# Patient Record
Sex: MALE | Race: WHITE | NOT HISPANIC OR LATINO | Employment: OTHER | URBAN - METROPOLITAN AREA
[De-identification: names, ages, dates, MRNs, and addresses within clinical notes are randomized per-mention and may not be internally consistent; named-entity substitution may affect disease eponyms.]

---

## 2017-10-17 ENCOUNTER — HOSPITAL ENCOUNTER (OUTPATIENT)
Dept: OTHER | Facility: HOSPITAL | Age: 72
Setting detail: SPECIMEN
Discharge: HOME | End: 2017-10-17
Attending: INTERNAL MEDICINE | Admitting: INTERNAL MEDICINE

## 2018-01-09 ENCOUNTER — HOSPITAL ENCOUNTER (OUTPATIENT)
Dept: PERIOP | Facility: HOSPITAL | Age: 73
Setting detail: HOSPITAL OUTPATIENT SURGERY
End: 2018-01-09
Attending: ORTHOPAEDIC SURGERY | Admitting: ORTHOPAEDIC SURGERY

## 2018-04-18 ENCOUNTER — HOSPITAL ENCOUNTER (OUTPATIENT)
Dept: LAB | Facility: HOSPITAL | Age: 73
Discharge: HOME | End: 2018-04-18
Attending: INTERNAL MEDICINE | Admitting: INTERNAL MEDICINE

## 2018-04-18 LAB
ALBUMIN SERPL-MCNC: 3.3 G/DL (ref 3.5–4.8)
ALBUMIN/GLOB SERPL: 1 {RATIO} (ref 1–1.7)
ALP SERPL-CCNC: 146 IU/L (ref 32–91)
ALT SERPL-CCNC: 38 IU/L (ref 17–63)
ANION GAP SERPL CALC-SCNC: 10.8 MMOL/L (ref 10–20)
AST SERPL-CCNC: 29 IU/L (ref 15–41)
BILIRUB SERPL-MCNC: 0.4 MG/DL (ref 0.3–1.2)
BILIRUB UR QL STRIP: NEGATIVE MG/DL
BUN SERPL-MCNC: 26 MG/DL (ref 8–20)
BUN/CREAT SERPL: 21.7 (ref 6.2–20.3)
CALCIUM SERPL-MCNC: 8.9 MG/DL (ref 8.9–10.3)
CASTS URNS QL MICRO: NORMAL /[LPF]
CHLORIDE SERPL-SCNC: 102 MMOL/L (ref 101–111)
COLOR UR: YELLOW
CONV BACTERIA IN URINE MICRO: NEGATIVE
CONV CLARITY OF URINE: CLEAR
CONV CO2: 27 MMOL/L (ref 22–32)
CONV HYALINE CASTS IN URINE MICRO: 0 /[LPF] (ref 0–5)
CONV PROTEIN IN URINE BY AUTOMATED TEST STRIP: NEGATIVE MG/DL
CONV SMALL ROUND CELLS: NORMAL /[HPF]
CONV TOTAL PROTEIN: 6.6 G/DL (ref 6.1–7.9)
CONV UROBILINOGEN IN URINE BY AUTOMATED TEST STRIP: 0.2 MG/DL
CREAT 24H UR-MCNC: 175.8 MG/DL
CREAT UR-MCNC: 1.2 MG/DL (ref 0.7–1.2)
CULTURE INDICATED?: NORMAL
ERYTHROCYTE [DISTWIDTH] IN BLOOD BY AUTOMATED COUNT: 15.9 % (ref 11.5–14.5)
GLOBULIN UR ELPH-MCNC: 3.3 G/DL (ref 2.5–3.8)
GLUCOSE SERPL-MCNC: 97 MG/DL (ref 65–99)
GLUCOSE UR QL: NEGATIVE MG/DL
HCT VFR BLD AUTO: 38.3 % (ref 40–54)
HGB BLD-MCNC: 12.4 G/DL (ref 14–18)
HGB UR QL STRIP: NEGATIVE
KETONES UR QL STRIP: NEGATIVE MG/DL
LEUKOCYTE ESTERASE UR QL STRIP: NEGATIVE
MAGNESIUM SERPL-MCNC: 1.9 MG/DL (ref 1.8–2.5)
MCH RBC QN AUTO: 25.5 PG (ref 26–32)
MCHC RBC AUTO-ENTMCNC: 32.4 G/DL (ref 32–36)
MCV RBC AUTO: 78.8 FL (ref 80–94)
NITRITE UR QL STRIP: NEGATIVE
OSMOLALITY UR: 786 MOSM/KG (ref 300–800)
PH UR STRIP.AUTO: 5 [PH] (ref 4.5–8)
PHOSPHATE SERPL-MCNC: 4.4 MG/DL (ref 2.4–4.7)
PLATELET # BLD AUTO: 342 10*3/UL (ref 150–450)
PMV BLD AUTO: 6.9 FL (ref 7.4–10.4)
POTASSIUM SERPL-SCNC: 4.8 MMOL/L (ref 3.6–5.1)
PROT UR-MCNC: 14 MG/DL
PROT/CREAT UR: 0.1 MG/MG (ref 0–22)
RBC # BLD AUTO: 4.86 10*6/UL (ref 4.6–6)
RBC #/AREA URNS HPF: 1 /[HPF] (ref 0–3)
SODIUM SERPL-SCNC: 135 MMOL/L (ref 136–144)
SODIUM UR-SCNC: 91 MMOL/L
SP GR UR: 1.03 (ref 1–1.03)
SPERM URNS QL MICRO: NORMAL /[HPF]
SQUAMOUS SPT QL MICRO: 0 /[HPF] (ref 0–5)
UNIDENT CRYS URNS QL MICRO: NORMAL /[HPF]
WBC # BLD AUTO: 7.6 10*3/UL (ref 4.5–11.5)
WBC #/AREA URNS HPF: 0 /[HPF] (ref 0–5)
YEAST SPEC QL WET PREP: NORMAL /[HPF]

## 2018-04-19 LAB — PROT PATTERN SERPL IFE-IMP: NORMAL

## 2018-04-24 LAB
KAPPA LC SERPL-MCNC: 2 MG/DL (ref 0.33–1.94)
KAPPA LC/LAMBDA SER: 0.53 {RATIO} (ref 0.26–1.65)
LAMBDA LC FREE SERPL-MCNC: 3.75 MG/DL (ref 0.57–2.63)

## 2018-06-15 ENCOUNTER — HOSPITAL ENCOUNTER (OUTPATIENT)
Dept: FAMILY MEDICINE CLINIC | Facility: CLINIC | Age: 73
Setting detail: SPECIMEN
Discharge: HOME | End: 2018-06-15
Attending: NURSE PRACTITIONER | Admitting: NURSE PRACTITIONER

## 2018-06-15 LAB
ALBUMIN SERPL-MCNC: 3.9 G/DL (ref 3.5–4.8)
ALBUMIN/GLOB SERPL: 1.1 {RATIO} (ref 1–1.7)
ALP SERPL-CCNC: 167 IU/L (ref 32–91)
ALT SERPL-CCNC: 53 IU/L (ref 17–63)
ANION GAP SERPL CALC-SCNC: 13.5 MMOL/L (ref 10–20)
AST SERPL-CCNC: 33 IU/L (ref 15–41)
BASOPHILS # BLD AUTO: 0 10*3/UL (ref 0–0.2)
BASOPHILS NFR BLD AUTO: 1 % (ref 0–2)
BILIRUB SERPL-MCNC: 0.7 MG/DL (ref 0.3–1.2)
BUN SERPL-MCNC: 30 MG/DL (ref 8–20)
BUN/CREAT SERPL: 21.4 (ref 6.2–20.3)
CALCIUM SERPL-MCNC: 9.6 MG/DL (ref 8.9–10.3)
CHLORIDE SERPL-SCNC: 99 MMOL/L (ref 101–111)
CHOLEST SERPL-MCNC: 260 MG/DL
CHOLEST/HDLC SERPL: 5.1 {RATIO}
CONV CO2: 27 MMOL/L (ref 22–32)
CONV LDL CHOLESTEROL DIRECT: 172 MG/DL (ref 0–100)
CONV MICROALBUM.,U,RANDOM: 2 MG/L
CONV TOTAL PROTEIN: 7.6 G/DL (ref 6.1–7.9)
CREAT 24H UR-MCNC: 183.6 MG/DL
CREAT UR-MCNC: 1.4 MG/DL (ref 0.7–1.2)
DIFFERENTIAL METHOD BLD: (no result)
EOSINOPHIL # BLD AUTO: 0.2 10*3/UL (ref 0–0.3)
EOSINOPHIL # BLD AUTO: 2 % (ref 0–3)
ERYTHROCYTE [DISTWIDTH] IN BLOOD BY AUTOMATED COUNT: 16.1 % (ref 11.5–14.5)
GLOBULIN UR ELPH-MCNC: 3.7 G/DL (ref 2.5–3.8)
GLUCOSE SERPL-MCNC: 92 MG/DL (ref 65–99)
HCT VFR BLD AUTO: 43.5 % (ref 40–54)
HDLC SERPL-MCNC: 51 MG/DL
HGB BLD-MCNC: 14.2 G/DL (ref 14–18)
LDLC/HDLC SERPL: 3.3 {RATIO}
LIPID INTERPRETATION: ABNORMAL
LYMPHOCYTES # BLD AUTO: 2.7 10*3/UL (ref 0.8–4.8)
LYMPHOCYTES NFR BLD AUTO: 28 % (ref 18–42)
MCH RBC QN AUTO: 26.1 PG (ref 26–32)
MCHC RBC AUTO-ENTMCNC: 32.6 G/DL (ref 32–36)
MCV RBC AUTO: 80 FL (ref 80–94)
MICROALBUMIN/CREAT UR: 1.1 UG/MG
MONOCYTES # BLD AUTO: 0.6 10*3/UL (ref 0.1–1.3)
MONOCYTES NFR BLD AUTO: 6 % (ref 2–11)
NEUTROPHILS # BLD AUTO: 6 10*3/UL (ref 2.3–8.6)
NEUTROPHILS NFR BLD AUTO: 63 % (ref 50–75)
NRBC BLD AUTO-RTO: 0 /100{WBCS}
NRBC/RBC NFR BLD MANUAL: 0 10*3/UL
PLATELET # BLD AUTO: 339 10*3/UL (ref 150–450)
PMV BLD AUTO: 7.1 FL (ref 7.4–10.4)
POTASSIUM SERPL-SCNC: 4.5 MMOL/L (ref 3.6–5.1)
RBC # BLD AUTO: 5.44 10*6/UL (ref 4.6–6)
SODIUM SERPL-SCNC: 135 MMOL/L (ref 136–144)
TRIGL SERPL-MCNC: 150 MG/DL
VLDLC SERPL CALC-MCNC: 37 MG/DL
WBC # BLD AUTO: 9.5 10*3/UL (ref 4.5–11.5)

## 2018-07-24 ENCOUNTER — CONVERSION ENCOUNTER (OUTPATIENT)
Dept: PAIN MEDICINE | Facility: CLINIC | Age: 73
End: 2018-07-24

## 2018-08-02 ENCOUNTER — HOSPITAL ENCOUNTER (OUTPATIENT)
Dept: PAIN MEDICINE | Facility: HOSPITAL | Age: 73
Discharge: HOME | End: 2018-08-02
Attending: PHYSICAL MEDICINE & REHABILITATION | Admitting: PHYSICAL MEDICINE & REHABILITATION

## 2018-08-02 LAB
AMPHETAMINES UR QL SCN: NEGATIVE
BARBITURATES UR QL SCN: NEGATIVE
BENZODIAZ UR QL SCN: NEGATIVE
BZE UR QL SCN: NEGATIVE
CREAT 24H UR-MCNC: ABNORMAL MG/DL
METHADONE UR QL SCN: NEGATIVE
OPIATE CONFIRMATION URINE: ABNORMAL
OPIATES TESTED UR SCN: ABNORMAL
PCP UR QL: NEGATIVE
THC SERPLBLD CFM-MCNC: NEGATIVE NG/ML

## 2018-08-13 ENCOUNTER — PREP FOR SURGERY (OUTPATIENT)
Dept: OTHER | Facility: HOSPITAL | Age: 73
End: 2018-08-13

## 2018-08-13 DIAGNOSIS — S72.452K: Primary | ICD-10-CM

## 2018-08-13 RX ORDER — OXYCODONE HCL 10 MG/1
20 TABLET, FILM COATED, EXTENDED RELEASE ORAL ONCE
Status: CANCELLED | OUTPATIENT
Start: 2018-08-31 | End: 2018-08-31

## 2018-08-13 RX ORDER — ACETAMINOPHEN 500 MG
1000 TABLET ORAL ONCE
Status: CANCELLED | OUTPATIENT
Start: 2018-08-31 | End: 2018-08-31

## 2018-08-13 RX ORDER — CEFAZOLIN SODIUM 2 G/100ML
2 INJECTION, SOLUTION INTRAVENOUS ONCE
Status: CANCELLED | OUTPATIENT
Start: 2018-08-31 | End: 2018-08-31

## 2018-08-16 ENCOUNTER — HOSPITAL ENCOUNTER (OUTPATIENT)
Dept: PAIN MEDICINE | Facility: HOSPITAL | Age: 73
Discharge: HOME | End: 2018-08-16
Attending: PHYSICAL MEDICINE & REHABILITATION | Admitting: PHYSICAL MEDICINE & REHABILITATION

## 2018-08-23 ENCOUNTER — HOSPITAL ENCOUNTER (OUTPATIENT)
Dept: GENERAL RADIOLOGY | Facility: HOSPITAL | Age: 73
Discharge: HOME | End: 2018-08-23

## 2018-08-23 ENCOUNTER — HOSPITAL ENCOUNTER (OUTPATIENT)
Dept: CARDIAC REHAB | Facility: HOSPITAL | Age: 73
Discharge: HOME | End: 2018-08-23
Attending: INTERNAL MEDICINE | Admitting: INTERNAL MEDICINE

## 2018-08-23 ENCOUNTER — APPOINTMENT (OUTPATIENT)
Dept: PREADMISSION TESTING | Facility: HOSPITAL | Age: 73
End: 2018-08-23

## 2018-08-23 ENCOUNTER — HOSPITAL ENCOUNTER (OUTPATIENT)
Dept: GENERAL RADIOLOGY | Facility: HOSPITAL | Age: 73
Discharge: HOME | End: 2018-08-23
Admitting: ORTHOPAEDIC SURGERY

## 2018-08-23 VITALS
TEMPERATURE: 99.4 F | BODY MASS INDEX: 31.07 KG/M2 | DIASTOLIC BLOOD PRESSURE: 77 MMHG | HEIGHT: 68 IN | OXYGEN SATURATION: 97 % | RESPIRATION RATE: 20 BRPM | SYSTOLIC BLOOD PRESSURE: 124 MMHG | WEIGHT: 205 LBS | HEART RATE: 86 BPM

## 2018-08-23 DIAGNOSIS — S72.452K: ICD-10-CM

## 2018-08-23 LAB
ALBUMIN SERPL-MCNC: 4 G/DL (ref 3.5–5.2)
ALBUMIN/GLOB SERPL: 1.3 G/DL
ALP SERPL-CCNC: 143 U/L (ref 39–117)
ALT SERPL W P-5'-P-CCNC: 40 U/L (ref 1–41)
ANION GAP SERPL CALCULATED.3IONS-SCNC: 6.9 MMOL/L
APTT PPP: 27.4 SECONDS (ref 22.7–35.4)
AST SERPL-CCNC: 24 U/L (ref 1–40)
BACTERIA UR QL AUTO: ABNORMAL /HPF
BASOPHILS # BLD AUTO: 0.02 10*3/MM3 (ref 0–0.2)
BASOPHILS NFR BLD AUTO: 0.2 % (ref 0–1.5)
BILIRUB SERPL-MCNC: 0.3 MG/DL (ref 0.1–1.2)
BILIRUB UR QL STRIP: NEGATIVE
BUN BLD-MCNC: 22 MG/DL (ref 8–23)
BUN/CREAT SERPL: 20.2 (ref 7–25)
CALCIUM SPEC-SCNC: 8.8 MG/DL (ref 8.6–10.5)
CHLORIDE SERPL-SCNC: 105 MMOL/L (ref 98–107)
CLARITY UR: CLEAR
CO2 SERPL-SCNC: 26.1 MMOL/L (ref 22–29)
COLOR UR: YELLOW
CREAT BLD-MCNC: 1.09 MG/DL (ref 0.76–1.27)
DEPRECATED RDW RBC AUTO: 45.3 FL (ref 37–54)
EOSINOPHIL # BLD AUTO: 0.19 10*3/MM3 (ref 0–0.7)
EOSINOPHIL NFR BLD AUTO: 2.3 % (ref 0.3–6.2)
ERYTHROCYTE [DISTWIDTH] IN BLOOD BY AUTOMATED COUNT: 14.4 % (ref 11.5–14.5)
GFR SERPL CREATININE-BSD FRML MDRD: 67 ML/MIN/1.73
GLOBULIN UR ELPH-MCNC: 3 GM/DL
GLUCOSE BLD-MCNC: 223 MG/DL (ref 65–99)
GLUCOSE UR STRIP-MCNC: ABNORMAL MG/DL
HBA1C MFR BLD: 7.9 % (ref 4.8–5.6)
HCT VFR BLD AUTO: 43.1 % (ref 40.4–52.2)
HGB BLD-MCNC: 13.4 G/DL (ref 13.7–17.6)
HGB UR QL STRIP.AUTO: ABNORMAL
HYALINE CASTS UR QL AUTO: ABNORMAL /LPF
IMM GRANULOCYTES # BLD: 0.02 10*3/MM3 (ref 0–0.03)
IMM GRANULOCYTES NFR BLD: 0.2 % (ref 0–0.5)
INR PPP: 1.04 (ref 0.9–1.1)
KETONES UR QL STRIP: ABNORMAL
LEUKOCYTE ESTERASE UR QL STRIP.AUTO: ABNORMAL
LYMPHOCYTES # BLD AUTO: 2.03 10*3/MM3 (ref 0.9–4.8)
LYMPHOCYTES NFR BLD AUTO: 24.6 % (ref 19.6–45.3)
MCH RBC QN AUTO: 26.9 PG (ref 27–32.7)
MCHC RBC AUTO-ENTMCNC: 31.1 G/DL (ref 32.6–36.4)
MCV RBC AUTO: 86.5 FL (ref 79.8–96.2)
MONOCYTES # BLD AUTO: 0.57 10*3/MM3 (ref 0.2–1.2)
MONOCYTES NFR BLD AUTO: 6.9 % (ref 5–12)
NEUTROPHILS # BLD AUTO: 5.41 10*3/MM3 (ref 1.9–8.1)
NEUTROPHILS NFR BLD AUTO: 65.8 % (ref 42.7–76)
NITRITE UR QL STRIP: NEGATIVE
PH UR STRIP.AUTO: <=5 [PH] (ref 5–8)
PLATELET # BLD AUTO: 295 10*3/MM3 (ref 140–500)
PMV BLD AUTO: 9.5 FL (ref 6–12)
POTASSIUM BLD-SCNC: 4.5 MMOL/L (ref 3.5–5.2)
PROT SERPL-MCNC: 7 G/DL (ref 6–8.5)
PROT UR QL STRIP: ABNORMAL
PROTHROMBIN TIME: 13.4 SECONDS (ref 11.7–14.2)
RBC # BLD AUTO: 4.98 10*6/MM3 (ref 4.6–6)
RBC # UR: ABNORMAL /HPF
REF LAB TEST METHOD: ABNORMAL
SODIUM BLD-SCNC: 138 MMOL/L (ref 136–145)
SP GR UR STRIP: 1.02 (ref 1–1.03)
SQUAMOUS #/AREA URNS HPF: ABNORMAL /HPF
UROBILINOGEN UR QL STRIP: ABNORMAL
WBC NRBC COR # BLD: 8.24 10*3/MM3 (ref 4.5–10.7)
WBC UR QL AUTO: ABNORMAL /HPF

## 2018-08-23 PROCEDURE — 85610 PROTHROMBIN TIME: CPT | Performed by: ORTHOPAEDIC SURGERY

## 2018-08-23 PROCEDURE — 83036 HEMOGLOBIN GLYCOSYLATED A1C: CPT | Performed by: ORTHOPAEDIC SURGERY

## 2018-08-23 PROCEDURE — 81001 URINALYSIS AUTO W/SCOPE: CPT | Performed by: ORTHOPAEDIC SURGERY

## 2018-08-23 PROCEDURE — 71046 X-RAY EXAM CHEST 2 VIEWS: CPT

## 2018-08-23 PROCEDURE — 85025 COMPLETE CBC W/AUTO DIFF WBC: CPT | Performed by: ORTHOPAEDIC SURGERY

## 2018-08-23 PROCEDURE — 80053 COMPREHEN METABOLIC PANEL: CPT | Performed by: ORTHOPAEDIC SURGERY

## 2018-08-23 PROCEDURE — 36415 COLL VENOUS BLD VENIPUNCTURE: CPT

## 2018-08-23 PROCEDURE — 85730 THROMBOPLASTIN TIME PARTIAL: CPT | Performed by: ORTHOPAEDIC SURGERY

## 2018-08-23 PROCEDURE — 73560 X-RAY EXAM OF KNEE 1 OR 2: CPT

## 2018-08-23 RX ORDER — TAMSULOSIN HYDROCHLORIDE 0.4 MG/1
1 CAPSULE ORAL NIGHTLY
COMMUNITY
End: 2019-06-25 | Stop reason: SDUPTHER

## 2018-08-23 RX ORDER — SODIUM CHLORIDE 1000 MG
1 TABLET, SOLUBLE MISCELLANEOUS 2 TIMES DAILY
COMMUNITY
End: 2019-04-08

## 2018-08-23 RX ORDER — GLIPIZIDE 5 MG/1
5 TABLET ORAL EVERY MORNING
COMMUNITY
End: 2019-09-10 | Stop reason: SDUPTHER

## 2018-08-23 RX ORDER — MEMANTINE HYDROCHLORIDE 5 MG/1
5 TABLET ORAL 2 TIMES DAILY
COMMUNITY
End: 2020-08-26

## 2018-08-23 RX ORDER — LEVOTHYROXINE SODIUM 0.05 MG/1
50 TABLET ORAL EVERY MORNING
COMMUNITY
End: 2019-12-10 | Stop reason: ALTCHOICE

## 2018-08-23 RX ORDER — TRAZODONE HYDROCHLORIDE 50 MG/1
50 TABLET ORAL AS NEEDED
COMMUNITY
End: 2019-06-25 | Stop reason: SDUPTHER

## 2018-08-23 RX ORDER — MAGNESIUM OXIDE 400 MG/1
400 TABLET ORAL 2 TIMES DAILY
COMMUNITY
End: 2019-10-23 | Stop reason: SDUPTHER

## 2018-08-23 RX ORDER — METOPROLOL SUCCINATE 25 MG/1
25 TABLET, EXTENDED RELEASE ORAL EVERY MORNING
COMMUNITY
End: 2019-12-31

## 2018-08-23 RX ORDER — ASPIRIN 325 MG
325 TABLET, DELAYED RELEASE (ENTERIC COATED) ORAL 3 TIMES WEEKLY
COMMUNITY
End: 2018-09-06 | Stop reason: HOSPADM

## 2018-08-23 RX ORDER — ARIPIPRAZOLE 20 MG/1
20 TABLET ORAL EVERY MORNING
COMMUNITY
End: 2019-06-25 | Stop reason: ALTCHOICE

## 2018-08-23 ASSESSMENT — KOOS JR
KOOS JR SCORE: 47.487
KOOS JR SCORE: 16

## 2018-08-23 NOTE — DISCHARGE INSTRUCTIONS
Take the following medications the morning of surgery with a small sip of water:  metoprolol        General Instructions:  • Do not eat solid food after midnight the night before surgery.  • You may drink clear liquids day of surgery but must stop at least one hour before your hospital arrival time.  • It is beneficial for you to have a clear drink that contains carbohydrates the day of surgery.  We suggest a 12 to 20 ounce bottle of Gatorade or Powerade for non-diabetic patients or a 12 to 20 ounce bottle of G2 or Powerade Zero for diabetic patients. (Pediatric patients, are not advised to drink a 12 to 20 ounce carbohydrate drink)    Clear liquids are liquids you can see through.  Nothing red in color.     Plain water                               Sports drinks  Sodas                                   Gelatin (Jell-O)  Fruit juices without pulp such as white grape juice and apple juice  Popsicles that contain no fruit or yogurt  Tea or coffee (no cream or milk added)  Gatorade / Powerade  G2 / Powerade Zero    • Infants may have breast milk up to four hours before surgery.  • Infants drinking formula may drink formula up to six hours before surgery.   • Patients who avoid smoking, chewing tobacco and alcohol for 4 weeks prior to surgery have a reduced risk of post-operative complications.  Quit smoking as many days before surgery as you can.  • Do not smoke, use chewing tobacco or drink alcohol the day of surgery.   • If applicable bring your C-PAP/ BI-PAP machine.  • Bring any papers given to you in the doctor’s office.  • Wear clean comfortable clothes and socks.  • Do not wear contact lenses or make-up.  Bring a case for your glasses.   • Bring crutches or walker if applicable.  • Remove all piercings.  Leave jewelry and any other valuables at home.  • Hair extensions with metal clips must be removed prior to surgery.  • The Pre-Admission Testing nurse will instruct you to bring medications if unable to obtain  an accurate list in Pre-Admission Testing.        If you were given a blood bank ID arm band remember to bring it with you the day of surgery.    Preventing a Surgical Site Infection:  • For 2 to 3 days before surgery, avoid shaving with a razor because the razor can irritate skin and make it easier to develop an infection.    • Any areas of open skin can increase the risk of a post-operative wound infection by allowing bacteria to enter and travel throughout the body.  Notify your surgeon if you have any skin wounds / rashes even if it is not near the expected surgical site.  The area will need assessed to determine if surgery should be delayed until it is healed.  • The night prior to surgery sleep in a clean bed with clean clothing.  Do not allow pets to sleep with you.  • Shower on the morning of surgery using a fresh bar of anti-bacterial soap (such as Dial) and clean washcloth.  Dry with a clean towel and dress in clean clothing.  • Ask your surgeon if you will be receiving antibiotics prior to surgery.  • Make sure you, your family, and all healthcare providers clean their hands with soap and water or an alcohol based hand  before caring for you or your wound.    Day of surgery:  Upon arrival, a Pre-op nurse and Anesthesiologist will review your health history, obtain vital signs, and answer questions you may have.  The only belongings needed at this time will be your home medications and if applicable your C-PAP/BI-PAP machine.  If you are staying overnight your family can leave the rest of your belongings in the car and bring them to your room later.  A Pre-op nurse will start an IV and you may receive medication in preparation for surgery, including something to help you relax.  Your family will be able to see you in the Pre-op area.  While you are in surgery your family should notify the waiting room  if they leave the waiting room area and provide a contact phone number.    Please be  aware that surgery does come with discomfort.  We want to make every effort to control your discomfort so please discuss any uncontrolled symptoms with your nurse.   Your doctor will most likely have prescribed pain medications.      If you are going home after surgery you will receive individualized written care instructions before being discharged.  A responsible adult must drive you to and from the hospital on the day of your surgery and stay with you for 24 hours.    If you are staying overnight following surgery, you will be transported to your hospital room following the recovery period.  [facility] has all private rooms.    You have received a list of surgical assistants for your reference.  If you have any questions please call Pre-Admission Testing at [phone].  Deductibles and co-payments are collected on the day of service. Please be prepared to pay the required co-pay, deductible or deposit on the day of service as defined by your plan.    2% CHLORAHEXIDINE GLUCONATE* CLOTH  Preparing or “prepping” skin before surgery can reduce the risk of infection at the surgical site. To make the process easier, [facility] has chosen disposable cloths moistened with a rinse-free, 2% Chlorhexidine Gluconate (CHG) antiseptic solution. The steps below outline the prepping process and should be carefully followed.        Use the prep cloth on the area that is circled in the diagram             Directions Night before Surgery  1) Shower using a fresh bar of anti-bacterial soap (such as Dial) and clean washcloth.  Use a clean towel to completely dry your skin.  2) Do not use any lotions, oils or creams on your skin.  3) Open the package and remove 1 cloth, wipe your skin for 30 seconds in a circular motion.  Allow to dry for 3 minutes.  4) Repeat #3 with second cloth.  5) Do not touch your eyes, ears, or mouth with the prep cloth.  6) Allow the wet prep solution to air dry.  7) Discard the  prep cloth and wash your hands with soap and water.   8) Dress in clean bed clothes and sleep on fresh clean bed sheets.   9) You may experience some temporary itching after the prep.    Directions Day of Surgery  1) Repeat steps 1,2,3,4,5,6,7, and 9.   2) Dress in clean clothes before coming to the hospital.    BACTROBAN NASAL OINTMENT  There are many germs normally in your nose. Bactroban is an ointment that will help reduce these germs. Please follow these instructions for Bactroban use:      ____The day before surgery in the morning  Date________    ____The day before surgery in the evening              Date________    ____The day of surgery in the morning    Date________    **Squirt ½ package of Bactroban Ointment onto a cotton applicator and apply to inside of 1st nostril.  Squirt the remaining Bactroban and apply to the inside of the other nostril.    PERIDEX- ORAL:  Use only if your surgeon has ordered  Use the night before and morning of surgery - Swish, gargle, and spit - do not swallow.

## 2018-08-31 ENCOUNTER — APPOINTMENT (OUTPATIENT)
Dept: GENERAL RADIOLOGY | Facility: HOSPITAL | Age: 73
End: 2018-08-31

## 2018-08-31 ENCOUNTER — ANESTHESIA (OUTPATIENT)
Dept: PERIOP | Facility: HOSPITAL | Age: 73
End: 2018-08-31

## 2018-08-31 ENCOUNTER — ANESTHESIA EVENT (OUTPATIENT)
Dept: PERIOP | Facility: HOSPITAL | Age: 73
End: 2018-08-31

## 2018-08-31 ENCOUNTER — HOSPITAL ENCOUNTER (INPATIENT)
Facility: HOSPITAL | Age: 73
LOS: 6 days | Discharge: NURSING, REHAB OR LONG-TERM CARE | End: 2018-09-06
Attending: ORTHOPAEDIC SURGERY | Admitting: ORTHOPAEDIC SURGERY

## 2018-08-31 DIAGNOSIS — Z74.09 IMPAIRED FUNCTIONAL MOBILITY, BALANCE, GAIT, AND ENDURANCE: Primary | ICD-10-CM

## 2018-08-31 DIAGNOSIS — S72.452K: ICD-10-CM

## 2018-08-31 PROBLEM — N40.0 BPH (BENIGN PROSTATIC HYPERPLASIA): Status: ACTIVE | Noted: 2018-08-31

## 2018-08-31 PROBLEM — I10 ESSENTIAL HYPERTENSION: Status: ACTIVE | Noted: 2018-08-31

## 2018-08-31 PROBLEM — E11.9 DM (DIABETES MELLITUS), TYPE 2 (HCC): Status: ACTIVE | Noted: 2018-08-31

## 2018-08-31 PROBLEM — E03.9 HYPOTHYROIDISM: Status: ACTIVE | Noted: 2018-08-31

## 2018-08-31 LAB
GLUCOSE BLDC GLUCOMTR-MCNC: 117 MG/DL (ref 70–130)
GLUCOSE BLDC GLUCOMTR-MCNC: 124 MG/DL (ref 70–130)
GLUCOSE BLDC GLUCOMTR-MCNC: 126 MG/DL (ref 70–130)
GLUCOSE BLDC GLUCOMTR-MCNC: 188 MG/DL (ref 70–130)

## 2018-08-31 PROCEDURE — 25010000003 CEFAZOLIN IN DEXTROSE 2-4 GM/100ML-% SOLUTION: Performed by: ORTHOPAEDIC SURGERY

## 2018-08-31 PROCEDURE — C1713 ANCHOR/SCREW BN/BN,TIS/BN: HCPCS | Performed by: ORTHOPAEDIC SURGERY

## 2018-08-31 PROCEDURE — 25010000002 ALBUMIN HUMAN 5% PER 50 ML: Performed by: NURSE ANESTHETIST, CERTIFIED REGISTERED

## 2018-08-31 PROCEDURE — C1776 JOINT DEVICE (IMPLANTABLE): HCPCS | Performed by: ORTHOPAEDIC SURGERY

## 2018-08-31 PROCEDURE — 63710000001 INSULIN DETEMIR PER 5 UNITS: Performed by: INTERNAL MEDICINE

## 2018-08-31 PROCEDURE — 73560 X-RAY EXAM OF KNEE 1 OR 2: CPT

## 2018-08-31 PROCEDURE — 82962 GLUCOSE BLOOD TEST: CPT

## 2018-08-31 PROCEDURE — 25010000002 FENTANYL CITRATE (PF) 100 MCG/2ML SOLUTION: Performed by: ANESTHESIOLOGY

## 2018-08-31 PROCEDURE — 63710000001 INSULIN ASPART PER 5 UNITS: Performed by: INTERNAL MEDICINE

## 2018-08-31 PROCEDURE — 25010000002 ONDANSETRON PER 1 MG: Performed by: NURSE ANESTHETIST, CERTIFIED REGISTERED

## 2018-08-31 PROCEDURE — 25010000002 HYDROMORPHONE PER 4 MG: Performed by: ORTHOPAEDIC SURGERY

## 2018-08-31 PROCEDURE — 25010000002 PROPOFOL 10 MG/ML EMULSION: Performed by: NURSE ANESTHETIST, CERTIFIED REGISTERED

## 2018-08-31 PROCEDURE — 0QPC04Z REMOVAL OF INTERNAL FIXATION DEVICE FROM LEFT LOWER FEMUR, OPEN APPROACH: ICD-10-PCS | Performed by: ORTHOPAEDIC SURGERY

## 2018-08-31 PROCEDURE — 25010000002 FENTANYL CITRATE (PF) 100 MCG/2ML SOLUTION: Performed by: NURSE ANESTHETIST, CERTIFIED REGISTERED

## 2018-08-31 PROCEDURE — 97162 PT EVAL MOD COMPLEX 30 MIN: CPT

## 2018-08-31 PROCEDURE — 25010000002 MIDAZOLAM PER 1 MG: Performed by: ANESTHESIOLOGY

## 2018-08-31 PROCEDURE — P9041 ALBUMIN (HUMAN),5%, 50ML: HCPCS | Performed by: NURSE ANESTHETIST, CERTIFIED REGISTERED

## 2018-08-31 PROCEDURE — 25010000002 PHENYLEPHRINE PER 1 ML: Performed by: NURSE ANESTHETIST, CERTIFIED REGISTERED

## 2018-08-31 PROCEDURE — 25010000002 VANCOMYCIN 10 G RECONSTITUTED SOLUTION: Performed by: ORTHOPAEDIC SURGERY

## 2018-08-31 PROCEDURE — 97110 THERAPEUTIC EXERCISES: CPT

## 2018-08-31 PROCEDURE — 0SRD0J9 REPLACEMENT OF LEFT KNEE JOINT WITH SYNTHETIC SUBSTITUTE, CEMENTED, OPEN APPROACH: ICD-10-PCS | Performed by: ORTHOPAEDIC SURGERY

## 2018-08-31 DEVICE — IMPLANTABLE DEVICE: Type: IMPLANTABLE DEVICE | Site: KNEE | Status: FUNCTIONAL

## 2018-08-31 DEVICE — AXLE KN DURATION MRH: Type: IMPLANTABLE DEVICE | Site: KNEE | Status: FUNCTIONAL

## 2018-08-31 DEVICE — SLV TIB DURATION MRH ROTAT: Type: IMPLANTABLE DEVICE | Site: KNEE | Status: FUNCTIONAL

## 2018-08-31 DEVICE — BUSHING FEM DURATION MRH STD: Type: IMPLANTABLE DEVICE | Site: KNEE | Status: FUNCTIONAL

## 2018-08-31 DEVICE — CMT BONE SIMPLEX/P FULL DOSE 10/PK: Type: IMPLANTABLE DEVICE | Site: KNEE | Status: FUNCTIONAL

## 2018-08-31 DEVICE — COMP TIB ROTAT MRH: Type: IMPLANTABLE DEVICE | Site: KNEE | Status: FUNCTIONAL

## 2018-08-31 RX ORDER — EPHEDRINE SULFATE 50 MG/ML
5 INJECTION, SOLUTION INTRAVENOUS ONCE AS NEEDED
Status: DISCONTINUED | OUTPATIENT
Start: 2018-08-31 | End: 2018-08-31 | Stop reason: HOSPADM

## 2018-08-31 RX ORDER — FENTANYL CITRATE 50 UG/ML
50 INJECTION, SOLUTION INTRAMUSCULAR; INTRAVENOUS
Status: DISCONTINUED | OUTPATIENT
Start: 2018-08-31 | End: 2018-08-31 | Stop reason: HOSPADM

## 2018-08-31 RX ORDER — MAGNESIUM OXIDE 400 MG/1
400 TABLET ORAL 2 TIMES DAILY
Status: DISCONTINUED | OUTPATIENT
Start: 2018-08-31 | End: 2018-09-06 | Stop reason: HOSPADM

## 2018-08-31 RX ORDER — HYDROCODONE BITARTRATE AND ACETAMINOPHEN 7.5; 325 MG/1; MG/1
2 TABLET ORAL EVERY 4 HOURS PRN
Status: DISCONTINUED | OUTPATIENT
Start: 2018-08-31 | End: 2018-09-06 | Stop reason: HOSPADM

## 2018-08-31 RX ORDER — ONDANSETRON 4 MG/1
4 TABLET, ORALLY DISINTEGRATING ORAL EVERY 6 HOURS PRN
Status: DISCONTINUED | OUTPATIENT
Start: 2018-08-31 | End: 2018-09-06 | Stop reason: HOSPADM

## 2018-08-31 RX ORDER — SODIUM CHLORIDE 9 MG/ML
100 INJECTION, SOLUTION INTRAVENOUS CONTINUOUS
Status: ACTIVE | OUTPATIENT
Start: 2018-08-31 | End: 2018-09-02

## 2018-08-31 RX ORDER — NICOTINE POLACRILEX 4 MG
15 LOZENGE BUCCAL
Status: DISCONTINUED | OUTPATIENT
Start: 2018-08-31 | End: 2018-09-06 | Stop reason: HOSPADM

## 2018-08-31 RX ORDER — FLUMAZENIL 0.1 MG/ML
0.2 INJECTION INTRAVENOUS AS NEEDED
Status: DISCONTINUED | OUTPATIENT
Start: 2018-08-31 | End: 2018-08-31 | Stop reason: HOSPADM

## 2018-08-31 RX ORDER — ALBUMIN, HUMAN INJ 5% 5 %
SOLUTION INTRAVENOUS CONTINUOUS PRN
Status: DISCONTINUED | OUTPATIENT
Start: 2018-08-31 | End: 2018-08-31 | Stop reason: SURG

## 2018-08-31 RX ORDER — PROMETHAZINE HYDROCHLORIDE 25 MG/ML
12.5 INJECTION, SOLUTION INTRAMUSCULAR; INTRAVENOUS ONCE AS NEEDED
Status: DISCONTINUED | OUTPATIENT
Start: 2018-08-31 | End: 2018-08-31 | Stop reason: HOSPADM

## 2018-08-31 RX ORDER — TRAZODONE HYDROCHLORIDE 50 MG/1
50 TABLET ORAL NIGHTLY PRN
Status: DISCONTINUED | OUTPATIENT
Start: 2018-08-31 | End: 2018-09-06 | Stop reason: HOSPADM

## 2018-08-31 RX ORDER — FENTANYL CITRATE 50 UG/ML
INJECTION, SOLUTION INTRAMUSCULAR; INTRAVENOUS AS NEEDED
Status: DISCONTINUED | OUTPATIENT
Start: 2018-08-31 | End: 2018-08-31 | Stop reason: SURG

## 2018-08-31 RX ORDER — MIDAZOLAM HYDROCHLORIDE 1 MG/ML
1 INJECTION INTRAMUSCULAR; INTRAVENOUS
Status: DISCONTINUED | OUTPATIENT
Start: 2018-08-31 | End: 2018-08-31 | Stop reason: HOSPADM

## 2018-08-31 RX ORDER — MEMANTINE HYDROCHLORIDE 5 MG/1
5 TABLET ORAL DAILY
Status: DISCONTINUED | OUTPATIENT
Start: 2018-08-31 | End: 2018-09-06 | Stop reason: HOSPADM

## 2018-08-31 RX ORDER — SODIUM CHLORIDE 0.9 % (FLUSH) 0.9 %
1-10 SYRINGE (ML) INJECTION AS NEEDED
Status: DISCONTINUED | OUTPATIENT
Start: 2018-08-31 | End: 2018-08-31 | Stop reason: HOSPADM

## 2018-08-31 RX ORDER — ROCURONIUM BROMIDE 10 MG/ML
INJECTION, SOLUTION INTRAVENOUS AS NEEDED
Status: DISCONTINUED | OUTPATIENT
Start: 2018-08-31 | End: 2018-08-31 | Stop reason: SURG

## 2018-08-31 RX ORDER — CEFAZOLIN SODIUM 2 G/100ML
2 INJECTION, SOLUTION INTRAVENOUS ONCE
Status: COMPLETED | OUTPATIENT
Start: 2018-08-31 | End: 2018-08-31

## 2018-08-31 RX ORDER — ONDANSETRON 2 MG/ML
INJECTION INTRAMUSCULAR; INTRAVENOUS AS NEEDED
Status: DISCONTINUED | OUTPATIENT
Start: 2018-08-31 | End: 2018-08-31 | Stop reason: SURG

## 2018-08-31 RX ORDER — ARIPIPRAZOLE 10 MG/1
20 TABLET ORAL EVERY MORNING
Status: DISCONTINUED | OUTPATIENT
Start: 2018-09-01 | End: 2018-09-06 | Stop reason: HOSPADM

## 2018-08-31 RX ORDER — UREA 10 %
1 LOTION (ML) TOPICAL NIGHTLY PRN
Status: DISCONTINUED | OUTPATIENT
Start: 2018-08-31 | End: 2018-09-06 | Stop reason: HOSPADM

## 2018-08-31 RX ORDER — DIPHENHYDRAMINE HYDROCHLORIDE 50 MG/ML
25 INJECTION INTRAMUSCULAR; INTRAVENOUS EVERY 6 HOURS PRN
Status: DISCONTINUED | OUTPATIENT
Start: 2018-08-31 | End: 2018-09-06 | Stop reason: HOSPADM

## 2018-08-31 RX ORDER — BISACODYL 5 MG/1
10 TABLET, DELAYED RELEASE ORAL DAILY PRN
Status: DISCONTINUED | OUTPATIENT
Start: 2018-08-31 | End: 2018-09-06 | Stop reason: HOSPADM

## 2018-08-31 RX ORDER — FAMOTIDINE 10 MG/ML
20 INJECTION, SOLUTION INTRAVENOUS ONCE
Status: COMPLETED | OUTPATIENT
Start: 2018-08-31 | End: 2018-08-31

## 2018-08-31 RX ORDER — SODIUM CHLORIDE 1000 MG
1 TABLET, SOLUBLE MISCELLANEOUS 2 TIMES DAILY
Status: DISCONTINUED | OUTPATIENT
Start: 2018-08-31 | End: 2018-09-06 | Stop reason: HOSPADM

## 2018-08-31 RX ORDER — ASPIRIN 325 MG
325 TABLET, DELAYED RELEASE (ENTERIC COATED) ORAL EVERY 12 HOURS SCHEDULED
Status: DISCONTINUED | OUTPATIENT
Start: 2018-09-01 | End: 2018-09-06 | Stop reason: HOSPADM

## 2018-08-31 RX ORDER — DOCUSATE SODIUM 100 MG/1
100 CAPSULE, LIQUID FILLED ORAL 2 TIMES DAILY
Status: DISCONTINUED | OUTPATIENT
Start: 2018-08-31 | End: 2018-09-06 | Stop reason: HOSPADM

## 2018-08-31 RX ORDER — TRANEXAMIC ACID 100 MG/ML
INJECTION, SOLUTION INTRAVENOUS AS NEEDED
Status: DISCONTINUED | OUTPATIENT
Start: 2018-08-31 | End: 2018-08-31 | Stop reason: SURG

## 2018-08-31 RX ORDER — PROPOFOL 10 MG/ML
VIAL (ML) INTRAVENOUS AS NEEDED
Status: DISCONTINUED | OUTPATIENT
Start: 2018-08-31 | End: 2018-08-31 | Stop reason: SURG

## 2018-08-31 RX ORDER — SODIUM CHLORIDE, SODIUM LACTATE, POTASSIUM CHLORIDE, CALCIUM CHLORIDE 600; 310; 30; 20 MG/100ML; MG/100ML; MG/100ML; MG/100ML
9 INJECTION, SOLUTION INTRAVENOUS CONTINUOUS
Status: DISCONTINUED | OUTPATIENT
Start: 2018-08-31 | End: 2018-08-31 | Stop reason: HOSPADM

## 2018-08-31 RX ORDER — ALBUTEROL SULFATE 2.5 MG/3ML
2.5 SOLUTION RESPIRATORY (INHALATION) ONCE AS NEEDED
Status: DISCONTINUED | OUTPATIENT
Start: 2018-08-31 | End: 2018-08-31 | Stop reason: HOSPADM

## 2018-08-31 RX ORDER — DIPHENHYDRAMINE HCL 25 MG
25 CAPSULE ORAL EVERY 6 HOURS PRN
Status: DISCONTINUED | OUTPATIENT
Start: 2018-08-31 | End: 2018-09-06 | Stop reason: HOSPADM

## 2018-08-31 RX ORDER — CEFAZOLIN SODIUM 2 G/100ML
2 INJECTION, SOLUTION INTRAVENOUS EVERY 8 HOURS
Status: COMPLETED | OUTPATIENT
Start: 2018-08-31 | End: 2018-09-01

## 2018-08-31 RX ORDER — LABETALOL HYDROCHLORIDE 5 MG/ML
5 INJECTION, SOLUTION INTRAVENOUS
Status: DISCONTINUED | OUTPATIENT
Start: 2018-08-31 | End: 2018-08-31 | Stop reason: HOSPADM

## 2018-08-31 RX ORDER — LIDOCAINE HYDROCHLORIDE 20 MG/ML
INJECTION, SOLUTION INFILTRATION; PERINEURAL AS NEEDED
Status: DISCONTINUED | OUTPATIENT
Start: 2018-08-31 | End: 2018-08-31 | Stop reason: SURG

## 2018-08-31 RX ORDER — HYDROCODONE BITARTRATE AND ACETAMINOPHEN 7.5; 325 MG/1; MG/1
1 TABLET ORAL EVERY 4 HOURS PRN
Status: DISCONTINUED | OUTPATIENT
Start: 2018-08-31 | End: 2018-09-06 | Stop reason: HOSPADM

## 2018-08-31 RX ORDER — GLIPIZIDE 5 MG/1
5 TABLET ORAL EVERY MORNING
Status: DISCONTINUED | OUTPATIENT
Start: 2018-09-01 | End: 2018-08-31

## 2018-08-31 RX ORDER — SODIUM CHLORIDE, SODIUM LACTATE, POTASSIUM CHLORIDE, CALCIUM CHLORIDE 600; 310; 30; 20 MG/100ML; MG/100ML; MG/100ML; MG/100ML
100 INJECTION, SOLUTION INTRAVENOUS CONTINUOUS
Status: DISCONTINUED | OUTPATIENT
Start: 2018-08-31 | End: 2018-09-06 | Stop reason: HOSPADM

## 2018-08-31 RX ORDER — BISACODYL 10 MG
10 SUPPOSITORY, RECTAL RECTAL DAILY PRN
Status: DISCONTINUED | OUTPATIENT
Start: 2018-08-31 | End: 2018-09-06 | Stop reason: HOSPADM

## 2018-08-31 RX ORDER — ACETAMINOPHEN 500 MG
1000 TABLET ORAL ONCE
Status: COMPLETED | OUTPATIENT
Start: 2018-08-31 | End: 2018-08-31

## 2018-08-31 RX ORDER — ACETAMINOPHEN 325 MG/1
325 TABLET ORAL EVERY 4 HOURS PRN
Status: DISCONTINUED | OUTPATIENT
Start: 2018-08-31 | End: 2018-09-06 | Stop reason: HOSPADM

## 2018-08-31 RX ORDER — ONDANSETRON 4 MG/1
4 TABLET, FILM COATED ORAL EVERY 6 HOURS PRN
Status: DISCONTINUED | OUTPATIENT
Start: 2018-08-31 | End: 2018-09-06 | Stop reason: HOSPADM

## 2018-08-31 RX ORDER — HYDROCODONE BITARTRATE AND ACETAMINOPHEN 7.5; 325 MG/1; MG/1
1 TABLET ORAL ONCE AS NEEDED
Status: DISCONTINUED | OUTPATIENT
Start: 2018-08-31 | End: 2018-08-31 | Stop reason: HOSPADM

## 2018-08-31 RX ORDER — LIDOCAINE HYDROCHLORIDE 10 MG/ML
0.5 INJECTION, SOLUTION EPIDURAL; INFILTRATION; INTRACAUDAL; PERINEURAL ONCE AS NEEDED
Status: DISCONTINUED | OUTPATIENT
Start: 2018-08-31 | End: 2018-08-31 | Stop reason: HOSPADM

## 2018-08-31 RX ORDER — LEVOTHYROXINE SODIUM 0.05 MG/1
50 TABLET ORAL EVERY MORNING
Status: DISCONTINUED | OUTPATIENT
Start: 2018-09-01 | End: 2018-09-06 | Stop reason: HOSPADM

## 2018-08-31 RX ORDER — DEXTROSE MONOHYDRATE 25 G/50ML
25 INJECTION, SOLUTION INTRAVENOUS
Status: DISCONTINUED | OUTPATIENT
Start: 2018-08-31 | End: 2018-09-06 | Stop reason: HOSPADM

## 2018-08-31 RX ORDER — TAMSULOSIN HYDROCHLORIDE 0.4 MG/1
0.4 CAPSULE ORAL NIGHTLY
Status: DISCONTINUED | OUTPATIENT
Start: 2018-08-31 | End: 2018-09-06 | Stop reason: HOSPADM

## 2018-08-31 RX ORDER — DIPHENHYDRAMINE HYDROCHLORIDE 50 MG/ML
12.5 INJECTION INTRAMUSCULAR; INTRAVENOUS
Status: DISCONTINUED | OUTPATIENT
Start: 2018-08-31 | End: 2018-08-31 | Stop reason: HOSPADM

## 2018-08-31 RX ORDER — MIDAZOLAM HYDROCHLORIDE 1 MG/ML
2 INJECTION INTRAMUSCULAR; INTRAVENOUS
Status: DISCONTINUED | OUTPATIENT
Start: 2018-08-31 | End: 2018-08-31 | Stop reason: HOSPADM

## 2018-08-31 RX ORDER — NALOXONE HCL 0.4 MG/ML
0.2 VIAL (ML) INJECTION AS NEEDED
Status: DISCONTINUED | OUTPATIENT
Start: 2018-08-31 | End: 2018-08-31 | Stop reason: HOSPADM

## 2018-08-31 RX ORDER — METOPROLOL SUCCINATE 25 MG/1
25 TABLET, EXTENDED RELEASE ORAL ONCE
Status: COMPLETED | OUTPATIENT
Start: 2018-08-31 | End: 2018-08-31

## 2018-08-31 RX ORDER — OXYCODONE HCL 10 MG/1
20 TABLET, FILM COATED, EXTENDED RELEASE ORAL ONCE
Status: COMPLETED | OUTPATIENT
Start: 2018-08-31 | End: 2018-08-31

## 2018-08-31 RX ORDER — ONDANSETRON 2 MG/ML
4 INJECTION INTRAMUSCULAR; INTRAVENOUS ONCE AS NEEDED
Status: DISCONTINUED | OUTPATIENT
Start: 2018-08-31 | End: 2018-08-31 | Stop reason: HOSPADM

## 2018-08-31 RX ORDER — PROMETHAZINE HYDROCHLORIDE 25 MG/1
25 TABLET ORAL ONCE AS NEEDED
Status: DISCONTINUED | OUTPATIENT
Start: 2018-08-31 | End: 2018-08-31 | Stop reason: HOSPADM

## 2018-08-31 RX ORDER — NALOXONE HCL 0.4 MG/ML
0.1 VIAL (ML) INJECTION
Status: DISCONTINUED | OUTPATIENT
Start: 2018-08-31 | End: 2018-09-01

## 2018-08-31 RX ORDER — METOPROLOL SUCCINATE 25 MG/1
25 TABLET, EXTENDED RELEASE ORAL EVERY MORNING
Status: DISCONTINUED | OUTPATIENT
Start: 2018-09-01 | End: 2018-09-01

## 2018-08-31 RX ORDER — ONDANSETRON 2 MG/ML
4 INJECTION INTRAMUSCULAR; INTRAVENOUS EVERY 6 HOURS PRN
Status: DISCONTINUED | OUTPATIENT
Start: 2018-08-31 | End: 2018-09-06 | Stop reason: HOSPADM

## 2018-08-31 RX ORDER — PROMETHAZINE HYDROCHLORIDE 25 MG/1
25 SUPPOSITORY RECTAL ONCE AS NEEDED
Status: DISCONTINUED | OUTPATIENT
Start: 2018-08-31 | End: 2018-08-31 | Stop reason: HOSPADM

## 2018-08-31 RX ADMIN — FENTANYL CITRATE 50 MCG: 50 INJECTION INTRAMUSCULAR; INTRAVENOUS at 09:26

## 2018-08-31 RX ADMIN — PROPOFOL 150 MG: 10 INJECTION, EMULSION INTRAVENOUS at 09:34

## 2018-08-31 RX ADMIN — PHENYLEPHRINE HYDROCHLORIDE 100 MCG: 10 INJECTION INTRAVENOUS at 12:28

## 2018-08-31 RX ADMIN — Medication 1 G: at 21:08

## 2018-08-31 RX ADMIN — PHENYLEPHRINE HYDROCHLORIDE 100 MCG: 10 INJECTION INTRAVENOUS at 09:38

## 2018-08-31 RX ADMIN — VANCOMYCIN HYDROCHLORIDE 1250 MG: 10 INJECTION, POWDER, LYOPHILIZED, FOR SOLUTION INTRAVENOUS at 08:30

## 2018-08-31 RX ADMIN — FENTANYL CITRATE 50 MCG: 50 INJECTION INTRAMUSCULAR; INTRAVENOUS at 14:22

## 2018-08-31 RX ADMIN — LIDOCAINE HYDROCHLORIDE 80 MG: 20 INJECTION, SOLUTION INFILTRATION; PERINEURAL at 09:34

## 2018-08-31 RX ADMIN — HYDROMORPHONE HYDROCHLORIDE 0.5 MG: 1 INJECTION, SOLUTION INTRAMUSCULAR; INTRAVENOUS; SUBCUTANEOUS at 16:09

## 2018-08-31 RX ADMIN — FENTANYL CITRATE 50 MCG: 50 INJECTION, SOLUTION INTRAMUSCULAR; INTRAVENOUS at 08:26

## 2018-08-31 RX ADMIN — PHENYLEPHRINE HYDROCHLORIDE 100 MCG: 10 INJECTION INTRAVENOUS at 10:43

## 2018-08-31 RX ADMIN — INSULIN ASPART 2 UNITS: 100 INJECTION, SOLUTION INTRAVENOUS; SUBCUTANEOUS at 21:07

## 2018-08-31 RX ADMIN — SUGAMMADEX 400 MG: 100 INJECTION, SOLUTION INTRAVENOUS at 13:13

## 2018-08-31 RX ADMIN — ROCURONIUM BROMIDE 10 MG: 10 INJECTION INTRAVENOUS at 11:20

## 2018-08-31 RX ADMIN — METOPROLOL SUCCINATE 25 MG: 25 TABLET, FILM COATED, EXTENDED RELEASE ORAL at 22:49

## 2018-08-31 RX ADMIN — ROCURONIUM BROMIDE 50 MG: 10 INJECTION INTRAVENOUS at 09:34

## 2018-08-31 RX ADMIN — SODIUM CHLORIDE, POTASSIUM CHLORIDE, SODIUM LACTATE AND CALCIUM CHLORIDE: 600; 310; 30; 20 INJECTION, SOLUTION INTRAVENOUS at 07:01

## 2018-08-31 RX ADMIN — FENTANYL CITRATE 50 MCG: 50 INJECTION INTRAMUSCULAR; INTRAVENOUS at 14:27

## 2018-08-31 RX ADMIN — PHENYLEPHRINE HYDROCHLORIDE 1 MCG/KG/MIN: 10 INJECTION, SOLUTION INTRAMUSCULAR; INTRAVENOUS; SUBCUTANEOUS at 11:26

## 2018-08-31 RX ADMIN — FENTANYL CITRATE 50 MCG: 50 INJECTION INTRAMUSCULAR; INTRAVENOUS at 09:34

## 2018-08-31 RX ADMIN — PHENYLEPHRINE HYDROCHLORIDE 100 MCG: 10 INJECTION INTRAVENOUS at 11:18

## 2018-08-31 RX ADMIN — PHENYLEPHRINE HYDROCHLORIDE 100 MCG: 10 INJECTION INTRAVENOUS at 11:13

## 2018-08-31 RX ADMIN — FENTANYL CITRATE 50 MCG: 50 INJECTION INTRAMUSCULAR; INTRAVENOUS at 14:02

## 2018-08-31 RX ADMIN — TRANEXAMIC ACID 1000 MG: 100 INJECTION, SOLUTION INTRAVENOUS at 09:40

## 2018-08-31 RX ADMIN — ACETAMINOPHEN 1000 MG: 500 TABLET, FILM COATED ORAL at 07:02

## 2018-08-31 RX ADMIN — PHENYLEPHRINE HYDROCHLORIDE 100 MCG: 10 INJECTION INTRAVENOUS at 10:58

## 2018-08-31 RX ADMIN — ROCURONIUM BROMIDE 20 MG: 10 INJECTION INTRAVENOUS at 12:23

## 2018-08-31 RX ADMIN — CEFAZOLIN SODIUM 2 G: 2 INJECTION, SOLUTION INTRAVENOUS at 09:26

## 2018-08-31 RX ADMIN — SODIUM CHLORIDE, POTASSIUM CHLORIDE, SODIUM LACTATE AND CALCIUM CHLORIDE 100 ML/HR: 600; 310; 30; 20 INJECTION, SOLUTION INTRAVENOUS at 18:12

## 2018-08-31 RX ADMIN — MIDAZOLAM HYDROCHLORIDE 1 MG: 2 INJECTION, SOLUTION INTRAMUSCULAR; INTRAVENOUS at 08:30

## 2018-08-31 RX ADMIN — FENTANYL CITRATE 50 MCG: 50 INJECTION INTRAMUSCULAR; INTRAVENOUS at 14:07

## 2018-08-31 RX ADMIN — ONDANSETRON 4 MG: 2 INJECTION INTRAMUSCULAR; INTRAVENOUS at 12:55

## 2018-08-31 RX ADMIN — ROCURONIUM BROMIDE 10 MG: 10 INJECTION INTRAVENOUS at 10:20

## 2018-08-31 RX ADMIN — Medication 400 MG: at 21:08

## 2018-08-31 RX ADMIN — INSULIN DETEMIR 24 UNITS: 100 INJECTION, SOLUTION SUBCUTANEOUS at 21:07

## 2018-08-31 RX ADMIN — FAMOTIDINE 20 MG: 10 INJECTION INTRAVENOUS at 08:29

## 2018-08-31 RX ADMIN — ALBUMIN (HUMAN): 0.05 SOLUTION INTRAVENOUS at 11:26

## 2018-08-31 RX ADMIN — SODIUM CHLORIDE, POTASSIUM CHLORIDE, SODIUM LACTATE AND CALCIUM CHLORIDE: 600; 310; 30; 20 INJECTION, SOLUTION INTRAVENOUS at 10:46

## 2018-08-31 RX ADMIN — HYDROCODONE BITARTRATE AND ACETAMINOPHEN 2 TABLET: 7.5; 325 TABLET ORAL at 18:12

## 2018-08-31 RX ADMIN — TAMSULOSIN HYDROCHLORIDE 0.4 MG: 0.4 CAPSULE ORAL at 21:08

## 2018-08-31 RX ADMIN — OXYCODONE HYDROCHLORIDE 20 MG: 10 TABLET, FILM COATED, EXTENDED RELEASE ORAL at 07:02

## 2018-08-31 RX ADMIN — HYDROCODONE BITARTRATE AND ACETAMINOPHEN 2 TABLET: 7.5; 325 TABLET ORAL at 22:25

## 2018-08-31 RX ADMIN — CEFAZOLIN SODIUM 2 G: 2 INJECTION, SOLUTION INTRAVENOUS at 18:12

## 2018-08-31 RX ADMIN — DOCUSATE SODIUM 100 MG: 100 CAPSULE, LIQUID FILLED ORAL at 21:07

## 2018-08-31 RX ADMIN — MIDAZOLAM HYDROCHLORIDE 1 MG: 2 INJECTION, SOLUTION INTRAMUSCULAR; INTRAVENOUS at 08:24

## 2018-09-01 ENCOUNTER — APPOINTMENT (OUTPATIENT)
Dept: GENERAL RADIOLOGY | Facility: HOSPITAL | Age: 73
End: 2018-09-01

## 2018-09-01 PROBLEM — R00.0 TACHYCARDIA: Status: ACTIVE | Noted: 2018-09-01

## 2018-09-01 PROBLEM — D62 POSTOPERATIVE ANEMIA DUE TO ACUTE BLOOD LOSS: Status: ACTIVE | Noted: 2018-09-01

## 2018-09-01 LAB
ANION GAP SERPL CALCULATED.3IONS-SCNC: 10.9 MMOL/L
BASOPHILS # BLD AUTO: 0.02 10*3/MM3 (ref 0–0.2)
BASOPHILS NFR BLD AUTO: 0.3 % (ref 0–1.5)
BUN BLD-MCNC: 20 MG/DL (ref 8–23)
BUN/CREAT SERPL: 16.1 (ref 7–25)
CALCIUM SPEC-SCNC: 8.1 MG/DL (ref 8.6–10.5)
CHLORIDE SERPL-SCNC: 103 MMOL/L (ref 98–107)
CO2 SERPL-SCNC: 24.1 MMOL/L (ref 22–29)
CREAT BLD-MCNC: 1.24 MG/DL (ref 0.76–1.27)
DEPRECATED RDW RBC AUTO: 46 FL (ref 37–54)
EOSINOPHIL # BLD AUTO: 0.11 10*3/MM3 (ref 0–0.7)
EOSINOPHIL NFR BLD AUTO: 1.5 % (ref 0.3–6.2)
ERYTHROCYTE [DISTWIDTH] IN BLOOD BY AUTOMATED COUNT: 14.4 % (ref 11.5–14.5)
GFR SERPL CREATININE-BSD FRML MDRD: 58 ML/MIN/1.73
GLUCOSE BLD-MCNC: 133 MG/DL (ref 65–99)
GLUCOSE BLDC GLUCOMTR-MCNC: 158 MG/DL (ref 70–130)
GLUCOSE BLDC GLUCOMTR-MCNC: 184 MG/DL (ref 70–130)
GLUCOSE BLDC GLUCOMTR-MCNC: 212 MG/DL (ref 70–130)
GLUCOSE BLDC GLUCOMTR-MCNC: 241 MG/DL (ref 70–130)
HCT VFR BLD AUTO: 26.4 % (ref 40.4–52.2)
HCT VFR BLD AUTO: 26.4 % (ref 40.4–52.2)
HCT VFR BLD AUTO: 27.3 % (ref 40.4–52.2)
HGB BLD-MCNC: 8.2 G/DL (ref 13.7–17.6)
HGB BLD-MCNC: 8.3 G/DL (ref 13.7–17.6)
HGB BLD-MCNC: 8.7 G/DL (ref 13.7–17.6)
IMM GRANULOCYTES # BLD: 0.02 10*3/MM3 (ref 0–0.03)
IMM GRANULOCYTES NFR BLD: 0.3 % (ref 0–0.5)
LYMPHOCYTES # BLD AUTO: 0.98 10*3/MM3 (ref 0.9–4.8)
LYMPHOCYTES NFR BLD AUTO: 13.3 % (ref 19.6–45.3)
MCH RBC QN AUTO: 27 PG (ref 27–32.7)
MCHC RBC AUTO-ENTMCNC: 31.1 G/DL (ref 32.6–36.4)
MCV RBC AUTO: 86.8 FL (ref 79.8–96.2)
MONOCYTES # BLD AUTO: 0.8 10*3/MM3 (ref 0.2–1.2)
MONOCYTES NFR BLD AUTO: 10.9 % (ref 5–12)
NEUTROPHILS # BLD AUTO: 5.46 10*3/MM3 (ref 1.9–8.1)
NEUTROPHILS NFR BLD AUTO: 74 % (ref 42.7–76)
NT-PROBNP SERPL-MCNC: 390.2 PG/ML (ref 5–900)
PLATELET # BLD AUTO: 213 10*3/MM3 (ref 140–500)
PMV BLD AUTO: 8.9 FL (ref 6–12)
POTASSIUM BLD-SCNC: 4.2 MMOL/L (ref 3.5–5.2)
RBC # BLD AUTO: 3.04 10*6/MM3 (ref 4.6–6)
SODIUM BLD-SCNC: 138 MMOL/L (ref 136–145)
WBC NRBC COR # BLD: 7.37 10*3/MM3 (ref 4.5–10.7)

## 2018-09-01 PROCEDURE — 85025 COMPLETE CBC W/AUTO DIFF WBC: CPT | Performed by: ORTHOPAEDIC SURGERY

## 2018-09-01 PROCEDURE — 85018 HEMOGLOBIN: CPT | Performed by: INTERNAL MEDICINE

## 2018-09-01 PROCEDURE — 97535 SELF CARE MNGMENT TRAINING: CPT

## 2018-09-01 PROCEDURE — 82962 GLUCOSE BLOOD TEST: CPT

## 2018-09-01 PROCEDURE — 83880 ASSAY OF NATRIURETIC PEPTIDE: CPT | Performed by: INTERNAL MEDICINE

## 2018-09-01 PROCEDURE — 80048 BASIC METABOLIC PNL TOTAL CA: CPT | Performed by: ORTHOPAEDIC SURGERY

## 2018-09-01 PROCEDURE — 63710000001 INSULIN ASPART PER 5 UNITS: Performed by: INTERNAL MEDICINE

## 2018-09-01 PROCEDURE — 97110 THERAPEUTIC EXERCISES: CPT

## 2018-09-01 PROCEDURE — 85014 HEMATOCRIT: CPT | Performed by: INTERNAL MEDICINE

## 2018-09-01 PROCEDURE — 71045 X-RAY EXAM CHEST 1 VIEW: CPT

## 2018-09-01 PROCEDURE — 97150 GROUP THERAPEUTIC PROCEDURES: CPT

## 2018-09-01 PROCEDURE — 25010000003 CEFAZOLIN IN DEXTROSE 2-4 GM/100ML-% SOLUTION: Performed by: ORTHOPAEDIC SURGERY

## 2018-09-01 PROCEDURE — 97165 OT EVAL LOW COMPLEX 30 MIN: CPT

## 2018-09-01 PROCEDURE — 85014 HEMATOCRIT: CPT | Performed by: HOSPITALIST

## 2018-09-01 PROCEDURE — 85018 HEMOGLOBIN: CPT | Performed by: HOSPITALIST

## 2018-09-01 PROCEDURE — 63710000001 INSULIN DETEMIR PER 5 UNITS: Performed by: INTERNAL MEDICINE

## 2018-09-01 RX ORDER — FERROUS SULFATE 325(65) MG
325 TABLET ORAL
Status: DISCONTINUED | OUTPATIENT
Start: 2018-09-01 | End: 2018-09-06 | Stop reason: HOSPADM

## 2018-09-01 RX ORDER — METOPROLOL SUCCINATE 25 MG/1
12.5 TABLET, EXTENDED RELEASE ORAL EVERY 12 HOURS SCHEDULED
Status: DISCONTINUED | OUTPATIENT
Start: 2018-09-01 | End: 2018-09-04

## 2018-09-01 RX ADMIN — ARIPIPRAZOLE 20 MG: 10 TABLET ORAL at 06:25

## 2018-09-01 RX ADMIN — HYDROCODONE BITARTRATE AND ACETAMINOPHEN 2 TABLET: 7.5; 325 TABLET ORAL at 18:28

## 2018-09-01 RX ADMIN — MEMANTINE HYDROCHLORIDE 5 MG: 5 TABLET, FILM COATED ORAL at 07:55

## 2018-09-01 RX ADMIN — ASPIRIN 325 MG: 325 TABLET, DELAYED RELEASE ORAL at 20:25

## 2018-09-01 RX ADMIN — METOPROLOL SUCCINATE 12.5 MG: 25 TABLET, FILM COATED, EXTENDED RELEASE ORAL at 20:25

## 2018-09-01 RX ADMIN — HYDROCODONE BITARTRATE AND ACETAMINOPHEN 2 TABLET: 7.5; 325 TABLET ORAL at 06:25

## 2018-09-01 RX ADMIN — Medication 1 G: at 20:25

## 2018-09-01 RX ADMIN — INSULIN ASPART 2 UNITS: 100 INJECTION, SOLUTION INTRAVENOUS; SUBCUTANEOUS at 18:27

## 2018-09-01 RX ADMIN — LEVOTHYROXINE SODIUM 50 MCG: 50 TABLET ORAL at 06:25

## 2018-09-01 RX ADMIN — TAMSULOSIN HYDROCHLORIDE 0.4 MG: 0.4 CAPSULE ORAL at 20:25

## 2018-09-01 RX ADMIN — INSULIN ASPART 3 UNITS: 100 INJECTION, SOLUTION INTRAVENOUS; SUBCUTANEOUS at 13:01

## 2018-09-01 RX ADMIN — DOCUSATE SODIUM 100 MG: 100 CAPSULE, LIQUID FILLED ORAL at 07:56

## 2018-09-01 RX ADMIN — INSULIN DETEMIR 24 UNITS: 100 INJECTION, SOLUTION SUBCUTANEOUS at 21:41

## 2018-09-01 RX ADMIN — INSULIN ASPART 3 UNITS: 100 INJECTION, SOLUTION INTRAVENOUS; SUBCUTANEOUS at 21:41

## 2018-09-01 RX ADMIN — SODIUM CHLORIDE 100 ML/HR: 9 INJECTION, SOLUTION INTRAVENOUS at 13:01

## 2018-09-01 RX ADMIN — INSULIN ASPART 2 UNITS: 100 INJECTION, SOLUTION INTRAVENOUS; SUBCUTANEOUS at 07:59

## 2018-09-01 RX ADMIN — HYDROCODONE BITARTRATE AND ACETAMINOPHEN 2 TABLET: 7.5; 325 TABLET ORAL at 02:28

## 2018-09-01 RX ADMIN — DOCUSATE SODIUM 100 MG: 100 CAPSULE, LIQUID FILLED ORAL at 21:41

## 2018-09-01 RX ADMIN — Medication 400 MG: at 20:25

## 2018-09-01 RX ADMIN — CEFAZOLIN SODIUM 2 G: 2 INJECTION, SOLUTION INTRAVENOUS at 01:25

## 2018-09-01 RX ADMIN — Medication 1 G: at 07:56

## 2018-09-01 RX ADMIN — SODIUM CHLORIDE 500 ML: 9 INJECTION, SOLUTION INTRAVENOUS at 12:27

## 2018-09-01 RX ADMIN — ASPIRIN 325 MG: 325 TABLET, DELAYED RELEASE ORAL at 07:56

## 2018-09-01 RX ADMIN — Medication 400 MG: at 07:56

## 2018-09-01 RX ADMIN — FERROUS SULFATE TAB 325 MG (65 MG ELEMENTAL FE) 325 MG: 325 (65 FE) TAB at 18:27
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
## 2018-09-01 NOTE — THERAPY TREATMENT NOTE
Acute Care - Physical Therapy Treatment Note  [facility]     Patient Name: Chris Ferguson  : 1945  MRN: [id number]  Today's Date: 2018  Onset of Illness/Injury or Date of Surgery: 18  Date of Referral to PT: 18  Referring Physician: Dr. Giang    Admit Date: 2018    Visit Dx:    ICD-10-CM ICD-9-CM   1. Impaired functional mobility, balance, gait, and endurance Z74.09 V49.89   2. 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, left   • Essential hypertension   • DM (diabetes mellitus), type 2 (CMS/HCC)   • BPH (benign prostatic hyperplasia)   • Hypothyroidism   • Postoperative anemia due to acute blood loss   • Tachycardia       Therapy Treatment          Rehabilitation Treatment Summary     Row Name 18 0830             Treatment Time/Intention    Discipline physical therapy assistant  -SM      Document Type therapy note (daily note)  -SM      Subjective Information complains of;weakness;pain  -SM      Mode of Treatment group therapy;physical therapy  -SM      Patient Effort adequate  -SM      Comment pt very lethargic  -SM      Existing Precautions/Restrictions fall;brace worn when out of bed   KI until SLR ind  -SM      Recorded by [SM] Leila Hunter, [facility] 18 1106      Row Name 18 0830             Vital Signs    O2 Delivery Pre Treatment supplemental O2  -SM      O2 Delivery Intra Treatment supplemental O2  -SM      O2 Delivery Post Treatment supplemental O2  -SM      Recorded by [] Leila Hunter [facility] 18 1106      Row Name 18 0830             Cognitive Assessment/Intervention- PT/OT    Orientation Status (Cognition) oriented x 3  -SM      Follows Commands (Cognition) follows two step commands;repetition of directions required;verbal cues/prompting required  -SM      Safety Deficit  (Cognitive) mild deficit  -SM      Personal Safety Interventions fall prevention program maintained;gait belt;nonskid shoes/slippers when out of bed  -SM      Recorded by [] Leila Hunter, [facility] 09/01/18 1106      Row Name 09/01/18 0830             Safety Issues, Functional Mobility    Impairments Affecting Function (Mobility) endurance/activity tolerance;pain;strength  -SM      Recorded by [] Leila Hunter, [facility] 09/01/18 1106      Row Name 09/01/18 0830             Bed Mobility Assessment/Treatment    Comment (Bed Mobility) up in chair  -SM      Recorded by [SM] Leila Hunter, [facility] 09/01/18 1106      Row Name 09/01/18 0830             Transfer Assessment/Treatment    Comment (Transfers) pt too lethargic, unsafe at this time  -SM      Recorded by [] Leila Hunter, [facility] 09/01/18 1106      Row Name 09/01/18 0830             General ROM    LT Lower Ext Lt Knee Extension/Flexion  -SM      Recorded by [] Leila Hunter, [facility] 09/01/18 110      Row Name 09/01/18 0830             Left Lower Ext    Lt Knee Extension/Flexion AROM 25-65   w/ ace wrap  -SM      Recorded by [] Leila Hunter, [facility] 09/01/18 1106      Row Name 09/01/18 0830             Therapeutic Exercise    Comment (Therapeutic Exercise) L TKR protocol x 20 reps, no SLR or SAQ  -SM      Recorded by [] Leila Hunter, [facility] 09/01/18 1106      Row Name 09/01/18 0830             Positioning and Restraints    Pre-Treatment Position sitting in chair/recliner  -SM      Post Treatment Position chair  -SM      In Chair reclined;call light within reach;encouraged to call for assist  -SM      Recorded by [] Leila Hunter, [facility] 09/01/18 1106      Row Name 09/01/18 0830             Pain Assessment    Additional Documentation Pain Scale: Numbers Pre/Post-Treatment (Group)  -SM      Recorded by [] Liela Hunter, [facility] 09/01/18 1106      Row Name 09/01/18 0830             Pain Scale: Numbers Pre/Post-Treatment    Pain  Scale: Numbers, Pretreatment 8/10  -SM      Pain Scale: Numbers, Post-Treatment 9/10  -SM      Pain Location - Side Left  -SM      Pain Location knee  -SM      Pain Intervention(s) Repositioned;Ambulation/increased activity;Rest  -      Recorded by [SM] Peter Hunterah Lorraine, DONOVAN 09/01/18 1106      Row Name                Wound 08/31/18 1241 Left knee incision    Wound - Properties Group Date first assessed: 08/31/18 [EG] Time first assessed: 1241 [EG] Side: Left [EG] Location: knee [EG] Type: incision [EG] Recorded by:  [EG] Mery Jc RN 08/31/18 1241      User Key  (r) = Recorded By, (t) = Taken By, (c) = Cosigned By    Initials Name Effective Dates Discipline     HunterPeterah Lorraine, DONOVAN 03/07/18 -  PT    Mery Koehler RN 07/10/17 -  Nurse          Wound 08/31/18 1241 Left knee incision (Active)   Dressing Appearance dry;intact;no drainage 9/1/2018  7:56 AM   Closure GENE 9/1/2018  7:56 AM   Base dressing in place, unable to visualize 8/31/2018  7:52 PM   Drainage Amount none 9/1/2018  7:56 AM   Dressing Care, Wound multi-layer wrap;elastic bandage 9/1/2018  7:56 AM             Physical Therapy Education     Title: PT OT SLP Therapies (Active)     Topic: Physical Therapy (Active)     Point: Mobility training (Active)    Learning Progress Summary     Learner Status Readiness Method Response Comment Documented by    Patient Active Acceptance EELIZABETH NR   09/01/18 1106     Done Acceptance E VU  MA 08/31/18 1618          Point: Home exercise program (Active)    Learning Progress Summary     Learner Status Readiness Method Response Comment Documented by    Patient Active Acceptance STEFANYD NR   09/01/18 1106     Done Acceptance E VU  MA 08/31/18 1618          Point: Body mechanics (Active)    Learning Progress Summary     Learner Status Readiness Method Response Comment Documented by    Patient Active Acceptance ELIZABETH MCCALL NR   09/01/18 1106     Done Acceptance E VU  MA 08/31/18 1618          Point:  Precautions (Active)    Learning Progress Summary     Learner Status Readiness Method Response Comment Documented by    Patient Active Acceptance E,D NR   09/01/18 1106     Done Acceptance E VU  MA 08/31/18 1618                      User Key     Initials Effective Dates Name Provider Type Discipline     03/07/18 -  Leila Hunter, PTA Physical Therapy Assistant PT    MA 04/03/18 -  Bridget Dennis, PT Physical Therapist PT                    PT Recommendation and Plan  Anticipated Discharge Disposition (PT): home with home health, skilled nursing facility  Outcome Summary/Treatment Plan (PT)  Anticipated Discharge Disposition (PT): home with home health, skilled nursing facility  Plan of Care Reviewed With: patient  Progress: no change  Outcome Summary: Pt now has KI, although refused to attempt SLR in gym session d/t weakness and pain. Still requires KI. Pt very lethargic throughout gym session and states he had no sleep last night. Did not attempt ambulation d/t pt being lethargic and in increased pain. PT will continue to address functional mobility deficits as tolerated.          Outcome Measures     Row Name 09/01/18 1100 08/31/18 1600          How much help from another person do you currently need...    Turning from your back to your side while in flat bed without using bedrails? 3  -SM 3  -MA     Moving from lying on back to sitting on the side of a flat bed without bedrails? 3  -SM 3  -MA     Moving to and from a bed to a chair (including a wheelchair)? 2  -SM 2  -MA     Standing up from a chair using your arms (e.g., wheelchair, bedside chair)? 2  -SM 2  -MA     Climbing 3-5 steps with a railing? 2  -SM 2  -MA     To walk in hospital room? 2  -SM 2  -MA     AM-PAC 6 Clicks Score 14  - 14  -MA        Functional Assessment    Outcome Measure Options AM-PAC 6 Clicks Basic Mobility (PT)  - AM-PAC 6 Clicks Basic Mobility (PT)  -MA       User Key  (r) = Recorded By, (t) = Taken By, (c) =  Cosigned By    Initials Name Provider Type     Leila Hunter PTA Physical Therapy Assistant    Bridegt Kirkland, PT Physical Therapist           Time Calculation:         PT Charges     Row Name 09/01/18 1110             Time Calculation    Start Time 0830  -SM      Stop Time 0930  -SM      Time Calculation (min) 60 min  -      PT Received On 09/01/18  -      PT - Next Appointment 09/01/18  -        User Key  (r) = Recorded By, (t) = Taken By, (c) = Cosigned By    Initials Name Provider Type     Leila Hunter PTA Physical Therapy Assistant        Therapy Suggested Charges     Code   Minutes Charges    None           Therapy Charges for Today     Code Description Service Date Service Provider Modifiers Qty    64864383188 HC PT THER PROC EA 15 MIN 9/1/2018 Leila Hunter PTA GP 1    95233671230 HC PT THER PROC GROUP 9/1/2018 Leila Hunter PTA GP 1          PT G-Codes  Outcome Measure Options: AM-PAC 6 Clicks Basic Mobility (PT)    Leila Hunter PTA  9/1/2018

## 2018-09-01 NOTE — PLAN OF CARE
Problem: Patient Care Overview  Goal: Plan of Care Review  Outcome: Ongoing (interventions implemented as appropriate)   09/01/18 1106   Coping/Psychosocial   Plan of Care Reviewed With patient   OTHER   Outcome Summary Pt now has KI, although refused to attempt SLR in gym session d/t weakness and pain. Still requires KI. Pt very lethargic throughout gym session and states he had no sleep last night. Did not attempt ambulation d/t pt being lethargic and in increased pain. PT will continue to address functional mobility deficits as tolerated.   Plan of Care Review   Progress no change

## 2018-09-01 NOTE — PLAN OF CARE
Problem: Patient Care Overview  Goal: Plan of Care Review  Outcome: Ongoing (interventions implemented as appropriate)   09/01/18 0208   Coping/Psychosocial   Plan of Care Reviewed With patient   OTHER   Outcome Summary patient resting comfortably through night, pain controlled at this time, patient heart rate elevated during shift, did not take metoprolol prior to surgery, 1x dose given last night and patient will resume his normal morning dose in am, educated on b/p and glucose monitoring   Plan of Care Review   Progress improving     Goal: Individualization and Mutuality  Outcome: Ongoing (interventions implemented as appropriate)    Goal: Discharge Needs Assessment  Outcome: Ongoing (interventions implemented as appropriate)    Goal: Interprofessional Rounds/Family Conf  Outcome: Ongoing (interventions implemented as appropriate)      Problem: Fall Risk (Adult)  Goal: Absence of Fall  Outcome: Ongoing (interventions implemented as appropriate)      Problem: Knee Arthroplasty (Total, Partial) (Adult)  Goal: Signs and Symptoms of Listed Potential Problems Will be Absent, Minimized or Managed (Knee Arthroplasty)  Outcome: Ongoing (interventions implemented as appropriate)    Goal: Anesthesia/Sedation Recovery  Outcome: Ongoing (interventions implemented as appropriate)

## 2018-09-01 NOTE — PLAN OF CARE
Problem: Patient Care Overview  Goal: Plan of Care Review  Outcome: Ongoing (interventions implemented as appropriate)   09/01/18 0074   Coping/Psychosocial   Plan of Care Reviewed With patient   OTHER   Outcome Summary Pt required mod A for standing and ambulating 3ft from chair to bed this date. VCs given for sequencing w/ [facility]. Family present towards 2nd half of session to see pt transfer, discussed rehab option. Pt and family requests rehab facility close to home ([place]).   Plan of Care Review   Progress improving

## 2018-09-01 NOTE — PLAN OF CARE
Problem: Patient Care Overview  Goal: Plan of Care Review  Outcome: Ongoing (interventions implemented as appropriate)   09/01/18 1850   Coping/Psychosocial   Plan of Care Reviewed With patient   OTHER   Outcome Summary Patient transferring using walker and x1 assist. Pain is controlled with po meds. Patient has been hypotensive throughout day with bp responding positively after bolus and IVF for 5 hours. Patient is voiding without difficulty. H/H rechecked at 1600 and hbg improved to 8.7 from 8.2. Tachycardia improved throughout day as well. Patient still requiring 3 L O2, unable to wean, IS encouraged but patient very drowsy throughout most of day. Patient will need snf at d/c, CCP aware and working to find placement. Will continue to monitor.    Plan of Care Review   Progress improving       Problem: Fall Risk (Adult)  Goal: Absence of Fall  Outcome: Ongoing (interventions implemented as appropriate)   09/01/18 1850   Fall Risk (Adult)   Absence of Fall achieves outcome       Problem: Knee Arthroplasty (Total, Partial) (Adult)  Goal: Signs and Symptoms of Listed Potential Problems Will be Absent, Minimized or Managed (Knee Arthroplasty)  Outcome: Ongoing (interventions implemented as appropriate)   09/01/18 1949   Goal/Outcome Evaluation   Problems Assessed (Knee Arthroplasty) all   Problems Present (Knee Arthroplasty) pain;situational response;range of motion decreased;functional deficit

## 2018-09-01 NOTE — PROGRESS NOTES
Orthopedic Progress Note      Patient: Chris Ferguson  YOB: 1945     Date of Admission: 8/31/2018  6:41 AM Medical Record Number: [id number]     Attending Physician: Valente Giang,*    Status Post:  LEft distal femur replacement  TOTAL KNEE ARTHROPLASTY  complex distal femur replacement   removal of plate  Post Operative Day Number: 1    Subjective : No new orthopaedic complaints     Pain Relief: some relief with present medication.     Systemic Complaints: No Complaints  Vitals:    08/31/18 2331 09/01/18 0227 09/01/18 0500 09/01/18 0704   BP: 110/65 112/65  105/66   BP Location: Right arm Right arm  Right arm   Patient Position: Lying Lying  Lying   Pulse: 114 118 108 116   Resp: 16 16 18   Temp: 98.1 °F (36.7 °C) (!) 100.8 °F (38.2 °C) 98.8 °F (37.1 °C) 99.3 °F (37.4 °C)   TempSrc: Oral Oral Oral Oral   SpO2: 94% 96% 98% 95%   Weight:       Height:           Physical Exam: 72 y.o. male    General Appearance:       Alert, cooperative, in no acute distress                  Extremities:    Dressing Clean, Dry and Intact, drain working 250 ml overnight          Incision healthy without signs or symptoms of infections         No clinical sign of DVT        Able to do good movements of digits    Pulses:   Pulses palpable and equal bilaterally           Diagnostic Tests:       Results from last 7 days  Lab Units 09/01/18  0709   WBC 10*3/mm3 7.37   HEMOGLOBIN g/dL 8.2*   HEMATOCRIT % 26.4*   PLATELETS 10*3/mm3 213       Results from last 7 days  Lab Units 09/01/18  0709   SODIUM mmol/L 138   POTASSIUM mmol/L 4.2   CHLORIDE mmol/L 103   CO2 mmol/L 24.1   BUN mg/dL 20   CREATININE mg/dL 1.24   GLUCOSE mg/dL 133*   CALCIUM mg/dL 8.1*         No results found for: URICACID  No results found for: CRYSTAL  Microbiology Results (last 10 days)     ** No results found for the last 240 hours. **        No radiology results for the last 7 days      Current Medications:  Scheduled Meds:  ARIPiprazole 20  mg Oral QAM   aspirin 325 mg Oral Q12H   docusate sodium 100 mg Oral BID   insulin aspart 0-7 Units Subcutaneous 4x Daily With Meals & Nightly   insulin detemir 24 Units Subcutaneous Nightly   levothyroxine 50 mcg Oral QAM   magnesium oxide 400 mg Oral BID   memantine 5 mg Oral Daily   metoprolol succinate XL 12.5 mg Oral Q12H   sodium chloride 1 g Oral BID   tamsulosin 0.4 mg Oral Nightly     Continuous Infusions:  lactated ringers 100 mL/hr Last Rate: 100 mL/hr (08/31/18 1812)   sodium chloride 100 mL/hr Last Rate: Stopped (09/01/18 0622)     PRN Meds:.•  acetaminophen  •  bisacodyl  •  bisacodyl  •  dextrose  •  dextrose  •  diphenhydrAMINE **OR** diphenhydrAMINE  •  glucagon (human recombinant)  •  HYDROcodone-acetaminophen  •  HYDROcodone-acetaminophen  •  melatonin  •  ondansetron **OR** ondansetron ODT **OR** ondansetron  •  traZODone    Assessment: Status post  TOTAL KNEE ARTHROPLASTY  complex distal femur replacement   removal of plate     Patient Active Problem List   Diagnosis   • 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    Valente Giang MD    Date: 9/1/2018    Time: 10:29 AM

## 2018-09-01 NOTE — PROGRESS NOTES
Orthopedic Total Knee Progress Note      Status post:REMOVAL OF LEFT DISTAL FEMUR PLATE AND SCREWS WITH COMPLEX TOTAL KNEE REPLACEMENT DISTAL FEMUR HINGED Post-Operative Day: 1    Doing well postoperatively.    Pain Relief: good relief    Weight Bearing: WBAT     LOS: 1 day     Systemic or Specific Complaints: No Complaints    Objective     Vital signs in last 24 hours:    Vitals:    08/31/18 2331 09/01/18 0227 09/01/18 0500 09/01/18 0704   BP: 110/65 112/65  105/66   BP Location: Right arm Right arm  Right arm   Patient Position: Lying Lying  Lying   Pulse: 114 118 108 116   Resp: 16 16 18   Temp: 98.1 °F (36.7 °C) (!) 100.8 °F (38.2 °C) 98.8 °F (37.1 °C) 99.3 °F (37.4 °C)   TempSrc: Oral Oral Oral Oral   SpO2: 94% 96% 98% 95%   Weight:       Height:           General: alert, appears stated age and cooperative   Neurovascular: Toes good active movements up and down, Gross sensation intact over toes.   Good capillary refill, toes pink and warm   Wound: Wound clean and dry no evidence of infection.   Activity: Doing well appropriate for the post op day   DVT Exam: No signs of DVT on physical examination           Data Review:    Results from last 7 days  Lab Units 09/01/18  0709   WBC 10*3/mm3 7.37   HEMOGLOBIN g/dL 8.2*   HEMATOCRIT % 26.4*   PLATELETS 10*3/mm3 213       Results from last 7 days  Lab Units 09/01/18  0709   SODIUM mmol/L 138   POTASSIUM mmol/L 4.2   CHLORIDE mmol/L 103   CO2 mmol/L 24.1   BUN mg/dL 20   CREATININE mg/dL 1.24   GLUCOSE mg/dL 133*   CALCIUM mg/dL 8.1*     Current Medications:  Scheduled Meds:  ARIPiprazole 20 mg Oral QAM   aspirin 325 mg Oral Q12H   docusate sodium 100 mg Oral BID   insulin aspart 0-7 Units Subcutaneous 4x Daily With Meals & Nightly   insulin detemir 24 Units Subcutaneous Nightly   levothyroxine 50 mcg Oral QAM   magnesium oxide 400 mg Oral BID   memantine 5 mg Oral Daily   metoprolol succinate XL 25 mg Oral QAM   sodium chloride 1 g Oral BID   tamsulosin 0.4 mg  Oral Nightly     Continuous Infusions:  lactated ringers 100 mL/hr Last Rate: 100 mL/hr (08/31/18 1812)   sodium chloride 100 mL/hr Last Rate: Stopped (09/01/18 0622)     PRN Meds:.•  acetaminophen  •  bisacodyl  •  bisacodyl  •  dextrose  •  dextrose  •  diphenhydrAMINE **OR** diphenhydrAMINE  •  glucagon (human recombinant)  •  HYDROcodone-acetaminophen  •  HYDROcodone-acetaminophen  •  HYDROmorphone **AND** naloxone  •  melatonin  •  ondansetron **OR** ondansetron ODT **OR** ondansetron  •  traZODone  Assessment/Plan     Continues current post-op course  Monitor H&H  Start ferrous sulfate 325mg PO QD x 30 days  Likely d/c to home tomorrow    LLOYD Almeida    Date: 9/1/2018  Time: 9:21 AM     I have examined this patient with LLOYD Almeida.   I have reviewed the labs, done the physical examination.  I agree with above evaluation and plan and  Note.

## 2018-09-01 NOTE — PROGRESS NOTES
Name: Chris Ferguson ADMIT: 2018   : 1945  PCP: Gisela Sotelo APRN    MRN: [id number] LOS: 1 days   AGE/SEX: 72 y.o. male  ROOM: [facility]     Subjective   Didn't sleep well at night. Sleepy today. He denies any chest pain, SOA, nausea, vomiting or diarrhea. RN called due to tachycardia.    Objective   Vital Signs  Temp:  [97 °F (36.1 °C)-100.8 °F (38.2 °C)] 99.3 °F (37.4 °C)  Heart Rate:  [] 116  Resp:  [16-18] 18  BP: ()/(57-77) 105/66  SpO2:  [92 %-100 %] 95 %  on  Flow (L/min):  [2-4] 3;   Device (Oxygen Therapy): nasal cannula  Body mass index is 28.55 kg/m².    Physical Exam   Constitutional: He is oriented to person, place, and time. No distress.   Cardiovascular: Regular rhythm.  Tachycardia present.    No murmur heard.  Pulmonary/Chest: Effort normal and breath sounds normal.   Abdominal: Soft. Bowel sounds are normal. He exhibits no distension. There is no tenderness.   Musculoskeletal: He exhibits no edema.   Left leg wrapped. Surgical drain in place.   Neurological: He is oriented to person, place, and time.   A little groggy   Skin: Skin is warm and dry. He is not diaphoretic.       Results Review:       I reviewed the patient's new clinical results.    Results from last 7 days  Lab Units 18  0709   WBC 10*3/mm3 7.37   HEMOGLOBIN g/dL 8.2*   PLATELETS 10*3/mm3 213     Results from last 7 days  Lab Units 18  0709   SODIUM mmol/L 138   POTASSIUM mmol/L 4.2   CHLORIDE mmol/L 103   CO2 mmol/L 24.1   BUN mg/dL 20   CREATININE mg/dL 1.24   GLUCOSE mg/dL 133*   Estimated Creatinine Clearance: 64.5 mL/min (by C-G formula based on SCr of 1.24 mg/dL).  Results from last 7 days  Lab Units 18  0709   CALCIUM mg/dL 8.1*       Lab Results   Component Value Date    HGBA1C 7.90 (H) 2018     Glucose   Date/Time Value Ref Range Status   2018 0551 158 (H) 70 - 130 mg/dL Final   2018 2058 188 (H) 70 - 130 mg/dL Final   2018 1624 126 70 - 130 mg/dL  Final   08/31/2018 1410 124 70 - 130 mg/dL Final   08/31/2018 0649 117 70 - 130 mg/dL Final         ARIPiprazole 20 mg Oral QAM   aspirin 325 mg Oral Q12H   docusate sodium 100 mg Oral BID   insulin aspart 0-7 Units Subcutaneous 4x Daily With Meals & Nightly   insulin detemir 24 Units Subcutaneous Nightly   levothyroxine 50 mcg Oral QAM   magnesium oxide 400 mg Oral BID   memantine 5 mg Oral Daily   metoprolol succinate XL 25 mg Oral QAM   sodium chloride 1 g Oral BID   tamsulosin 0.4 mg Oral Nightly       lactated ringers 100 mL/hr Last Rate: 100 mL/hr (08/31/18 1812)   sodium chloride 100 mL/hr Last Rate: Stopped (09/01/18 0622)         Assessment/Plan      Active Hospital Problems    Diagnosis Date Noted   • **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      Postoperative anemia due to acute blood loss  · Preop hemoglobin 13.4 down to 8.2 this morning.  · Defer to orthopedics whether transfusion is indicated.  · Daily CBC.      Essential hypertension  · BP somewhat low.  · Holding parameters for Toprol.      Tachycardia  · Likely due to postoperative state, anemia and pain.  · Continue Toprol but change to 12.5 mg twice daily with holding parameters.      DM (diabetes mellitus), type 2 (CMS/HCC)  · A1c in acceptable range.  · Blood sugar stable.  · Continue  current insulin regimen.      BPH (benign prostatic hyperplasia)  · Continue Flomax and watch for postoperative urinary retention.      Hypothyroidism  · Continue outpatient dose of Synthroid      Nghia Stinson MD  [facility]  09/01/18  8:24 AM

## 2018-09-01 NOTE — PROGRESS NOTES
Discharge Planning Assessment  [facility]     Patient Name: Chris Ferguson  MRN: [id number]  Today's Date: 9/1/2018    Admit Date: 8/31/2018          Discharge Needs Assessment     Row Name 09/01/18 1647       Living Environment    Lives With friend(s)    Current Living Arrangements home/apartment/condo    Family Caregiver if Needed friend(s)    Quality of Family Relationships helpful;involved;supportive       Resource/Environmental Concerns    Resource/Environmental Concerns none       Transition Planning    Patient/Family Anticipates Transition to inpatient rehabilitation facility    Patient/Family Anticipated Services at Transition skilled nursing    Transportation Anticipated family or friend will provide       Discharge Needs Assessment    Readmission Within the Last 30 Days no previous admission in last 30 days    Discharge Facility/Level of Care Needs nursing facility, skilled    Offered/Gave Vendor List yes            Discharge Plan     Row Name 09/01/18 9050       Plan    Plan referral pending w/ [facility][1] Rehab (will require [employer] cert)     Patient/Family in Agreement with Plan yes    Plan Comments S/w pt verified facesheet and d/c plan, pt would like a referral to [facility]. Electronic referral sent, will not be able to initiate cert until Tuesday.  CCP will follow up.         Destination     Service Request Status Selected Specialties Address Phone Number Fax Number    [facility] Pending - Request Sent N/A [address] [place] [address] [phone]    [facility] Pending - Request Sent N/A [address] [place] [address] [phone]      Durable Medical Equipment     No service coordination in this encounter.      Dialysis/Infusion     No service coordination in this encounter.      Home Medical Care     No service coordination in this encounter.      Social Care     No service  coordination in this encounter.                Demographic Summary    No documentation.           Functional Status     Row Name 09/01/18 1649       Functional Status    Usual Activity Tolerance moderate    Current Activity Tolerance fair       Functional Status, IADL    Medications independent    Meal Preparation independent    Housekeeping independent    Laundry independent    Shopping independent            Psychosocial    No documentation.           Abuse/Neglect    No documentation.           Legal    No documentation.           Substance Abuse    No documentation.           Patient Forms     Row Name 09/01/18 1649       Patient Forms    Provider Choice List Delivered    Delivered to Patient    Method of delivery Telephone          Theresa Merritt RN

## 2018-09-02 LAB
ABO GROUP BLD: NORMAL
ANION GAP SERPL CALCULATED.3IONS-SCNC: 8.5 MMOL/L
BASOPHILS # BLD AUTO: 0.02 10*3/MM3 (ref 0–0.2)
BASOPHILS NFR BLD AUTO: 0.3 % (ref 0–1.5)
BLD GP AB SCN SERPL QL: NEGATIVE
BUN BLD-MCNC: 23 MG/DL (ref 8–23)
BUN/CREAT SERPL: 16.5 (ref 7–25)
CALCIUM SPEC-SCNC: 8.2 MG/DL (ref 8.6–10.5)
CHLORIDE SERPL-SCNC: 104 MMOL/L (ref 98–107)
CO2 SERPL-SCNC: 26.5 MMOL/L (ref 22–29)
CREAT BLD-MCNC: 1.39 MG/DL (ref 0.76–1.27)
DEPRECATED RDW RBC AUTO: 46.9 FL (ref 37–54)
EOSINOPHIL # BLD AUTO: 0.26 10*3/MM3 (ref 0–0.7)
EOSINOPHIL NFR BLD AUTO: 3.8 % (ref 0.3–6.2)
ERYTHROCYTE [DISTWIDTH] IN BLOOD BY AUTOMATED COUNT: 14.6 % (ref 11.5–14.5)
GFR SERPL CREATININE-BSD FRML MDRD: 51 ML/MIN/1.73
GLUCOSE BLD-MCNC: 95 MG/DL (ref 65–99)
GLUCOSE BLDC GLUCOMTR-MCNC: 177 MG/DL (ref 70–130)
GLUCOSE BLDC GLUCOMTR-MCNC: 207 MG/DL (ref 70–130)
GLUCOSE BLDC GLUCOMTR-MCNC: 222 MG/DL (ref 70–130)
GLUCOSE BLDC GLUCOMTR-MCNC: 71 MG/DL (ref 70–130)
HCT VFR BLD AUTO: 23.4 % (ref 40.4–52.2)
HGB BLD-MCNC: 7.3 G/DL (ref 13.7–17.6)
IMM GRANULOCYTES # BLD: 0.01 10*3/MM3 (ref 0–0.03)
IMM GRANULOCYTES NFR BLD: 0.1 % (ref 0–0.5)
LYMPHOCYTES # BLD AUTO: 0.95 10*3/MM3 (ref 0.9–4.8)
LYMPHOCYTES NFR BLD AUTO: 13.9 % (ref 19.6–45.3)
MCH RBC QN AUTO: 27 PG (ref 27–32.7)
MCHC RBC AUTO-ENTMCNC: 31.2 G/DL (ref 32.6–36.4)
MCV RBC AUTO: 86.7 FL (ref 79.8–96.2)
MONOCYTES # BLD AUTO: 0.53 10*3/MM3 (ref 0.2–1.2)
MONOCYTES NFR BLD AUTO: 7.8 % (ref 5–12)
NEUTROPHILS # BLD AUTO: 5.07 10*3/MM3 (ref 1.9–8.1)
NEUTROPHILS NFR BLD AUTO: 74.2 % (ref 42.7–76)
PLATELET # BLD AUTO: 186 10*3/MM3 (ref 140–500)
PMV BLD AUTO: 9.2 FL (ref 6–12)
POTASSIUM BLD-SCNC: 4.3 MMOL/L (ref 3.5–5.2)
RBC # BLD AUTO: 2.7 10*6/MM3 (ref 4.6–6)
RH BLD: POSITIVE
SODIUM BLD-SCNC: 139 MMOL/L (ref 136–145)
T&S EXPIRATION DATE: NORMAL
WBC NRBC COR # BLD: 6.83 10*3/MM3 (ref 4.5–10.7)

## 2018-09-02 PROCEDURE — 63710000001 INSULIN DETEMIR PER 5 UNITS: Performed by: INTERNAL MEDICINE

## 2018-09-02 PROCEDURE — 63710000001 INSULIN ASPART PER 5 UNITS: Performed by: INTERNAL MEDICINE

## 2018-09-02 PROCEDURE — 80048 BASIC METABOLIC PNL TOTAL CA: CPT | Performed by: ORTHOPAEDIC SURGERY

## 2018-09-02 PROCEDURE — 86900 BLOOD TYPING SEROLOGIC ABO: CPT

## 2018-09-02 PROCEDURE — P9016 RBC LEUKOCYTES REDUCED: HCPCS

## 2018-09-02 PROCEDURE — 86900 BLOOD TYPING SEROLOGIC ABO: CPT | Performed by: PHYSICIAN ASSISTANT

## 2018-09-02 PROCEDURE — 86901 BLOOD TYPING SEROLOGIC RH(D): CPT

## 2018-09-02 PROCEDURE — 85025 COMPLETE CBC W/AUTO DIFF WBC: CPT | Performed by: ORTHOPAEDIC SURGERY

## 2018-09-02 PROCEDURE — 82962 GLUCOSE BLOOD TEST: CPT

## 2018-09-02 PROCEDURE — 86850 RBC ANTIBODY SCREEN: CPT | Performed by: PHYSICIAN ASSISTANT

## 2018-09-02 PROCEDURE — 97110 THERAPEUTIC EXERCISES: CPT

## 2018-09-02 PROCEDURE — 86901 BLOOD TYPING SEROLOGIC RH(D): CPT | Performed by: PHYSICIAN ASSISTANT

## 2018-09-02 PROCEDURE — 36430 TRANSFUSION BLD/BLD COMPNT: CPT

## 2018-09-02 PROCEDURE — 86923 COMPATIBILITY TEST ELECTRIC: CPT

## 2018-09-02 RX ADMIN — MEMANTINE HYDROCHLORIDE 5 MG: 5 TABLET, FILM COATED ORAL at 07:34

## 2018-09-02 RX ADMIN — Medication 400 MG: at 20:37

## 2018-09-02 RX ADMIN — HYDROCODONE BITARTRATE AND ACETAMINOPHEN 2 TABLET: 7.5; 325 TABLET ORAL at 10:22

## 2018-09-02 RX ADMIN — HYDROCODONE BITARTRATE AND ACETAMINOPHEN 1 TABLET: 7.5; 325 TABLET ORAL at 06:19

## 2018-09-02 RX ADMIN — ASPIRIN 325 MG: 325 TABLET, DELAYED RELEASE ORAL at 20:37

## 2018-09-02 RX ADMIN — METOPROLOL SUCCINATE 12.5 MG: 25 TABLET, FILM COATED, EXTENDED RELEASE ORAL at 07:34

## 2018-09-02 RX ADMIN — METOPROLOL SUCCINATE 12.5 MG: 25 TABLET, FILM COATED, EXTENDED RELEASE ORAL at 20:37

## 2018-09-02 RX ADMIN — Medication 400 MG: at 07:34

## 2018-09-02 RX ADMIN — ARIPIPRAZOLE 20 MG: 10 TABLET ORAL at 06:19

## 2018-09-02 RX ADMIN — Medication 1 G: at 20:37

## 2018-09-02 RX ADMIN — TAMSULOSIN HYDROCHLORIDE 0.4 MG: 0.4 CAPSULE ORAL at 20:37

## 2018-09-02 RX ADMIN — DOCUSATE SODIUM 100 MG: 100 CAPSULE, LIQUID FILLED ORAL at 20:37

## 2018-09-02 RX ADMIN — LEVOTHYROXINE SODIUM 50 MCG: 50 TABLET ORAL at 06:19

## 2018-09-02 RX ADMIN — Medication 1 G: at 07:34

## 2018-09-02 RX ADMIN — DOCUSATE SODIUM 100 MG: 100 CAPSULE, LIQUID FILLED ORAL at 07:34

## 2018-09-02 RX ADMIN — INSULIN DETEMIR 24 UNITS: 100 INJECTION, SOLUTION SUBCUTANEOUS at 21:06

## 2018-09-02 RX ADMIN — ASPIRIN 325 MG: 325 TABLET, DELAYED RELEASE ORAL at 07:33

## 2018-09-02 RX ADMIN — FERROUS SULFATE TAB 325 MG (65 MG ELEMENTAL FE) 325 MG: 325 (65 FE) TAB at 07:34

## 2018-09-02 RX ADMIN — INSULIN ASPART 3 UNITS: 100 INJECTION, SOLUTION INTRAVENOUS; SUBCUTANEOUS at 17:49

## 2018-09-02 RX ADMIN — HYDROCODONE BITARTRATE AND ACETAMINOPHEN 2 TABLET: 7.5; 325 TABLET ORAL at 17:48

## 2018-09-02 RX ADMIN — INSULIN ASPART 2 UNITS: 100 INJECTION, SOLUTION INTRAVENOUS; SUBCUTANEOUS at 11:37

## 2018-09-02 RX ADMIN — INSULIN ASPART 3 UNITS: 100 INJECTION, SOLUTION INTRAVENOUS; SUBCUTANEOUS at 21:05

## 2018-09-02 NOTE — PLAN OF CARE
Problem: Patient Care Overview  Goal: Plan of Care Review  Outcome: Ongoing (interventions implemented as appropriate)   09/02/18 0221   Coping/Psychosocial   Plan of Care Reviewed With patient   OTHER   Outcome Summary patient resting comfortbly at this time, ambulating with assistance, no complaints of pain, unable to wean O2, CXR done r/t new expiratory wheezing, educated on b/p and glucose monitoring   Plan of Care Review   Progress improving     Goal: Individualization and Mutuality  Outcome: Ongoing (interventions implemented as appropriate)    Goal: Discharge Needs Assessment  Outcome: Ongoing (interventions implemented as appropriate)    Goal: Interprofessional Rounds/Family Conf  Outcome: Ongoing (interventions implemented as appropriate)      Problem: Fall Risk (Adult)  Goal: Absence of Fall  Outcome: Ongoing (interventions implemented as appropriate)      Problem: Knee Arthroplasty (Total, Partial) (Adult)  Goal: Signs and Symptoms of Listed Potential Problems Will be Absent, Minimized or Managed (Knee Arthroplasty)  Outcome: Ongoing (interventions implemented as appropriate)    Goal: Anesthesia/Sedation Recovery  Outcome: Outcome(s) achieved Date Met: 09/02/18

## 2018-09-02 NOTE — PLAN OF CARE
Problem: Patient Care Overview  Goal: Plan of Care Review  Outcome: Ongoing (interventions implemented as appropriate)   09/02/18 1026 09/02/18 9927   Coping/Psychosocial   Plan of Care Reviewed With patient --    OTHER   Outcome Summary --  pt POD 3 Left Femur. pain is controlled with PO pain medication. VSS. BP improved. 2 U PRBC's given, tolerated well. pt able to work with PT. educated on the importance of glycemic control and medications as ordered for HO DM. will cont to monitor.    Plan of Care Review   Progress --  improving     Goal: Individualization and Mutuality  Outcome: Ongoing (interventions implemented as appropriate)    Goal: Discharge Needs Assessment  Outcome: Ongoing (interventions implemented as appropriate)    Goal: Interprofessional Rounds/Family Conf  Outcome: Ongoing (interventions implemented as appropriate)      Problem: Fall Risk (Adult)  Goal: Identify Related Risk Factors and Signs and Symptoms  Outcome: Ongoing (interventions implemented as appropriate)    Goal: Absence of Fall  Outcome: Ongoing (interventions implemented as appropriate)      Problem: Knee Arthroplasty (Total, Partial) (Adult)  Goal: Signs and Symptoms of Listed Potential Problems Will be Absent, Minimized or Managed (Knee Arthroplasty)  Outcome: Ongoing (interventions implemented as appropriate)    Goal: Anesthesia/Sedation Recovery  Outcome: Ongoing (interventions implemented as appropriate)

## 2018-09-02 NOTE — PROGRESS NOTES
Name: Chris Ferguson ADMIT: 2018   : 1945  PCP: Gisela Sotelo APRN    MRN: [id number] LOS: 2 days   AGE/SEX: 72 y.o. male  ROOM: [facility]     Subjective   Feels weak today. No other new c/o. He denies any chest pain, SOA, nausea, vomiting or diarrhea.     Objective   Vital Signs  Temp:  [97 °F (36.1 °C)-100 °F (37.8 °C)] 97.6 °F (36.4 °C)  Heart Rate:  [] 110  Resp:  [16-18] 18  BP: ()/(38-72) 117/72  SpO2:  [95 %-98 %] 96 %  on  Flow (L/min):  [3] 3;   Device (Oxygen Therapy): nasal cannula  Body mass index is 28.55 kg/m².    Physical Exam   Constitutional: He is oriented to person, place, and time. No distress.   Cardiovascular: Normal rate and regular rhythm.    No murmur heard.  Pulmonary/Chest: Effort normal and breath sounds normal.   Abdominal: Soft. Bowel sounds are normal. He exhibits no distension. There is no tenderness.   Musculoskeletal: He exhibits no edema.   Left leg wrapped. Surgical drain in place.   Neurological: He is oriented to person, place, and time.   A little groggy   Skin: Skin is warm and dry. He is not diaphoretic.       Results Review:       I reviewed the patient's new clinical results.    Results from last 7 days  Lab Units 18  0430 18  2103 18  1639 18  0709   WBC 10*3/mm3 6.83  --   --  7.37   HEMOGLOBIN g/dL 7.3* 8.3* 8.7* 8.2*   PLATELETS 10*3/mm3 186  --   --  213       Results from last 7 days  Lab Units 18  0430 18  0709   SODIUM mmol/L 139 138   POTASSIUM mmol/L 4.3 4.2   CHLORIDE mmol/L 104 103   CO2 mmol/L 26.5 24.1   BUN mg/dL 23 20   CREATININE mg/dL 1.39* 1.24   GLUCOSE mg/dL 95 133*   Estimated Creatinine Clearance: 57.6 mL/min (A) (by C-G formula based on SCr of 1.39 mg/dL (H)).    Results from last 7 days  Lab Units 18  0430 18  0709   CALCIUM mg/dL 8.2* 8.1*       Lab Results   Component Value Date    HGBA1C 7.90 (H) 2018     Glucose   Date/Time Value Ref Range Status   2018  0607 71 70 - 130 mg/dL Final   09/01/2018 2135 212 (H) 70 - 130 mg/dL Final   09/01/2018 1626 184 (H) 70 - 130 mg/dL Final   09/01/2018 1127 241 (H) 70 - 130 mg/dL Final   09/01/2018 0551 158 (H) 70 - 130 mg/dL Final   08/31/2018 2058 188 (H) 70 - 130 mg/dL Final   08/31/2018 1624 126 70 - 130 mg/dL Final   08/31/2018 1410 124 70 - 130 mg/dL Final         ARIPiprazole 20 mg Oral QAM   aspirin 325 mg Oral Q12H   docusate sodium 100 mg Oral BID   ferrous sulfate 325 mg Oral Daily With Breakfast   insulin aspart 0-7 Units Subcutaneous 4x Daily With Meals & Nightly   insulin detemir 24 Units Subcutaneous Nightly   levothyroxine 50 mcg Oral QAM   magnesium oxide 400 mg Oral BID   memantine 5 mg Oral Daily   metoprolol succinate XL 12.5 mg Oral Q12H   sodium chloride 1 g Oral BID   tamsulosin 0.4 mg Oral Nightly       lactated ringers 100 mL/hr Last Rate: 100 mL/hr (08/31/18 1812)   sodium chloride 100 mL/hr Last Rate: 100 mL/hr (09/01/18 1301)       Active Hospital Problems    Diagnosis Date Noted   • **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      Postoperative anemia due to acute blood loss  · Preop hemoglobin 13.4 down to 8.2 yesterday and 7.3 this morning.  · Defer to orthopedics whether transfusion is indicated.  · Daily CBC.      Essential  hypertension  · BP somewhat low yesterday but improved today.  · Holding parameters for Toprol.      Tachycardia  · Likely due to postoperative state, anemia and pain.  · HR and BP both better today.   · Continue Toprol but changed to 12.5 mg twice daily with holding parameters.      DM (diabetes mellitus), type 2 (CMS/[facility])  · A1c in acceptable range.  · Blood sugar stable.  · Continue current insulin regimen.      BPH (benign prostatic hyperplasia)  · Continue Flomax and watch for postoperative urinary retention.      Hypothyroidism  · Continue outpatient dose of Synthroid      Nghia Stinson MD  [facility]  09/02/18  9:39 AM

## 2018-09-02 NOTE — THERAPY TREATMENT NOTE
Acute Care - Physical Therapy Treatment Note  [facility]     Patient Name: Chris Ferguson  : 1945  MRN: [id number]  Today's Date: 2018  Onset of Illness/Injury or Date of Surgery: 18  Date of Referral to PT: 18  Referring Physician: Dr. Giang    Admit Date: 2018    Visit Dx:    ICD-10-CM ICD-9-CM   1. Impaired functional mobility, balance, gait, and endurance Z74.09 V49.89   2. 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, left   • Essential hypertension   • DM (diabetes mellitus), type 2 (CMS/HCC)   • BPH (benign prostatic hyperplasia)   • Hypothyroidism   • Postoperative anemia due to acute blood loss   • Tachycardia       Therapy Treatment          Rehabilitation Treatment Summary     Row Name 18 0956             Treatment Time/Intention    Discipline physical therapy assistant  -SM      Document Type therapy note (daily note)  -SM      Subjective Information complains of;weakness;fatigue;pain  -SM      Mode of Treatment physical therapy  -SM      Patient Effort fair  -SM      Existing Precautions/Restrictions fall;brace worn when out of bed   KI until able to SLR ind  -SM      Recorded by [SM] Leila Hunter, [facility] 18 1025      Row Name 18 0956             Vital Signs    Pre SpO2 (%) 96  -SM      O2 Delivery Pre Treatment supplemental O2  -SM      Intra SpO2 (%) 95  -SM      O2 Delivery Intra Treatment supplemental O2  -SM      Post SpO2 (%) 96  -SM      O2 Delivery Post Treatment supplemental O2  -SM      Pre Patient Position Sitting  -SM      Intra Patient Position Sitting  -SM      Post Patient Position Sitting  -SM      Recorded by [SM] Leila Hunter, [facility] 18 1025      Row Name 18 0956             Cognitive Assessment/Intervention- PT/OT    Orientation Status (Cognition) oriented x 3   -SM      Follows Commands (Cognition) follows two step commands;repetition of directions required  -SM      Safety Deficit (Cognitive) moderate deficit  -SM      Personal Safety Interventions fall prevention program maintained  -SM      Recorded by [] Leila Hunter [facility] 09/02/18 1025      Row Name 09/02/18 0956             Safety Issues, Functional Mobility    Impairments Affecting Function (Mobility) endurance/activity tolerance;pain;strength  -SM      Recorded by [] Leila Hunter [facility] [place] [facility] 09/02/18 1025      Row Name 09/02/18 0956             Bed Mobility Assessment/Treatment    Comment (Bed Mobility) up in chair throughout  -SM      Recorded by [] Leila Hunter [facility] [place] [facility] 09/02/18 1025      Row Name 09/02/18 0956             Transfer Assessment/Treatment    Comment (Transfers) NT - pt in increased pain and weakness   agreed for exercises only  -SM      Recorded by [] Leila Hunter [facility] [place] 09/02/18 1025      Row Name 09/02/18 0956             Therapeutic Exercise    Comment (Therapeutic Exercise) L TKR protocol x 15-20 reps w/ assist  -SM      Recorded by [] Leila Hunter [facility] [place] 09/02/18 1025      [facility] Name 09/02/18 0956             Positioning and Restraints    Pre-Treatment Position sitting in chair/recliner  -SM      Post Treatment Position chair  -SM      In Chair reclined;call light within reach;encouraged to call for assist;exit alarm on;notified nsg  -SM      Recorded by [] Leila Hunter [facility] [place] [facility] 09/02/18 1025      Row Name 09/02/18 0956             Pain Assessment    Additional Documentation Pain Scale: Numbers Pre/Post-Treatment (Group)  -SM      Recorded by [] Leila Hunter [facility] [place] 09/02/18 1025      Row Name 09/02/18 0956             Pain Scale: Numbers Pre/Post-Treatment    Pain Scale: Numbers, Pretreatment 8/10  -SM      Pain Scale: Numbers, Post-Treatment 8/10  -SM      Pain Location - Side Left  -SM      Pain Location knee  -      Pain Intervention(s)  Repositioned;Ambulation/increased activity;Rest  -SM2      Recorded by [SM] Leila Hunter [place], PTA 09/02/18 1025  [SM2] Leila Hunter [place], PTA 09/02/18 1026      Row Name                Wound 08/31/18 1241 Left knee incision    Wound - Properties Group Date first assessed: 08/31/18 [EG] Time first assessed: 1241 [EG] Side: Left [EG] Location: knee [EG] Type: incision [EG] Recorded by:  [EG] Mery Jc RN 08/31/18 1241      User Key  (r) = Recorded By, (t) = Taken By, (c) = Cosigned By    Initials Name Effective Dates Discipline     Hunter Leila Lorraine, DONOVAN 03/07/18 -  PT    EG Mery Jc RN 07/10/17 -  Nurse          Wound 08/31/18 1241 Left knee incision (Active)   Dressing Appearance dry;intact;no drainage 9/2/2018  7:34 AM   Closure GENE 9/2/2018  7:34 AM   Base dressing in place, unable to visualize 9/2/2018  7:34 AM   Drainage Amount none 9/2/2018  7:34 AM             Physical Therapy Education     Title: PT OT SLP Therapies (Done)     Topic: Physical Therapy (Done)     Point: Mobility training (Done)    Learning Progress Summary     Learner Status Readiness Method Response Comment Documented by    Patient Done Acceptance E,TB,D VU,NR   09/02/18 1026     Done Acceptance E,TB,D VU,[facility] 09/01/18 1453     Active Acceptance E,D NR   09/01/18 1106     Done Acceptance E VU  MA 08/31/18 1618          Point: Home exercise program (Done)    Learning Progress Summary     Learner Status Readiness Method Response Comment Documented by    Patient Done Acceptance E,TB,D VU,NR   09/02/18 1026     Done Acceptance E,TB,D VU,NR   09/01/18 1453     Active Acceptance E,D NR   09/01/18 1106     Done Acceptance E VU  MA 08/31/18 1618          Point: Body mechanics (Done)    Learning Progress Summary     Learner Status Readiness Method Response Comment Documented by    Patient Done Acceptance E,TB,D VU,NR   09/02/18 1026     Done Acceptance E,TB,D VU,[facility] 09/01/18 1453     Active Acceptance E,D NR    09/01/18 1106     Done Acceptance E VU  MA 08/31/18 1618          Point: Precautions (Done)    Learning Progress Summary     Learner Status Readiness Method Response Comment Documented by    Patient Done Acceptance E,ARISTIDES,ELIZABETH ROJO,NR   09/02/18 1026     Done Acceptance STEFANYTB,ELIZABETH ROJO,NR   09/01/18 1453     Active Acceptance E,D NR   09/01/18 1106     Done Acceptance E VU  MA 08/31/18 1618                      User Key     Initials Effective Dates Name Provider Type Discipline     03/07/18 -  Leila Hunter, PTA Physical Therapy Assistant PT    MA 04/03/18 -  Bridget Dennis, PT Physical Therapist PT                    PT Recommendation and Plan  Anticipated Discharge Disposition (PT): home with home health, skilled nursing facility  Outcome Summary/Treatment Plan (PT)  Anticipated Discharge Disposition (PT): home with home health, skilled nursing facility  Plan of Care Reviewed With: patient  Progress: no change  Outcome Summary: Pt still having increased pain, also having overall weakness. Pt performed TKR protocol w/ assist. Not obtaining much knee flexion at this time, even w/ PROM. Severe pain w/ all exercises. Pt refused standing this morning, but agreed to ambulate to bed this afternoon. PT will continue to address functional mobility deficits as tolerated.          Outcome Measures     Row Name 09/02/18 1000 09/01/18 1400 09/01/18 1111       How much help from another person do you currently need...    Turning from your back to your side while in flat bed without using bedrails? 3  -SM 3  -SM  --    Moving from lying on back to sitting on the side of a flat bed without bedrails? 3  -SM 3  -SM  --    Moving to and from a bed to a chair (including a wheelchair)? 2  -SM 2  -SM  --    Standing up from a chair using your arms (e.g., wheelchair, bedside chair)? 2  -SM 2  -SM  --    Climbing 3-5 steps with a railing? 2  -SM 2  -SM  --    To walk in hospital room? 2  -SM 2  -SM  --    AM-PAC 6 Clicks Score  14  -SM 14  -SM  --       How much help from another is currently needed...    Putting on and taking off regular lower body clothing?  --  -- 1  -LE    Bathing (including washing, rinsing, and drying)  --  -- 1  -LE    Toileting (which includes using toilet bed pan or urinal)  --  -- 2  -LE    Putting on and taking off regular upper body clothing  --  -- 1  -LE    Taking care of personal grooming (such as brushing teeth)  --  -- 1  -LE    Eating meals  --  -- 3  -LE    Score  --  -- 9  -LE       Functional Assessment    Outcome Measure Options AM-PAC 6 Clicks Basic Mobility (PT)  - AM-PAC 6 Clicks Basic Mobility (PT)  - AM-[facility] 6 Clicks Daily Activity (OT)  -LE    Row Name 09/01/18 1100 08/31/18 1600          How much help from another person do you currently need...    Turning from your back to your side while in flat bed without using bedrails? 3  -SM 3  -MA     Moving from lying on back to sitting on the side of a flat bed without bedrails? 3  -SM 3  -MA     Moving to and from a bed to a chair (including a wheelchair)? 2  -SM 2  -MA     Standing up from a chair using your arms (e.g., wheelchair, bedside chair)? 2  -SM 2  -MA     Climbing 3-5 steps with a railing? 2  -SM 2  -MA     To walk in hospital room? 2  -SM 2  -MA     AM-PAC 6 Clicks Score 14  -SM 14  -MA        Functional Assessment    Outcome Measure Options AM-PAC 6 Clicks Basic Mobility (PT)  -[facility] 6 Clicks Basic Mobility (PT)  -MA       User Key  (r) = Recorded By, (t) = Taken By, (c) = Cosigned By    Initials Name Provider Type    Rita Arceo, OTR Occupational Therapist    Leila Cooney, DONOVAN Physical Therapy Assistant    Bridget Kirkland, PT Physical Therapist           Time Calculation:         PT Charges     Row Name 09/02/18 1031             Time Calculation    Start Time 0956  -      Stop Time 1020  -      Time Calculation (min) 24 min  -      PT Received On 09/02/18  -      PT - Next Appointment 09/02/18  -         User Key  (r) = Recorded By, (t) = Taken By, (c) = Cosigned By    Initials Name Provider Type     Leila Hunter, DONOVAN Physical Therapy Assistant        Therapy Suggested Charges     Code   Minutes Charges    None           Therapy Charges for Today     Code Description Service Date Service Provider Modifiers Qty    39990499018 HC PT THER PROC EA 15 MIN 9/1/2018 Leila Hunter, PTA GP 1    12184805425 HC PT THER PROC GROUP 9/1/2018 Leila Hunter, DONOVAN GP 1    94834601040 HC PT THER PROC EA 15 MIN 9/1/2018 Leila Hunter, PTA GP 2    42055635925 HC PT THER PROC EA 15 MIN 9/2/2018 Leila Hunter, PTA GP 2          PT G-Codes  Outcome Measure Options: AM-PAC 6 Clicks Basic Mobility (PT)    Leila Hunter PTA  9/2/2018

## 2018-09-02 NOTE — THERAPY TREATMENT NOTE
Acute Care - Physical Therapy Treatment Note  [facility]     Patient Name: Chris Ferguson  : 1945  MRN: [id number]  Today's Date: 2018  Onset of Illness/Injury or Date of Surgery: 18  Date of Referral to PT: 18  Referring Physician: Dr. Giang    Admit Date: 2018    Visit Dx:    ICD-10-CM ICD-9-CM   1. Impaired functional mobility, balance, gait, and endurance Z74.09 V49.89   2. 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, left   • Essential hypertension   • DM (diabetes mellitus), type 2 (CMS/HCC)   • BPH (benign prostatic hyperplasia)   • Hypothyroidism   • Postoperative anemia due to acute blood loss   • Tachycardia       Therapy Treatment          Rehabilitation Treatment Summary     Row Name 18 1410 18 0956          Treatment Time/Intention    Discipline physical therapy assistant  - physical therapy assistant  -     Document Type therapy note (daily note)  - therapy note (daily note)  -     Subjective Information complains of;weakness;fatigue;pain  -SM complains of;weakness;fatigue;pain  -SM     Mode of Treatment physical therapy  - physical therapy  -     Patient Effort adequate  -SM fair  -SM     Existing Precautions/Restrictions fall;brace worn when out of bed   KI until able to SLR  -SM fall;brace worn when out of bed   KI until able to SLR ind  -SM     Recorded by [SM] Leila Hunter, [facility] 18 1444 [SM] Leila Hunter, [facility] 18 1025     Row Name 18 0956             Vital Signs    Pre SpO2 (%) 96  -SM      O2 Delivery Pre Treatment supplemental O2  -SM      Intra SpO2 (%) 95  -SM      O2 Delivery Intra Treatment supplemental O2  -SM      Post SpO2 (%) 96  -SM      O2 Delivery Post Treatment supplemental O2  -SM      Pre Patient Position Sitting  -SM      Intra Patient  Position Sitting  -SM      Post Patient Position Sitting  -SM      Recorded by [SM] Leila Hunter, [facility] 09/02/18 1025      Row Name 09/02/18 1410 09/02/18 0956          Cognitive Assessment/Intervention- PT/OT    Orientation Status (Cognition) oriented x 3  -SM oriented x 3  -SM     Follows Commands (Cognition) WFL  -SM follows two step commands;repetition of directions required  -     Safety Deficit (Cognitive) moderate deficit  -SM moderate deficit  -SM     Personal Safety Interventions fall prevention program maintained;gait belt;nonskid shoes/slippers when out of bed  - fall prevention program maintained  -SM     Recorded by [SM] Leial Hunter, [facility] 09/02/18 1444 [SM] Leila Hunter, [facility] 09/02/18 1025     Row Name 09/02/18 1410 09/02/18 0956          Safety Issues, Functional Mobility    Impairments Affecting Function (Mobility) endurance/activity tolerance;pain;strength  - endurance/activity tolerance;pain;strength  -SM     Recorded by [SM] Leila Hunter, [facility] 09/02/18 1444 [SM] Leila Hunter, [facility] 09/02/18 1025     Row Name 09/02/18 1410 09/02/18 0956          Bed Mobility Assessment/Treatment    Bed Mobility Assessment/Treatment sit-supine  -  --     Supine-Sit [place] (Bed Mobility) not tested  -  --     Sit-Supine [place] (Bed Mobility) moderate assist (50% patient effort)  -  --     Bed Mobility, Safety Issues decreased use of legs for bridging/pushing  -  --     Assistive Device (Bed Mobility) bed rails  -  --     Comment (Bed Mobility)  -- up in chair throughout  -SM     Recorded by [SM] Leila Hunter, [facility] 09/02/18 1444 [SM] Leila Hunter, [facility] 09/02/18 1025     Row Name 09/02/18 1410 09/02/18 0956          Transfer Assessment/Treatment    Transfer Assessment/Treatment sit-stand transfer;stand-sit transfer  -  --     Comment (Transfers) cues for hand placement  - NT - pt in increased pain and weakness   agreed for exercises only  -      Recorded by [] Leila Hunter, [facility] 09/02/18 1444 [SM] Leila Hunter, [facility] 09/02/18 1025     Row Name 09/02/18 1410             Sit-Stand Transfer    Sit-Stand [place] (Transfers) minimum assist (75% patient effort);moderate assist (50% patient effort);2 person assist  -      Assistive Device (Sit-Stand Transfers) walker, front-wheeled  -      Recorded by [] Leila Hunter, [facility] 09/02/18 1444      Row Name 09/02/18 1410             Stand-Sit Transfer    Stand-Sit [place] (Transfers) minimum assist (75% patient effort);moderate assist (50% patient effort);2 person assist;verbal cues  -      Assistive Device (Stand-Sit Transfers) walker, front-wheeled  -      Recorded by [] Leila Hunter, [facility] 09/02/18 1444      Row Name 09/02/18 1410             Gait/Stairs Assessment/Training    Gait/Stairs Assessment/Training gait/ambulation independence;gait/ambulation assistive device;distance ambulated;gait pattern;gait deviations  -      [place] Level (Gait) minimum assist (75% patient effort);moderate assist (50% patient effort);2 person assist;verbal cues  -      Assistive Device (Gait) walker, front-wheeled  -      Distance in Feet (Gait) 5  -SM      Pattern (Gait) step-to  -SM      Deviations/Abnormal Patterns (Gait) antalgic;scar decreased;stride length decreased  -      Bilateral Gait Deviations forward flexed posture;heel strike decreased;weight shift ability decreased  -      Comment (Gait/Stairs) cues for keeping walker close throughout  -SM      Recorded by [] Leila Hunter, [facility] 09/02/18 1444      Row Name 09/02/18 1410 09/02/18 0956          Therapeutic Exercise    Comment (Therapeutic Exercise) L TKR protocol x 15 reps w/ assist  - L TKR protocol x 15-20 reps w/ assist  -     Recorded by [] Leila Hunter, [facility] 09/02/18 1444 [SM] Leila Hunter, [facility] 09/02/18 1025     Row Name 09/02/18 1410 09/02/18 0956          Positioning and  Restraints    Pre-Treatment Position sitting in chair/recliner  -SM sitting in chair/recliner  -SM     Post Treatment Position bed  -SM chair  -SM     In Bed supine;call light within reach;encouraged to call for assist;exit alarm on;notified nsg  -SM  --     In Chair  -- reclined;call light within reach;encouraged to call for assist;exit alarm on;notified nsg  -SM     Recorded by [] Dale Leila Lorraine, [facility] 09/02/18 1444 [] Hunter Leila Lorraine, [facility] 09/02/18 1025     Row Name 09/02/18 1410 09/02/18 0956          Pain Assessment    Additional Documentation Pain Scale: Numbers Pre/Post-Treatment (Group)  -SM Pain Scale: Numbers Pre/Post-Treatment (Group)  -SM     Recorded by [] Leila Hunter, [facility] 09/02/18 1444 [SM] HunterPeterah Lorraine, [facility] 09/02/18 1025     Row Name 09/02/18 1410 09/02/18 0956          Pain Scale: Numbers Pre/Post-Treatment    Pain Scale: Numbers, Pretreatment 3/10  -SM 8/10  -SM     Pain Scale: Numbers, Post-Treatment 8/10  -SM 8/10  -SM     Pain Location - Side Left  -SM Left  -SM     Pain Location knee  -SM knee  -SM     Pain Intervention(s) Repositioned;Ambulation/increased activity;Rest  -SM Repositioned;Ambulation/increased activity;Rest  -SM2     Recorded by [] Dale Leila Lorraine, [facility] 09/02/18 1444 [SM] Peter Hunterah Lorraine, [facility] 09/02/18 1025  [SM2] Dale Leila Lorraine, [facility] 09/02/18 1026     Row Name                Wound 08/31/18 1241 Left knee incision    Wound - Properties Group Date first assessed: 08/31/18 [EG] Time first assessed: 1241 [EG] Side: Left [EG] Location: knee [EG] Type: incision [EG] Recorded by:  [EG] Mery Jc RN 08/31/18 1241      User Key  (r) = Recorded By, (t) = Taken By, (c) = Cosigned By    Initials Name Effective Dates Discipline     Leila Hunter, [facility] 03/07/18 -  PT    EG Mery Jc RN 07/10/17 -  Nurse          Wound 08/31/18 1241 Left knee incision (Active)   Dressing Appearance dry;intact;no drainage 9/2/2018 12:11 PM   Closure  GENE 9/2/2018 12:11 PM   Base dressing in place, unable to visualize 9/2/2018 12:11 PM   Drainage Amount none 9/2/2018 12:11 PM             Physical Therapy Education     Title: PT OT SLP Therapies (Done)     Topic: Physical Therapy (Done)     Point: Mobility training (Done)    Learning Progress Summary     Learner Status Readiness Method Response Comment Documented by    Patient Done Acceptance E,TB,D VU,NR   09/02/18 1026     Done Acceptance E,TB,D VU,[facility] 09/01/18 1453     Active Acceptance E,D NR   09/01/18 1106     Done Acceptance E VU  MA 08/31/18 1618          Point: Home exercise program (Done)    Learning Progress Summary     Learner Status Readiness Method Response Comment Documented by    Patient Done Acceptance E,TB,D VU,[facility] 09/02/18 1026     Done Acceptance E,TB,D VU,[facility] 09/01/18 1453     Active Acceptance E,D [facility] 09/01/18 1106     Done Acceptance E VU  MA 08/31/18 1618          Point: Body mechanics (Done)    Learning Progress Summary     Learner Status Readiness Method Response Comment Documented by    Patient Done Acceptance E,TB,D VU,[facility] 09/02/18 1026     Done Acceptance E,TB,D VU,[facility] 09/01/18 1453     Active Acceptance E,D NR   09/01/18 1106     Done Acceptance E VU  MA 08/31/18 1618          Point: Precautions (Done)    Learning Progress Summary     Learner Status Readiness Method Response Comment Documented by    Patient Done Acceptance E,TB,D VU,[facility] 09/02/18 1026     Done Acceptance E,TB,D VU,[facility] 09/01/18 1453     Active Acceptance E,D [facility] 09/01/18 1106     Done Acceptance E VU  MA 08/31/18 1618                      User Key     Initials Effective Dates Name Provider Type Discipline     03/07/18 -  Leila Hunter, PTA Physical Therapy Assistant PT    MA 04/03/18 -  Bridget Dennis PT Physical Therapist PT                    PT Recommendation and Plan  Anticipated Discharge Disposition (PT): home with home health, skilled nursing facility  Outcome  Summary/Treatment Plan (PT)  Anticipated Discharge Disposition (PT): home with home health, skilled nursing facility  Plan of Care Reviewed With: patient  Progress: no change  Outcome Summary: Pt still having increased pain, also having overall weakness. Pt performed TKR protocol w/ assist. Not obtaining much knee flexion at this time, even w/ PROM. Severe pain w/ all exercises. Pt refused standing this morning, but agreed to ambulate to bed this afternoon. PT will continue to address functional mobility deficits as tolerated.          Outcome Measures     Row Name 09/02/18 1000 09/01/18 1400 09/01/18 1111       How much help from another person do you currently need...    Turning from your back to your side while in flat bed without using bedrails? 3  -SM 3  -SM  --    Moving from lying on back to sitting on the side of a flat bed without bedrails? 3  -SM 3  -SM  --    Moving to and from a bed to a chair (including a wheelchair)? 2  -SM 2  -SM  --    Standing up from a chair using your arms (e.g., wheelchair, bedside chair)? 2  -SM 2  -SM  --    Climbing 3-5 steps with a railing? 2  -SM 2  -SM  --    To walk in hospital room? 2  -SM 2  -SM  --    AM-PAC 6 Clicks Score 14  -SM 14  -SM  --       How much help from another is currently needed...    Putting on and taking off regular lower body clothing?  --  -- 1  -LE    Bathing (including washing, rinsing, and drying)  --  -- 1  -LE    Toileting (which includes using toilet bed pan or urinal)  --  -- 2  -LE    Putting on and taking off regular upper body clothing  --  -- 1  -LE    Taking care of personal grooming (such as brushing teeth)  --  -- 1  -LE    Eating meals  --  -- 3  -LE    Score  --  -- 9  -LE       Functional Assessment    Outcome Measure Options AM-PAC 6 Clicks Basic Mobility (PT)  - AM-PAC 6 Clicks Basic Mobility (PT)  - AM-PAC 6 Clicks Daily Activity (OT)  -LE    Row Name 09/01/18 1100 08/31/18 1600          How much help from another person do  you currently need...    Turning from your back to your side while in flat bed without using bedrails? 3  -SM 3  -MA     Moving from lying on back to sitting on the side of a flat bed without bedrails? 3  -SM 3  -MA     Moving to and from a bed to a chair (including a wheelchair)? 2  -SM 2  -MA     Standing up from a chair using your arms (e.g., wheelchair, bedside chair)? 2  -SM 2  -MA     Climbing 3-5 steps with a railing? 2  -SM 2  -MA     To walk in hospital room? 2  -SM 2  -MA     AM-PAC 6 Clicks Score 14  -SM 14  -MA        Functional Assessment    Outcome Measure Options AM-PAC 6 Clicks Basic Mobility (PT)  -SM AM-PAC 6 Clicks Basic Mobility (PT)  -MA       User Key  (r) = Recorded By, (t) = Taken By, (c) = Cosigned By    Initials Name Provider Type    Rita Arceo, OTR Occupational Therapist     Leila Hunter, DONOVAN Physical Therapy Assistant    Bridget Kirkland, PT Physical Therapist           Time Calculation:         PT Charges     Row Name 09/02/18 1444 09/02/18 1031          Time Calculation    Start Time 1410  -SM 0956  -SM     Stop Time 1433  -SM 1020  -SM     Time Calculation (min) 23 min  - 24 min  -     PT Received On 09/02/18  - 09/02/18  -     PT - Next Appointment 09/03/18  - 09/02/18  -       User Key  (r) = Recorded By, (t) = Taken By, (c) = Cosigned By    Initials Name Provider Type     Leila Hunter PTA Physical Therapy Assistant        Therapy Suggested Charges     Code   Minutes Charges    None           Therapy Charges for Today     Code Description Service Date Service Provider Modifiers Qty    39446384157 HC PT THER PROC EA 15 MIN 9/1/2018 Leila Hunter, PTA GP 1    24839869958 HC PT THER PROC GROUP 9/1/2018 Leila Hunter, PTA GP 1    19583161294 HC PT THER PROC EA 15 MIN 9/1/2018 Leila Hunter, PTA GP 2    76633742565 HC PT THER PROC EA 15 MIN 9/2/2018 Leila Hunter, PTA GP 2    88543665787 HC PT THER PROC EA 15 MIN 9/2/2018  Leila Hunter, PTA GP 2    10144998993 HC PT THER SUPP EA 15 MIN 9/2/2018 Leila Hunter, DONOVAN GP 1          PT G-Codes  Outcome Measure Options: AM-PAC 6 Clicks Basic Mobility (PT)    Leila Hunter PTA  9/2/2018

## 2018-09-02 NOTE — PLAN OF CARE
Problem: Patient Care Overview  Goal: Plan of Care Review  Outcome: Ongoing (interventions implemented as appropriate)   09/02/18 1026   Coping/Psychosocial   Plan of Care Reviewed With patient   OTHER   Outcome Summary Pt still having increased pain, also having overall weakness. Pt performed TKR protocol w/ assist. Not obtaining much knee flexion at this time, even w/ PROM. Severe pain w/ all exercises. Pt refused standing this morning, but agreed to ambulate to bed this afternoon. PT will continue to address functional mobility deficits as tolerated.   Plan of Care Review   Progress no change

## 2018-09-02 NOTE — PROGRESS NOTES
Orthopedic Progress Note      Patient: Chris Ferguson  YOB: 1945     Date of Admission: 8/31/2018  6:41 AM Medical Record Number: [id number]     Attending Physician: Valente Giang,*    Status Post:  LEft distal femur replacement  TOTAL KNEE ARTHROPLASTY  complex distal femur replacement   removal of plate  Post Operative Day Number: 2    Subjective : No new orthopaedic complaints     Pain Relief: some relief with present medication.     Systemic Complaints: No Complaints  Drain was pulled by patient overnight    Vitals:    09/01/18 1900 09/01/18 2100 09/02/18 0300 09/02/18 0707   BP: 112/61 117/69 113/63 117/72   BP Location: Left arm Left arm Left arm Right arm   Patient Position: Lying Lying Lying Lying   Pulse: 99 102 98 110   Resp: 16 16 16 18   Temp: 97.4 °F (36.3 °C) 97 °F (36.1 °C) 97.1 °F (36.2 °C) 97.6 °F (36.4 °C)   TempSrc: Oral Oral Oral Oral   SpO2: 95% 97% 96% 96%   Weight:       Height:           Physical Exam: 72 y.o. male    General Appearance:       Alert, cooperative, in no acute distress                  Extremities:    Dressing Clean, Dry and Intact, drain has been pulled          Incision healthy without signs or symptoms of infections         No clinical sign of DVT        Able to do good movements of digits    Pulses:   Pulses palpable and equal bilaterally           Diagnostic Tests:       Results from last 7 days  Lab Units 09/02/18  0430 09/01/18  2103 09/01/18  1639 09/01/18  0709   WBC 10*3/mm3 6.83  --   --  7.37   HEMOGLOBIN g/dL 7.3* 8.3* 8.7* 8.2*   HEMATOCRIT % 23.4* 26.4* 27.3* 26.4*   PLATELETS 10*3/mm3 186  --   --  213       Results from last 7 days  Lab Units 09/02/18  0430 09/01/18  0709   SODIUM mmol/L 139 138   POTASSIUM mmol/L 4.3 4.2   CHLORIDE mmol/L 104 103   CO2 mmol/L 26.5 24.1   BUN mg/dL 23 20   CREATININE mg/dL 1.39* 1.24   GLUCOSE mg/dL 95 133*   CALCIUM mg/dL 8.2* 8.1*         Xr Chest 1 View    Result Date: 9/1/2018   Stable appearance  of the chest by portable radiography.  This report was finalized on 9/1/2018 9:04 PM by Oj Cowan M.D.          Current Medications:  Scheduled Meds:    ARIPiprazole 20 mg Oral QAM   aspirin 325 mg Oral Q12H   docusate sodium 100 mg Oral BID   ferrous sulfate 325 mg Oral Daily With Breakfast   insulin aspart 0-7 Units Subcutaneous 4x Daily With Meals & Nightly   insulin detemir 24 Units Subcutaneous Nightly   levothyroxine 50 mcg Oral QAM   magnesium oxide 400 mg Oral BID   memantine 5 mg Oral Daily   metoprolol succinate XL 12.5 mg Oral Q12H   sodium chloride 1 g Oral BID   tamsulosin 0.4 mg Oral Nightly     Continuous Infusions:    lactated ringers 100 mL/hr Last Rate: 100 mL/hr (08/31/18 1812)   sodium chloride 100 mL/hr Last Rate: 100 mL/hr (09/01/18 1301)     PRN Meds:.•  acetaminophen  •  bisacodyl  •  bisacodyl  •  dextrose  •  dextrose  •  diphenhydrAMINE **OR** diphenhydrAMINE  •  glucagon (human recombinant)  •  HYDROcodone-acetaminophen  •  HYDROcodone-acetaminophen  •  melatonin  •  ondansetron **OR** ondansetron ODT **OR** ondansetron  •  traZODone    Assessment: Status post  TOTAL KNEE ARTHROPLASTY  complex distal femur replacement   removal of plate     Patient Active Problem List   Diagnosis   • 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 Will transfuse 2 unit PRBC    Weight Bearing: WBAT  Discharge Plan: Await rehab placement. D/C to rehab once medically stable      LLOYD Almeida    Date: 9/2/2018    Time: 7:21 AM     I I have examined this patient with LLOYD Almeida.   I have reviewed the  labs, done the physical examination.  I agree with above evaluation and plan and  Note.

## 2018-09-02 NOTE — NURSING NOTE
Dr. High notified of patient's drop in hemoglobin. Stated he will wait and let day shift address and decide if they want to transfuse.

## 2018-09-03 LAB
ABO + RH BLD: NORMAL
ABO + RH BLD: NORMAL
BH BB BLOOD EXPIRATION DATE: NORMAL
BH BB BLOOD EXPIRATION DATE: NORMAL
BH BB BLOOD TYPE BARCODE: 6200
BH BB BLOOD TYPE BARCODE: 6200
BH BB DISPENSE STATUS: NORMAL
BH BB DISPENSE STATUS: NORMAL
BH BB PRODUCT CODE: NORMAL
BH BB PRODUCT CODE: NORMAL
BH BB UNIT NUMBER: NORMAL
BH BB UNIT NUMBER: NORMAL
GLUCOSE BLDC GLUCOMTR-MCNC: 134 MG/DL (ref 70–130)
GLUCOSE BLDC GLUCOMTR-MCNC: 135 MG/DL (ref 70–130)
GLUCOSE BLDC GLUCOMTR-MCNC: 205 MG/DL (ref 70–130)
GLUCOSE BLDC GLUCOMTR-MCNC: 251 MG/DL (ref 70–130)
HCT VFR BLD AUTO: 28.6 % (ref 40.4–52.2)
HGB BLD-MCNC: 9.1 G/DL (ref 13.7–17.6)
UNIT  ABO: NORMAL
UNIT  ABO: NORMAL
UNIT  RH: NORMAL
UNIT  RH: NORMAL

## 2018-09-03 PROCEDURE — 97110 THERAPEUTIC EXERCISES: CPT

## 2018-09-03 PROCEDURE — 82962 GLUCOSE BLOOD TEST: CPT

## 2018-09-03 PROCEDURE — 85014 HEMATOCRIT: CPT | Performed by: HOSPITALIST

## 2018-09-03 PROCEDURE — 63710000001 INSULIN ASPART PER 5 UNITS: Performed by: INTERNAL MEDICINE

## 2018-09-03 PROCEDURE — 63710000001 INSULIN DETEMIR PER 5 UNITS: Performed by: INTERNAL MEDICINE

## 2018-09-03 PROCEDURE — 85018 HEMOGLOBIN: CPT | Performed by: HOSPITALIST

## 2018-09-03 PROCEDURE — 63710000001 INSULIN ASPART PER 5 UNITS: Performed by: HOSPITALIST

## 2018-09-03 RX ADMIN — METOPROLOL SUCCINATE 12.5 MG: 25 TABLET, FILM COATED, EXTENDED RELEASE ORAL at 08:28

## 2018-09-03 RX ADMIN — DOCUSATE SODIUM 100 MG: 100 CAPSULE, LIQUID FILLED ORAL at 08:28

## 2018-09-03 RX ADMIN — ARIPIPRAZOLE 20 MG: 10 TABLET ORAL at 06:06

## 2018-09-03 RX ADMIN — INSULIN ASPART 3 UNITS: 100 INJECTION, SOLUTION INTRAVENOUS; SUBCUTANEOUS at 17:36

## 2018-09-03 RX ADMIN — TAMSULOSIN HYDROCHLORIDE 0.4 MG: 0.4 CAPSULE ORAL at 20:59

## 2018-09-03 RX ADMIN — Medication 400 MG: at 08:28

## 2018-09-03 RX ADMIN — ASPIRIN 325 MG: 325 TABLET, DELAYED RELEASE ORAL at 08:28

## 2018-09-03 RX ADMIN — FERROUS SULFATE TAB 325 MG (65 MG ELEMENTAL FE) 325 MG: 325 (65 FE) TAB at 08:28

## 2018-09-03 RX ADMIN — METOPROLOL SUCCINATE 12.5 MG: 25 TABLET, FILM COATED, EXTENDED RELEASE ORAL at 20:59

## 2018-09-03 RX ADMIN — DOCUSATE SODIUM 100 MG: 100 CAPSULE, LIQUID FILLED ORAL at 21:08

## 2018-09-03 RX ADMIN — INSULIN ASPART 3 UNITS: 100 INJECTION, SOLUTION INTRAVENOUS; SUBCUTANEOUS at 12:11

## 2018-09-03 RX ADMIN — Medication 400 MG: at 20:59

## 2018-09-03 RX ADMIN — INSULIN DETEMIR 24 UNITS: 100 INJECTION, SOLUTION SUBCUTANEOUS at 21:08

## 2018-09-03 RX ADMIN — INSULIN ASPART 4 UNITS: 100 INJECTION, SOLUTION INTRAVENOUS; SUBCUTANEOUS at 12:09

## 2018-09-03 RX ADMIN — Medication 1 G: at 20:59

## 2018-09-03 RX ADMIN — LEVOTHYROXINE SODIUM 50 MCG: 50 TABLET ORAL at 06:06

## 2018-09-03 RX ADMIN — Medication 1 G: at 08:28

## 2018-09-03 RX ADMIN — HYDROCODONE BITARTRATE AND ACETAMINOPHEN 2 TABLET: 7.5; 325 TABLET ORAL at 06:06

## 2018-09-03 RX ADMIN — MEMANTINE HYDROCHLORIDE 5 MG: 5 TABLET, FILM COATED ORAL at 08:28

## 2018-09-03 RX ADMIN — HYDROCODONE BITARTRATE AND ACETAMINOPHEN 2 TABLET: 7.5; 325 TABLET ORAL at 17:35

## 2018-09-03 RX ADMIN — ASPIRIN 325 MG: 325 TABLET, DELAYED RELEASE ORAL at 20:59

## 2018-09-03 RX ADMIN — HYDROCODONE BITARTRATE AND ACETAMINOPHEN 2 TABLET: 7.5; 325 TABLET ORAL at 23:16

## 2018-09-03 RX ADMIN — HYDROCODONE BITARTRATE AND ACETAMINOPHEN 2 TABLET: 7.5; 325 TABLET ORAL at 10:35

## 2018-09-03 NOTE — PLAN OF CARE
Problem: Patient Care Overview  Goal: Plan of Care Review  Outcome: Ongoing (interventions implemented as appropriate)   09/03/18 4364   Coping/Psychosocial   Plan of Care Reviewed With patient   OTHER   Outcome Summary Pt tolerated treatment fair this date. States he is feeling better, although still w/ increased pain w/ mobility. Required mod A x2 for standing, then min-mod A x2 to ambulate 5ft w/ Rw. Cues given for sequencing. Pt educated on performing APs and QS throughout the day. PT will continue to address functional mobility deficits as tolerated.   Plan of Care Review   Progress improving

## 2018-09-03 NOTE — PROGRESS NOTES
[facility]     LOS: 3 days     Name: Chris Ferguson  Age: 72 y.o.  Sex: male  :  1945  MRN: [id number]         Primary Care Physician: Gisela Sotelo APRN    Diet Regular; Consistent Carbohydrate    Subjective   no complaints. states pain is controlled.    Objective   Temp:  [97.3 °F (36.3 °C)-99.9 °F (37.7 °C)] 99.5 °F (37.5 °C)  Heart Rate:  [] 96  Resp:  [16-20] 16  BP: (115-149)/(62-86) 126/73   SpO2:  [86 %-98 %] 92 %  on  Flow (L/min):  [1-2] 1;   Device (Oxygen Therapy): nasal cannula  Body mass index is 28.55 kg/m².    Physical Exam   Constitutional: He is oriented to person, place, and time. No distress.   Cardiovascular: Normal rate and regular rhythm.    Pulmonary/Chest: Effort normal and breath sounds normal. No respiratory distress.   Abdominal: Soft. There is no tenderness.   Musculoskeletal:   left knee covered   Neurological: He is alert and oriented to person, place, and time.   Skin: Skin is warm and dry.     Reviewed medications and new clinical results    ARIPiprazole 20 mg Oral QAM   aspirin 325 mg Oral Q12H   docusate sodium 100 mg Oral BID   ferrous sulfate 325 mg Oral Daily With Breakfast   insulin aspart 0-7 Units Subcutaneous 4x Daily With Meals & Nightly   insulin detemir 24 Units Subcutaneous Nightly   levothyroxine 50 mcg Oral QAM   magnesium oxide 400 mg Oral BID   memantine 5 mg Oral Daily   metoprolol succinate XL 12.5 mg Oral Q12H   sodium chloride 1 g Oral BID   tamsulosin 0.4 mg Oral Nightly     lactated ringers 100 mL/hr Last Rate: 100 mL/hr (18 1812)     Results from last 7 days  Lab Units 18  0430 18  2103 18  1639 18  0709   WBC 10*3/mm3 6.83  --   --  7.37   HEMOGLOBIN g/dL 7.3* 8.3* 8.7* 8.2*   PLATELETS 10*3/mm3 186  --   --  213     Results from last 7 days  Lab Units 18  0430 18  0709   SODIUM mmol/L 139 138   POTASSIUM mmol/L 4.3 4.2   CHLORIDE mmol/L 104  103   CO2 mmol/L 26.5 24.1   BUN mg/dL 23 20   CREATININE mg/dL 1.39* 1.24   CALCIUM mg/dL 8.2* 8.1*   GLUCOSE mg/dL 95 133*     Lab Results   Component Value Date    ANIONGAP 8.5 09/02/2018     Glucose   Date/Time Value Ref Range Status   09/03/2018 1042 251 (H) 70 - 130 mg/dL Final   09/03/2018 0616 135 (H) 70 - 130 mg/dL Final   09/02/2018 2100 207 (H) 70 - 130 mg/dL Final   09/02/2018 1737 222 (H) 70 - 130 mg/dL Final   09/02/2018 1048 177 (H) 70 - 130 mg/dL Final   09/02/2018 0607 71 70 - 130 mg/dL Final   09/01/2018 2135 212 (H) 70 - 130 mg/dL Final   09/01/2018 1626 184 (H) 70 - 130 mg/dL Final     Estimated Creatinine Clearance: 57.6 mL/min (A) (by C-G formula based on SCr of 1.39 mg/dL (H)).    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 check follow up Hgb  · HTN  · controlled  · tachycardia  · improved  · suspect due to anemia, post op, pain  · DM 2  · add small dose mealtime insulin  · a1c 7.90  · BPH  · hypothyroidism  · disposition  · per attending  · I discussed the patient's findings and my recommendations with patient.    Reed Aguirre MD   09/03/18  10:54 AM

## 2018-09-03 NOTE — PROGRESS NOTES
Orthopedic Progress Note      Patient: Chris Ferguson  YOB: 1945     Date of Admission: 8/31/2018  6:41 AM Medical Record Number: [id number]     Attending Physician: Valente Giang,*    Status Post:  LEFT  TOTAL KNEE ARTHROPLASTY  complex distal femur replacement   removal of plate  Post Operative Day Number: 3    Subjective : No new orthopaedic complaints     Pain Relief: some relief with present medication.     Systemic Complaints: No Complaints  Vitals:    09/03/18 0700 09/03/18 0820 09/03/18 0828 09/03/18 1056   BP: 126/73   116/70   BP Location: Right arm   Right arm   Patient Position: Lying   Sitting   Pulse: 96   72   Resp: 16   18   Temp: 99.5 °F (37.5 °C)   99.1 °F (37.3 °C)   TempSrc: Oral   Oral   SpO2: 91% (!) 86% 92% 95%   Weight:       Height:           Physical Exam: 72 y.o. male    General Appearance:       Alert, cooperative, in no acute distress                  Extremities:    Dressing Clean, Dry and Intact         Incision healthy without signs or symptoms of infections         No clinical sign of DVT        Able to do good movements of digits    Pulses:   Pulses palpable and equal bilaterally           Diagnostic Tests:       Results from last 7 days  Lab Units 09/02/18  0430 09/01/18  2103 09/01/18  1639 09/01/18  0709   WBC 10*3/mm3 6.83  --   --  7.37   HEMOGLOBIN g/dL 7.3* 8.3* 8.7* 8.2*   HEMATOCRIT % 23.4* 26.4* 27.3* 26.4*   PLATELETS 10*3/mm3 186  --   --  213       Results from last 7 days  Lab Units 09/02/18  0430 09/01/18  0709   SODIUM mmol/L 139 138   POTASSIUM mmol/L 4.3 4.2   CHLORIDE mmol/L 104 103   CO2 mmol/L 26.5 24.1   BUN mg/dL 23 20   CREATININE mg/dL 1.39* 1.24   GLUCOSE mg/dL 95 133*   CALCIUM mg/dL 8.2* 8.1*         No results found for: URICACID  No results found for: CRYSTAL  Microbiology Results (last 10 days)     ** No results found for the last 240 hours. **        Xr Chest 1 View    Result Date: 9/1/2018   Stable appearance of the chest  by portable radiography.  This report was finalized on 9/1/2018 9:04 PM by Oj Cowan M.D.          Current Medications:  Scheduled Meds:  ARIPiprazole 20 mg Oral QAM   aspirin 325 mg Oral Q12H   docusate sodium 100 mg Oral BID   ferrous sulfate 325 mg Oral Daily With Breakfast   insulin aspart 0-7 Units Subcutaneous 4x Daily With Meals & Nightly   insulin aspart 3 Units Subcutaneous TID With Meals   insulin detemir 24 Units Subcutaneous Nightly   levothyroxine 50 mcg Oral QAM   magnesium oxide 400 mg Oral BID   memantine 5 mg Oral Daily   metoprolol succinate XL 12.5 mg Oral Q12H   sodium chloride 1 g Oral BID   tamsulosin 0.4 mg Oral Nightly     Continuous Infusions:  lactated ringers 100 mL/hr Last Rate: 100 mL/hr (08/31/18 1812)     PRN Meds:.•  acetaminophen  •  bisacodyl  •  bisacodyl  •  dextrose  •  dextrose  •  diphenhydrAMINE **OR** diphenhydrAMINE  •  glucagon (human recombinant)  •  HYDROcodone-acetaminophen  •  HYDROcodone-acetaminophen  •  melatonin  •  ondansetron **OR** ondansetron ODT **OR** ondansetron  •  traZODone    Assessment: Status post  TOTAL KNEE ARTHROPLASTY  complex distal femur replacement   removal of plate     Patient Active Problem List   Diagnosis   • 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    Valente Giang MD    Date: 9/3/2018    Time: 11:40 AM

## 2018-09-03 NOTE — PLAN OF CARE
Problem: Patient Care Overview  Goal: Plan of Care Review  Outcome: Ongoing (interventions implemented as appropriate)   09/03/18 0943 09/03/18 1215 09/03/18 3068   Coping/Psychosocial   Plan of Care Reviewed With --  patient --    OTHER   Outcome Summary --  --  PT POD 3, able to work more with therapy today with Assist x2. VSS. BP improving. O2 1l NC in place. unable to wean. pt states he is feeling better. pain is controlled with PO pain medication. voiding per urinal. KI in place when up. pt to be D/C'd to SNU when medically stable and precert obtained. possible in AM. pt educated on the importance of Glucose monitoring and the use of SSI as ordered. verbalized understanding. will cont to monitor.    Plan of Care Review   Progress improving --  --      Goal: Individualization and Mutuality  Outcome: Ongoing (interventions implemented as appropriate)    Goal: Discharge Needs Assessment  Outcome: Ongoing (interventions implemented as appropriate)    Goal: Interprofessional Rounds/Family Conf  Outcome: Ongoing (interventions implemented as appropriate)      Problem: Fall Risk (Adult)  Goal: Identify Related Risk Factors and Signs and Symptoms  Outcome: Ongoing (interventions implemented as appropriate)    Goal: Absence of Fall  Outcome: Ongoing (interventions implemented as appropriate)      Problem: Knee Arthroplasty (Total, Partial) (Adult)  Goal: Signs and Symptoms of Listed Potential Problems Will be Absent, Minimized or Managed (Knee Arthroplasty)  Outcome: Ongoing (interventions implemented as appropriate)    Goal: Anesthesia/Sedation Recovery  Outcome: Ongoing (interventions implemented as appropriate)

## 2018-09-03 NOTE — THERAPY TREATMENT NOTE
Acute Care - Physical Therapy Treatment Note  [facility]     Patient Name: Chris Ferguson  : 1945  MRN: [id number]  Today's Date: 9/3/2018  Onset of Illness/Injury or Date of Surgery: 18  Date of Referral to PT: 18  Referring Physician: Dr. Giang    Admit Date: 2018    Visit Dx:    ICD-10-CM ICD-9-CM   1. Impaired functional mobility, balance, gait, and endurance Z74.09 V49.89   2. 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, left   • Essential hypertension   • DM (diabetes mellitus), type 2 (CMS/HCC)   • BPH (benign prostatic hyperplasia)   • Hypothyroidism   • Postoperative anemia due to acute blood loss   • Tachycardia       Therapy Treatment          Rehabilitation Treatment Summary     Row Name 18             Treatment Time/Intention    Discipline physical therapy assistant  -SM      Document Type therapy note (daily note)  -SM      Subjective Information complains of;weakness;fatigue;pain  -SM      Mode of Treatment physical therapy  -SM      Patient Effort adequate  -SM      Existing Precautions/Restrictions fall;brace worn when out of bed   KI until able to SLR ind  -SM      Recorded by [SM] Leila Hunter, [facility] 18      Row Name 18             Vital Signs    Pre SpO2 (%) 97  -SM      O2 Delivery Pre Treatment supplemental O2  -SM      O2 Delivery Intra Treatment supplemental O2  -SM      Post SpO2 (%) 93  -SM      O2 Delivery Post Treatment supplemental O2  -SM      Pre Patient Position Supine  -SM      Intra Patient Position Standing  -SM      Post Patient Position Sitting  -SM      Recorded by [SM] Leila Hunter, DONOVAN 18      Row Name 18             Cognitive Assessment/Intervention- PT/OT    Orientation Status (Cognition) oriented x 3  -SM      Follows  Commands (Cognition) WFL  -      Safety Deficit (Cognitive) mild deficit  -      Personal Safety Interventions fall prevention program maintained;gait belt;nonskid shoes/slippers when out of bed  -      Recorded by [] Leila Hunter, PTA 09/03/18 0942      Row Name 09/03/18 0915             Safety Issues, Functional Mobility    Impairments Affecting Function (Mobility) endurance/activity tolerance;pain;strength  -      Recorded by [] Leila Hunter, PTA 09/03/18 0942      Row Name 09/03/18 0915             Bed Mobility Assessment/Treatment    Bed Mobility Assessment/Treatment supine-sit  -SM      Supine-Sit [place] (Bed Mobility) moderate assist (50% patient effort)   assist w/ LEs  -SM      Sit-Supine [place] (Bed Mobility) not tested  -      Bed Mobility, Safety Issues decreased use of legs for bridging/pushing  -      Assistive Device (Bed Mobility) bed rails;head of bed elevated  -      Recorded by [] Leila Hunter, PTA 09/03/18 0942      Row Name 09/03/18 0915             Transfer Assessment/Treatment    Transfer Assessment/Treatment sit-stand transfer;stand-sit transfer  -      Recorded by [] Leila Hunter, PTA 09/03/18 0942      Row Name 09/03/18 0915             Sit-Stand Transfer    Sit-Stand [place] (Transfers) moderate assist (50% patient effort);2 person assist;verbal cues  -      Assistive Device (Sit-Stand Transfers) walker, front-wheeled  -      Recorded by [] Leila Hunter, PTA 09/03/18 0942      Row Name 09/03/18 0915             Stand-Sit Transfer    Stand-Sit [place] (Transfers) minimum assist (75% patient effort);2 person assist;verbal cues  -      Assistive Device (Stand-Sit Transfers) walker, front-wheeled  -      Recorded by [] Leila Hunter, PTA 09/03/18 0942      Row Name 09/03/18 0915             Gait/Stairs Assessment/Training    Gait/Stairs Assessment/Training gait/ambulation  independence;gait/ambulation assistive device;distance ambulated;gait pattern;gait deviations  -      [place] Level (Gait) minimum assist (75% patient effort);moderate assist (50% patient effort);2 person assist;verbal cues  -SM      Assistive Device (Gait) walker, front-wheeled  -SM      Distance in Feet (Gait) 5  -SM      Pattern (Gait) step-to  -SM      Deviations/Abnormal Patterns (Gait) antalgic;scar decreased;stride length decreased  -SM      Bilateral Gait Deviations forward flexed posture;heel strike decreased;weight shift ability decreased  -      Comment (Gait/Stairs) cues for sequencing; better use of keeping walker close throughout w/out cues  -SM      Recorded by [] Leila Hunter, [facility] 09/03/18 0942      Row Name 09/03/18 0915             Therapeutic Exercise    Comment (Therapeutic Exercise) L TKR protocol x 15 reps w/ assist  -SM      Recorded by [SM] Leila Hunter PTA 09/03/18 0942      Row Name 09/03/18 0915             Positioning and Restraints    Pre-Treatment Position in bed  -SM      Post Treatment Position chair  -SM      In Chair reclined;call light within reach;encouraged to call for assist  -SM      Recorded by [] Leila Hunter, [facility] 09/03/18 0942      Row Name 09/03/18 0915             Pain Assessment    Additional Documentation Pain Scale: Numbers Pre/Post-Treatment (Group)  -SM      Recorded by [] Leila Hunter PTA 09/03/18 0942      Row Name 09/03/18 0915             Pain Scale: Numbers Pre/Post-Treatment    Pain Scale: Numbers, Pretreatment 0/10 - no pain  -      Pain Scale: Numbers, Post-Treatment 8/10  -SM      Pain Location - Side Left  -SM      Pain Location knee  -SM      Pain Intervention(s) Repositioned;Ambulation/increased activity;Rest  -SM      Recorded by [] Leila Hunter PTA 09/03/18 0942      Row Name                Wound 08/31/18 1241 Left knee incision    Wound - Properties Group Date first assessed: 08/31/18 [EG] Time  first assessed: 1241 [EG] Side: Left [EG] Location: knee [EG] Type: incision [EG] Recorded by:  [EG] Mery Jc RN 08/31/18 1241      User Key  (r) = Recorded By, (t) = Taken By, (c) = Cosigned By    Initials Name Effective Dates Discipline     Dale Leila Peters, PTA 03/07/18 -  PT    EG Mery Jc RN 07/10/17 -  Nurse          Wound 08/31/18 1241 Left knee incision (Active)   Dressing Appearance dry;intact;no drainage 9/2/2018  7:40 PM   Closure GENE 9/2/2018  7:40 PM   Base dressing in place, unable to visualize 9/2/2018  7:40 PM   Drainage Amount none 9/2/2018  4:18 PM             Physical Therapy Education     Title: PT OT SLP Therapies (Done)     Topic: Physical Therapy (Done)     Point: Mobility training (Done)    Learning Progress Summary     Learner Status Readiness Method Response Comment Documented by    Patient Done Acceptance E,TB,D VU,[facility] 09/03/18 0943     Done Acceptance E,TB,D VU,[facility] 09/02/18 1026     Done Acceptance E,TB,D VU,[facility] 09/01/18 1453     Active Acceptance E,D [facility] 09/01/18 1106     Done Acceptance E VU  MA 08/31/18 1618          Point: Home exercise program (Done)    Learning Progress Summary     Learner Status Readiness Method Response Comment Documented by    Patient Done Acceptance E,TB,D VU,[facility] 09/03/18 0943     Done Acceptance E,TB,D VU,[facility] 09/02/18 1026     Done Acceptance E,TB,D VU,[facility] 09/01/18 1453     Active Acceptance E,D [facility] 09/01/18 1106     Done Acceptance E VU  MA 08/31/18 1618          Point: Body mechanics (Done)    Learning Progress Summary     Learner Status Readiness Method Response Comment Documented by    Patient Done Acceptance E,TB,D VU,[facility] 09/03/18 0943     Done Acceptance E,TB,D VU,[facility] 09/02/18 1026     Done Acceptance E,TB,D VU,[facility] 09/01/18 1453     Active Acceptance E,D [facility] 09/01/18 1106     Done Acceptance E VU  MA 08/31/18 1618          Point: Precautions (Done)    Learning Progress Summary     Learner Status  Readiness Method Response Comment Documented by    Patient Done Acceptance E,TB,D VU,NR   09/03/18 0943     Done Acceptance E,TB,D VU,NR   09/02/18 1026     Done Acceptance E,TB,D VU,NR   09/01/18 1453     Active Acceptance E,D NR   09/01/18 1106     Done Acceptance E ALIA  MA 08/31/18 1618                      User Key     Initials Effective Dates Name Provider Type Discipline     03/07/18 -  Leila Hunter, PTA Physical Therapy Assistant PT    MA 04/03/18 -  Bridget Dennis, PT Physical Therapist PT                    PT Recommendation and Plan  Anticipated Discharge Disposition (PT): home with home health, skilled nursing facility  Outcome Summary/Treatment Plan (PT)  Anticipated Discharge Disposition (PT): home with home health, skilled nursing facility  Plan of Care Reviewed With: patient  Progress: improving  Outcome Summary: Pt tolerated treatment fair this date. States he is feeling better, although still w/ increased pain w/ mobility. Required mod A x2 for standing, then min-mod A x2 to ambulate 5ft w/ Rw. Cues given for sequencing. Pt educated on performing APs and QS throughout the day. PT will continue to address functional mobility deficits as tolerated.          Outcome Measures     Row Name 09/03/18 0900 09/02/18 1000 09/01/18 1400       How much help from another person do you currently need...    Turning from your back to your side while in flat bed without using bedrails? 3  -SM 3  -SM 3  -SM    Moving from lying on back to sitting on the side of a flat bed without bedrails? 3  -SM 3  -SM 3  -SM    Moving to and from a bed to a chair (including a wheelchair)? 2  -SM 2  -SM 2  -SM    Standing up from a chair using your arms (e.g., wheelchair, bedside chair)? 2  -SM 2  -SM 2  -SM    Climbing 3-5 steps with a railing? 2  -SM 2  -SM 2  -SM    To walk in hospital room? 2  -SM 2  -SM 2  -SM    AM-PAC 6 Clicks Score 14  -SM 14  -SM 14  -SM       Functional Assessment    Outcome Measure  Options AM-[facility] 6 Clicks Basic Mobility (PT)  -SM AM-[facility] 6 Clicks Basic Mobility (PT)  -[facility] 6 Clicks Basic Mobility (PT)  -    Row Name 09/01/18 1111 09/01/18 1100 08/31/18 1600       How much help from another person do you currently need...    Turning from your back to your side while in flat bed without using bedrails?  -- 3  -SM 3  -MA    Moving from lying on back to sitting on the side of a flat bed without bedrails?  -- 3  -SM 3  -MA    Moving to and from a bed to a chair (including a wheelchair)?  -- 2  -SM 2  -MA    Standing up from a chair using your arms (e.g., wheelchair, bedside chair)?  -- 2  -SM 2  -MA    Climbing 3-5 steps with a railing?  -- 2  -SM 2  -MA    To walk in hospital room?  -- 2  -SM 2  -MA    AM-PAC 6 Clicks Score  -- 14  -SM 14  -MA       How much help from another is currently needed...    Putting on and taking off regular lower body clothing? 1  -LE  --  --    Bathing (including washing, rinsing, and drying) 1  -LE  --  --    Toileting (which includes using toilet bed pan or urinal) 2  -LE  --  --    Putting on and taking off regular upper body clothing 1  -LE  --  --    Taking care of personal grooming (such as brushing teeth) 1  -LE  --  --    Eating meals 3  -LE  --  --    Score 9  -LE  --  --       Functional Assessment    Outcome Measure Options AM-[facility] 6 Clicks Daily Activity (OT)  -LE AM-[facility] 6 Clicks Basic Mobility (PT)  -[facility] 6 Clicks Basic Mobility (PT)  -MA      User Key  (r) = Recorded By, (t) = Taken By, (c) = Cosigned By    Initials Name Provider Type    Rita Arceo, OTR Occupational Therapist    Leila Cooney, PTA Physical Therapy Assistant    Bridget Kirkland, PT Physical Therapist           Time Calculation:         PT Charges     Row Name 09/03/18 0947             Time Calculation    Start Time 0915  -      Stop Time 0938  -SM      Time Calculation (min) 23 min  -      PT Received On 09/03/18  -      PT - Next Appointment 09/03/18   -        User Key  (r) = Recorded By, (t) = Taken By, (c) = Cosigned By    Initials Name Provider Type     Leila Hunter PTA Physical Therapy Assistant        Therapy Suggested Charges     Code   Minutes Charges    None           Therapy Charges for Today     Code Description Service Date Service Provider Modifiers Qty    08305942977 HC PT THER PROC EA 15 MIN 9/2/2018 Peter Hunterah Lor[place], DONOVAN GP 2    24823540044 HC PT THER PROC EA 15 MIN 9/2/2018 Leila Hunter, DONOVAN GP 2    59088546478 HC PT THER SUPP EA 15 MIN 9/2/2018 Leila Hunter, DONOVAN GP 1    76697870300 HC PT THER PROC EA 15 MIN 9/3/2018 Leila Hunter, DONOVAN GP 2    27735738286 HC PT THER SUPP EA 15 MIN 9/3/2018 Leila Hunter, DONOVAN GP 1          PT G-Codes  Outcome Measure Options: AM-PAC 6 Clicks Basic Mobility (PT)    Leila Hunter PTA  9/3/2018

## 2018-09-03 NOTE — PLAN OF CARE
Problem: Patient Care Overview  Goal: Plan of Care Review  Outcome: Ongoing (interventions implemented as appropriate)   09/03/18 0122   Coping/Psychosocial   Plan of Care Reviewed With parent   OTHER   Outcome Summary patient ambulating with assistance x2, pain controlled at this time, resting comfortably through night, educated on b/p and glucose monitoring   Plan of Care Review   Progress improving     Goal: Individualization and Mutuality  Outcome: Ongoing (interventions implemented as appropriate)    Goal: Discharge Needs Assessment  Outcome: Ongoing (interventions implemented as appropriate)    Goal: Interprofessional Rounds/Family Conf  Outcome: Ongoing (interventions implemented as appropriate)      Problem: Fall Risk (Adult)  Goal: Identify Related Risk Factors and Signs and Symptoms  Outcome: Outcome(s) achieved Date Met: 09/03/18    Goal: Absence of Fall  Outcome: Ongoing (interventions implemented as appropriate)      Problem: Knee Arthroplasty (Total, Partial) (Adult)  Goal: Signs and Symptoms of Listed Potential Problems Will be Absent, Minimized or Managed (Knee Arthroplasty)  Outcome: Ongoing (interventions implemented as appropriate)    Goal: Anesthesia/Sedation Recovery  Outcome: Outcome(s) achieved Date Met: 09/03/18

## 2018-09-03 NOTE — THERAPY TREATMENT NOTE
Acute Care - Physical Therapy Treatment Note  [facility]     Patient Name: Chris Ferguson  : 1945  MRN: [id number]  Today's Date: 9/3/2018  Onset of Illness/Injury or Date of Surgery: 18  Date of Referral to PT: 18  Referring Physician: Dr. Giang    Admit Date: 2018    Visit Dx:    ICD-10-CM ICD-9-CM   1. Impaired functional mobility, balance, gait, and endurance Z74.09 V49.89   2. 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, left   • Essential hypertension   • DM (diabetes mellitus), type 2 (CMS/HCC)   • BPH (benign prostatic hyperplasia)   • Hypothyroidism   • Postoperative anemia due to acute blood loss   • Tachycardia       Therapy Treatment          Rehabilitation Treatment Summary     Row Name 18 1308 18 0915          Treatment Time/Intention    Discipline physical therapy assistant  - physical therapy assistant  -     Document Type therapy note (daily note)  - therapy note (daily note)  -     Subjective Information complains of;fatigue;pain  -SM complains of;weakness;fatigue;pain  -SM     Mode of Treatment physical therapy  -SM physical therapy  -     Patient Effort adequate  -SM adequate  -SM     Existing Precautions/Restrictions fall;brace worn when out of bed   KI until able to SLR ind  -SM fall;brace worn when out of bed   KI until able to SLR ind  -SM     Recorded by [SM] Leila Hunter, [facility] 18 1324 [SM] Leila Hunter, [facility] 18 0942     Row Name 18 0915             Vital Signs    Pre SpO2 (%) 97  -SM      O2 Delivery Pre Treatment supplemental O2  -SM      O2 Delivery Intra Treatment supplemental O2  -SM      Post SpO2 (%) 93  -SM      O2 Delivery Post Treatment supplemental O2  -SM      Pre Patient Position Supine  -SM      Intra Patient Position Standing  -SM      Post  Patient Position Sitting  -SM      Recorded by [SM] Leila Hunter, PTA 09/03/18 0942      Row Name 09/03/18 1308 09/03/18 0915          Cognitive Assessment/Intervention- PT/OT    Orientation Status (Cognition) oriented x 4  -SM oriented x 3  -SM     Follows Commands (Cognition) WFL  -SM WFL  -SM     Safety Deficit (Cognitive) mild deficit  -SM mild deficit  -SM     Personal Safety Interventions fall prevention program maintained;gait belt;nonskid shoes/slippers when out of bed  -SM fall prevention program maintained;gait belt;nonskid shoes/slippers when out of bed  -SM     Recorded by [SM] Leila Hunter, PTA 09/03/18 1324 [SM] Leila Hunter, PTA 09/03/18 0942     Row Name 09/03/18 1308 09/03/18 0915          Safety Issues, Functional Mobility    Impairments Affecting Function (Mobility) endurance/activity tolerance;pain;strength  - endurance/activity tolerance;pain;strength  -SM     Recorded by [] Leila Hunter, PTA 09/03/18 1324 [SM] Leila Hunter, PTA 09/03/18 0942     Row Name 09/03/18 1308 09/03/18 0915          Bed Mobility Assessment/Treatment    Bed Mobility Assessment/Treatment  -- supine-sit  -     Supine-Sit [place] (Bed Mobility)  -- moderate assist (50% patient effort)   assist w/ LEs  -SM     Sit-Supine [place] (Bed Mobility)  -- not tested  -     Bed Mobility, Safety Issues  -- decreased use of legs for bridging/pushing  -     Assistive Device (Bed Mobility)  -- bed rails;head of bed elevated  -     Comment (Bed Mobility) up in chair  -  --     Recorded by [SM] Leila Hunter, PTA 09/03/18 1324 [SM] Leila Hunter, PTA 09/03/18 0942     Row Name 09/03/18 1308 09/03/18 0915          Transfer Assessment/Treatment    Transfer Assessment/Treatment sit-stand transfer;stand-sit transfer  - sit-stand transfer;stand-sit transfer  -     Recorded by [SM] Leila Hunter, PTA 09/03/18 1324 [] Leila Hunter, PTA 09/03/18 0942     Row  Name 09/03/18 1308 09/03/18 0915          Sit-Stand Transfer    Sit-Stand [place] (Transfers) moderate assist (50% patient effort);2 person assist;verbal cues  -SM moderate assist (50% patient effort);2 person assist;verbal cues  -SM     Assistive Device (Sit-Stand Transfers) walker, front-wheeled  -SM walker, front-wheeled  -SM     Recorded by [SM] Leila Hunter, [facility] 09/03/18 1324 [SM] Leila Hunter, [facility] 09/03/18 0942     Row Name 09/03/18 1308 09/03/18 0915          Stand-Sit Transfer    Stand-Sit [place] (Transfers) minimum assist (75% patient effort);verbal cues  -SM minimum assist (75% patient effort);2 person assist;verbal cues  -SM     Assistive Device (Stand-Sit Transfers) walker, front-wheeled  -SM walker, front-wheeled  -SM     Recorded by [SM] Leila Hunter, [facility] 09/03/18 1324 [SM] Leila Hunter, [facility] 09/03/18 0942     Row Name 09/03/18 1308 09/03/18 0915          Gait/Stairs Assessment/Training    Gait/Stairs Assessment/Training gait/ambulation independence;gait/ambulation assistive device;distance ambulated;gait pattern;gait deviations  - gait/ambulation independence;gait/ambulation assistive device;distance ambulated;gait pattern;gait deviations  -     [place] Level (Gait) minimum assist (75% patient effort);moderate assist (50% patient effort);2 person assist;verbal cues  -SM minimum assist (75% patient effort);moderate assist (50% patient effort);2 person assist;verbal cues  -SM     Assistive Device (Gait) walker, front-wheeled  -SM walker, front-wheeled  -SM     Distance in Feet (Gait) 10  -SM 5  -SM     Pattern (Gait) step-to  -SM step-to  -SM     Deviations/Abnormal Patterns (Gait) antalgic;scar decreased;stride length decreased  -SM antalgic;scar decreased;stride length decreased  -SM     Bilateral Gait Deviations forward flexed posture;heel strike decreased;weight shift ability decreased  -SM forward flexed posture;heel strike decreased;weight shift  ability decreased  -     Comment (Gait/Stairs) good carryover for sequencing; cues for posture  -SM cues for sequencing; better use of keeping walker close throughout w/out cues  -SM     Recorded by [] Leila Hunter, [facility] 09/03/18 1324 [SM] Leila Hunter, [facility] 09/03/18 0942     Row Name 09/03/18 1308 09/03/18 0915          Therapeutic Exercise    Comment (Therapeutic Exercise) L TKR protocol x 20 reps  -SM L TKR protocol x 15 reps w/ assist  -SM     Recorded by [SM] Leila Hunter, [facility] 09/03/18 1324 [SM] Leila Hunter, [facility] 09/03/18 0942     Row Name 09/03/18 1308 09/03/18 0915          Positioning and Restraints    Pre-Treatment Position sitting in chair/recliner  -SM in bed  -SM     Post Treatment Position chair  -SM chair  -SM     In Chair reclined;call light within reach;encouraged to call for assist;exit alarm on  -SM reclined;call light within reach;encouraged to call for assist  -SM     Recorded by [] Leila Hunter, [facility] 09/03/18 1324 [SM] Leila Hunter, [facility] 09/03/18 0942     Row Name 09/03/18 1308 09/03/18 0915          Pain Assessment    Additional Documentation Pain Scale: Numbers Pre/Post-Treatment (Group)  -SM Pain Scale: Numbers Pre/Post-Treatment (Group)  -SM     Recorded by [] Leila Hunter, [facility] 09/03/18 1324 [SM] Leila Hunter, [facility] 09/03/18 0942     Row Name 09/03/18 [facility]8 09/03/18 0915          Pain Scale: Numbers Pre/Post-Treatment    Pain Scale: Numbers, Pretreatment 2/10  -SM 0/10 - no pain  -SM     Pain Scale: Numbers, Post-Treatment 8/10  -SM 8/10  -SM     Pain Location - Side Left  -SM Left  -SM     Pain Location knee  -SM knee  -SM     Pain Intervention(s) Repositioned;Ambulation/increased activity;Rest  -SM Repositioned;Ambulation/increased activity;Rest  -SM     Recorded by [] Leila Hunter, [facility] 09/03/18 1324 [SM] Leila Hunter, PTA 09/03/18 0942     Row Name                Wound 08/31/18 1241 Left knee incision    Wound -  Properties Group Date first assessed: 08/31/18 [EG] Time first assessed: 1241 [EG] Side: Left [EG] Location: knee [EG] Type: incision [EG] Recorded by:  [EG] Mery Jc RN 08/31/18 1241      User Key  (r) = Recorded By, (t) = Taken By, (c) = Cosigned By    Initials Name Effective Dates Discipline     Dale Leila Lorraine, DONOVAN 03/07/18 -  PT    EG Mery Jc RN 07/10/17 -  Nurse          Wound 08/31/18 1241 Left knee incision (Active)   Dressing Appearance dry;intact;no drainage 9/3/2018 12:15 PM   Closure GENE 9/3/2018 12:15 PM   Base dressing in place, unable to visualize 9/3/2018 12:15 PM   Drainage Amount small 9/3/2018 12:15 PM             Physical Therapy Education     Title: PT OT SLP Therapies (Done)     Topic: Physical Therapy (Done)     Point: Mobility training (Done)    Learning Progress Summary     Learner Status Readiness Method Response Comment Documented by    Patient Done Acceptance E,TB,D VU,[facility] 09/03/18 0943     Done Acceptance E,TB,D VU,[facility] 09/02/18 1026     Done Acceptance E,TB,D VU,[facility] 09/01/18 1453     Active Acceptance E,D [facility] 09/01/18 1106     Done Acceptance E VU  MA 08/31/18 1618          Point: Home exercise program (Done)    Learning Progress Summary     Learner Status Readiness Method Response Comment Documented by    Patient Done Acceptance E,TB,D VU,[facility] 09/03/18 0943     Done Acceptance E,TB,D VU,[facility] 09/02/18 1026     Done Acceptance E,TB,D VU,[facility] 09/01/18 1453     Active Acceptance E,D [facility] 09/01/18 1106     Done Acceptance E VU  MA 08/31/18 1618          Point: Body mechanics (Done)    Learning Progress Summary     Learner Status Readiness Method Response Comment Documented by    Patient Done Acceptance E,TB,D VU,[facility] 09/03/18 0943     Done Acceptance E,TB,D VU,[facility] 09/02/18 1026     Done Acceptance E,TB,D VU,[facility] 09/01/18 1453     Active Acceptance E,D [facility] 09/01/18 1106     Done Acceptance E VU  MA 08/31/18 8317          Point:  Precautions (Done)    Learning Progress Summary     Learner Status Readiness Method Response Comment Documented by    Patient Done Acceptance E,TB,D VU,NR   09/03/18 0943     Done Acceptance E,TB,D VU,NR   09/02/18 1026     Done Acceptance E,TB,D VU,NR   09/01/18 1453     Active Acceptance E,D NR   09/01/18 1106     Done Acceptance STEFANY ROJO  MA 08/31/18 1618                      User Key     Initials Effective Dates Name Provider Type Discipline     03/07/18 -  Leila Hunter, PTA Physical Therapy Assistant PT    MA 04/03/18 -  Bridget Dennis PT Physical Therapist PT                    PT Recommendation and Plan  Anticipated Discharge Disposition (PT): home with home health, skilled nursing facility  Outcome Summary/Treatment Plan (PT)  Anticipated Discharge Disposition (PT): home with home health, skilled nursing facility  Plan of Care Reviewed With: patient  Progress: improving  Outcome Summary: Pt tolerated treatment fair this date. States he is feeling better, although still w/ increased pain w/ mobility. Required mod A x2 for standing, then min-mod A x2 to ambulate 5ft w/ Rw. Cues given for sequencing. Pt educated on performing APs and QS throughout the day. PT will continue to address functional mobility deficits as tolerated.          Outcome Measures     Row Name 09/03/18 0900 09/02/18 1000 09/01/18 1400       How much help from another person do you currently need...    Turning from your back to your side while in flat bed without using bedrails? 3  -SM 3  -SM 3  -SM    Moving from lying on back to sitting on the side of a flat bed without bedrails? 3  -SM 3  -SM 3  -SM    Moving to and from a bed to a chair (including a wheelchair)? 2  -SM 2  -SM 2  -SM    Standing up from a chair using your arms (e.g., wheelchair, bedside chair)? 2  -SM 2  -SM 2  -SM    Climbing 3-5 steps with a railing? 2  -SM 2  -SM 2  -SM    To walk in hospital room? 2  -SM 2  -SM 2  -SM    AM-PAC 6 Clicks Score 14   - 14  -SM 14  -SM       Functional Assessment    Outcome Measure Options AM-[facility] 6 Clicks Basic Mobility (PT)  -SM AM-[facility] 6 Clicks Basic Mobility (PT)  -[facility] 6 Clicks Basic Mobility (PT)  -    Row Name 09/01/18 1111 09/01/18 1100 08/31/18 1600       How much help from another person do you currently need...    Turning from your back to your side while in flat bed without using bedrails?  -- 3  -SM 3  -MA    Moving from lying on back to sitting on the side of a flat bed without bedrails?  -- 3  -SM 3  -MA    Moving to and from a bed to a chair (including a wheelchair)?  -- 2  -SM 2  -MA    Standing up from a chair using your arms (e.g., wheelchair, bedside chair)?  -- 2  -SM 2  -MA    Climbing 3-5 steps with a railing?  -- 2  -SM 2  -MA    To walk in hospital room?  -- 2  -SM 2  -MA    AM-PAC 6 Clicks Score  -- 14  -SM 14  -MA       How much help from another is currently needed...    Putting on and taking off regular lower body clothing? 1  -LE  --  --    Bathing (including washing, rinsing, and drying) 1  -LE  --  --    Toileting (which includes using toilet bed pan or urinal) 2  -LE  --  --    Putting on and taking off regular upper body clothing 1  -LE  --  --    Taking care of personal grooming (such as brushing teeth) 1  -LE  --  --    Eating meals 3  -LE  --  --    Score 9  -LE  --  --       Functional Assessment    Outcome Measure Options AM-[facility] 6 Clicks Daily Activity (OT)  -LE AM-[facility] 6 Clicks Basic Mobility (PT)  -[facility] 6 Clicks Basic Mobility (PT)  -MA      User Key  (r) = Recorded By, (t) = Taken By, (c) = Cosigned By    Initials Name Provider Type    Rita Arceo, OTR Occupational Therapist    Leila Cooney, DONOVAN Physical Therapy Assistant    Bridget Kirkland, PT Physical Therapist           Time Calculation:         PT Charges     Row Name 09/03/18 1334 09/03/18 0947          Time Calculation    Start Time 1308  - 0915  -     Stop Time 1334  - 0938  -     Time  Calculation (min) 26 min  - 23 min  -     PT Received On 09/03/18  - 09/03/18  -     PT - Next Appointment 09/04/18  - 09/03/18  -       User Key  (r) = Recorded By, (t) = Taken By, (c) = Cosigned By    Initials Name Provider Type    SM Leila Hunter, PTA Physical Therapy Assistant        Therapy Suggested Charges     Code   Minutes Charges    None           Therapy Charges for Today     Code Description Service Date Service Provider Modifiers Qty    03983703439 HC PT THER PROC EA 15 MIN 9/2/2018 Leila Hunter, PTA GP 2    14018990719 HC PT THER PROC EA 15 MIN 9/2/2018 Leila Hunter, PTA GP 2    93804208609 HC PT THER SUPP EA 15 MIN 9/2/2018 Leila Hunter, PTA GP 1    13105600613 HC PT THER PROC EA 15 MIN 9/3/2018 Leila Hunter, PTA GP 2    18211069355 HC PT THER SUPP EA 15 MIN 9/3/2018 Leila Hunter, PTA GP 1    14005202763 HC PT THER PROC EA 15 MIN 9/3/2018 Leila Hunter, PTA GP 2    45704257837 HC PT THER SUPP EA 15 MIN 9/3/2018 Leila Hunter, PTA GP 1          PT G-Codes  Outcome Measure Options: AM-PAC 6 Clicks Basic Mobility (PT)    Leila Peters DONOVAN Hunter  9/3/2018

## 2018-09-04 LAB
ANION GAP SERPL CALCULATED.3IONS-SCNC: 10.6 MMOL/L
BUN BLD-MCNC: 24 MG/DL (ref 8–23)
BUN/CREAT SERPL: 22.4 (ref 7–25)
CALCIUM SPEC-SCNC: 8.8 MG/DL (ref 8.6–10.5)
CHLORIDE SERPL-SCNC: 102 MMOL/L (ref 98–107)
CO2 SERPL-SCNC: 25.4 MMOL/L (ref 22–29)
CREAT BLD-MCNC: 1.07 MG/DL (ref 0.76–1.27)
GFR SERPL CREATININE-BSD FRML MDRD: 68 ML/MIN/1.73
GLUCOSE BLD-MCNC: 58 MG/DL (ref 65–99)
GLUCOSE BLDC GLUCOMTR-MCNC: 139 MG/DL (ref 70–130)
GLUCOSE BLDC GLUCOMTR-MCNC: 164 MG/DL (ref 70–130)
GLUCOSE BLDC GLUCOMTR-MCNC: 170 MG/DL (ref 70–130)
GLUCOSE BLDC GLUCOMTR-MCNC: 172 MG/DL (ref 70–130)
GLUCOSE BLDC GLUCOMTR-MCNC: 48 MG/DL (ref 70–130)
GLUCOSE BLDC GLUCOMTR-MCNC: 71 MG/DL (ref 70–130)
POTASSIUM BLD-SCNC: 4.3 MMOL/L (ref 3.5–5.2)
SODIUM BLD-SCNC: 138 MMOL/L (ref 136–145)

## 2018-09-04 PROCEDURE — 63710000001 INSULIN ASPART PER 5 UNITS: Performed by: HOSPITALIST

## 2018-09-04 PROCEDURE — 82962 GLUCOSE BLOOD TEST: CPT

## 2018-09-04 PROCEDURE — 63710000001 INSULIN ASPART PER 5 UNITS: Performed by: INTERNAL MEDICINE

## 2018-09-04 PROCEDURE — 97110 THERAPEUTIC EXERCISES: CPT

## 2018-09-04 PROCEDURE — 80048 BASIC METABOLIC PNL TOTAL CA: CPT | Performed by: HOSPITALIST

## 2018-09-04 PROCEDURE — 63710000001 INSULIN DETEMIR PER 5 UNITS: Performed by: HOSPITALIST

## 2018-09-04 RX ORDER — BISACODYL 5 MG/1
10 TABLET, DELAYED RELEASE ORAL DAILY PRN
Qty: 30 TABLET | Refills: 1 | Status: SHIPPED | OUTPATIENT
Start: 2018-09-04 | End: 2020-08-26

## 2018-09-04 RX ORDER — HYDROCODONE BITARTRATE AND ACETAMINOPHEN 7.5; 325 MG/1; MG/1
TABLET ORAL
Qty: 60 TABLET | Refills: 0 | Status: ON HOLD | OUTPATIENT
Start: 2018-09-04 | End: 2019-04-24 | Stop reason: SDUPTHER

## 2018-09-04 RX ORDER — ONDANSETRON 4 MG/1
4 TABLET, FILM COATED ORAL EVERY 8 HOURS PRN
Qty: 30 TABLET | Refills: 0 | Status: SHIPPED | OUTPATIENT
Start: 2018-09-04 | End: 2019-10-23 | Stop reason: ALTCHOICE

## 2018-09-04 RX ORDER — ACETAMINOPHEN 325 MG/1
650 TABLET ORAL EVERY 4 HOURS PRN
Qty: 120 TABLET | Refills: 1 | Status: SHIPPED | OUTPATIENT
Start: 2018-09-04 | End: 2020-08-26

## 2018-09-04 RX ORDER — PSEUDOEPHEDRINE HCL 30 MG
100 TABLET ORAL 2 TIMES DAILY
Qty: 60 CAPSULE | Refills: 1 | Status: SHIPPED | OUTPATIENT
Start: 2018-09-04 | End: 2018-09-16

## 2018-09-04 RX ORDER — METOPROLOL SUCCINATE 25 MG/1
25 TABLET, EXTENDED RELEASE ORAL EVERY MORNING
Status: DISCONTINUED | OUTPATIENT
Start: 2018-09-04 | End: 2018-09-06 | Stop reason: HOSPADM

## 2018-09-04 RX ORDER — FERROUS SULFATE 325(65) MG
325 TABLET ORAL
Qty: 27 TABLET | Refills: 0 | Status: SHIPPED | OUTPATIENT
Start: 2018-09-04 | End: 2018-10-01

## 2018-09-04 RX ADMIN — MEMANTINE HYDROCHLORIDE 5 MG: 5 TABLET, FILM COATED ORAL at 08:56

## 2018-09-04 RX ADMIN — HYDROCODONE BITARTRATE AND ACETAMINOPHEN 1 TABLET: 7.5; 325 TABLET ORAL at 17:29

## 2018-09-04 RX ADMIN — ASPIRIN 325 MG: 325 TABLET, DELAYED RELEASE ORAL at 08:56

## 2018-09-04 RX ADMIN — Medication 1 G: at 08:56

## 2018-09-04 RX ADMIN — LEVOTHYROXINE SODIUM 50 MCG: 50 TABLET ORAL at 06:36

## 2018-09-04 RX ADMIN — HYDROCODONE BITARTRATE AND ACETAMINOPHEN 2 TABLET: 7.5; 325 TABLET ORAL at 12:40

## 2018-09-04 RX ADMIN — INSULIN ASPART 2 UNITS: 100 INJECTION, SOLUTION INTRAVENOUS; SUBCUTANEOUS at 12:41

## 2018-09-04 RX ADMIN — INSULIN ASPART 3 UNITS: 100 INJECTION, SOLUTION INTRAVENOUS; SUBCUTANEOUS at 17:28

## 2018-09-04 RX ADMIN — INSULIN ASPART 3 UNITS: 100 INJECTION, SOLUTION INTRAVENOUS; SUBCUTANEOUS at 12:41

## 2018-09-04 RX ADMIN — DOCUSATE SODIUM 100 MG: 100 CAPSULE, LIQUID FILLED ORAL at 21:17

## 2018-09-04 RX ADMIN — METOPROLOL SUCCINATE 12.5 MG: 25 TABLET, FILM COATED, EXTENDED RELEASE ORAL at 08:56

## 2018-09-04 RX ADMIN — Medication 1 G: at 21:17

## 2018-09-04 RX ADMIN — ARIPIPRAZOLE 20 MG: 10 TABLET ORAL at 08:56

## 2018-09-04 RX ADMIN — ASPIRIN 325 MG: 325 TABLET, DELAYED RELEASE ORAL at 21:17

## 2018-09-04 RX ADMIN — FERROUS SULFATE TAB 325 MG (65 MG ELEMENTAL FE) 325 MG: 325 (65 FE) TAB at 08:56

## 2018-09-04 RX ADMIN — HYDROCODONE BITARTRATE AND ACETAMINOPHEN 2 TABLET: 7.5; 325 TABLET ORAL at 04:48

## 2018-09-04 RX ADMIN — INSULIN ASPART 2 UNITS: 100 INJECTION, SOLUTION INTRAVENOUS; SUBCUTANEOUS at 08:57

## 2018-09-04 RX ADMIN — Medication 400 MG: at 21:17

## 2018-09-04 RX ADMIN — HYDROCODONE BITARTRATE AND ACETAMINOPHEN 2 TABLET: 7.5; 325 TABLET ORAL at 08:56

## 2018-09-04 RX ADMIN — BISACODYL 10 MG: 5 TABLET, COATED ORAL at 08:56

## 2018-09-04 RX ADMIN — HYDROCODONE BITARTRATE AND ACETAMINOPHEN 2 TABLET: 7.5; 325 TABLET ORAL at 21:17

## 2018-09-04 RX ADMIN — TAMSULOSIN HYDROCHLORIDE 0.4 MG: 0.4 CAPSULE ORAL at 21:17

## 2018-09-04 RX ADMIN — INSULIN ASPART 2 UNITS: 100 INJECTION, SOLUTION INTRAVENOUS; SUBCUTANEOUS at 21:17

## 2018-09-04 RX ADMIN — INSULIN DETEMIR 10 UNITS: 100 INJECTION, SOLUTION SUBCUTANEOUS at 21:16

## 2018-09-04 RX ADMIN — Medication 400 MG: at 08:56

## 2018-09-04 RX ADMIN — INSULIN ASPART 3 UNITS: 100 INJECTION, SOLUTION INTRAVENOUS; SUBCUTANEOUS at 08:57

## 2018-09-04 RX ADMIN — BISACODYL 10 MG: 10 SUPPOSITORY RECTAL at 08:56

## 2018-09-04 RX ADMIN — DOCUSATE SODIUM 100 MG: 100 CAPSULE, LIQUID FILLED ORAL at 08:56

## 2018-09-04 NOTE — THERAPY TREATMENT NOTE
Acute Care - Physical Therapy Treatment Note  [facility]     Patient Name: Chris Ferguson  : 1945  MRN: [id number]  Today's Date: 2018  Onset of Illness/Injury or Date of Surgery: 18  Date of Referral to PT: 18  Referring Physician: Dr. Giang    Admit Date: 2018    Visit Dx:    ICD-10-CM ICD-9-CM   1. Impaired functional mobility, balance, gait, and endurance Z74.09 V49.89   2. 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, left   • Essential hypertension   • DM (diabetes mellitus), type 2 (CMS/HCC)   • BPH (benign prostatic hyperplasia)   • Hypothyroidism   • Postoperative anemia due to acute blood loss   • Tachycardia       Therapy Treatment          Rehabilitation Treatment Summary     Row Name 18 1600 18 1135          Treatment Time/Intention    Discipline physical therapy assistant  -CW physical therapist  -EJ     Document Type therapy note (daily note)  -CW therapy note (daily note)  -EJ     Subjective Information complains of;weakness;fatigue;pain  -CW complains of;pain  -EJ     Mode of Treatment physical therapy  -CW physical therapy  -EJ     Therapy Frequency (PT Clinical Impression) 2 times/day  -CW  --     Patient Effort adequate  -CW adequate  -EJ     Existing Precautions/Restrictions brace worn when out of bed;fall  -CW fall;brace worn when out of bed   knee immobilizer on when ambulating, unable to do SLR i  -EJ     Recorded by [CW] Marty Peterson P, PTA 18 1631 [EJ] Shea Santoro, PT 18 1148     Row Name 18 1600             Vital Signs    O2 Delivery Pre Treatment room air  -CW      Recorded by [CW] Marty Peterson, PTA 18 1631      Row Name 18 1600 18 1135          Cognitive Assessment/Intervention- PT/OT    Orientation Status (Cognition) oriented x 4  -CW   --     Follows Commands (Cognition) WFL  -CW  --     Safety Deficit (Cognitive) mild deficit  -CW  --     Personal Safety Interventions fall prevention program maintained;gait belt;muscle strengthening facilitated;nonskid shoes/slippers when out of bed  -CW fall prevention program maintained;gait belt;nonskid shoes/slippers when out of bed;supervised activity  -EJ     Recorded by [CW] Marty Peterson, PTA 09/04/18 1631 [EJ] Shea Santoro, PT 09/04/18 1148     Row Name 09/04/18 1135             Cognitive Assessment Intervention- SLP    Cognitive Function (Cognition) other (see comments)  -EJ      Recorded by [EJ] Shea Santoro, PT 09/04/18 1148      Row Name 09/04/18 1600 09/04/18 1135          Bed Mobility Assessment/Treatment    Supine-Sit [place] (Bed Mobility) not tested  -CW verbal cues;moderate assist (50% patient effort)  -EJ     Sit-Supine [place] (Bed Mobility)  -- not tested  -EJ     Bed Mobility, Safety Issues  -- decreased use of legs for bridging/pushing  -EJ     Assistive Device (Bed Mobility)  -- bed rails  -EJ     Comment (Bed Mobility) in chair  -CW  --     Recorded by [CW] Marty Peterson, PTA 09/04/18 1631 [EJ] Shea Santoro, PT 09/04/18 1148     Row Name 09/04/18 1600             Transfer Assessment/Treatment    Transfer Assessment/Treatment sit-stand transfer;stand-sit transfer  -CW      Recorded by [CW] Marty Peterson, PTA 09/04/18 1631      Row Name 09/04/18 1600 09/04/18 1135          Sit-Stand Transfer    Sit-Stand [place] (Transfers) moderate assist (50% patient effort)  -CW verbal cues;moderate assist (50% patient effort);2 person assist  -EJ     Assistive Device (Sit-Stand Transfers) walker, front-wheeled  -CW walker, front-wheeled  -EJ     Recorded by [CW] Marty Peterson, PTA 09/04/18 1631 [EJ] Shea Santoro, PT 09/04/18 1148     Row Name 09/04/18 1600 09/04/18 1135          Stand-Sit Transfer    Stand-Sit [place] (Transfers)  moderate assist (50% patient effort)  -CW verbal cues;moderate assist (50% patient effort);2 person assist  -EJ     Assistive Device (Stand-Sit Transfers) walker, front-wheeled  -CW walker, front-wheeled  -EJ     Recorded by [CW] Marty Peterson, PTA 09/04/18 1631 [EJ] Shea Santoro, PT 09/04/18 1148     Row Name 09/04/18 1600 09/04/18 1135          Gait/Stairs Assessment/Training    [place] Level (Gait) minimum assist (75% patient effort)  -CW verbal cues;nonverbal cues (demo/gesture);moderate assist (50% patient effort);2 person assist  -EJ     Assistive Device (Gait) walker, front-wheeled  -CW walker, front-wheeled  -EJ     Distance in Feet (Gait) 20  -CW 10  -EJ     Pattern (Gait) step-to  -CW  --     Deviations/Abnormal Patterns (Gait) antalgic;scar decreased;stride length decreased  -CW antalgic;scar decreased;stride length decreased  -EJ     Bilateral Gait Deviations forward flexed posture;heel strike decreased  -CW forward flexed posture;heel strike decreased  -EJ     Comment (Gait/Stairs)  -- cues for sequence and posture  -EJ     Recorded by [CW] Marty Peterson, PTA 09/04/18 1631 [EJ] Shea Santoro, PT 09/04/18 1148     Row Name 09/04/18 1135             Therapeutic Exercise    Comment (Therapeutic Exercise) L TKR  protocol x 10 reps with assist  -EJ      Recorded by [EJ] Shea Santoro, PT 09/04/18 1148      Row Name 09/04/18 1600 09/04/18 1135          Positioning and Restraints    Pre-Treatment Position sitting in chair/recliner  -CW in bed  -EJ     Post Treatment Position chair  -CW chair  -EJ     In Chair notified nsg;reclined;call light within reach;encouraged to call for assist;exit alarm on  -CW notified nsg;reclined;call light within reach;encouraged to call for assist;exit alarm on  -EJ     Recorded by [CW] Marty Peterson, PTA 09/04/18 1631 [EJ] Shea Santoro, PT 09/04/18 1148     Row Name 09/04/18 1600             Pain Assessment    Additional  Documentation Pain Scale: Numbers Pre/Post-Treatment (Group)  -CW      Recorded by [CW] Marty Peterson, PTA 09/04/18 1631      Row Name 09/04/18 1600 09/04/18 1135          Pain Scale: Numbers Pre/Post-Treatment    Pain Scale: Numbers, Pretreatment 6/10  -CW 8/10  -EJ     Pain Scale: Numbers, Post-Treatment 8/10  -CW 8/10  -EJ     Pain Location - Side Left  -CW Left  -EJ     Pain Location - Orientation incisional  -CW  --     Pain Location knee  -CW knee  -EJ     Pain Intervention(s) Repositioned;Ambulation/increased activity  -CW  --     Recorded by [CW] Marty Peterson, PTA 09/04/18 1631 [EJ] Shea Santoro, PT 09/04/18 1148     Row Name                Wound 08/31/18 1241 Left knee incision    Wound - Properties Group Date first assessed: 08/31/18 [EG] Time first assessed: 1241 [EG] Side: Left [EG] Location: knee [EG] Type: incision [EG] Recorded by:  [EG] Mery Jc RN 08/31/18 1241    Row Name 09/04/18 1600             Outcome Summary/Treatment Plan (PT)    Anticipated Discharge Disposition (PT) home with home health;skilled nursing facility  -CW      Recorded by [CW] Marty Peterson, PTA 09/04/18 1631        User Key  (r) = Recorded By, (t) = Taken By, (c) = Cosigned By    Initials Name Effective Dates Discipline    EJ Shea Santoro, PT 04/03/18 -  PT    CW Marty Peterson, PTA 03/07/18 -  PT    EG Mery Jc RN 07/10/17 -  Nurse          Wound 08/31/18 1241 Left knee incision (Active)   Dressing Appearance intact;dried drainage 9/4/2018  8:56 AM   Closure GENE 9/4/2018  8:56 AM   Base dressing in place, unable to visualize 9/4/2018  8:56 AM   Drainage Amount small 9/4/2018  4:48 AM             Physical Therapy Education     Title: PT OT SLP Therapies (Done)     Topic: Physical Therapy (Done)     Point: Mobility training (Done)    Learning Progress Summary     Learner Status Readiness Method Response Comment Documented by    Patient Done Acceptance E,TB,D VU,NR  EJ  09/04/18 1148     Done Acceptance E,TB,D VU,NR   09/03/18 0943     Done Acceptance E,TB,D VU,[facility] 09/02/18 1026     Done Acceptance E,TB,D VU,[facility] 09/01/18 1453     Active Acceptance E,D [facility] 09/01/18 1106     Done Acceptance E VU  MA 08/31/18 1618          Point: Home exercise program (Done)    Learning Progress Summary     Learner Status Readiness Method Response Comment Documented by    Patient Done Acceptance E,TB,D VU,NR   09/04/18 1148     Done Acceptance E,TB,D VU,[facility] 09/03/18 0943     Done Acceptance E,TB,D VU,[facility] 09/02/18 1026     Done Acceptance E,TB,D VU,[facility] 09/01/18 1453     Active Acceptance E,D [facility] 09/01/18 1106     Done Acceptance E VU  MA 08/31/18 1618          Point: Body mechanics (Done)    Learning Progress Summary     Learner Status Readiness Method Response Comment Documented by    Patient Done Acceptance E,TB,D VU,NR   09/04/18 1148     Done Acceptance E,TB,D VU,[facility] 09/03/18 0943     Done Acceptance E,TB,D VU,[facility] 09/02/18 1026     Done Acceptance E,TB,D VU,[facility] 09/01/18 1453     Active Acceptance E,D [facility] 09/01/18 1106     Done Acceptance E VU  MA 08/31/18 1618          Point: Precautions (Done)    Learning Progress Summary     Learner Status Readiness Method Response Comment Documented by    Patient Done Acceptance E,TB,D VU,[facility] 09/04/18 1148     Done Acceptance E,TB,D VU,[facility] 09/03/18 0943     Done Acceptance E,TB,D VU,[facility] 09/02/18 1026     Done Acceptance E,TB,D VU,[facility] 09/01/18 1453     Active Acceptance E,D [facility] 09/01/18 1106     Done Acceptance E VU  MA 08/31/18 1618                      User Key     Initials Effective Dates Name Provider Type Discipline     04/03/18 -  Shea Santoro, PT Physical Therapist PT     03/07/18 -  Leila Hunter, PTA Physical Therapy Assistant PT    MA 04/03/18 -  Sonny, Bridget B, PT Physical Therapist PT                    PT Recommendation and Plan  Anticipated Discharge Disposition (PT): home  with home health, skilled nursing facility  Therapy Frequency (PT Clinical Impression): 2 times/day  Outcome Summary/Treatment Plan (PT)  Anticipated Discharge Disposition (PT): home with home health, skilled nursing facility          Outcome Measures     Row Name 09/04/18 1100 09/03/18 0900 09/02/18 1000       How much help from another person do you currently need...    Turning from your back to your side while in flat bed without using bedrails? 3  -EJ 3  -SM 3  -SM    Moving from lying on back to sitting on the side of a flat bed without bedrails? 2  -EJ 3  -SM 3  -SM    Moving to and from a bed to a chair (including a wheelchair)? 2  -EJ 2  -SM 2  -SM    Standing up from a chair using your arms (e.g., wheelchair, bedside chair)? 2  -EJ 2  -SM 2  -SM    Climbing 3-5 steps with a railing? 1  -EJ 2  -SM 2  -SM    To walk in hospital room? 2  -EJ 2  -SM 2  -SM    AM-PAC 6 Clicks Score 12  -EJ 14  -SM 14  -SM       Functional Assessment    Outcome Measure Options AM-PAC 6 Clicks Basic Mobility (PT)  -EJ AM-PAC 6 Clicks Basic Mobility (PT)  -SM AM-PAC 6 Clicks Basic Mobility (PT)  -SM      User Key  (r) = Recorded By, (t) = Taken By, (c) = Cosigned By    Initials Name Provider Type    Shea Roldan, PT Physical Therapist    Leila Cooney, PTA Physical Therapy Assistant           Time Calculation:         PT Charges     Row Name 09/04/18 1631 09/04/18 1150          Time Calculation    Start Time 1615  -CW 1135  -EJ     Stop Time 1631  -CW 1151  -EJ     Time Calculation (min) 16 min  -CW 16 min  -EJ     PT Received On 09/04/18  -CW 09/04/18  -EJ     PT - Next Appointment 09/05/18  -CW 09/04/18  -EJ       User Key  (r) = Recorded By, (t) = Taken By, (c) = Cosigned By    Initials Name Provider Type    Shea Roldan, PT Physical Therapist    Marty Aldridge, PTA Physical Therapy Assistant        Therapy Suggested Charges     Code   Minutes Charges    None           Therapy Charges for Today      Code Description Service Date Service Provider Modifiers Qty    85271910887 HC PT THER PROC EA 15 MIN 9/4/2018 Marty Peterson, PTA GP 1          PT G-Codes  Outcome Measure Options: AM-PAC 6 Clicks Basic Mobility (PT)  AM-PAC 6 Clicks Score: 12  Score: 9    Marty Peterson PTA  9/4/2018

## 2018-09-04 NOTE — THERAPY TREATMENT NOTE
Acute Care - Physical Therapy Treatment Note  [facility]     Patient Name: Chris Ferguson  : 1945  MRN: [id number]  Today's Date: 2018  Onset of Illness/Injury or Date of Surgery: 18  Date of Referral to PT: 18  Referring Physician: Dr. Giang    Admit Date: 2018    Visit Dx:    ICD-10-CM ICD-9-CM   1. Impaired functional mobility, balance, gait, and endurance Z74.09 V49.89   2. 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, left   • Essential hypertension   • DM (diabetes mellitus), type 2 (CMS/HCC)   • BPH (benign prostatic hyperplasia)   • Hypothyroidism   • Postoperative anemia due to acute blood loss   • Tachycardia       Therapy Treatment          Rehabilitation Treatment Summary     Row Name 18 1135             Treatment Time/Intention    Discipline physical therapist  -EJ      Document Type therapy note (daily note)  -EJ      Subjective Information complains of;pain  -EJ      Mode of Treatment physical therapy  -EJ      Patient Effort adequate  -EJ      Existing Precautions/Restrictions fall;brace worn when out of bed   knee immobilizer on when ambulating, unable to do SLR i  -EJ      Recorded by [EJ] Shea Santoro, PT 18 1148      Row Name 18 1135             Cognitive Assessment/Intervention- PT/OT    Personal Safety Interventions fall prevention program maintained;gait belt;nonskid shoes/slippers when out of bed;supervised activity  -EJ      Recorded by [EJ] Shea Santoro, PT 18 1148      Row Name 18 1135             Cognitive Assessment Intervention- SLP    Cognitive Function (Cognition) other (see comments)  -EJ      Recorded by [EJ] Shea Santoro, PT 18 1148      Row Name 18 1135             Bed Mobility Assessment/Treatment    Supine-Sit [place] (Bed  Mobility) verbal cues;moderate assist (50% patient effort)  -EJ      Sit-Supine [place] (Bed Mobility) not tested  -EJ      Bed Mobility, Safety Issues decreased use of legs for bridging/pushing  -EJ      Assistive Device (Bed Mobility) bed rails  -EJ      Recorded by [EJ] Shea Santoro, PT 09/04/18 1148      Row Name 09/04/18 1135             Sit-Stand Transfer    Sit-Stand [place] (Transfers) verbal cues;moderate assist (50% patient effort);2 person assist  -EJ      Assistive Device (Sit-Stand Transfers) walker, front-wheeled  -EJ      Recorded by [EJ] Shea Santoro, PT 09/04/18 1148      Row Name 09/04/18 1135             Stand-Sit Transfer    Stand-Sit [place] (Transfers) verbal cues;moderate assist (50% patient effort);2 person assist  -EJ      Assistive Device (Stand-Sit Transfers) walker, front-wheeled  -EJ      Recorded by [EJ] Shea Santoro, PT 09/04/18 1148      Row Name 09/04/18 1135             Gait/Stairs Assessment/Training    [place] Level (Gait) verbal cues;nonverbal cues (demo/gesture);moderate assist (50% patient effort);2 person assist  -EJ      Assistive Device (Gait) walker, front-wheeled  -EJ      Distance in Feet (Gait) 10  -EJ      Deviations/Abnormal Patterns (Gait) antalgic;scar decreased;stride length decreased  -EJ      Bilateral Gait Deviations forward flexed posture;heel strike decreased  -EJ      Comment (Gait/Stairs) cues for sequence and posture  -EJ      Recorded by [EJ] Shea Santoro, PT 09/04/18 1148      Row Name 09/04/18 1135             Therapeutic Exercise    Comment (Therapeutic Exercise) L TKR  protocol x 10 reps with assist  -EJ      Recorded by [EJ] Shea Santoro, PT 09/04/18 1148      Row Name 09/04/18 1135             Positioning and Restraints    Pre-Treatment Position in bed  -EJ      Post Treatment Position chair  -EJ      In Chair notified nsg;reclined;call light within reach;encouraged to call for assist;exit alarm on   -EJ      Recorded by [EJ] Shea Santoro, PT 09/04/18 1148      Row Name 09/04/18 1135             Pain Scale: Numbers Pre/Post-Treatment    Pain Scale: Numbers, Pretreatment 8/10  -EJ      Pain Scale: Numbers, Post-Treatment 8/10  -EJ      Pain Location - Side Left  -EJ      Pain Location knee  -EJ      Recorded by [EJ] Shea Santoro, PT 09/04/18 1148      Row Name                Wound 08/31/18 1241 Left knee incision    Wound - Properties Group Date first assessed: 08/31/18 [EG] Time first assessed: 1241 [EG] Side: Left [EG] Location: knee [EG] Type: incision [EG] Recorded by:  [EG] Mery Jc RN 08/31/18 1241      User Key  (r) = Recorded By, (t) = Taken By, (c) = Cosigned By    Initials Name Effective Dates Discipline    EJ Shea Santoro, PT 04/03/18 -  PT    EG Mery Jc RN 07/10/17 -  Nurse          Wound 08/31/18 1241 Left knee incision (Active)   Dressing Appearance intact;dried drainage 9/4/2018  8:56 AM   Closure GENE 9/4/2018  8:56 AM   Base dressing in place, unable to visualize 9/4/2018  8:56 AM   Drainage Amount small 9/4/2018  4:48 AM             Physical Therapy Education     Title: PT OT SLP Therapies (Done)     Topic: Physical Therapy (Done)     Point: Mobility training (Done)    Learning Progress Summary     Learner Status Readiness Method Response Comment Documented by    Patient Done Acceptance E,TB,D VUKARTHIK   09/04/18 1148     Done Acceptance ETBELIZABETH VU,[facility] 09/03/18 0943     Done Acceptance ETBELIZABETH VU,[facility] 09/02/18 1026     Done Acceptance ETBELIZABETH VU,[facility] 09/01/18 1453     Active Acceptance E,D KARTHIK   09/01/18 1106     Done Acceptance E ALIA SOTELO 08/31/18 1618          Point: Home exercise program (Done)    Learning Progress Summary     Learner Status Readiness Method Response Comment Documented by    Patient Done Acceptance E,TB,D VU,KARTHIK   09/04/18 1148     Done Acceptance ETBELIZABETH VU,[facility] 09/03/18 0943     Done Acceptance ETBD KARTHIK ROJO   09/02/18 1029      Done Acceptance E,TB,D VU,NR   09/01/18 1453     Active Acceptance E,D NR   09/01/18 1106     Done Acceptance E VU  MA 08/31/18 1618          Point: Body mechanics (Done)    Learning Progress Summary     Learner Status Readiness Method Response Comment Documented by    Patient Done Acceptance E,TB,D VU,NR   09/04/18 1148     Done Acceptance E,TB,D VU,[facility] 09/03/18 0943     Done Acceptance E,TB,D VU,[facility] 09/02/18 1026     Done Acceptance E,TB,D VU,[facility] 09/01/18 1453     Active Acceptance E,D NR   09/01/18 1106     Done Acceptance E VU  MA 08/31/18 1618          Point: Precautions (Done)    Learning Progress Summary     Learner Status Readiness Method Response Comment Documented by    Patient Done Acceptance E,TB,D VU,NR   09/04/18 1148     Done Acceptance E,TB,D VU,[facility] 09/03/18 0943     Done Acceptance E,TB,D VU,[facility] 09/02/18 1026     Done Acceptance E,TB,D VU,[facility] 09/01/18 1453     Active Acceptance E,D NR   09/01/18 1106     Done Acceptance E VU  MA 08/31/18 1618                      User Key     Initials Effective Dates Name Provider Type Discipline     04/03/18 -  Shea Santoro, PT Physical Therapist PT     03/07/18 -  Leila Hunter, DONOVAN Physical Therapy Assistant PT    MA 04/03/18 -  Bridget Dennis, DARYN Physical Therapist PT                    PT Recommendation and Plan     Plan of Care Reviewed With: patient  Progress: improving  Outcome Summary: Pt agreeable to  PT, still with c/o pain. Slow progress with PT. He  is requiring mod A for all mobility as well as assist with exercises. He is unable to do SLR independently yet. Will continue to benefit from skilled PT.          Outcome Measures     Row Name 09/04/18 1100 09/03/18 0900 09/02/18 1000       How much help from another person do you currently need...    Turning from your back to your side while in flat bed without using bedrails? 3  -EJ 3  -SM 3  -SM    Moving from lying on back to sitting on the side of a  flat bed without bedrails? 2  -EJ 3  -SM 3  -SM    Moving to and from a bed to a chair (including a wheelchair)? 2  -EJ 2  -SM 2  -SM    Standing up from a chair using your arms (e.g., wheelchair, bedside chair)? 2  -EJ 2  -SM 2  -SM    Climbing 3-5 steps with a railing? 1  -EJ 2  -SM 2  -SM    To walk in hospital room? 2  -EJ 2  -SM 2  -SM    AM-PAC 6 Clicks Score 12  -EJ 14  -SM 14  -SM       Functional Assessment    Outcome Measure Options AM-PAC 6 Clicks Basic Mobility (PT)  -EJ AM-PAC 6 Clicks Basic Mobility (PT)  -SM AM-PAC 6 Clicks Basic Mobility (PT)  -SM    Row Name 09/01/18 1400             How much help from another person do you currently need...    Turning from your back to your side while in flat bed without using bedrails? 3  -SM      Moving from lying on back to sitting on the side of a flat bed without bedrails? 3  -SM      Moving to and from a bed to a chair (including a wheelchair)? 2  -SM      Standing up from a chair using your arms (e.g., wheelchair, bedside chair)? 2  -SM      Climbing 3-5 steps with a railing? 2  -SM      To walk in hospital room? 2  -SM      AM-PAC 6 Clicks Score 14  -SM         Functional Assessment    Outcome Measure Options AM-PAC 6 Clicks Basic Mobility (PT)  -SM        User Key  (r) = Recorded By, (t) = Taken By, (c) = Cosigned By    Initials Name Provider Type    Shea Roldan, PT Physical Therapist    Leila Cooney, PTA Physical Therapy Assistant           Time Calculation:         PT Charges     Row Name 09/04/18 1150             Time Calculation    Start Time 1135  -EJ      Stop Time 1151  -EJ      Time Calculation (min) 16 min  -EJ      PT Received On 09/04/18  -EJ      PT - Next Appointment 09/04/18  -EJ        User Key  (r) = Recorded By, (t) = Taken By, (c) = Cosigned By    Initials Name Provider Type    Shea Roldan, PT Physical Therapist        Therapy Suggested Charges     Code   Minutes Charges    None           Therapy Charges for  Today     Code Description Service Date Service Provider Modifiers Qty    25074464996 HC PT THER PROC EA 15 MIN 9/4/2018 Shea Santoro, PT GP 1    01353889259 HC PT THER SUPP EA 15 MIN 9/4/2018 Shea Santoro, PT GP 1          PT G-Codes  Outcome Measure Options: AM-PAC 6 Clicks Basic Mobility (PT)  AM-PAC 6 Clicks Score: 12  Score: 9    Shea Santoro, PT  9/4/2018

## 2018-09-04 NOTE — PROGRESS NOTES
Continued Stay Note  [facility]     Patient Name: Chris Ferguson  MRN: [id number]  Today's Date: 9/4/2018    Admit Date: 8/31/2018          Discharge Plan     Row Name 09/04/18 1554       Plan    Plan [facility], pre-cert pending     Patient/Family in Agreement with Plan yes    Plan Comments Plan remains to d/c to [facility], pending [employer] cert. [place]/ will notify when cert is approved.               Discharge Codes    No documentation.           Theresa Merritt RN

## 2018-09-04 NOTE — DISCHARGE PLACEMENT REQUEST
"Chris Grullon (72 y.o. Male)     Date of Birth Social Security Number Address Home Phone MRN    1945  [id number] [place] [address] [phone] [id number]    Gnosticist Marital Status          Tenriism        Admission Date Admission Type Admitting Provider Attending Provider Department, Room/Bed    8/31/18 Elective Valente Giang MD Yakkanti, Madhusudhan R, MD [facility] [address], P880/1    Discharge Date Discharge Disposition Discharge Destination                       Attending Provider:  Valente Giang MD    Allergies:  Codeine    Isolation:  None   Infection:  None   Code Status:  CPR    Ht:  180.3 cm (70.98\")   Wt:  92.8 kg (204 lb 9.4 oz)    Admission Cmt:  None   Principal Problem:  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                 Active Insurance as of 8/31/2018     Primary Coverage     Payor Plan Insurance Group Employer/Plan Group    HUMANA MEDICARE REPLACEMENT HUMANA MEDICARE REPL [id number]     Payor Plan Address Payor Plan Phone Number Effective From Effective To    [address] [address] [phone] 1/1/2018     [facility] [address]       Subscriber Name Subscriber Birth Date Member ID       CHRIS GRULLON 1945 [id number]                 Emergency Contacts      (Rel.) Home Phone Work Phone Mobile Phone    Norbert Lynch (Friend) [phone] -- --    Constance Moreau (Friend) -- -- [phone]              "

## 2018-09-04 NOTE — DISCHARGE INSTRUCTIONS
[facility] Total Knee Replacement Discharge Instructions     I. ACTIVITIES:  1. Exercises:  ? Complete exercise program as taught by the hospital physical therapist 2 times per day  ? Exercise program will be advanced by your home health physical therapist  ? During the day be up ambulating every 2 hours (while awake) for short distances  ? Complete the ankle pump exercises at least 10 times per hour (while awake)  ? Elevate legs most of the day the first week post operatively and thereafter elevate legs when in bed and for at least 30 minutes during the day.   ? Caution must be taken to avoid pillow placement under the bend of the knee as this can led to flexion contractures of the knee. Pillow placement under the heel is encouraged.  ? Use cold packs 20-30 minutes approximately 5 times per day. This should be done before and after completing your exercises and at any time you are experiencing pain/ stiffness in your operative extremity.      2. Activities of Daily Living:  ? No tub baths, hot tubs, or swimming pools for 4 weeks  ? May shower and let water run over the incision on post-operative day #5 if no drainage. Do not scrub or rub the incision. Simply let the water run over the incision and pat dry.    II. Restrictions  ? Do not cross legs or kneel  ? Your surgeon will discuss with you when you will be able to drive again. Usual guidelines are you are to be off pain medications prior to driving.  ? Weight bearing is as tolerated  ? First week stay inside on even terrain. May go up and down stairs one stair at a time utilizing the hand rail.  ? After one week, you may venture outside.    III. Precautions:  ? Everyone that comes near you should wash their hands  ? No elective dental, genital-urinary, or colon procedures or surgical procedures for 12 weeks after surgery unless absolutely necessary.  ?  If dental work or surgical procedure is deemed absolutely necessary, you will need to contact your surgeon as  you will need to take antibiotics 1 hour prior to any dental work (including teeth cleanings).  ? Please discuss with your surgeon prophylactic antibiotics as the length of time this intervention will be necessary for you varies with each patient’s health history and situation.  ? Avoid sick people. If you must be around someone who is ill, they should wear a mask.  ? Avoid visits to the Emergency Room or Urgent Care. If you feel you need to go to the emergency room, please notify your surgeon.    ? Stockings are to be worn for one week after surgery and are to be placed on in the morning and removed at night. Observe your skin when stocking is removed for any problems. Monitor the stockings to ensure that any swelling is not causing the stockings to become too tight. In this case, remove stockings immediately.    IV. INCISION CARE:  ? Wash your hands prior to dressing changes  ? Change the dressing as needed to keep incision clean and dry. Utilize dry gauze and paper tape. Avoid touching the side of the gauze that goes against the incision with your hands.  ? No creams or ointments to the incision  ? May remove dressing once the incision is free of drainage  ? Do not touch or pick at the incision  ? Check incision every day and notify surgeon immediately if any of the following signs or symptoms are noted:  o Increase in redness  o Increase in swelling around the incision and of the entire extremity  o Increase in pain  o Drainage oozing from the incision  o Pulling apart of the edges of the incision  o Increase in overall body temperature (greater than 100.5 degrees)  ? Your  Staples will be removed between 10-14 days postoperation.  This may be done by either the home health nurse, rehabilitation nurse or during your return visit to Dr. Giang's office.  You will then be instructed on how to care for the incision.  (Please call the office if your staples have not been removed within 14 days after  surgery).    V. Medications:   1. Anticoagulants: You will be discharged on an anticoagulant. This is a prophylactic medication that helps prevent blood clots during your post-operative period.  You will be on Aspirin 325 mg Enteric Coated every 12 hours orally for 21 days. If you were on Aspirin 81 mg prior to surgery hold it and restart once your Aspirin 325 mg is completed.     ? While taking the anticoagulant, you should avoid taking any additional aspirin, ibuprofen (Advil or Motrin), Aleve (Naprosyn) or other non-steroidal anti-inflammatory medications.   ? Notify surgeon immediately if any franklin bleeding is noted in the urine, stool, emesis, or from the nose or the incision. Blood in the stool will often appear as black rather than red. Blood in urine may appear as pink. Blood in emesis may appear as brown/black like coffee grounds.  ? You will need to apply pressure for longer periods of time to any cuts or abrasions to stop bleeding  ? Avoid alcohol while taking anticoagulants    2. Stool Softeners: You will be at greater risk of constipation after surgery due to being less mobile and the pain medications.   ? Take stool softeners as instructed by your surgeon while on pain medications. Over the counter Colace 100 mg 1-2 capsules twice daily.   ? If stools become too loose or too frequent, please decreases the dosage or stop the stool softener.  ? If constipation occurs despite use of stool softeners, you are to continue the stool softeners and add a laxative (Milk of Magnesia 1 ounce daily as needed).  ? Dulcolax oral tabs or suppository, or a fleets enema can also be utilized for constipation and can be obtained over the counter.   ? If above interventions are unsuccessful in inducing bowel movements, please contact your surgeon's office / family physician's office.  ? Drink plenty of fluids, and eat fruits and vegetables during your recovery time    3. Pain Medications utilized after surgery are  narcotics and the law requires that the following information be given to all patients that are prescribed narcotics:  ? CLASSIFICATION: Pain medications are called Opioids and are narcotics  ? LEGALITIES: It is illegal to share narcotics with others and to drive within 24 hours of taking narcotics  ? POTENTIAL SIDE EFFECTS: Potential side effects of opioids include: nausea, vomiting, itching, dizziness, drowsiness, dry mouth, constipation, and difficulty urinating.  ? POTENTIAL ADVERSE EFFECTS:   o Opioid tolerance can develop with use of pain medications and this simply means that it requires more and more of the medication to control pain; however, this is seen more in patients that use opioids for longer periods of time.  o Opioid dependence can develop with use of Opioids and this simply means that to stop the medication can cause withdrawal symptoms; however, this is seen with patients that use Opioids for longer periods of time.  o Opioid addiction can develop with use of Opioids and the incidence of this is very unlikely in patients who take the medications as ordered and stop the medications as instructed.  o Opioid overdose can be dangerous, but is unlikely when the medication is taken as ordered and stopped when ordered. It is important not to mix opioids with alcohol or with and type of sedative such as Benadryl as this can lead to over sedation and respiratory difficulty.  ? DOSAGE:   o Pain medications will need to be taken consistently for the first week to decrease pain and promote adequate pain relief and participation in physical therapy.  o After the initial surgical pain begins to resolve, you may begin to decrease the pain medication. By the end of 6 weeks, you should be off of pain medications.  o Refills will not be given by the office during evening hours, on weekends, or after 6 weeks post-op.  o To seek refills on pain medications during the initial 6 week post-operative period, you must  call the office 48 hours in advance to request the refill. The office will then notify you when to  the prescription. DO NOT wait until you are out of the medication to request a refill.    V. FOLLOW-UP VISITS:  ? You will need to follow up in the office with your surgeon in [place] on 9/20/18. Please call this number [phone] to schedule this appointment.  ? If you have any concerns or suspected complications prior to your follow up visit, please call your surgeons office. Do not wait until your appointment time if you suspect complications. These will need to be addressed in the office promptly.

## 2018-09-04 NOTE — PLAN OF CARE
Problem: Patient Care Overview  Goal: Plan of Care Review  Outcome: Ongoing (interventions implemented as appropriate)   09/04/18 1811   Coping/Psychosocial   Plan of Care Reviewed With patient   OTHER   Outcome Summary Pain controlled with PO analgesic. Ambulating with walker and assist. Dressing clean and dry. Suppository given x1 for no BM with large result. Continue ac/hs accuchecks with sliding scale coverage, levemir adjusted per [facility]. Plan to DC to [facility] once precert obtained, prob tomorrow.   Plan of Care Review   Progress improving     Goal: Individualization and Mutuality  Outcome: Ongoing (interventions implemented as appropriate)    Goal: Discharge Needs Assessment  Outcome: Ongoing (interventions implemented as appropriate)    Goal: Interprofessional Rounds/Family Conf  Outcome: Ongoing (interventions implemented as appropriate)      Problem: Fall Risk (Adult)  Goal: Absence of Fall  Outcome: Ongoing (interventions implemented as appropriate)   09/04/18 1811   Fall Risk (Adult)   Absence of Fall achieves outcome       Problem: Knee Arthroplasty (Total, Partial) (Adult)  Goal: Signs and Symptoms of Listed Potential Problems Will be Absent, Minimized or Managed (Knee Arthroplasty)  Outcome: Ongoing (interventions implemented as appropriate)   09/04/18 1811   Goal/Outcome Evaluation   Problems Assessed (Knee Arthroplasty) all   Problems Present (Knee Arthroplasty) pain

## 2018-09-04 NOTE — PROGRESS NOTES
Orthopedic Progress Note      Patient: Chris Ferguson  YOB: 1945     Date of Admission: 8/31/2018  6:41 AM Medical Record Number: [id number]     Attending Physician: Valente Giang,*    Status Post:  LEFT  TOTAL KNEE ARTHROPLASTY  complex distal femur replacement   removal of plate  Post Operative Day Number: 4    Subjective : No new orthopaedic complaints     Pain Relief: some relief with present medication.     Systemic Complaints: No Complaints  Vitals:    09/03/18 2313 09/04/18 0253 09/04/18 0449 09/04/18 0737   BP: 128/80 137/81  127/85   BP Location: Right arm Right arm  Right arm   Patient Position: Lying Lying  Sitting   Pulse: 115 108  115   Resp: 16 16  16   Temp: 100.1 °F (37.8 °C) 100.3 °F (37.9 °C) 98.6 °F (37 °C) 98.9 °F (37.2 °C)   TempSrc: Oral Oral Oral Oral   SpO2: 95% 95%  93%   Weight:       Height:           Physical Exam: 72 y.o. male    General Appearance:       Alert, cooperative, in no acute distress                  Extremities:    Dressing Clean, Dry and Intact         Incision healthy without signs or symptoms of infections         No clinical sign of DVT        Able to do good movements of digits    Pulses:   Pulses palpable and equal bilaterally           Diagnostic Tests:       Results from last 7 days  Lab Units 09/03/18  1208 09/02/18  0430 09/01/18  2103  09/01/18  0709   WBC 10*3/mm3  --  6.83  --   --  7.37   HEMOGLOBIN g/dL 9.1* 7.3* 8.3*  < > 8.2*   HEMATOCRIT % 28.6* 23.4* 26.4*  < > 26.4*   PLATELETS 10*3/mm3  --  186  --   --  213   < > = values in this interval not displayed.    Results from last 7 days  Lab Units 09/04/18  0301 09/02/18  0430 09/01/18  0709   SODIUM mmol/L 138 139 138   POTASSIUM mmol/L 4.3 4.3 4.2   CHLORIDE mmol/L 102 104 103   CO2 mmol/L 25.4 26.5 24.1   BUN mg/dL 24* 23 20   CREATININE mg/dL 1.07 1.39* 1.24   GLUCOSE mg/dL 58* 95 133*   CALCIUM mg/dL 8.8 8.2* 8.1*         No results found for: URICACID  No results found for:  CRYSTAL  Microbiology Results (last 10 days)     ** No results found for the last 240 hours. **        Xr Chest 1 View    Result Date: 9/1/2018   Stable appearance of the chest by portable radiography.  This report was finalized on 9/1/2018 9:04 PM by Oj Cowan M.D.          Current Medications:  Scheduled Meds:    ARIPiprazole 20 mg Oral QAM   aspirin 325 mg Oral Q12H   docusate sodium 100 mg Oral BID   ferrous sulfate 325 mg Oral Daily With Breakfast   insulin aspart 0-7 Units Subcutaneous 4x Daily With Meals & Nightly   insulin aspart 3 Units Subcutaneous TID With Meals   insulin detemir 10 Units Subcutaneous Nightly   levothyroxine 50 mcg Oral QAM   magnesium oxide 400 mg Oral BID   memantine 5 mg Oral Daily   metoprolol succinate XL 12.5 mg Oral Q12H   sodium chloride 1 g Oral BID   tamsulosin 0.4 mg Oral Nightly     Continuous Infusions:    lactated ringers 100 mL/hr Last Rate: 100 mL/hr (08/31/18 1812)     PRN Meds:.•  acetaminophen  •  bisacodyl  •  bisacodyl  •  dextrose  •  dextrose  •  diphenhydrAMINE **OR** diphenhydrAMINE  •  glucagon (human recombinant)  •  HYDROcodone-acetaminophen  •  HYDROcodone-acetaminophen  •  melatonin  •  ondansetron **OR** ondansetron ODT **OR** ondansetron  •  traZODone    Assessment: Status post  TOTAL KNEE ARTHROPLASTY  complex distal femur replacement   removal of plate     Patient Active Problem List   Diagnosis   • 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    Denzel Moscoso MD    Date: 9/4/2018    Time: 9:13 AM

## 2018-09-04 NOTE — DISCHARGE SUMMARY
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, left [S72.452K]    Procedures Performed  REMOVAL OF LEFT DISTAL FEMUR PLATE AND SCREWS WITH COMPLEX TOTAL KNEE REPLACEMENT DISTAL FEMUR HINGED         TOTAL KNEE ARTHROPLASTY  complex distal femur replacement   removal of plate     Patient Active Problem List   Diagnosis   • 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, left   • Essential hypertension   • DM (diabetes mellitus), type 2 (CMS/HCC)   • BPH (benign prostatic hyperplasia)   • Hypothyroidism   • Postoperative anemia due to acute blood loss   • Tachycardia          Allergies   Allergen Reactions   • Codeine GI Intolerance          Discharge Medications      New Medications      Instructions Start Date   acetaminophen 325 MG tablet  Commonly known as:  TYLENOL   650 mg, Oral, Every 4 Hours PRN      bisacodyl 5 MG EC tablet  Commonly known as:  DULCOLAX   10 mg, Oral, Daily PRN      docusate sodium 100 MG capsule   100 mg, Oral, 2 Times Daily      ferrous sulfate 325 (65 FE) MG tablet   325 mg, Oral, Daily With Breakfast      HYDROcodone-acetaminophen 7.5-325 MG per tablet  Commonly known as:  NORCO   Take 1-2 tablets by mouth every 4-6 hours as needed for pain.      ondansetron 4 MG tablet  Commonly known as:  ZOFRAN   4 mg, Oral, Every 8 Hours PRN         Changes to Medications      Instructions Start Date   aspirin 325 MG EC tablet  What changed:  when to take this   325 mg, Oral, Every 12 Hours Scheduled         Continue These Medications      Instructions Start Date   ARIPiprazole 20 MG tablet  Commonly known  as:  ABILIFY   20 mg, Oral, Every Morning      glipiZIDE 5 MG tablet  Commonly known as:  GLUCOTROL   5 mg, Oral, Every Morning      levothyroxine 50 MCG tablet  Commonly known as:  SYNTHROID, LEVOTHROID   50 mcg, Oral, Every Morning      magnesium oxide 400 MG tablet  Commonly known as:  MAG-OX   400 mg, Oral, 2 Times Daily      memantine 5 MG tablet  Commonly known as:  NAMENDA   5 mg, Oral, 2 Times Daily      metoprolol succinate XL 25 MG 24 hr tablet  Commonly known as:  TOPROL-XL   25 mg, Oral, Every Morning      sodium chloride 1 g tablet   1 g, Oral, 2 Times Daily, 3 tabs  Each dose       tamsulosin 0.4 MG capsule 24 hr capsule  Commonly known as:  FLOMAX   1 capsule, Oral, Nightly      traZODone 50 MG tablet  Commonly known as:  DESYREL   50 mg, Oral, As Needed                Past Medical History:   Diagnosis Date   • Arthritis    • BPH (benign prostatic hyperplasia)    • Depression    • Diabetes mellitus (CMS/HCC)     type 2   • Disease of thyroid gland    • GERD (gastroesophageal reflux disease)    • Hypertension         Past Surgical History:   Procedure Laterality Date   • APPENDECTOMY     • CHOLECYSTECTOMY     • EYE SURGERY      kallie cataracts w iol   • FEMUR FRACTURE SURGERY Left     1/2018   • STOMACH SURGERY      gastric ulcer        Social History     Occupational History   • Not on file.     Social History Main Topics   • Smoking status: Never Smoker   • Smokeless tobacco: Never Used   • Alcohol use No   • Drug use: No   • Sexual activity: Not on file      Social History     Social History Narrative   • No narrative on file        Family History   Problem Relation Age of Onset   • Malig Hyperthermia Neg Hx        Physical Exam: 72 y.o. male  General Appearance:    Alert, cooperative, in no acute distress                      Vitals:    09/03/18 2313 09/04/18 0253 09/04/18 0449 09/04/18 0737   BP: 128/80 137/81  127/85   BP Location: Right arm Right arm  Right arm   Patient Position: Lying Lying   Sitting   Pulse: 115 108  115   Resp: 16 16  16   Temp: 100.1 °F (37.8 °C) 100.3 °F (37.9 °C) 98.6 °F (37 °C) 98.9 °F (37.2 °C)   TempSrc: Oral Oral Oral Oral   SpO2: 95% 95%  93%   Weight:       Height:            Hospital Course:  72 y.o. male admitted to [facility] to services of Valente Giang * with nonunion left femur fracture on 8/31/2018 and underwent a left distal femur replacement. Antibiotic and VTE prophylaxis were per [facility]IP protocols and included  Kefzol  every 8 hours and Aspirin 325 mg twice daily . Post-operatively the patient transferred to the post-operative floor where the patient underwent mobilization therapy that included active as well as passive ROM exercises. Opioids were titrated to achieve appropriate pain management to allow for participation in mobilization exercises. Vital signs are now stable. The incision is intact without signs or symptoms of infection. Operative extremity neurovascular status remains intact.     He had a decreased hemoglobin on postoperative day #2 and received 2 units of PRBC.  He lives alone and has been immobile for several months prior to the procedure and it was felt he will benefit from subacute rehabilitation placement.  Appropriate education re: incision care, activity levels, medications, and follow up visits was completed and all questions were answered. The patient is now deemed stable for discharge.    There was a delay in getting a precert from the insurance  And his discharge was delayed.      DIAGNOSTIC TESTS:     Admission on 08/31/2018   Component Date Value Ref Range Status   • Glucose 08/31/2018 117  70 - 130 mg/dL Final   • Glucose 08/31/2018 124  70 - 130 mg/dL Final   • Glucose 08/31/2018 126  70 - 130 mg/dL Final   • Glucose 08/31/2018 188* 70 - 130 mg/dL Final   • Glucose 09/01/2018 133* 65 - 99 mg/dL Final   • BUN 09/01/2018 20  8 - 23 mg/dL Final   • Creatinine 09/01/2018 1.24  0.76 - 1.27 mg/dL Final   • Sodium 09/01/2018 138   136 - 145 mmol/L Final   • Potassium 09/01/2018 4.2  3.5 - 5.2 mmol/L Final   • Chloride 09/01/2018 103  98 - 107 mmol/L Final   • CO2 09/01/2018 24.1  22.0 - 29.0 mmol/L Final   • Calcium 09/01/2018 8.1* 8.6 - 10.5 mg/dL Final   • eGFR Non African Amer 09/01/2018 58* >60 mL/min/1.73 Final   • BUN/Creatinine Ratio 09/01/2018 16.1  7.0 - 25.0 Final   • Anion Gap 09/01/2018 10.9  mmol/L Final   • WBC 09/01/2018 7.37  4.50 - 10.70 10*3/mm3 Final   • RBC 09/01/2018 3.04* 4.60 - 6.00 10*6/mm3 Final   • Hemoglobin 09/01/2018 8.2* 13.7 - 17.6 g/dL Final   • Hematocrit 09/01/2018 26.4* 40.4 - 52.2 % Final   • MCV 09/01/2018 86.8  79.8 - 96.2 fL Final   • MCH 09/01/2018 27.0  27.0 - 32.7 pg Final   • MCHC 09/01/2018 31.1* 32.6 - 36.4 g/dL Final   • RDW 09/01/2018 14.4  11.5 - 14.5 % Final   • RDW-SD 09/01/2018 46.0  37.0 - 54.0 fl Final   • MPV 09/01/2018 8.9  6.0 - 12.0 fL Final   • Platelets 09/01/2018 213  140 - 500 10*3/mm3 Final   • Neutrophil % 09/01/2018 74.0  42.7 - 76.0 % Final   • Lymphocyte % 09/01/2018 13.3* 19.6 - 45.3 % Final   • Monocyte % 09/01/2018 10.9  5.0 - 12.0 % Final   • Eosinophil % 09/01/2018 1.5  0.3 - 6.2 % Final   • Basophil % 09/01/2018 0.3  0.0 - 1.5 % Final   • Immature Grans % 09/01/2018 0.3  0.0 - 0.5 % Final   • Neutrophils, Absolute 09/01/2018 5.46  1.90 - 8.10 10*3/mm3 Final   • Lymphocytes, Absolute 09/01/2018 0.98  0.90 - 4.80 10*3/mm3 Final   • Monocytes, Absolute 09/01/2018 0.80  0.20 - 1.20 10*3/mm3 Final   • Eosinophils, Absolute 09/01/2018 0.11  0.00 - 0.70 10*3/mm3 Final   • Basophils, Absolute 09/01/2018 0.02  0.00 - 0.20 10*3/mm3 Final   • Immature Grans, Absolute 09/01/2018 0.02  0.00 - 0.03 10*3/mm3 Final   • Glucose 09/01/2018 158* 70 - 130 mg/dL Final   • Glucose 09/01/2018 241* 70 - 130 mg/dL Final   • Hemoglobin 09/01/2018 8.7* 13.7 - 17.6 g/dL Final   • Hematocrit 09/01/2018 27.3* 40.4 - 52.2 % Final   • Glucose 09/01/2018 184* 70 - 130 mg/dL Final   • Hemoglobin  09/01/2018 8.3* 13.7 - 17.6 g/dL Final   • Hematocrit 09/01/2018 26.4* 40.4 - 52.2 % Final   • proBNP 09/01/2018 390.2  5.0 - 900.0 pg/mL Final   • Glucose 09/01/2018 212* 70 - 130 mg/dL Final   • Glucose 09/02/2018 95  65 - 99 mg/dL Final   • BUN 09/02/2018 23  8 - 23 mg/dL Final   • Creatinine 09/02/2018 1.39* 0.76 - 1.27 mg/dL Final   • Sodium 09/02/2018 139  136 - 145 mmol/L Final   • Potassium 09/02/2018 4.3  3.5 - 5.2 mmol/L Final   • Chloride 09/02/2018 104  98 - 107 mmol/L Final   • CO2 09/02/2018 26.5  22.0 - 29.0 mmol/L Final   • Calcium 09/02/2018 8.2* 8.6 - 10.5 mg/dL Final   • eGFR Non  Amer 09/02/2018 51* >60 mL/min/1.73 Final   • BUN/Creatinine Ratio 09/02/2018 16.5  7.0 - 25.0 Final   • Anion Gap 09/02/2018 8.5  mmol/L Final   • WBC 09/02/2018 6.83  4.50 - 10.70 10*3/mm3 Final   • RBC 09/02/2018 2.70* 4.60 - 6.00 10*6/mm3 Final   • Hemoglobin 09/02/2018 7.3* 13.7 - 17.6 g/dL Final   • Hematocrit 09/02/2018 23.4* 40.4 - 52.2 % Final   • MCV 09/02/2018 86.7  79.8 - 96.2 fL Final   • MCH 09/02/2018 27.0  27.0 - 32.7 pg Final   • MCHC 09/02/2018 31.2* 32.6 - 36.4 g/dL Final   • RDW 09/02/2018 14.6* 11.5 - 14.5 % Final   • RDW-SD 09/02/2018 46.9  37.0 - 54.0 fl Final   • MPV 09/02/2018 9.2  6.0 - 12.0 fL Final   • Platelets 09/02/2018 186  140 - 500 10*3/mm3 Final   • Neutrophil % 09/02/2018 74.2  42.7 - 76.0 % Final   • Lymphocyte % 09/02/2018 13.9* 19.6 - 45.3 % Final   • Monocyte % 09/02/2018 7.8  5.0 - 12.0 % Final   • Eosinophil % 09/02/2018 3.8  0.3 - 6.2 % Final   • Basophil % 09/02/2018 0.3  0.0 - 1.5 % Final   • Immature Grans % 09/02/2018 0.1  0.0 - 0.5 % Final   • Neutrophils, Absolute 09/02/2018 5.07  1.90 - 8.10 10*3/mm3 Final   • Lymphocytes, Absolute 09/02/2018 0.95  0.90 - 4.80 10*3/mm3 Final   • Monocytes, Absolute 09/02/2018 0.53  0.20 - 1.20 10*3/mm3 Final   • Eosinophils, Absolute 09/02/2018 0.26  0.00 - 0.70 10*3/mm3 Final   • Basophils, Absolute 09/02/2018 0.02  0.00 - 0.20  10*3/mm3 Final   • Immature Grans, Absolute 09/02/2018 0.01  0.00 - 0.03 10*3/mm3 Final   • Glucose 09/02/2018 71  70 - 130 mg/dL Final   • Product Code 09/03/2018 [id number]   Final   • Unit Number 09/03/2018 [id number]   Final   • UNIT  ABO 09/03/2018 A   Final   • UNIT  RH 09/03/2018 POS   Final   • Dispense Status 09/03/2018 PT   Final   • Blood Type 09/03/2018 APOS   Final   • Blood Expiration Date 09/03/2018 201809262359   Final   • Blood Type Barcode 09/03/2018 6200   Final   • Product Code 09/03/2018 [id number]   Final   • Unit Number 09/03/2018 [id number]   Final   • UNIT  ABO 09/03/2018 A   Final   • UNIT  RH 09/03/2018 POS   Final   • Dispense Status 09/03/2018 PT   Final   • Blood Type 09/03/2018 APOS   Final   • Blood Expiration Date 09/03/2018 201809262359   Final   • Blood Type Barcode 09/03/2018 6200   Final   • ABO Type 09/02/2018 A   Final   • RH type 09/02/2018 Positive   Final   • Antibody Screen 09/02/2018 Negative   Final   • T&S Expiration Date 09/02/2018 9/5/2018 11:59:59 PM   Final   • Glucose 09/02/2018 177* 70 - 130 mg/dL Final   • Glucose 09/02/2018 222* 70 - 130 mg/dL Final   • Glucose 09/02/2018 207* 70 - 130 mg/dL Final   • Glucose 09/03/2018 135* 70 - 130 mg/dL Final   • Glucose 09/03/2018 251* 70 - 130 mg/dL Final   • Hemoglobin 09/03/2018 9.1* 13.7 - 17.6 g/dL Final   • Hematocrit 09/03/2018 28.6* 40.4 - 52.2 % Final   • Glucose 09/03/2018 205* 70 - 130 mg/dL Final   • Glucose 09/03/2018 134* 70 - 130 mg/dL Final   • Glucose 09/04/2018 58* 65 - 99 mg/dL Final   • BUN 09/04/2018 24* 8 - 23 mg/dL Final   • Creatinine 09/04/2018 1.07  0.76 - 1.27 mg/dL Final   • Sodium 09/04/2018 138  136 - 145 mmol/L Final   • Potassium 09/04/2018 4.3  3.5 - 5.2 mmol/L Final   • Chloride 09/04/2018 102  98 - 107 mmol/L Final   • CO2 09/04/2018 25.4  22.0 - 29.0 mmol/L Final   • Calcium 09/04/2018 8.8  8.6 - 10.5 mg/dL Final   • eGFR Non  Amer 09/04/2018 68  >60 mL/min/1.73 Final   •  BUN/Creatinine Ratio 09/04/2018 22.4  7.0 - 25.0 Final   • Anion Gap 09/04/2018 10.6  mmol/L Final   • Glucose 09/04/2018 48* 70 - 130 mg/dL Final   • Glucose 09/04/2018 71  70 - 130 mg/dL Final   • Glucose 09/04/2018 164* 70 - 130 mg/dL Final       No results found for: URICACID  No results found for: CRYSTAL  Microbiology Results (last 10 days)     ** No results found for the last 240 hours. **        Xr Chest 1 View    Result Date: 9/1/2018   Stable appearance of the chest by portable radiography.  This report was finalized on 9/1/2018 9:04 PM by Oj Cowan M.D.        Discharge and Follow up Instructions:     Total Knee Joint Replacement Discharge Instructions:    I. ACTIVITIES:  1. Exercises:  ? Complete exercise program as taught by the hospital physical therapist 2 times per day  ? Exercise program will be advanced by your home health physical therapist  ? During the day be up ambulating every 2 hours (while awake) for short distances  ? Complete the ankle pump exercises at least 10 times per hour (while awake)  ? Elevate legs most of the day the first week post operatively and thereafter elevate legs when in bed and for at least 30 minutes during the day.   ? Caution must be taken to avoid pillow placement under the bend of the knee as this can led to flexion contractures of the knee. Pillow placement under the heel is encouraged.  ? Use cold packs 20-30 minutes approximately 5 times per day. This should be done before and after completing your exercises and at any time you are experiencing pain/ stiffness in your operative extremity.      2. Activities of Daily Living:  ? No tub baths, hot tubs, or swimming pools for 4 weeks  ? May shower and let water run over the incision on post-operative day #5 if no drainage. Do not scrub or rub the incision. Simply let the water run over the incision and pat dry.    II. Restrictions  ? Do not cross legs or kneel  ? Your surgeon will discuss with you when you will  be able to drive again. Usual guidelines are you are to be off pain medications prior to driving.  ? Weight bearing is as tolerated  ? First week stay inside on even terrain. May go up and down stairs one stair at a time utilizing the hand rail.  ? After one week, you may venture outside.    III. Precautions:  ? Everyone that comes near you should wash their hands  ? No elective dental, genital-urinary, or colon procedures or surgical procedures for 12 weeks after surgery unless absolutely necessary.  ?  If dental work or surgical procedure is deemed absolutely necessary, you will need to contact your surgeon as you will need to take antibiotics 1 hour prior to any dental work (including teeth cleanings).  ? Please discuss with your surgeon prophylactic antibiotics as the length of time this intervention will be necessary for you varies with each patient’s health history and situation.  ? Avoid sick people. If you must be around someone who is ill, they should wear a mask.  ? Avoid visits to the Emergency Room or Urgent Care. If you feel you need to go to the emergency room, please notify your surgeon.    ? Stockings are to be worn for one week after surgery and are to be placed on in the morning and removed at night. Observe your skin when stocking is removed for any problems. Monitor the stockings to ensure that any swelling is not causing the stockings to become too tight. In this case, remove stockings immediately.    IV. INCISION CARE:  ? Wash your hands prior to dressing changes  ? Change the dressing as needed to keep incision clean and dry. Utilize dry gauze and paper tape. Avoid touching the side of the gauze that goes against the incision with your hands.  ? No creams or ointments to the incision  ? May remove dressing once the incision is free of drainage  ? Do not touch or pick at the incision  ? Check incision every day and notify surgeon immediately if any of the following signs or symptoms are  noted:  o Increase in redness  o Increase in swelling around the incision and of the entire extremity  o Increase in pain  o Drainage oozing from the incision  o Pulling apart of the edges of the incision  o Increase in overall body temperature (greater than 100.5 degrees)  ? Your  Staples will be removed between 10-14 days postoperation (9/12/18).  This may be done by either the home health nurse, rehabilitation nurse or during your return visit to Dr. Giang's office.  You will then be instructed on how to care for the incision.  (Please call the office if your staples have not been removed within 14 days after surgery).    V. Medications:   1. Anticoagulants: You will be discharged on an anticoagulant. This is a prophylactic medication that helps prevent blood clots during your post-operative period.  You will be on Aspirin 325 mg twice daily for 21 days. If you were on Aspirin 81 mg prior to surgery hold it and restart once your Aspirin 325 mg is completed.     ? While taking the anticoagulant, you should avoid taking any additional aspirin, ibuprofen (Advil or Motrin), Aleve (Naprosyn) or other non-steroidal anti-inflammatory medications.   ? Notify surgeon immediately if any franklin bleeding is noted in the urine, stool, emesis, or from the nose or the incision. Blood in the stool will often appear as black rather than red. Blood in urine may appear as pink. Blood in emesis may appear as brown/black like coffee grounds.  ? You will need to apply pressure for longer periods of time to any cuts or abrasions to stop bleeding  ? Avoid alcohol while taking anticoagulants    2. Stool Softeners: You will be at greater risk of constipation after surgery due to being less mobile and the pain medications.   ? Take stool softeners as instructed by your surgeon while on pain medications. Over the counter Colace 100 mg 1-2 capsules twice daily.   ? If stools become too loose or too frequent, please decreases the dosage or  stop the stool softener.  ? If constipation occurs despite use of stool softeners, you are to continue the stool softeners and add a laxative (Milk of Magnesia 1 ounce daily as needed).  ? Dulcolax oral tabs or suppository, or a fleets enema can also be utilized for constipation and can be obtained over the counter.   ? If above interventions are unsuccessful in inducing bowel movements, please contact your surgeon's office / family physician's office.  ? Drink plenty of fluids, and eat fruits and vegetables during your recovery time    3. Pain Medications utilized after surgery are narcotics and the law requires that the following information be given to all patients that are prescribed narcotics:  ? CLASSIFICATION: Pain medications are called Opioids and are narcotics  ? LEGALITIES: It is illegal to share narcotics with others and to drive within 24 hours of taking narcotics  ? POTENTIAL SIDE EFFECTS: Potential side effects of opioids include: nausea, vomiting, itching, dizziness, drowsiness, dry mouth, constipation, and difficulty urinating.  ? POTENTIAL ADVERSE EFFECTS:   o Opioid tolerance can develop with use of pain medications and this simply means that it requires more and more of the medication to control pain; however, this is seen more in patients that use opioids for longer periods of time.  o Opioid dependence can develop with use of Opioids and this simply means that to stop the medication can cause withdrawal symptoms; however, this is seen with patients that use Opioids for longer periods of time.  o Opioid addiction can develop with use of Opioids and the incidence of this is very unlikely in patients who take the medications as ordered and stop the medications as instructed.  o Opioid overdose can be dangerous, but is unlikely when the medication is taken as ordered and stopped when ordered. It is important not to mix opioids with alcohol or with and type of sedative such as Benadryl as this can  lead to over sedation and respiratory difficulty.  ? DOSAGE:   o Pain medications will need to be taken consistently for the first week to decrease pain and promote adequate pain relief and participation in physical therapy.  o After the initial surgical pain begins to resolve, you may begin to decrease the pain medication. By the end of 6 weeks, you should be off of pain medications.  o Refills will not be given by the office during evening hours, on weekends, or after 6 weeks post-op.  o To seek refills on pain medications during the initial 6 week post-operative period, you must call the office 48 hours in advance to request the refill. The office will then notify you when to  the prescription. DO NOT wait until you are out of the medication to request a refill.    V. FOLLOW-UP VISITS:  ? You will need to follow up in the office with your surgeon Dr Giang in [facility] on 9/20/18. Please call this number [phone] to schedule this appointment.  ? If you have any concerns or suspected complications prior to your follow up visit, please call your surgeons office. Do not wait until your appointment time if you suspect complications. These will need to be addressed in the office promptly.      Denzel Moscoso MD    CC: Gisela Sotelo, APRN; MD Rahat Reardon, Valente HOFFMANN,*     Addendum:    I have personally examined this patient. I agree with the above findings and treatment  plan.   There was a delay in getting a precert from the insurance  And his discharge was delayed.    Valente Giang MD  9/9/2018

## 2018-09-04 NOTE — PLAN OF CARE
Problem: Patient Care Overview  Goal: Plan of Care Review  Outcome: Ongoing (interventions implemented as appropriate)   09/04/18 1146   Coping/Psychosocial   Plan of Care Reviewed With patient   OTHER   Outcome Summary Pt agreeable to PT, still with c/o pain. Slow progress with PT. He is requiring mod A for all mobility as well as assist with exercises. He is unable to do SLR independently yet. Will continue to benefit from skilled PT.   Plan of Care Review   Progress improving

## 2018-09-04 NOTE — PROGRESS NOTES
[facility]     LOS: 4 days     Name: Chris Ferguson  Age: 72 y.o.  Sex: male  :  1945  MRN: [id number]         Primary Care Physician: Gisela Sotelo APRN    Diet Regular; Consistent Carbohydrate    Subjective   no complaints. states pain is controlled. had hypoglycemia and was symptomatic (diaphroesis). corrected and feeling back to normal now.    Objective   Temp:  [97.2 °F (36.2 °C)-100.3 °F (37.9 °C)] 98.9 °F (37.2 °C)  Heart Rate:  [] 115  Resp:  [16-18] 16  BP: (115-137)/(65-85) 127/85   SpO2:  [91 %-95 %] 93 %  on  Flow (L/min):  [1-2] 2;   Device (Oxygen Therapy): room air  Body mass index is 28.55 kg/m².    Physical Exam   Constitutional: He is oriented to person, place, and time. No distress.   Cardiovascular: Normal rate and regular rhythm.    Pulmonary/Chest: Effort normal and breath sounds normal. No respiratory distress.   Abdominal: Soft. There is no tenderness.   Musculoskeletal:   left knee covered   Neurological: He is alert and oriented to person, place, and time.   Skin: Skin is warm and dry.     Reviewed medications and new clinical results    ARIPiprazole 20 mg Oral QAM   aspirin 325 mg Oral Q12H   docusate sodium 100 mg Oral BID   ferrous sulfate 325 mg Oral Daily With Breakfast   insulin aspart 0-7 Units Subcutaneous 4x Daily With Meals & Nightly   insulin aspart 3 Units Subcutaneous TID With Meals   insulin detemir 10 Units Subcutaneous Nightly   levothyroxine 50 mcg Oral QAM   magnesium oxide 400 mg Oral BID   memantine 5 mg Oral Daily   metoprolol succinate XL 12.5 mg Oral Q12H   sodium chloride 1 g Oral BID   tamsulosin 0.4 mg Oral Nightly     lactated ringers 100 mL/hr Last Rate: 100 mL/hr (18)     Results from last 7 days  Lab Units 18  1208 18  0430 18  2103 18  1639 18  0709   WBC 10*3/mm3  --  6.83  --   --  7.37   HEMOGLOBIN g/dL 9.1* 7.3* 8.3* 8.7* 8.2*   PLATELETS  10*3/mm3  --  186  --   --  213     Results from last 7 days  Lab Units 09/04/18  0301 09/02/18  0430 09/01/18  0709   SODIUM mmol/L 138 139 138   POTASSIUM mmol/L 4.3 4.3 4.2   CHLORIDE mmol/L 102 104 103   CO2 mmol/L 25.4 26.5 24.1   BUN mg/dL 24* 23 20   CREATININE mg/dL 1.07 1.39* 1.24   CALCIUM mg/dL 8.8 8.2* 8.1*   GLUCOSE mg/dL 58* 95 133*     Lab Results   Component Value Date    ANIONGAP 10.6 09/04/2018     Glucose   Date/Time Value Ref Range Status   09/04/2018 0555 164 (H) 70 - 130 mg/dL Final   09/04/2018 0433 71 70 - 130 mg/dL Final   09/04/2018 0414 48 (C) 70 - 130 mg/dL Final   09/03/2018 2057 134 (H) 70 - 130 mg/dL Final   09/03/2018 1536 205 (H) 70 - 130 mg/dL Final   09/03/2018 1042 251 (H) 70 - 130 mg/dL Final   09/03/2018 0616 135 (H) 70 - 130 mg/dL Final   09/02/2018 2100 207 (H) 70 - 130 mg/dL Final     Estimated Creatinine Clearance: 74.8 mL/min (by C-G formula based on SCr of 1.07 mg/dL).    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  · DM 2  · with hypoglycemia reduced levemir  · if discharged likely can resume home PO DM medication (glucotrol) given a1c 7.90 not terrible. could add metformin as creatinine is OK but will defer to PCP  · BPH  · hypothyroidism  · disposition  · per attending  · I discussed the patient's findings and my recommendations with patient and nursing staff.    Reed Aguirre MD   09/04/18  9:11 AM

## 2018-09-04 NOTE — PLAN OF CARE
Problem: Patient Care Overview  Goal: Plan of Care Review  Outcome: Ongoing (interventions implemented as appropriate)   09/04/18 0615   Coping/Psychosocial   Plan of Care Reviewed With patient   OTHER   Outcome Summary VSS, pain tolerable, voiding per urinal, blood sugar low on AM labs-treated and rechecked, KI when OOB, educated on glucose monitoring, rehab today   Plan of Care Review   Progress improving     Goal: Individualization and Mutuality  Outcome: Ongoing (interventions implemented as appropriate)    Goal: Discharge Needs Assessment  Outcome: Ongoing (interventions implemented as appropriate)      Problem: Fall Risk (Adult)  Goal: Identify Related Risk Factors and Signs and Symptoms  Outcome: Outcome(s) achieved Date Met: 09/04/18    Goal: Absence of Fall  Outcome: Ongoing (interventions implemented as appropriate)      Problem: Knee Arthroplasty (Total, Partial) (Adult)  Goal: Signs and Symptoms of Listed Potential Problems Will be Absent, Minimized or Managed (Knee Arthroplasty)  Outcome: Ongoing (interventions implemented as appropriate)    Goal: Anesthesia/Sedation Recovery  Outcome: Outcome(s) achieved Date Met: 09/04/18

## 2018-09-05 ENCOUNTER — APPOINTMENT (OUTPATIENT)
Dept: GENERAL RADIOLOGY | Facility: HOSPITAL | Age: 73
End: 2018-09-05
Attending: HOSPITALIST

## 2018-09-05 ENCOUNTER — APPOINTMENT (OUTPATIENT)
Dept: CARDIOLOGY | Facility: HOSPITAL | Age: 73
End: 2018-09-05
Attending: HOSPITALIST

## 2018-09-05 LAB
GLUCOSE BLDC GLUCOMTR-MCNC: 165 MG/DL (ref 70–130)
GLUCOSE BLDC GLUCOMTR-MCNC: 248 MG/DL (ref 70–130)
GLUCOSE BLDC GLUCOMTR-MCNC: 249 MG/DL (ref 70–130)
GLUCOSE BLDC GLUCOMTR-MCNC: 92 MG/DL (ref 70–130)

## 2018-09-05 PROCEDURE — 63710000001 INSULIN ASPART PER 5 UNITS: Performed by: INTERNAL MEDICINE

## 2018-09-05 PROCEDURE — 63710000001 INSULIN DETEMIR PER 5 UNITS: Performed by: HOSPITALIST

## 2018-09-05 PROCEDURE — 71045 X-RAY EXAM CHEST 1 VIEW: CPT

## 2018-09-05 PROCEDURE — 97150 GROUP THERAPEUTIC PROCEDURES: CPT

## 2018-09-05 PROCEDURE — 63710000001 INSULIN ASPART PER 5 UNITS: Performed by: HOSPITALIST

## 2018-09-05 PROCEDURE — 93970 EXTREMITY STUDY: CPT

## 2018-09-05 PROCEDURE — 97110 THERAPEUTIC EXERCISES: CPT

## 2018-09-05 PROCEDURE — 82962 GLUCOSE BLOOD TEST: CPT

## 2018-09-05 RX ADMIN — HYDROCODONE BITARTRATE AND ACETAMINOPHEN 2 TABLET: 7.5; 325 TABLET ORAL at 12:16

## 2018-09-05 RX ADMIN — LEVOTHYROXINE SODIUM 50 MCG: 50 TABLET ORAL at 06:16

## 2018-09-05 RX ADMIN — INSULIN ASPART 2 UNITS: 100 INJECTION, SOLUTION INTRAVENOUS; SUBCUTANEOUS at 17:22

## 2018-09-05 RX ADMIN — ARIPIPRAZOLE 20 MG: 10 TABLET ORAL at 08:22

## 2018-09-05 RX ADMIN — INSULIN ASPART 3 UNITS: 100 INJECTION, SOLUTION INTRAVENOUS; SUBCUTANEOUS at 08:23

## 2018-09-05 RX ADMIN — DOCUSATE SODIUM 100 MG: 100 CAPSULE, LIQUID FILLED ORAL at 08:22

## 2018-09-05 RX ADMIN — INSULIN ASPART 3 UNITS: 100 INJECTION, SOLUTION INTRAVENOUS; SUBCUTANEOUS at 21:08

## 2018-09-05 RX ADMIN — ASPIRIN 325 MG: 325 TABLET, DELAYED RELEASE ORAL at 08:22

## 2018-09-05 RX ADMIN — METOPROLOL SUCCINATE 25 MG: 25 TABLET, FILM COATED, EXTENDED RELEASE ORAL at 08:22

## 2018-09-05 RX ADMIN — FERROUS SULFATE TAB 325 MG (65 MG ELEMENTAL FE) 325 MG: 325 (65 FE) TAB at 08:22

## 2018-09-05 RX ADMIN — HYDROCODONE BITARTRATE AND ACETAMINOPHEN 1 TABLET: 7.5; 325 TABLET ORAL at 21:08

## 2018-09-05 RX ADMIN — INSULIN ASPART 3 UNITS: 100 INJECTION, SOLUTION INTRAVENOUS; SUBCUTANEOUS at 12:17

## 2018-09-05 RX ADMIN — MEMANTINE HYDROCHLORIDE 5 MG: 5 TABLET, FILM COATED ORAL at 08:21

## 2018-09-05 RX ADMIN — INSULIN ASPART 3 UNITS: 100 INJECTION, SOLUTION INTRAVENOUS; SUBCUTANEOUS at 12:18

## 2018-09-05 RX ADMIN — INSULIN DETEMIR 10 UNITS: 100 INJECTION, SOLUTION SUBCUTANEOUS at 21:08

## 2018-09-05 RX ADMIN — ASPIRIN 325 MG: 325 TABLET, DELAYED RELEASE ORAL at 21:08

## 2018-09-05 RX ADMIN — Medication 1 G: at 21:08

## 2018-09-05 RX ADMIN — INSULIN ASPART 3 UNITS: 100 INJECTION, SOLUTION INTRAVENOUS; SUBCUTANEOUS at 17:22

## 2018-09-05 RX ADMIN — Medication 400 MG: at 21:08

## 2018-09-05 RX ADMIN — TAMSULOSIN HYDROCHLORIDE 0.4 MG: 0.4 CAPSULE ORAL at 21:08

## 2018-09-05 RX ADMIN — Medication 400 MG: at 08:21

## 2018-09-05 RX ADMIN — HYDROCODONE BITARTRATE AND ACETAMINOPHEN 2 TABLET: 7.5; 325 TABLET ORAL at 06:16

## 2018-09-05 RX ADMIN — Medication 1 G: at 08:22

## 2018-09-05 NOTE — PLAN OF CARE
Problem: Patient Care Overview  Goal: Plan of Care Review  Outcome: Ongoing (interventions implemented as appropriate)   09/05/18 1954   Coping/Psychosocial   Plan of Care Reviewed With patient   OTHER   Outcome Summary Pt VSS, POD 5, pt noted to have some slight confusion this afternoon, O2sats 87-90 on RA, replaced O2 at 2L NC, CXR ordered, bilat dopplers done, labs for am. Awaiting precert for [facility].   Plan of Care Review   Progress improving       Problem: Fall Risk (Adult)  Goal: Absence of Fall  Outcome: Ongoing (interventions implemented as appropriate)

## 2018-09-05 NOTE — THERAPY TREATMENT NOTE
Acute Care - Physical Therapy Treatment Note  [facility]     Patient Name: Chris Ferguson  : 1945  MRN: [id number]  Today's Date: 2018  Onset of Illness/Injury or Date of Surgery: 18  Date of Referral to PT: 18  Referring Physician: Dr. Giang    Admit Date: 2018    Visit Dx:    ICD-10-CM ICD-9-CM   1. Impaired functional mobility, balance, gait, and endurance Z74.09 V49.89   2. 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, left   • Essential hypertension   • DM (diabetes mellitus), type 2 (CMS/HCC)   • BPH (benign prostatic hyperplasia)   • Hypothyroidism   • Postoperative anemia due to acute blood loss   • Tachycardia       Therapy Treatment          Rehabilitation Treatment Summary     Row Name 18 1000             Treatment Time/Intention    Discipline physical therapy assistant  -CW      Document Type therapy note (daily note)  -CW      Subjective Information complains of;weakness;fatigue;pain  -CW      Mode of Treatment physical therapy  -CW      Therapy Frequency (PT Clinical Impression) 2 times/day  -CW      Patient Effort adequate  -CW      Existing Precautions/Restrictions brace worn when out of bed;fall  -CW      Recorded by [CW] Marty Peterson, PTA 18 1056      Row Name 18 1000             Vital Signs    O2 Delivery Pre Treatment room air  -CW      Recorded by [CW] Marty Peterson, PTA 18 1056      Row Name 18 1000             Cognitive Assessment/Intervention- PT/OT    Orientation Status (Cognition) oriented x 4  -CW      Follows Commands (Cognition) WFL  -CW      Safety Deficit (Cognitive) mild deficit  -CW      Personal Safety Interventions fall prevention program maintained;gait belt;muscle strengthening facilitated;nonskid shoes/slippers when out of bed  -CW      Recorded by  [CW] Marty Peterson, PTA 09/05/18 1056      Row Name 09/05/18 1000             Mobility Assessment/Intervention    Extremity Weight-bearing Status left lower extremity  -CW      Left Lower Extremity (Weight-bearing Status) weight-bearing as tolerated (WBAT)  -CW      Recorded by [CW] Marty Peterson, PTA 09/05/18 1056      Row Name 09/05/18 1000             Bed Mobility Assessment/Treatment    Supine-Sit [place] (Bed Mobility) not tested  -CW      Comment (Bed Mobility) in chair  -CW      Recorded by [CW] Marty Peterson, PTA 09/05/18 1056      Row Name 09/05/18 1000             Transfer Assessment/Treatment    Transfer Assessment/Treatment sit-stand transfer;stand-sit transfer  -CW      Recorded by [CW] Marty Peterson, PTA 09/05/18 1056      Row Name 09/05/18 1000             Sit-Stand Transfer    Sit-Stand [place] (Transfers) minimum assist (75% patient effort)  -CW      Assistive Device (Sit-Stand Transfers) walker, front-wheeled  -CW      Recorded by [CW] Marty Peterson, PTA 09/05/18 1056      Row Name 09/05/18 1000             Stand-Sit Transfer    Stand-Sit [place] (Transfers) minimum assist (75% patient effort)  -CW      Assistive Device (Stand-Sit Transfers) walker, front-wheeled  -CW      Recorded by [CW] Marty Peterson, PTA 09/05/18 1056      Row Name 09/05/18 1000             Gait/Stairs Assessment/Training    [place] Level (Gait) contact guard  -CW      Assistive Device (Gait) walker, front-wheeled  -CW      Distance in Feet (Gait) 40  -CW      Pattern (Gait) step-to  -CW      Deviations/Abnormal Patterns (Gait) antalgic;scar decreased;stride length decreased  -CW      Bilateral Gait Deviations forward flexed posture;heel strike decreased  -CW      Recorded by [CW] Marty Peterson, PTA 09/05/18 1056      Row Name 09/05/18 1000             General ROM    GENERAL ROM COMMENTS 5-70  -CW      Recorded by [CW] Marty Peterson, PTA 09/05/18 1056       Row Name 09/05/18 1000             Therapeutic Exercise    Comment (Therapeutic Exercise) L TKR Protocol 15 Reps  -CW      Recorded by [CW] Marty Peterson, PTA 09/05/18 1056      Row Name 09/05/18 1000             Positioning and Restraints    Pre-Treatment Position sitting in chair/recliner  -CW      Post Treatment Position chair  -CW      In Chair notified nsg;reclined;call light within reach;encouraged to call for assist  -CW      Recorded by [CW] Marty Peterson, PTA 09/05/18 1056      Row Name 09/05/18 1000             Pain Assessment    Additional Documentation Pain Scale: Numbers Pre/Post-Treatment (Group)  -CW      Recorded by [CW] Marty Peterson, PTA 09/05/18 1056      Row Name 09/05/18 1000             Pain Scale: Numbers Pre/Post-Treatment    Pain Scale: Numbers, Pretreatment 8/10  -CW      Pain Scale: Numbers, Post-Treatment 8/10  -CW      Pain Location - Side Left  -CW      Pain Location - Orientation incisional  -CW      Pain Location knee  -CW      Pain Intervention(s) Repositioned;Ambulation/increased activity  -CW      Recorded by [CW] Marty Peterson, PTA 09/05/18 1056      Row Name                Wound 08/31/18 1241 Left knee incision    Wound - Properties Group Date first assessed: 08/31/18 [EG] Time first assessed: 1241 [EG] Side: Left [EG] Location: knee [EG] Type: incision [EG] Recorded by:  [EG] Mery Jc RN 08/31/18 1241    Row Name 09/05/18 1000             Outcome Summary/Treatment Plan (PT)    Anticipated Discharge Disposition (PT) home with home health;skilled nursing facility  -CW      Recorded by [CW] Marty Peterson, PTA 09/05/18 1056        User Key  (r) = Recorded By, (t) = Taken By, (c) = Cosigned By    Initials Name Effective Dates Discipline    CW Marty Peterson, PTA 03/07/18 -  PT    EG Mery Jc RN 07/10/17 -  Nurse          Wound 08/31/18 1241 Left knee incision (Active)   Dressing Appearance dried drainage;intact 9/5/2018  8:45 AM    Closure GENE 9/5/2018  8:45 AM   Base dressing in place, unable to visualize 9/5/2018  8:45 AM   Drainage Amount small 9/5/2018  8:45 AM             Physical Therapy Education     Title: PT OT SLP Therapies (Done)     Topic: Physical Therapy (Done)     Point: Mobility training (Done)    Learning Progress Summary     Learner Status Readiness Method Response Comment Documented by    Patient Done Acceptance E,TB VU,JESSE   09/05/18 1056     Done Acceptance E,TB,D VU,[facility] 09/04/18 1148     Done Acceptance E,TB,D VU,[facility] 09/03/18 0943     Done Acceptance E,TB,D VU,[facility] 09/02/18 1026     Done Acceptance E,TB,D VU,[facility] 09/01/18 1453     Active Acceptance E,D [facility] 09/01/18 1106     Done Acceptance E VU  MA 08/31/18 1618          Point: Home exercise program (Done)    Learning Progress Summary     Learner Status Readiness Method Response Comment Documented by    Patient Done Acceptance E,TB VU,JESSE   09/05/18 1056     Done Acceptance E,TB,D VU,[facility] 09/04/18 1148     Done Acceptance E,TB,D VU,[facility] 09/03/18 0943     Done Acceptance E,TB,D VU,[facility] 09/02/18 1026     Done Acceptance E,TB,D VU,[facility] 09/01/18 1453     Active Acceptance E,D [facility] 09/01/18 1106     Done Acceptance E VU  MA 08/31/18 1618          Point: Body mechanics (Done)    Learning Progress Summary     Learner Status Readiness Method Response Comment Documented by    Patient Done Acceptance E,TB VU,[facility] 09/05/18 1056     Done Acceptance E,TB,D VU,[facility] 09/04/18 1148     Done Acceptance E,TB,D VU,[facility] 09/03/18 0943     Done Acceptance E,TB,D VU,[facility] 09/02/18 1026     Done Acceptance E,TB,D VU,[facility] 09/01/18 1453     Active Acceptance E,D [facility] 09/01/18 1106     Done Acceptance E VU  MA 08/31/18 1618          Point: Precautions (Done)    Learning Progress Summary     Learner Status Readiness Method Response Comment Documented by    Patient Done Acceptance E,TB VU,JESSE   09/05/18 1056     Done Acceptance E,TB,D VU,[facility] 09/04/18 1148      Done Acceptance E,TB,D VU,NR   09/03/18 0943     Done Acceptance E,TB,D VU,NR   09/02/18 1026     Done Acceptance E,TB,D VU,NR   09/01/18 1453     Active Acceptance E,D NR   09/01/18 1106     Done Acceptance E VU  MA 08/31/18 1618                      User Key     Initials Effective Dates Name Provider Type Discipline     04/03/18 -  Shea Santoro, PT Physical Therapist PT     03/07/18 -  Leila Hunter, PTA Physical Therapy Assistant PT    MA 04/03/18 -  Bridget Dennis, PT Physical Therapist PT     03/07/18 -  Marty Peterson, PTA Physical Therapy Assistant PT                    PT Recommendation and Plan  Anticipated Discharge Disposition (PT): home with home health, skilled nursing facility  Therapy Frequency (PT Clinical Impression): 2 times/day  Outcome Summary/Treatment Plan (PT)  Anticipated Discharge Disposition (PT): home with home health, skilled nursing facility  Plan of Care Reviewed With: patient  Progress: improving  Outcome Summary: Pt increaisng with strength in the L LE and improved transadfer safety and I to RWX and activity tolerancew          Outcome Measures     Row Name 09/05/18 1000 09/04/18 1100 09/03/18 0900       How much help from another person do you currently need...    Turning from your back to your side while in flat bed without using bedrails? 3  -CW 3  -EJ 3  -SM    Moving from lying on back to sitting on the side of a flat bed without bedrails? 3  -CW 2  -EJ 3  -SM    Moving to and from a bed to a chair (including a wheelchair)? 3  -CW 2  -EJ 2  -SM    Standing up from a chair using your arms (e.g., wheelchair, bedside chair)? 3  -CW 2  -EJ 2  -SM    Climbing 3-5 steps with a railing? 1  -CW 1  -EJ 2  -SM    To walk in hospital room? 3  -CW 2  -EJ 2  -SM    AM-PAC 6 Clicks Score 16  -CW 12  -EJ 14  -SM       Functional Assessment    Outcome Measure Options AM-PAC 6 Clicks Basic Mobility (PT)  -CW AM-PAC 6 Clicks Basic Mobility (PT)  -EJ AM-PAC 6  Clicks Basic Mobility (PT)  -      User Key  (r) = Recorded By, (t) = Taken By, (c) = Cosigned By    Initials Name Provider Type    Shea Roldan, PT Physical Therapist    Leila Cooney, PTA Physical Therapy Assistant    CW Marty Peterson PTA Physical Therapy Assistant           Time Calculation:         PT Charges     Row Name 09/05/18 1057             Time Calculation    Start Time 0940  -CW      Stop Time 1030  -CW      Time Calculation (min) 50 min  -CW      PT Received On 09/05/18  -CW      PT - Next Appointment 09/05/18  -CW        User Key  (r) = Recorded By, (t) = Taken By, (c) = Cosigned By    Initials Name Provider Type    Marty Aldridge PTA Physical Therapy Assistant        Therapy Suggested Charges     Code   Minutes Charges    None           Therapy Charges for Today     Code Description Service Date Service Provider Modifiers Qty    68421470544 HC PT THER PROC EA 15 MIN 9/4/2018 Marty Peterson, DONOVAN GP 1    61928287440 HC PT THER PROC GROUP 9/5/2018 Marty Peterson PTA GP 1    29767976102 HC PT THER PROC EA 15 MIN 9/5/2018 Marty Peterson PTA GP 1          PT G-Codes  Outcome Measure Options: AM-PAC 6 Clicks Basic Mobility (PT)  AM-PAC 6 Clicks Score: 16  Score: 9    Marty Peterson PTA  9/5/2018

## 2018-09-05 NOTE — PROGRESS NOTES
Orthopedic Progress Note      Patient: Chris Ferguson  YOB: 1945     Date of Admission: 8/31/2018  6:41 AM Medical Record Number: [id number]     Attending Physician: Valente Giang,*    Status Post:  TOTAL KNEE ARTHROPLASTY  complex distal femur replacement   removal of plate  Post Operative Day Number: 5    Subjective : No new orthopaedic complaints     Pain Relief: some relief with present medication.     Systemic Complaints: No Complaints  Vitals:    09/04/18 1925 09/04/18 2300 09/05/18 0300 09/05/18 0742   BP: 114/69 114/67 134/82 115/67   BP Location: Right arm Right arm Right arm Right arm   Patient Position: Lying Lying Lying Sitting   Pulse: 98 105 108 108   Resp: 16 16 16 16   Temp: 99 °F (37.2 °C) 99.3 °F (37.4 °C) 98.9 °F (37.2 °C) 97.9 °F (36.6 °C)   TempSrc:    Oral   SpO2: 96% 94% 94% 93%   Weight:       Height:           Physical Exam: 72 y.o. male    General Appearance:       Alert, cooperative, in no acute distress                  Extremities:    Dressing Clean, Dry and Intact         Incision healthy without signs or symptoms of infections         No clinical sign of DVT        Able to do good movements of digits    Pulses:   Pulses palpable and equal bilaterally           Diagnostic Tests:       Results from last 7 days  Lab Units 09/03/18  1208 09/02/18  0430 09/01/18  2103  09/01/18  0709   WBC 10*3/mm3  --  6.83  --   --  7.37   HEMOGLOBIN g/dL 9.1* 7.3* 8.3*  < > 8.2*   HEMATOCRIT % 28.6* 23.4* 26.4*  < > 26.4*   PLATELETS 10*3/mm3  --  186  --   --  213   < > = values in this interval not displayed.    Results from last 7 days  Lab Units 09/04/18  0301 09/02/18  0430 09/01/18  0709   SODIUM mmol/L 138 139 138   POTASSIUM mmol/L 4.3 4.3 4.2   CHLORIDE mmol/L 102 104 103   CO2 mmol/L 25.4 26.5 24.1   BUN mg/dL 24* 23 20   CREATININE mg/dL 1.07 1.39* 1.24   GLUCOSE mg/dL 58* 95 133*   CALCIUM mg/dL 8.8 8.2* 8.1*         No results found for: URICACID  No results found  for: CRYSTAL  Microbiology Results (last 10 days)     ** No results found for the last 240 hours. **        Xr Chest 1 View    Result Date: 9/1/2018   Stable appearance of the chest by portable radiography.  This report was finalized on 9/1/2018 9:04 PM by Oj Cowan M.D.          Current Medications:  Scheduled Meds:  ARIPiprazole 20 mg Oral QAM   aspirin 325 mg Oral Q12H   docusate sodium 100 mg Oral BID   ferrous sulfate 325 mg Oral Daily With Breakfast   insulin aspart 0-7 Units Subcutaneous 4x Daily With Meals & Nightly   insulin aspart 3 Units Subcutaneous TID With Meals   insulin detemir 10 Units Subcutaneous Nightly   levothyroxine 50 mcg Oral QAM   magnesium oxide 400 mg Oral BID   memantine 5 mg Oral Daily   metoprolol succinate XL 25 mg Oral QAM   sodium chloride 1 g Oral BID   tamsulosin 0.4 mg Oral Nightly     Continuous Infusions:  lactated ringers 100 mL/hr Last Rate: 100 mL/hr (08/31/18 1812)     PRN Meds:.•  acetaminophen  •  bisacodyl  •  bisacodyl  •  dextrose  •  dextrose  •  diphenhydrAMINE **OR** diphenhydrAMINE  •  glucagon (human recombinant)  •  HYDROcodone-acetaminophen  •  HYDROcodone-acetaminophen  •  melatonin  •  ondansetron **OR** ondansetron ODT **OR** ondansetron  •  traZODone    Assessment: Status post  TOTAL KNEE ARTHROPLASTY  complex distal femur replacement   removal of plate     Patient Active Problem List   Diagnosis   • 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 Awaiting precert    Valente Giang MD    Date: 9/5/2018    Time: 7:52 AM

## 2018-09-05 NOTE — PLAN OF CARE
Problem: Patient Care Overview  Goal: Plan of Care Review  Outcome: Ongoing (interventions implemented as appropriate)   09/05/18 1056   Coping/Psychosocial   Plan of Care Reviewed With patient   OTHER   Outcome Summary Pt increasing with strength in the L LE and improved transfer safety and I to RWX and activity tolerance   Plan of Care Review   Progress improving

## 2018-09-05 NOTE — PROGRESS NOTES
BLEV doppler completed.  Preliminary results:  Negative for DVT and STP in bilateral lower extremities.  Results given to RUBY Pena.

## 2018-09-05 NOTE — PLAN OF CARE
Problem: Patient Care Overview  Goal: Plan of Care Review  Outcome: Ongoing (interventions implemented as appropriate)   09/05/18 0151   Coping/Psychosocial   Plan of Care Reviewed With patient   OTHER   Outcome Summary patient ambulating with assistance, pain controlled at this time, resting comfortably through night, educated on b/p and glucose monitoring   Plan of Care Review   Progress improving     Goal: Individualization and Mutuality  Outcome: Ongoing (interventions implemented as appropriate)    Goal: Discharge Needs Assessment  Outcome: Ongoing (interventions implemented as appropriate)    Goal: Interprofessional Rounds/Family Conf  Outcome: Ongoing (interventions implemented as appropriate)      Problem: Fall Risk (Adult)  Goal: Absence of Fall  Outcome: Ongoing (interventions implemented as appropriate)      Problem: Knee Arthroplasty (Total, Partial) (Adult)  Goal: Signs and Symptoms of Listed Potential Problems Will be Absent, Minimized or Managed (Knee Arthroplasty)  Outcome: Ongoing (interventions implemented as appropriate)

## 2018-09-05 NOTE — THERAPY TREATMENT NOTE
Acute Care - Physical Therapy Treatment Note  [facility]     Patient Name: Chris Ferguson  : 1945  MRN: [id number]  Today's Date: 2018  Onset of Illness/Injury or Date of Surgery: 18  Date of Referral to PT: 18  Referring Physician: Dr. Giang    Admit Date: 2018    Visit Dx:    ICD-10-CM ICD-9-CM   1. Impaired functional mobility, balance, gait, and endurance Z74.09 V49.89   2. 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, left   • Essential hypertension   • DM (diabetes mellitus), type 2 (CMS/HCC)   • BPH (benign prostatic hyperplasia)   • Hypothyroidism   • Postoperative anemia due to acute blood loss   • Tachycardia       Therapy Treatment          Rehabilitation Treatment Summary     Row Name 18 1400 18 1000          Treatment Time/Intention    Discipline physical therapy assistant  -CW physical therapy assistant  -CW     Document Type therapy note (daily note)  -CW therapy note (daily note)  -CW     Subjective Information complains of;weakness;fatigue;pain  -CW complains of;weakness;fatigue;pain  -CW     Mode of Treatment physical therapy  -CW physical therapy  -CW     Therapy Frequency (PT Clinical Impression) 2 times/day  -CW 2 times/day  -CW     Patient Effort adequate  -CW adequate  -CW     Existing Precautions/Restrictions brace worn when out of bed;fall  -CW brace worn when out of bed;fall  -CW     Recorded by [CW] Marty Peterson, PTA 18 1418 [CW] Marty Peterson, PTA 18 1056     Row Name 18 1400 18 1000          Vital Signs    O2 Delivery Pre Treatment room air  -CW room air  -CW     Recorded by [CW] Marty Peterson, PTA 18 1418 [CW] Marty Peterson, PTA 18 1056     Row Name 18 1400 18 1000          Cognitive Assessment/Intervention- PT/OT     Orientation Status (Cognition) oriented x 4  -CW oriented x 4  -CW     Follows Commands (Cognition) WFL  -CW WFL  -CW     Safety Deficit (Cognitive) mild deficit  -CW mild deficit  -CW     Personal Safety Interventions fall prevention program maintained;gait belt;muscle strengthening facilitated;nonskid shoes/slippers when out of bed  -CW fall prevention program maintained;gait belt;muscle strengthening facilitated;nonskid shoes/slippers when out of bed  -CW     Recorded by [CW] Marty Peterson, PTA 09/05/18 1418 [CW] Marty Peterson, PTA 09/05/18 1056     Row Name 09/05/18 1400 09/05/18 1000          Mobility Assessment/Intervention    Extremity Weight-bearing Status left lower extremity  -CW left lower extremity  -CW     Left Lower Extremity (Weight-bearing Status) weight-bearing as tolerated (WBAT)  -CW weight-bearing as tolerated (WBAT)  -CW     Recorded by [CW] Marty Peterson, PTA 09/05/18 1418 [CW] Marty Peterson, PTA 09/05/18 1056     Row Name 09/05/18 1400 09/05/18 1000          Bed Mobility Assessment/Treatment    Supine-Sit [place] (Bed Mobility) not tested  -CW not tested  -CW     Comment (Bed Mobility)  -- in chair  -CW     Recorded by [CW] Marty Peterson, PTA 09/05/18 1418 [CW] Marty Peterson, PTA 09/05/18 1056     Row Name 09/05/18 1400 09/05/18 1000          Transfer Assessment/Treatment    Transfer Assessment/Treatment sit-stand transfer;stand-sit transfer  -CW sit-stand transfer;stand-sit transfer  -CW     Recorded by [CW] Marty Peterson, PTA 09/05/18 1418 [CW] Marty Peterson, PTA 09/05/18 1056     Row Name 09/05/18 1400 09/05/18 1000          Sit-Stand Transfer    Sit-Stand [place] (Transfers) minimum assist (75% patient effort)  -CW minimum assist (75% patient effort)  -CW     Assistive Device (Sit-Stand Transfers) walker, front-wheeled  -CW walker, front-wheeled  -CW     Recorded by [CW] Marty Peterson, PTA 09/05/18 1418 [CW] Marty Peterson  P, PTA 09/05/18 1056     Row Name 09/05/18 1400 09/05/18 1000          Stand-Sit Transfer    Stand-Sit [place] (Transfers) minimum assist (75% patient effort)  -CW minimum assist (75% patient effort)  -CW     Assistive Device (Stand-Sit Transfers) walker, front-wheeled  -CW walker, front-wheeled  -CW     Recorded by [CW] Marty Peterson, PTA 09/05/18 1418 [CW] Marty Peterson, PTA 09/05/18 1056     Row Name 09/05/18 1400 09/05/18 1000          Gait/Stairs Assessment/Training    [place] Level (Gait) contact guard  -CW contact guard  -CW     Assistive Device (Gait) walker, front-wheeled  -CW walker, front-wheeled  -CW     Distance in Feet (Gait) 40  -CW 40  -CW     Pattern (Gait) step-to  -CW step-to  -CW     Deviations/Abnormal Patterns (Gait) antalgic;scar decreased;stride length decreased  -CW antalgic;scar decreased;stride length decreased  -CW     Bilateral Gait Deviations forward flexed posture;heel strike decreased  -CW forward flexed posture;heel strike decreased  -CW     Recorded by [CW] Marty Peterson, PTA 09/05/18 1418 [CW] Marty Peterson, PTA 09/05/18 1056     Row Name 09/05/18 1000             General ROM    GENERAL ROM COMMENTS 5-70  -CW      Recorded by [CW] Marty Peterson, PTA 09/05/18 1056      Row Name 09/05/18 1400 09/05/18 1000          Therapeutic Exercise    Comment (Therapeutic Exercise) L TKR Protocol Reps 20  -CW L TKR Protocol 15 Reps  -CW     Recorded by [CW] Marty Peterson, PTA 09/05/18 1418 [CW] Marty Peterson, PTA 09/05/18 1056     Row Name 09/05/18 1400 09/05/18 1000          Positioning and Restraints    Pre-Treatment Position sitting in chair/recliner  -CW sitting in chair/recliner  -CW     Post Treatment Position chair  -CW chair  -CW     In Chair notified nsg;reclined;call light within reach;encouraged to call for assist  -CW notified nsg;reclined;call light within reach;encouraged to call for assist  -CW     Recorded by [CW]  Marty Peterson P, PTA 09/05/18 1418 [CW] Marty Peterson P, PTA 09/05/18 1056     Row Name 09/05/18 1400 09/05/18 1000          Pain Assessment    Additional Documentation Pain Scale: Numbers Pre/Post-Treatment (Group)  -CW Pain Scale: Numbers Pre/Post-Treatment (Group)  -CW     Recorded by [CW] Marty Peterson P, PTA 09/05/18 1418 [CW] Marty Peterson, PTA 09/05/18 1056     Row Name 09/05/18 1400 09/05/18 1000          Pain Scale: Numbers Pre/Post-Treatment    Pain Scale: Numbers, Pretreatment 8/10  -CW 8/10  -CW     Pain Scale: Numbers, Post-Treatment 8/10  -CW 8/10  -CW     Pain Location - Side Left  -CW Left  -CW     Pain Location - Orientation incisional  -CW incisional  -CW     Pain Location knee  -CW knee  -CW     Pain Intervention(s)  -- Repositioned;Ambulation/increased activity  -CW     Recorded by [CW] Marty Peterson, PTA 09/05/18 1418 [CW] Marty Peterson, PTA 09/05/18 1056     Row Name                Wound 08/31/18 1241 Left knee incision    Wound - Properties Group Date first assessed: 08/31/18 [EG] Time first assessed: 1241 [EG] Side: Left [EG] Location: knee [EG] Type: incision [EG] Recorded by:  [EG] Mery Jc RN 08/31/18 1241    Row Name 09/05/18 1400 09/05/18 1000          Outcome Summary/Treatment Plan (PT)    Anticipated Discharge Disposition (PT) home with home health;skilled nursing facility  -CW home with home health;skilled nursing facility  -CW     Recorded by [CW] Marty Peterson, PTA 09/05/18 1418 [CW] Marty Peterson, PTA 09/05/18 1056       User Key  (r) = Recorded By, (t) = Taken By, (c) = Cosigned By    Initials Name Effective Dates Discipline    CW Marty Peterson, PTA 03/07/18 -  PT    EG Mery Jc RN 07/10/17 -  Nurse          Wound 08/31/18 1241 Left knee incision (Active)   Dressing Appearance dried drainage;intact 9/5/2018 12:16 PM   Closure GENE 9/5/2018 12:16 PM   Base dressing in place, unable to visualize 9/5/2018 12:16 PM    Drainage Amount scant 9/5/2018 12:16 PM             Physical Therapy Education     Title: PT OT SLP Therapies (Done)     Topic: Physical Therapy (Done)     Point: Mobility training (Done)    Learning Progress Summary     Learner Status Readiness Method Response Comment Documented by    Patient Done Acceptance E,TB VU,JESSE   09/05/18 1056     Done Acceptance E,TB,D VU,NR   09/04/18 1148     Done Acceptance E,TB,D VU,[facility] 09/03/18 0943     Done Acceptance E,TB,D VU,[facility] 09/02/18 1026     Done Acceptance E,TB,D VU,[facility] 09/01/18 1453     Active Acceptance E,D NR   09/01/18 1106     Done Acceptance E VU  MA 08/31/18 1618          Point: Home exercise program (Done)    Learning Progress Summary     Learner Status Readiness Method Response Comment Documented by    Patient Done Acceptance E,TB VU,[facility] 09/05/18 1056     Done Acceptance E,TB,D VU,[facility] 09/04/18 1148     Done Acceptance E,TB,D VU,[facility] 09/03/18 0943     Done Acceptance E,TB,D VU,[facility] 09/02/18 1026     Done Acceptance E,TB,D VU,[facility] 09/01/18 1453     Active Acceptance E,D [facility] 09/01/18 1106     Done Acceptance E VU  MA 08/31/18 1618          Point: Body mechanics (Done)    Learning Progress Summary     Learner Status Readiness Method Response Comment Documented by    Patient Done Acceptance E,TB VU,DU   09/05/18 1056     Done Acceptance E,TB,D VU,[facility] 09/04/18 1148     Done Acceptance E,TB,D VU,[facility] 09/03/18 0943     Done Acceptance E,TB,D VU,[facility] 09/02/18 1026     Done Acceptance E,TB,D VU,[facility] 09/01/18 1453     Active Acceptance E,D [facility] 09/01/18 1106     Done Acceptance E VU  MA 08/31/18 1618          Point: Precautions (Done)    Learning Progress Summary     Learner Status Readiness Method Response Comment Documented by    Patient Done Acceptance E,TB VU,JESSE   09/05/18 1056     Done Acceptance E,TB,D VU,[facility] 09/04/18 1148     Done Acceptance E,TB,D VU,[facility] 09/03/18 0943     Done Acceptance E,TB,D KARTHIK ROJO   09/02/18  1026     Done Acceptance E,TB,D VU,NR   09/01/18 1453     Active Acceptance E,D NR   09/01/18 1106     Done Acceptance E ALIA  MA 08/31/18 1618                      User Key     Initials Effective Dates Name Provider Type Discipline     04/03/18 -  Shea Santoro, PT Physical Therapist PT     03/07/18 -  Leila Hunter, PTA Physical Therapy Assistant PT    MA 04/03/18 -  Bridget Dennis, PT Physical Therapist PT     03/07/18 -  Marty Peterson, PTA Physical Therapy Assistant PT                    PT Recommendation and Plan  Anticipated Discharge Disposition (PT): home with home health, skilled nursing facility  Therapy Frequency (PT Clinical Impression): 2 times/day  Outcome Summary/Treatment Plan (PT)  Anticipated Discharge Disposition (PT): home with home health, skilled nursing facility  Plan of Care Reviewed With: patient  Progress: improving  Outcome Summary: Pt increaisng with strength in the L LE and improved transadfer safety and I to RWX and activity tolerancew          Outcome Measures     Row Name 09/05/18 1000 09/04/18 1100 09/03/18 0900       How much help from another person do you currently need...    Turning from your back to your side while in flat bed without using bedrails? 3  -CW 3  -EJ 3  -SM    Moving from lying on back to sitting on the side of a flat bed without bedrails? 3  -CW 2  -EJ 3  -SM    Moving to and from a bed to a chair (including a wheelchair)? 3  -CW 2  -EJ 2  -SM    Standing up from a chair using your arms (e.g., wheelchair, bedside chair)? 3  -CW 2  -EJ 2  -SM    Climbing 3-5 steps with a railing? 1  -CW 1  -EJ 2  -SM    To walk in hospital room? 3  -CW 2  -EJ 2  -SM    AM-PAC 6 Clicks Score 16  -CW 12  -EJ 14  -SM       Functional Assessment    Outcome Measure Options AM-PAC 6 Clicks Basic Mobility (PT)  -CW AM-PAC 6 Clicks Basic Mobility (PT)  -EJ AM-PAC 6 Clicks Basic Mobility (PT)  -SM      User Key  (r) = Recorded By, (t) = Taken By, (c) = Cosigned  By    Initials Name Provider Type    EJ Shea Santoro, PT Physical Therapist    Leila Cooney, PTA Physical Therapy Assistant    CW Marty Peterson, DONOVAN Physical Therapy Assistant           Time Calculation:         PT Charges     Row Name 09/05/18 1448 09/05/18 1057          Time Calculation    Start Time 1415  -CW 0940  -CW     Stop Time 1448  -CW 1030  -CW     Time Calculation (min) 33 min  -CW 50 min  -CW     PT Received On 09/05/18  -CW 09/05/18  -CW     PT - Next Appointment 09/06/18  -CW 09/05/18  -CW       User Key  (r) = Recorded By, (t) = Taken By, (c) = Cosigned By    Initials Name Provider Type    Marty Aldridge, PTA Physical Therapy Assistant        Therapy Suggested Charges     Code   Minutes Charges    None           Therapy Charges for Today     Code Description Service Date Service Provider Modifiers Qty    62280867206 HC PT THER PROC EA 15 MIN 9/4/2018 Marty Peterson, PTA GP 1    71487651132 HC PT THER PROC GROUP 9/5/2018 Marty Peterson, PTA GP 1    01986210489 HC PT THER PROC EA 15 MIN 9/5/2018 Marty Peterson, PTA GP 1    26830873500 HC PT THER PROC GROUP 9/5/2018 Marty Peterson, PTA GP 1    86254067401 HC PT THER PROC EA 15 MIN 9/5/2018 Marty Peterson, PTA GP 1          PT G-Codes  Outcome Measure Options: AM-PAC 6 Clicks Basic Mobility (PT)  AM-PAC 6 Clicks Score: 16  Score: 9    Marty Peterson PTA  9/5/2018

## 2018-09-05 NOTE — PROGRESS NOTES
[facility]     LOS: 5 days     Name: Chris Ferguson  Age: 72 y.o.  Sex: male  :  1945  MRN: [id number]         Primary Care Physician: Gisela Sotelo APRN    Diet Regular; Consistent Carbohydrate    Subjective   no complaints. no chest pain, shortness of breath, dysuria. per RN around noon he was feeling a little achey. he had low grade fever at that time.    Objective   Temp:  [97.5 °F (36.4 °C)-100.3 °F (37.9 °C)] 97.5 °F (36.4 °C)  Heart Rate:  [] 88  Resp:  [16-18] 18  BP: (112-137)/(67-82) 112/68   SpO2:  [90 %-96 %] 90 %  on  Flow (L/min):  [2] 2;   Device (Oxygen Therapy): nasal cannula  Body mass index is 28.55 kg/m².    Physical Exam   Constitutional: He is oriented to person, place, and time. No distress.   Cardiovascular: Normal rate and regular rhythm.    Pulmonary/Chest: Effort normal and breath sounds normal. No respiratory distress.   Abdominal: Soft. There is no tenderness.   Musculoskeletal:   left knee covered   Neurological: He is alert and oriented to person, place, and time.   Skin: Skin is warm and dry.     Reviewed medications and new clinical results    ARIPiprazole 20 mg Oral QAM   aspirin 325 mg Oral Q12H   docusate sodium 100 mg Oral BID   ferrous sulfate 325 mg Oral Daily With Breakfast   insulin aspart 0-7 Units Subcutaneous 4x Daily With Meals & Nightly   insulin aspart 3 Units Subcutaneous TID With Meals   insulin detemir 10 Units Subcutaneous Nightly   levothyroxine 50 mcg Oral QAM   magnesium oxide 400 mg Oral BID   memantine 5 mg Oral Daily   metoprolol succinate XL 25 mg Oral QAM   sodium chloride 1 g Oral BID   tamsulosin 0.4 mg Oral Nightly       lactated ringers 100 mL/hr Last Rate: 100 mL/hr (18 1812)     Results from last 7 days  Lab Units 18  1208 18  0430 18  2103 18  1639 18  0709   WBC 10*3/mm3  --  6.83  --   --  7.37   HEMOGLOBIN g/dL 9.1* 7.3* 8.3* 8.7* 8.2*    PLATELETS 10*3/mm3  --  186  --   --  213     Results from last 7 days  Lab Units 09/04/18  0301 09/02/18  0430 09/01/18  0709   SODIUM mmol/L 138 139 138   POTASSIUM mmol/L 4.3 4.3 4.2   CHLORIDE mmol/L 102 104 103   CO2 mmol/L 25.4 26.5 24.1   BUN mg/dL 24* 23 20   CREATININE mg/dL 1.07 1.39* 1.24   CALCIUM mg/dL 8.8 8.2* 8.1*   GLUCOSE mg/dL 58* 95 133*     Lab Results   Component Value Date    ANIONGAP 10.6 09/04/2018     Glucose   Date/Time Value Ref Range Status   09/05/2018 1145 249 (H) 70 - 130 mg/dL Final   09/05/2018 0634 92 70 - 130 mg/dL Final   09/04/2018 2106 172 (H) 70 - 130 mg/dL Final   09/04/2018 1700 139 (H) 70 - 130 mg/dL Final   09/04/2018 1055 170 (H) 70 - 130 mg/dL Final   09/04/2018 0555 164 (H) 70 - 130 mg/dL Final   09/04/2018 0433 71 70 - 130 mg/dL Final   09/04/2018 0414 48 (C) 70 - 130 mg/dL Final     Estimated Creatinine Clearance: 74.8 mL/min (by C-G formula based on SCr of 1.07 mg/dL).    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 dimer not helpful post op  · increased toprol yesterday to home dose  · continue to monitor  · DM 2  · no more hypoglycemia.  · if discharged likely can resume home PO DM medication (glucotrol) given a1c 7.90 not terrible. could add metformin as creatinine is OK but will defer to PCP  · BPH  · hypothyroidism  · disposition  · per attending, awaiting precert  · I discussed the patient's findings and my recommendations with patient and nursing staff.    Reed Aguirre MD   09/05/18  4:47 PM

## 2018-09-06 VITALS
WEIGHT: 204.59 LBS | RESPIRATION RATE: 18 BRPM | HEART RATE: 85 BPM | OXYGEN SATURATION: 92 % | HEIGHT: 71 IN | TEMPERATURE: 98.6 F | BODY MASS INDEX: 28.64 KG/M2 | DIASTOLIC BLOOD PRESSURE: 61 MMHG | SYSTOLIC BLOOD PRESSURE: 109 MMHG

## 2018-09-06 LAB
BH CV LOWER VASCULAR LEFT COMMON FEMORAL AUGMENT: NORMAL
BH CV LOWER VASCULAR LEFT COMMON FEMORAL COMPETENT: NORMAL
BH CV LOWER VASCULAR LEFT COMMON FEMORAL COMPRESS: NORMAL
BH CV LOWER VASCULAR LEFT COMMON FEMORAL PHASIC: NORMAL
BH CV LOWER VASCULAR LEFT COMMON FEMORAL SPONT: NORMAL
BH CV LOWER VASCULAR LEFT DISTAL FEMORAL COMPRESS: NORMAL
BH CV LOWER VASCULAR LEFT GASTRONEMIUS COMPRESS: NORMAL
BH CV LOWER VASCULAR LEFT GREATER SAPH AK COMPRESS: NORMAL
BH CV LOWER VASCULAR LEFT GREATER SAPH BK COMPRESS: NORMAL
BH CV LOWER VASCULAR LEFT MID FEMORAL AUGMENT: NORMAL
BH CV LOWER VASCULAR LEFT MID FEMORAL COMPETENT: NORMAL
BH CV LOWER VASCULAR LEFT MID FEMORAL COMPRESS: NORMAL
BH CV LOWER VASCULAR LEFT MID FEMORAL PHASIC: NORMAL
BH CV LOWER VASCULAR LEFT MID FEMORAL SPONT: NORMAL
BH CV LOWER VASCULAR LEFT PERONEAL COMPRESS: NORMAL
BH CV LOWER VASCULAR LEFT POPLITEAL AUGMENT: NORMAL
BH CV LOWER VASCULAR LEFT POPLITEAL COMPETENT: NORMAL
BH CV LOWER VASCULAR LEFT POPLITEAL COMPRESS: NORMAL
BH CV LOWER VASCULAR LEFT POPLITEAL PHASIC: NORMAL
BH CV LOWER VASCULAR LEFT POPLITEAL SPONT: NORMAL
BH CV LOWER VASCULAR LEFT POSTERIOR TIBIAL COMPRESS: NORMAL
BH CV LOWER VASCULAR LEFT PROXIMAL FEMORAL COMPRESS: NORMAL
BH CV LOWER VASCULAR LEFT SAPHENOFEMORAL JUNCTION AUGMENT: NORMAL
BH CV LOWER VASCULAR LEFT SAPHENOFEMORAL JUNCTION COMPRESS: NORMAL
BH CV LOWER VASCULAR LEFT SAPHENOFEMORAL JUNCTION PHASIC: NORMAL
BH CV LOWER VASCULAR LEFT SAPHENOFEMORAL JUNCTION SPONT: NORMAL
BH CV LOWER VASCULAR RIGHT COMMON FEMORAL AUGMENT: NORMAL
BH CV LOWER VASCULAR RIGHT COMMON FEMORAL COMPETENT: NORMAL
BH CV LOWER VASCULAR RIGHT COMMON FEMORAL COMPRESS: NORMAL
BH CV LOWER VASCULAR RIGHT COMMON FEMORAL PHASIC: NORMAL
BH CV LOWER VASCULAR RIGHT COMMON FEMORAL SPONT: NORMAL
BH CV LOWER VASCULAR RIGHT DISTAL FEMORAL COMPRESS: NORMAL
BH CV LOWER VASCULAR RIGHT GASTRONEMIUS COMPRESS: NORMAL
BH CV LOWER VASCULAR RIGHT GREATER SAPH AK COMPRESS: NORMAL
BH CV LOWER VASCULAR RIGHT GREATER SAPH BK COMPRESS: NORMAL
BH CV LOWER VASCULAR RIGHT MID FEMORAL AUGMENT: NORMAL
BH CV LOWER VASCULAR RIGHT MID FEMORAL COMPETENT: NORMAL
BH CV LOWER VASCULAR RIGHT MID FEMORAL COMPRESS: NORMAL
BH CV LOWER VASCULAR RIGHT MID FEMORAL PHASIC: NORMAL
BH CV LOWER VASCULAR RIGHT MID FEMORAL SPONT: NORMAL
BH CV LOWER VASCULAR RIGHT PERONEAL COMPRESS: NORMAL
BH CV LOWER VASCULAR RIGHT POPLITEAL AUGMENT: NORMAL
BH CV LOWER VASCULAR RIGHT POPLITEAL COMPETENT: NORMAL
BH CV LOWER VASCULAR RIGHT POPLITEAL COMPRESS: NORMAL
BH CV LOWER VASCULAR RIGHT POPLITEAL PHASIC: NORMAL
BH CV LOWER VASCULAR RIGHT POPLITEAL SPONT: NORMAL
BH CV LOWER VASCULAR RIGHT POSTERIOR TIBIAL COMPRESS: NORMAL
BH CV LOWER VASCULAR RIGHT PROXIMAL FEMORAL COMPRESS: NORMAL
BH CV LOWER VASCULAR RIGHT SAPHENOFEMORAL JUNCTION AUGMENT: NORMAL
BH CV LOWER VASCULAR RIGHT SAPHENOFEMORAL JUNCTION COMPRESS: NORMAL
BH CV LOWER VASCULAR RIGHT SAPHENOFEMORAL JUNCTION PHASIC: NORMAL
BH CV LOWER VASCULAR RIGHT SAPHENOFEMORAL JUNCTION SPONT: NORMAL
DEPRECATED RDW RBC AUTO: 47.4 FL (ref 37–54)
ERYTHROCYTE [DISTWIDTH] IN BLOOD BY AUTOMATED COUNT: 14.3 % (ref 11.5–14.5)
GLUCOSE BLDC GLUCOMTR-MCNC: 237 MG/DL (ref 70–130)
GLUCOSE BLDC GLUCOMTR-MCNC: 265 MG/DL (ref 70–130)
GLUCOSE BLDC GLUCOMTR-MCNC: 95 MG/DL (ref 70–130)
HCT VFR BLD AUTO: 28.3 % (ref 40.4–52.2)
HGB BLD-MCNC: 8.7 G/DL (ref 13.7–17.6)
MCH RBC QN AUTO: 27.7 PG (ref 27–32.7)
MCHC RBC AUTO-ENTMCNC: 30.7 G/DL (ref 32.6–36.4)
MCV RBC AUTO: 90.1 FL (ref 79.8–96.2)
PLATELET # BLD AUTO: 268 10*3/MM3 (ref 140–500)
PMV BLD AUTO: 8.9 FL (ref 6–12)
RBC # BLD AUTO: 3.14 10*6/MM3 (ref 4.6–6)
WBC NRBC COR # BLD: 6.86 10*3/MM3 (ref 4.5–10.7)

## 2018-09-06 PROCEDURE — 97110 THERAPEUTIC EXERCISES: CPT

## 2018-09-06 PROCEDURE — 97150 GROUP THERAPEUTIC PROCEDURES: CPT

## 2018-09-06 PROCEDURE — 63710000001 INSULIN ASPART PER 5 UNITS: Performed by: INTERNAL MEDICINE

## 2018-09-06 PROCEDURE — 85027 COMPLETE CBC AUTOMATED: CPT | Performed by: HOSPITALIST

## 2018-09-06 PROCEDURE — 82962 GLUCOSE BLOOD TEST: CPT

## 2018-09-06 PROCEDURE — 63710000001 INSULIN ASPART PER 5 UNITS: Performed by: HOSPITALIST

## 2018-09-06 RX ADMIN — INSULIN ASPART 4 UNITS: 100 INJECTION, SOLUTION INTRAVENOUS; SUBCUTANEOUS at 12:17

## 2018-09-06 RX ADMIN — HYDROCODONE BITARTRATE AND ACETAMINOPHEN 1 TABLET: 7.5; 325 TABLET ORAL at 16:21

## 2018-09-06 RX ADMIN — INSULIN ASPART 3 UNITS: 100 INJECTION, SOLUTION INTRAVENOUS; SUBCUTANEOUS at 08:53

## 2018-09-06 RX ADMIN — LEVOTHYROXINE SODIUM 50 MCG: 50 TABLET ORAL at 06:04

## 2018-09-06 RX ADMIN — ARIPIPRAZOLE 20 MG: 10 TABLET ORAL at 08:53

## 2018-09-06 RX ADMIN — Medication 400 MG: at 08:53

## 2018-09-06 RX ADMIN — DOCUSATE SODIUM 100 MG: 100 CAPSULE, LIQUID FILLED ORAL at 08:52

## 2018-09-06 RX ADMIN — METOPROLOL SUCCINATE 25 MG: 25 TABLET, FILM COATED, EXTENDED RELEASE ORAL at 08:53

## 2018-09-06 RX ADMIN — HYDROCODONE BITARTRATE AND ACETAMINOPHEN 1 TABLET: 7.5; 325 TABLET ORAL at 06:04

## 2018-09-06 RX ADMIN — MEMANTINE HYDROCHLORIDE 5 MG: 5 TABLET, FILM COATED ORAL at 08:53

## 2018-09-06 RX ADMIN — ASPIRIN 325 MG: 325 TABLET, DELAYED RELEASE ORAL at 08:53

## 2018-09-06 RX ADMIN — INSULIN ASPART 3 UNITS: 100 INJECTION, SOLUTION INTRAVENOUS; SUBCUTANEOUS at 12:17

## 2018-09-06 RX ADMIN — HYDROCODONE BITARTRATE AND ACETAMINOPHEN 2 TABLET: 7.5; 325 TABLET ORAL at 12:17

## 2018-09-06 RX ADMIN — Medication 1 G: at 08:53

## 2018-09-06 RX ADMIN — FERROUS SULFATE TAB 325 MG (65 MG ELEMENTAL FE) 325 MG: 325 (65 FE) TAB at 08:53

## 2018-09-06 NOTE — PROGRESS NOTES
Continued Stay Note  [facility]     Patient Name: Chris Ferguson  MRN: [id number]  Today's Date: 9/6/2018    Admit Date: 8/31/2018          Discharge Plan     Row Name 09/06/18 0910       Plan    Plan [facility], cert APPROVED.     Patient/Family in Agreement with Plan yes    Plan Comments S/w [facility] cert approved for [facility], bed ready.               Discharge Codes    No documentation.           Theresa Merritt RN

## 2018-09-06 NOTE — PLAN OF CARE
Problem: Patient Care Overview  Goal: Plan of Care Review  Outcome: Ongoing (interventions implemented as appropriate)   09/06/18 1549   Coping/Psychosocial   Plan of Care Reviewed With patient   OTHER   Outcome Summary Ambulating with walker, assist x1-2. Pain controlled with oral analgesic. AC/HS accucheck with sliding scale coverage. Hx htn, continue to monitor. DC to [facility].   Plan of Care Review   Progress improving     Goal: Individualization and Mutuality  Outcome: Ongoing (interventions implemented as appropriate)    Goal: Discharge Needs Assessment  Outcome: Ongoing (interventions implemented as appropriate)    Goal: Interprofessional Rounds/Family Conf  Outcome: Ongoing (interventions implemented as appropriate)      Problem: Fall Risk (Adult)  Goal: Absence of Fall  Outcome: Ongoing (interventions implemented as appropriate)   09/06/18 2369   Fall Risk (Adult)   Absence of Fall achieves outcome       Problem: Knee Arthroplasty (Total, Partial) (Adult)  Goal: Signs and Symptoms of Listed Potential Problems Will be Absent, Minimized or Managed (Knee Arthroplasty)  Outcome: Ongoing (interventions implemented as appropriate)   09/06/18 1549   Goal/Outcome Evaluation   Problems Assessed (Knee Arthroplasty) all   Problems Present (Knee Arthroplasty) pain;range of motion decreased

## 2018-09-06 NOTE — PROGRESS NOTES
[facility]     LOS: 6 days     Name: Chris Ferguson  Age: 72 y.o.  Sex: male  :  1945  MRN: [id number]         Primary Care Physician: Gisela Sotelo APRN    Diet Regular; Consistent Carbohydrate    Subjective   no complaints. states pain is fine. no chest pain, shortness of breath, cough, dysuria.    Objective   Temp:  [97.3 °F (36.3 °C)-100.3 °F (37.9 °C)] 97.9 °F (36.6 °C)  Heart Rate:  [] 102  Resp:  [16-18] 16  BP: (112-140)/(68-77) 140/77   SpO2:  [90 %-95 %] 95 %  on  Flow (L/min):  [2] 2;   Device (Oxygen Therapy): room air  Body mass index is 28.55 kg/m².    Physical Exam   Constitutional: He is oriented to person, place, and time. No distress.   Cardiovascular: Normal rate and regular rhythm.    Pulmonary/Chest: Effort normal and breath sounds normal. No respiratory distress.   Abdominal: Soft. Bowel sounds are normal. There is no tenderness. There is no rebound and no guarding.   Musculoskeletal:   left knee covered   Neurological: He is alert and oriented to person, place, and time.   Skin: Skin is warm and dry.   Psychiatric: He has a normal mood and affect. His behavior is normal.     Reviewed medications and new clinical results    ARIPiprazole 20 mg Oral QAM   aspirin 325 mg Oral Q12H   docusate sodium 100 mg Oral BID   ferrous sulfate 325 mg Oral Daily With Breakfast   insulin aspart 0-7 Units Subcutaneous 4x Daily With Meals & Nightly   insulin aspart 3 Units Subcutaneous TID With Meals   insulin detemir 10 Units Subcutaneous Nightly   levothyroxine 50 mcg Oral QAM   magnesium oxide 400 mg Oral BID   memantine 5 mg Oral Daily   metoprolol succinate XL 25 mg Oral QAM   sodium chloride 1 g Oral BID   tamsulosin 0.4 mg Oral Nightly       lactated ringers 100 mL/hr Last Rate: 100 mL/hr (18 1812)       Results from last 7 days  Lab Units 18  0331 18  1208 18  0430 18  2103 18  7788  09/01/18  0709   WBC 10*3/mm3 6.86  --  6.83  --   --  7.37   HEMOGLOBIN g/dL 8.7* 9.1* 7.3* 8.3* 8.7* 8.2*   PLATELETS 10*3/mm3 268  --  186  --   --  213       Results from last 7 days  Lab Units 09/04/18  0301 09/02/18  0430 09/01/18  0709   SODIUM mmol/L 138 139 138   POTASSIUM mmol/L 4.3 4.3 4.2   CHLORIDE mmol/L 102 104 103   CO2 mmol/L 25.4 26.5 24.1   BUN mg/dL 24* 23 20   CREATININE mg/dL 1.07 1.39* 1.24   CALCIUM mg/dL 8.8 8.2* 8.1*   GLUCOSE mg/dL 58* 95 133*     Lab Results   Component Value Date    ANIONGAP 10.6 09/04/2018     Glucose   Date/Time Value Ref Range Status   09/06/2018 0641 95 70 - 130 mg/dL Final   09/05/2018 2044 248 (H) 70 - 130 mg/dL Final   09/05/2018 1650 165 (H) 70 - 130 mg/dL Final   09/05/2018 1145 249 (H) 70 - 130 mg/dL Final   09/05/2018 0634 92 70 - 130 mg/dL Final   09/04/2018 2106 172 (H) 70 - 130 mg/dL Final   09/04/2018 1700 139 (H) 70 - 130 mg/dL Final   09/04/2018 1055 170 (H) 70 - 130 mg/dL Final     Estimated Creatinine Clearance: 74.8 mL/min (by C-G formula based on SCr of 1.07 mg/dL).    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  · if discharged likely can resume home PO DM medication (glucotrol) given a1c 7.90 not terrible. could add metformin as creatinine is OK but will defer to PCP  · BPH  · hypothyroidism  · disposition  · per attending, awaiting precert  · I discussed the patient's findings and my recommendations with patient.    Reed Aguirre MD   09/06/18  9:18 AM    "

## 2018-09-06 NOTE — PROGRESS NOTES
Orthopedic Progress Note      Patient: Chris Ferguson  YOB: 1945     Date of Admission: 8/31/2018  6:41 AM Medical Record Number: [id number]     Attending Physician: Valente Giang,*    Status Post:  TOTAL KNEE ARTHROPLASTY  complex distal femur replacement   removal of plate  Post Operative Day Number: 6    Subjective : No new orthopaedic complaints     Pain Relief: some relief with present medication.     Systemic Complaints: No Complaints  Vitals:    09/05/18 2300 09/06/18 0300 09/06/18 0614 09/06/18 1100   BP: 119/69 112/70 140/77 122/67   BP Location: Right arm Right arm Right arm Left arm   Patient Position: Lying Lying Lying Sitting   Pulse: 88 90 102 102   Resp: 16 16 16 16   Temp: 97.3 °F (36.3 °C) 97.7 °F (36.5 °C) 97.9 °F (36.6 °C) 100 °F (37.8 °C)  Comment: RN Notified   TempSrc: Oral Oral Oral Oral   SpO2: 95%  95% 91%   Weight:       Height:           Physical Exam: 72 y.o. male    General Appearance:       Alert, cooperative, in no acute distress                  Extremities:    Dressing Clean, Dry and Intact         Incision healthy without signs or symptoms of infections         No clinical sign of DVT        Able to do good movements of digits    Pulses:   Pulses palpable and equal bilaterally           Diagnostic Tests:       Results from last 7 days  Lab Units 09/06/18  0331 09/03/18  1208 09/02/18  0430  09/01/18  0709   WBC 10*3/mm3 6.86  --  6.83  --  7.37   HEMOGLOBIN g/dL 8.7* 9.1* 7.3*  < > 8.2*   HEMATOCRIT % 28.3* 28.6* 23.4*  < > 26.4*   PLATELETS 10*3/mm3 268  --  186  --  213   < > = values in this interval not displayed.    Results from last 7 days  Lab Units 09/04/18  0301 09/02/18  0430 09/01/18  0709   SODIUM mmol/L 138 139 138   POTASSIUM mmol/L 4.3 4.3 4.2   CHLORIDE mmol/L 102 104 103   CO2 mmol/L 25.4 26.5 24.1   BUN mg/dL 24* 23 20   CREATININE mg/dL 1.07 1.39* 1.24   GLUCOSE mg/dL 58* 95 133*   CALCIUM mg/dL 8.8 8.2* 8.1*         No results found  for: URICACID  No results found for: CRYSTAL  Microbiology Results (last 10 days)     ** No results found for the last 240 hours. **        Xr Chest 1 View    Result Date: 9/1/2018   Stable appearance of the chest by portable radiography.  This report was finalized on 9/1/2018 9:04 PM by Oj Cowan M.D.          Current Medications:  Scheduled Meds:    ARIPiprazole 20 mg Oral QAM   aspirin 325 mg Oral Q12H   docusate sodium 100 mg Oral BID   ferrous sulfate 325 mg Oral Daily With Breakfast   insulin aspart 0-7 Units Subcutaneous 4x Daily With Meals & Nightly   insulin aspart 3 Units Subcutaneous TID With Meals   insulin detemir 10 Units Subcutaneous Nightly   levothyroxine 50 mcg Oral QAM   magnesium oxide 400 mg Oral BID   memantine 5 mg Oral Daily   metoprolol succinate XL 25 mg Oral QAM   sodium chloride 1 g Oral BID   tamsulosin 0.4 mg Oral Nightly     Continuous Infusions:    lactated ringers 100 mL/hr Last Rate: 100 mL/hr (08/31/18 1812)     PRN Meds:.•  acetaminophen  •  bisacodyl  •  bisacodyl  •  dextrose  •  dextrose  •  diphenhydrAMINE **OR** diphenhydrAMINE  •  glucagon (human recombinant)  •  HYDROcodone-acetaminophen  •  HYDROcodone-acetaminophen  •  melatonin  •  ondansetron **OR** ondansetron ODT **OR** ondansetron  •  traZODone    Assessment: Status post  TOTAL KNEE ARTHROPLASTY  complex distal femur replacement   removal of plate     Patient Active Problem List   Diagnosis   • 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 I  agree with the above findings and treatment  plan.     Valente Giang MD  9/6/2018

## 2018-09-06 NOTE — SIGNIFICANT NOTE
09/06/18 1556   Rehab Treatment   Discipline occupational therapist   Reason Treatment Not Performed other (see comments)  (Pt w/ D/C orders to rehab today.)

## 2018-09-06 NOTE — THERAPY TREATMENT NOTE
Acute Care - Physical Therapy Treatment Note  [facility]     Patient Name: Chris Ferguson  : 1945  MRN: [id number]  Today's Date: 2018  Onset of Illness/Injury or Date of Surgery: 18  Date of Referral to PT: 18  Referring Physician: Dr. Giang    Admit Date: 2018    Visit Dx:    ICD-10-CM ICD-9-CM   1. Impaired functional mobility, balance, gait, and endurance Z74.09 V49.89   2. 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, left   • Essential hypertension   • DM (diabetes mellitus), type 2 (CMS/HCC)   • BPH (benign prostatic hyperplasia)   • Hypothyroidism   • Postoperative anemia due to acute blood loss   • Tachycardia       Therapy Treatment          Rehabilitation Treatment Summary     Row Name 18 1000             Treatment Time/Intention    Discipline physical therapy assistant  -CW      Document Type therapy note (daily note)  -CW      Subjective Information complains of;weakness;fatigue;pain  -CW      Mode of Treatment physical therapy  -CW      Therapy Frequency (PT Clinical Impression) 2 times/day  -CW      Patient Effort adequate  -CW      Existing Precautions/Restrictions brace worn when out of bed;fall  -CW      Recorded by [CW] Marty Petersno, PTA 18 1029      Row Name 18 1000             Vital Signs    O2 Delivery Pre Treatment room air  -CW      Recorded by [CW] Marty Peterson PTA 18 1029      Row Name 18 1000             Cognitive Assessment/Intervention- PT/OT    Orientation Status (Cognition) oriented x 4  -CW      Follows Commands (Cognition) WFL  -CW      Safety Deficit (Cognitive) mild deficit  -CW      Personal Safety Interventions fall prevention program maintained;gait belt;muscle strengthening facilitated;nonskid shoes/slippers when out of bed  -CW      Recorded by  [CW] Marty Peterson, PTA 09/06/18 1029      Row Name 09/06/18 1000             Mobility Assessment/Intervention    Extremity Weight-bearing Status left lower extremity  -CW      Left Lower Extremity (Weight-bearing Status) weight-bearing as tolerated (WBAT)  -CW      Recorded by [CW] Marty Peterson, PTA 09/06/18 1029      Row Name 09/06/18 1000             Bed Mobility Assessment/Treatment    Supine-Sit [place] (Bed Mobility) not tested  -CW      Recorded by [CW] Marty Peterson, PTA 09/06/18 1029      Row Name 09/06/18 1000             Transfer Assessment/Treatment    Transfer Assessment/Treatment sit-stand transfer;stand-sit transfer  -CW      Recorded by [CW] Marty Peterson, PTA 09/06/18 1029      Row Name 09/06/18 1000             Sit-Stand Transfer    Sit-Stand [place] (Transfers) minimum assist (75% patient effort)  -CW      Assistive Device (Sit-Stand Transfers) walker, front-wheeled  -CW      Recorded by [CW] Marty Peterson, PTA 09/06/18 1029      Row Name 09/06/18 1000             Stand-Sit Transfer    Stand-Sit [place] (Transfers) minimum assist (75% patient effort)  -CW      Assistive Device (Stand-Sit Transfers) walker, front-wheeled  -CW      Recorded by [CW] Marty Peterson, PTA 09/06/18 1029      Row Name 09/06/18 1000             Gait/Stairs Assessment/Training    [place] Level (Gait) contact guard  -CW      Assistive Device (Gait) walker, front-wheeled  -CW      Distance in Feet (Gait) 40  -CW      Pattern (Gait) step-to  -CW      Deviations/Abnormal Patterns (Gait) antalgic;scar decreased;stride length decreased  -CW      Bilateral Gait Deviations forward flexed posture;heel strike decreased  -CW      Recorded by [CW] Marty Peterson, PTA 09/06/18 1029      Row Name 09/06/18 1000             General ROM    GENERAL ROM COMMENTS 8-80  -CW      Recorded by [CW] Marty Peterson, PTA 09/06/18 1029      Row Name 09/06/18 1000              Therapeutic Exercise    Comment (Therapeutic Exercise) L TKR Protocol 25 reps  -CW      Recorded by [CW] Marty Peterson, PTA 09/06/18 1029      Row Name 09/06/18 1000             Positioning and Restraints    Pre-Treatment Position sitting in chair/recliner  -CW      Post Treatment Position chair  -CW      In Chair notified nsg;reclined;call light within reach;encouraged to call for assist  -CW      Recorded by [CW] Marty Peterson, PTA 09/06/18 1029      Row Name 09/06/18 1000             Pain Scale: Numbers Pre/Post-Treatment    Pain Scale: Numbers, Pretreatment 8/10  -CW      Pain Scale: Numbers, Post-Treatment 8/10  -CW      Pain Location - Side Left  -CW      Pain Location - Orientation incisional  -CW      Pain Location knee  -CW      Recorded by [CW] Marty Peterson, PTA 09/06/18 1029      Row Name                Wound 08/31/18 1241 Left knee incision    Wound - Properties Group Date first assessed: 08/31/18 [EG] Time first assessed: 1241 [EG] Side: Left [EG] Location: knee [EG] Type: incision [EG] Recorded by:  [EG] Mery Jc RN 08/31/18 1241    Row Name 09/06/18 1000             Outcome Summary/Treatment Plan (PT)    Anticipated Discharge Disposition (PT) home with home health;skilled nursing facility  -CW      Recorded by [CW] Marty Peterson, PTA 09/06/18 1029        User Key  (r) = Recorded By, (t) = Taken By, (c) = Cosigned By    Initials Name Effective Dates Discipline    CW Marty Peterson, PTA 03/07/18 -  PT    EG Mery Jc RN 07/10/17 -  Nurse          Wound 08/31/18 1241 Left knee incision (Active)   Dressing Appearance dried drainage;intact 9/6/2018  8:53 AM   Closure GENE 9/6/2018  8:53 AM   Base dressing in place, unable to visualize 9/6/2018  8:53 AM   Drainage Amount scant 9/5/2018  4:00 PM   Dressing Care, Wound dressing changed 9/5/2018  4:00 PM             Physical Therapy Education     Title: PT OT SLP Therapies (Done)     Topic: Physical Therapy  (Done)     Point: Mobility training (Done)    Learning Progress Summary     Learner Status Readiness Method Response Comment Documented by    Patient Done Acceptance E,TB VU,DU   09/06/18 1029     Done Acceptance E,TB VU,[facility] 09/05/18 1056     Done Acceptance E,TB,D VU,NR   09/04/18 1148     Done Acceptance E,TB,D VU,NR   09/03/18 0943     Done Acceptance E,TB,D VU,NR   09/02/18 1026     Done Acceptance E,TB,D VU,NR   09/01/18 1453     Active Acceptance E,D NR   09/01/18 1106     Done Acceptance E VU  MA 08/31/18 1618          Point: Home exercise program (Done)    Learning Progress Summary     Learner Status Readiness Method Response Comment Documented by    Patient Done Acceptance E,TB VU,DU   09/06/18 1029     Done Acceptance E,TB VU,[facility] 09/05/18 1056     Done Acceptance E,TB,D VU,[facility] 09/04/18 1148     Done Acceptance E,TB,D VU,[facility] 09/03/18 0943     Done Acceptance E,TB,D VU,NR   09/02/18 1026     Done Acceptance E,TB,D VU,[facility] 09/01/18 1453     Active Acceptance E,D NR   09/01/18 1106     Done Acceptance E VU  MA 08/31/18 1618          Point: Body mechanics (Done)    Learning Progress Summary     Learner Status Readiness Method Response Comment Documented by    Patient Done Acceptance E,TB VU,[facility] 09/06/18 1029     Done Acceptance E,TB VU,[facility] 09/05/18 1056     Done Acceptance E,TB,D VU,NR   09/04/18 1148     Done Acceptance E,TB,D VU,NR   09/03/18 0943     Done Acceptance E,TB,D VU,[facility] 09/02/18 1026     Done Acceptance E,TB,D VU,[facility] 09/01/18 1453     Active Acceptance E,D [facility] 09/01/18 1106     Done Acceptance E VU  MA 08/31/18 1618          Point: Precautions (Done)    Learning Progress Summary     Learner Status Readiness Method Response Comment Documented by    Patient Done Acceptance E,TB VU,[facility] 09/06/18 1029     Done Acceptance E,TB VU,[facility] 09/05/18 1056     Done Acceptance E,TB,KARTHIK MORALES   09/04/18 1148     Done Acceptance STEFANY,ELIZABETH IRVING NR   09/03/18 0949      Done Acceptance E,TB,D VU,NR   09/02/18 1026     Done Acceptance E,TB,D VU,NR   09/01/18 1453     Active Acceptance E,D [facility] 09/01/18 1106     Done Acceptance E VU  MA 08/31/18 1618                      User Key     Initials Effective Dates Name Provider Type Discipline     04/03/18 -  Shea Santoro, PT Physical Therapist PT     03/07/18 -  Leila Hunter, PTA Physical Therapy Assistant PT    MA 04/03/18 -  Bridget Dennis, PT Physical Therapist PT     03/07/18 -  Marty Peterson, PTA Physical Therapy Assistant PT                    PT Recommendation and Plan  Anticipated Discharge Disposition (PT): home with home health, skilled nursing facility  Therapy Frequency (PT Clinical Impression): 2 times/day  Outcome Summary/Treatment Plan (PT)  Anticipated Discharge Disposition (PT): home with home health, skilled nursing facility  Plan of Care Reviewed With: patient  Progress: improving  Outcome Summary: P tincreasing with strength and ROM in the L LE but limited amb due to pain and weakness          Outcome Measures     Row Name 09/06/18 1000 09/05/18 1000 09/04/18 1100       How much help from another person do you currently need...    Turning from your back to your side while in flat bed without using bedrails? 3  -CW 3  -CW 3  -EJ    Moving from lying on back to sitting on the side of a flat bed without bedrails? 3  -CW 3  -CW 2  -EJ    Moving to and from a bed to a chair (including a wheelchair)? 3  -CW 3  -CW 2  -EJ    Standing up from a chair using your arms (e.g., wheelchair, bedside chair)? 3  -CW 3  -CW 2  -EJ    Climbing 3-5 steps with a railing? 1  -CW 1  -CW 1  -EJ    To walk in hospital room? 3  -CW 3  -CW 2  -EJ    AM-PAC 6 Clicks Score 16  -CW 16  -CW 12  -EJ       Functional Assessment    Outcome Measure Options AM-PAC 6 Clicks Basic Mobility (PT)  -CW AM-PAC 6 Clicks Basic Mobility (PT)  -CW AM-PAC 6 Clicks Basic Mobility (PT)  -EJ      User Key  (r) = Recorded By, (t)  = Taken By, (c) = Cosigned By    Initials Name Provider Type    Shea Roldan, PT Physical Therapist    CW Marty Peterson, DONOVAN Physical Therapy Assistant           Time Calculation:         PT Charges     Row Name 09/06/18 1030             Time Calculation    Start Time 0930  -CW      Stop Time 1020  -CW      Time Calculation (min) 50 min  -CW      PT Received On 09/06/18  -CW        User Key  (r) = Recorded By, (t) = Taken By, (c) = Cosigned By    Initials Name Provider Type    Marty Aldridge, DONOVAN Physical Therapy Assistant        Therapy Suggested Charges     Code   Minutes Charges    None           Therapy Charges for Today     Code Description Service Date Service Provider Modifiers Qty    70229167834 HC PT THER PROC GROUP 9/5/2018 Marty Peterson, PTA GP 1    03400302039 HC PT THER PROC EA 15 MIN 9/5/2018 Marty Peterson, PTA GP 1    91930200363 HC PT THER PROC GROUP 9/5/2018 Marty Peterson, PTA GP 1    96248293146 HC PT THER PROC EA 15 MIN 9/5/2018 Marty Peterson, PTA GP 1    12131177264 HC PT THER PROC GROUP 9/6/2018 Marty Peterson, PTA GP 1    13611185200 HC PT THER PROC EA 15 MIN 9/6/2018 Marty Peterson, DONOVAN GP 1          PT G-Codes  Outcome Measure Options: AM-PAC 6 Clicks Basic Mobility (PT)  AM-PAC 6 Clicks Score: 16  Score: 9    Marty Peterson PTA  9/6/2018

## 2018-09-06 NOTE — PLAN OF CARE
Problem: Patient Care Overview  Goal: Plan of Care Review  Outcome: Ongoing (interventions implemented as appropriate)   09/06/18 0130   Coping/Psychosocial   Plan of Care Reviewed With patient   OTHER   Outcome Summary patient ambulating with assistance x2 to chair, placed back on O2 during dayshift for decreased SATs, sleeping through night, pain controlled at this time, kallie dopplers during dayshift, educated on b/p and glucose monitoring   Plan of Care Review   Progress no change     Goal: Individualization and Mutuality  Outcome: Ongoing (interventions implemented as appropriate)    Goal: Discharge Needs Assessment  Outcome: Ongoing (interventions implemented as appropriate)    Goal: Interprofessional Rounds/Family Conf  Outcome: Ongoing (interventions implemented as appropriate)      Problem: Fall Risk (Adult)  Goal: Absence of Fall  Outcome: Ongoing (interventions implemented as appropriate)      Problem: Knee Arthroplasty (Total, Partial) (Adult)  Goal: Signs and Symptoms of Listed Potential Problems Will be Absent, Minimized or Managed (Knee Arthroplasty)  Outcome: Ongoing (interventions implemented as appropriate)

## 2018-09-06 NOTE — PLAN OF CARE
Problem: Patient Care Overview  Goal: Plan of Care Review  Outcome: Ongoing (interventions implemented as appropriate)   09/06/18 1030   Coping/Psychosocial   Plan of Care Reviewed With patient   OTHER   Outcome Summary Pt increasing with strength and ROM in the L LE but limited amb due to pain and weakness   Plan of Care Review   Progress improving

## 2018-09-17 ENCOUNTER — HOSPITAL ENCOUNTER (OUTPATIENT)
Dept: CARDIOLOGY | Facility: HOSPITAL | Age: 73
Discharge: HOME | End: 2018-09-17
Attending: PHYSICAL MEDICINE & REHABILITATION | Admitting: PHYSICAL MEDICINE & REHABILITATION

## 2018-10-02 ENCOUNTER — HOSPITAL ENCOUNTER (OUTPATIENT)
Dept: PHYSICAL THERAPY | Facility: HOSPITAL | Age: 73
Setting detail: RECURRING SERIES
Discharge: HOME | End: 2018-11-29
Attending: ORTHOPAEDIC SURGERY | Admitting: ORTHOPAEDIC SURGERY

## 2018-10-25 ENCOUNTER — HOSPITAL ENCOUNTER (OUTPATIENT)
Dept: FAMILY MEDICINE CLINIC | Facility: CLINIC | Age: 73
Setting detail: SPECIMEN
Discharge: HOME | End: 2018-10-25
Attending: NURSE PRACTITIONER | Admitting: NURSE PRACTITIONER

## 2018-10-25 LAB
ANION GAP SERPL CALC-SCNC: 13.4 MMOL/L (ref 10–20)
BASOPHILS # BLD AUTO: 0.1 10*3/UL (ref 0–0.2)
BASOPHILS NFR BLD AUTO: 1 % (ref 0–2)
BUN SERPL-MCNC: 22 MG/DL (ref 8–20)
BUN/CREAT SERPL: 18.3 (ref 6.2–20.3)
CALCIUM SERPL-MCNC: 9 MG/DL (ref 8.9–10.3)
CHLORIDE SERPL-SCNC: 103 MMOL/L (ref 101–111)
CONV CO2: 25 MMOL/L (ref 22–32)
CREAT UR-MCNC: 1.2 MG/DL (ref 0.7–1.2)
DIFFERENTIAL METHOD BLD: (no result)
EOSINOPHIL # BLD AUTO: 0.1 10*3/UL (ref 0–0.3)
EOSINOPHIL # BLD AUTO: 2 % (ref 0–3)
ERYTHROCYTE [DISTWIDTH] IN BLOOD BY AUTOMATED COUNT: 15.4 % (ref 11.5–14.5)
GLUCOSE SERPL-MCNC: 84 MG/DL (ref 65–99)
HCT VFR BLD AUTO: 38.7 % (ref 40–54)
HGB BLD-MCNC: 12.6 G/DL (ref 14–18)
LYMPHOCYTES # BLD AUTO: 1.6 10*3/UL (ref 0.8–4.8)
LYMPHOCYTES NFR BLD AUTO: 17 % (ref 18–42)
MCH RBC QN AUTO: 27.8 PG (ref 26–32)
MCHC RBC AUTO-ENTMCNC: 32.6 G/DL (ref 32–36)
MCV RBC AUTO: 85.3 FL (ref 80–94)
MONOCYTES # BLD AUTO: 0.6 10*3/UL (ref 0.1–1.3)
MONOCYTES NFR BLD AUTO: 7 % (ref 2–11)
NEUTROPHILS # BLD AUTO: 6.8 10*3/UL (ref 2.3–8.6)
NEUTROPHILS NFR BLD AUTO: 73 % (ref 50–75)
NRBC BLD AUTO-RTO: 0 /100{WBCS}
NRBC/RBC NFR BLD MANUAL: 0 10*3/UL
PLATELET # BLD AUTO: 335 10*3/UL (ref 150–450)
PMV BLD AUTO: 7.3 FL (ref 7.4–10.4)
POTASSIUM SERPL-SCNC: 4.4 MMOL/L (ref 3.6–5.1)
RBC # BLD AUTO: 4.54 10*6/UL (ref 4.6–6)
SODIUM SERPL-SCNC: 137 MMOL/L (ref 136–144)
WBC # BLD AUTO: 9.2 10*3/UL (ref 4.5–11.5)

## 2018-11-12 ENCOUNTER — HOSPITAL ENCOUNTER (OUTPATIENT)
Dept: FAMILY MEDICINE CLINIC | Facility: CLINIC | Age: 73
Setting detail: SPECIMEN
Discharge: HOME | End: 2018-11-12
Attending: NURSE PRACTITIONER | Admitting: NURSE PRACTITIONER

## 2018-11-12 LAB
BASOPHILS # BLD AUTO: 0 10*3/UL (ref 0–0.2)
BASOPHILS NFR BLD AUTO: 0 % (ref 0–2)
DIFFERENTIAL METHOD BLD: (no result)
EOSINOPHIL # BLD AUTO: 0.2 10*3/UL (ref 0–0.3)
EOSINOPHIL # BLD AUTO: 3 % (ref 0–3)
ERYTHROCYTE [DISTWIDTH] IN BLOOD BY AUTOMATED COUNT: 15.3 % (ref 11.5–14.5)
HCT VFR BLD AUTO: 38.1 % (ref 40–54)
HGB BLD-MCNC: 12.7 G/DL (ref 14–18)
IRON SATN MFR SERPL: 14 % (ref 20–50)
IRON SERPL-MCNC: 47 UG/DL (ref 45–182)
LYMPHOCYTES # BLD AUTO: 2.3 10*3/UL (ref 0.8–4.8)
LYMPHOCYTES NFR BLD AUTO: 28 % (ref 18–42)
MCH RBC QN AUTO: 28.3 PG (ref 26–32)
MCHC RBC AUTO-ENTMCNC: 33.4 G/DL (ref 32–36)
MCV RBC AUTO: 84.8 FL (ref 80–94)
MONOCYTES # BLD AUTO: 0.4 10*3/UL (ref 0.1–1.3)
MONOCYTES NFR BLD AUTO: 5 % (ref 2–11)
NEUTROPHILS # BLD AUTO: 5.3 10*3/UL (ref 2.3–8.6)
NEUTROPHILS NFR BLD AUTO: 64 % (ref 50–75)
NRBC BLD AUTO-RTO: 0 /100{WBCS}
NRBC/RBC NFR BLD MANUAL: 0 10*3/UL
PLATELET # BLD AUTO: 320 10*3/UL (ref 150–450)
PMV BLD AUTO: 7.7 FL (ref 7.4–10.4)
RBC # BLD AUTO: 4.49 10*6/UL (ref 4.6–6)
TIBC SERPL-MCNC: 342 UG/DL (ref 228–428)
WBC # BLD AUTO: 8.4 10*3/UL (ref 4.5–11.5)

## 2019-01-09 ENCOUNTER — HOSPITAL ENCOUNTER (OUTPATIENT)
Dept: LAB | Facility: HOSPITAL | Age: 74
Discharge: HOME | End: 2019-01-09
Attending: NURSE PRACTITIONER | Admitting: NURSE PRACTITIONER

## 2019-01-09 LAB
ALBUMIN SERPL-MCNC: 3.3 G/DL (ref 3.5–4.8)
ALBUMIN/GLOB SERPL: 0.9 {RATIO} (ref 1–1.7)
ALP SERPL-CCNC: 150 IU/L (ref 32–91)
ALT SERPL-CCNC: 33 IU/L (ref 17–63)
ANION GAP SERPL CALC-SCNC: 15.4 MMOL/L (ref 10–20)
AST SERPL-CCNC: 27 IU/L (ref 15–41)
BILIRUB SERPL-MCNC: <0.1 MG/DL (ref 0.3–1.2)
BUN SERPL-MCNC: 22 MG/DL (ref 8–20)
BUN/CREAT SERPL: 16.9 (ref 6.2–20.3)
CALCIUM SERPL-MCNC: 9 MG/DL (ref 8.9–10.3)
CHLORIDE SERPL-SCNC: 102 MMOL/L (ref 101–111)
CHOLEST SERPL-MCNC: 272 MG/DL
CHOLEST/HDLC SERPL: 4.4 {RATIO}
CONV CO2: 24 MMOL/L (ref 22–32)
CONV LDL CHOLESTEROL DIRECT: 199 MG/DL (ref 0–100)
CONV TOTAL PROTEIN: 6.8 G/DL (ref 6.1–7.9)
CREAT UR-MCNC: 1.3 MG/DL (ref 0.7–1.2)
GLOBULIN UR ELPH-MCNC: 3.5 G/DL (ref 2.5–3.8)
GLUCOSE SERPL-MCNC: 219 MG/DL (ref 65–99)
HDLC SERPL-MCNC: 62 MG/DL
LDLC/HDLC SERPL: 3.2 {RATIO}
LIPID INTERPRETATION: ABNORMAL
MAGNESIUM SERPL-MCNC: 1.8 MG/DL (ref 1.8–2.5)
POTASSIUM SERPL-SCNC: 4.4 MMOL/L (ref 3.6–5.1)
SODIUM SERPL-SCNC: 137 MMOL/L (ref 136–144)
TRIGL SERPL-MCNC: 139 MG/DL
VLDLC SERPL CALC-MCNC: 11.2 MG/DL

## 2019-01-21 ENCOUNTER — HOSPITAL ENCOUNTER (OUTPATIENT)
Dept: PAIN MEDICINE | Facility: HOSPITAL | Age: 74
Discharge: HOME | End: 2019-01-21
Attending: PHYSICAL MEDICINE & REHABILITATION | Admitting: PHYSICAL MEDICINE & REHABILITATION

## 2019-02-11 ENCOUNTER — HOSPITAL ENCOUNTER (OUTPATIENT)
Dept: PHYSICAL THERAPY | Facility: HOSPITAL | Age: 74
Setting detail: RECURRING SERIES
Discharge: HOME | End: 2019-03-27
Attending: ORTHOPAEDIC SURGERY | Admitting: ORTHOPAEDIC SURGERY

## 2019-02-18 ENCOUNTER — HOSPITAL ENCOUNTER (OUTPATIENT)
Dept: PAIN MEDICINE | Facility: HOSPITAL | Age: 74
Discharge: HOME | End: 2019-02-18
Attending: PHYSICAL MEDICINE & REHABILITATION | Admitting: PHYSICAL MEDICINE & REHABILITATION

## 2019-03-21 ENCOUNTER — TRANSCRIBE ORDERS (OUTPATIENT)
Dept: ADMINISTRATIVE | Facility: HOSPITAL | Age: 74
End: 2019-03-21

## 2019-03-21 ENCOUNTER — PREP FOR SURGERY (OUTPATIENT)
Dept: OTHER | Facility: HOSPITAL | Age: 74
End: 2019-03-21

## 2019-03-21 DIAGNOSIS — M25.551 HIP PAIN, CHRONIC, RIGHT: Primary | ICD-10-CM

## 2019-03-21 DIAGNOSIS — G89.29 HIP PAIN, CHRONIC, RIGHT: Primary | ICD-10-CM

## 2019-03-21 DIAGNOSIS — M16.11 PRIMARY OSTEOARTHRITIS OF RIGHT HIP: Primary | ICD-10-CM

## 2019-03-21 RX ORDER — OXYCODONE HCL 10 MG/1
20 TABLET, FILM COATED, EXTENDED RELEASE ORAL ONCE
Status: CANCELLED | OUTPATIENT
Start: 2019-04-23 | End: 2019-03-21

## 2019-03-21 RX ORDER — VANCOMYCIN HYDROCHLORIDE 1 G/200ML
1000 INJECTION, SOLUTION INTRAVENOUS ONCE
Status: CANCELLED | OUTPATIENT
Start: 2019-04-23 | End: 2019-03-21

## 2019-03-21 RX ORDER — CEFAZOLIN SODIUM 2 G/100ML
2 INJECTION, SOLUTION INTRAVENOUS ONCE
Status: CANCELLED | OUTPATIENT
Start: 2019-04-23 | End: 2019-03-21

## 2019-03-21 RX ORDER — ACETAMINOPHEN 500 MG
1000 TABLET ORAL ONCE
Status: CANCELLED | OUTPATIENT
Start: 2019-04-23 | End: 2019-03-21

## 2019-03-31 NOTE — H&P
NIDHI is a 73 year old male Presenting to the office today with complaints of RIGHT hip pain and inability to ambulate.  He has had a recent MRI for a prostate issue and has been diagnosed with RIGHT hip abnormality and has been referred to  me for further evaluation. No history of any malignancy in the prostate.  He  had a left removal of femur hardware with distal femur replacement and total knee arthroplasty  on 08/31/2018.  He is now 7 months status post operation, performed by THIEN CLEMENT MD.  He is doing well with his LEFT knee.  He continues to notice  some weakness in bilateral legs.  He has trouble sitting in a chair and getting up from a chair.  He is  ambulating with the help of a rolling walker.  He does get tired easily if having to walk long distances.  He is here with his brother in the office today.  He has a history of diabetes mellitus type 2, which is controlled with oral medications and insulin injections.   No history of DVT or MRSA infections.  No history of any cardiac problems.  High BMI  Review of Systems:  Positive for: Depression and Persistent Cough.    Patient denies: Abdominal Pain, Bleeding, Chest Pain, Convulsions/Seizure, Decreased Motion, Difficulty Swallowing, Easy Bruisability, Emotional Disturbances, Eyes or Vision Problems, Fecal Incontinence, Fever/Chills, Headaches, Increased Thirst, Increased Hunger, Insomnia, Joint Pain, Nausea/Vomiting, Night Sweats, Poor Balance, Rash, Shortness of Breath, Shortness of Breath While Lying down, Skin Problems, Urinary Retention and Weakness.  Allergies:  * codene (critical)  * no known allergies  Medications:  trazodone hcl 50 mg oral tablet (trazodone hcl)   glipizide 5 mg oral tablet (glipizide)   tamsulosin hcl 0.4 mg oral capsule (tamsulosin hcl)   metoprolol succinate er 25 mg oral tablet extended release 24 hour (metoprolol succinate)   sodium chloride 1 gm oral tablet (sodium chloride)   pain reliever tablet (acetaminophen  tabs)   aripiprazole tablet (aripiprazole tabs)   magnesium oxide 400 mg oral tablet (magnesium oxide)   levothyroxine sodium tablet (levothyroxine sodium tabs)   memantine hcl 5 mg oral tablet (memantine hcl)   Patient History of:  PROSTATE  BLOOD CLOTS/EMBOLISM - NEGATIVE  DIABETES - TYPE 2  CANCER - SHOULDER  Surgical History:  BIOPSY PROSTATE-   LT.KNEE-   Known Family History of:  Negative  Social History:  Social history taken on 03/21/2019 states NIDHI GRULLON is a  73 year old male.  He has never used tobacco products.  He has not fallen in the last 12 months.      Past medical, social, family histories and ROS reviewed today with the patient and changes documented in the chart (03/21/2019).  Referring Dr. VISHAL ESTRADA MD  PCP MICHAEL Ferrell    Physical Exam  Height:  67 in.    Weight:  225 lbs.     BMI:  35.37  Pulse:  102  Blood pressure:  142 / 76 mm Hg    Gait: antalgic               Ambulation: with a walker      Mental/HEENT/Cardio/Skin  The patient's general appearance was well nourished, well hydrated, no acute distress.  Orientation was alert and oritented x 3.  The patient's mood was normal.  A head exam revealed normocephalic/atraumatic.  An eye exam revealed pupils equal.  Pulmonary exam shows normal air exchange, no labored breathing, or shortness of breath.  A skin exam showed normal temperature and color in the area of examination.      Right Hip/Pelvis  RIGHT leg is short by about 10 mm.  Normal:   Neurovascular status is intact.  Sensation in hip is normal.  DP Pulse is 3+.  PT PULSE is 3+.  Capillary Refill is normal.  Reflexes are normal.    ROM  Internal Rotation: <30  External Rotation: <40  Abduction: <45  Adduction: <15  Flexion: <100  Extension: <5    Tenderness  Location: groin  Radiation of Pain: anterior thigh  Pain w/ Palpation: none  Yaz Test: negative  Impingement: negative  Straight Leg Raise: negative    Strength  Quad: normal  Abduction:  normal  Adduction: normal  Flexion: normal  Extension: normal  Positive Stinchfield sign.   Attempted movements of the RIGHT hip are painful and restricted, especially rotations.  Unable to do a Trendelenburg sign.  Due to inability to stand on single leg.    Left Hip/Pelvis          Imaging/Diagnostic Studies  X-rays of the Right Hip [AP;AP pelvis;Frog Lateral] were ordered and reviewed today.      X-rays of the right hip/pelvisX-rays show Protrusio.  osteoarthritis is severe.  Images show bone on bone arthrosis.  He has diffuse degeneration of the hip with signs of some resorption of the femoral head.  I cannot rule out  avascular necrosis/ infection.    MRI report from [facility] on March 4, 2019, has been reviewed by me.  This was an MRI of the prostate with and without contrast.  Abnormal appearance of the RIGHT hip was noticed and a suspicion for septic joint has been expressed.  Impression  Right hip primary osteoarthritis ([id number])    Plan  Options and alternatives were discussed in detail with the patient.  Will rule out infection.   Dedicated MRI Right hip to be scheduled.   Right hip aspiration of hip under fluoroscopy and send fluid for cell count, culture sensitivity.  CRP, ESR at PAT  The patient has reached the point of disability and failed nonoperative management.   The patient is indicated for a RIGHT  total hip replacement .     The likely Risks and benefits of the procedure including but not limited to infection, DVT, pulmonary embolism,  recurrent dislocation, Leg length discrepancy, periprosthetic fractures, possibility of injury to nerves or vessels, tendons, possibility of morbidity and mortality likely  medical risks for stroke and heart attack,  have been discussed in detail.     Despite the risks involved the patient would like to proceed.  The patient  is being scheduled for a RIGHT   Total HIP  arthroplasty by posterior approach   at [facility] on April 23 of  2019. I will plan for frozen section if needed,  if there is any suspicion for infection during surgery, The patient will receive antibiotic spacer.  I will request for Medical and cardiac clearance from Dr. Gisela Sotelo, MARIZOL.  Postoperative DVT prophylaxis - Patient has no high risk factors  Plan for Aspirin.   Preoperative antibiotic prophylaxis - Plan for SCIP protocol with Cephazolin weight based. Will give Vancomycin in addition due to increased BMI.     We discussed the benefits of surgical intervention, as well as alternative treatments.  Potential surgical risks and complications include but are not limited to DVT, infection, neurovascular injury, fracture, implant wear, failure, possible need for revision surgery, loss of motion, dislocation, limb length changes.  Sufficient opportunity was given to discuss the condition and treatment plan with the doctor, and all questions were answered for the patient.  Nonsurgical measures such as injections, medications, or physical therapy may not offer significant relief to this patient.  The discussion lasted 30 minutes.

## 2019-04-08 ENCOUNTER — APPOINTMENT (OUTPATIENT)
Dept: GENERAL RADIOLOGY | Facility: HOSPITAL | Age: 74
End: 2019-04-08

## 2019-04-08 ENCOUNTER — APPOINTMENT (OUTPATIENT)
Dept: PREADMISSION TESTING | Facility: HOSPITAL | Age: 74
End: 2019-04-08

## 2019-04-08 ENCOUNTER — HOSPITAL ENCOUNTER (OUTPATIENT)
Dept: GENERAL RADIOLOGY | Facility: HOSPITAL | Age: 74
Discharge: HOME | End: 2019-04-08

## 2019-04-08 ENCOUNTER — HOSPITAL ENCOUNTER (OUTPATIENT)
Dept: GENERAL RADIOLOGY | Facility: HOSPITAL | Age: 74
Discharge: HOME | End: 2019-04-08
Admitting: ORTHOPAEDIC SURGERY

## 2019-04-08 ENCOUNTER — APPOINTMENT (OUTPATIENT)
Dept: MRI IMAGING | Facility: HOSPITAL | Age: 74
End: 2019-04-08

## 2019-04-08 VITALS
OXYGEN SATURATION: 98 % | WEIGHT: 224.19 LBS | DIASTOLIC BLOOD PRESSURE: 66 MMHG | RESPIRATION RATE: 16 BRPM | SYSTOLIC BLOOD PRESSURE: 128 MMHG | TEMPERATURE: 98.9 F | BODY MASS INDEX: 31.39 KG/M2 | HEART RATE: 95 BPM | HEIGHT: 71 IN

## 2019-04-08 DIAGNOSIS — M16.11 PRIMARY OSTEOARTHRITIS OF RIGHT HIP: ICD-10-CM

## 2019-04-08 LAB
ALBUMIN SERPL-MCNC: 3.9 G/DL (ref 3.5–5.2)
ALBUMIN/GLOB SERPL: 1.1 G/DL
ALP SERPL-CCNC: 139 U/L (ref 39–117)
ALT SERPL W P-5'-P-CCNC: 31 U/L (ref 1–41)
ANION GAP SERPL CALCULATED.3IONS-SCNC: 13.4 MMOL/L
APTT PPP: 27.6 SECONDS (ref 22.7–35.4)
AST SERPL-CCNC: 20 U/L (ref 1–40)
BASOPHILS # BLD AUTO: 0.03 10*3/MM3 (ref 0–0.2)
BASOPHILS NFR BLD AUTO: 0.4 % (ref 0–1.5)
BILIRUB SERPL-MCNC: 0.3 MG/DL (ref 0.2–1.2)
BILIRUB UR QL STRIP: NEGATIVE
BUN BLD-MCNC: 32 MG/DL (ref 8–23)
BUN/CREAT SERPL: 24.6 (ref 7–25)
CALCIUM SPEC-SCNC: 9.8 MG/DL (ref 8.6–10.5)
CHLORIDE SERPL-SCNC: 96 MMOL/L (ref 98–107)
CLARITY UR: CLEAR
CO2 SERPL-SCNC: 23.6 MMOL/L (ref 22–29)
COLOR UR: YELLOW
CREAT BLD-MCNC: 1.3 MG/DL (ref 0.76–1.27)
DEPRECATED RDW RBC AUTO: 48.7 FL (ref 37–54)
EOSINOPHIL # BLD AUTO: 0.22 10*3/MM3 (ref 0–0.4)
EOSINOPHIL NFR BLD AUTO: 2.6 % (ref 0.3–6.2)
ERYTHROCYTE [DISTWIDTH] IN BLOOD BY AUTOMATED COUNT: 15 % (ref 12.3–15.4)
GFR SERPL CREATININE-BSD FRML MDRD: 54 ML/MIN/1.73
GLOBULIN UR ELPH-MCNC: 3.5 GM/DL
GLUCOSE BLD-MCNC: 316 MG/DL (ref 65–99)
GLUCOSE UR STRIP-MCNC: NEGATIVE MG/DL
HBA1C MFR BLD: 7.77 % (ref 4.8–5.6)
HCT VFR BLD AUTO: 43.5 % (ref 37.5–51)
HGB BLD-MCNC: 13.8 G/DL (ref 13–17.7)
HGB UR QL STRIP.AUTO: NEGATIVE
IMM GRANULOCYTES # BLD AUTO: 0.04 10*3/MM3 (ref 0–0.05)
IMM GRANULOCYTES NFR BLD AUTO: 0.5 % (ref 0–0.5)
INR PPP: 0.98 (ref 0.9–1.1)
KETONES UR QL STRIP: NEGATIVE
LEUKOCYTE ESTERASE UR QL STRIP.AUTO: NEGATIVE
LYMPHOCYTES # BLD AUTO: 1.92 10*3/MM3 (ref 0.7–3.1)
LYMPHOCYTES NFR BLD AUTO: 23.1 % (ref 19.6–45.3)
MCH RBC QN AUTO: 28.1 PG (ref 26.6–33)
MCHC RBC AUTO-ENTMCNC: 31.7 G/DL (ref 31.5–35.7)
MCV RBC AUTO: 88.6 FL (ref 79–97)
MONOCYTES # BLD AUTO: 0.61 10*3/MM3 (ref 0.1–0.9)
MONOCYTES NFR BLD AUTO: 7.3 % (ref 5–12)
NEUTROPHILS # BLD AUTO: 5.5 10*3/MM3 (ref 1.4–7)
NEUTROPHILS NFR BLD AUTO: 66.1 % (ref 42.7–76)
NITRITE UR QL STRIP: NEGATIVE
NRBC BLD AUTO-RTO: 0 /100 WBC (ref 0–0)
PH UR STRIP.AUTO: <=5 [PH] (ref 5–8)
PLATELET # BLD AUTO: 282 10*3/MM3 (ref 140–450)
PMV BLD AUTO: 9.7 FL (ref 6–12)
POTASSIUM BLD-SCNC: 4.6 MMOL/L (ref 3.5–5.2)
PROT SERPL-MCNC: 7.4 G/DL (ref 6–8.5)
PROT UR QL STRIP: ABNORMAL
PROTHROMBIN TIME: 12.7 SECONDS (ref 11.7–14.2)
RBC # BLD AUTO: 4.91 10*6/MM3 (ref 4.14–5.8)
SODIUM BLD-SCNC: 133 MMOL/L (ref 136–145)
SP GR UR STRIP: 1.02 (ref 1–1.03)
UROBILINOGEN UR QL STRIP: ABNORMAL
WBC NRBC COR # BLD: 8.32 10*3/MM3 (ref 3.4–10.8)

## 2019-04-08 PROCEDURE — 36415 COLL VENOUS BLD VENIPUNCTURE: CPT

## 2019-04-08 PROCEDURE — 73502 X-RAY EXAM HIP UNI 2-3 VIEWS: CPT

## 2019-04-08 PROCEDURE — 80053 COMPREHEN METABOLIC PANEL: CPT | Performed by: ORTHOPAEDIC SURGERY

## 2019-04-08 PROCEDURE — 93005 ELECTROCARDIOGRAM TRACING: CPT

## 2019-04-08 PROCEDURE — 85730 THROMBOPLASTIN TIME PARTIAL: CPT | Performed by: ORTHOPAEDIC SURGERY

## 2019-04-08 PROCEDURE — 83036 HEMOGLOBIN GLYCOSYLATED A1C: CPT | Performed by: ORTHOPAEDIC SURGERY

## 2019-04-08 PROCEDURE — 85610 PROTHROMBIN TIME: CPT | Performed by: ORTHOPAEDIC SURGERY

## 2019-04-08 PROCEDURE — 85025 COMPLETE CBC W/AUTO DIFF WBC: CPT | Performed by: ORTHOPAEDIC SURGERY

## 2019-04-08 PROCEDURE — 81003 URINALYSIS AUTO W/O SCOPE: CPT | Performed by: ORTHOPAEDIC SURGERY

## 2019-04-08 PROCEDURE — 71046 X-RAY EXAM CHEST 2 VIEWS: CPT

## 2019-04-08 PROCEDURE — 93010 ELECTROCARDIOGRAM REPORT: CPT | Performed by: INTERNAL MEDICINE

## 2019-04-08 RX ORDER — PNV NO.95/FERROUS FUM/FOLIC AC 28MG-0.8MG
TABLET ORAL
COMMUNITY
End: 2019-10-23 | Stop reason: ALTCHOICE

## 2019-04-08 ASSESSMENT — HOOS JR
HOOS JR SCORE: 12
HOOS JR SCORE: 52.965

## 2019-04-08 NOTE — DISCHARGE INSTRUCTIONS
Take the following medications the morning of surgery with a small sip of water:    METOPROLOL  MENANTINE (NAMENDA)  HYDROCODONE IF NEEDED    General Instructions:  • Do not eat solid food after midnight the night before surgery.  • You may drink clear liquids day of surgery but must stop at least one hour before your hospital arrival time @ 0430 AM STOP DRINKING!!!  • It is beneficial for you to have a clear drink that contains carbohydrates the day of surgery.  We suggest  a 12 to 20 ounce bottle of G2 or Powerade Zero for diabetic patients.     Clear liquids are liquids you can see through.  Nothing red in color.     Plain water                               Sports drinks  Sodas                                   Gelatin (Jell-O)  Fruit juices without pulp such as white grape juice and apple juice  Popsicles that contain no fruit or yogurt  Tea or coffee (no cream or milk added)  G2 / Powerade Zero  .   • Patients who avoid smoking, chewing tobacco and alcohol for 4 weeks prior to surgery have a reduced risk of post-operative complications.  Quit smoking as many days before surgery as you can.  • Do not smoke, use chewing tobacco or drink alcohol the day of surgery.   • If applicable bring your C-PAP/ BI-PAP machine.  • Bring any papers given to you in the doctor’s office.  • Wear clean comfortable clothes and socks.  • Do not wear contact lenses or make-up.  Bring a case for your glasses.   • Bring crutches or walker if applicable.  • Remove all piercings.  Leave jewelry and any other valuables at home.  • Hair extensions with metal clips must be removed prior to surgery.  • The Pre-Admission Testing nurse will instruct you to bring medications if unable to obtain an accurate list in Pre-Admission Testing.        Preventing a Surgical Site Infection:  • For 2 to 3 days before surgery, avoid shaving with a razor because the razor can irritate skin and make it easier to develop an infection.    • Any areas of open  skin can increase the risk of a post-operative wound infection by allowing bacteria to enter and travel throughout the body.  Notify your surgeon if you have any skin wounds / rashes even if it is not near the expected surgical site.  The area will need assessed to determine if surgery should be delayed until it is healed.  • The night prior to surgery sleep in a clean bed with clean clothing.  Do not allow pets to sleep with you.  • Shower on the morning of surgery using a fresh bar of anti-bacterial soap (such as Dial) and clean washcloth.  Dry with a clean towel and dress in clean clothing.  • Ask your surgeon if you will be receiving antibiotics prior to surgery.  • Make sure you, your family, and all healthcare providers clean their hands with soap and water or an alcohol based hand  before caring for you or your wound.    Day of surgery:04- ARRIVE @ 0530 AM REPORT TO THE MAIN OR   Upon arrival, a Pre-op nurse and Anesthesiologist will review your health history, obtain vital signs, and answer questions you may have.  The only belongings needed at this time will be your home medications and if applicable your C-PAP/BI-PAP machine.  If you are staying overnight your family can leave the rest of your belongings in the car and bring them to your room later.  A Pre-op nurse will start an IV and you may receive medication in preparation for surgery, including something to help you relax.  Your family will be able to see you in the Pre-op area.  While you are in surgery your family should notify the waiting room  if they leave the waiting room area and provide a contact phone number.    Please be aware that surgery does come with discomfort.  We want to make every effort to control your discomfort so please discuss any uncontrolled symptoms with your nurse.   Your doctor will most likely have prescribed pain medications.      If you are going home after surgery you will receive  individualized written care instructions before being discharged.  A responsible adult must drive you to and from the hospital on the day of your surgery and stay with you for 24 hours.    If you are staying overnight following surgery, you will be transported to your hospital room following the recovery period.  [facility] has all private rooms.    You have received a list of surgical assistants for your reference.  If you have any questions please call Pre-Admission Testing at [phone].  Deductibles and co-payments are collected on the day of service. Please be prepared to pay the required co-pay, deductible or deposit on the day of service as defined by your plan.    2% CHLORAHEXIDINE GLUCONATE* CLOTH  Preparing or “prepping” skin before surgery can reduce the risk of infection at the surgical site. To make the process easier, [facility] has chosen disposable cloths moistened with a rinse-free, 2% Chlorhexidine Gluconate (CHG) antiseptic solution. The steps below outline the prepping process and should be carefully followed.        Use the prep cloth on the area that is circled in the diagram             Directions Night before Surgery 04- PM PRIOR TO SURGERY (RIGHT HIP AREA)  1) Shower using a fresh bar of anti-bacterial soap (such as Dial) and clean washcloth.  Use a clean towel to completely dry your skin.  2) Do not use any lotions, oils or creams on your skin.  3) Open the package and remove 1 cloth, wipe your skin for 30 seconds in a circular motion.  Allow to dry for 3 minutes.  4) Repeat #3 with second cloth.  5) Do not touch your eyes, ears, or mouth with the prep cloth.  6) Allow the wet prep solution to air dry.  7) Discard the prep cloth and wash your hands with soap and water.   8) Dress in clean bed clothes and sleep on fresh clean bed sheets.   9) You may experience some temporary itching after the prep.    Directions Day of Surgery 04- AM PRIOR TO  SURGERY (RIGHT HIP AREA)  1) Repeat steps 1,2,3,4,5,6,7, and 9.   2) Dress in clean clothes before coming to the hospital.    BACTROBAN NASAL OINTMENT  There are many germs normally in your nose. Bactroban is an ointment that will help reduce these germs. Please follow these instructions for Bactroban use:      ____The day before surgery in the morning  [id number]_______    ____The day before surgery in the evening              [id number]_______    ____The day of surgery in the morning    [id number]_______    **Squirt ½ package of Bactroban Ointment onto a cotton applicator and apply to inside of 1st nostril.  Squirt the remaining Bactroban and apply to the inside of the other nostril.

## 2019-04-23 ENCOUNTER — APPOINTMENT (OUTPATIENT)
Dept: GENERAL RADIOLOGY | Facility: HOSPITAL | Age: 74
End: 2019-04-23

## 2019-04-23 ENCOUNTER — HOSPITAL ENCOUNTER (INPATIENT)
Facility: HOSPITAL | Age: 74
LOS: 3 days | Discharge: NURSING, REHAB OR LONG-TERM CARE | End: 2019-04-26
Attending: ORTHOPAEDIC SURGERY | Admitting: ORTHOPAEDIC SURGERY

## 2019-04-23 ENCOUNTER — ANESTHESIA (OUTPATIENT)
Dept: PERIOP | Facility: HOSPITAL | Age: 74
End: 2019-04-23

## 2019-04-23 ENCOUNTER — ANESTHESIA EVENT (OUTPATIENT)
Dept: PERIOP | Facility: HOSPITAL | Age: 74
End: 2019-04-23

## 2019-04-23 DIAGNOSIS — R26.2 DIFFICULTY WALKING: Primary | ICD-10-CM

## 2019-04-23 DIAGNOSIS — M16.11 PRIMARY OSTEOARTHRITIS OF RIGHT HIP: ICD-10-CM

## 2019-04-23 LAB
ANION GAP SERPL CALCULATED.3IONS-SCNC: 14.1 MMOL/L
BUN BLD-MCNC: 29 MG/DL (ref 8–23)
BUN/CREAT SERPL: 18.5 (ref 7–25)
CALCIUM SPEC-SCNC: 8.6 MG/DL (ref 8.6–10.5)
CHLORIDE SERPL-SCNC: 100 MMOL/L (ref 98–107)
CO2 SERPL-SCNC: 22.9 MMOL/L (ref 22–29)
CREAT BLD-MCNC: 1.57 MG/DL (ref 0.76–1.27)
GFR SERPL CREATININE-BSD FRML MDRD: 44 ML/MIN/1.73
GLUCOSE BLD-MCNC: 231 MG/DL (ref 65–99)
GLUCOSE BLDC GLUCOMTR-MCNC: 219 MG/DL (ref 70–130)
GLUCOSE BLDC GLUCOMTR-MCNC: 250 MG/DL (ref 70–130)
GLUCOSE BLDC GLUCOMTR-MCNC: 269 MG/DL (ref 70–130)
GLUCOSE BLDC GLUCOMTR-MCNC: 274 MG/DL (ref 70–130)
GLUCOSE BLDC GLUCOMTR-MCNC: 284 MG/DL (ref 70–130)
GLUCOSE BLDC GLUCOMTR-MCNC: 292 MG/DL (ref 70–130)
POTASSIUM BLD-SCNC: 5 MMOL/L (ref 3.5–5.2)
SODIUM BLD-SCNC: 137 MMOL/L (ref 136–145)

## 2019-04-23 PROCEDURE — 87075 CULTR BACTERIA EXCEPT BLOOD: CPT | Performed by: ORTHOPAEDIC SURGERY

## 2019-04-23 PROCEDURE — 97110 THERAPEUTIC EXERCISES: CPT

## 2019-04-23 PROCEDURE — 87176 TISSUE HOMOGENIZATION CULTR: CPT | Performed by: ORTHOPAEDIC SURGERY

## 2019-04-23 PROCEDURE — 97161 PT EVAL LOW COMPLEX 20 MIN: CPT

## 2019-04-23 PROCEDURE — 25010000002 FENTANYL CITRATE (PF) 100 MCG/2ML SOLUTION: Performed by: NURSE ANESTHETIST, CERTIFIED REGISTERED

## 2019-04-23 PROCEDURE — 82962 GLUCOSE BLOOD TEST: CPT

## 2019-04-23 PROCEDURE — 25010000002 VANCOMYCIN PER 500 MG: Performed by: ORTHOPAEDIC SURGERY

## 2019-04-23 PROCEDURE — 25010000002 PHENYLEPHRINE PER 1 ML: Performed by: NURSE ANESTHETIST, CERTIFIED REGISTERED

## 2019-04-23 PROCEDURE — 25010000002 FENTANYL CITRATE (PF) 100 MCG/2ML SOLUTION: Performed by: ANESTHESIOLOGY

## 2019-04-23 PROCEDURE — 25010000002 PROPOFOL 10 MG/ML EMULSION: Performed by: NURSE ANESTHETIST, CERTIFIED REGISTERED

## 2019-04-23 PROCEDURE — 73501 X-RAY EXAM HIP UNI 1 VIEW: CPT

## 2019-04-23 PROCEDURE — 63710000001 INSULIN LISPRO (HUMAN) PER 5 UNITS: Performed by: HOSPITALIST

## 2019-04-23 PROCEDURE — 25010000002 KETOROLAC TROMETHAMINE PER 15 MG: Performed by: ORTHOPAEDIC SURGERY

## 2019-04-23 PROCEDURE — 25010000003 CEFAZOLIN IN DEXTROSE 2-4 GM/100ML-% SOLUTION: Performed by: ORTHOPAEDIC SURGERY

## 2019-04-23 PROCEDURE — 25010000002 MIDAZOLAM PER 1 MG: Performed by: ANESTHESIOLOGY

## 2019-04-23 PROCEDURE — 25010000002 ONDANSETRON PER 1 MG: Performed by: NURSE ANESTHETIST, CERTIFIED REGISTERED

## 2019-04-23 PROCEDURE — 87070 CULTURE OTHR SPECIMN AEROBIC: CPT | Performed by: ORTHOPAEDIC SURGERY

## 2019-04-23 PROCEDURE — 80048 BASIC METABOLIC PNL TOTAL CA: CPT | Performed by: ORTHOPAEDIC SURGERY

## 2019-04-23 PROCEDURE — 63710000001 INSULIN REGULAR HUMAN PER 5 UNITS: Performed by: ANESTHESIOLOGY

## 2019-04-23 PROCEDURE — 0SR90JZ REPLACEMENT OF RIGHT HIP JOINT WITH SYNTHETIC SUBSTITUTE, OPEN APPROACH: ICD-10-PCS | Performed by: ORTHOPAEDIC SURGERY

## 2019-04-23 PROCEDURE — 25010000002 ROPIVACAINE PER 1 MG: Performed by: ORTHOPAEDIC SURGERY

## 2019-04-23 PROCEDURE — C1776 JOINT DEVICE (IMPLANTABLE): HCPCS | Performed by: ORTHOPAEDIC SURGERY

## 2019-04-23 PROCEDURE — 87205 SMEAR GRAM STAIN: CPT | Performed by: ORTHOPAEDIC SURGERY

## 2019-04-23 DEVICE — IMPLANTABLE DEVICE: Type: IMPLANTABLE DEVICE | Site: HIP | Status: FUNCTIONAL

## 2019-04-23 DEVICE — SCRW HEX LP TRIDENT2 6.5X30MM: Type: IMPLANTABLE DEVICE | Site: HIP | Status: FUNCTIONAL

## 2019-04-23 DEVICE — TOTL HIP HI DEMAND STRYKER: Type: IMPLANTABLE DEVICE | Site: HIP | Status: FUNCTIONAL

## 2019-04-23 DEVICE — SUT FW #2 W/TPR NDL 1/2 CIR 38IN 97CM 26.5MM BLU: Type: IMPLANTABLE DEVICE | Site: HIP | Status: FUNCTIONAL

## 2019-04-23 DEVICE — HD FEM/HIP BIOLOX/DELTA V40 CERAM 28MM: Type: IMPLANTABLE DEVICE | Site: HIP | Status: FUNCTIONAL

## 2019-04-23 DEVICE — STEM FEM ACCOLADE2 V40 127D 37X114X50 SZ7: Type: IMPLANTABLE DEVICE | Site: HIP | Status: FUNCTIONAL

## 2019-04-23 DEVICE — SCRW HEX LP TRIDENT2 6.5X40MM: Type: IMPLANTABLE DEVICE | Site: HIP | Status: FUNCTIONAL

## 2019-04-23 RX ORDER — ONDANSETRON 2 MG/ML
4 INJECTION INTRAMUSCULAR; INTRAVENOUS EVERY 6 HOURS PRN
Status: DISCONTINUED | OUTPATIENT
Start: 2019-04-23 | End: 2019-04-26 | Stop reason: HOSPADM

## 2019-04-23 RX ORDER — ASPIRIN 325 MG
325 TABLET, DELAYED RELEASE (ENTERIC COATED) ORAL EVERY 12 HOURS SCHEDULED
Status: DISCONTINUED | OUTPATIENT
Start: 2019-04-24 | End: 2019-04-26 | Stop reason: HOSPADM

## 2019-04-23 RX ORDER — DIPHENHYDRAMINE HCL 25 MG
25 CAPSULE ORAL
Status: DISCONTINUED | OUTPATIENT
Start: 2019-04-23 | End: 2019-04-23 | Stop reason: HOSPADM

## 2019-04-23 RX ORDER — SODIUM CHLORIDE 9 MG/ML
75 INJECTION, SOLUTION INTRAVENOUS CONTINUOUS
Status: DISCONTINUED | OUTPATIENT
Start: 2019-04-23 | End: 2019-04-24

## 2019-04-23 RX ORDER — HYDROCODONE BITARTRATE AND ACETAMINOPHEN 7.5; 325 MG/1; MG/1
1 TABLET ORAL ONCE AS NEEDED
Status: DISCONTINUED | OUTPATIENT
Start: 2019-04-23 | End: 2019-04-23 | Stop reason: HOSPADM

## 2019-04-23 RX ORDER — OXYCODONE AND ACETAMINOPHEN 7.5; 325 MG/1; MG/1
1 TABLET ORAL ONCE AS NEEDED
Status: DISCONTINUED | OUTPATIENT
Start: 2019-04-23 | End: 2019-04-23 | Stop reason: HOSPADM

## 2019-04-23 RX ORDER — FERROUS SULFATE 325(65) MG
325 TABLET ORAL
Status: DISCONTINUED | OUTPATIENT
Start: 2019-04-23 | End: 2019-04-26 | Stop reason: HOSPADM

## 2019-04-23 RX ORDER — ONDANSETRON 2 MG/ML
INJECTION INTRAMUSCULAR; INTRAVENOUS AS NEEDED
Status: DISCONTINUED | OUTPATIENT
Start: 2019-04-23 | End: 2019-04-23 | Stop reason: SURG

## 2019-04-23 RX ORDER — MEMANTINE HYDROCHLORIDE 5 MG/1
5 TABLET ORAL DAILY
Status: DISCONTINUED | OUTPATIENT
Start: 2019-04-23 | End: 2019-04-26 | Stop reason: HOSPADM

## 2019-04-23 RX ORDER — HYDROMORPHONE HCL 110MG/55ML
2 PATIENT CONTROLLED ANALGESIA SYRINGE INTRAVENOUS EVERY 4 HOURS PRN
Status: DISCONTINUED | OUTPATIENT
Start: 2019-04-23 | End: 2019-04-24

## 2019-04-23 RX ORDER — CEFAZOLIN SODIUM 2 G/100ML
2 INJECTION, SOLUTION INTRAVENOUS ONCE
Status: COMPLETED | OUTPATIENT
Start: 2019-04-23 | End: 2019-04-23

## 2019-04-23 RX ORDER — ACETAMINOPHEN 325 MG/1
325 TABLET ORAL EVERY 4 HOURS PRN
Status: DISCONTINUED | OUTPATIENT
Start: 2019-04-23 | End: 2019-04-26 | Stop reason: HOSPADM

## 2019-04-23 RX ORDER — FENTANYL CITRATE 50 UG/ML
50 INJECTION, SOLUTION INTRAMUSCULAR; INTRAVENOUS
Status: DISCONTINUED | OUTPATIENT
Start: 2019-04-23 | End: 2019-04-23 | Stop reason: HOSPADM

## 2019-04-23 RX ORDER — METOPROLOL SUCCINATE 25 MG/1
25 TABLET, EXTENDED RELEASE ORAL EVERY MORNING
Status: DISCONTINUED | OUTPATIENT
Start: 2019-04-23 | End: 2019-04-26 | Stop reason: HOSPADM

## 2019-04-23 RX ORDER — MAGNESIUM OXIDE 400 MG/1
400 TABLET ORAL 2 TIMES DAILY
Status: DISCONTINUED | OUTPATIENT
Start: 2019-04-23 | End: 2019-04-26 | Stop reason: HOSPADM

## 2019-04-23 RX ORDER — HYDROCODONE BITARTRATE AND ACETAMINOPHEN 7.5; 325 MG/1; MG/1
1 TABLET ORAL EVERY 4 HOURS PRN
Status: DISCONTINUED | OUTPATIENT
Start: 2019-04-23 | End: 2019-04-26 | Stop reason: HOSPADM

## 2019-04-23 RX ORDER — SODIUM CHLORIDE, SODIUM LACTATE, POTASSIUM CHLORIDE, CALCIUM CHLORIDE 600; 310; 30; 20 MG/100ML; MG/100ML; MG/100ML; MG/100ML
9 INJECTION, SOLUTION INTRAVENOUS CONTINUOUS
Status: DISCONTINUED | OUTPATIENT
Start: 2019-04-23 | End: 2019-04-23 | Stop reason: HOSPADM

## 2019-04-23 RX ORDER — TRANEXAMIC ACID 100 MG/ML
INJECTION, SOLUTION INTRAVENOUS AS NEEDED
Status: DISCONTINUED | OUTPATIENT
Start: 2019-04-23 | End: 2019-04-23 | Stop reason: SURG

## 2019-04-23 RX ORDER — BISACODYL 5 MG/1
10 TABLET, DELAYED RELEASE ORAL DAILY PRN
Status: DISCONTINUED | OUTPATIENT
Start: 2019-04-23 | End: 2019-04-26 | Stop reason: HOSPADM

## 2019-04-23 RX ORDER — FLUMAZENIL 0.1 MG/ML
0.2 INJECTION INTRAVENOUS AS NEEDED
Status: DISCONTINUED | OUTPATIENT
Start: 2019-04-23 | End: 2019-04-23 | Stop reason: HOSPADM

## 2019-04-23 RX ORDER — PROMETHAZINE HYDROCHLORIDE 25 MG/1
25 SUPPOSITORY RECTAL ONCE AS NEEDED
Status: DISCONTINUED | OUTPATIENT
Start: 2019-04-23 | End: 2019-04-23 | Stop reason: HOSPADM

## 2019-04-23 RX ORDER — TAMSULOSIN HYDROCHLORIDE 0.4 MG/1
0.4 CAPSULE ORAL NIGHTLY
Status: DISCONTINUED | OUTPATIENT
Start: 2019-04-23 | End: 2019-04-26 | Stop reason: HOSPADM

## 2019-04-23 RX ORDER — PROMETHAZINE HYDROCHLORIDE 25 MG/1
25 TABLET ORAL ONCE AS NEEDED
Status: DISCONTINUED | OUTPATIENT
Start: 2019-04-23 | End: 2019-04-23 | Stop reason: HOSPADM

## 2019-04-23 RX ORDER — SODIUM CHLORIDE 0.9 % (FLUSH) 0.9 %
1-10 SYRINGE (ML) INJECTION AS NEEDED
Status: DISCONTINUED | OUTPATIENT
Start: 2019-04-23 | End: 2019-04-26 | Stop reason: HOSPADM

## 2019-04-23 RX ORDER — VANCOMYCIN HYDROCHLORIDE 1 G/200ML
1000 INJECTION, SOLUTION INTRAVENOUS ONCE
Status: COMPLETED | OUTPATIENT
Start: 2019-04-23 | End: 2019-04-23

## 2019-04-23 RX ORDER — OXYCODONE HCL 10 MG/1
20 TABLET, FILM COATED, EXTENDED RELEASE ORAL ONCE
Status: COMPLETED | OUTPATIENT
Start: 2019-04-23 | End: 2019-04-23

## 2019-04-23 RX ORDER — BISACODYL 10 MG
10 SUPPOSITORY, RECTAL RECTAL DAILY PRN
Status: DISCONTINUED | OUTPATIENT
Start: 2019-04-23 | End: 2019-04-24

## 2019-04-23 RX ORDER — PANTOPRAZOLE SODIUM 40 MG/1
40 TABLET, DELAYED RELEASE ORAL
Status: DISCONTINUED | OUTPATIENT
Start: 2019-04-24 | End: 2019-04-26 | Stop reason: HOSPADM

## 2019-04-23 RX ORDER — LIDOCAINE HYDROCHLORIDE 10 MG/ML
0.5 INJECTION, SOLUTION EPIDURAL; INFILTRATION; INTRACAUDAL; PERINEURAL ONCE AS NEEDED
Status: DISCONTINUED | OUTPATIENT
Start: 2019-04-23 | End: 2019-04-23 | Stop reason: HOSPADM

## 2019-04-23 RX ORDER — SODIUM CHLORIDE 0.9 % (FLUSH) 0.9 %
3 SYRINGE (ML) INJECTION EVERY 12 HOURS SCHEDULED
Status: DISCONTINUED | OUTPATIENT
Start: 2019-04-23 | End: 2019-04-26 | Stop reason: HOSPADM

## 2019-04-23 RX ORDER — UREA 10 %
1 LOTION (ML) TOPICAL NIGHTLY PRN
Status: DISCONTINUED | OUTPATIENT
Start: 2019-04-23 | End: 2019-04-26 | Stop reason: HOSPADM

## 2019-04-23 RX ORDER — MIDAZOLAM HYDROCHLORIDE 1 MG/ML
1 INJECTION INTRAMUSCULAR; INTRAVENOUS
Status: DISCONTINUED | OUTPATIENT
Start: 2019-04-23 | End: 2019-04-23 | Stop reason: HOSPADM

## 2019-04-23 RX ORDER — SODIUM CHLORIDE 0.9 % (FLUSH) 0.9 %
1-10 SYRINGE (ML) INJECTION AS NEEDED
Status: DISCONTINUED | OUTPATIENT
Start: 2019-04-23 | End: 2019-04-23 | Stop reason: HOSPADM

## 2019-04-23 RX ORDER — GLIPIZIDE 5 MG/1
5 TABLET ORAL EVERY MORNING
Status: DISCONTINUED | OUTPATIENT
Start: 2019-04-23 | End: 2019-04-24

## 2019-04-23 RX ORDER — ONDANSETRON 4 MG/1
4 TABLET, FILM COATED ORAL EVERY 6 HOURS PRN
Status: DISCONTINUED | OUTPATIENT
Start: 2019-04-23 | End: 2019-04-24

## 2019-04-23 RX ORDER — ONDANSETRON 2 MG/ML
4 INJECTION INTRAMUSCULAR; INTRAVENOUS ONCE AS NEEDED
Status: DISCONTINUED | OUTPATIENT
Start: 2019-04-23 | End: 2019-04-23 | Stop reason: HOSPADM

## 2019-04-23 RX ORDER — TRAZODONE HYDROCHLORIDE 50 MG/1
50 TABLET ORAL NIGHTLY PRN
Status: DISCONTINUED | OUTPATIENT
Start: 2019-04-23 | End: 2019-04-26 | Stop reason: HOSPADM

## 2019-04-23 RX ORDER — HYDROMORPHONE HYDROCHLORIDE 1 MG/ML
0.5 INJECTION, SOLUTION INTRAMUSCULAR; INTRAVENOUS; SUBCUTANEOUS
Status: DISCONTINUED | OUTPATIENT
Start: 2019-04-23 | End: 2019-04-23 | Stop reason: HOSPADM

## 2019-04-23 RX ORDER — PROPOFOL 10 MG/ML
VIAL (ML) INTRAVENOUS AS NEEDED
Status: DISCONTINUED | OUTPATIENT
Start: 2019-04-23 | End: 2019-04-23 | Stop reason: SURG

## 2019-04-23 RX ORDER — BISACODYL 5 MG/1
10 TABLET, DELAYED RELEASE ORAL DAILY PRN
Status: DISCONTINUED | OUTPATIENT
Start: 2019-04-23 | End: 2019-04-23 | Stop reason: SDUPTHER

## 2019-04-23 RX ORDER — HYDROCODONE BITARTRATE AND ACETAMINOPHEN 7.5; 325 MG/1; MG/1
2 TABLET ORAL EVERY 4 HOURS PRN
Status: DISCONTINUED | OUTPATIENT
Start: 2019-04-23 | End: 2019-04-26 | Stop reason: HOSPADM

## 2019-04-23 RX ORDER — ACETAMINOPHEN 325 MG/1
650 TABLET ORAL ONCE AS NEEDED
Status: DISCONTINUED | OUTPATIENT
Start: 2019-04-23 | End: 2019-04-23 | Stop reason: HOSPADM

## 2019-04-23 RX ORDER — ROCURONIUM BROMIDE 10 MG/ML
INJECTION, SOLUTION INTRAVENOUS AS NEEDED
Status: DISCONTINUED | OUTPATIENT
Start: 2019-04-23 | End: 2019-04-23 | Stop reason: SURG

## 2019-04-23 RX ORDER — ARIPIPRAZOLE 10 MG/1
20 TABLET ORAL EVERY MORNING
Status: DISCONTINUED | OUTPATIENT
Start: 2019-04-23 | End: 2019-04-26 | Stop reason: HOSPADM

## 2019-04-23 RX ORDER — MAGNESIUM HYDROXIDE 1200 MG/15ML
LIQUID ORAL AS NEEDED
Status: DISCONTINUED | OUTPATIENT
Start: 2019-04-23 | End: 2019-04-23 | Stop reason: HOSPADM

## 2019-04-23 RX ORDER — NALOXONE HCL 0.4 MG/ML
0.1 VIAL (ML) INJECTION
Status: DISCONTINUED | OUTPATIENT
Start: 2019-04-23 | End: 2019-04-24

## 2019-04-23 RX ORDER — LIDOCAINE HYDROCHLORIDE 20 MG/ML
INJECTION, SOLUTION INFILTRATION; PERINEURAL AS NEEDED
Status: DISCONTINUED | OUTPATIENT
Start: 2019-04-23 | End: 2019-04-23 | Stop reason: SURG

## 2019-04-23 RX ORDER — ACETAMINOPHEN 500 MG
1000 TABLET ORAL ONCE
Status: COMPLETED | OUTPATIENT
Start: 2019-04-23 | End: 2019-04-23

## 2019-04-23 RX ORDER — CEFAZOLIN SODIUM 2 G/100ML
2 INJECTION, SOLUTION INTRAVENOUS EVERY 8 HOURS
Status: COMPLETED | OUTPATIENT
Start: 2019-04-23 | End: 2019-04-23

## 2019-04-23 RX ORDER — PROMETHAZINE HYDROCHLORIDE 25 MG/ML
12.5 INJECTION, SOLUTION INTRAMUSCULAR; INTRAVENOUS ONCE AS NEEDED
Status: DISCONTINUED | OUTPATIENT
Start: 2019-04-23 | End: 2019-04-23 | Stop reason: HOSPADM

## 2019-04-23 RX ORDER — NALOXONE HCL 0.4 MG/ML
0.2 VIAL (ML) INJECTION AS NEEDED
Status: DISCONTINUED | OUTPATIENT
Start: 2019-04-23 | End: 2019-04-23 | Stop reason: HOSPADM

## 2019-04-23 RX ORDER — MIDAZOLAM HYDROCHLORIDE 1 MG/ML
2 INJECTION INTRAMUSCULAR; INTRAVENOUS
Status: DISCONTINUED | OUTPATIENT
Start: 2019-04-23 | End: 2019-04-23 | Stop reason: HOSPADM

## 2019-04-23 RX ORDER — LEVOTHYROXINE SODIUM 0.05 MG/1
50 TABLET ORAL EVERY MORNING
Status: DISCONTINUED | OUTPATIENT
Start: 2019-04-23 | End: 2019-04-26 | Stop reason: HOSPADM

## 2019-04-23 RX ORDER — DOCUSATE SODIUM 100 MG/1
100 CAPSULE, LIQUID FILLED ORAL 2 TIMES DAILY PRN
Status: DISCONTINUED | OUTPATIENT
Start: 2019-04-23 | End: 2019-04-26 | Stop reason: HOSPADM

## 2019-04-23 RX ORDER — EPHEDRINE SULFATE 50 MG/ML
5 INJECTION, SOLUTION INTRAVENOUS ONCE AS NEEDED
Status: DISCONTINUED | OUTPATIENT
Start: 2019-04-23 | End: 2019-04-23 | Stop reason: HOSPADM

## 2019-04-23 RX ORDER — MEPERIDINE HYDROCHLORIDE 25 MG/ML
12.5 INJECTION INTRAMUSCULAR; INTRAVENOUS; SUBCUTANEOUS
Status: DISCONTINUED | OUTPATIENT
Start: 2019-04-23 | End: 2019-04-23 | Stop reason: HOSPADM

## 2019-04-23 RX ORDER — FAMOTIDINE 10 MG/ML
20 INJECTION, SOLUTION INTRAVENOUS ONCE
Status: COMPLETED | OUTPATIENT
Start: 2019-04-23 | End: 2019-04-23

## 2019-04-23 RX ADMIN — PHENYLEPHRINE HYDROCHLORIDE 100 MCG: 10 INJECTION INTRAVENOUS at 09:33

## 2019-04-23 RX ADMIN — HYDROCODONE BITARTRATE AND ACETAMINOPHEN 1 TABLET: 7.5; 325 TABLET ORAL at 12:04

## 2019-04-23 RX ADMIN — FENTANYL CITRATE 100 MCG: 50 INJECTION INTRAMUSCULAR; INTRAVENOUS at 07:09

## 2019-04-23 RX ADMIN — SODIUM CHLORIDE, PRESERVATIVE FREE 3 ML: 5 INJECTION INTRAVENOUS at 20:35

## 2019-04-23 RX ADMIN — ARIPIPRAZOLE 20 MG: 10 TABLET ORAL at 14:51

## 2019-04-23 RX ADMIN — PROPOFOL 150 MG: 10 INJECTION, EMULSION INTRAVENOUS at 07:10

## 2019-04-23 RX ADMIN — HYDROCODONE BITARTRATE AND ACETAMINOPHEN 2 TABLET: 7.5; 325 TABLET ORAL at 15:47

## 2019-04-23 RX ADMIN — CEFAZOLIN SODIUM 2 G: 2 INJECTION, SOLUTION INTRAVENOUS at 14:53

## 2019-04-23 RX ADMIN — GLIPIZIDE 5 MG: 5 TABLET ORAL at 17:59

## 2019-04-23 RX ADMIN — PHENYLEPHRINE HYDROCHLORIDE 100 MCG: 10 INJECTION INTRAVENOUS at 08:48

## 2019-04-23 RX ADMIN — PHENYLEPHRINE HYDROCHLORIDE 200 MCG: 10 INJECTION INTRAVENOUS at 08:58

## 2019-04-23 RX ADMIN — TRANEXAMIC ACID 1000 MG: 100 INJECTION, SOLUTION INTRAVENOUS at 07:40

## 2019-04-23 RX ADMIN — PHENYLEPHRINE HYDROCHLORIDE 200 MCG: 10 INJECTION INTRAVENOUS at 09:21

## 2019-04-23 RX ADMIN — Medication 400 MG: at 17:59

## 2019-04-23 RX ADMIN — PHENYLEPHRINE HYDROCHLORIDE 100 MCG: 10 INJECTION INTRAVENOUS at 08:31

## 2019-04-23 RX ADMIN — VANCOMYCIN HYDROCHLORIDE 1000 MG: 1 INJECTION, SOLUTION INTRAVENOUS at 06:24

## 2019-04-23 RX ADMIN — LIDOCAINE HYDROCHLORIDE 80 MG: 20 INJECTION, SOLUTION INFILTRATION; PERINEURAL at 07:10

## 2019-04-23 RX ADMIN — PHENYLEPHRINE HYDROCHLORIDE 200 MCG: 10 INJECTION INTRAVENOUS at 08:38

## 2019-04-23 RX ADMIN — PHENYLEPHRINE HYDROCHLORIDE 100 MCG: 10 INJECTION INTRAVENOUS at 08:34

## 2019-04-23 RX ADMIN — FENTANYL CITRATE 50 MCG: 50 INJECTION, SOLUTION INTRAMUSCULAR; INTRAVENOUS at 10:30

## 2019-04-23 RX ADMIN — ACETAMINOPHEN 1000 MG: 500 TABLET, FILM COATED ORAL at 06:08

## 2019-04-23 RX ADMIN — HYDROCODONE BITARTRATE AND ACETAMINOPHEN 2 TABLET: 7.5; 325 TABLET ORAL at 20:35

## 2019-04-23 RX ADMIN — ROCURONIUM BROMIDE 50 MG: 10 INJECTION INTRAVENOUS at 07:10

## 2019-04-23 RX ADMIN — SODIUM CHLORIDE, POTASSIUM CHLORIDE, SODIUM LACTATE AND CALCIUM CHLORIDE 9 ML/HR: 600; 310; 30; 20 INJECTION, SOLUTION INTRAVENOUS at 06:24

## 2019-04-23 RX ADMIN — OXYCODONE HYDROCHLORIDE 20 MG: 10 TABLET, FILM COATED, EXTENDED RELEASE ORAL at 06:23

## 2019-04-23 RX ADMIN — PHENYLEPHRINE HYDROCHLORIDE 100 MCG: 10 INJECTION INTRAVENOUS at 08:11

## 2019-04-23 RX ADMIN — SODIUM CHLORIDE, POTASSIUM CHLORIDE, SODIUM LACTATE AND CALCIUM CHLORIDE: 600; 310; 30; 20 INJECTION, SOLUTION INTRAVENOUS at 08:41

## 2019-04-23 RX ADMIN — CEFAZOLIN SODIUM 2 G: 2 INJECTION, SOLUTION INTRAVENOUS at 07:22

## 2019-04-23 RX ADMIN — MEMANTINE HYDROCHLORIDE 5 MG: 5 TABLET, FILM COATED ORAL at 14:51

## 2019-04-23 RX ADMIN — CEFAZOLIN SODIUM 2 G: 2 INJECTION, SOLUTION INTRAVENOUS at 22:19

## 2019-04-23 RX ADMIN — TAMSULOSIN HYDROCHLORIDE 0.4 MG: 0.4 CAPSULE ORAL at 20:35

## 2019-04-23 RX ADMIN — FENTANYL CITRATE 25 MCG: 50 INJECTION INTRAMUSCULAR; INTRAVENOUS at 07:43

## 2019-04-23 RX ADMIN — INSULIN LISPRO 4 UNITS: 100 INJECTION, SOLUTION INTRAVENOUS; SUBCUTANEOUS at 16:59

## 2019-04-23 RX ADMIN — INSULIN LISPRO 4 UNITS: 100 INJECTION, SOLUTION INTRAVENOUS; SUBCUTANEOUS at 22:08

## 2019-04-23 RX ADMIN — PHENYLEPHRINE HYDROCHLORIDE 100 MCG: 10 INJECTION INTRAVENOUS at 09:43

## 2019-04-23 RX ADMIN — FAMOTIDINE 20 MG: 10 INJECTION INTRAVENOUS at 06:34

## 2019-04-23 RX ADMIN — INSULIN HUMAN 10 UNITS: 100 INJECTION, SOLUTION PARENTERAL at 10:15

## 2019-04-23 RX ADMIN — LEVOTHYROXINE SODIUM 50 MCG: 50 TABLET ORAL at 14:51

## 2019-04-23 RX ADMIN — FERROUS SULFATE TAB 325 MG (65 MG ELEMENTAL FE) 325 MG: 325 (65 FE) TAB at 14:51

## 2019-04-23 RX ADMIN — SODIUM CHLORIDE 100 ML/HR: 9 INJECTION, SOLUTION INTRAVENOUS at 11:48

## 2019-04-23 RX ADMIN — MIDAZOLAM 0.5 MG: 1 INJECTION INTRAMUSCULAR; INTRAVENOUS at 06:35

## 2019-04-23 RX ADMIN — ONDANSETRON 4 MG: 2 INJECTION INTRAMUSCULAR; INTRAVENOUS at 09:30

## 2019-04-23 NOTE — ANESTHESIA PREPROCEDURE EVALUATION
Anesthesia Evaluation     Patient summary reviewed and Nursing notes reviewed                Airway   Mallampati: II  No difficulty expected  Dental    (+) poor dentition and upper dentures        Pulmonary - negative pulmonary ROS   Cardiovascular     ECG reviewed  Rhythm: regular  Rate: normal    (+) hypertension,       Neuro/Psych  (+) psychiatric history Depression, dementia,     GI/Hepatic/Renal/Endo    (+)  GERD,  diabetes mellitus type 2, hypothyroidism,     Musculoskeletal     Abdominal    Substance History - negative use     OB/GYN negative ob/gyn ROS         Other   (+) arthritis                     Anesthesia Plan    ASA 3     general   (Patient prefers GA vs SAB)  intravenous induction   Anesthetic plan, all risks, benefits, and alternatives have been provided, discussed and informed consent has been obtained with: patient.

## 2019-04-23 NOTE — ANESTHESIA PROCEDURE NOTES
Airway  Urgency: elective    Date/Time: 4/23/2019 7:11 AM  Airway not difficult    General Information and Staff    Patient location during procedure: OR  Anesthesiologist: Vinny Kowalski MD  CRNA: Alexia Heredia CRNA    Indications and Patient Condition  Indications for airway management: airway protection    Preoxygenated: yes  Mask difficulty assessment: 1 - vent by mask    Final Airway Details  Final airway type: endotracheal airway      Successful airway: ETT  Cuffed: yes   Successful intubation technique: direct laryngoscopy  Endotracheal tube insertion site: oral  Blade: Melvin  Blade size: 4  ETT size (mm): 7.5  Cormack-Lehane Classification: grade I - full view of glottis  Placement verified by: chest auscultation and capnometry   Cuff volume (mL): 5  Measured from: lips  ETT to lips (cm): 21  Number of attempts at approach: 1    Additional Comments  Smooth IV induction. Trachea intubated. Cuff up. Dentition intact without injury.

## 2019-04-23 NOTE — ANESTHESIA POSTPROCEDURE EVALUATION
"Patient: Chris Ferguson    Procedure Summary     Date:  04/23/19 Room / Location:  [facility] OR  / [facility] MAIN OR    Anesthesia Start:  0704 Anesthesia Stop:  0959    Procedure:  TOTAL HIP ARTHROPLASTY POSTERIOR (Right Hip) Diagnosis:       Primary osteoarthritis of right hip      (Primary osteoarthritis of right hip [M16.11])    Surgeon:  Valente Giang MD Provider:  Vinny Kowalski MD    Anesthesia Type:  general ASA Status:  3          Anesthesia Type: general  Last vitals  BP   138/76 (04/23/19 1030)   Temp   36.5 °C (97.7 °F) (04/23/19 0955)   Pulse   95 (04/23/19 1030)   Resp   16 (04/23/19 1030)     SpO2   96 % (04/23/19 1030)     Post Anesthesia Care and Evaluation    Patient location during evaluation: PACU  Patient participation: complete - patient participated  Level of consciousness: awake and alert  Pain management: adequate  Airway patency: patent  Anesthetic complications: No anesthetic complications    Cardiovascular status: acceptable  Respiratory status: acceptable  Hydration status: acceptable    Comments: /76 (BP Location: Left arm, Patient Position: Lying)   Pulse 95   Temp 36.5 °C (97.7 °F) (Oral)   Resp 16   Ht 177.8 cm (70\")   Wt 99.3 kg (218 lb 14.7 oz)   SpO2 96%   BMI 31.41 kg/m²       "

## 2019-04-24 PROBLEM — M16.11 PRIMARY OSTEOARTHRITIS OF RIGHT HIP: Status: RESOLVED | Noted: 2019-03-21 | Resolved: 2019-04-24

## 2019-04-24 LAB
ALBUMIN SERPL-MCNC: 3.2 G/DL (ref 3.5–5.2)
ALBUMIN/GLOB SERPL: 1 G/DL
ALP SERPL-CCNC: 118 U/L (ref 39–117)
ALT SERPL W P-5'-P-CCNC: 18 U/L (ref 1–41)
ANION GAP SERPL CALCULATED.3IONS-SCNC: 12.8 MMOL/L
AST SERPL-CCNC: 24 U/L (ref 1–40)
BASOPHILS # BLD AUTO: 0.03 10*3/MM3 (ref 0–0.2)
BASOPHILS NFR BLD AUTO: 0.3 % (ref 0–1.5)
BILIRUB SERPL-MCNC: 0.2 MG/DL (ref 0.2–1.2)
BUN BLD-MCNC: 39 MG/DL (ref 8–23)
BUN/CREAT SERPL: 19 (ref 7–25)
CALCIUM SPEC-SCNC: 7.8 MG/DL (ref 8.6–10.5)
CHLORIDE SERPL-SCNC: 100 MMOL/L (ref 98–107)
CHOLEST SERPL-MCNC: 227 MG/DL (ref 0–200)
CO2 SERPL-SCNC: 23.2 MMOL/L (ref 22–29)
CREAT BLD-MCNC: 2.05 MG/DL (ref 0.76–1.27)
DEPRECATED RDW RBC AUTO: 49.3 FL (ref 37–54)
EOSINOPHIL # BLD AUTO: 0.07 10*3/MM3 (ref 0–0.4)
EOSINOPHIL NFR BLD AUTO: 0.6 % (ref 0.3–6.2)
ERYTHROCYTE [DISTWIDTH] IN BLOOD BY AUTOMATED COUNT: 15 % (ref 12.3–15.4)
GFR SERPL CREATININE-BSD FRML MDRD: 32 ML/MIN/1.73
GLOBULIN UR ELPH-MCNC: 3.1 GM/DL
GLUCOSE BLD-MCNC: 231 MG/DL (ref 65–99)
GLUCOSE BLDC GLUCOMTR-MCNC: 234 MG/DL (ref 70–130)
GLUCOSE BLDC GLUCOMTR-MCNC: 246 MG/DL (ref 70–130)
GLUCOSE BLDC GLUCOMTR-MCNC: 259 MG/DL (ref 70–130)
GLUCOSE BLDC GLUCOMTR-MCNC: 353 MG/DL (ref 70–130)
HBA1C MFR BLD: 7.94 % (ref 4.8–5.6)
HCT VFR BLD AUTO: 36 % (ref 37.5–51)
HDLC SERPL-MCNC: 42 MG/DL (ref 40–60)
HGB BLD-MCNC: 11.2 G/DL (ref 13–17.7)
IMM GRANULOCYTES # BLD AUTO: 0.05 10*3/MM3 (ref 0–0.05)
IMM GRANULOCYTES NFR BLD AUTO: 0.5 % (ref 0–0.5)
LDLC SERPL CALC-MCNC: 146 MG/DL (ref 0–100)
LDLC/HDLC SERPL: 3.48 {RATIO}
LYMPHOCYTES # BLD AUTO: 1.13 10*3/MM3 (ref 0.7–3.1)
LYMPHOCYTES NFR BLD AUTO: 10.4 % (ref 19.6–45.3)
MCH RBC QN AUTO: 28 PG (ref 26.6–33)
MCHC RBC AUTO-ENTMCNC: 31.1 G/DL (ref 31.5–35.7)
MCV RBC AUTO: 90 FL (ref 79–97)
MONOCYTES # BLD AUTO: 0.78 10*3/MM3 (ref 0.1–0.9)
MONOCYTES NFR BLD AUTO: 7.2 % (ref 5–12)
NEUTROPHILS # BLD AUTO: 8.84 10*3/MM3 (ref 1.7–7)
NEUTROPHILS NFR BLD AUTO: 81 % (ref 42.7–76)
NRBC BLD AUTO-RTO: 0 /100 WBC (ref 0–0.2)
NT-PROBNP SERPL-MCNC: 141.2 PG/ML (ref 5–900)
PLATELET # BLD AUTO: 226 10*3/MM3 (ref 140–450)
PMV BLD AUTO: 9.5 FL (ref 6–12)
POTASSIUM BLD-SCNC: 5 MMOL/L (ref 3.5–5.2)
PROT SERPL-MCNC: 6.3 G/DL (ref 6–8.5)
RBC # BLD AUTO: 4 10*6/MM3 (ref 4.14–5.8)
SODIUM BLD-SCNC: 136 MMOL/L (ref 136–145)
TRIGL SERPL-MCNC: 194 MG/DL (ref 0–150)
TSH SERPL DL<=0.05 MIU/L-ACNC: 3.15 MIU/ML (ref 0.27–4.2)
VLDLC SERPL-MCNC: 38.8 MG/DL (ref 5–40)
WBC NRBC COR # BLD: 10.9 10*3/MM3 (ref 3.4–10.8)

## 2019-04-24 PROCEDURE — 80053 COMPREHEN METABOLIC PANEL: CPT | Performed by: HOSPITALIST

## 2019-04-24 PROCEDURE — 97165 OT EVAL LOW COMPLEX 30 MIN: CPT

## 2019-04-24 PROCEDURE — 82962 GLUCOSE BLOOD TEST: CPT

## 2019-04-24 PROCEDURE — 97110 THERAPEUTIC EXERCISES: CPT

## 2019-04-24 PROCEDURE — 63710000001 INSULIN LISPRO (HUMAN) PER 5 UNITS: Performed by: HOSPITALIST

## 2019-04-24 PROCEDURE — 84443 ASSAY THYROID STIM HORMONE: CPT | Performed by: HOSPITALIST

## 2019-04-24 PROCEDURE — 97535 SELF CARE MNGMENT TRAINING: CPT

## 2019-04-24 PROCEDURE — 80061 LIPID PANEL: CPT | Performed by: HOSPITALIST

## 2019-04-24 PROCEDURE — 83036 HEMOGLOBIN GLYCOSYLATED A1C: CPT | Performed by: HOSPITALIST

## 2019-04-24 PROCEDURE — 97150 GROUP THERAPEUTIC PROCEDURES: CPT

## 2019-04-24 PROCEDURE — 83880 ASSAY OF NATRIURETIC PEPTIDE: CPT | Performed by: HOSPITALIST

## 2019-04-24 PROCEDURE — 85025 COMPLETE CBC W/AUTO DIFF WBC: CPT | Performed by: HOSPITALIST

## 2019-04-24 RX ORDER — HYDROCODONE BITARTRATE AND ACETAMINOPHEN 7.5; 325 MG/1; MG/1
TABLET ORAL
Qty: 60 TABLET | Refills: 0 | Status: SHIPPED | OUTPATIENT
Start: 2019-04-24 | End: 2019-06-25 | Stop reason: ALTCHOICE

## 2019-04-24 RX ORDER — GLIPIZIDE 10 MG/1
10 TABLET ORAL EVERY MORNING
Status: DISCONTINUED | OUTPATIENT
Start: 2019-04-25 | End: 2019-04-25

## 2019-04-24 RX ADMIN — HYDROCODONE BITARTRATE AND ACETAMINOPHEN 1 TABLET: 7.5; 325 TABLET ORAL at 12:23

## 2019-04-24 RX ADMIN — HYDROCODONE BITARTRATE AND ACETAMINOPHEN 2 TABLET: 7.5; 325 TABLET ORAL at 19:23

## 2019-04-24 RX ADMIN — METOPROLOL SUCCINATE 25 MG: 25 TABLET, FILM COATED, EXTENDED RELEASE ORAL at 08:03

## 2019-04-24 RX ADMIN — HYDROCODONE BITARTRATE AND ACETAMINOPHEN 1 TABLET: 7.5; 325 TABLET ORAL at 08:04

## 2019-04-24 RX ADMIN — SODIUM CHLORIDE, PRESERVATIVE FREE 3 ML: 5 INJECTION INTRAVENOUS at 08:05

## 2019-04-24 RX ADMIN — MEMANTINE HYDROCHLORIDE 5 MG: 5 TABLET, FILM COATED ORAL at 08:03

## 2019-04-24 RX ADMIN — FERROUS SULFATE TAB 325 MG (65 MG ELEMENTAL FE) 325 MG: 325 (65 FE) TAB at 08:03

## 2019-04-24 RX ADMIN — TAMSULOSIN HYDROCHLORIDE 0.4 MG: 0.4 CAPSULE ORAL at 21:13

## 2019-04-24 RX ADMIN — SODIUM CHLORIDE, PRESERVATIVE FREE 3 ML: 5 INJECTION INTRAVENOUS at 21:14

## 2019-04-24 RX ADMIN — LEVOTHYROXINE SODIUM 50 MCG: 50 TABLET ORAL at 06:23

## 2019-04-24 RX ADMIN — Medication 400 MG: at 08:03

## 2019-04-24 RX ADMIN — ASPIRIN 325 MG: 325 TABLET, COATED ORAL at 21:13

## 2019-04-24 RX ADMIN — GLIPIZIDE 5 MG: 5 TABLET ORAL at 08:03

## 2019-04-24 RX ADMIN — HYDROCODONE BITARTRATE AND ACETAMINOPHEN 2 TABLET: 7.5; 325 TABLET ORAL at 04:05

## 2019-04-24 RX ADMIN — Medication 400 MG: at 21:13

## 2019-04-24 RX ADMIN — PANTOPRAZOLE SODIUM 40 MG: 40 TABLET, DELAYED RELEASE ORAL at 06:10

## 2019-04-24 RX ADMIN — INSULIN LISPRO 6 UNITS: 100 INJECTION, SOLUTION INTRAVENOUS; SUBCUTANEOUS at 12:23

## 2019-04-24 RX ADMIN — INSULIN LISPRO 3 UNITS: 100 INJECTION, SOLUTION INTRAVENOUS; SUBCUTANEOUS at 21:13

## 2019-04-24 RX ADMIN — INSULIN LISPRO 3 UNITS: 100 INJECTION, SOLUTION INTRAVENOUS; SUBCUTANEOUS at 08:04

## 2019-04-24 RX ADMIN — INSULIN LISPRO 4 UNITS: 100 INJECTION, SOLUTION INTRAVENOUS; SUBCUTANEOUS at 18:42

## 2019-04-24 RX ADMIN — ARIPIPRAZOLE 20 MG: 10 TABLET ORAL at 08:03

## 2019-04-24 RX ADMIN — ASPIRIN 325 MG: 325 TABLET, COATED ORAL at 08:03

## 2019-04-25 LAB
ANION GAP SERPL CALCULATED.3IONS-SCNC: 11 MMOL/L
BASOPHILS # BLD AUTO: 0.02 10*3/MM3 (ref 0–0.2)
BASOPHILS NFR BLD AUTO: 0.3 % (ref 0–1.5)
BUN BLD-MCNC: 41 MG/DL (ref 8–23)
BUN/CREAT SERPL: 24 (ref 7–25)
CALCIUM SPEC-SCNC: 8 MG/DL (ref 8.6–10.5)
CHLORIDE SERPL-SCNC: 102 MMOL/L (ref 98–107)
CO2 SERPL-SCNC: 23 MMOL/L (ref 22–29)
CREAT BLD-MCNC: 1.71 MG/DL (ref 0.76–1.27)
DEPRECATED RDW RBC AUTO: 48 FL (ref 37–54)
EOSINOPHIL # BLD AUTO: 0.23 10*3/MM3 (ref 0–0.4)
EOSINOPHIL NFR BLD AUTO: 3 % (ref 0.3–6.2)
ERYTHROCYTE [DISTWIDTH] IN BLOOD BY AUTOMATED COUNT: 14.8 % (ref 12.3–15.4)
GFR SERPL CREATININE-BSD FRML MDRD: 40 ML/MIN/1.73
GLUCOSE BLD-MCNC: 163 MG/DL (ref 65–99)
GLUCOSE BLDC GLUCOMTR-MCNC: 174 MG/DL (ref 70–130)
GLUCOSE BLDC GLUCOMTR-MCNC: 189 MG/DL (ref 70–130)
GLUCOSE BLDC GLUCOMTR-MCNC: 204 MG/DL (ref 70–130)
GLUCOSE BLDC GLUCOMTR-MCNC: 282 MG/DL (ref 70–130)
HCT VFR BLD AUTO: 31.6 % (ref 37.5–51)
HGB BLD-MCNC: 10 G/DL (ref 13–17.7)
IMM GRANULOCYTES # BLD AUTO: 0.04 10*3/MM3 (ref 0–0.05)
IMM GRANULOCYTES NFR BLD AUTO: 0.5 % (ref 0–0.5)
LYMPHOCYTES # BLD AUTO: 1.38 10*3/MM3 (ref 0.7–3.1)
LYMPHOCYTES NFR BLD AUTO: 17.9 % (ref 19.6–45.3)
MCH RBC QN AUTO: 27.9 PG (ref 26.6–33)
MCHC RBC AUTO-ENTMCNC: 31.6 G/DL (ref 31.5–35.7)
MCV RBC AUTO: 88.3 FL (ref 79–97)
MONOCYTES # BLD AUTO: 0.55 10*3/MM3 (ref 0.1–0.9)
MONOCYTES NFR BLD AUTO: 7.1 % (ref 5–12)
NEUTROPHILS # BLD AUTO: 5.51 10*3/MM3 (ref 1.7–7)
NEUTROPHILS NFR BLD AUTO: 71.2 % (ref 42.7–76)
NRBC BLD AUTO-RTO: 0 /100 WBC (ref 0–0.2)
PLATELET # BLD AUTO: 202 10*3/MM3 (ref 140–450)
PMV BLD AUTO: 9.3 FL (ref 6–12)
POTASSIUM BLD-SCNC: 4.8 MMOL/L (ref 3.5–5.2)
RBC # BLD AUTO: 3.58 10*6/MM3 (ref 4.14–5.8)
SODIUM BLD-SCNC: 136 MMOL/L (ref 136–145)
WBC NRBC COR # BLD: 7.73 10*3/MM3 (ref 3.4–10.8)

## 2019-04-25 PROCEDURE — 82962 GLUCOSE BLOOD TEST: CPT

## 2019-04-25 PROCEDURE — 97150 GROUP THERAPEUTIC PROCEDURES: CPT

## 2019-04-25 PROCEDURE — 85025 COMPLETE CBC W/AUTO DIFF WBC: CPT | Performed by: HOSPITALIST

## 2019-04-25 PROCEDURE — 80048 BASIC METABOLIC PNL TOTAL CA: CPT | Performed by: HOSPITALIST

## 2019-04-25 PROCEDURE — 63710000001 INSULIN LISPRO (HUMAN) PER 5 UNITS: Performed by: HOSPITALIST

## 2019-04-25 PROCEDURE — 97110 THERAPEUTIC EXERCISES: CPT

## 2019-04-25 RX ORDER — GLIPIZIDE 10 MG/1
10 TABLET ORAL
Status: DISCONTINUED | OUTPATIENT
Start: 2019-04-25 | End: 2019-04-26 | Stop reason: HOSPADM

## 2019-04-25 RX ADMIN — PANTOPRAZOLE SODIUM 40 MG: 40 TABLET, DELAYED RELEASE ORAL at 05:24

## 2019-04-25 RX ADMIN — TAMSULOSIN HYDROCHLORIDE 0.4 MG: 0.4 CAPSULE ORAL at 21:20

## 2019-04-25 RX ADMIN — Medication 400 MG: at 21:20

## 2019-04-25 RX ADMIN — Medication 400 MG: at 08:41

## 2019-04-25 RX ADMIN — INSULIN LISPRO 4 UNITS: 100 INJECTION, SOLUTION INTRAVENOUS; SUBCUTANEOUS at 13:20

## 2019-04-25 RX ADMIN — ASPIRIN 325 MG: 325 TABLET, COATED ORAL at 08:41

## 2019-04-25 RX ADMIN — SODIUM CHLORIDE, PRESERVATIVE FREE 3 ML: 5 INJECTION INTRAVENOUS at 08:41

## 2019-04-25 RX ADMIN — ARIPIPRAZOLE 20 MG: 10 TABLET ORAL at 06:18

## 2019-04-25 RX ADMIN — INSULIN LISPRO 2 UNITS: 100 INJECTION, SOLUTION INTRAVENOUS; SUBCUTANEOUS at 08:41

## 2019-04-25 RX ADMIN — ASPIRIN 325 MG: 325 TABLET, COATED ORAL at 21:20

## 2019-04-25 RX ADMIN — INSULIN LISPRO 2 UNITS: 100 INJECTION, SOLUTION INTRAVENOUS; SUBCUTANEOUS at 22:36

## 2019-04-25 RX ADMIN — INSULIN LISPRO 3 UNITS: 100 INJECTION, SOLUTION INTRAVENOUS; SUBCUTANEOUS at 17:00

## 2019-04-25 RX ADMIN — GLIPIZIDE 10 MG: 10 TABLET ORAL at 06:18

## 2019-04-25 RX ADMIN — HYDROCODONE BITARTRATE AND ACETAMINOPHEN 2 TABLET: 7.5; 325 TABLET ORAL at 05:24

## 2019-04-25 RX ADMIN — FERROUS SULFATE TAB 325 MG (65 MG ELEMENTAL FE) 325 MG: 325 (65 FE) TAB at 08:41

## 2019-04-25 RX ADMIN — METOPROLOL SUCCINATE 25 MG: 25 TABLET, FILM COATED, EXTENDED RELEASE ORAL at 06:19

## 2019-04-25 RX ADMIN — HYDROCODONE BITARTRATE AND ACETAMINOPHEN 2 TABLET: 7.5; 325 TABLET ORAL at 17:00

## 2019-04-25 RX ADMIN — HYDROCODONE BITARTRATE AND ACETAMINOPHEN 2 TABLET: 7.5; 325 TABLET ORAL at 21:20

## 2019-04-25 RX ADMIN — MEMANTINE HYDROCHLORIDE 5 MG: 5 TABLET, FILM COATED ORAL at 08:41

## 2019-04-25 RX ADMIN — HYDROCODONE BITARTRATE AND ACETAMINOPHEN 2 TABLET: 7.5; 325 TABLET ORAL at 13:21

## 2019-04-25 RX ADMIN — GLIPIZIDE 10 MG: 10 TABLET ORAL at 17:00

## 2019-04-25 RX ADMIN — SODIUM CHLORIDE, PRESERVATIVE FREE 3 ML: 5 INJECTION INTRAVENOUS at 21:21

## 2019-04-25 RX ADMIN — LEVOTHYROXINE SODIUM 50 MCG: 50 TABLET ORAL at 06:19

## 2019-04-26 VITALS
WEIGHT: 218.92 LBS | DIASTOLIC BLOOD PRESSURE: 57 MMHG | RESPIRATION RATE: 16 BRPM | HEIGHT: 70 IN | TEMPERATURE: 99.2 F | BODY MASS INDEX: 31.34 KG/M2 | OXYGEN SATURATION: 95 % | SYSTOLIC BLOOD PRESSURE: 122 MMHG | HEART RATE: 108 BPM

## 2019-04-26 LAB
BACTERIA SPEC AEROBE CULT: NORMAL
GLUCOSE BLDC GLUCOMTR-MCNC: 192 MG/DL (ref 70–130)
GLUCOSE BLDC GLUCOMTR-MCNC: 254 MG/DL (ref 70–130)
GRAM STN SPEC: NORMAL

## 2019-04-26 PROCEDURE — 63710000001 INSULIN LISPRO (HUMAN) PER 5 UNITS: Performed by: HOSPITALIST

## 2019-04-26 PROCEDURE — 82962 GLUCOSE BLOOD TEST: CPT

## 2019-04-26 PROCEDURE — 97150 GROUP THERAPEUTIC PROCEDURES: CPT

## 2019-04-26 PROCEDURE — 97110 THERAPEUTIC EXERCISES: CPT

## 2019-04-26 RX ADMIN — Medication 400 MG: at 09:25

## 2019-04-26 RX ADMIN — ASPIRIN 325 MG: 325 TABLET, COATED ORAL at 09:25

## 2019-04-26 RX ADMIN — ARIPIPRAZOLE 20 MG: 10 TABLET ORAL at 09:26

## 2019-04-26 RX ADMIN — HYDROCODONE BITARTRATE AND ACETAMINOPHEN 2 TABLET: 7.5; 325 TABLET ORAL at 06:41

## 2019-04-26 RX ADMIN — HYDROCODONE BITARTRATE AND ACETAMINOPHEN 2 TABLET: 7.5; 325 TABLET ORAL at 10:29

## 2019-04-26 RX ADMIN — INSULIN LISPRO 2 UNITS: 100 INJECTION, SOLUTION INTRAVENOUS; SUBCUTANEOUS at 09:25

## 2019-04-26 RX ADMIN — FERROUS SULFATE TAB 325 MG (65 MG ELEMENTAL FE) 325 MG: 325 (65 FE) TAB at 09:26

## 2019-04-26 RX ADMIN — METOPROLOL SUCCINATE 25 MG: 25 TABLET, FILM COATED, EXTENDED RELEASE ORAL at 09:25

## 2019-04-26 RX ADMIN — MEMANTINE HYDROCHLORIDE 5 MG: 5 TABLET, FILM COATED ORAL at 09:25

## 2019-04-26 RX ADMIN — PANTOPRAZOLE SODIUM 40 MG: 40 TABLET, DELAYED RELEASE ORAL at 06:41

## 2019-04-26 RX ADMIN — INSULIN LISPRO 4 UNITS: 100 INJECTION, SOLUTION INTRAVENOUS; SUBCUTANEOUS at 11:59

## 2019-04-26 RX ADMIN — LEVOTHYROXINE SODIUM 50 MCG: 50 TABLET ORAL at 06:41

## 2019-04-26 RX ADMIN — GLIPIZIDE 10 MG: 10 TABLET ORAL at 09:26

## 2019-04-28 LAB — BACTERIA SPEC ANAEROBE CULT: NORMAL

## 2019-06-03 VITALS — WEIGHT: 215 LBS | HEIGHT: 71 IN | BODY MASS INDEX: 30.1 KG/M2

## 2019-06-19 ENCOUNTER — TELEPHONE (OUTPATIENT)
Dept: FAMILY MEDICINE CLINIC | Facility: CLINIC | Age: 74
End: 2019-06-19

## 2019-06-19 NOTE — TELEPHONE ENCOUNTER
Please call [facility] regarding refilling medication from rehab  Sertaline 25 mg  Metformin  Memantine  metoprolol

## 2019-06-25 ENCOUNTER — OFFICE VISIT (OUTPATIENT)
Dept: FAMILY MEDICINE CLINIC | Facility: CLINIC | Age: 74
End: 2019-06-25

## 2019-06-25 VITALS
WEIGHT: 214.6 LBS | HEIGHT: 70 IN | BODY MASS INDEX: 30.72 KG/M2 | RESPIRATION RATE: 20 BRPM | SYSTOLIC BLOOD PRESSURE: 115 MMHG | HEART RATE: 77 BPM | TEMPERATURE: 98.2 F | OXYGEN SATURATION: 95 % | DIASTOLIC BLOOD PRESSURE: 75 MMHG

## 2019-06-25 DIAGNOSIS — R39.11 BENIGN PROSTATIC HYPERPLASIA WITH URINARY HESITANCY: ICD-10-CM

## 2019-06-25 DIAGNOSIS — N40.1 BENIGN PROSTATIC HYPERPLASIA WITH URINARY HESITANCY: ICD-10-CM

## 2019-06-25 DIAGNOSIS — F32.A DEPRESSION DETERMINED BY EXAMINATION: Primary | ICD-10-CM

## 2019-06-25 PROCEDURE — 99213 OFFICE O/P EST LOW 20 MIN: CPT | Performed by: NURSE PRACTITIONER

## 2019-06-25 RX ORDER — SERTRALINE HYDROCHLORIDE 25 MG/1
25 TABLET, FILM COATED ORAL DAILY
Qty: 90 TABLET | Refills: 1 | Status: SHIPPED | OUTPATIENT
Start: 2019-06-25 | End: 2019-10-29

## 2019-06-25 RX ORDER — TRAZODONE HYDROCHLORIDE 50 MG/1
50 TABLET ORAL AS NEEDED
Qty: 90 TABLET | Refills: 1 | Status: SHIPPED | OUTPATIENT
Start: 2019-06-25 | End: 2020-08-26

## 2019-06-25 RX ORDER — TAMSULOSIN HYDROCHLORIDE 0.4 MG/1
1 CAPSULE ORAL NIGHTLY
Qty: 90 CAPSULE | Refills: 1 | Status: SHIPPED | OUTPATIENT
Start: 2019-06-25 | End: 2020-03-16

## 2019-06-25 RX ORDER — SERTRALINE HYDROCHLORIDE 25 MG/1
25 TABLET, FILM COATED ORAL DAILY
Qty: 90 TABLET | Refills: 1 | Status: SHIPPED | OUTPATIENT
Start: 2019-06-25 | End: 2019-06-25 | Stop reason: SDUPTHER

## 2019-06-25 RX ORDER — TAMSULOSIN HYDROCHLORIDE 0.4 MG/1
1 CAPSULE ORAL NIGHTLY
Qty: 90 CAPSULE | Refills: 1 | Status: SHIPPED | OUTPATIENT
Start: 2019-06-25 | End: 2019-06-25 | Stop reason: SDUPTHER

## 2019-06-25 NOTE — PROGRESS NOTES
Subjective   Chris Ferguson is a 73 y.o. male.     Chief Complaint   Patient presents with   • Depression     Says medication is not working. Does not care if he lives or dies.        HPI  Family states he doesn't want to take baths . Still wont walk even after knee and hip replacement. He is not sucidal or homicidal . He was in rehab after hip replacement. He said he can get up but doesn't have urge to do it. Legs hurt at night. He is up multiple times at night to urinate hasn't been taking flomax.his meds changed after he was discharged from rehab. He was on abilify in past for depression but he is not on it since rehab.     The following portions of the patient's history were reviewed and updated as appropriate: allergies, current medications, past family history, past medical history, past social history, past surgical history and problem list.      Current Outpatient Medications:   •  acetaminophen (TYLENOL) 325 MG tablet, Take 2 tablets by mouth Every 4 (Four) Hours As Needed for Mild Pain  or Moderate Pain  (greater than 101 F)., Disp: 120 tablet, Rfl: 1  •  bisacodyl (DULCOLAX) 5 MG EC tablet, Take 2 tablets by mouth Daily As Needed for Constipation., Disp: 30 tablet, Rfl: 1  •  glipiZIDE (GLUCOTROL) 5 MG tablet, Take 5 mg by mouth Every Morning., Disp: , Rfl:   •  insulin aspart (novoLOG FLEXPEN) 100 UNIT/ML solution pen-injector sc pen, Inject 18 Units under the skin into the appropriate area as directed Every Night. Sliding scale , Disp: , Rfl:   •  insulin degludec (TRESIBA FLEXTOUCH) 100 UNIT/ML solution pen-injector injection, TRESIBA FLEXTOUCH 100 UNIT/ML SOPN, Disp: , Rfl:   •  levothyroxine (SYNTHROID, LEVOTHROID) 50 MCG tablet, Take 50 mcg by mouth Every Morning., Disp: , Rfl:   •  magnesium oxide (MAG-OX) 400 MG tablet, Take 400 mg by mouth 2 (Two) Times a Day., Disp: , Rfl:   •  memantine (NAMENDA) 5 MG tablet, Take 5 mg by mouth 2 (Two) Times a Day., Disp: , Rfl:   •  metFORMIN (GLUCOPHAGE)  "500 MG tablet, Take 1 tablet by mouth 2 (Two) Times a Day With Meals., Disp: 180 tablet, Rfl: 0  •  metoprolol succinate XL (TOPROL-XL) 25 MG 24 hr tablet, Take 25 mg by mouth Every Morning., Disp: , Rfl:   •  traZODone (DESYREL) 50 MG tablet, Take 1 tablet by mouth As Needed for Sleep., Disp: 90 tablet, Rfl: 1  •  Ferrous Sulfate (IRON) 325 (65 Fe) MG tablet, Take  by mouth. HOLD PRIOR TO SURGERY  04-, Disp: , Rfl:   •  ondansetron (ZOFRAN) 4 MG tablet, Take 1 tablet by mouth Every 8 (Eight) Hours As Needed for Nausea or Vomiting., Disp: 30 tablet, Rfl: 0  •  sertraline (ZOLOFT) 25 MG tablet, Take 1 tablet by mouth Daily., Disp: 90 tablet, Rfl: 1  •  tamsulosin (FLOMAX) 0.4 MG capsule 24 hr capsule, Take 1 capsule by mouth Every Night., Disp: 90 capsule, Rfl: 1          Review of Systems   Constitutional: Negative for chills and fever.   HENT: Negative for congestion and sinus pressure.    Eyes: Negative for blurred vision and pain.   Respiratory: Negative for cough and shortness of breath.    Cardiovascular: Negative for chest pain and leg swelling.   Gastrointestinal: Negative for abdominal pain, nausea and indigestion.   Endocrine: Negative for cold intolerance, heat intolerance, polydipsia, polyphagia and polyuria.   Genitourinary: Positive for nocturia.   Skin: Negative for dry skin, rash and bruise.   Psychiatric/Behavioral: Positive for sleep disturbance and depressed mood. Negative for dysphoric mood and stress.       Objective     /75 (BP Location: Left arm, Patient Position: Sitting, Cuff Size: Large Adult)   Pulse 77   Temp 98.2 °F (36.8 °C) (Oral)   Resp 20   Ht 177.8 cm (70\")   Wt 97.3 kg (214 lb 9.6 oz)   SpO2 95%   BMI 30.79 kg/m²     Physical Exam   Constitutional: He is oriented to person, place, and time. He appears well-developed and well-nourished.   HENT:   Head: Normocephalic and atraumatic.   Right Ear: External ear normal.   Left Ear: External ear normal.   Eyes: " Conjunctivae and EOM are normal. Pupils are equal, round, and reactive to light.   Neck: Normal range of motion. Neck supple.   Cardiovascular: Normal rate, regular rhythm, normal heart sounds and intact distal pulses.   Pulmonary/Chest: Effort normal and breath sounds normal.   Abdominal: Soft. Bowel sounds are normal.   Musculoskeletal: Normal range of motion.   Neurological: He is alert and oriented to person, place, and time.   Skin: Skin is warm and dry.   Psychiatric: He has a normal mood and affect. His behavior is normal.   Nursing note and vitals reviewed.        Assessment/Plan   Chris was seen today for depression.    Diagnoses and all orders for this visit:    Depression determined by examination  -     Ambulatory Referral to Psychiatry    Benign prostatic hyperplasia with urinary hesitancy    Other orders  -     Discontinue: tamsulosin (FLOMAX) 0.4 MG capsule 24 hr capsule; Take 1 capsule by mouth Every Night.  -     traZODone (DESYREL) 50 MG tablet; Take 1 tablet by mouth As Needed for Sleep.  -     Discontinue: sertraline (ZOLOFT) 25 MG tablet; Take 1 tablet by mouth Daily.  -     metFORMIN (GLUCOPHAGE) 500 MG tablet; Take 1 tablet by mouth 2 (Two) Times a Day With Meals.  -     sertraline (ZOLOFT) 25 MG tablet; Take 1 tablet by mouth Daily.  -     tamsulosin (FLOMAX) 0.4 MG capsule 24 hr capsule; Take 1 capsule by mouth Every Night.      Patient Instructions   Start the flomax at night . Take the sertaline . Monitor blood sugars.       Gisela Sotelo, MARIZOL    06/25/19

## 2019-06-26 RX ORDER — MEMANTINE HYDROCHLORIDE 5 MG/1
TABLET ORAL
Qty: 60 TABLET | Refills: 0 | Status: SHIPPED | OUTPATIENT
Start: 2019-06-26 | End: 2019-07-30 | Stop reason: SDUPTHER

## 2019-06-26 RX ORDER — LEVOTHYROXINE SODIUM 0.05 MG/1
TABLET ORAL
Qty: 30 TABLET | Refills: 0 | Status: SHIPPED | OUTPATIENT
Start: 2019-06-26 | End: 2019-07-29 | Stop reason: SDUPTHER

## 2019-06-26 RX ORDER — SERTRALINE HYDROCHLORIDE 25 MG/1
TABLET, FILM COATED ORAL
Qty: 30 TABLET | Refills: 0 | Status: SHIPPED | OUTPATIENT
Start: 2019-06-26 | End: 2020-08-26

## 2019-07-22 RX ORDER — TRAZODONE HYDROCHLORIDE 50 MG/1
TABLET ORAL
Qty: 90 TABLET | Refills: 0 | Status: SHIPPED | OUTPATIENT
Start: 2019-07-22 | End: 2019-10-24 | Stop reason: SDUPTHER

## 2019-07-23 RX ORDER — GLIPIZIDE 5 MG/1
TABLET ORAL
Qty: 90 TABLET | Refills: 1 | Status: SHIPPED | OUTPATIENT
Start: 2019-07-23 | End: 2019-12-02 | Stop reason: SDUPTHER

## 2019-07-30 RX ORDER — LEVOTHYROXINE SODIUM 0.05 MG/1
TABLET ORAL
Qty: 30 TABLET | Refills: 0 | Status: SHIPPED | OUTPATIENT
Start: 2019-07-30 | End: 2019-09-09 | Stop reason: SDUPTHER

## 2019-07-31 RX ORDER — MEMANTINE HYDROCHLORIDE 5 MG/1
TABLET ORAL
Qty: 60 TABLET | Refills: 0 | Status: SHIPPED | OUTPATIENT
Start: 2019-07-31 | End: 2019-08-19 | Stop reason: SDUPTHER

## 2019-08-08 ENCOUNTER — TELEPHONE (OUTPATIENT)
Dept: FAMILY MEDICINE CLINIC | Facility: CLINIC | Age: 74
End: 2019-08-08

## 2019-08-08 NOTE — TELEPHONE ENCOUNTER
The patient had a hip replacement 6-8 months ago and denied pain medication and Rehab PT but is now having some issues and would like a referral to PT in [facility].

## 2019-08-09 DIAGNOSIS — S72.452K: Primary | ICD-10-CM

## 2019-08-14 ENCOUNTER — OFFICE VISIT (OUTPATIENT)
Dept: PHYSICAL THERAPY | Facility: CLINIC | Age: 74
End: 2019-08-14

## 2019-08-14 DIAGNOSIS — M79.605 BILATERAL LEG PAIN: Primary | ICD-10-CM

## 2019-08-14 DIAGNOSIS — Z96.643 STATUS POST TOTAL REPLACEMENT OF BOTH HIPS: ICD-10-CM

## 2019-08-14 DIAGNOSIS — M79.604 BILATERAL LEG PAIN: Primary | ICD-10-CM

## 2019-08-14 PROCEDURE — 97530 THERAPEUTIC ACTIVITIES: CPT | Performed by: PHYSICAL THERAPIST

## 2019-08-14 PROCEDURE — 97162 PT EVAL MOD COMPLEX 30 MIN: CPT | Performed by: PHYSICAL THERAPIST

## 2019-08-14 PROCEDURE — 97110 THERAPEUTIC EXERCISES: CPT | Performed by: PHYSICAL THERAPIST

## 2019-08-14 NOTE — PROGRESS NOTES
Physical Therapy Initial Evaluation and Plan of Care    Patient: Chris Ferguson   : 1945  Diagnosis/ICD-10 Code:  Bilateral leg pain [M79.604, M79.605]  Referring practitioner: MARIZOL Orozco  Date of Initial Visit: 2019  Today's Date: 2019  Patient seen for 1 sessions           Subjective Questionnaire: LEFS: 17.5% functional ability       Subjective Evaluation    History of Present Illness  Mechanism of injury: Pt reports that he is having trouble walking because of pain going down both legs. Pt has had pain for ~1 month. He turned the wrong way when getting out of bed which caused the pain to start. Pt did have hips replaced within the last year. No numbness or tingling down legs. Pain has stayed about the same within the past month. Getting in and out of bed causes more pain. Sitting down feels better. Standing feels worse. No falls within last year. Pt denies issues with back in the past and no back pain at this time. Pt feels like legs are weak. Pt been using rollator walker since hip surgery. Pt did go see Ortho surgeon ~2 weeks ago and was told he couldn't do anything else for him. Took x-rays of hips and pt reports both looked fine. Pt does have one step to get up into living room and it is going ok. Pt just has to take his time. MD follow-up in a few months.     Pain  Current pain ratin  At best pain ratin  At worst pain ratin  Quality: dull ache  Relieving factors: ice and heat (sitting)  Aggravating factors: ambulation             Objective       Postural Observations  Seated posture: poor  Standing posture: poor        Tenderness     Additional Tenderness Details  No tenderness to palpation along lumbar spine    Neurological Testing     Sensation     Lumbar   Left   Intact: light touch    Right   Intact: light touch    Reflexes   Left   Babinski sign: negative  Clonus sign: negative    Right   Babinski sign: negative  Clonus sign: negative    Active Range of Motion    Cervical/Thoracic Spine     Thoracic   Left lateral flexion: WFL  Right lateral flexion: WFL  Left rotation: WFL  Right rotation: WFL    Additional Active Range of Motion Details  Min assessment of forward flexion and extension due to pt decreased balance.   No pain or worsening of symptoms with lumbar ROM      Strength/Myotome Testing     Left Hip   Planes of Motion   Flexion: 4-    Right Hip   Planes of Motion   Flexion: 4    Left Knee   Flexion: 4+  Extension: 4    Right Knee   Flexion: 4+  Extension: 4+    Left Ankle/Foot   Dorsiflexion: 4+  Plantar flexion: 4+    Right Ankle/Foot   Dorsiflexion: 4+  Plantar flexion: 4+    Ambulation   Weight-Bearing Status   Weight-Bearing Status (Left): weight-bearing as tolerated   Weight-Bearing Status (Right): weight-bearing as tolerated    Assistive device used: rollator walker with seat    Ambulation: Level Surfaces   Ambulation with assistive device: independent    Observational Gait   Decreased walking speed, stride length, left stance time and right stance time.     Quality of Movement During Gait   Trunk  Forward lean.     Functional Assessment     Comments  Difficulty with STS and required mod assist of UE's for support and rollator for balance once standing.   Difficulty with bed mobility, especially rolling      Pt Education  Discussed proper rolling techniques and supine to sit with log roll technique. Pt educated about importance of engaging core musculature during daily tasks to decrease strain on lumbar spine and hips.       Assessment & Plan     Assessment  Impairments: abnormal coordination, abnormal gait, abnormal muscle firing, abnormal muscle tone, abnormal or restricted ROM, activity intolerance, impaired balance, impaired physical strength, lacks appropriate home exercise program, pain with function and safety issue  Assessment details: Pt is a 73 year old male with c/o B LE pain. Pt displays difficulty with ambulation and requires rollator. Decreased  ability to perform bed mobility tasks and transfers due to pain and weakness in LE's. No pain with ROM assessment of lumbar spine. Neg neuro tests as well. Pt displays generalized weakness with functional tasks. Decreased flexibility noted in B hip flexors/knee extensors as well.   Prognosis: fair  Functional Limitations: carrying objects, lifting, walking, pushing, uncomfortable because of pain, moving in bed and stooping  Goals  Plan Goals: STG:  Pt will demonstrate increased core stabilization as well as postural awareness within 3 weeks.     Pt will display improved ability to perform STS with min use of UE's for assistance within 3 weeks .     LTG:  Pt will be independent with home exercises within 6 weeks to assist with pain management and continue to improve function.     Pt will be able to ambulate for >20 mins with min to no pain within 6 weeks.     Pt will be able to perform ADL's and other daily tasks with proper mechanics and min to no pain within 6 weeks.       Plan  Therapy options: will be seen for skilled physical therapy services  Planned modality interventions: cryotherapy and thermotherapy (hydrocollator packs)  Planned therapy interventions: abdominal trunk stabilization, ADL retraining, balance/weight-bearing training, body mechanics training, flexibility, functional ROM exercises, gait training, home exercise program, manual therapy, motor coordination training, neuromuscular re-education, postural training, soft tissue mobilization, strengthening, stretching and therapeutic activities  Plan details: 30 visits per POC 90 day certification period          Eval:  Low Eval          Mins  64916  Mod Eval     15     Mins  15207  High Eval                            Mins  96139  Re-Eval                               mins  50317    Timed:         Manual Therapy:         mins  10715;     Therapeutic Exercise:    20     mins  79361;     Neuromuscular Naye:        mins  85379;    Therapeutic Activity:      10     mins  98273;     Gait Training:           mins  15281;     Ultrasound:          mins  70891;    Ionto                                   mins   92565  Self Care                            mins   45693    Un-Timed:  Electrical Stimulation:         mins  60382 ( );  Traction          mins 31900      Timed Treatment:   45   mins   Total Treatment:     45   mins    PT SIGNATURE: Falguni Dowling, PT, DPT, OCS           [place] License # [id number]   DATE TREATMENT INITIATED: 8/14/2019    Initial Certification  Certification Period: 11/12/2019  I certify that the therapy services are furnished while this patient is under my care.  The services outlined above are required by this patient, and will be reviewed every 90 days.     PHYSICIAN: Gisela Sotelo, APRN      DATE:     Please sign and return via fax to  .. Thank you, [facility].

## 2019-08-17 RX ORDER — MEMANTINE HYDROCHLORIDE 5 MG/1
TABLET ORAL
Qty: 60 TABLET | Refills: 0 | OUTPATIENT
Start: 2019-08-17

## 2019-08-19 ENCOUNTER — OFFICE VISIT (OUTPATIENT)
Dept: PHYSICAL THERAPY | Facility: CLINIC | Age: 74
End: 2019-08-19

## 2019-08-19 DIAGNOSIS — M79.604 BILATERAL LEG PAIN: Primary | ICD-10-CM

## 2019-08-19 DIAGNOSIS — Z96.643 STATUS POST TOTAL REPLACEMENT OF BOTH HIPS: ICD-10-CM

## 2019-08-19 DIAGNOSIS — M79.605 BILATERAL LEG PAIN: Primary | ICD-10-CM

## 2019-08-19 PROCEDURE — 97110 THERAPEUTIC EXERCISES: CPT | Performed by: PHYSICAL THERAPIST

## 2019-08-19 PROCEDURE — 97112 NEUROMUSCULAR REEDUCATION: CPT | Performed by: PHYSICAL THERAPIST

## 2019-08-19 RX ORDER — MEMANTINE HYDROCHLORIDE 5 MG/1
5 TABLET ORAL 2 TIMES DAILY
Qty: 60 TABLET | Refills: 3 | Status: SHIPPED | OUTPATIENT
Start: 2019-08-19 | End: 2019-08-23 | Stop reason: SDUPTHER

## 2019-08-19 NOTE — PROGRESS NOTES
Physical Therapy Daily Progress Note    VISIT#: 2    Subjective   Chris Ferguson reports: that he felt good after last session. He is doing well overall.       Objective     See Exercise, Manual, and Modality Logs for complete treatment.       Assessment & Plan     Assessment  Assessment details: Pt displays mod fatigue with exercises. Especially standing SLS and required increased UE support as he continued. Pt tolerated all exercises well with no c/o pain.           Progress per Plan of Care            Timed:         Manual Therapy:         mins  00480;     Therapeutic Exercise:    20     mins  06257;     Neuromuscular Naye:    10    mins  73077;    Therapeutic Activity:          mins  29185;     Gait Training:           mins  29968;     Ultrasound:          mins  55023;    Ionto                                   mins   30620  Self Care                            mins   10444    Un-Timed:  Electrical Stimulation:         mins  83844 ( );  Traction          mins 80036    Timed Treatment:   30   mins   Total Treatment:     30   mins    Falguni Dowling, PT, DPT, OCS  [place] License # [id number]  Physical Therapist

## 2019-08-21 ENCOUNTER — OFFICE VISIT (OUTPATIENT)
Dept: PHYSICAL THERAPY | Facility: CLINIC | Age: 74
End: 2019-08-21

## 2019-08-21 DIAGNOSIS — Z96.643 STATUS POST TOTAL REPLACEMENT OF BOTH HIPS: ICD-10-CM

## 2019-08-21 DIAGNOSIS — M79.605 BILATERAL LEG PAIN: Primary | ICD-10-CM

## 2019-08-21 DIAGNOSIS — M79.604 BILATERAL LEG PAIN: Primary | ICD-10-CM

## 2019-08-21 PROCEDURE — 97110 THERAPEUTIC EXERCISES: CPT | Performed by: PHYSICAL THERAPIST

## 2019-08-21 PROCEDURE — 97112 NEUROMUSCULAR REEDUCATION: CPT | Performed by: PHYSICAL THERAPIST

## 2019-08-21 NOTE — PROGRESS NOTES
Physical Therapy Daily Progress Note    VISIT#: 3    Subjective   Chris Ferguson reports: that he is doing better. He feels like legs are improving.       Objective     See Exercise, Manual, and Modality Logs for complete treatment.       Assessment & Plan     Assessment  Assessment details: Pt demonstrates mod fatigue with exercises. Attempted piriformis stretch supine, however pt had pain in L knee so exercise was stopped. Pt c/o pain in B knees with step-ups and with SLS. Pt tolerated other exercises well.           Progress per Plan of Care            Timed:         Manual Therapy:         mins  02923;     Therapeutic Exercise:    18     mins  66380;     Neuromuscular Naye:    10    mins  26601;    Therapeutic Activity:          mins  08950;     Gait Training:           mins  45931;     Ultrasound:          mins  32517;    Ionto                                   mins   84020  Self Care                            mins   92341    Un-Timed:  Electrical Stimulation:         mins  26775 ( );  Traction          mins 01673    Timed Treatment:   28   mins   Total Treatment:     28   mins    Falguni Dowling, PT, DPT, OCS  [place] License # [id number]  Physical Therapist

## 2019-08-23 RX ORDER — MEMANTINE HYDROCHLORIDE 5 MG/1
TABLET ORAL
Qty: 180 TABLET | Refills: 3 | Status: SHIPPED | OUTPATIENT
Start: 2019-08-23 | End: 2019-12-02 | Stop reason: SDUPTHER

## 2019-08-28 ENCOUNTER — OFFICE VISIT (OUTPATIENT)
Dept: PHYSICAL THERAPY | Facility: CLINIC | Age: 74
End: 2019-08-28

## 2019-08-28 DIAGNOSIS — M79.604 BILATERAL LEG PAIN: Primary | ICD-10-CM

## 2019-08-28 DIAGNOSIS — M79.605 BILATERAL LEG PAIN: Primary | ICD-10-CM

## 2019-08-28 DIAGNOSIS — Z96.643 STATUS POST TOTAL REPLACEMENT OF BOTH HIPS: ICD-10-CM

## 2019-08-28 PROCEDURE — 97110 THERAPEUTIC EXERCISES: CPT | Performed by: PHYSICAL THERAPIST

## 2019-08-28 PROCEDURE — 97112 NEUROMUSCULAR REEDUCATION: CPT | Performed by: PHYSICAL THERAPIST

## 2019-08-28 NOTE — PROGRESS NOTES
Physical Therapy Daily Progress Note    VISIT#: 4    Subjective   Chris Ferguson reports: that pain in legs has been better but is still there.       Objective     See Exercise, Manual, and Modality Logs for complete treatment.       Assessment & Plan     Assessment  Assessment details: Pt demonstrates increased fatigue with exercises. Mod c/o pain in R knee with STS this date. No other pain with other exercises reported. Pt tolerated treatment well overall.           Progress per Plan of Care            Timed:         Manual Therapy:         mins  58714;     Therapeutic Exercise:    18     mins  35307;     Neuromuscular Naye:    10    mins  80439;    Therapeutic Activity:          mins  57302;     Gait Training:           mins  51480;     Ultrasound:          mins  94730;    Ionto                                   mins   52728  Self Care                            mins   86632    Un-Timed:  Electrical Stimulation:         mins  23712 ( );  Traction          mins 41317    Timed Treatment:   28   mins   Total Treatment:     28   mins    Falguni Dowling, PT, DPT, OCS  [place] License # [id number]  Physical Therapist

## 2019-08-31 RX ORDER — LEVOTHYROXINE SODIUM 0.05 MG/1
TABLET ORAL
Qty: 30 TABLET | Refills: 0 | OUTPATIENT
Start: 2019-08-31

## 2019-09-03 ENCOUNTER — OFFICE VISIT (OUTPATIENT)
Dept: PHYSICAL THERAPY | Facility: CLINIC | Age: 74
End: 2019-09-03

## 2019-09-03 DIAGNOSIS — Z96.643 STATUS POST TOTAL REPLACEMENT OF BOTH HIPS: Primary | ICD-10-CM

## 2019-09-03 DIAGNOSIS — M79.604 BILATERAL LEG PAIN: ICD-10-CM

## 2019-09-03 DIAGNOSIS — M79.605 BILATERAL LEG PAIN: ICD-10-CM

## 2019-09-03 PROCEDURE — 97110 THERAPEUTIC EXERCISES: CPT | Performed by: PHYSICAL THERAPIST

## 2019-09-03 NOTE — PROGRESS NOTES
Physical Therapy Daily Progress Note    VISIT#: 5    Subjective   Chris Ferguson reports: that legs are doing better.       Objective     See Exercise, Manual, and Modality Logs for complete treatment.       Assessment & Plan     Assessment  Assessment details: Pt demonstrates improved core stabilization during exercises, however required vc's to continue with exercise this date. Pt displays improved tolerance to standing exercises with less pain in LE's.           Progress per Plan of Care            Timed:         Manual Therapy:         mins  95745;     Therapeutic Exercise:    20     mins  26892;     Neuromuscular Naye:    10    mins  31788;  Pt co-treated with another pt at this time, therefore no neuro unit charged this date.   Therapeutic Activity:          mins  93264;     Gait Training:           mins  45867;     Ultrasound:          mins  52123;    Ionto                                   mins   52790  Self Care                            mins   95200    Un-Timed:  Electrical Stimulation:         mins  31091 ( );  Traction          mins 19699    Timed Treatment:   30   mins   Total Treatment:     30   mins    Falguni Dowling, PT, DPT, OCS  [place] License # [id number]  Physical Therapist

## 2019-09-05 ENCOUNTER — OFFICE VISIT (OUTPATIENT)
Dept: PHYSICAL THERAPY | Facility: CLINIC | Age: 74
End: 2019-09-05

## 2019-09-05 DIAGNOSIS — M79.605 BILATERAL LEG PAIN: ICD-10-CM

## 2019-09-05 DIAGNOSIS — Z96.643 STATUS POST TOTAL REPLACEMENT OF BOTH HIPS: Primary | ICD-10-CM

## 2019-09-05 DIAGNOSIS — M79.604 BILATERAL LEG PAIN: ICD-10-CM

## 2019-09-05 PROCEDURE — 97110 THERAPEUTIC EXERCISES: CPT | Performed by: PHYSICAL THERAPIST

## 2019-09-05 NOTE — PROGRESS NOTES
Physical Therapy Daily Progress Note    VISIT#: 6    Subjective   Chris Ferguson reports: that legs are feeling pretty good today. No issues after last session.       Objective     See Exercise, Manual, and Modality Logs for complete treatment.       Assessment & Plan     Assessment  Assessment details: Pt demonstrates increased fatigue with exercises and required rest breaks. Pt required vc's to continue theraball exercises this date. Pt tolerated treatment well with no c/o pain in LE's.           Progress per Plan of Care            Timed:         Manual Therapy:         mins  82500;     Therapeutic Exercise:    20     mins  78806;     Neuromuscular Naye:    10    mins  09070;  Pt co-treated with another pt at this time, therefore no neuro unit charged.   Therapeutic Activity:          mins  03966;     Gait Training:           mins  10349;     Ultrasound:          mins  88567;    Ionto                                   mins   08029  Self Care                            mins   93686    Un-Timed:  Electrical Stimulation:         mins  76656 ( );  Traction          mins 07012    Timed Treatment:   30   mins   Total Treatment:     30   mins    Falguni Dowling, PT, DPT, OCS  [place] License # [id number]  Physical Therapist

## 2019-09-09 ENCOUNTER — OFFICE VISIT (OUTPATIENT)
Dept: PHYSICAL THERAPY | Facility: CLINIC | Age: 74
End: 2019-09-09

## 2019-09-09 DIAGNOSIS — Z96.643 STATUS POST TOTAL REPLACEMENT OF BOTH HIPS: Primary | ICD-10-CM

## 2019-09-09 DIAGNOSIS — M79.605 BILATERAL LEG PAIN: ICD-10-CM

## 2019-09-09 DIAGNOSIS — M79.604 BILATERAL LEG PAIN: ICD-10-CM

## 2019-09-09 PROCEDURE — 97110 THERAPEUTIC EXERCISES: CPT | Performed by: PHYSICAL THERAPIST

## 2019-09-09 PROCEDURE — 97112 NEUROMUSCULAR REEDUCATION: CPT | Performed by: PHYSICAL THERAPIST

## 2019-09-09 RX ORDER — LEVOTHYROXINE SODIUM 0.05 MG/1
50 TABLET ORAL DAILY
Qty: 90 TABLET | Refills: 0 | Status: SHIPPED | OUTPATIENT
Start: 2019-09-09 | End: 2019-11-27 | Stop reason: SDUPTHER

## 2019-09-09 NOTE — PROGRESS NOTES
Physical Therapy Daily Progress Note    VISIT#: 7    Subjective   Chris Ferguson reports: that his legs are doing better. Haven't been hurting as much.       Objective     See Exercise, Manual, and Modality Logs for complete treatment.       Assessment & Plan     Assessment  Assessment details: Pt displays increased fatigue with exercises, especially standing. Pt had increased pain with hip abd this date. Pt tolerated treatment well overall.           Progress per Plan of Care            Timed:         Manual Therapy:         mins  22851;     Therapeutic Exercise:    20     mins  20715;     Neuromuscular Naye:    15    mins  62715;    Therapeutic Activity:          mins  07496;     Gait Training:           mins  49702;     Ultrasound:          mins  32866;    Ionto                                   mins   26681  Self Care                            mins   22984    Un-Timed:  Electrical Stimulation:         mins  29674 ( );  Traction          mins 76730    Timed Treatment:   35   mins   Total Treatment:     35   mins    Falguni Dowling, PT, DPT, OCS  [place] License # [id number]  Physical Therapist

## 2019-09-10 RX ORDER — GLIPIZIDE 5 MG/1
5 TABLET ORAL EVERY MORNING
Qty: 90 TABLET | Refills: 0 | Status: SHIPPED | OUTPATIENT
Start: 2019-09-10 | End: 2020-08-26

## 2019-09-12 ENCOUNTER — OFFICE VISIT (OUTPATIENT)
Dept: PHYSICAL THERAPY | Facility: CLINIC | Age: 74
End: 2019-09-12

## 2019-09-12 DIAGNOSIS — M79.604 BILATERAL LEG PAIN: Primary | ICD-10-CM

## 2019-09-12 DIAGNOSIS — Z96.643 STATUS POST TOTAL REPLACEMENT OF BOTH HIPS: ICD-10-CM

## 2019-09-12 DIAGNOSIS — M79.605 BILATERAL LEG PAIN: Primary | ICD-10-CM

## 2019-09-12 PROCEDURE — 97110 THERAPEUTIC EXERCISES: CPT | Performed by: PHYSICAL THERAPIST

## 2019-09-13 RX ORDER — METOPROLOL SUCCINATE 25 MG/1
TABLET, EXTENDED RELEASE ORAL
Qty: 90 TABLET | Refills: 0 | Status: SHIPPED | OUTPATIENT
Start: 2019-09-13 | End: 2019-11-27 | Stop reason: SDUPTHER

## 2019-09-13 NOTE — PROGRESS NOTES
Physical Therapy Daily Progress Note    VISIT#: 8    Subjective   Chris Ferguson reports: that his legs are hurting and tired today. No issues after last session.       Objective     See Exercise, Manual, and Modality Logs for complete treatment.       Assessment & Plan     Assessment  Assessment details: Pt demonstrates more difficulty with exercises this date due to increased pain in legs. Pt able to perform seated exercises without pain. However, more pain noted standing.           Progress per Plan of Care            Timed:         Manual Therapy:         mins  15600;     Therapeutic Exercise:    25     mins  70268;     Neuromuscular Naye:        mins  03300;    Therapeutic Activity:          mins  77163;     Gait Training:           mins  44745;     Ultrasound:          mins  48266;    Ionto                                   mins   94443  Self Care                            mins   65608    Un-Timed:  Electrical Stimulation:         mins  49901 ( );  Traction          mins 64889    Timed Treatment:   25   mins   Total Treatment:     25   mins    Falguni Dowling, PT, DPT, OCS  [place] License # [id number]  Physical Therapist

## 2019-09-17 ENCOUNTER — TREATMENT (OUTPATIENT)
Dept: PHYSICAL THERAPY | Facility: CLINIC | Age: 74
End: 2019-09-17

## 2019-09-17 DIAGNOSIS — M79.605 BILATERAL LEG PAIN: Primary | ICD-10-CM

## 2019-09-17 DIAGNOSIS — Z96.643 STATUS POST TOTAL REPLACEMENT OF BOTH HIPS: ICD-10-CM

## 2019-09-17 DIAGNOSIS — M79.604 BILATERAL LEG PAIN: Primary | ICD-10-CM

## 2019-09-17 PROCEDURE — 97110 THERAPEUTIC EXERCISES: CPT | Performed by: PHYSICAL THERAPIST

## 2019-09-17 NOTE — PROGRESS NOTES
Physical Therapy Daily Progress Note    VISIT#: 9    Subjective   Pt reports: Pt reports extra LE pain today with unknown factors. HEP going well.      Objective     See Exercise, Manual, and Modality Logs for complete treatment.     Patient Education: HEP    Assessment/Plan Rx limited by c/o LE pain. Fatigues easily.      Progress per Plan of Care            Timed:         Manual Therapy:         mins  06410;     Therapeutic Exercise:    25     mins  60194;     Neuromuscular Naye:        mins  94816;    Therapeutic Activity:          mins  60581;     Gait Training:           mins  87294;     Ultrasound:          mins  76605;    Ionto                                   mins   63499  Self Care                            mins   97234    Un-Timed:  Electrical Stimulation:         mins  94736 ( );  Traction          mins 91762  Low Eval          Mins  94447  Mod Eval          Mins  09951  High Eval                            Mins  50829  Re-Eval                               mins  00051    Timed Treatment:   25   mins   Total Treatment:     25   mins    Kevin Espinosa, PT, DPT, OCS  [place] license: [id number]  Physical Therapist

## 2019-09-24 ENCOUNTER — TREATMENT (OUTPATIENT)
Dept: PHYSICAL THERAPY | Facility: CLINIC | Age: 74
End: 2019-09-24

## 2019-09-24 DIAGNOSIS — Z96.643 STATUS POST TOTAL REPLACEMENT OF BOTH HIPS: ICD-10-CM

## 2019-09-24 DIAGNOSIS — M79.604 BILATERAL LEG PAIN: Primary | ICD-10-CM

## 2019-09-24 DIAGNOSIS — M79.605 BILATERAL LEG PAIN: Primary | ICD-10-CM

## 2019-09-24 PROCEDURE — 97110 THERAPEUTIC EXERCISES: CPT | Performed by: PHYSICAL THERAPIST

## 2019-09-24 NOTE — PROGRESS NOTES
Physical Therapy Daily Progress Note    VISIT#: 10    Subjective   Chris Ferguson reports: that he is doing better. He hasn't been having as much pain in legs and feels like they are getting stronger. Still has trouble getting up and down.       Objective     See Exercise, Manual, and Modality Logs for complete treatment.       Assessment & Plan     Assessment  Assessment details: Pt displays improved overall strength. Pt continues to require UE support during STS and transfers. Pt cued to decrease UE use during PT session and practice using LE's more. Pt tolerated treatment well.     Goals  Plan Goals: STG:  Pt will demonstrate increased core stabilization as well as postural awareness within 3 weeks. - met    Pt will display improved ability to perform STS with min use of UE's for assistance within 3 weeks.  - partially met     LTG:  Pt will be independent with home exercises within 6 weeks to assist with pain management and continue to improve function. - partially met    Pt will be able to ambulate for >20 mins with min to no pain within 6 weeks. - partially met    Pt will be able to perform ADL's and other daily tasks with proper mechanics and min to no pain within 6 weeks. - partially met    Plan  Therapy options: will be seen for skilled physical therapy services  Plan details: 30 visits per POC 90 day certification period            Progress per Plan of Care            Timed:         Manual Therapy:         mins  46183;     Therapeutic Exercise:    30     mins  10840;     Neuromuscular Naye:        mins  42139;    Therapeutic Activity:          mins  12827;     Gait Training:           mins  77491;     Ultrasound:          mins  38843;    Ionto                                   mins   08076  Self Care                            mins   07705    Un-Timed:  Electrical Stimulation:         mins  63574 ( );  Traction          mins 34828    Timed Treatment:   30   mins   Total Treatment:     30    mins    Falguni Dowling, PT, DPT, OCS  [place] License # [id number]  Physical Therapist

## 2019-09-26 ENCOUNTER — TREATMENT (OUTPATIENT)
Dept: PHYSICAL THERAPY | Facility: CLINIC | Age: 74
End: 2019-09-26

## 2019-09-26 DIAGNOSIS — M79.604 BILATERAL LEG PAIN: ICD-10-CM

## 2019-09-26 DIAGNOSIS — Z96.643 STATUS POST TOTAL REPLACEMENT OF BOTH HIPS: Primary | ICD-10-CM

## 2019-09-26 DIAGNOSIS — M79.605 BILATERAL LEG PAIN: ICD-10-CM

## 2019-09-26 PROCEDURE — 97110 THERAPEUTIC EXERCISES: CPT | Performed by: PHYSICAL THERAPIST

## 2019-09-26 NOTE — PROGRESS NOTES
Physical Therapy Daily Progress Note    VISIT#: 11    Subjective   Chris Ferguson reports: that legs are doing good today. No issues after last session.       Objective     See Exercise, Manual, and Modality Logs for complete treatment.       Assessment & Plan     Assessment  Assessment details: Pt demonstrates increased fatigue with exercises. Pt tolerated added reps well. No c/o pain in LE's during session this date.           Progress per Plan of Care            Timed:         Manual Therapy:         mins  64579;     Therapeutic Exercise:    30     mins  25355;     Neuromuscular Naye:        mins  54066;    Therapeutic Activity:          mins  78609;     Gait Training:           mins  17851;     Ultrasound:          mins  64770;    Ionto                                   mins   51764  Self Care                            mins   58403    Un-Timed:  Electrical Stimulation:         mins  16779 ( );  Traction          mins 19319    Timed Treatment:   30   mins   Total Treatment:     30   mins    Falguni Dowling, PT, DPT, OCS  [place] License # [id number]  Physical Therapist

## 2019-10-21 ENCOUNTER — APPOINTMENT (OUTPATIENT)
Dept: PAIN MEDICINE | Facility: CLINIC | Age: 74
End: 2019-10-21

## 2019-10-23 ENCOUNTER — RESULTS ENCOUNTER (OUTPATIENT)
Dept: PAIN MEDICINE | Facility: HOSPITAL | Age: 74
End: 2019-10-23

## 2019-10-23 ENCOUNTER — OFFICE VISIT (OUTPATIENT)
Dept: PAIN MEDICINE | Facility: CLINIC | Age: 74
End: 2019-10-23

## 2019-10-23 VITALS
RESPIRATION RATE: 16 BRPM | SYSTOLIC BLOOD PRESSURE: 134 MMHG | HEART RATE: 68 BPM | HEIGHT: 68 IN | BODY MASS INDEX: 31.52 KG/M2 | DIASTOLIC BLOOD PRESSURE: 80 MMHG | OXYGEN SATURATION: 95 % | WEIGHT: 208 LBS | TEMPERATURE: 97.6 F

## 2019-10-23 DIAGNOSIS — G89.4 CHRONIC PAIN SYNDROME: Primary | ICD-10-CM

## 2019-10-23 DIAGNOSIS — M25.50 POLYARTHRALGIA: ICD-10-CM

## 2019-10-23 DIAGNOSIS — M47.817 LUMBOSACRAL SPONDYLOSIS WITHOUT MYELOPATHY: ICD-10-CM

## 2019-10-23 DIAGNOSIS — F19.90 CURRENT DRUG USE: Primary | ICD-10-CM

## 2019-10-23 DIAGNOSIS — M79.18 MYOFASCIAL PAIN SYNDROME: ICD-10-CM

## 2019-10-23 DIAGNOSIS — F19.90 CURRENT DRUG USE: ICD-10-CM

## 2019-10-23 DIAGNOSIS — Z79.899 HIGH RISK MEDICATION USE: ICD-10-CM

## 2019-10-23 PROCEDURE — 99214 OFFICE O/P EST MOD 30 MIN: CPT | Performed by: ANESTHESIOLOGY

## 2019-10-23 PROCEDURE — G0463 HOSPITAL OUTPT CLINIC VISIT: HCPCS | Performed by: ANESTHESIOLOGY

## 2019-10-23 RX ORDER — LANOLIN ALCOHOL/MO/W.PET/CERES
1 CREAM (GRAM) TOPICAL 2 TIMES DAILY
Refills: 0 | COMMUNITY
Start: 2019-08-17 | End: 2020-08-26

## 2019-10-23 RX ORDER — ESCITALOPRAM OXALATE 10 MG/1
TABLET ORAL DAILY
Refills: 0 | COMMUNITY
Start: 2019-10-10 | End: 2019-10-29 | Stop reason: SDUPTHER

## 2019-10-23 RX ORDER — LAMOTRIGINE 25 MG/1
12.5 TABLET ORAL 2 TIMES DAILY
Refills: 0 | COMMUNITY
Start: 2019-10-10 | End: 2019-10-29 | Stop reason: SDUPTHER

## 2019-10-23 RX ORDER — OLANZAPINE 2.5 MG/1
TABLET ORAL
Refills: 0 | COMMUNITY
Start: 2019-10-18 | End: 2019-10-29 | Stop reason: SDUPTHER

## 2019-10-23 RX ORDER — FLASH GLUCOSE SCANNING READER
EACH MISCELLANEOUS
COMMUNITY
Start: 2019-01-29 | End: 2020-08-26

## 2019-10-23 RX ORDER — HYDROCODONE BITARTRATE AND ACETAMINOPHEN 7.5; 325 MG/1; MG/1
1 TABLET ORAL 2 TIMES DAILY PRN
Qty: 14 TABLET | Refills: 0 | Status: SHIPPED | OUTPATIENT
Start: 2019-10-23 | End: 2019-11-07 | Stop reason: SDUPTHER

## 2019-10-23 RX ORDER — HYDROCODONE BITARTRATE AND ACETAMINOPHEN 7.5; 325 MG/1; MG/1
1 TABLET ORAL 2 TIMES DAILY PRN
Qty: 14 TABLET | Refills: 0 | Status: SHIPPED | OUTPATIENT
Start: 2019-10-23 | End: 2019-10-23 | Stop reason: SDUPTHER

## 2019-10-23 NOTE — PROGRESS NOTES
Subjective    CC left knee pain, right hip pain  Chris Ferguson is a 73 y.o. male with dementia/depression, chronic polyarthralgia, left knee pain status post TKA, right hip pain status post NISREEN here for follow-up.  New to me.  Sees Dr. Guerrero.  Patient is here with his power of /lifelong friend Norbert.  Requests to restart hydrocodone  Complains of continued and worsening left knee pain not improved after TKA worse with walking standing or activity described as constant throbbing.  Chronic hip pain and polyarthralgia with myofascial pain.  States the pain significantly interfering with daily activity.  Denies new injury,   Recent inpatient hospitalization for major depressive episode with dementia, denies suicidal ideation.     Chronic opioid therapy with hydrocodone 7.5/325 QID with good relief of functional benefits last taken 6 months ago .  Patient was last seen 7 months ago.    Pain Assessment   Location of Pain: Lower Back, R Hip,  L knee joint  Description of Pain: Dull/Aching, Throbbing, Stabbing  Previous Pain Rating :7  Current Pain Ratin  Aggravating Factors: Activity  Alleviating Factors: Rest, Medication    The following portions of the patient's history were reviewed and updated as appropriate: allergies, current medications, past family history, past medical history, past social history, past surgical history and problem list.     has a past medical history of Anemia, Angina pectoris (CMS/HCC), Anxiety, Arthritis, BPH (benign prostatic hyperplasia), Dementia (CMS/HCC), Dementia (CMS/HCC), Depression, Diabetes mellitus (CMS/HCC), Disease of thyroid gland, GERD (gastroesophageal reflux disease), Hypertension, and Short-term memory loss.     has a past surgical history that includes Femur fracture surgery (Left); Cholecystectomy; Appendectomy; Stomach surgery; Eye surgery; Total knee arthroplasty (Left, 2018); Colonoscopy; Multiple tooth extractions; and Total hip arthroplasty (Right,  4/23/2019).  Social History     Tobacco Use   • Smoking status: Never Smoker   • Smokeless tobacco: Never Used   Substance Use Topics   • Alcohol use: No     Review of Systems   Constitutional: Negative for chills, fatigue and fever.   HENT: Negative for swollen glands and trouble swallowing.    Respiratory: Negative.  Negative for shortness of breath.    Cardiovascular: Negative for chest pain, palpitations and leg swelling.   Gastrointestinal: Negative for nausea and vomiting.   Musculoskeletal: Positive for arthralgias and back pain. Negative for gait problem, joint swelling, myalgias, neck pain and neck stiffness.        Left knee, right hip pain   Skin: Negative for color change, dry skin, rash and skin lesions.   Neurological: Negative for dizziness, weakness, light-headedness and headache.   Hematological: Negative for adenopathy. Does not bruise/bleed easily.   Psychiatric/Behavioral: Negative for behavioral problems, suicidal ideas and depressed mood.   All other systems reviewed and are negative.      Objective   Physical Exam   Constitutional: He is oriented to person, place, and time. He appears well-developed. No distress.   Eyes: Conjunctivae are normal. Pupils are equal, round, and reactive to light.   Neck: Normal range of motion and full passive range of motion without pain. No Kernig's sign noted.   Cardiovascular: Normal heart sounds and intact distal pulses.   Pulmonary/Chest: Effort normal and breath sounds normal.   Musculoskeletal:        Left knee: He exhibits no swelling, no effusion, no ecchymosis and no deformity. Tenderness found. Medial joint line and lateral joint line tenderness noted.        Lumbar back: He exhibits decreased range of motion and tenderness.   Back: Paraspinal tenderness, positive leg raise on the left/right.  Pain with extension/side rotation.      Vascular Status -  His right foot exhibits no edema. His left foot exhibits no edema.  Lymphadenopathy:     He has no  "cervical adenopathy.   Neurological: He is alert and oriented to person, place, and time. He has normal strength and normal reflexes. No sensory deficit. Gait abnormal. Coordination normal.   Antalgic gait, ambulating with walker   Skin: Skin is warm and dry. Capillary refill takes less than 2 seconds.   Psychiatric: He has a normal mood and affect. His behavior is normal.   Vitals reviewed.    /80   Pulse 68   Temp 97.6 °F (36.4 °C)   Resp 16   Ht 172.7 cm (68\")   Wt 94.3 kg (208 lb)   SpO2 95%   BMI 31.63 kg/m²     Global pain scale and PHQ 9 on chart  Opioid risk tool moderate risk (psych)    Assessment/Plan   Chris was seen today for knee pain, hip pain and follow-up.    Diagnoses and all orders for this visit:    Chronic pain syndrome    Polyarthralgia    Myofascial pain syndrome    Lumbosacral spondylosis without myelopathy    High risk medication use    Other orders  -     Discontinue: HYDROcodone-acetaminophen (NORCO) 7.5-325 MG per tablet; Take 1 tablet by mouth 2 (Two) Times a Day As Needed for Severe Pain .  -     HYDROcodone-acetaminophen (NORCO) 7.5-325 MG per tablet; Take 1 tablet by mouth 2 (Two) Times a Day As Needed for Severe Pain .      Summary  Chris Ferguson is a 73 y.o. male with dementia/depression, chronic polyarthralgia, left knee pain status post TKA, right hip pain status post NISREEN here for follow-up.  New to me.  Sees Dr. Guerrero.  Patient is here with his power of /lifelong friend Norbert.  Requests to restart hydrocodone    Reports significant decline in function due to pain and since he has been off chronic opioid therapy.  Moderate risk for chronic opioid therapy given history of depression/dementia.  Denies suicidal ideation.  Discussed risk of tolerance, dependence, respiratory depression, coma and death associated with use of oral opioids for treatment of chronic nonmalignant pain.   Both the patient and power of  voiced understanding.    We will restart " low-dose hydrocodone at 7.5/325 twice daily as needed for severe pain.  UDS sent.  Inspect reviewed.    Follow-up in 2 weeks with Dr. Guerrero

## 2019-10-24 RX ORDER — TRAZODONE HYDROCHLORIDE 50 MG/1
TABLET ORAL
Qty: 90 TABLET | Refills: 0 | Status: SHIPPED | OUTPATIENT
Start: 2019-10-24 | End: 2019-12-02 | Stop reason: SDUPTHER

## 2019-10-25 ENCOUNTER — APPOINTMENT (OUTPATIENT)
Dept: PAIN MEDICINE | Facility: CLINIC | Age: 74
End: 2019-10-25

## 2019-10-29 ENCOUNTER — OFFICE VISIT (OUTPATIENT)
Dept: FAMILY MEDICINE CLINIC | Facility: CLINIC | Age: 74
End: 2019-10-29

## 2019-10-29 VITALS
SYSTOLIC BLOOD PRESSURE: 126 MMHG | HEART RATE: 68 BPM | OXYGEN SATURATION: 98 % | WEIGHT: 217.1 LBS | BODY MASS INDEX: 31.08 KG/M2 | HEIGHT: 70 IN | DIASTOLIC BLOOD PRESSURE: 80 MMHG | RESPIRATION RATE: 20 BRPM | TEMPERATURE: 98.5 F

## 2019-10-29 DIAGNOSIS — F41.1 GENERALIZED ANXIETY DISORDER: Primary | ICD-10-CM

## 2019-10-29 DIAGNOSIS — F32.A DEPRESSION DETERMINED BY EXAMINATION: ICD-10-CM

## 2019-10-29 PROBLEM — L85.3 ASTEATOSIS CUTIS: Status: ACTIVE | Noted: 2018-07-09

## 2019-10-29 PROBLEM — E83.42 HYPOMAGNESEMIA: Status: ACTIVE | Noted: 2019-01-09

## 2019-10-29 PROBLEM — N17.9 ACUTE RENAL FAILURE SYNDROME (HCC): Status: ACTIVE | Noted: 2019-10-29

## 2019-10-29 PROBLEM — Z79.899 OTHER LONG TERM (CURRENT) DRUG THERAPY: Status: ACTIVE | Noted: 2018-08-02

## 2019-10-29 PROBLEM — R97.20 HIGH PROSTATE SPECIFIC ANTIGEN (PSA): Status: ACTIVE | Noted: 2018-06-19

## 2019-10-29 PROBLEM — B35.1 ONYCHOMYCOSIS: Status: ACTIVE | Noted: 2018-07-09

## 2019-10-29 PROBLEM — E87.1 HYPO-OSMOLALITY AND HYPONATREMIA: Status: ACTIVE | Noted: 2019-10-29

## 2019-10-29 PROBLEM — F41.9 ANXIETY DISORDER: Status: ACTIVE | Noted: 2019-10-29

## 2019-10-29 PROBLEM — I25.10 CORONARY HEART DISEASE: Status: ACTIVE | Noted: 2019-10-29

## 2019-10-29 PROBLEM — M25.569 KNEE PAIN: Status: ACTIVE | Noted: 2018-06-15

## 2019-10-29 PROCEDURE — 99213 OFFICE O/P EST LOW 20 MIN: CPT | Performed by: NURSE PRACTITIONER

## 2019-10-29 RX ORDER — OLANZAPINE 2.5 MG/1
2.5 TABLET ORAL NIGHTLY
Qty: 90 TABLET | Refills: 0 | Status: SHIPPED | OUTPATIENT
Start: 2019-10-29 | End: 2020-02-11 | Stop reason: SDUPTHER

## 2019-10-29 RX ORDER — ESCITALOPRAM OXALATE 10 MG/1
10 TABLET ORAL DAILY
Qty: 90 TABLET | Refills: 0 | Status: SHIPPED | OUTPATIENT
Start: 2019-10-29 | End: 2020-08-26

## 2019-10-29 RX ORDER — LAMOTRIGINE 25 MG/1
12.5 TABLET ORAL 2 TIMES DAILY
Qty: 90 TABLET | Refills: 0 | Status: SHIPPED | OUTPATIENT
Start: 2019-10-29 | End: 2020-08-26

## 2019-10-29 RX ORDER — GALANTAMINE HYDROBROMIDE 4 MG/1
4 TABLET, FILM COATED ORAL 2 TIMES DAILY
Qty: 180 TABLET | Refills: 0 | Status: SHIPPED | OUTPATIENT
Start: 2019-10-29 | End: 2020-01-25

## 2019-10-29 RX ORDER — GALANTAMINE HYDROBROMIDE 4 MG/1
4 TABLET, FILM COATED ORAL 2 TIMES DAILY
COMMUNITY
End: 2019-10-29 | Stop reason: SDUPTHER

## 2019-10-29 NOTE — PROGRESS NOTES
Subjective   Chris Ferguson is a 73 y.o. male.     Chief Complaint   Patient presents with   • Anxiety   • Depression       HPI  Needs refill on his medication for anxiety and depression he has appt with psych 12/17/2019 . He was admitted to [facility] for depression and anxiety. Family feels he is better they report he is laughing now. This is new for him.   The are in need of refills on his new meds until he can be seen by psychiatrist.         The following portions of the patient's history were reviewed and updated as appropriate: allergies, current medications, past family history, past medical history, past social history, past surgical history and problem list.      Current Outpatient Medications:   •  acetaminophen (TYLENOL) 325 MG tablet, Take 2 tablets by mouth Every 4 (Four) Hours As Needed for Mild Pain  or Moderate Pain  (greater than 101 F)., Disp: 120 tablet, Rfl: 1  •  bisacodyl (DULCOLAX) 5 MG EC tablet, Take 2 tablets by mouth Daily As Needed for Constipation., Disp: 30 tablet, Rfl: 1  •  Continuous Blood Gluc  (FREESTYLE TONY 14 DAY READER) device, FREESTYLE TONY 14 DAY READER MICHAEL, Disp: , Rfl:   •  escitalopram (LEXAPRO) 10 MG tablet, Take 1 tablet by mouth Daily., Disp: 90 tablet, Rfl: 0  •  galantamine (RAZADYNE) 4 MG tablet, Take 1 tablet by mouth 2 (Two) Times a Day., Disp: 180 tablet, Rfl: 0  •  glipiZIDE (GLUCOTROL) 5 MG tablet, Take 1 tablet by mouth Every Morning. Make appointment to be seen, Disp: 90 tablet, Rfl: 0  •  glucose blood (TRUE METRIX BLOOD GLUCOSE TEST) test strip, TRUE METRIX BLOOD GLUCOSE TEST STRP, Disp: , Rfl:   •  HYDROcodone-acetaminophen (NORCO) 7.5-325 MG per tablet, Take 1 tablet by mouth 2 (Two) Times a Day As Needed for Severe Pain ., Disp: 14 tablet, Rfl: 0  •  insulin aspart (novoLOG FLEXPEN) 100 UNIT/ML solution pen-injector sc pen, Inject 18 Units under the skin into the appropriate area as directed Every Night. Sliding scale , Disp: , Rfl:   •   "insulin degludec (TRESIBA FLEXTOUCH) 100 UNIT/ML solution pen-injector injection, TRESIBA FLEXTOUCH 100 UNIT/ML SOPN, Disp: , Rfl:   •  Insulin Pen Needle (NANCY UNIFINE PENTIPS) 32G X 4 MM misc, NANCY UNIFINE PENTIPS 32G X 4 MM, Disp: , Rfl:   •  lamoTRIgine (LaMICtal) 25 MG tablet, Take 0.5 tablets by mouth 2 (Two) Times a Day., Disp: 90 tablet, Rfl: 0  •  levothyroxine (SYNTHROID, LEVOTHROID) 50 MCG tablet, Take 50 mcg by mouth Every Morning., Disp: , Rfl:   •  Magnesium Oxide 400 (240 Mg) MG tablet, Take 1 tablet by mouth 2 (Two) Times a Day., Disp: , Rfl: 0  •  memantine (NAMENDA) 5 MG tablet, Take 5 mg by mouth 2 (Two) Times a Day., Disp: , Rfl:   •  metFORMIN (GLUCOPHAGE) 500 MG tablet, Take 1 tablet by mouth 2 (Two) Times a Day With Meals., Disp: 180 tablet, Rfl: 0  •  metoprolol succinate XL (TOPROL-XL) 25 MG 24 hr tablet, Take 25 mg by mouth Every Morning., Disp: , Rfl:   •  Multiple Vitamin (MULTI VITAMIN) tablet, Daily., Disp: , Rfl:   •  OLANZapine (zyPREXA) 2.5 MG tablet, Take 1 tablet by mouth Every Night., Disp: 90 tablet, Rfl: 0  •  tamsulosin (FLOMAX) 0.4 MG capsule 24 hr capsule, Take 1 capsule by mouth Every Night., Disp: 90 capsule, Rfl: 1  •  traZODone (DESYREL) 50 MG tablet, Take 1 tablet by mouth As Needed for Sleep., Disp: 90 tablet, Rfl: 1    No results found for this or any previous visit (from the past 4032 hour(s)).      Review of Systems   Psychiatric/Behavioral: Negative for depressed mood. The patient is not nervous/anxious.        Objective     /80 (BP Location: Left arm, Patient Position: Sitting, Cuff Size: Large Adult)   Pulse 68   Temp 98.5 °F (36.9 °C) (Oral)   Resp 20   Ht 177.8 cm (70\")   Wt 98.5 kg (217 lb 1.6 oz)   SpO2 98%   BMI 31.15 kg/m²     Physical Exam   Constitutional: He appears well-developed and well-nourished.   Cardiovascular: Normal rate, regular rhythm, normal heart sounds and intact distal pulses.   Pulmonary/Chest: Effort normal and breath " sounds normal.   Neurological: He is alert.   Skin: Skin is warm and dry.   Psychiatric: He has a normal mood and affect. His behavior is normal. Judgment and thought content normal.   Nursing note and vitals reviewed.        Assessment/Plan   Chris was seen today for anxiety and depression.    Diagnoses and all orders for this visit:    Generalized anxiety disorder    Depression determined by examination    Other orders  -     galantamine (RAZADYNE) 4 MG tablet; Take 1 tablet by mouth 2 (Two) Times a Day.  -     lamoTRIgine (LaMICtal) 25 MG tablet; Take 0.5 tablets by mouth 2 (Two) Times a Day.  -     escitalopram (LEXAPRO) 10 MG tablet; Take 1 tablet by mouth Daily.  -     OLANZapine (zyPREXA) 2.5 MG tablet; Take 1 tablet by mouth Every Night.      Patient Instructions   Schedule routine appt.  meds prescribed.         Gisela Sotelo, APRN    10/29/19

## 2019-11-07 ENCOUNTER — OFFICE VISIT (OUTPATIENT)
Dept: PAIN MEDICINE | Facility: CLINIC | Age: 74
End: 2019-11-07

## 2019-11-07 ENCOUNTER — DOCUMENTATION (OUTPATIENT)
Dept: PHYSICAL THERAPY | Facility: CLINIC | Age: 74
End: 2019-11-07

## 2019-11-07 VITALS
SYSTOLIC BLOOD PRESSURE: 159 MMHG | TEMPERATURE: 98.1 F | RESPIRATION RATE: 16 BRPM | HEIGHT: 68 IN | OXYGEN SATURATION: 98 % | DIASTOLIC BLOOD PRESSURE: 81 MMHG | BODY MASS INDEX: 31.37 KG/M2 | HEART RATE: 71 BPM | WEIGHT: 207 LBS

## 2019-11-07 DIAGNOSIS — Z79.899 OTHER LONG TERM (CURRENT) DRUG THERAPY: Primary | ICD-10-CM

## 2019-11-07 PROCEDURE — G0463 HOSPITAL OUTPT CLINIC VISIT: HCPCS | Performed by: PHYSICAL MEDICINE & REHABILITATION

## 2019-11-07 PROCEDURE — 99214 OFFICE O/P EST MOD 30 MIN: CPT | Performed by: PHYSICAL MEDICINE & REHABILITATION

## 2019-11-07 RX ORDER — HYDROCODONE BITARTRATE AND ACETAMINOPHEN 7.5; 325 MG/1; MG/1
1 TABLET ORAL EVERY 6 HOURS PRN
Qty: 120 TABLET | Refills: 0 | Status: SHIPPED | OUTPATIENT
Start: 2019-11-07 | End: 2019-11-07 | Stop reason: SDUPTHER

## 2019-11-07 RX ORDER — HYDROCODONE BITARTRATE AND ACETAMINOPHEN 7.5; 325 MG/1; MG/1
1 TABLET ORAL EVERY 6 HOURS PRN
Qty: 120 TABLET | Refills: 0 | Status: SHIPPED | OUTPATIENT
Start: 2019-11-07 | End: 2020-02-04 | Stop reason: SDUPTHER
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
## 2019-11-07 NOTE — PROGRESS NOTES
Discharge Summary  Discharge Summary from Physical Therapy Report      Number of Visits: 11     Goals: Partially Met    Discharge Plan: Continue with current home exercise program as instructed    Comments: Pt did not show up for last three appts. Pt will be discharged at this time.     Date of Discharge: 11/7/2019      PT SIGNATURE: Falguni Dowling PT, DPT, OCS           [place] License # [id number]

## 2019-11-28 RX ORDER — LEVOTHYROXINE SODIUM 0.05 MG/1
50 TABLET ORAL DAILY
Qty: 90 TABLET | Refills: 0 | Status: SHIPPED | OUTPATIENT
Start: 2019-11-28 | End: 2019-12-10 | Stop reason: SDUPTHER

## 2019-11-28 RX ORDER — METOPROLOL SUCCINATE 25 MG/1
TABLET, EXTENDED RELEASE ORAL
Qty: 90 TABLET | Refills: 0 | Status: SHIPPED | OUTPATIENT
Start: 2019-11-28 | End: 2019-12-31

## 2019-12-02 ENCOUNTER — OFFICE VISIT (OUTPATIENT)
Dept: FAMILY MEDICINE CLINIC | Facility: CLINIC | Age: 74
End: 2019-12-02

## 2019-12-02 VITALS
BODY MASS INDEX: 33.65 KG/M2 | OXYGEN SATURATION: 96 % | RESPIRATION RATE: 18 BRPM | HEIGHT: 68 IN | HEART RATE: 71 BPM | DIASTOLIC BLOOD PRESSURE: 75 MMHG | TEMPERATURE: 98 F | SYSTOLIC BLOOD PRESSURE: 121 MMHG | WEIGHT: 222 LBS

## 2019-12-02 DIAGNOSIS — R97.20 HIGH PROSTATE SPECIFIC ANTIGEN (PSA): ICD-10-CM

## 2019-12-02 DIAGNOSIS — R39.11 BENIGN PROSTATIC HYPERPLASIA WITH URINARY HESITANCY: ICD-10-CM

## 2019-12-02 DIAGNOSIS — I10 ESSENTIAL HYPERTENSION: Primary | ICD-10-CM

## 2019-12-02 DIAGNOSIS — E11.65 TYPE 2 DIABETES MELLITUS WITH HYPERGLYCEMIA, WITHOUT LONG-TERM CURRENT USE OF INSULIN (HCC): ICD-10-CM

## 2019-12-02 DIAGNOSIS — E03.9 ACQUIRED HYPOTHYROIDISM: ICD-10-CM

## 2019-12-02 DIAGNOSIS — N40.1 BENIGN PROSTATIC HYPERPLASIA WITH URINARY HESITANCY: ICD-10-CM

## 2019-12-02 DIAGNOSIS — F41.1 GENERALIZED ANXIETY DISORDER: ICD-10-CM

## 2019-12-02 PROBLEM — F03.90 DEMENTIA: Status: ACTIVE | Noted: 2019-12-02

## 2019-12-02 PROCEDURE — 99214 OFFICE O/P EST MOD 30 MIN: CPT | Performed by: NURSE PRACTITIONER

## 2019-12-02 NOTE — PATIENT INSTRUCTIONS
Follow up with Dr Vela is time for exam  Get eye exam  Follow up on lab results.  Get up and walk.

## 2019-12-02 NOTE — PROGRESS NOTES
Subjective   Chris Ferguson is a 73 y.o. male.     Chief Complaint   Patient presents with   • Anxiety     One month follow up    • Depression       HPI  He is here for one month follow up he has  Appointment 12/17/2019. He is taking his psych meds and said he feels much better.   zoloft 25 mg once day  zyprexa 2.5 mg once day trazodone 50 mg at night. namenda 5 mg one bid. lexapro 10 mg once day.     Diabetes: he is taking metformin 500 mg bid with meals. testinf once day  At night. Glipizide 5 mg once day. novolog 18 units at bedtime. Needs refill. Using treshiba daily. By sliding scale. 200 uses 16 units. If 300 uses 22 units. If under 200  4 units.        Chronic pain: followed by pain management. norco 7.5 mg /325 taking one qid  He reports controls pain well. He does not  Exercise. He uses walker most of the time. He stopped PT    HypertensioN: metoprolol succinate XL 25 mg in am. Blood pressure well controlled on medications. No chest pain as reported.     Hypothyroidism: synthroid 50 mcg once day. He is not depressed he reports. He does not have heart palpations.     BPH gets up once at night. Taking flomax 0.4 mg .      The following portions of the patient's history were reviewed and updated as appropriate: allergies, current medications, past family history, past medical history, past social history, past surgical history and problem list.      Current Outpatient Medications:   •  acetaminophen (TYLENOL) 325 MG tablet, Take 2 tablets by mouth Every 4 (Four) Hours As Needed for Mild Pain  or Moderate Pain  (greater than 101 F)., Disp: 120 tablet, Rfl: 1  •  bisacodyl (DULCOLAX) 5 MG EC tablet, Take 2 tablets by mouth Daily As Needed for Constipation., Disp: 30 tablet, Rfl: 1  •  Continuous Blood Gluc  (FREESTYLE TONY 14 DAY READER) device, FREESTYLE TONY 14 DAY READER MICHAEL, Disp: , Rfl:   •  escitalopram (LEXAPRO) 10 MG tablet, Take 1 tablet by mouth Daily., Disp: 90 tablet, Rfl: 0  •   galantamine (RAZADYNE) 4 MG tablet, Take 1 tablet by mouth 2 (Two) Times a Day., Disp: 180 tablet, Rfl: 0  •  glucose blood (TRUE METRIX BLOOD GLUCOSE TEST) test strip, TRUE METRIX BLOOD GLUCOSE TEST STRP, Disp: , Rfl:   •  HYDROcodone-acetaminophen (NORCO) 7.5-325 MG per tablet, Take 1 tablet by mouth Every 6 (Six) Hours As Needed for Severe Pain ., Disp: 120 tablet, Rfl: 0  •  insulin aspart (novoLOG FLEXPEN) 100 UNIT/ML solution pen-injector sc pen, Inject 18 Units under the skin into the appropriate area as directed Every Night. Sliding scale, Disp: 15 pen, Rfl: 1  •  insulin degludec (TRESIBA FLEXTOUCH) 100 UNIT/ML solution pen-injector injection, TRESIBA FLEXTOUCH 100 UNIT/ML SOPN, Disp: , Rfl:   •  Insulin Pen Needle (NANCY UNIFINE PENTIPS) 32G X 4 MM misc, NANCY UNIFINE PENTIPS 32G X 4 MM, Disp: , Rfl:   •  lamoTRIgine (LaMICtal) 25 MG tablet, Take 0.5 tablets by mouth 2 (Two) Times a Day., Disp: 90 tablet, Rfl: 0  •  levothyroxine (SYNTHROID, LEVOTHROID) 50 MCG tablet, Take 50 mcg by mouth Every Morning., Disp: , Rfl:   •  Magnesium Oxide 400 (240 Mg) MG tablet, Take 1 tablet by mouth 2 (Two) Times a Day., Disp: , Rfl: 0  •  memantine (NAMENDA) 5 MG tablet, Take 5 mg by mouth 2 (Two) Times a Day., Disp: , Rfl:   •  metoprolol succinate XL (TOPROL-XL) 25 MG 24 hr tablet, Take 25 mg by mouth Every Morning., Disp: , Rfl:   •  Multiple Vitamin (MULTI VITAMIN) tablet, Daily., Disp: , Rfl:   •  OLANZapine (zyPREXA) 2.5 MG tablet, Take 1 tablet by mouth Every Night., Disp: 90 tablet, Rfl: 0  •  tamsulosin (FLOMAX) 0.4 MG capsule 24 hr capsule, Take 1 capsule by mouth Every Night., Disp: 90 capsule, Rfl: 1  •  traZODone (DESYREL) 50 MG tablet, Take 1 tablet by mouth As Needed for Sleep., Disp: 90 tablet, Rfl: 1  •  glipiZIDE (GLUCOTROL) 5 MG tablet, Take 1 tablet by mouth Every Morning. Make appointment to be seen, Disp: 90 tablet, Rfl: 0  •  levothyroxine (SYNTHROID, LEVOTHROID) 50 MCG tablet, TAKE 1 TABLET BY  "MOUTH DAILY, Disp: 90 tablet, Rfl: 0  •  metFORMIN (GLUCOPHAGE) 500 MG tablet, TAKE 1 TABLET BY MOUTH TWICE DAILY WITH MEALS, Disp: 180 tablet, Rfl: 0  •  metoprolol succinate XL (TOPROL-XL) 25 MG 24 hr tablet, TAKE 1 TABLET BY MOUTH EVERY DAY, Disp: 90 tablet, Rfl: 0  •  sertraline (ZOLOFT) 25 MG tablet, TAKE 1 TABLET BY MOUTH AT BEDTIME, Disp: 30 tablet, Rfl: 0    No results found for this or any previous visit (from the past 4032 hour(s)).      Review of Systems   Constitutional: Negative for chills and fever.   HENT: Negative for congestion, sinus pressure and swollen glands.    Eyes: Negative for blurred vision and pain.   Respiratory: Negative for cough and shortness of breath.    Cardiovascular: Negative for chest pain and leg swelling.   Gastrointestinal: Negative for abdominal pain, nausea and indigestion.   Endocrine: Negative for cold intolerance, heat intolerance, polydipsia, polyphagia and polyuria.   Musculoskeletal:        Left knee.   Says he just doesn't exercise or move much.   Skin: Negative for dry skin, rash and bruise.   Allergic/Immunologic: Negative.    Neurological: Negative for speech difficulty, light-headedness and headache.   Psychiatric/Behavioral: Negative for dysphoric mood and stress.       Objective     /75 (BP Location: Left arm, Patient Position: Sitting, Cuff Size: Adult)   Pulse 71   Temp 98 °F (36.7 °C) (Oral)   Resp 18   Ht 172.7 cm (68\")   Wt 101 kg (222 lb)   SpO2 96%   BMI 33.75 kg/m²     Physical Exam   Constitutional: He is oriented to person, place, and time. He appears well-developed and well-nourished.   HENT:   Head: Normocephalic.   Right Ear: External ear normal.   Left Ear: External ear normal.   Nose: Nose normal.   Mouth/Throat: Oropharynx is clear and moist. No oropharyngeal exudate.   Eyes: Conjunctivae are normal. Pupils are equal, round, and reactive to light.   Neck: Normal range of motion. Neck supple.   Cardiovascular: Normal rate, regular " rhythm, normal heart sounds and intact distal pulses.   Pulmonary/Chest: Effort normal and breath sounds normal.   Abdominal: Soft. Bowel sounds are normal. He exhibits no distension. There is no tenderness.   Musculoskeletal: Normal range of motion.   Neurological: He is alert and oriented to person, place, and time.   Skin: Skin is warm and dry.         Assessment/Plan   Chris was seen today for anxiety and depression.    Diagnoses and all orders for this visit:    Essential hypertension    Benign prostatic hyperplasia with urinary hesitancy    Type 2 diabetes mellitus with hyperglycemia, without long-term current use of insulin (CMS/[facility])    Acquired hypothyroidism    Generalized anxiety disorder    High prostate specific antigen (PSA)    Other orders  -     insulin aspart (novoLOG FLEXPEN) 100 UNIT/ML solution pen-injector sc pen; Inject 18 Units under the skin into the appropriate area as directed Every Night. Sliding scale      Patient Instructions   Follow up with Dr Vela is time for exam  Get eye exam  Follow up on lab results.  Get up and walk.      Gisela Sotelo, APRN    12/02/19

## 2019-12-04 ENCOUNTER — APPOINTMENT (OUTPATIENT)
Dept: LAB | Facility: HOSPITAL | Age: 74
End: 2019-12-04

## 2019-12-04 LAB
ALBUMIN SERPL-MCNC: 4.1 G/DL (ref 3.5–5.2)
ALBUMIN UR-MCNC: 5.4 MG/DL
ALBUMIN/GLOB SERPL: 1.1 G/DL
ALP SERPL-CCNC: 107 U/L (ref 39–117)
ALT SERPL W P-5'-P-CCNC: 22 U/L (ref 1–41)
ANION GAP SERPL CALCULATED.3IONS-SCNC: 11.2 MMOL/L (ref 5–15)
AST SERPL-CCNC: 19 U/L (ref 1–40)
BILIRUB SERPL-MCNC: 0.2 MG/DL (ref 0.2–1.2)
BUN BLD-MCNC: 24 MG/DL (ref 8–23)
BUN/CREAT SERPL: 18.5 (ref 7–25)
CALCIUM SPEC-SCNC: 8.9 MG/DL (ref 8.6–10.5)
CHLORIDE SERPL-SCNC: 102 MMOL/L (ref 98–107)
CHOLEST SERPL-MCNC: 265 MG/DL (ref 0–200)
CO2 SERPL-SCNC: 26.8 MMOL/L (ref 22–29)
CREAT BLD-MCNC: 1.3 MG/DL (ref 0.76–1.27)
CREAT UR-MCNC: 138.8 MG/DL
GFR SERPL CREATININE-BSD FRML MDRD: 54 ML/MIN/1.73
GLOBULIN UR ELPH-MCNC: 3.6 GM/DL
GLUCOSE BLD-MCNC: 109 MG/DL (ref 65–99)
HBA1C MFR BLD: 7.8 % (ref 3.5–5.6)
HDLC SERPL-MCNC: 56 MG/DL (ref 40–60)
LDLC SERPL CALC-MCNC: 158 MG/DL (ref 0–100)
LDLC/HDLC SERPL: 2.83 {RATIO}
MICROALBUMIN/CREAT UR: 38.9 MG/G
POTASSIUM BLD-SCNC: 4.8 MMOL/L (ref 3.5–5.2)
PROT SERPL-MCNC: 7.7 G/DL (ref 6–8.5)
SODIUM BLD-SCNC: 140 MMOL/L (ref 136–145)
TRIGL SERPL-MCNC: 254 MG/DL (ref 0–150)
TSH SERPL DL<=0.05 MIU/L-ACNC: 4.58 UIU/ML (ref 0.27–4.2)
VLDLC SERPL-MCNC: 50.8 MG/DL (ref 5–40)

## 2019-12-04 PROCEDURE — 36415 COLL VENOUS BLD VENIPUNCTURE: CPT | Performed by: NURSE PRACTITIONER

## 2019-12-04 PROCEDURE — 83036 HEMOGLOBIN GLYCOSYLATED A1C: CPT | Performed by: NURSE PRACTITIONER

## 2019-12-04 PROCEDURE — 82043 UR ALBUMIN QUANTITATIVE: CPT | Performed by: NURSE PRACTITIONER

## 2019-12-04 PROCEDURE — 84443 ASSAY THYROID STIM HORMONE: CPT | Performed by: NURSE PRACTITIONER

## 2019-12-04 PROCEDURE — 82570 ASSAY OF URINE CREATININE: CPT | Performed by: NURSE PRACTITIONER

## 2019-12-04 PROCEDURE — 80053 COMPREHEN METABOLIC PANEL: CPT | Performed by: NURSE PRACTITIONER

## 2019-12-04 PROCEDURE — 80061 LIPID PANEL: CPT | Performed by: NURSE PRACTITIONER

## 2019-12-10 RX ORDER — LEVOTHYROXINE SODIUM 0.07 MG/1
75 TABLET ORAL DAILY
Qty: 90 TABLET | Refills: 1 | Status: SHIPPED | OUTPATIENT
Start: 2019-12-10 | End: 2020-02-11 | Stop reason: SDUPTHER

## 2019-12-10 RX ORDER — ATORVASTATIN CALCIUM 20 MG/1
20 TABLET, FILM COATED ORAL DAILY
Qty: 90 TABLET | Refills: 1 | Status: SHIPPED | OUTPATIENT
Start: 2019-12-10 | End: 2020-05-24

## 2019-12-13 RX ORDER — INSULIN DEGLUDEC INJECTION 100 U/ML
INJECTION, SOLUTION SUBCUTANEOUS
Qty: 15 ML | Refills: 0 | Status: SHIPPED | OUTPATIENT
Start: 2019-12-13 | End: 2020-03-16

## 2019-12-31 RX ORDER — METOPROLOL SUCCINATE 25 MG/1
TABLET, EXTENDED RELEASE ORAL
Qty: 90 TABLET | Refills: 0 | Status: SHIPPED | OUTPATIENT
Start: 2019-12-31 | End: 2020-05-18

## 2020-01-25 RX ORDER — GALANTAMINE HYDROBROMIDE 4 MG/1
TABLET, FILM COATED ORAL
Qty: 180 TABLET | Refills: 0 | Status: SHIPPED | OUTPATIENT
Start: 2020-01-25 | End: 2020-02-04 | Stop reason: SDUPTHER

## 2020-02-04 ENCOUNTER — OFFICE VISIT (OUTPATIENT)
Dept: PAIN MEDICINE | Facility: CLINIC | Age: 75
End: 2020-02-04

## 2020-02-04 VITALS
WEIGHT: 220 LBS | DIASTOLIC BLOOD PRESSURE: 80 MMHG | OXYGEN SATURATION: 90 % | BODY MASS INDEX: 33.34 KG/M2 | RESPIRATION RATE: 16 BRPM | SYSTOLIC BLOOD PRESSURE: 133 MMHG | HEIGHT: 68 IN | HEART RATE: 88 BPM | TEMPERATURE: 98.1 F

## 2020-02-04 DIAGNOSIS — Z79.899 OTHER LONG TERM (CURRENT) DRUG THERAPY: Primary | ICD-10-CM

## 2020-02-04 DIAGNOSIS — M25.551 RIGHT HIP PAIN: ICD-10-CM

## 2020-02-04 DIAGNOSIS — Z96.652 CHRONIC KNEE PAIN AFTER TOTAL REPLACEMENT OF LEFT KNEE JOINT: ICD-10-CM

## 2020-02-04 DIAGNOSIS — M25.562 CHRONIC KNEE PAIN AFTER TOTAL REPLACEMENT OF LEFT KNEE JOINT: ICD-10-CM

## 2020-02-04 DIAGNOSIS — G89.29 CHRONIC KNEE PAIN AFTER TOTAL REPLACEMENT OF LEFT KNEE JOINT: ICD-10-CM

## 2020-02-04 PROCEDURE — G0463 HOSPITAL OUTPT CLINIC VISIT: HCPCS | Performed by: PHYSICAL MEDICINE & REHABILITATION

## 2020-02-04 PROCEDURE — 99213 OFFICE O/P EST LOW 20 MIN: CPT | Performed by: PHYSICAL MEDICINE & REHABILITATION

## 2020-02-04 RX ORDER — HYDROCODONE BITARTRATE AND ACETAMINOPHEN 7.5; 325 MG/1; MG/1
1 TABLET ORAL EVERY 6 HOURS PRN
Qty: 120 TABLET | Refills: 0 | Status: SHIPPED | OUTPATIENT
Start: 2020-02-04 | End: 2020-08-26

## 2020-02-04 RX ORDER — HYDROCODONE BITARTRATE AND ACETAMINOPHEN 7.5; 325 MG/1; MG/1
1 TABLET ORAL EVERY 6 HOURS PRN
Qty: 120 TABLET | Refills: 0 | Status: SHIPPED | OUTPATIENT
Start: 2020-02-04 | End: 2020-02-04 | Stop reason: SDUPTHER

## 2020-02-04 RX ORDER — GALANTAMINE HYDROBROMIDE 8 MG/1
8 TABLET, FILM COATED ORAL 2 TIMES DAILY
COMMUNITY
Start: 2020-01-23 | End: 2020-08-26

## 2020-02-04 NOTE — PROGRESS NOTES
Subjective    CC left knee pain, right hip pain  Chris Ferguson is a 74 y.o. male with dementia/depression, chronic polyarthralgia, left knee pain status post TKA, right hip pain status post NISREEN here for follow-up.  Patient is here with his power of /lifelong friend Norbert.  Requests to restart hydrocodone  Complains of continued and worsening left knee pain not improved after TKA worse with walking standing or activity described as constant throbbing.  Chronic hip pain and polyarthralgia with myofascial pain.  States the pain significantly interfering with daily activity.  Denies new injury,   Recent inpatient hospitalization for major depressive episode with dementia, denies suicidal ideation.     Chronic opioid therapy with hydrocodone 7.5/325 QID with good relief of functional benefits, restarted at BID, then QID prn, doing well, no side effects.      Pain Assessment   Location of Pain: Lower Back, R Hip,  L knee joint  Description of Pain: Dull/Aching, Throbbing, Stabbing  Previous Pain Rating :7  Current Pain Ratin  Aggravating Factors: Activity  Alleviating Factors: Rest, Medication    The following portions of the patient's history were reviewed and updated as appropriate: allergies, current medications, past family history, past medical history, past social history, past surgical history and problem list.     has a past medical history of Anemia, Angina pectoris (CMS/HCC), Anxiety, Arthritis, BPH (benign prostatic hyperplasia), Dementia (CMS/HCC), Dementia (CMS/HCC), Depression, Diabetes mellitus (CMS/HCC), Disease of thyroid gland, GERD (gastroesophageal reflux disease), Hypertension, and Short-term memory loss.     has a past surgical history that includes Femur fracture surgery (Left); Cholecystectomy; Appendectomy; Stomach surgery; Eye surgery; Total knee arthroplasty (Left, 2018); Colonoscopy; Multiple tooth extractions; and Total hip arthroplasty (Right, 2019).  Social History      Tobacco Use   • Smoking status: Never Smoker   • Smokeless tobacco: Never Used   Substance Use Topics   • Alcohol use: No     Review of Systems   Constitutional: Negative for chills, fatigue and fever.   HENT: Negative for swollen glands and trouble swallowing.    Respiratory: Negative.  Negative for shortness of breath.    Cardiovascular: Negative for chest pain, palpitations and leg swelling.   Gastrointestinal: Negative for nausea and vomiting.   Musculoskeletal: Positive for arthralgias and back pain. Negative for gait problem, joint swelling, myalgias, neck pain and neck stiffness.        Left knee, right hip pain   Skin: Negative for color change, dry skin, rash and skin lesions.   Neurological: Negative for dizziness, weakness, light-headedness and headache.   Hematological: Negative for adenopathy. Does not bruise/bleed easily.   Psychiatric/Behavioral: Negative for behavioral problems, suicidal ideas and depressed mood.   All other systems reviewed and are negative.      Objective   Physical Exam   Constitutional: He is oriented to person, place, and time. He appears well-developed. No distress.   Eyes: Pupils are equal, round, and reactive to light. Conjunctivae are normal.   Neck: Normal range of motion and full passive range of motion without pain. No Kernig's sign noted.   Cardiovascular: Normal heart sounds and intact distal pulses.   Pulmonary/Chest: Effort normal and breath sounds normal.   Musculoskeletal:        Left knee: He exhibits no swelling, no effusion, no ecchymosis and no deformity. Tenderness found. Medial joint line and lateral joint line tenderness noted.        Lumbar back: He exhibits decreased range of motion and tenderness.   Back: Paraspinal tenderness, positive leg raise on the left/right.  Pain with extension/side rotation.      Vascular Status -  His right foot exhibits no edema. His left foot exhibits no edema.  Lymphadenopathy:     He has no cervical adenopathy.  "  Neurological: He is alert and oriented to person, place, and time. He has normal strength and normal reflexes. No sensory deficit. Gait abnormal. Coordination normal.   Antalgic gait, ambulating with walker   Skin: Skin is warm and dry. Capillary refill takes less than 2 seconds.   Psychiatric: He has a normal mood and affect. His behavior is normal.   Vitals reviewed.    /80   Pulse 88   Temp 98.1 °F (36.7 °C)   Resp 16   Ht 172.7 cm (68\")   Wt 99.8 kg (220 lb)   SpO2 90%   BMI 33.45 kg/m²     Global pain scale and PHQ 9 on chart  Opioid risk tool moderate risk (psych)    Assessment/Plan   Chris was seen today for extremity pain.    Diagnoses and all orders for this visit:    Other long term (current) drug therapy    Chronic knee pain after total replacement of left knee joint    Right hip pain      Summary  Chris Ferguson is a 74 y.o. male with dementia/depression, chronic polyarthralgia, left knee pain status post TKA, right hip pain status post NISREEN here for follow-up.  Saw Dr. Phelps.  Patient is here with his power of /lifelong friend Norbert.  Restarted hydrocodone with Dr. Phelps at BID prn, insufficient, now QID prn.    Reported significant decline in function due to pain and since he had been off chronic opioid therapy.  Moderate risk for chronic opioid therapy given history of depression/dementia.  Low risk per SOAPP. Denies suicidal ideation.  Discussed risk of tolerance, dependence, respiratory depression, coma and death associated with use of oral opioids for treatment of chronic nonmalignant pain.   Both the patient and power of  voiced understanding.  Treatment plan will consist of continuing current medication as long as it remains effective and is necessary, while evaluating patient at each visit and determining if the medication can be lowered or discontinued, while also using nonopioid therapies                   "

## 2020-02-11 RX ORDER — OLANZAPINE 2.5 MG/1
2.5 TABLET ORAL NIGHTLY
Qty: 90 TABLET | Refills: 0 | OUTPATIENT
Start: 2020-02-11

## 2020-02-11 RX ORDER — LEVOTHYROXINE SODIUM 0.07 MG/1
75 TABLET ORAL DAILY
Qty: 90 TABLET | Refills: 1 | Status: SHIPPED | OUTPATIENT
Start: 2020-02-11 | End: 2020-06-12

## 2020-02-11 RX ORDER — OLANZAPINE 2.5 MG/1
2.5 TABLET ORAL NIGHTLY
Qty: 90 TABLET | Refills: 0 | Status: SHIPPED | OUTPATIENT
Start: 2020-02-11 | End: 2020-08-26

## 2020-02-11 RX ORDER — LEVOTHYROXINE SODIUM 0.05 MG/1
50 TABLET ORAL DAILY
Qty: 90 TABLET | Refills: 0 | OUTPATIENT
Start: 2020-02-11

## 2020-02-12 RX ORDER — ESCITALOPRAM OXALATE 10 MG/1
10 TABLET ORAL DAILY
Qty: 90 TABLET | Refills: 0 | OUTPATIENT
Start: 2020-02-12

## 2020-03-01 RX ORDER — GALANTAMINE HYDROBROMIDE 4 MG/1
TABLET, FILM COATED ORAL
Qty: 180 TABLET | Refills: 0 | Status: SHIPPED | OUTPATIENT
Start: 2020-03-01 | End: 2020-08-26

## 2020-03-16 RX ORDER — INSULIN DEGLUDEC INJECTION 100 U/ML
INJECTION, SOLUTION SUBCUTANEOUS
Qty: 15 ML | Refills: 0 | Status: SHIPPED | OUTPATIENT
Start: 2020-03-16 | End: 2020-08-26

## 2020-03-16 RX ORDER — TAMSULOSIN HYDROCHLORIDE 0.4 MG/1
CAPSULE ORAL
Qty: 90 CAPSULE | Refills: 1 | Status: SHIPPED | OUTPATIENT
Start: 2020-03-16 | End: 2020-08-26

## 2020-05-18 RX ORDER — METOPROLOL SUCCINATE 25 MG/1
TABLET, EXTENDED RELEASE ORAL
Qty: 90 TABLET | Refills: 0 | Status: SHIPPED | OUTPATIENT
Start: 2020-05-18 | End: 2020-08-26

## 2020-05-24 RX ORDER — ATORVASTATIN CALCIUM 20 MG/1
20 TABLET, FILM COATED ORAL DAILY
Qty: 90 TABLET | Refills: 0 | Status: SHIPPED | OUTPATIENT
Start: 2020-05-24 | End: 2020-08-26

## 2020-06-12 RX ORDER — LEVOTHYROXINE SODIUM 0.07 MG/1
75 TABLET ORAL DAILY
Qty: 90 TABLET | Refills: 1 | Status: SHIPPED | OUTPATIENT
Start: 2020-06-12 | End: 2020-08-26

## 2020-08-10 RX ORDER — METOPROLOL SUCCINATE 25 MG/1
TABLET, EXTENDED RELEASE ORAL
Qty: 90 TABLET | Refills: 0 | OUTPATIENT
Start: 2020-08-10

## 2020-08-10 RX ORDER — OLANZAPINE 2.5 MG/1
2.5 TABLET ORAL NIGHTLY
Qty: 90 TABLET | Refills: 0 | OUTPATIENT
Start: 2020-08-10

## 2020-08-26 ENCOUNTER — APPOINTMENT (OUTPATIENT)
Dept: GENERAL RADIOLOGY | Facility: HOSPITAL | Age: 75
End: 2020-08-26

## 2020-08-26 ENCOUNTER — HOSPITAL ENCOUNTER (INPATIENT)
Facility: HOSPITAL | Age: 75
LOS: 30 days | Discharge: NURSING, REHAB OR LONG-TERM CARE | End: 2020-09-25
Attending: EMERGENCY MEDICINE | Admitting: INTERNAL MEDICINE

## 2020-08-26 ENCOUNTER — APPOINTMENT (OUTPATIENT)
Dept: CT IMAGING | Facility: HOSPITAL | Age: 75
End: 2020-08-26

## 2020-08-26 DIAGNOSIS — T67.01XA HEAT STROKE AND SUNSTROKE, INITIAL ENCOUNTER: Primary | ICD-10-CM

## 2020-08-26 DIAGNOSIS — R65.21 SHOCK, SEPTIC (HCC): ICD-10-CM

## 2020-08-26 DIAGNOSIS — A41.9 SHOCK, SEPTIC (HCC): ICD-10-CM

## 2020-08-26 DIAGNOSIS — T30.0 BURN: ICD-10-CM

## 2020-08-26 PROBLEM — E87.3 ACUTE RESPIRATORY ALKALOSIS: Status: ACTIVE | Noted: 2020-08-26

## 2020-08-26 PROBLEM — E87.29 HIGH ANION GAP METABOLIC ACIDOSIS: Status: ACTIVE | Noted: 2020-08-26

## 2020-08-26 PROBLEM — S72.452K: Status: RESOLVED | Noted: 2018-08-13 | Resolved: 2020-08-26

## 2020-08-26 PROBLEM — N17.9 AKI (ACUTE KIDNEY INJURY) (HCC): Status: ACTIVE | Noted: 2020-08-26

## 2020-08-26 PROBLEM — M62.82 NON-TRAUMATIC RHABDOMYOLYSIS: Status: ACTIVE | Noted: 2020-08-26

## 2020-08-26 LAB
A-A DO2: 62.5 MMHG (ref 0–300)
ALBUMIN SERPL-MCNC: 4.12 G/DL (ref 3.5–5.2)
ALBUMIN/GLOB SERPL: 1.3 G/DL
ALP SERPL-CCNC: 143 U/L (ref 39–117)
ALT SERPL W P-5'-P-CCNC: 21 U/L (ref 1–41)
ANION GAP SERPL CALCULATED.3IONS-SCNC: 14.9 MMOL/L (ref 5–15)
ANION GAP SERPL CALCULATED.3IONS-SCNC: 17.6 MMOL/L (ref 5–15)
APTT PPP: 24.9 SECONDS (ref 25.6–35.3)
ARTERIAL PATENCY WRIST A: POSITIVE
AST SERPL-CCNC: 33 U/L (ref 1–40)
ATMOSPHERIC PRESS: 729 MMHG
BACTERIA UR QL AUTO: ABNORMAL /HPF
BASE EXCESS BLDA CALC-SCNC: -4.9 MMOL/L (ref 0–2)
BASOPHILS # BLD AUTO: 0.05 10*3/MM3 (ref 0–0.2)
BASOPHILS NFR BLD AUTO: 0.5 % (ref 0–1.5)
BDY SITE: ABNORMAL
BILIRUB SERPL-MCNC: 0.4 MG/DL (ref 0–1.2)
BILIRUB UR QL STRIP: ABNORMAL
BODY TEMPERATURE: 0 C
BUN SERPL-MCNC: 27 MG/DL (ref 8–23)
BUN SERPL-MCNC: 31 MG/DL (ref 8–23)
BUN/CREAT SERPL: 15 (ref 7–25)
BUN/CREAT SERPL: 15.8 (ref 7–25)
CALCIUM SPEC-SCNC: 8.4 MG/DL (ref 8.6–10.5)
CALCIUM SPEC-SCNC: 9.3 MG/DL (ref 8.6–10.5)
CHLORIDE SERPL-SCNC: 100 MMOL/L (ref 98–107)
CHLORIDE SERPL-SCNC: 93 MMOL/L (ref 98–107)
CK SERPL-CCNC: 758 U/L (ref 20–200)
CLARITY UR: ABNORMAL
CO2 BLDA-SCNC: 17.3 MMOL/L (ref 22–33)
CO2 SERPL-SCNC: 16.4 MMOL/L (ref 22–29)
CO2 SERPL-SCNC: 18.1 MMOL/L (ref 22–29)
COARSE GRAN CASTS URNS QL MICRO: ABNORMAL /LPF
COHGB MFR BLD: 0.8 % (ref 0–5)
COLOR UR: ABNORMAL
CREAT SERPL-MCNC: 1.8 MG/DL (ref 0.76–1.27)
CREAT SERPL-MCNC: 1.96 MG/DL (ref 0.76–1.27)
D-LACTATE SERPL-SCNC: 1.9 MMOL/L (ref 0.5–2)
D-LACTATE SERPL-SCNC: 4.4 MMOL/L (ref 0.5–2)
DEPRECATED RDW RBC AUTO: 38.4 FL (ref 37–54)
EOSINOPHIL # BLD AUTO: 0.03 10*3/MM3 (ref 0–0.4)
EOSINOPHIL NFR BLD AUTO: 0.3 % (ref 0.3–6.2)
ERYTHROCYTE [DISTWIDTH] IN BLOOD BY AUTOMATED COUNT: 12.9 % (ref 12.3–15.4)
ETHANOL BLD-MCNC: <10 MG/DL (ref 0–10)
ETHANOL UR QL: <0.01 %
FLUAV AG NPH QL: NEGATIVE
FLUBV AG NPH QL IA: NEGATIVE
GFR SERPL CREATININE-BSD FRML MDRD: 34 ML/MIN/1.73
GFR SERPL CREATININE-BSD FRML MDRD: 37 ML/MIN/1.73
GLOBULIN UR ELPH-MCNC: 3.3 GM/DL
GLUCOSE BLDC GLUCOMTR-MCNC: 307 MG/DL (ref 70–130)
GLUCOSE BLDC GLUCOMTR-MCNC: 338 MG/DL (ref 70–130)
GLUCOSE BLDC GLUCOMTR-MCNC: 422 MG/DL (ref 70–130)
GLUCOSE SERPL-MCNC: 326 MG/DL (ref 65–99)
GLUCOSE SERPL-MCNC: 472 MG/DL (ref 65–99)
GLUCOSE UR STRIP-MCNC: ABNORMAL MG/DL
HCO3 BLDA-SCNC: 16.6 MMOL/L (ref 20–26)
HCT VFR BLD AUTO: 44.9 % (ref 37.5–51)
HCT VFR BLD CALC: 46.9 % (ref 38–51)
HGB BLD-MCNC: 14.6 G/DL (ref 13–17.7)
HGB BLDA-MCNC: 15.3 G/DL (ref 14–18)
HGB UR QL STRIP.AUTO: ABNORMAL
HYALINE CASTS UR QL AUTO: ABNORMAL /LPF
IMM GRANULOCYTES # BLD AUTO: 0.04 10*3/MM3 (ref 0–0.05)
IMM GRANULOCYTES NFR BLD AUTO: 0.4 % (ref 0–0.5)
INHALED O2 CONCENTRATION: 21 %
INR PPP: 1.08 (ref 0.9–1.1)
KETONES UR QL STRIP: ABNORMAL
LACTATE HOLD SPECIMEN: NORMAL
LEUKOCYTE ESTERASE UR QL STRIP.AUTO: NEGATIVE
LYMPHOCYTES # BLD AUTO: 3.51 10*3/MM3 (ref 0.7–3.1)
LYMPHOCYTES NFR BLD AUTO: 31.8 % (ref 19.6–45.3)
Lab: ABNORMAL
Lab: ABNORMAL
MAGNESIUM SERPL-MCNC: 1.5 MG/DL (ref 1.6–2.4)
MCH RBC QN AUTO: 26.5 PG (ref 26.6–33)
MCHC RBC AUTO-ENTMCNC: 32.5 G/DL (ref 31.5–35.7)
MCV RBC AUTO: 81.6 FL (ref 79–97)
METHGB BLD QL: 0.3 % (ref 0–3)
MODALITY: ABNORMAL
MONOCYTES # BLD AUTO: 0.72 10*3/MM3 (ref 0.1–0.9)
MONOCYTES NFR BLD AUTO: 6.5 % (ref 5–12)
MYOGLOBIN SERPL-MCNC: 5654 NG/ML (ref 28–72)
MYOGLOBIN SERPL-MCNC: 9888 NG/ML (ref 28–72)
NEUTROPHILS NFR BLD AUTO: 6.68 10*3/MM3 (ref 1.7–7)
NEUTROPHILS NFR BLD AUTO: 60.5 % (ref 42.7–76)
NITRITE UR QL STRIP: NEGATIVE
NOTE: ABNORMAL
NOTIFIED BY: ABNORMAL
NOTIFIED WHO: ABNORMAL
NRBC BLD AUTO-RTO: 0 /100 WBC (ref 0–0.2)
NT-PROBNP SERPL-MCNC: 218.1 PG/ML (ref 0–900)
OXYHGB MFR BLDV: 89.3 % (ref 94–99)
PCO2 BLDA: 23 MM HG (ref 35–45)
PCO2 TEMP ADJ BLD: ABNORMAL MM[HG]
PH BLDA: 7.47 PH UNITS (ref 7.35–7.45)
PH UR STRIP.AUTO: <=5 [PH] (ref 5–8)
PH, TEMP CORRECTED: ABNORMAL
PHOSPHATE SERPL-MCNC: 2.5 MG/DL (ref 2.5–4.5)
PLATELET # BLD AUTO: 438 10*3/MM3 (ref 140–450)
PMV BLD AUTO: 9.2 FL (ref 6–12)
PO2 BLDA: 54.8 MM HG (ref 83–108)
PO2 TEMP ADJ BLD: ABNORMAL MM[HG]
POTASSIUM SERPL-SCNC: 4.5 MMOL/L (ref 3.5–5.2)
POTASSIUM SERPL-SCNC: 5.4 MMOL/L (ref 3.5–5.2)
PROT SERPL-MCNC: 7.4 G/DL (ref 6–8.5)
PROT UR QL STRIP: ABNORMAL
PROTHROMBIN TIME: 13.8 SECONDS (ref 11.9–14.1)
RBC # BLD AUTO: 5.5 10*6/MM3 (ref 4.14–5.8)
RBC # UR: ABNORMAL /HPF
REF LAB TEST METHOD: ABNORMAL
SAO2 % BLDCOA: 90.3 % (ref 94–99)
SARS-COV-2 RDRP RESP QL NAA+PROBE: NOT DETECTED
SODIUM SERPL-SCNC: 127 MMOL/L (ref 136–145)
SODIUM SERPL-SCNC: 133 MMOL/L (ref 136–145)
SP GR UR STRIP: 1.02 (ref 1–1.03)
SQUAMOUS #/AREA URNS HPF: ABNORMAL /HPF
TRANS CELLS #/AREA URNS HPF: ABNORMAL /HPF
TROPONIN T SERPL-MCNC: 0.02 NG/ML (ref 0–0.03)
TROPONIN T SERPL-MCNC: 0.05 NG/ML (ref 0–0.03)
UROBILINOGEN UR QL STRIP: ABNORMAL
VENTILATOR MODE: ABNORMAL
WBC # BLD AUTO: 11.03 10*3/MM3 (ref 3.4–10.8)
WBC UR QL AUTO: ABNORMAL /HPF

## 2020-08-26 PROCEDURE — 83605 ASSAY OF LACTIC ACID: CPT | Performed by: EMERGENCY MEDICINE

## 2020-08-26 PROCEDURE — 82962 GLUCOSE BLOOD TEST: CPT

## 2020-08-26 PROCEDURE — 63710000001 INSULIN ASPART PER 5 UNITS: Performed by: INTERNAL MEDICINE

## 2020-08-26 PROCEDURE — 25010000002 PIPERACILLIN SOD-TAZOBACTAM PER 1 G: Performed by: EMERGENCY MEDICINE

## 2020-08-26 PROCEDURE — 84484 ASSAY OF TROPONIN QUANT: CPT | Performed by: INTERNAL MEDICINE

## 2020-08-26 PROCEDURE — 93010 ELECTROCARDIOGRAM REPORT: CPT | Performed by: INTERNAL MEDICINE

## 2020-08-26 PROCEDURE — 82550 ASSAY OF CK (CPK): CPT | Performed by: NURSE PRACTITIONER

## 2020-08-26 PROCEDURE — 84484 ASSAY OF TROPONIN QUANT: CPT | Performed by: EMERGENCY MEDICINE

## 2020-08-26 PROCEDURE — 84145 PROCALCITONIN (PCT): CPT | Performed by: INTERNAL MEDICINE

## 2020-08-26 PROCEDURE — 25010000002 CEFTRIAXONE PER 250 MG: Performed by: INTERNAL MEDICINE

## 2020-08-26 PROCEDURE — 85025 COMPLETE CBC W/AUTO DIFF WBC: CPT | Performed by: EMERGENCY MEDICINE

## 2020-08-26 PROCEDURE — 80307 DRUG TEST PRSMV CHEM ANLYZR: CPT | Performed by: EMERGENCY MEDICINE

## 2020-08-26 PROCEDURE — 81001 URINALYSIS AUTO W/SCOPE: CPT | Performed by: EMERGENCY MEDICINE

## 2020-08-26 PROCEDURE — 93005 ELECTROCARDIOGRAM TRACING: CPT | Performed by: EMERGENCY MEDICINE

## 2020-08-26 PROCEDURE — 99285 EMERGENCY DEPT VISIT HI MDM: CPT

## 2020-08-26 PROCEDURE — 25010000002 MAGNESIUM SULFATE IN D5W 1G/100ML (PREMIX) 1-5 GM/100ML-% SOLUTION: Performed by: INTERNAL MEDICINE

## 2020-08-26 PROCEDURE — 87040 BLOOD CULTURE FOR BACTERIA: CPT | Performed by: EMERGENCY MEDICINE

## 2020-08-26 PROCEDURE — 93005 ELECTROCARDIOGRAM TRACING: CPT | Performed by: INTERNAL MEDICINE

## 2020-08-26 PROCEDURE — 84100 ASSAY OF PHOSPHORUS: CPT | Performed by: INTERNAL MEDICINE

## 2020-08-26 PROCEDURE — 83050 HGB METHEMOGLOBIN QUAN: CPT

## 2020-08-26 PROCEDURE — 63710000001 INSULIN REGULAR HUMAN PER 5 UNITS: Performed by: EMERGENCY MEDICINE

## 2020-08-26 PROCEDURE — 82375 ASSAY CARBOXYHB QUANT: CPT

## 2020-08-26 PROCEDURE — 83874 ASSAY OF MYOGLOBIN: CPT | Performed by: INTERNAL MEDICINE

## 2020-08-26 PROCEDURE — 83735 ASSAY OF MAGNESIUM: CPT | Performed by: EMERGENCY MEDICINE

## 2020-08-26 PROCEDURE — 87635 SARS-COV-2 COVID-19 AMP PRB: CPT | Performed by: EMERGENCY MEDICINE

## 2020-08-26 PROCEDURE — 36600 WITHDRAWAL OF ARTERIAL BLOOD: CPT

## 2020-08-26 PROCEDURE — 87147 CULTURE TYPE IMMUNOLOGIC: CPT | Performed by: EMERGENCY MEDICINE

## 2020-08-26 PROCEDURE — 85730 THROMBOPLASTIN TIME PARTIAL: CPT | Performed by: INTERNAL MEDICINE

## 2020-08-26 PROCEDURE — 70450 CT HEAD/BRAIN W/O DYE: CPT

## 2020-08-26 PROCEDURE — 71045 X-RAY EXAM CHEST 1 VIEW: CPT

## 2020-08-26 PROCEDURE — 80053 COMPREHEN METABOLIC PANEL: CPT | Performed by: EMERGENCY MEDICINE

## 2020-08-26 PROCEDURE — 83880 ASSAY OF NATRIURETIC PEPTIDE: CPT | Performed by: EMERGENCY MEDICINE

## 2020-08-26 PROCEDURE — 82550 ASSAY OF CK (CPK): CPT | Performed by: EMERGENCY MEDICINE

## 2020-08-26 PROCEDURE — 71045 X-RAY EXAM CHEST 1 VIEW: CPT | Performed by: RADIOLOGY

## 2020-08-26 PROCEDURE — 99223 1ST HOSP IP/OBS HIGH 75: CPT | Performed by: INTERNAL MEDICINE

## 2020-08-26 PROCEDURE — 82805 BLOOD GASES W/O2 SATURATION: CPT

## 2020-08-26 PROCEDURE — 25010000002 VANCOMYCIN: Performed by: EMERGENCY MEDICINE

## 2020-08-26 PROCEDURE — 84443 ASSAY THYROID STIM HORMONE: CPT | Performed by: INTERNAL MEDICINE

## 2020-08-26 PROCEDURE — 87804 INFLUENZA ASSAY W/OPTIC: CPT | Performed by: EMERGENCY MEDICINE

## 2020-08-26 PROCEDURE — P9612 CATHETERIZE FOR URINE SPEC: HCPCS

## 2020-08-26 PROCEDURE — 85610 PROTHROMBIN TIME: CPT | Performed by: INTERNAL MEDICINE

## 2020-08-26 PROCEDURE — 83874 ASSAY OF MYOGLOBIN: CPT | Performed by: EMERGENCY MEDICINE

## 2020-08-26 PROCEDURE — 70450 CT HEAD/BRAIN W/O DYE: CPT | Performed by: RADIOLOGY

## 2020-08-26 PROCEDURE — 87150 DNA/RNA AMPLIFIED PROBE: CPT | Performed by: EMERGENCY MEDICINE

## 2020-08-26 RX ORDER — MAGNESIUM SULFATE HEPTAHYDRATE 40 MG/ML
4 INJECTION, SOLUTION INTRAVENOUS AS NEEDED
Status: DISCONTINUED | OUTPATIENT
Start: 2020-08-26 | End: 2020-09-25 | Stop reason: HOSPADM

## 2020-08-26 RX ORDER — NICOTINE POLACRILEX 4 MG
15 LOZENGE BUCCAL
Status: DISCONTINUED | OUTPATIENT
Start: 2020-08-26 | End: 2020-09-25 | Stop reason: HOSPADM

## 2020-08-26 RX ORDER — MAGNESIUM SULFATE 1 G/100ML
1 INJECTION INTRAVENOUS AS NEEDED
Status: DISCONTINUED | OUTPATIENT
Start: 2020-08-26 | End: 2020-09-25 | Stop reason: HOSPADM

## 2020-08-26 RX ORDER — ESCITALOPRAM OXALATE 10 MG/1
10 TABLET ORAL DAILY
COMMUNITY
End: 2020-09-25 | Stop reason: HOSPADM

## 2020-08-26 RX ORDER — IPRATROPIUM BROMIDE AND ALBUTEROL SULFATE 2.5; .5 MG/3ML; MG/3ML
3 SOLUTION RESPIRATORY (INHALATION) EVERY 4 HOURS PRN
Status: DISCONTINUED | OUTPATIENT
Start: 2020-08-26 | End: 2020-09-25 | Stop reason: HOSPADM

## 2020-08-26 RX ORDER — METOPROLOL SUCCINATE 25 MG/1
25 TABLET, EXTENDED RELEASE ORAL DAILY
COMMUNITY

## 2020-08-26 RX ORDER — OLANZAPINE 2.5 MG/1
2.5 TABLET ORAL NIGHTLY
COMMUNITY
End: 2020-09-25 | Stop reason: HOSPADM

## 2020-08-26 RX ORDER — HEPARIN SODIUM 5000 [USP'U]/ML
5000 INJECTION, SOLUTION INTRAVENOUS; SUBCUTANEOUS EVERY 8 HOURS SCHEDULED
Status: DISCONTINUED | OUTPATIENT
Start: 2020-08-27 | End: 2020-09-25 | Stop reason: HOSPADM

## 2020-08-26 RX ORDER — ATORVASTATIN CALCIUM 20 MG/1
20 TABLET, FILM COATED ORAL NIGHTLY
COMMUNITY

## 2020-08-26 RX ORDER — DEXTROSE MONOHYDRATE 25 G/50ML
25 INJECTION, SOLUTION INTRAVENOUS
Status: DISCONTINUED | OUTPATIENT
Start: 2020-08-26 | End: 2020-09-25 | Stop reason: HOSPADM

## 2020-08-26 RX ORDER — IPRATROPIUM BROMIDE AND ALBUTEROL SULFATE 2.5; .5 MG/3ML; MG/3ML
3 SOLUTION RESPIRATORY (INHALATION)
Status: DISCONTINUED | OUTPATIENT
Start: 2020-08-27 | End: 2020-08-26

## 2020-08-26 RX ORDER — DULOXETIN HYDROCHLORIDE 60 MG/1
60 CAPSULE, DELAYED RELEASE ORAL DAILY
COMMUNITY
End: 2020-09-25 | Stop reason: HOSPADM

## 2020-08-26 RX ORDER — LEVOTHYROXINE SODIUM 0.1 MG/1
100 TABLET ORAL DAILY
COMMUNITY

## 2020-08-26 RX ORDER — MEMANTINE HYDROCHLORIDE 5 MG/1
5 TABLET ORAL DAILY
COMMUNITY

## 2020-08-26 RX ORDER — MAGNESIUM SULFATE HEPTAHYDRATE 40 MG/ML
2 INJECTION, SOLUTION INTRAVENOUS AS NEEDED
Status: DISCONTINUED | OUTPATIENT
Start: 2020-08-26 | End: 2020-09-25 | Stop reason: HOSPADM

## 2020-08-26 RX ORDER — TAMSULOSIN HYDROCHLORIDE 0.4 MG/1
1 CAPSULE ORAL DAILY
COMMUNITY

## 2020-08-26 RX ORDER — OXYCODONE HYDROCHLORIDE AND ACETAMINOPHEN 5; 325 MG/1; MG/1
1 TABLET ORAL ONCE
Status: COMPLETED | OUTPATIENT
Start: 2020-08-26 | End: 2020-08-26

## 2020-08-26 RX ORDER — SODIUM CHLORIDE 0.9 % (FLUSH) 0.9 %
10 SYRINGE (ML) INJECTION AS NEEDED
Status: DISCONTINUED | OUTPATIENT
Start: 2020-08-26 | End: 2020-09-25 | Stop reason: HOSPADM

## 2020-08-26 RX ORDER — SODIUM CHLORIDE 9 MG/ML
125 INJECTION, SOLUTION INTRAVENOUS CONTINUOUS
Status: DISCONTINUED | OUTPATIENT
Start: 2020-08-26 | End: 2020-08-28

## 2020-08-26 RX ORDER — SODIUM CHLORIDE 0.9 % (FLUSH) 0.9 %
10 SYRINGE (ML) INJECTION EVERY 12 HOURS SCHEDULED
Status: DISCONTINUED | OUTPATIENT
Start: 2020-08-26 | End: 2020-09-25 | Stop reason: HOSPADM

## 2020-08-26 RX ORDER — MAGNESIUM SULFATE 1 G/100ML
1 INJECTION INTRAVENOUS
Status: COMPLETED | OUTPATIENT
Start: 2020-08-26 | End: 2020-08-27

## 2020-08-26 RX ORDER — LAMOTRIGINE 25 MG/1
12.5 TABLET ORAL 2 TIMES DAILY
COMMUNITY

## 2020-08-26 RX ADMIN — DOXYCYCLINE 100 MG: 100 INJECTION, POWDER, LYOPHILIZED, FOR SOLUTION INTRAVENOUS at 22:08

## 2020-08-26 RX ADMIN — PIPERACILLIN SODIUM AND TAZOBACTAM SODIUM 3.38 G: 3; .375 INJECTION, POWDER, LYOPHILIZED, FOR SOLUTION INTRAVENOUS at 19:20

## 2020-08-26 RX ADMIN — SILVER SULFADIAZINE: 10 CREAM TOPICAL at 22:08

## 2020-08-26 RX ADMIN — VANCOMYCIN HYDROCHLORIDE 2000 MG: 1 INJECTION, POWDER, LYOPHILIZED, FOR SOLUTION INTRAVENOUS at 19:57

## 2020-08-26 RX ADMIN — INSULIN ASPART 5 UNITS: 100 INJECTION, SOLUTION INTRAVENOUS; SUBCUTANEOUS at 22:56

## 2020-08-26 RX ADMIN — MAGNESIUM SULFATE IN DEXTROSE 1 G: 10 INJECTION, SOLUTION INTRAVENOUS at 23:24

## 2020-08-26 RX ADMIN — CEFTRIAXONE 2 G: 2 INJECTION, POWDER, FOR SOLUTION INTRAMUSCULAR; INTRAVENOUS at 22:08

## 2020-08-26 RX ADMIN — SODIUM CHLORIDE 1000 ML: 9 INJECTION, SOLUTION INTRAVENOUS at 16:27

## 2020-08-26 RX ADMIN — SODIUM CHLORIDE 125 ML/HR: 9 INJECTION, SOLUTION INTRAVENOUS at 19:26

## 2020-08-26 RX ADMIN — OXYCODONE HYDROCHLORIDE AND ACETAMINOPHEN 1 TABLET: 5; 325 TABLET ORAL at 21:17

## 2020-08-26 RX ADMIN — HUMAN INSULIN 8 UNITS: 100 INJECTION, SOLUTION SUBCUTANEOUS at 17:55

## 2020-08-26 RX ADMIN — MAGNESIUM SULFATE IN DEXTROSE 1 G: 10 INJECTION, SOLUTION INTRAVENOUS at 22:08

## 2020-08-27 ENCOUNTER — APPOINTMENT (OUTPATIENT)
Dept: CARDIOLOGY | Facility: HOSPITAL | Age: 75
End: 2020-08-27

## 2020-08-27 ENCOUNTER — APPOINTMENT (OUTPATIENT)
Dept: GENERAL RADIOLOGY | Facility: HOSPITAL | Age: 75
End: 2020-08-27

## 2020-08-27 LAB
6-ACETYL MORPHINE: NEGATIVE
A-A DO2: 77.4 MMHG (ref 0–300)
ALBUMIN SERPL-MCNC: 3.34 G/DL (ref 3.5–5.2)
ALBUMIN/GLOB SERPL: 1.2 G/DL
ALP SERPL-CCNC: 109 U/L (ref 39–117)
ALT SERPL W P-5'-P-CCNC: 45 U/L (ref 1–41)
AMPHET+METHAMPHET UR QL: NEGATIVE
ANION GAP SERPL CALCULATED.3IONS-SCNC: 11.7 MMOL/L (ref 5–15)
ARTERIAL PATENCY WRIST A: POSITIVE
AST SERPL-CCNC: 160 U/L (ref 1–40)
ATMOSPHERIC PRESS: 729 MMHG
BACTERIA BLD CULT: ABNORMAL
BARBITURATES UR QL SCN: NEGATIVE
BASE EXCESS BLDA CALC-SCNC: -5.6 MMOL/L (ref 0–2)
BASOPHILS # BLD AUTO: 0.02 10*3/MM3 (ref 0–0.2)
BASOPHILS NFR BLD AUTO: 0.2 % (ref 0–1.5)
BDY SITE: ABNORMAL
BENZODIAZ UR QL SCN: NEGATIVE
BH CV ECHO MEAS - AO MAX PG: 7.1 MMHG
BH CV ECHO MEAS - AO MEAN PG: 4 MMHG
BH CV ECHO MEAS - AO V2 MAX: 133 CM/SEC
BH CV ECHO MEAS - AO V2 MEAN: 85.3 CM/SEC
BH CV ECHO MEAS - AO V2 VTI: 23.6 CM
BH CV ECHO MEAS - BSA(HAYCOCK): 2.2 M^2
BH CV ECHO MEAS - BSA: 2.2 M^2
BH CV ECHO MEAS - BZI_BMI: 27 KILOGRAMS/M^2
BH CV ECHO MEAS - BZI_METRIC_HEIGHT: 188 CM
BH CV ECHO MEAS - BZI_METRIC_WEIGHT: 95.3 KG
BH CV ECHO MEAS - EDV(MOD-SP4): 47.3 ML
BH CV ECHO MEAS - EF(MOD-SP4): 52.9 %
BH CV ECHO MEAS - ESV(MOD-SP4): 22.3 ML
BH CV ECHO MEAS - LV DIASTOLIC VOL/BSA (35-75): 21.3 ML/M^2
BH CV ECHO MEAS - LV SYSTOLIC VOL/BSA (12-30): 10.1 ML/M^2
BH CV ECHO MEAS - LVLD AP4: 6.7 CM
BH CV ECHO MEAS - LVLS AP4: 5.8 CM
BH CV ECHO MEAS - MV A MAX VEL: 112 CM/SEC
BH CV ECHO MEAS - MV E MAX VEL: 73.7 CM/SEC
BH CV ECHO MEAS - MV E/A: 0.66
BH CV ECHO MEAS - SI(MOD-SP4): 11.3 ML/M^2
BH CV ECHO MEAS - SV(MOD-SP4): 25 ML
BILIRUB SERPL-MCNC: 0.2 MG/DL (ref 0–1.2)
BODY TEMPERATURE: 0 C
BUN SERPL-MCNC: 28 MG/DL (ref 8–23)
BUN/CREAT SERPL: 18.8 (ref 7–25)
BUPRENORPHINE SERPL-MCNC: NEGATIVE NG/ML
CALCIUM SPEC-SCNC: 8.1 MG/DL (ref 8.6–10.5)
CANNABINOIDS SERPL QL: NEGATIVE
CHLORIDE SERPL-SCNC: 102 MMOL/L (ref 98–107)
CK SERPL-CCNC: 7663 U/L (ref 20–200)
CK SERPL-CCNC: 7673 U/L (ref 20–200)
CO2 BLDA-SCNC: 20.4 MMOL/L (ref 22–33)
CO2 SERPL-SCNC: 19.3 MMOL/L (ref 22–29)
COCAINE UR QL: NEGATIVE
COHGB MFR BLD: 0.8 % (ref 0–5)
CREAT SERPL-MCNC: 1.49 MG/DL (ref 0.76–1.27)
DEPRECATED RDW RBC AUTO: 40.9 FL (ref 37–54)
EOSINOPHIL # BLD AUTO: 0.01 10*3/MM3 (ref 0–0.4)
EOSINOPHIL NFR BLD AUTO: 0.1 % (ref 0.3–6.2)
ERYTHROCYTE [DISTWIDTH] IN BLOOD BY AUTOMATED COUNT: 13.2 % (ref 12.3–15.4)
GAS FLOW AIRWAY: 2.5 LPM
GFR SERPL CREATININE-BSD FRML MDRD: 46 ML/MIN/1.73
GLOBULIN UR ELPH-MCNC: 2.8 GM/DL
GLUCOSE BLDC GLUCOMTR-MCNC: 248 MG/DL (ref 70–130)
GLUCOSE BLDC GLUCOMTR-MCNC: 252 MG/DL (ref 70–130)
GLUCOSE BLDC GLUCOMTR-MCNC: 272 MG/DL (ref 70–130)
GLUCOSE BLDC GLUCOMTR-MCNC: 380 MG/DL (ref 70–130)
GLUCOSE SERPL-MCNC: 297 MG/DL (ref 65–99)
HBA1C MFR BLD: 13.7 % (ref 4.8–5.6)
HCO3 BLDA-SCNC: 19.3 MMOL/L (ref 20–26)
HCT VFR BLD AUTO: 43.6 % (ref 37.5–51)
HCT VFR BLD CALC: 42.7 % (ref 38–51)
HGB BLD-MCNC: 13.7 G/DL (ref 13–17.7)
HGB BLDA-MCNC: 13.9 G/DL (ref 14–18)
IMM GRANULOCYTES # BLD AUTO: 0.03 10*3/MM3 (ref 0–0.05)
IMM GRANULOCYTES NFR BLD AUTO: 0.3 % (ref 0–0.5)
INHALED O2 CONCENTRATION: 30 %
LYMPHOCYTES # BLD AUTO: 1.65 10*3/MM3 (ref 0.7–3.1)
LYMPHOCYTES NFR BLD AUTO: 16.4 % (ref 19.6–45.3)
Lab: ABNORMAL
MAGNESIUM SERPL-MCNC: 2.5 MG/DL (ref 1.6–2.4)
MCH RBC QN AUTO: 26.7 PG (ref 26.6–33)
MCHC RBC AUTO-ENTMCNC: 31.4 G/DL (ref 31.5–35.7)
MCV RBC AUTO: 85 FL (ref 79–97)
METHADONE UR QL SCN: NEGATIVE
METHGB BLD QL: 0.3 % (ref 0–3)
MODALITY: ABNORMAL
MONOCYTES # BLD AUTO: 0.83 10*3/MM3 (ref 0.1–0.9)
MONOCYTES NFR BLD AUTO: 8.3 % (ref 5–12)
MYOGLOBIN SERPL-MCNC: 4081 NG/ML (ref 28–72)
MYOGLOBIN SERPL-MCNC: 5538 NG/ML (ref 28–72)
NEUTROPHILS NFR BLD AUTO: 7.52 10*3/MM3 (ref 1.7–7)
NEUTROPHILS NFR BLD AUTO: 74.7 % (ref 42.7–76)
NOTE: ABNORMAL
NRBC BLD AUTO-RTO: 0 /100 WBC (ref 0–0.2)
OPIATES UR QL: NEGATIVE
OXYCODONE UR QL SCN: NEGATIVE
OXYHGB MFR BLDV: 95.6 % (ref 94–99)
PCO2 BLDA: 35.3 MM HG (ref 35–45)
PCO2 TEMP ADJ BLD: ABNORMAL MM[HG]
PCP UR QL SCN: NEGATIVE
PH BLDA: 7.35 PH UNITS (ref 7.35–7.45)
PH, TEMP CORRECTED: ABNORMAL
PLATELET # BLD AUTO: 258 10*3/MM3 (ref 140–450)
PMV BLD AUTO: 8.9 FL (ref 6–12)
PO2 BLDA: 87.8 MM HG (ref 83–108)
PO2 TEMP ADJ BLD: ABNORMAL MM[HG]
POTASSIUM SERPL-SCNC: 4.6 MMOL/L (ref 3.5–5.2)
PROCALCITONIN SERPL-MCNC: 5.03 NG/ML (ref 0–0.25)
PROT SERPL-MCNC: 6.1 G/DL (ref 6–8.5)
RBC # BLD AUTO: 5.13 10*6/MM3 (ref 4.14–5.8)
SAO2 % BLDCOA: 96.7 % (ref 94–99)
SODIUM SERPL-SCNC: 133 MMOL/L (ref 136–145)
T4 FREE SERPL-MCNC: 1.07 NG/DL (ref 0.93–1.7)
TROPONIN T SERPL-MCNC: 0.03 NG/ML (ref 0–0.03)
TROPONIN T SERPL-MCNC: 0.03 NG/ML (ref 0–0.03)
TROPONIN T SERPL-MCNC: 0.04 NG/ML (ref 0–0.03)
TSH SERPL DL<=0.05 MIU/L-ACNC: 5.76 UIU/ML (ref 0.27–4.2)
VENTILATOR MODE: ABNORMAL
WBC # BLD AUTO: 10.06 10*3/MM3 (ref 3.4–10.8)

## 2020-08-27 PROCEDURE — 82962 GLUCOSE BLOOD TEST: CPT

## 2020-08-27 PROCEDURE — 71045 X-RAY EXAM CHEST 1 VIEW: CPT | Performed by: RADIOLOGY

## 2020-08-27 PROCEDURE — 63710000001 INSULIN ASPART PER 5 UNITS: Performed by: INTERNAL MEDICINE

## 2020-08-27 PROCEDURE — 80307 DRUG TEST PRSMV CHEM ANLYZR: CPT | Performed by: INTERNAL MEDICINE

## 2020-08-27 PROCEDURE — 83874 ASSAY OF MYOGLOBIN: CPT | Performed by: INTERNAL MEDICINE

## 2020-08-27 PROCEDURE — 84484 ASSAY OF TROPONIN QUANT: CPT | Performed by: INTERNAL MEDICINE

## 2020-08-27 PROCEDURE — 25010000002 CEFTRIAXONE PER 250 MG: Performed by: INTERNAL MEDICINE

## 2020-08-27 PROCEDURE — 93010 ELECTROCARDIOGRAM REPORT: CPT | Performed by: INTERNAL MEDICINE

## 2020-08-27 PROCEDURE — 94799 UNLISTED PULMONARY SVC/PX: CPT

## 2020-08-27 PROCEDURE — 93005 ELECTROCARDIOGRAM TRACING: CPT | Performed by: INTERNAL MEDICINE

## 2020-08-27 PROCEDURE — 83036 HEMOGLOBIN GLYCOSYLATED A1C: CPT | Performed by: INTERNAL MEDICINE

## 2020-08-27 PROCEDURE — 83735 ASSAY OF MAGNESIUM: CPT | Performed by: INTERNAL MEDICINE

## 2020-08-27 PROCEDURE — 82805 BLOOD GASES W/O2 SATURATION: CPT

## 2020-08-27 PROCEDURE — 85025 COMPLETE CBC W/AUTO DIFF WBC: CPT | Performed by: INTERNAL MEDICINE

## 2020-08-27 PROCEDURE — 93306 TTE W/DOPPLER COMPLETE: CPT

## 2020-08-27 PROCEDURE — 99233 SBSQ HOSP IP/OBS HIGH 50: CPT | Performed by: INTERNAL MEDICINE

## 2020-08-27 PROCEDURE — 93306 TTE W/DOPPLER COMPLETE: CPT | Performed by: INTERNAL MEDICINE

## 2020-08-27 PROCEDURE — 83050 HGB METHEMOGLOBIN QUAN: CPT

## 2020-08-27 PROCEDURE — 36600 WITHDRAWAL OF ARTERIAL BLOOD: CPT

## 2020-08-27 PROCEDURE — 25010000002 MAGNESIUM SULFATE IN D5W 1G/100ML (PREMIX) 1-5 GM/100ML-% SOLUTION: Performed by: INTERNAL MEDICINE

## 2020-08-27 PROCEDURE — 82375 ASSAY CARBOXYHB QUANT: CPT

## 2020-08-27 PROCEDURE — 25010000002 HEPARIN (PORCINE) PER 1000 UNITS: Performed by: NURSE PRACTITIONER

## 2020-08-27 PROCEDURE — 71045 X-RAY EXAM CHEST 1 VIEW: CPT

## 2020-08-27 PROCEDURE — 80053 COMPREHEN METABOLIC PANEL: CPT | Performed by: INTERNAL MEDICINE

## 2020-08-27 PROCEDURE — 82550 ASSAY OF CK (CPK): CPT | Performed by: INTERNAL MEDICINE

## 2020-08-27 PROCEDURE — 84439 ASSAY OF FREE THYROXINE: CPT | Performed by: INTERNAL MEDICINE

## 2020-08-27 RX ORDER — HYDROCODONE BITARTRATE AND ACETAMINOPHEN 5; 325 MG/1; MG/1
1 TABLET ORAL EVERY 6 HOURS PRN
Status: DISPENSED | OUTPATIENT
Start: 2020-08-27 | End: 2020-09-03

## 2020-08-27 RX ORDER — OLANZAPINE 2.5 MG/1
2.5 TABLET ORAL NIGHTLY
Status: DISCONTINUED | OUTPATIENT
Start: 2020-08-27 | End: 2020-09-10

## 2020-08-27 RX ORDER — L.ACID,PARA/B.BIFIDUM/S.THERM 8B CELL
1 CAPSULE ORAL DAILY
Status: DISCONTINUED | OUTPATIENT
Start: 2020-08-27 | End: 2020-09-08

## 2020-08-27 RX ORDER — SODIUM CHLORIDE 0.9 % (FLUSH) 0.9 %
10 SYRINGE (ML) INJECTION EVERY 12 HOURS SCHEDULED
Status: DISCONTINUED | OUTPATIENT
Start: 2020-08-27 | End: 2020-09-25 | Stop reason: HOSPADM

## 2020-08-27 RX ORDER — METOPROLOL SUCCINATE 25 MG/1
25 TABLET, EXTENDED RELEASE ORAL DAILY
Status: DISCONTINUED | OUTPATIENT
Start: 2020-08-27 | End: 2020-09-25 | Stop reason: HOSPADM

## 2020-08-27 RX ORDER — TAMSULOSIN HYDROCHLORIDE 0.4 MG/1
0.4 CAPSULE ORAL DAILY
Status: DISCONTINUED | OUTPATIENT
Start: 2020-08-27 | End: 2020-09-25 | Stop reason: HOSPADM

## 2020-08-27 RX ORDER — LAMOTRIGINE 25 MG/1
12.5 TABLET ORAL 2 TIMES DAILY
Status: DISCONTINUED | OUTPATIENT
Start: 2020-08-27 | End: 2020-09-25 | Stop reason: HOSPADM

## 2020-08-27 RX ORDER — MEMANTINE HYDROCHLORIDE 5 MG/1
5 TABLET ORAL 2 TIMES DAILY
Status: DISCONTINUED | OUTPATIENT
Start: 2020-08-27 | End: 2020-09-25 | Stop reason: HOSPADM

## 2020-08-27 RX ORDER — LEVOTHYROXINE SODIUM 0.07 MG/1
75 TABLET ORAL DAILY
Status: DISCONTINUED | OUTPATIENT
Start: 2020-08-27 | End: 2020-09-25 | Stop reason: HOSPADM

## 2020-08-27 RX ORDER — SODIUM CHLORIDE 0.9 % (FLUSH) 0.9 %
10 SYRINGE (ML) INJECTION AS NEEDED
Status: DISCONTINUED | OUTPATIENT
Start: 2020-08-27 | End: 2020-09-25 | Stop reason: HOSPADM

## 2020-08-27 RX ORDER — ASPIRIN 81 MG/1
81 TABLET ORAL DAILY
Status: DISCONTINUED | OUTPATIENT
Start: 2020-08-27 | End: 2020-09-25 | Stop reason: HOSPADM

## 2020-08-27 RX ADMIN — HYDROCODONE BITARTRATE AND ACETAMINOPHEN 1 TABLET: 5; 325 TABLET ORAL at 10:22

## 2020-08-27 RX ADMIN — SILVER SULFADIAZINE: 10 CREAM TOPICAL at 21:01

## 2020-08-27 RX ADMIN — SODIUM CHLORIDE, PRESERVATIVE FREE 10 ML: 5 INJECTION INTRAVENOUS at 08:16

## 2020-08-27 RX ADMIN — HEPARIN SODIUM 5000 UNITS: 5000 INJECTION INTRAVENOUS; SUBCUTANEOUS at 06:15

## 2020-08-27 RX ADMIN — HEPARIN SODIUM 5000 UNITS: 5000 INJECTION INTRAVENOUS; SUBCUTANEOUS at 21:00

## 2020-08-27 RX ADMIN — INSULIN ASPART 6 UNITS: 100 INJECTION, SOLUTION INTRAVENOUS; SUBCUTANEOUS at 10:34

## 2020-08-27 RX ADMIN — OLANZAPINE 2.5 MG: 2.5 TABLET ORAL at 21:00

## 2020-08-27 RX ADMIN — SODIUM CHLORIDE 125 ML/HR: 9 INJECTION, SOLUTION INTRAVENOUS at 12:02

## 2020-08-27 RX ADMIN — HEPARIN SODIUM 5000 UNITS: 5000 INJECTION INTRAVENOUS; SUBCUTANEOUS at 00:51

## 2020-08-27 RX ADMIN — TAMSULOSIN HYDROCHLORIDE 0.4 MG: 0.4 CAPSULE ORAL at 12:03

## 2020-08-27 RX ADMIN — SODIUM CHLORIDE, PRESERVATIVE FREE 10 ML: 5 INJECTION INTRAVENOUS at 21:00

## 2020-08-27 RX ADMIN — HYDROCODONE BITARTRATE AND ACETAMINOPHEN 1 TABLET: 5; 325 TABLET ORAL at 16:31

## 2020-08-27 RX ADMIN — LEVOTHYROXINE SODIUM 75 MCG: 75 TABLET ORAL at 12:03

## 2020-08-27 RX ADMIN — INSULIN ASPART 3 UNITS: 100 INJECTION, SOLUTION INTRAVENOUS; SUBCUTANEOUS at 16:40

## 2020-08-27 RX ADMIN — SILVER SULFADIAZINE: 10 CREAM TOPICAL at 08:16

## 2020-08-27 RX ADMIN — INSULIN ASPART 4 UNITS: 100 INJECTION, SOLUTION INTRAVENOUS; SUBCUTANEOUS at 22:00

## 2020-08-27 RX ADMIN — SODIUM CHLORIDE 125 ML/HR: 9 INJECTION, SOLUTION INTRAVENOUS at 20:58

## 2020-08-27 RX ADMIN — ASPIRIN 81 MG: 81 TABLET, COATED ORAL at 12:03

## 2020-08-27 RX ADMIN — Medication 1 CAPSULE: at 10:22

## 2020-08-27 RX ADMIN — MEMANTINE HYDROCHLORIDE 5 MG: 5 TABLET, FILM COATED ORAL at 12:03

## 2020-08-27 RX ADMIN — CEFTRIAXONE 2 G: 2 INJECTION, POWDER, FOR SOLUTION INTRAMUSCULAR; INTRAVENOUS at 21:00

## 2020-08-27 RX ADMIN — MAGNESIUM SULFATE IN DEXTROSE 1 G: 10 INJECTION, SOLUTION INTRAVENOUS at 00:27

## 2020-08-27 RX ADMIN — DOXYCYCLINE 100 MG: 100 INJECTION, POWDER, LYOPHILIZED, FOR SOLUTION INTRAVENOUS at 22:00

## 2020-08-27 RX ADMIN — INSULIN ASPART 4 UNITS: 100 INJECTION, SOLUTION INTRAVENOUS; SUBCUTANEOUS at 06:31

## 2020-08-27 RX ADMIN — SODIUM CHLORIDE, PRESERVATIVE FREE 10 ML: 5 INJECTION INTRAVENOUS at 20:58

## 2020-08-27 RX ADMIN — MEMANTINE HYDROCHLORIDE 5 MG: 5 TABLET, FILM COATED ORAL at 21:00

## 2020-08-27 RX ADMIN — METOPROLOL SUCCINATE 25 MG: 25 TABLET, EXTENDED RELEASE ORAL at 12:03

## 2020-08-27 RX ADMIN — SODIUM CHLORIDE 125 ML/HR: 9 INJECTION, SOLUTION INTRAVENOUS at 03:58

## 2020-08-27 RX ADMIN — HEPARIN SODIUM 5000 UNITS: 5000 INJECTION INTRAVENOUS; SUBCUTANEOUS at 13:55

## 2020-08-27 RX ADMIN — HYDROCODONE BITARTRATE AND ACETAMINOPHEN 1 TABLET: 5; 325 TABLET ORAL at 22:00

## 2020-08-27 RX ADMIN — DOXYCYCLINE 100 MG: 100 INJECTION, POWDER, LYOPHILIZED, FOR SOLUTION INTRAVENOUS at 10:22

## 2020-08-28 ENCOUNTER — APPOINTMENT (OUTPATIENT)
Dept: GENERAL RADIOLOGY | Facility: HOSPITAL | Age: 75
End: 2020-08-28

## 2020-08-28 ENCOUNTER — APPOINTMENT (OUTPATIENT)
Dept: ULTRASOUND IMAGING | Facility: HOSPITAL | Age: 75
End: 2020-08-28

## 2020-08-28 LAB
ALBUMIN SERPL-MCNC: 2.93 G/DL (ref 3.5–5.2)
ALBUMIN/GLOB SERPL: 1.1 G/DL
ALP SERPL-CCNC: 93 U/L (ref 39–117)
ALT SERPL W P-5'-P-CCNC: 173 U/L (ref 1–41)
ANION GAP SERPL CALCULATED.3IONS-SCNC: 9 MMOL/L (ref 5–15)
AST SERPL-CCNC: 201 U/L (ref 1–40)
BASOPHILS # BLD AUTO: 0.03 10*3/MM3 (ref 0–0.2)
BASOPHILS NFR BLD AUTO: 0.4 % (ref 0–1.5)
BILIRUB SERPL-MCNC: <0.2 MG/DL (ref 0–1.2)
BUN SERPL-MCNC: 23 MG/DL (ref 8–23)
BUN/CREAT SERPL: 16.8 (ref 7–25)
CALCIUM SPEC-SCNC: 8.3 MG/DL (ref 8.6–10.5)
CHLORIDE SERPL-SCNC: 104 MMOL/L (ref 98–107)
CK SERPL-CCNC: 5438 U/L (ref 20–200)
CO2 SERPL-SCNC: 21 MMOL/L (ref 22–29)
CREAT SERPL-MCNC: 1.37 MG/DL (ref 0.76–1.27)
CRP SERPL-MCNC: 8.37 MG/DL (ref 0–0.5)
DEPRECATED RDW RBC AUTO: 43.2 FL (ref 37–54)
EOSINOPHIL # BLD AUTO: 0.1 10*3/MM3 (ref 0–0.4)
EOSINOPHIL NFR BLD AUTO: 1.3 % (ref 0.3–6.2)
ERYTHROCYTE [DISTWIDTH] IN BLOOD BY AUTOMATED COUNT: 13.4 % (ref 12.3–15.4)
GFR SERPL CREATININE-BSD FRML MDRD: 51 ML/MIN/1.73
GLOBULIN UR ELPH-MCNC: 2.8 GM/DL
GLUCOSE BLDC GLUCOMTR-MCNC: 216 MG/DL (ref 70–130)
GLUCOSE BLDC GLUCOMTR-MCNC: 305 MG/DL (ref 70–130)
GLUCOSE BLDC GLUCOMTR-MCNC: 364 MG/DL (ref 70–130)
GLUCOSE BLDC GLUCOMTR-MCNC: 380 MG/DL (ref 70–130)
GLUCOSE SERPL-MCNC: 329 MG/DL (ref 65–99)
HCT VFR BLD AUTO: 41.3 % (ref 37.5–51)
HGB BLD-MCNC: 12.3 G/DL (ref 13–17.7)
IMM GRANULOCYTES # BLD AUTO: 0.02 10*3/MM3 (ref 0–0.05)
IMM GRANULOCYTES NFR BLD AUTO: 0.3 % (ref 0–0.5)
LYMPHOCYTES # BLD AUTO: 1.59 10*3/MM3 (ref 0.7–3.1)
LYMPHOCYTES NFR BLD AUTO: 20.6 % (ref 19.6–45.3)
MAGNESIUM SERPL-MCNC: 2 MG/DL (ref 1.6–2.4)
MCH RBC QN AUTO: 26.2 PG (ref 26.6–33)
MCHC RBC AUTO-ENTMCNC: 29.8 G/DL (ref 31.5–35.7)
MCV RBC AUTO: 87.9 FL (ref 79–97)
MONOCYTES # BLD AUTO: 0.59 10*3/MM3 (ref 0.1–0.9)
MONOCYTES NFR BLD AUTO: 7.7 % (ref 5–12)
NEUTROPHILS NFR BLD AUTO: 5.37 10*3/MM3 (ref 1.7–7)
NEUTROPHILS NFR BLD AUTO: 69.7 % (ref 42.7–76)
NRBC BLD AUTO-RTO: 0 /100 WBC (ref 0–0.2)
PLATELET # BLD AUTO: 240 10*3/MM3 (ref 140–450)
PMV BLD AUTO: 9.1 FL (ref 6–12)
POTASSIUM SERPL-SCNC: 4.4 MMOL/L (ref 3.5–5.2)
PROT SERPL-MCNC: 5.7 G/DL (ref 6–8.5)
RBC # BLD AUTO: 4.7 10*6/MM3 (ref 4.14–5.8)
SODIUM SERPL-SCNC: 134 MMOL/L (ref 136–145)
WBC # BLD AUTO: 7.7 10*3/MM3 (ref 3.4–10.8)

## 2020-08-28 PROCEDURE — 25010000002 HEPARIN (PORCINE) PER 1000 UNITS: Performed by: NURSE PRACTITIONER

## 2020-08-28 PROCEDURE — 82962 GLUCOSE BLOOD TEST: CPT

## 2020-08-28 PROCEDURE — 85025 COMPLETE CBC W/AUTO DIFF WBC: CPT | Performed by: INTERNAL MEDICINE

## 2020-08-28 PROCEDURE — 97535 SELF CARE MNGMENT TRAINING: CPT

## 2020-08-28 PROCEDURE — 71045 X-RAY EXAM CHEST 1 VIEW: CPT

## 2020-08-28 PROCEDURE — 76775 US EXAM ABDO BACK WALL LIM: CPT | Performed by: RADIOLOGY

## 2020-08-28 PROCEDURE — 97166 OT EVAL MOD COMPLEX 45 MIN: CPT

## 2020-08-28 PROCEDURE — 94799 UNLISTED PULMONARY SVC/PX: CPT

## 2020-08-28 PROCEDURE — 86140 C-REACTIVE PROTEIN: CPT | Performed by: INTERNAL MEDICINE

## 2020-08-28 PROCEDURE — 97162 PT EVAL MOD COMPLEX 30 MIN: CPT

## 2020-08-28 PROCEDURE — 80053 COMPREHEN METABOLIC PANEL: CPT | Performed by: INTERNAL MEDICINE

## 2020-08-28 PROCEDURE — 82550 ASSAY OF CK (CPK): CPT | Performed by: INTERNAL MEDICINE

## 2020-08-28 PROCEDURE — 63710000001 INSULIN ASPART PER 5 UNITS: Performed by: INTERNAL MEDICINE

## 2020-08-28 PROCEDURE — 05HC33Z INSERTION OF INFUSION DEVICE INTO LEFT BASILIC VEIN, PERCUTANEOUS APPROACH: ICD-10-PCS | Performed by: STUDENT IN AN ORGANIZED HEALTH CARE EDUCATION/TRAINING PROGRAM

## 2020-08-28 PROCEDURE — 25010000002 CEFTRIAXONE PER 250 MG: Performed by: INTERNAL MEDICINE

## 2020-08-28 PROCEDURE — 87040 BLOOD CULTURE FOR BACTERIA: CPT | Performed by: INTERNAL MEDICINE

## 2020-08-28 PROCEDURE — 99232 SBSQ HOSP IP/OBS MODERATE 35: CPT | Performed by: INTERNAL MEDICINE

## 2020-08-28 PROCEDURE — 93010 ELECTROCARDIOGRAM REPORT: CPT | Performed by: INTERNAL MEDICINE

## 2020-08-28 PROCEDURE — 93005 ELECTROCARDIOGRAM TRACING: CPT | Performed by: INTERNAL MEDICINE

## 2020-08-28 PROCEDURE — 36410 VNPNXR 3YR/> PHY/QHP DX/THER: CPT

## 2020-08-28 PROCEDURE — 83735 ASSAY OF MAGNESIUM: CPT | Performed by: INTERNAL MEDICINE

## 2020-08-28 PROCEDURE — 76775 US EXAM ABDO BACK WALL LIM: CPT

## 2020-08-28 PROCEDURE — 94640 AIRWAY INHALATION TREATMENT: CPT

## 2020-08-28 PROCEDURE — C1751 CATH, INF, PER/CENT/MIDLINE: HCPCS

## 2020-08-28 RX ORDER — SODIUM CHLORIDE 0.9 % (FLUSH) 0.9 %
10 SYRINGE (ML) INJECTION EVERY 12 HOURS SCHEDULED
Status: DISCONTINUED | OUTPATIENT
Start: 2020-08-28 | End: 2020-09-11

## 2020-08-28 RX ORDER — SODIUM CHLORIDE 0.9 % (FLUSH) 0.9 %
10 SYRINGE (ML) INJECTION AS NEEDED
Status: DISCONTINUED | OUTPATIENT
Start: 2020-08-28 | End: 2020-09-11

## 2020-08-28 RX ORDER — FAMOTIDINE 20 MG/1
20 TABLET, FILM COATED ORAL
Status: DISCONTINUED | OUTPATIENT
Start: 2020-08-28 | End: 2020-09-25 | Stop reason: HOSPADM

## 2020-08-28 RX ADMIN — HEPARIN SODIUM 5000 UNITS: 5000 INJECTION INTRAVENOUS; SUBCUTANEOUS at 21:04

## 2020-08-28 RX ADMIN — SODIUM CHLORIDE, PRESERVATIVE FREE 10 ML: 5 INJECTION INTRAVENOUS at 21:05

## 2020-08-28 RX ADMIN — DOXYCYCLINE 100 MG: 100 INJECTION, POWDER, LYOPHILIZED, FOR SOLUTION INTRAVENOUS at 11:04

## 2020-08-28 RX ADMIN — SILVER SULFADIAZINE: 10 CREAM TOPICAL at 21:05

## 2020-08-28 RX ADMIN — METOPROLOL SUCCINATE 25 MG: 25 TABLET, EXTENDED RELEASE ORAL at 09:02

## 2020-08-28 RX ADMIN — HYDROCODONE BITARTRATE AND ACETAMINOPHEN 1 TABLET: 5; 325 TABLET ORAL at 16:35

## 2020-08-28 RX ADMIN — HEPARIN SODIUM 5000 UNITS: 5000 INJECTION INTRAVENOUS; SUBCUTANEOUS at 06:30

## 2020-08-28 RX ADMIN — FAMOTIDINE 20 MG: 20 TABLET, FILM COATED ORAL at 16:35

## 2020-08-28 RX ADMIN — LEVOTHYROXINE SODIUM 75 MCG: 75 TABLET ORAL at 09:02

## 2020-08-28 RX ADMIN — Medication 1 CAPSULE: at 09:02

## 2020-08-28 RX ADMIN — SILVER SULFADIAZINE: 10 CREAM TOPICAL at 09:03

## 2020-08-28 RX ADMIN — HEPARIN SODIUM 5000 UNITS: 5000 INJECTION INTRAVENOUS; SUBCUTANEOUS at 16:35

## 2020-08-28 RX ADMIN — INSULIN ASPART 6 UNITS: 100 INJECTION, SOLUTION INTRAVENOUS; SUBCUTANEOUS at 11:04

## 2020-08-28 RX ADMIN — MEMANTINE HYDROCHLORIDE 5 MG: 5 TABLET, FILM COATED ORAL at 21:04

## 2020-08-28 RX ADMIN — TAMSULOSIN HYDROCHLORIDE 0.4 MG: 0.4 CAPSULE ORAL at 09:02

## 2020-08-28 RX ADMIN — INSULIN ASPART 6 UNITS: 100 INJECTION, SOLUTION INTRAVENOUS; SUBCUTANEOUS at 17:24

## 2020-08-28 RX ADMIN — FAMOTIDINE 20 MG: 20 TABLET, FILM COATED ORAL at 09:02

## 2020-08-28 RX ADMIN — HYDROCODONE BITARTRATE AND ACETAMINOPHEN 1 TABLET: 5; 325 TABLET ORAL at 10:29

## 2020-08-28 RX ADMIN — INSULIN ASPART 3 UNITS: 100 INJECTION, SOLUTION INTRAVENOUS; SUBCUTANEOUS at 06:30

## 2020-08-28 RX ADMIN — IPRATROPIUM BROMIDE AND ALBUTEROL SULFATE 3 ML: .5; 3 SOLUTION RESPIRATORY (INHALATION) at 02:54

## 2020-08-28 RX ADMIN — DOXYCYCLINE 100 MG: 100 INJECTION, POWDER, LYOPHILIZED, FOR SOLUTION INTRAVENOUS at 22:37

## 2020-08-28 RX ADMIN — HYDROCODONE BITARTRATE AND ACETAMINOPHEN 1 TABLET: 5; 325 TABLET ORAL at 22:37

## 2020-08-28 RX ADMIN — ASPIRIN 81 MG: 81 TABLET, COATED ORAL at 09:02

## 2020-08-28 RX ADMIN — SODIUM CHLORIDE, PRESERVATIVE FREE 10 ML: 5 INJECTION INTRAVENOUS at 09:03

## 2020-08-28 RX ADMIN — INSULIN ASPART 5 UNITS: 100 INJECTION, SOLUTION INTRAVENOUS; SUBCUTANEOUS at 21:29

## 2020-08-28 RX ADMIN — HYDROCODONE BITARTRATE AND ACETAMINOPHEN 1 TABLET: 5; 325 TABLET ORAL at 03:57

## 2020-08-28 RX ADMIN — OLANZAPINE 2.5 MG: 2.5 TABLET ORAL at 21:04

## 2020-08-28 RX ADMIN — MEMANTINE HYDROCHLORIDE 5 MG: 5 TABLET, FILM COATED ORAL at 09:02

## 2020-08-28 RX ADMIN — CEFTRIAXONE 2 G: 2 INJECTION, POWDER, FOR SOLUTION INTRAMUSCULAR; INTRAVENOUS at 21:04

## 2020-08-28 RX ADMIN — IPRATROPIUM BROMIDE AND ALBUTEROL SULFATE 3 ML: .5; 3 SOLUTION RESPIRATORY (INHALATION) at 07:09

## 2020-08-29 ENCOUNTER — APPOINTMENT (OUTPATIENT)
Dept: GENERAL RADIOLOGY | Facility: HOSPITAL | Age: 75
End: 2020-08-29

## 2020-08-29 LAB
ALBUMIN SERPL-MCNC: 2.53 G/DL (ref 3.5–5.2)
ALBUMIN/GLOB SERPL: 0.7 G/DL
ALP SERPL-CCNC: 93 U/L (ref 39–117)
ALT SERPL W P-5'-P-CCNC: 133 U/L (ref 1–41)
ANION GAP SERPL CALCULATED.3IONS-SCNC: 12.5 MMOL/L (ref 5–15)
ANION GAP SERPL CALCULATED.3IONS-SCNC: 9.8 MMOL/L (ref 5–15)
AST SERPL-CCNC: 109 U/L (ref 1–40)
BACTERIA SPEC AEROBE CULT: ABNORMAL
BASOPHILS # BLD AUTO: 0.05 10*3/MM3 (ref 0–0.2)
BASOPHILS NFR BLD AUTO: 0.5 % (ref 0–1.5)
BILIRUB SERPL-MCNC: 0.2 MG/DL (ref 0–1.2)
BUN SERPL-MCNC: 22 MG/DL (ref 8–23)
BUN SERPL-MCNC: 22 MG/DL (ref 8–23)
BUN/CREAT SERPL: 16.8 (ref 7–25)
BUN/CREAT SERPL: 17.2 (ref 7–25)
CALCIUM SPEC-SCNC: 8.4 MG/DL (ref 8.6–10.5)
CALCIUM SPEC-SCNC: 8.7 MG/DL (ref 8.6–10.5)
CHLORIDE SERPL-SCNC: 104 MMOL/L (ref 98–107)
CHLORIDE SERPL-SCNC: 104 MMOL/L (ref 98–107)
CK SERPL-CCNC: 3382 U/L (ref 20–200)
CO2 SERPL-SCNC: 15.5 MMOL/L (ref 22–29)
CO2 SERPL-SCNC: 20.2 MMOL/L (ref 22–29)
CREAT SERPL-MCNC: 1.28 MG/DL (ref 0.76–1.27)
CREAT SERPL-MCNC: 1.31 MG/DL (ref 0.76–1.27)
DEPRECATED RDW RBC AUTO: 42 FL (ref 37–54)
EOSINOPHIL # BLD AUTO: 0.19 10*3/MM3 (ref 0–0.4)
EOSINOPHIL NFR BLD AUTO: 2 % (ref 0.3–6.2)
ERYTHROCYTE [DISTWIDTH] IN BLOOD BY AUTOMATED COUNT: 13.7 % (ref 12.3–15.4)
GFR SERPL CREATININE-BSD FRML MDRD: 54 ML/MIN/1.73
GFR SERPL CREATININE-BSD FRML MDRD: 55 ML/MIN/1.73
GLOBULIN UR ELPH-MCNC: 3.5 GM/DL
GLUCOSE BLDC GLUCOMTR-MCNC: 213 MG/DL (ref 70–130)
GLUCOSE BLDC GLUCOMTR-MCNC: 236 MG/DL (ref 70–130)
GLUCOSE BLDC GLUCOMTR-MCNC: 261 MG/DL (ref 70–130)
GLUCOSE BLDC GLUCOMTR-MCNC: 280 MG/DL (ref 70–130)
GLUCOSE SERPL-MCNC: 175 MG/DL (ref 65–99)
GLUCOSE SERPL-MCNC: 264 MG/DL (ref 65–99)
GRAM STN SPEC: ABNORMAL
HCT VFR BLD AUTO: 42 % (ref 37.5–51)
HGB BLD-MCNC: 13 G/DL (ref 13–17.7)
IMM GRANULOCYTES # BLD AUTO: 0.04 10*3/MM3 (ref 0–0.05)
IMM GRANULOCYTES NFR BLD AUTO: 0.4 % (ref 0–0.5)
ISOLATED FROM: ABNORMAL
LYMPHOCYTES # BLD AUTO: 1.91 10*3/MM3 (ref 0.7–3.1)
LYMPHOCYTES NFR BLD AUTO: 19.9 % (ref 19.6–45.3)
MCH RBC QN AUTO: 26.1 PG (ref 26.6–33)
MCHC RBC AUTO-ENTMCNC: 31 G/DL (ref 31.5–35.7)
MCV RBC AUTO: 84.3 FL (ref 79–97)
MONOCYTES # BLD AUTO: 1.02 10*3/MM3 (ref 0.1–0.9)
MONOCYTES NFR BLD AUTO: 10.6 % (ref 5–12)
NEUTROPHILS NFR BLD AUTO: 6.38 10*3/MM3 (ref 1.7–7)
NEUTROPHILS NFR BLD AUTO: 66.6 % (ref 42.7–76)
NRBC BLD AUTO-RTO: 0 /100 WBC (ref 0–0.2)
PLATELET # BLD AUTO: 224 10*3/MM3 (ref 140–450)
PMV BLD AUTO: 9.6 FL (ref 6–12)
POTASSIUM SERPL-SCNC: 4.7 MMOL/L (ref 3.5–5.2)
POTASSIUM SERPL-SCNC: 5.1 MMOL/L (ref 3.5–5.2)
PROT SERPL-MCNC: 6 G/DL (ref 6–8.5)
RBC # BLD AUTO: 4.98 10*6/MM3 (ref 4.14–5.8)
SODIUM SERPL-SCNC: 132 MMOL/L (ref 136–145)
SODIUM SERPL-SCNC: 134 MMOL/L (ref 136–145)
WBC # BLD AUTO: 9.59 10*3/MM3 (ref 3.4–10.8)

## 2020-08-29 PROCEDURE — 25010000002 CEFTRIAXONE PER 250 MG: Performed by: INTERNAL MEDICINE

## 2020-08-29 PROCEDURE — 87205 SMEAR GRAM STAIN: CPT | Performed by: INTERNAL MEDICINE

## 2020-08-29 PROCEDURE — 71045 X-RAY EXAM CHEST 1 VIEW: CPT

## 2020-08-29 PROCEDURE — 63710000001 INSULIN ASPART PER 5 UNITS: Performed by: INTERNAL MEDICINE

## 2020-08-29 PROCEDURE — 82550 ASSAY OF CK (CPK): CPT | Performed by: INTERNAL MEDICINE

## 2020-08-29 PROCEDURE — 25010000002 THIAMINE PER 100 MG: Performed by: INTERNAL MEDICINE

## 2020-08-29 PROCEDURE — 80053 COMPREHEN METABOLIC PANEL: CPT | Performed by: INTERNAL MEDICINE

## 2020-08-29 PROCEDURE — 94799 UNLISTED PULMONARY SVC/PX: CPT

## 2020-08-29 PROCEDURE — 25010000002 HEPARIN (PORCINE) PER 1000 UNITS: Performed by: NURSE PRACTITIONER

## 2020-08-29 PROCEDURE — 25010000002 LORAZEPAM PER 2 MG: Performed by: INTERNAL MEDICINE

## 2020-08-29 PROCEDURE — 87070 CULTURE OTHR SPECIMN AEROBIC: CPT | Performed by: INTERNAL MEDICINE

## 2020-08-29 PROCEDURE — 85025 COMPLETE CBC W/AUTO DIFF WBC: CPT | Performed by: INTERNAL MEDICINE

## 2020-08-29 PROCEDURE — 25010000002 METHYLPREDNISOLONE PER 40 MG: Performed by: INTERNAL MEDICINE

## 2020-08-29 PROCEDURE — 71045 X-RAY EXAM CHEST 1 VIEW: CPT | Performed by: RADIOLOGY

## 2020-08-29 PROCEDURE — 99233 SBSQ HOSP IP/OBS HIGH 50: CPT | Performed by: INTERNAL MEDICINE

## 2020-08-29 PROCEDURE — 82962 GLUCOSE BLOOD TEST: CPT

## 2020-08-29 RX ORDER — LORAZEPAM 2 MG/1
2 TABLET ORAL
Status: DISCONTINUED | OUTPATIENT
Start: 2020-08-29 | End: 2020-09-04

## 2020-08-29 RX ORDER — LORAZEPAM 2 MG/ML
1 INJECTION INTRAMUSCULAR
Status: DISCONTINUED | OUTPATIENT
Start: 2020-08-29 | End: 2020-09-04

## 2020-08-29 RX ORDER — LORAZEPAM 2 MG/ML
2 INJECTION INTRAMUSCULAR
Status: DISCONTINUED | OUTPATIENT
Start: 2020-08-29 | End: 2020-09-04

## 2020-08-29 RX ORDER — BUDESONIDE 0.5 MG/2ML
0.5 INHALANT ORAL
Status: DISCONTINUED | OUTPATIENT
Start: 2020-08-29 | End: 2020-09-25 | Stop reason: HOSPADM

## 2020-08-29 RX ORDER — LORAZEPAM 2 MG/ML
0.5 INJECTION INTRAMUSCULAR
Status: DISCONTINUED | OUTPATIENT
Start: 2020-08-29 | End: 2020-09-04

## 2020-08-29 RX ORDER — LORAZEPAM 1 MG/1
1 TABLET ORAL
Status: DISCONTINUED | OUTPATIENT
Start: 2020-08-29 | End: 2020-09-04

## 2020-08-29 RX ORDER — THIAMINE MONONITRATE (VIT B1) 100 MG
100 TABLET ORAL ONCE
Status: COMPLETED | OUTPATIENT
Start: 2020-08-29 | End: 2020-08-29

## 2020-08-29 RX ORDER — IPRATROPIUM BROMIDE AND ALBUTEROL SULFATE 2.5; .5 MG/3ML; MG/3ML
3 SOLUTION RESPIRATORY (INHALATION)
Status: DISCONTINUED | OUTPATIENT
Start: 2020-08-29 | End: 2020-09-20

## 2020-08-29 RX ORDER — METHYLPREDNISOLONE SODIUM SUCCINATE 40 MG/ML
20 INJECTION, POWDER, LYOPHILIZED, FOR SOLUTION INTRAMUSCULAR; INTRAVENOUS EVERY 12 HOURS
Status: DISCONTINUED | OUTPATIENT
Start: 2020-08-29 | End: 2020-09-10

## 2020-08-29 RX ORDER — LORAZEPAM 0.5 MG/1
0.5 TABLET ORAL
Status: DISCONTINUED | OUTPATIENT
Start: 2020-08-29 | End: 2020-09-04

## 2020-08-29 RX ADMIN — LORAZEPAM 1 MG: 2 INJECTION INTRAMUSCULAR; INTRAVENOUS at 15:25

## 2020-08-29 RX ADMIN — HEPARIN SODIUM 5000 UNITS: 5000 INJECTION INTRAVENOUS; SUBCUTANEOUS at 13:55

## 2020-08-29 RX ADMIN — SODIUM CHLORIDE, PRESERVATIVE FREE 10 ML: 5 INJECTION INTRAVENOUS at 09:58

## 2020-08-29 RX ADMIN — IPRATROPIUM BROMIDE AND ALBUTEROL SULFATE 3 ML: .5; 3 SOLUTION RESPIRATORY (INHALATION) at 15:25

## 2020-08-29 RX ADMIN — SODIUM CHLORIDE, PRESERVATIVE FREE 10 ML: 5 INJECTION INTRAVENOUS at 20:22

## 2020-08-29 RX ADMIN — METHYLPREDNISOLONE SODIUM SUCCINATE 20 MG: 40 INJECTION, POWDER, FOR SOLUTION INTRAMUSCULAR; INTRAVENOUS at 13:55

## 2020-08-29 RX ADMIN — METHYLPREDNISOLONE SODIUM SUCCINATE 20 MG: 40 INJECTION, POWDER, FOR SOLUTION INTRAMUSCULAR; INTRAVENOUS at 22:31

## 2020-08-29 RX ADMIN — LORAZEPAM 2 MG: 2 INJECTION INTRAMUSCULAR; INTRAVENOUS at 23:26

## 2020-08-29 RX ADMIN — DOXYCYCLINE 100 MG: 100 INJECTION, POWDER, LYOPHILIZED, FOR SOLUTION INTRAVENOUS at 10:17

## 2020-08-29 RX ADMIN — INSULIN ASPART 3 UNITS: 100 INJECTION, SOLUTION INTRAVENOUS; SUBCUTANEOUS at 11:23

## 2020-08-29 RX ADMIN — SODIUM CHLORIDE, PRESERVATIVE FREE 10 ML: 5 INJECTION INTRAVENOUS at 20:21

## 2020-08-29 RX ADMIN — LORAZEPAM 1 MG: 2 INJECTION INTRAMUSCULAR; INTRAVENOUS at 03:10

## 2020-08-29 RX ADMIN — INSULIN ASPART 3 UNITS: 100 INJECTION, SOLUTION INTRAVENOUS; SUBCUTANEOUS at 21:18

## 2020-08-29 RX ADMIN — LORAZEPAM 2 MG: 2 INJECTION INTRAMUSCULAR; INTRAVENOUS at 04:40

## 2020-08-29 RX ADMIN — SODIUM CHLORIDE, PRESERVATIVE FREE 10 ML: 5 INJECTION INTRAVENOUS at 09:59

## 2020-08-29 RX ADMIN — BUDESONIDE 0.5 MG: 0.5 INHALANT RESPIRATORY (INHALATION) at 13:04

## 2020-08-29 RX ADMIN — LORAZEPAM 2 MG: 2 INJECTION INTRAMUSCULAR; INTRAVENOUS at 22:28

## 2020-08-29 RX ADMIN — LORAZEPAM 1 MG: 2 INJECTION INTRAMUSCULAR; INTRAVENOUS at 01:51

## 2020-08-29 RX ADMIN — CEFTRIAXONE 2 G: 2 INJECTION, POWDER, FOR SOLUTION INTRAMUSCULAR; INTRAVENOUS at 20:23

## 2020-08-29 RX ADMIN — LORAZEPAM 1 MG: 2 INJECTION INTRAMUSCULAR; INTRAVENOUS at 11:54

## 2020-08-29 RX ADMIN — IPRATROPIUM BROMIDE AND ALBUTEROL SULFATE 3 ML: .5; 3 SOLUTION RESPIRATORY (INHALATION) at 10:20

## 2020-08-29 RX ADMIN — SILVER SULFADIAZINE: 10 CREAM TOPICAL at 20:22

## 2020-08-29 RX ADMIN — SILVER SULFADIAZINE: 10 CREAM TOPICAL at 09:58

## 2020-08-29 RX ADMIN — THIAMINE HYDROCHLORIDE 100 MG: 100 INJECTION, SOLUTION INTRAMUSCULAR; INTRAVENOUS at 13:55

## 2020-08-29 RX ADMIN — HEPARIN SODIUM 5000 UNITS: 5000 INJECTION INTRAVENOUS; SUBCUTANEOUS at 20:22

## 2020-08-29 RX ADMIN — LORAZEPAM 1 MG: 2 INJECTION INTRAMUSCULAR; INTRAVENOUS at 19:04

## 2020-08-29 RX ADMIN — INSULIN ASPART 4 UNITS: 100 INJECTION, SOLUTION INTRAVENOUS; SUBCUTANEOUS at 06:24

## 2020-08-29 RX ADMIN — INSULIN ASPART 4 UNITS: 100 INJECTION, SOLUTION INTRAVENOUS; SUBCUTANEOUS at 17:36

## 2020-08-29 RX ADMIN — DOXYCYCLINE 100 MG: 100 INJECTION, POWDER, LYOPHILIZED, FOR SOLUTION INTRAVENOUS at 21:19

## 2020-08-29 RX ADMIN — IPRATROPIUM BROMIDE AND ALBUTEROL SULFATE 3 ML: .5; 3 SOLUTION RESPIRATORY (INHALATION) at 11:31

## 2020-08-29 RX ADMIN — HEPARIN SODIUM 5000 UNITS: 5000 INJECTION INTRAVENOUS; SUBCUTANEOUS at 05:33

## 2020-08-30 ENCOUNTER — APPOINTMENT (OUTPATIENT)
Dept: GENERAL RADIOLOGY | Facility: HOSPITAL | Age: 75
End: 2020-08-30

## 2020-08-30 LAB
ALBUMIN SERPL-MCNC: 2.76 G/DL (ref 3.5–5.2)
ALBUMIN/GLOB SERPL: 0.8 G/DL
ALP SERPL-CCNC: 93 U/L (ref 39–117)
ALT SERPL W P-5'-P-CCNC: 113 U/L (ref 1–41)
ANION GAP SERPL CALCULATED.3IONS-SCNC: 14.2 MMOL/L (ref 5–15)
AST SERPL-CCNC: 98 U/L (ref 1–40)
BILIRUB SERPL-MCNC: 0.2 MG/DL (ref 0–1.2)
BUN SERPL-MCNC: 24 MG/DL (ref 8–23)
BUN/CREAT SERPL: 20 (ref 7–25)
CALCIUM SPEC-SCNC: 8.8 MG/DL (ref 8.6–10.5)
CHLORIDE SERPL-SCNC: 102 MMOL/L (ref 98–107)
CK SERPL-CCNC: 2579 U/L (ref 20–200)
CO2 SERPL-SCNC: 17.8 MMOL/L (ref 22–29)
CREAT SERPL-MCNC: 1.2 MG/DL (ref 0.76–1.27)
CRP SERPL-MCNC: 11.3 MG/DL (ref 0–0.5)
DEPRECATED RDW RBC AUTO: 43.1 FL (ref 37–54)
ERYTHROCYTE [DISTWIDTH] IN BLOOD BY AUTOMATED COUNT: 13.5 % (ref 12.3–15.4)
GFR SERPL CREATININE-BSD FRML MDRD: 60 ML/MIN/1.73
GLOBULIN UR ELPH-MCNC: 3.6 GM/DL
GLUCOSE BLDC GLUCOMTR-MCNC: 299 MG/DL (ref 70–130)
GLUCOSE BLDC GLUCOMTR-MCNC: 307 MG/DL (ref 70–130)
GLUCOSE BLDC GLUCOMTR-MCNC: 320 MG/DL (ref 70–130)
GLUCOSE BLDC GLUCOMTR-MCNC: 326 MG/DL (ref 70–130)
GLUCOSE SERPL-MCNC: 325 MG/DL (ref 65–99)
HCT VFR BLD AUTO: 40.3 % (ref 37.5–51)
HGB BLD-MCNC: 12 G/DL (ref 13–17.7)
HYPOCHROMIA BLD QL: ABNORMAL
INR PPP: 1.08 (ref 0.9–1.1)
LYMPHOCYTES # BLD MANUAL: 0.52 10*3/MM3 (ref 0.7–3.1)
LYMPHOCYTES NFR BLD MANUAL: 3 % (ref 5–12)
LYMPHOCYTES NFR BLD MANUAL: 7 % (ref 19.6–45.3)
MAGNESIUM SERPL-MCNC: 1.7 MG/DL (ref 1.6–2.4)
MCH RBC QN AUTO: 26 PG (ref 26.6–33)
MCHC RBC AUTO-ENTMCNC: 29.8 G/DL (ref 31.5–35.7)
MCV RBC AUTO: 87.4 FL (ref 79–97)
MONOCYTES # BLD AUTO: 0.22 10*3/MM3 (ref 0.1–0.9)
NEUTROPHILS # BLD AUTO: 6.72 10*3/MM3 (ref 1.7–7)
NEUTROPHILS NFR BLD MANUAL: 85 % (ref 42.7–76)
NEUTS BAND NFR BLD MANUAL: 5 % (ref 0–5)
PLAT MORPH BLD: NORMAL
PLATELET # BLD AUTO: 244 10*3/MM3 (ref 140–450)
PMV BLD AUTO: 9.5 FL (ref 6–12)
POTASSIUM SERPL-SCNC: 5.3 MMOL/L (ref 3.5–5.2)
PROT SERPL-MCNC: 6.4 G/DL (ref 6–8.5)
PROTHROMBIN TIME: 13.8 SECONDS (ref 11.9–14.1)
RBC # BLD AUTO: 4.61 10*6/MM3 (ref 4.14–5.8)
SODIUM SERPL-SCNC: 134 MMOL/L (ref 136–145)
TROPONIN T SERPL-MCNC: <0.01 NG/ML (ref 0–0.03)
WBC # BLD AUTO: 7.47 10*3/MM3 (ref 3.4–10.8)

## 2020-08-30 PROCEDURE — 25010000002 HEPARIN (PORCINE) PER 1000 UNITS: Performed by: NURSE PRACTITIONER

## 2020-08-30 PROCEDURE — 63710000001 INSULIN DETEMIR PER 5 UNITS: Performed by: INTERNAL MEDICINE

## 2020-08-30 PROCEDURE — 83735 ASSAY OF MAGNESIUM: CPT | Performed by: INTERNAL MEDICINE

## 2020-08-30 PROCEDURE — 25010000002 CEFTRIAXONE PER 250 MG: Performed by: INTERNAL MEDICINE

## 2020-08-30 PROCEDURE — 25010000002 METHYLPREDNISOLONE PER 40 MG: Performed by: INTERNAL MEDICINE

## 2020-08-30 PROCEDURE — 25010000002 LORAZEPAM PER 2 MG: Performed by: INTERNAL MEDICINE

## 2020-08-30 PROCEDURE — 84484 ASSAY OF TROPONIN QUANT: CPT | Performed by: INTERNAL MEDICINE

## 2020-08-30 PROCEDURE — 94799 UNLISTED PULMONARY SVC/PX: CPT

## 2020-08-30 PROCEDURE — 85007 BL SMEAR W/DIFF WBC COUNT: CPT | Performed by: INTERNAL MEDICINE

## 2020-08-30 PROCEDURE — 63710000001 INSULIN ASPART PER 5 UNITS: Performed by: INTERNAL MEDICINE

## 2020-08-30 PROCEDURE — 85025 COMPLETE CBC W/AUTO DIFF WBC: CPT | Performed by: INTERNAL MEDICINE

## 2020-08-30 PROCEDURE — 25010000002 MAGNESIUM SULFATE 2 GM/50ML SOLUTION: Performed by: INTERNAL MEDICINE

## 2020-08-30 PROCEDURE — 82550 ASSAY OF CK (CPK): CPT | Performed by: INTERNAL MEDICINE

## 2020-08-30 PROCEDURE — 86140 C-REACTIVE PROTEIN: CPT | Performed by: INTERNAL MEDICINE

## 2020-08-30 PROCEDURE — 80053 COMPREHEN METABOLIC PANEL: CPT | Performed by: INTERNAL MEDICINE

## 2020-08-30 PROCEDURE — 71045 X-RAY EXAM CHEST 1 VIEW: CPT

## 2020-08-30 PROCEDURE — 85610 PROTHROMBIN TIME: CPT | Performed by: INTERNAL MEDICINE

## 2020-08-30 PROCEDURE — 99232 SBSQ HOSP IP/OBS MODERATE 35: CPT | Performed by: INTERNAL MEDICINE

## 2020-08-30 PROCEDURE — 82962 GLUCOSE BLOOD TEST: CPT

## 2020-08-30 RX ORDER — BUMETANIDE 0.25 MG/ML
1 INJECTION INTRAMUSCULAR; INTRAVENOUS ONCE
Status: COMPLETED | OUTPATIENT
Start: 2020-08-30 | End: 2020-08-30

## 2020-08-30 RX ORDER — MAGNESIUM SULFATE HEPTAHYDRATE 40 MG/ML
2 INJECTION, SOLUTION INTRAVENOUS ONCE
Status: COMPLETED | OUTPATIENT
Start: 2020-08-30 | End: 2020-08-30

## 2020-08-30 RX ADMIN — LORAZEPAM 2 MG: 2 INJECTION INTRAMUSCULAR; INTRAVENOUS at 01:58

## 2020-08-30 RX ADMIN — SODIUM CHLORIDE, PRESERVATIVE FREE 10 ML: 5 INJECTION INTRAVENOUS at 22:06

## 2020-08-30 RX ADMIN — HEPARIN SODIUM 5000 UNITS: 5000 INJECTION INTRAVENOUS; SUBCUTANEOUS at 14:37

## 2020-08-30 RX ADMIN — LORAZEPAM 1 MG: 2 INJECTION INTRAMUSCULAR; INTRAVENOUS at 23:49

## 2020-08-30 RX ADMIN — CEFTRIAXONE 2 G: 2 INJECTION, POWDER, FOR SOLUTION INTRAMUSCULAR; INTRAVENOUS at 22:04

## 2020-08-30 RX ADMIN — HEPARIN SODIUM 5000 UNITS: 5000 INJECTION INTRAVENOUS; SUBCUTANEOUS at 05:03

## 2020-08-30 RX ADMIN — LORAZEPAM 1 MG: 2 INJECTION INTRAMUSCULAR; INTRAVENOUS at 22:52

## 2020-08-30 RX ADMIN — DOXYCYCLINE 100 MG: 100 INJECTION, POWDER, LYOPHILIZED, FOR SOLUTION INTRAVENOUS at 22:05

## 2020-08-30 RX ADMIN — METRONIDAZOLE 500 MG: 500 INJECTION, SOLUTION INTRAVENOUS at 22:05

## 2020-08-30 RX ADMIN — INSULIN DETEMIR 10 UNITS: 100 INJECTION, SOLUTION SUBCUTANEOUS at 22:10

## 2020-08-30 RX ADMIN — METHYLPREDNISOLONE SODIUM SUCCINATE 20 MG: 40 INJECTION, POWDER, FOR SOLUTION INTRAMUSCULAR; INTRAVENOUS at 11:16

## 2020-08-30 RX ADMIN — METHYLPREDNISOLONE SODIUM SUCCINATE 20 MG: 40 INJECTION, POWDER, FOR SOLUTION INTRAMUSCULAR; INTRAVENOUS at 23:27

## 2020-08-30 RX ADMIN — SODIUM CHLORIDE, PRESERVATIVE FREE 10 ML: 5 INJECTION INTRAVENOUS at 22:07

## 2020-08-30 RX ADMIN — MAGNESIUM SULFATE 2 G: 2 INJECTION INTRAVENOUS at 03:41

## 2020-08-30 RX ADMIN — BUDESONIDE 0.5 MG: 0.5 INHALANT RESPIRATORY (INHALATION) at 18:49

## 2020-08-30 RX ADMIN — IPRATROPIUM BROMIDE AND ALBUTEROL SULFATE 3 ML: .5; 3 SOLUTION RESPIRATORY (INHALATION) at 00:42

## 2020-08-30 RX ADMIN — METRONIDAZOLE 500 MG: 500 INJECTION, SOLUTION INTRAVENOUS at 13:36

## 2020-08-30 RX ADMIN — BUMETANIDE 1 MG: 0.25 INJECTION, SOLUTION INTRAMUSCULAR; INTRAVENOUS at 22:52

## 2020-08-30 RX ADMIN — LORAZEPAM 1 MG: 2 INJECTION INTRAMUSCULAR; INTRAVENOUS at 12:42

## 2020-08-30 RX ADMIN — SILVER SULFADIAZINE: 10 CREAM TOPICAL at 22:05

## 2020-08-30 RX ADMIN — IPRATROPIUM BROMIDE AND ALBUTEROL SULFATE 3 ML: .5; 3 SOLUTION RESPIRATORY (INHALATION) at 18:49

## 2020-08-30 RX ADMIN — SODIUM CHLORIDE, PRESERVATIVE FREE 10 ML: 5 INJECTION INTRAVENOUS at 22:05

## 2020-08-30 RX ADMIN — HEPARIN SODIUM 5000 UNITS: 5000 INJECTION INTRAVENOUS; SUBCUTANEOUS at 22:05

## 2020-08-30 RX ADMIN — LORAZEPAM 1 MG: 2 INJECTION INTRAMUSCULAR; INTRAVENOUS at 17:33

## 2020-08-30 RX ADMIN — IPRATROPIUM BROMIDE AND ALBUTEROL SULFATE 3 ML: .5; 3 SOLUTION RESPIRATORY (INHALATION) at 13:14

## 2020-08-30 RX ADMIN — INSULIN ASPART 4 UNITS: 100 INJECTION, SOLUTION INTRAVENOUS; SUBCUTANEOUS at 17:33

## 2020-08-30 RX ADMIN — BUDESONIDE 0.5 MG: 0.5 INHALANT RESPIRATORY (INHALATION) at 06:51

## 2020-08-30 RX ADMIN — INSULIN ASPART 5 UNITS: 100 INJECTION, SOLUTION INTRAVENOUS; SUBCUTANEOUS at 22:10

## 2020-08-30 RX ADMIN — INSULIN ASPART 5 UNITS: 100 INJECTION, SOLUTION INTRAVENOUS; SUBCUTANEOUS at 11:19

## 2020-08-30 RX ADMIN — IPRATROPIUM BROMIDE AND ALBUTEROL SULFATE 3 ML: .5; 3 SOLUTION RESPIRATORY (INHALATION) at 06:50

## 2020-08-30 RX ADMIN — INSULIN ASPART 5 UNITS: 100 INJECTION, SOLUTION INTRAVENOUS; SUBCUTANEOUS at 06:27

## 2020-08-30 RX ADMIN — SODIUM CHLORIDE, PRESERVATIVE FREE 10 ML: 5 INJECTION INTRAVENOUS at 08:18

## 2020-08-30 RX ADMIN — SILVER SULFADIAZINE: 10 CREAM TOPICAL at 08:18

## 2020-08-30 RX ADMIN — DOXYCYCLINE 100 MG: 100 INJECTION, POWDER, LYOPHILIZED, FOR SOLUTION INTRAVENOUS at 11:16

## 2020-08-31 LAB
A-A DO2: 107.7 MMHG (ref 0–300)
ALBUMIN SERPL-MCNC: 3.01 G/DL (ref 3.5–5.2)
ALBUMIN/GLOB SERPL: 0.8 G/DL
ALP SERPL-CCNC: 94 U/L (ref 39–117)
ALT SERPL W P-5'-P-CCNC: 110 U/L (ref 1–41)
ANION GAP SERPL CALCULATED.3IONS-SCNC: 15.5 MMOL/L (ref 5–15)
ARTERIAL PATENCY WRIST A: POSITIVE
AST SERPL-CCNC: 81 U/L (ref 1–40)
ATMOSPHERIC PRESS: 725 MMHG
BACTERIA SPEC AEROBE CULT: NORMAL
BASE EXCESS BLDA CALC-SCNC: 1.8 MMOL/L (ref 0–2)
BASOPHILS # BLD AUTO: 0 10*3/MM3 (ref 0–0.2)
BASOPHILS NFR BLD AUTO: 0 % (ref 0–1.5)
BDY SITE: ABNORMAL
BILIRUB SERPL-MCNC: 0.2 MG/DL (ref 0–1.2)
BODY TEMPERATURE: 0 C
BUN SERPL-MCNC: 26 MG/DL (ref 8–23)
BUN/CREAT SERPL: 20.6 (ref 7–25)
CALCIUM SPEC-SCNC: 9.6 MG/DL (ref 8.6–10.5)
CHLORIDE SERPL-SCNC: 100 MMOL/L (ref 98–107)
CK SERPL-CCNC: 1484 U/L (ref 20–200)
CO2 BLDA-SCNC: 27.8 MMOL/L (ref 22–33)
CO2 SERPL-SCNC: 20.5 MMOL/L (ref 22–29)
COHGB MFR BLD: 1 % (ref 0–5)
CREAT SERPL-MCNC: 1.26 MG/DL (ref 0.76–1.27)
CRP SERPL-MCNC: 7.57 MG/DL (ref 0–0.5)
DEPRECATED RDW RBC AUTO: 42.5 FL (ref 37–54)
EOSINOPHIL # BLD AUTO: 0.01 10*3/MM3 (ref 0–0.4)
EOSINOPHIL NFR BLD AUTO: 0.1 % (ref 0.3–6.2)
ERYTHROCYTE [DISTWIDTH] IN BLOOD BY AUTOMATED COUNT: 13.4 % (ref 12.3–15.4)
GAS FLOW AIRWAY: 3 LPM
GFR SERPL CREATININE-BSD FRML MDRD: 56 ML/MIN/1.73
GLOBULIN UR ELPH-MCNC: 3.8 GM/DL
GLUCOSE BLDC GLUCOMTR-MCNC: 264 MG/DL (ref 70–130)
GLUCOSE BLDC GLUCOMTR-MCNC: 291 MG/DL (ref 70–130)
GLUCOSE BLDC GLUCOMTR-MCNC: 329 MG/DL (ref 70–130)
GLUCOSE BLDC GLUCOMTR-MCNC: 380 MG/DL (ref 70–130)
GLUCOSE SERPL-MCNC: 330 MG/DL (ref 65–99)
HCO3 BLDA-SCNC: 26.5 MMOL/L (ref 20–26)
HCT VFR BLD AUTO: 41.4 % (ref 37.5–51)
HCT VFR BLD CALC: 37.6 % (ref 38–51)
HGB BLD-MCNC: 12.5 G/DL (ref 13–17.7)
HGB BLDA-MCNC: 12.3 G/DL (ref 14–18)
IMM GRANULOCYTES # BLD AUTO: 0.05 10*3/MM3 (ref 0–0.05)
IMM GRANULOCYTES NFR BLD AUTO: 0.5 % (ref 0–0.5)
INHALED O2 CONCENTRATION: 32 %
LYMPHOCYTES # BLD AUTO: 0.73 10*3/MM3 (ref 0.7–3.1)
LYMPHOCYTES NFR BLD AUTO: 7.9 % (ref 19.6–45.3)
Lab: ABNORMAL
MAGNESIUM SERPL-MCNC: 2.2 MG/DL (ref 1.6–2.4)
MCH RBC QN AUTO: 26.3 PG (ref 26.6–33)
MCHC RBC AUTO-ENTMCNC: 30.2 G/DL (ref 31.5–35.7)
MCV RBC AUTO: 87.2 FL (ref 79–97)
METHGB BLD QL: 0.2 % (ref 0–3)
MODALITY: ABNORMAL
MONOCYTES # BLD AUTO: 0.43 10*3/MM3 (ref 0.1–0.9)
MONOCYTES NFR BLD AUTO: 4.6 % (ref 5–12)
NEUTROPHILS NFR BLD AUTO: 8.07 10*3/MM3 (ref 1.7–7)
NEUTROPHILS NFR BLD AUTO: 86.9 % (ref 42.7–76)
NOTE: ABNORMAL
NRBC BLD AUTO-RTO: 0 /100 WBC (ref 0–0.2)
OXYHGB MFR BLDV: 91.3 % (ref 94–99)
PCO2 BLDA: 41.3 MM HG (ref 35–45)
PCO2 TEMP ADJ BLD: ABNORMAL MM[HG]
PH BLDA: 7.42 PH UNITS (ref 7.35–7.45)
PH, TEMP CORRECTED: ABNORMAL
PLATELET # BLD AUTO: 292 10*3/MM3 (ref 140–450)
PMV BLD AUTO: 9.1 FL (ref 6–12)
PO2 BLDA: 63.3 MM HG (ref 83–108)
PO2 TEMP ADJ BLD: ABNORMAL MM[HG]
POTASSIUM SERPL-SCNC: 4.7 MMOL/L (ref 3.5–5.2)
PROT SERPL-MCNC: 6.8 G/DL (ref 6–8.5)
RBC # BLD AUTO: 4.75 10*6/MM3 (ref 4.14–5.8)
SAO2 % BLDCOA: 92.4 % (ref 94–99)
SODIUM SERPL-SCNC: 136 MMOL/L (ref 136–145)
VENTILATOR MODE: ABNORMAL
WBC # BLD AUTO: 9.29 10*3/MM3 (ref 3.4–10.8)

## 2020-08-31 PROCEDURE — 83735 ASSAY OF MAGNESIUM: CPT | Performed by: INTERNAL MEDICINE

## 2020-08-31 PROCEDURE — 83050 HGB METHEMOGLOBIN QUAN: CPT

## 2020-08-31 PROCEDURE — 82375 ASSAY CARBOXYHB QUANT: CPT

## 2020-08-31 PROCEDURE — 82962 GLUCOSE BLOOD TEST: CPT

## 2020-08-31 PROCEDURE — 82805 BLOOD GASES W/O2 SATURATION: CPT

## 2020-08-31 PROCEDURE — 94799 UNLISTED PULMONARY SVC/PX: CPT

## 2020-08-31 PROCEDURE — 25010000002 LORAZEPAM PER 2 MG: Performed by: INTERNAL MEDICINE

## 2020-08-31 PROCEDURE — 92610 EVALUATE SWALLOWING FUNCTION: CPT

## 2020-08-31 PROCEDURE — 97530 THERAPEUTIC ACTIVITIES: CPT

## 2020-08-31 PROCEDURE — 63710000001 INSULIN ASPART PER 5 UNITS: Performed by: INTERNAL MEDICINE

## 2020-08-31 PROCEDURE — 80053 COMPREHEN METABOLIC PANEL: CPT | Performed by: INTERNAL MEDICINE

## 2020-08-31 PROCEDURE — 25010000002 HEPARIN (PORCINE) PER 1000 UNITS: Performed by: NURSE PRACTITIONER

## 2020-08-31 PROCEDURE — 82550 ASSAY OF CK (CPK): CPT | Performed by: INTERNAL MEDICINE

## 2020-08-31 PROCEDURE — 86140 C-REACTIVE PROTEIN: CPT | Performed by: INTERNAL MEDICINE

## 2020-08-31 PROCEDURE — 25010000002 CEFTRIAXONE PER 250 MG: Performed by: INTERNAL MEDICINE

## 2020-08-31 PROCEDURE — 25010000002 METHYLPREDNISOLONE PER 40 MG: Performed by: INTERNAL MEDICINE

## 2020-08-31 PROCEDURE — 36600 WITHDRAWAL OF ARTERIAL BLOOD: CPT

## 2020-08-31 PROCEDURE — 85025 COMPLETE CBC W/AUTO DIFF WBC: CPT | Performed by: INTERNAL MEDICINE

## 2020-08-31 PROCEDURE — 25010000002 DIAZEPAM PER 5 MG: Performed by: INTERNAL MEDICINE

## 2020-08-31 PROCEDURE — 63710000001 INSULIN DETEMIR PER 5 UNITS: Performed by: INTERNAL MEDICINE

## 2020-08-31 PROCEDURE — 99233 SBSQ HOSP IP/OBS HIGH 50: CPT | Performed by: INTERNAL MEDICINE

## 2020-08-31 PROCEDURE — 94660 CPAP INITIATION&MGMT: CPT

## 2020-08-31 RX ORDER — DIAZEPAM 5 MG/ML
10 INJECTION, SOLUTION INTRAMUSCULAR; INTRAVENOUS ONCE
Status: COMPLETED | OUTPATIENT
Start: 2020-08-31 | End: 2020-08-31

## 2020-08-31 RX ORDER — THIAMINE MONONITRATE (VIT B1) 100 MG
100 TABLET ORAL DAILY
Status: DISCONTINUED | OUTPATIENT
Start: 2020-08-31 | End: 2020-09-25 | Stop reason: HOSPADM

## 2020-08-31 RX ORDER — CHLORDIAZEPOXIDE HYDROCHLORIDE 25 MG/1
25 CAPSULE, GELATIN COATED ORAL EVERY 8 HOURS SCHEDULED
Status: DISCONTINUED | OUTPATIENT
Start: 2020-08-31 | End: 2020-09-05

## 2020-08-31 RX ORDER — GUAIFENESIN 600 MG/1
1200 TABLET, EXTENDED RELEASE ORAL EVERY 12 HOURS SCHEDULED
Status: DISCONTINUED | OUTPATIENT
Start: 2020-08-31 | End: 2020-09-25 | Stop reason: HOSPADM

## 2020-08-31 RX ADMIN — GUAIFENESIN 1200 MG: 600 TABLET, EXTENDED RELEASE ORAL at 23:57

## 2020-08-31 RX ADMIN — FAMOTIDINE 20 MG: 20 TABLET, FILM COATED ORAL at 16:48

## 2020-08-31 RX ADMIN — Medication 1 CAPSULE: at 10:23

## 2020-08-31 RX ADMIN — ASPIRIN 81 MG: 81 TABLET, COATED ORAL at 10:23

## 2020-08-31 RX ADMIN — SILVER SULFADIAZINE: 10 CREAM TOPICAL at 10:25

## 2020-08-31 RX ADMIN — DOXYCYCLINE 100 MG: 100 INJECTION, POWDER, LYOPHILIZED, FOR SOLUTION INTRAVENOUS at 23:57

## 2020-08-31 RX ADMIN — INSULIN ASPART 8 UNITS: 100 INJECTION, SOLUTION INTRAVENOUS; SUBCUTANEOUS at 11:30

## 2020-08-31 RX ADMIN — SODIUM CHLORIDE, PRESERVATIVE FREE 10 ML: 5 INJECTION INTRAVENOUS at 20:05

## 2020-08-31 RX ADMIN — INSULIN ASPART 6 UNITS: 100 INJECTION, SOLUTION INTRAVENOUS; SUBCUTANEOUS at 06:24

## 2020-08-31 RX ADMIN — LORAZEPAM 1 MG: 2 INJECTION INTRAMUSCULAR; INTRAVENOUS at 20:04

## 2020-08-31 RX ADMIN — HEPARIN SODIUM 5000 UNITS: 5000 INJECTION INTRAVENOUS; SUBCUTANEOUS at 13:43

## 2020-08-31 RX ADMIN — CEFTRIAXONE 2 G: 2 INJECTION, POWDER, FOR SOLUTION INTRAMUSCULAR; INTRAVENOUS at 21:23

## 2020-08-31 RX ADMIN — INSULIN DETEMIR 10 UNITS: 100 INJECTION, SOLUTION SUBCUTANEOUS at 20:03

## 2020-08-31 RX ADMIN — LORAZEPAM 0.5 MG: 2 INJECTION INTRAMUSCULAR; INTRAVENOUS at 14:31

## 2020-08-31 RX ADMIN — METOPROLOL SUCCINATE 25 MG: 25 TABLET, EXTENDED RELEASE ORAL at 10:22

## 2020-08-31 RX ADMIN — SODIUM CHLORIDE, PRESERVATIVE FREE 10 ML: 5 INJECTION INTRAVENOUS at 10:25

## 2020-08-31 RX ADMIN — INSULIN ASPART 10 UNITS: 100 INJECTION, SOLUTION INTRAVENOUS; SUBCUTANEOUS at 16:48

## 2020-08-31 RX ADMIN — HYDROCODONE BITARTRATE AND ACETAMINOPHEN 1 TABLET: 5; 325 TABLET ORAL at 13:43

## 2020-08-31 RX ADMIN — LORAZEPAM 2 MG: 2 INJECTION INTRAMUSCULAR; INTRAVENOUS at 06:08

## 2020-08-31 RX ADMIN — IPRATROPIUM BROMIDE AND ALBUTEROL SULFATE 3 ML: .5; 3 SOLUTION RESPIRATORY (INHALATION) at 00:24

## 2020-08-31 RX ADMIN — Medication 100 MG: at 15:50

## 2020-08-31 RX ADMIN — LORAZEPAM 2 MG: 2 INJECTION INTRAMUSCULAR; INTRAVENOUS at 04:42

## 2020-08-31 RX ADMIN — DOXYCYCLINE 100 MG: 100 INJECTION, POWDER, LYOPHILIZED, FOR SOLUTION INTRAVENOUS at 10:21

## 2020-08-31 RX ADMIN — IPRATROPIUM BROMIDE AND ALBUTEROL SULFATE 3 ML: .5; 3 SOLUTION RESPIRATORY (INHALATION) at 07:26

## 2020-08-31 RX ADMIN — MEMANTINE HYDROCHLORIDE 5 MG: 5 TABLET, FILM COATED ORAL at 10:22

## 2020-08-31 RX ADMIN — SILVER SULFADIAZINE 1 APPLICATION: 10 CREAM TOPICAL at 20:40

## 2020-08-31 RX ADMIN — CHLORDIAZEPOXIDE HYDROCHLORIDE 25 MG: 25 CAPSULE ORAL at 15:50

## 2020-08-31 RX ADMIN — LEVOTHYROXINE SODIUM 75 MCG: 75 TABLET ORAL at 10:22

## 2020-08-31 RX ADMIN — BUDESONIDE 0.5 MG: 0.5 INHALANT RESPIRATORY (INHALATION) at 07:25

## 2020-08-31 RX ADMIN — SODIUM CHLORIDE, PRESERVATIVE FREE 10 ML: 5 INJECTION INTRAVENOUS at 10:26

## 2020-08-31 RX ADMIN — METRONIDAZOLE 500 MG: 500 INJECTION, SOLUTION INTRAVENOUS at 13:44

## 2020-08-31 RX ADMIN — METRONIDAZOLE 500 MG: 500 INJECTION, SOLUTION INTRAVENOUS at 06:08

## 2020-08-31 RX ADMIN — SODIUM CHLORIDE, PRESERVATIVE FREE 10 ML: 5 INJECTION INTRAVENOUS at 10:22

## 2020-08-31 RX ADMIN — HEPARIN SODIUM 5000 UNITS: 5000 INJECTION INTRAVENOUS; SUBCUTANEOUS at 21:23

## 2020-08-31 RX ADMIN — CHLORDIAZEPOXIDE HYDROCHLORIDE 25 MG: 25 CAPSULE ORAL at 21:23

## 2020-08-31 RX ADMIN — MEMANTINE HYDROCHLORIDE 5 MG: 5 TABLET, FILM COATED ORAL at 20:03

## 2020-08-31 RX ADMIN — SODIUM CHLORIDE, PRESERVATIVE FREE 10 ML: 5 INJECTION INTRAVENOUS at 20:03

## 2020-08-31 RX ADMIN — HEPARIN SODIUM 5000 UNITS: 5000 INJECTION INTRAVENOUS; SUBCUTANEOUS at 06:08

## 2020-08-31 RX ADMIN — DIAZEPAM 10 MG: 5 INJECTION, SOLUTION INTRAMUSCULAR; INTRAVENOUS at 05:08

## 2020-08-31 RX ADMIN — METRONIDAZOLE 500 MG: 500 INJECTION, SOLUTION INTRAVENOUS at 21:24

## 2020-08-31 RX ADMIN — TAMSULOSIN HYDROCHLORIDE 0.4 MG: 0.4 CAPSULE ORAL at 10:23

## 2020-08-31 RX ADMIN — OLANZAPINE 2.5 MG: 2.5 TABLET ORAL at 20:02

## 2020-08-31 RX ADMIN — METHYLPREDNISOLONE SODIUM SUCCINATE 20 MG: 40 INJECTION, POWDER, FOR SOLUTION INTRAMUSCULAR; INTRAVENOUS at 11:30

## 2020-09-01 ENCOUNTER — APPOINTMENT (OUTPATIENT)
Dept: GENERAL RADIOLOGY | Facility: HOSPITAL | Age: 75
End: 2020-09-01

## 2020-09-01 LAB
ALBUMIN SERPL-MCNC: 3.03 G/DL (ref 3.5–5.2)
ALBUMIN/GLOB SERPL: 1 G/DL
ALP SERPL-CCNC: 86 U/L (ref 39–117)
ALT SERPL W P-5'-P-CCNC: 88 U/L (ref 1–41)
ANION GAP SERPL CALCULATED.3IONS-SCNC: 10.8 MMOL/L (ref 5–15)
AST SERPL-CCNC: 46 U/L (ref 1–40)
BACTERIA SPEC AEROBE CULT: NORMAL
BASOPHILS # BLD AUTO: 0 10*3/MM3 (ref 0–0.2)
BASOPHILS NFR BLD AUTO: 0 % (ref 0–1.5)
BILIRUB SERPL-MCNC: <0.2 MG/DL (ref 0–1.2)
BUN SERPL-MCNC: 30 MG/DL (ref 8–23)
BUN/CREAT SERPL: 24.8 (ref 7–25)
CALCIUM SPEC-SCNC: 9.2 MG/DL (ref 8.6–10.5)
CHLORIDE SERPL-SCNC: 100 MMOL/L (ref 98–107)
CK SERPL-CCNC: 721 U/L (ref 20–200)
CO2 SERPL-SCNC: 26.2 MMOL/L (ref 22–29)
CREAT SERPL-MCNC: 1.21 MG/DL (ref 0.76–1.27)
CRP SERPL-MCNC: 4.05 MG/DL (ref 0–0.5)
DEPRECATED RDW RBC AUTO: 43.9 FL (ref 37–54)
EOSINOPHIL # BLD AUTO: 0 10*3/MM3 (ref 0–0.4)
EOSINOPHIL NFR BLD AUTO: 0 % (ref 0.3–6.2)
ERYTHROCYTE [DISTWIDTH] IN BLOOD BY AUTOMATED COUNT: 13.7 % (ref 12.3–15.4)
GFR SERPL CREATININE-BSD FRML MDRD: 59 ML/MIN/1.73
GLOBULIN UR ELPH-MCNC: 3.2 GM/DL
GLUCOSE BLDC GLUCOMTR-MCNC: 292 MG/DL (ref 70–130)
GLUCOSE BLDC GLUCOMTR-MCNC: 306 MG/DL (ref 70–130)
GLUCOSE BLDC GLUCOMTR-MCNC: 339 MG/DL (ref 70–130)
GLUCOSE BLDC GLUCOMTR-MCNC: 378 MG/DL (ref 70–130)
GLUCOSE SERPL-MCNC: 329 MG/DL (ref 65–99)
GRAM STN SPEC: NORMAL
HCT VFR BLD AUTO: 38.8 % (ref 37.5–51)
HGB BLD-MCNC: 11.6 G/DL (ref 13–17.7)
IMM GRANULOCYTES # BLD AUTO: 0.03 10*3/MM3 (ref 0–0.05)
IMM GRANULOCYTES NFR BLD AUTO: 0.4 % (ref 0–0.5)
LYMPHOCYTES # BLD AUTO: 0.71 10*3/MM3 (ref 0.7–3.1)
LYMPHOCYTES NFR BLD AUTO: 9 % (ref 19.6–45.3)
MAGNESIUM SERPL-MCNC: 2 MG/DL (ref 1.6–2.4)
MCH RBC QN AUTO: 26.4 PG (ref 26.6–33)
MCHC RBC AUTO-ENTMCNC: 29.9 G/DL (ref 31.5–35.7)
MCV RBC AUTO: 88.2 FL (ref 79–97)
MONOCYTES # BLD AUTO: 0.5 10*3/MM3 (ref 0.1–0.9)
MONOCYTES NFR BLD AUTO: 6.3 % (ref 5–12)
NEUTROPHILS NFR BLD AUTO: 6.66 10*3/MM3 (ref 1.7–7)
NEUTROPHILS NFR BLD AUTO: 84.3 % (ref 42.7–76)
NRBC BLD AUTO-RTO: 0 /100 WBC (ref 0–0.2)
PHOSPHATE SERPL-MCNC: 3.9 MG/DL (ref 2.5–4.5)
PLATELET # BLD AUTO: 306 10*3/MM3 (ref 140–450)
PMV BLD AUTO: 9 FL (ref 6–12)
POTASSIUM SERPL-SCNC: 4.8 MMOL/L (ref 3.5–5.2)
PROT SERPL-MCNC: 6.2 G/DL (ref 6–8.5)
RBC # BLD AUTO: 4.4 10*6/MM3 (ref 4.14–5.8)
SODIUM SERPL-SCNC: 137 MMOL/L (ref 136–145)
WBC # BLD AUTO: 7.9 10*3/MM3 (ref 3.4–10.8)

## 2020-09-01 PROCEDURE — 80053 COMPREHEN METABOLIC PANEL: CPT | Performed by: INTERNAL MEDICINE

## 2020-09-01 PROCEDURE — 97530 THERAPEUTIC ACTIVITIES: CPT

## 2020-09-01 PROCEDURE — 99233 SBSQ HOSP IP/OBS HIGH 50: CPT | Performed by: INTERNAL MEDICINE

## 2020-09-01 PROCEDURE — 71045 X-RAY EXAM CHEST 1 VIEW: CPT

## 2020-09-01 PROCEDURE — 25010000002 CEFTRIAXONE PER 250 MG: Performed by: INTERNAL MEDICINE

## 2020-09-01 PROCEDURE — 71045 X-RAY EXAM CHEST 1 VIEW: CPT | Performed by: RADIOLOGY

## 2020-09-01 PROCEDURE — 82550 ASSAY OF CK (CPK): CPT | Performed by: INTERNAL MEDICINE

## 2020-09-01 PROCEDURE — 85025 COMPLETE CBC W/AUTO DIFF WBC: CPT | Performed by: INTERNAL MEDICINE

## 2020-09-01 PROCEDURE — 25010000002 HEPARIN (PORCINE) PER 1000 UNITS: Performed by: NURSE PRACTITIONER

## 2020-09-01 PROCEDURE — 25010000002 METHYLPREDNISOLONE PER 40 MG: Performed by: INTERNAL MEDICINE

## 2020-09-01 PROCEDURE — 63710000001 INSULIN DETEMIR PER 5 UNITS: Performed by: INTERNAL MEDICINE

## 2020-09-01 PROCEDURE — 82962 GLUCOSE BLOOD TEST: CPT

## 2020-09-01 PROCEDURE — 63710000001 INSULIN ASPART PER 5 UNITS: Performed by: INTERNAL MEDICINE

## 2020-09-01 PROCEDURE — 94799 UNLISTED PULMONARY SVC/PX: CPT

## 2020-09-01 PROCEDURE — 83735 ASSAY OF MAGNESIUM: CPT | Performed by: INTERNAL MEDICINE

## 2020-09-01 PROCEDURE — 86140 C-REACTIVE PROTEIN: CPT | Performed by: INTERNAL MEDICINE

## 2020-09-01 PROCEDURE — 84100 ASSAY OF PHOSPHORUS: CPT | Performed by: INTERNAL MEDICINE

## 2020-09-01 PROCEDURE — 97110 THERAPEUTIC EXERCISES: CPT

## 2020-09-01 RX ORDER — BUMETANIDE 0.25 MG/ML
1 INJECTION INTRAMUSCULAR; INTRAVENOUS ONCE
Status: COMPLETED | OUTPATIENT
Start: 2020-09-01 | End: 2020-09-01

## 2020-09-01 RX ADMIN — SODIUM CHLORIDE, PRESERVATIVE FREE 10 ML: 5 INJECTION INTRAVENOUS at 22:29

## 2020-09-01 RX ADMIN — Medication 1 CAPSULE: at 08:22

## 2020-09-01 RX ADMIN — SODIUM CHLORIDE, PRESERVATIVE FREE 10 ML: 5 INJECTION INTRAVENOUS at 08:24

## 2020-09-01 RX ADMIN — OLANZAPINE 2.5 MG: 2.5 TABLET ORAL at 21:34

## 2020-09-01 RX ADMIN — METOPROLOL SUCCINATE 25 MG: 25 TABLET, EXTENDED RELEASE ORAL at 08:22

## 2020-09-01 RX ADMIN — MEMANTINE HYDROCHLORIDE 5 MG: 5 TABLET, FILM COATED ORAL at 21:34

## 2020-09-01 RX ADMIN — CEFTRIAXONE 2 G: 2 INJECTION, POWDER, FOR SOLUTION INTRAMUSCULAR; INTRAVENOUS at 21:35

## 2020-09-01 RX ADMIN — METRONIDAZOLE 500 MG: 500 INJECTION, SOLUTION INTRAVENOUS at 21:35

## 2020-09-01 RX ADMIN — DOXYCYCLINE 100 MG: 100 INJECTION, POWDER, LYOPHILIZED, FOR SOLUTION INTRAVENOUS at 23:02

## 2020-09-01 RX ADMIN — METHYLPREDNISOLONE SODIUM SUCCINATE 20 MG: 40 INJECTION, POWDER, FOR SOLUTION INTRAMUSCULAR; INTRAVENOUS at 11:23

## 2020-09-01 RX ADMIN — SILVER SULFADIAZINE: 10 CREAM TOPICAL at 10:19

## 2020-09-01 RX ADMIN — LAMOTRIGINE 12.5 MG: 25 TABLET ORAL at 08:23

## 2020-09-01 RX ADMIN — FAMOTIDINE 20 MG: 20 TABLET, FILM COATED ORAL at 06:32

## 2020-09-01 RX ADMIN — INSULIN DETEMIR 15 UNITS: 100 INJECTION, SOLUTION SUBCUTANEOUS at 21:39

## 2020-09-01 RX ADMIN — INSULIN ASPART 10 UNITS: 100 INJECTION, SOLUTION INTRAVENOUS; SUBCUTANEOUS at 06:34

## 2020-09-01 RX ADMIN — FAMOTIDINE 20 MG: 20 TABLET, FILM COATED ORAL at 16:39

## 2020-09-01 RX ADMIN — INSULIN DETEMIR 10 UNITS: 100 INJECTION, SOLUTION SUBCUTANEOUS at 10:20

## 2020-09-01 RX ADMIN — INSULIN ASPART 8 UNITS: 100 INJECTION, SOLUTION INTRAVENOUS; SUBCUTANEOUS at 11:23

## 2020-09-01 RX ADMIN — SILVER SULFADIAZINE: 10 CREAM TOPICAL at 21:35

## 2020-09-01 RX ADMIN — DOXYCYCLINE 100 MG: 100 INJECTION, POWDER, LYOPHILIZED, FOR SOLUTION INTRAVENOUS at 10:20

## 2020-09-01 RX ADMIN — MEMANTINE HYDROCHLORIDE 5 MG: 5 TABLET, FILM COATED ORAL at 08:22

## 2020-09-01 RX ADMIN — HYDROCODONE BITARTRATE AND ACETAMINOPHEN 1 TABLET: 5; 325 TABLET ORAL at 21:34

## 2020-09-01 RX ADMIN — GUAIFENESIN 1200 MG: 600 TABLET, EXTENDED RELEASE ORAL at 08:22

## 2020-09-01 RX ADMIN — METHYLPREDNISOLONE SODIUM SUCCINATE 20 MG: 40 INJECTION, POWDER, FOR SOLUTION INTRAMUSCULAR; INTRAVENOUS at 01:31

## 2020-09-01 RX ADMIN — METRONIDAZOLE 500 MG: 500 INJECTION, SOLUTION INTRAVENOUS at 13:36

## 2020-09-01 RX ADMIN — LEVOTHYROXINE SODIUM 75 MCG: 75 TABLET ORAL at 08:22

## 2020-09-01 RX ADMIN — HEPARIN SODIUM 5000 UNITS: 5000 INJECTION INTRAVENOUS; SUBCUTANEOUS at 13:36

## 2020-09-01 RX ADMIN — SODIUM CHLORIDE, PRESERVATIVE FREE 10 ML: 5 INJECTION INTRAVENOUS at 22:30

## 2020-09-01 RX ADMIN — FAMOTIDINE 20 MG: 20 TABLET, FILM COATED ORAL at 06:33

## 2020-09-01 RX ADMIN — METRONIDAZOLE 500 MG: 500 INJECTION, SOLUTION INTRAVENOUS at 06:16

## 2020-09-01 RX ADMIN — BUMETANIDE 1 MG: 0.25 INJECTION, SOLUTION INTRAMUSCULAR; INTRAVENOUS at 10:20

## 2020-09-01 RX ADMIN — CHLORDIAZEPOXIDE HYDROCHLORIDE 25 MG: 25 CAPSULE ORAL at 06:16

## 2020-09-01 RX ADMIN — ASPIRIN 81 MG: 81 TABLET, COATED ORAL at 08:22

## 2020-09-01 RX ADMIN — INSULIN ASPART 10 UNITS: 100 INJECTION, SOLUTION INTRAVENOUS; SUBCUTANEOUS at 16:42

## 2020-09-01 RX ADMIN — TAMSULOSIN HYDROCHLORIDE 0.4 MG: 0.4 CAPSULE ORAL at 08:22

## 2020-09-01 RX ADMIN — HEPARIN SODIUM 5000 UNITS: 5000 INJECTION INTRAVENOUS; SUBCUTANEOUS at 21:34

## 2020-09-01 RX ADMIN — Medication 100 MG: at 08:22

## 2020-09-01 RX ADMIN — CHLORDIAZEPOXIDE HYDROCHLORIDE 25 MG: 25 CAPSULE ORAL at 13:36

## 2020-09-01 RX ADMIN — CHLORDIAZEPOXIDE HYDROCHLORIDE 25 MG: 25 CAPSULE ORAL at 21:35

## 2020-09-01 RX ADMIN — SODIUM CHLORIDE, PRESERVATIVE FREE 10 ML: 5 INJECTION INTRAVENOUS at 08:23

## 2020-09-01 RX ADMIN — HEPARIN SODIUM 5000 UNITS: 5000 INJECTION INTRAVENOUS; SUBCUTANEOUS at 06:17

## 2020-09-01 RX ADMIN — GUAIFENESIN 1200 MG: 600 TABLET, EXTENDED RELEASE ORAL at 21:34

## 2020-09-02 LAB
ACETONE BLD QL: NEGATIVE
ALBUMIN SERPL-MCNC: 2.94 G/DL (ref 3.5–5.2)
ALBUMIN/GLOB SERPL: 0.8 G/DL
ALP SERPL-CCNC: 88 U/L (ref 39–117)
ALT SERPL W P-5'-P-CCNC: 69 U/L (ref 1–41)
ANION GAP SERPL CALCULATED.3IONS-SCNC: 10.3 MMOL/L (ref 5–15)
ANION GAP SERPL CALCULATED.3IONS-SCNC: 10.6 MMOL/L (ref 5–15)
AST SERPL-CCNC: 30 U/L (ref 1–40)
BACTERIA SPEC AEROBE CULT: NORMAL
BACTERIA SPEC AEROBE CULT: NORMAL
BASOPHILS # BLD AUTO: 0.01 10*3/MM3 (ref 0–0.2)
BASOPHILS NFR BLD AUTO: 0.1 % (ref 0–1.5)
BILIRUB SERPL-MCNC: 0.2 MG/DL (ref 0–1.2)
BUN SERPL-MCNC: 38 MG/DL (ref 8–23)
BUN SERPL-MCNC: 41 MG/DL (ref 8–23)
BUN/CREAT SERPL: 29.9 (ref 7–25)
BUN/CREAT SERPL: 30.6 (ref 7–25)
CALCIUM SPEC-SCNC: 9.6 MG/DL (ref 8.6–10.5)
CALCIUM SPEC-SCNC: 9.6 MG/DL (ref 8.6–10.5)
CHLORIDE SERPL-SCNC: 95 MMOL/L (ref 98–107)
CHLORIDE SERPL-SCNC: 97 MMOL/L (ref 98–107)
CK SERPL-CCNC: 393 U/L (ref 20–200)
CO2 SERPL-SCNC: 27.4 MMOL/L (ref 22–29)
CO2 SERPL-SCNC: 28.7 MMOL/L (ref 22–29)
CREAT SERPL-MCNC: 1.27 MG/DL (ref 0.76–1.27)
CREAT SERPL-MCNC: 1.34 MG/DL (ref 0.76–1.27)
CRP SERPL-MCNC: 2.02 MG/DL (ref 0–0.5)
DEPRECATED RDW RBC AUTO: 43.8 FL (ref 37–54)
EOSINOPHIL # BLD AUTO: 0.01 10*3/MM3 (ref 0–0.4)
EOSINOPHIL NFR BLD AUTO: 0.1 % (ref 0.3–6.2)
ERYTHROCYTE [DISTWIDTH] IN BLOOD BY AUTOMATED COUNT: 13.4 % (ref 12.3–15.4)
GFR SERPL CREATININE-BSD FRML MDRD: 52 ML/MIN/1.73
GFR SERPL CREATININE-BSD FRML MDRD: 56 ML/MIN/1.73
GLOBULIN UR ELPH-MCNC: 3.6 GM/DL
GLUCOSE BLDC GLUCOMTR-MCNC: 127 MG/DL (ref 70–130)
GLUCOSE BLDC GLUCOMTR-MCNC: 265 MG/DL (ref 70–130)
GLUCOSE BLDC GLUCOMTR-MCNC: 273 MG/DL (ref 70–130)
GLUCOSE BLDC GLUCOMTR-MCNC: 302 MG/DL (ref 70–130)
GLUCOSE BLDC GLUCOMTR-MCNC: 323 MG/DL (ref 70–130)
GLUCOSE BLDC GLUCOMTR-MCNC: 355 MG/DL (ref 70–130)
GLUCOSE BLDC GLUCOMTR-MCNC: 378 MG/DL (ref 70–130)
GLUCOSE BLDC GLUCOMTR-MCNC: 427 MG/DL (ref 70–130)
GLUCOSE BLDC GLUCOMTR-MCNC: 461 MG/DL (ref 70–130)
GLUCOSE BLDC GLUCOMTR-MCNC: 468 MG/DL (ref 70–130)
GLUCOSE BLDC GLUCOMTR-MCNC: 482 MG/DL (ref 70–130)
GLUCOSE SERPL-MCNC: 402 MG/DL (ref 65–99)
GLUCOSE SERPL-MCNC: 508 MG/DL (ref 65–99)
HCT VFR BLD AUTO: 42.6 % (ref 37.5–51)
HGB BLD-MCNC: 12.4 G/DL (ref 13–17.7)
IMM GRANULOCYTES # BLD AUTO: 0.05 10*3/MM3 (ref 0–0.05)
IMM GRANULOCYTES NFR BLD AUTO: 0.5 % (ref 0–0.5)
LYMPHOCYTES # BLD AUTO: 1.57 10*3/MM3 (ref 0.7–3.1)
LYMPHOCYTES NFR BLD AUTO: 15.1 % (ref 19.6–45.3)
MAGNESIUM SERPL-MCNC: 1.9 MG/DL (ref 1.6–2.4)
MCH RBC QN AUTO: 26.2 PG (ref 26.6–33)
MCHC RBC AUTO-ENTMCNC: 29.1 G/DL (ref 31.5–35.7)
MCV RBC AUTO: 89.9 FL (ref 79–97)
MONOCYTES # BLD AUTO: 0.68 10*3/MM3 (ref 0.1–0.9)
MONOCYTES NFR BLD AUTO: 6.5 % (ref 5–12)
NEUTROPHILS NFR BLD AUTO: 77.7 % (ref 42.7–76)
NEUTROPHILS NFR BLD AUTO: 8.07 10*3/MM3 (ref 1.7–7)
NRBC BLD AUTO-RTO: 0 /100 WBC (ref 0–0.2)
PHOSPHATE SERPL-MCNC: 4.5 MG/DL (ref 2.5–4.5)
PLATELET # BLD AUTO: 276 10*3/MM3 (ref 140–450)
PMV BLD AUTO: 10.1 FL (ref 6–12)
POTASSIUM SERPL-SCNC: 4.5 MMOL/L (ref 3.5–5.2)
POTASSIUM SERPL-SCNC: 5 MMOL/L (ref 3.5–5.2)
PROT SERPL-MCNC: 6.5 G/DL (ref 6–8.5)
RBC # BLD AUTO: 4.74 10*6/MM3 (ref 4.14–5.8)
SODIUM SERPL-SCNC: 134 MMOL/L (ref 136–145)
SODIUM SERPL-SCNC: 135 MMOL/L (ref 136–145)
WBC # BLD AUTO: 10.39 10*3/MM3 (ref 3.4–10.8)

## 2020-09-02 PROCEDURE — 25010000002 CEFTRIAXONE PER 250 MG: Performed by: INTERNAL MEDICINE

## 2020-09-02 PROCEDURE — 84100 ASSAY OF PHOSPHORUS: CPT | Performed by: INTERNAL MEDICINE

## 2020-09-02 PROCEDURE — 63710000001 INSULIN ASPART PER 5 UNITS: Performed by: INTERNAL MEDICINE

## 2020-09-02 PROCEDURE — 85025 COMPLETE CBC W/AUTO DIFF WBC: CPT | Performed by: INTERNAL MEDICINE

## 2020-09-02 PROCEDURE — 25010000002 HEPARIN (PORCINE) PER 1000 UNITS: Performed by: NURSE PRACTITIONER

## 2020-09-02 PROCEDURE — 63710000001 INSULIN DETEMIR PER 5 UNITS: Performed by: INTERNAL MEDICINE

## 2020-09-02 PROCEDURE — 92610 EVALUATE SWALLOWING FUNCTION: CPT

## 2020-09-02 PROCEDURE — 25010000002 LORAZEPAM PER 2 MG: Performed by: INTERNAL MEDICINE

## 2020-09-02 PROCEDURE — 94799 UNLISTED PULMONARY SVC/PX: CPT

## 2020-09-02 PROCEDURE — 25010000002 MAGNESIUM SULFATE 2 GM/50ML SOLUTION: Performed by: INTERNAL MEDICINE

## 2020-09-02 PROCEDURE — 82962 GLUCOSE BLOOD TEST: CPT

## 2020-09-02 PROCEDURE — 82550 ASSAY OF CK (CPK): CPT | Performed by: INTERNAL MEDICINE

## 2020-09-02 PROCEDURE — 99233 SBSQ HOSP IP/OBS HIGH 50: CPT | Performed by: INTERNAL MEDICINE

## 2020-09-02 PROCEDURE — 99221 1ST HOSP IP/OBS SF/LOW 40: CPT | Performed by: SURGERY

## 2020-09-02 PROCEDURE — 83735 ASSAY OF MAGNESIUM: CPT | Performed by: INTERNAL MEDICINE

## 2020-09-02 PROCEDURE — 25010000002 METHYLPREDNISOLONE PER 40 MG: Performed by: INTERNAL MEDICINE

## 2020-09-02 PROCEDURE — 80053 COMPREHEN METABOLIC PANEL: CPT | Performed by: INTERNAL MEDICINE

## 2020-09-02 PROCEDURE — 86140 C-REACTIVE PROTEIN: CPT | Performed by: INTERNAL MEDICINE

## 2020-09-02 PROCEDURE — 82009 KETONE BODYS QUAL: CPT | Performed by: INTERNAL MEDICINE

## 2020-09-02 RX ORDER — DEXTROSE MONOHYDRATE 25 G/50ML
25-50 INJECTION, SOLUTION INTRAVENOUS
Status: DISCONTINUED | OUTPATIENT
Start: 2020-09-02 | End: 2020-09-25 | Stop reason: HOSPADM

## 2020-09-02 RX ORDER — MAGNESIUM SULFATE HEPTAHYDRATE 40 MG/ML
2 INJECTION, SOLUTION INTRAVENOUS ONCE
Status: COMPLETED | OUTPATIENT
Start: 2020-09-02 | End: 2020-09-02

## 2020-09-02 RX ADMIN — DOXYCYCLINE 100 MG: 100 INJECTION, POWDER, LYOPHILIZED, FOR SOLUTION INTRAVENOUS at 14:28

## 2020-09-02 RX ADMIN — INSULIN HUMAN 4 UNITS/HR: 1 INJECTION, SOLUTION INTRAVENOUS at 15:13

## 2020-09-02 RX ADMIN — SODIUM CHLORIDE, PRESERVATIVE FREE 10 ML: 5 INJECTION INTRAVENOUS at 20:04

## 2020-09-02 RX ADMIN — METHYLPREDNISOLONE SODIUM SUCCINATE 20 MG: 40 INJECTION, POWDER, FOR SOLUTION INTRAMUSCULAR; INTRAVENOUS at 11:51

## 2020-09-02 RX ADMIN — COLLAGENASE SANTYL: 250 OINTMENT TOPICAL at 09:44

## 2020-09-02 RX ADMIN — INSULIN ASPART 14 UNITS: 100 INJECTION, SOLUTION INTRAVENOUS; SUBCUTANEOUS at 11:51

## 2020-09-02 RX ADMIN — CHLORDIAZEPOXIDE HYDROCHLORIDE 25 MG: 25 CAPSULE ORAL at 14:04

## 2020-09-02 RX ADMIN — GUAIFENESIN 1200 MG: 600 TABLET, EXTENDED RELEASE ORAL at 20:10

## 2020-09-02 RX ADMIN — CHLORDIAZEPOXIDE HYDROCHLORIDE 25 MG: 25 CAPSULE ORAL at 21:33

## 2020-09-02 RX ADMIN — HEPARIN SODIUM 5000 UNITS: 5000 INJECTION INTRAVENOUS; SUBCUTANEOUS at 14:04

## 2020-09-02 RX ADMIN — MEMANTINE HYDROCHLORIDE 5 MG: 5 TABLET, FILM COATED ORAL at 08:07

## 2020-09-02 RX ADMIN — SODIUM CHLORIDE, PRESERVATIVE FREE 10 ML: 5 INJECTION INTRAVENOUS at 08:07

## 2020-09-02 RX ADMIN — SODIUM CHLORIDE, PRESERVATIVE FREE 10 ML: 5 INJECTION INTRAVENOUS at 08:13

## 2020-09-02 RX ADMIN — TAMSULOSIN HYDROCHLORIDE 0.4 MG: 0.4 CAPSULE ORAL at 08:06

## 2020-09-02 RX ADMIN — COLLAGENASE SANTYL: 250 OINTMENT TOPICAL at 20:10

## 2020-09-02 RX ADMIN — OLANZAPINE 2.5 MG: 2.5 TABLET ORAL at 20:10

## 2020-09-02 RX ADMIN — ASPIRIN 81 MG: 81 TABLET, COATED ORAL at 08:06

## 2020-09-02 RX ADMIN — LORAZEPAM 1 MG: 2 INJECTION INTRAMUSCULAR; INTRAVENOUS at 19:36

## 2020-09-02 RX ADMIN — LORAZEPAM 1 MG: 2 INJECTION INTRAMUSCULAR; INTRAVENOUS at 16:08

## 2020-09-02 RX ADMIN — SILVER SULFADIAZINE: 10 CREAM TOPICAL at 20:10

## 2020-09-02 RX ADMIN — FAMOTIDINE 20 MG: 20 TABLET, FILM COATED ORAL at 08:06

## 2020-09-02 RX ADMIN — INSULIN ASPART 12 UNITS: 100 INJECTION, SOLUTION INTRAVENOUS; SUBCUTANEOUS at 08:12

## 2020-09-02 RX ADMIN — METHYLPREDNISOLONE SODIUM SUCCINATE 20 MG: 40 INJECTION, POWDER, FOR SOLUTION INTRAMUSCULAR; INTRAVENOUS at 00:05

## 2020-09-02 RX ADMIN — INSULIN DETEMIR 15 UNITS: 100 INJECTION, SOLUTION SUBCUTANEOUS at 09:44

## 2020-09-02 RX ADMIN — METRONIDAZOLE 500 MG: 500 INJECTION, SOLUTION INTRAVENOUS at 05:18

## 2020-09-02 RX ADMIN — Medication 1 CAPSULE: at 08:06

## 2020-09-02 RX ADMIN — MEMANTINE HYDROCHLORIDE 5 MG: 5 TABLET, FILM COATED ORAL at 20:10

## 2020-09-02 RX ADMIN — HEPARIN SODIUM 5000 UNITS: 5000 INJECTION INTRAVENOUS; SUBCUTANEOUS at 21:33

## 2020-09-02 RX ADMIN — MAGNESIUM SULFATE IN WATER 2 G: 40 INJECTION, SOLUTION INTRAVENOUS at 11:51

## 2020-09-02 RX ADMIN — FAMOTIDINE 20 MG: 20 TABLET, FILM COATED ORAL at 17:35

## 2020-09-02 RX ADMIN — LEVOTHYROXINE SODIUM 75 MCG: 75 TABLET ORAL at 08:06

## 2020-09-02 RX ADMIN — METRONIDAZOLE 500 MG: 500 INJECTION, SOLUTION INTRAVENOUS at 14:03

## 2020-09-02 RX ADMIN — METHYLPREDNISOLONE SODIUM SUCCINATE 20 MG: 40 INJECTION, POWDER, FOR SOLUTION INTRAMUSCULAR; INTRAVENOUS at 23:30

## 2020-09-02 RX ADMIN — Medication 100 MG: at 08:06

## 2020-09-02 RX ADMIN — METRONIDAZOLE 500 MG: 500 INJECTION, SOLUTION INTRAVENOUS at 21:33

## 2020-09-02 RX ADMIN — CHLORDIAZEPOXIDE HYDROCHLORIDE 25 MG: 25 CAPSULE ORAL at 05:18

## 2020-09-02 RX ADMIN — METOPROLOL SUCCINATE 25 MG: 25 TABLET, EXTENDED RELEASE ORAL at 08:06

## 2020-09-02 RX ADMIN — CEFTRIAXONE 2 G: 2 INJECTION, POWDER, FOR SOLUTION INTRAMUSCULAR; INTRAVENOUS at 21:33

## 2020-09-02 RX ADMIN — SILVER SULFADIAZINE: 10 CREAM TOPICAL at 08:13

## 2020-09-02 RX ADMIN — GUAIFENESIN 1200 MG: 600 TABLET, EXTENDED RELEASE ORAL at 08:06

## 2020-09-02 RX ADMIN — LAMOTRIGINE 12.5 MG: 25 TABLET ORAL at 08:12

## 2020-09-03 ENCOUNTER — APPOINTMENT (OUTPATIENT)
Dept: GENERAL RADIOLOGY | Facility: HOSPITAL | Age: 75
End: 2020-09-03

## 2020-09-03 LAB
ALBUMIN SERPL-MCNC: 2.77 G/DL (ref 3.5–5.2)
ALBUMIN/GLOB SERPL: 0.8 G/DL
ALP SERPL-CCNC: 81 U/L (ref 39–117)
ALT SERPL W P-5'-P-CCNC: 54 U/L (ref 1–41)
ANION GAP SERPL CALCULATED.3IONS-SCNC: 10.3 MMOL/L (ref 5–15)
AST SERPL-CCNC: 22 U/L (ref 1–40)
BASOPHILS # BLD AUTO: 0.01 10*3/MM3 (ref 0–0.2)
BASOPHILS NFR BLD AUTO: 0.1 % (ref 0–1.5)
BILIRUB SERPL-MCNC: <0.2 MG/DL (ref 0–1.2)
BUN SERPL-MCNC: 35 MG/DL (ref 8–23)
BUN/CREAT SERPL: 32.7 (ref 7–25)
CALCIUM SPEC-SCNC: 9.3 MG/DL (ref 8.6–10.5)
CHLORIDE SERPL-SCNC: 102 MMOL/L (ref 98–107)
CK SERPL-CCNC: 332 U/L (ref 20–200)
CO2 SERPL-SCNC: 27.7 MMOL/L (ref 22–29)
CREAT SERPL-MCNC: 1.07 MG/DL (ref 0.76–1.27)
CRP SERPL-MCNC: 1.18 MG/DL (ref 0–0.5)
DEPRECATED RDW RBC AUTO: 41.1 FL (ref 37–54)
EOSINOPHIL # BLD AUTO: 0.03 10*3/MM3 (ref 0–0.4)
EOSINOPHIL NFR BLD AUTO: 0.3 % (ref 0.3–6.2)
ERYTHROCYTE [DISTWIDTH] IN BLOOD BY AUTOMATED COUNT: 13.2 % (ref 12.3–15.4)
GFR SERPL CREATININE-BSD FRML MDRD: 68 ML/MIN/1.73
GLOBULIN UR ELPH-MCNC: 3.3 GM/DL
GLUCOSE BLDC GLUCOMTR-MCNC: 103 MG/DL (ref 70–130)
GLUCOSE BLDC GLUCOMTR-MCNC: 104 MG/DL (ref 70–130)
GLUCOSE BLDC GLUCOMTR-MCNC: 116 MG/DL (ref 70–130)
GLUCOSE BLDC GLUCOMTR-MCNC: 118 MG/DL (ref 70–130)
GLUCOSE BLDC GLUCOMTR-MCNC: 146 MG/DL (ref 70–130)
GLUCOSE BLDC GLUCOMTR-MCNC: 155 MG/DL (ref 70–130)
GLUCOSE BLDC GLUCOMTR-MCNC: 161 MG/DL (ref 70–130)
GLUCOSE BLDC GLUCOMTR-MCNC: 164 MG/DL (ref 70–130)
GLUCOSE BLDC GLUCOMTR-MCNC: 191 MG/DL (ref 70–130)
GLUCOSE BLDC GLUCOMTR-MCNC: 209 MG/DL (ref 70–130)
GLUCOSE BLDC GLUCOMTR-MCNC: 215 MG/DL (ref 70–130)
GLUCOSE BLDC GLUCOMTR-MCNC: 236 MG/DL (ref 70–130)
GLUCOSE BLDC GLUCOMTR-MCNC: 251 MG/DL (ref 70–130)
GLUCOSE BLDC GLUCOMTR-MCNC: 271 MG/DL (ref 70–130)
GLUCOSE BLDC GLUCOMTR-MCNC: 310 MG/DL (ref 70–130)
GLUCOSE BLDC GLUCOMTR-MCNC: 60 MG/DL (ref 70–130)
GLUCOSE BLDC GLUCOMTR-MCNC: 96 MG/DL (ref 70–130)
GLUCOSE SERPL-MCNC: 112 MG/DL (ref 65–99)
HCT VFR BLD AUTO: 42.5 % (ref 37.5–51)
HGB BLD-MCNC: 12.8 G/DL (ref 13–17.7)
IMM GRANULOCYTES # BLD AUTO: 0.08 10*3/MM3 (ref 0–0.05)
IMM GRANULOCYTES NFR BLD AUTO: 0.8 % (ref 0–0.5)
LYMPHOCYTES # BLD AUTO: 1.16 10*3/MM3 (ref 0.7–3.1)
LYMPHOCYTES NFR BLD AUTO: 12 % (ref 19.6–45.3)
MAGNESIUM SERPL-MCNC: 2.1 MG/DL (ref 1.6–2.4)
MCH RBC QN AUTO: 26 PG (ref 26.6–33)
MCHC RBC AUTO-ENTMCNC: 30.1 G/DL (ref 31.5–35.7)
MCV RBC AUTO: 86.4 FL (ref 79–97)
MONOCYTES # BLD AUTO: 0.72 10*3/MM3 (ref 0.1–0.9)
MONOCYTES NFR BLD AUTO: 7.4 % (ref 5–12)
NEUTROPHILS NFR BLD AUTO: 7.67 10*3/MM3 (ref 1.7–7)
NEUTROPHILS NFR BLD AUTO: 79.4 % (ref 42.7–76)
NRBC BLD AUTO-RTO: 0 /100 WBC (ref 0–0.2)
PHOSPHATE SERPL-MCNC: 4.6 MG/DL (ref 2.5–4.5)
PLATELET # BLD AUTO: 341 10*3/MM3 (ref 140–450)
PMV BLD AUTO: 9 FL (ref 6–12)
POTASSIUM SERPL-SCNC: 4.3 MMOL/L (ref 3.5–5.2)
PROT SERPL-MCNC: 6.1 G/DL (ref 6–8.5)
RBC # BLD AUTO: 4.92 10*6/MM3 (ref 4.14–5.8)
SODIUM SERPL-SCNC: 140 MMOL/L (ref 136–145)
WBC # BLD AUTO: 9.67 10*3/MM3 (ref 3.4–10.8)

## 2020-09-03 PROCEDURE — 25010000002 HEPARIN (PORCINE) PER 1000 UNITS: Performed by: NURSE PRACTITIONER

## 2020-09-03 PROCEDURE — 82962 GLUCOSE BLOOD TEST: CPT

## 2020-09-03 PROCEDURE — 94799 UNLISTED PULMONARY SVC/PX: CPT

## 2020-09-03 PROCEDURE — 63710000001 INSULIN ASPART PER 5 UNITS: Performed by: HOSPITALIST

## 2020-09-03 PROCEDURE — 82550 ASSAY OF CK (CPK): CPT | Performed by: INTERNAL MEDICINE

## 2020-09-03 PROCEDURE — 80053 COMPREHEN METABOLIC PANEL: CPT | Performed by: INTERNAL MEDICINE

## 2020-09-03 PROCEDURE — 25010000002 LORAZEPAM PER 2 MG: Performed by: INTERNAL MEDICINE

## 2020-09-03 PROCEDURE — 86140 C-REACTIVE PROTEIN: CPT | Performed by: INTERNAL MEDICINE

## 2020-09-03 PROCEDURE — 97530 THERAPEUTIC ACTIVITIES: CPT

## 2020-09-03 PROCEDURE — 99233 SBSQ HOSP IP/OBS HIGH 50: CPT | Performed by: INTERNAL MEDICINE

## 2020-09-03 PROCEDURE — 83735 ASSAY OF MAGNESIUM: CPT | Performed by: INTERNAL MEDICINE

## 2020-09-03 PROCEDURE — 84100 ASSAY OF PHOSPHORUS: CPT | Performed by: INTERNAL MEDICINE

## 2020-09-03 PROCEDURE — 99231 SBSQ HOSP IP/OBS SF/LOW 25: CPT | Performed by: SURGERY

## 2020-09-03 PROCEDURE — 71045 X-RAY EXAM CHEST 1 VIEW: CPT | Performed by: RADIOLOGY

## 2020-09-03 PROCEDURE — 25010000002 METHYLPREDNISOLONE PER 40 MG: Performed by: INTERNAL MEDICINE

## 2020-09-03 PROCEDURE — 97110 THERAPEUTIC EXERCISES: CPT

## 2020-09-03 PROCEDURE — 71045 X-RAY EXAM CHEST 1 VIEW: CPT

## 2020-09-03 PROCEDURE — 25010000002 CEFTRIAXONE PER 250 MG: Performed by: INTERNAL MEDICINE

## 2020-09-03 PROCEDURE — 63710000001 INSULIN ASPART PER 5 UNITS: Performed by: INTERNAL MEDICINE

## 2020-09-03 PROCEDURE — 85025 COMPLETE CBC W/AUTO DIFF WBC: CPT | Performed by: INTERNAL MEDICINE

## 2020-09-03 RX ORDER — BUMETANIDE 0.25 MG/ML
0.5 INJECTION INTRAMUSCULAR; INTRAVENOUS ONCE
Status: COMPLETED | OUTPATIENT
Start: 2020-09-03 | End: 2020-09-03

## 2020-09-03 RX ORDER — HYDROCODONE BITARTRATE AND ACETAMINOPHEN 5; 325 MG/1; MG/1
1 TABLET ORAL EVERY 6 HOURS PRN
Status: DISPENSED | OUTPATIENT
Start: 2020-09-03 | End: 2020-09-10

## 2020-09-03 RX ADMIN — METRONIDAZOLE 500 MG: 500 INJECTION, SOLUTION INTRAVENOUS at 21:03

## 2020-09-03 RX ADMIN — SODIUM CHLORIDE, PRESERVATIVE FREE 10 ML: 5 INJECTION INTRAVENOUS at 21:02

## 2020-09-03 RX ADMIN — LORAZEPAM 2 MG: 2 INJECTION INTRAMUSCULAR; INTRAVENOUS at 23:32

## 2020-09-03 RX ADMIN — DEXTROSE MONOHYDRATE 25 ML: 25 INJECTION, SOLUTION INTRAVENOUS at 13:27

## 2020-09-03 RX ADMIN — ASPIRIN 81 MG: 81 TABLET, COATED ORAL at 08:01

## 2020-09-03 RX ADMIN — METRONIDAZOLE 500 MG: 500 INJECTION, SOLUTION INTRAVENOUS at 14:04

## 2020-09-03 RX ADMIN — DOXYCYCLINE 100 MG: 100 INJECTION, POWDER, LYOPHILIZED, FOR SOLUTION INTRAVENOUS at 02:53

## 2020-09-03 RX ADMIN — MEMANTINE HYDROCHLORIDE 5 MG: 5 TABLET, FILM COATED ORAL at 08:01

## 2020-09-03 RX ADMIN — IPRATROPIUM BROMIDE AND ALBUTEROL SULFATE 3 ML: .5; 3 SOLUTION RESPIRATORY (INHALATION) at 13:44

## 2020-09-03 RX ADMIN — CHLORDIAZEPOXIDE HYDROCHLORIDE 25 MG: 25 CAPSULE ORAL at 21:03

## 2020-09-03 RX ADMIN — TAMSULOSIN HYDROCHLORIDE 0.4 MG: 0.4 CAPSULE ORAL at 08:01

## 2020-09-03 RX ADMIN — BUDESONIDE 0.5 MG: 0.5 INHALANT RESPIRATORY (INHALATION) at 18:15

## 2020-09-03 RX ADMIN — METHYLPREDNISOLONE SODIUM SUCCINATE 20 MG: 40 INJECTION, POWDER, FOR SOLUTION INTRAMUSCULAR; INTRAVENOUS at 12:41

## 2020-09-03 RX ADMIN — CHLORDIAZEPOXIDE HYDROCHLORIDE 25 MG: 25 CAPSULE ORAL at 14:04

## 2020-09-03 RX ADMIN — FAMOTIDINE 20 MG: 20 TABLET, FILM COATED ORAL at 05:19

## 2020-09-03 RX ADMIN — INSULIN ASPART 5 UNITS: 100 INJECTION, SOLUTION INTRAVENOUS; SUBCUTANEOUS at 21:53

## 2020-09-03 RX ADMIN — BUMETANIDE 0.5 MG: 0.25 INJECTION, SOLUTION INTRAMUSCULAR; INTRAVENOUS at 11:35

## 2020-09-03 RX ADMIN — COLLAGENASE SANTYL: 250 OINTMENT TOPICAL at 08:01

## 2020-09-03 RX ADMIN — OLANZAPINE 2.5 MG: 2.5 TABLET ORAL at 21:02

## 2020-09-03 RX ADMIN — CHLORDIAZEPOXIDE HYDROCHLORIDE 25 MG: 25 CAPSULE ORAL at 05:20

## 2020-09-03 RX ADMIN — DOXYCYCLINE 100 MG: 100 INJECTION, POWDER, LYOPHILIZED, FOR SOLUTION INTRAVENOUS at 14:04

## 2020-09-03 RX ADMIN — MEMANTINE HYDROCHLORIDE 5 MG: 5 TABLET, FILM COATED ORAL at 21:03

## 2020-09-03 RX ADMIN — COLLAGENASE SANTYL: 250 OINTMENT TOPICAL at 21:02

## 2020-09-03 RX ADMIN — SILVER SULFADIAZINE: 10 CREAM TOPICAL at 21:02

## 2020-09-03 RX ADMIN — SODIUM CHLORIDE, PRESERVATIVE FREE 10 ML: 5 INJECTION INTRAVENOUS at 08:01

## 2020-09-03 RX ADMIN — SILVER SULFADIAZINE: 10 CREAM TOPICAL at 08:01

## 2020-09-03 RX ADMIN — METOPROLOL SUCCINATE 25 MG: 25 TABLET, EXTENDED RELEASE ORAL at 08:01

## 2020-09-03 RX ADMIN — Medication 1 CAPSULE: at 08:01

## 2020-09-03 RX ADMIN — LORAZEPAM 1 MG: 2 INJECTION INTRAMUSCULAR; INTRAVENOUS at 21:08

## 2020-09-03 RX ADMIN — CEFTRIAXONE 2 G: 2 INJECTION, POWDER, FOR SOLUTION INTRAMUSCULAR; INTRAVENOUS at 21:03

## 2020-09-03 RX ADMIN — HEPARIN SODIUM 5000 UNITS: 5000 INJECTION INTRAVENOUS; SUBCUTANEOUS at 21:02

## 2020-09-03 RX ADMIN — GUAIFENESIN 1200 MG: 600 TABLET, EXTENDED RELEASE ORAL at 21:02

## 2020-09-03 RX ADMIN — METRONIDAZOLE 500 MG: 500 INJECTION, SOLUTION INTRAVENOUS at 05:15

## 2020-09-03 RX ADMIN — HEPARIN SODIUM 5000 UNITS: 5000 INJECTION INTRAVENOUS; SUBCUTANEOUS at 05:20

## 2020-09-03 RX ADMIN — SODIUM CHLORIDE, PRESERVATIVE FREE 10 ML: 5 INJECTION INTRAVENOUS at 21:04

## 2020-09-03 RX ADMIN — Medication 100 MG: at 08:01

## 2020-09-03 RX ADMIN — IPRATROPIUM BROMIDE AND ALBUTEROL SULFATE 3 ML: .5; 3 SOLUTION RESPIRATORY (INHALATION) at 18:15

## 2020-09-03 RX ADMIN — LEVOTHYROXINE SODIUM 75 MCG: 75 TABLET ORAL at 08:01

## 2020-09-03 RX ADMIN — INSULIN ASPART 2 UNITS: 100 INJECTION, SOLUTION INTRAVENOUS; SUBCUTANEOUS at 17:20

## 2020-09-03 RX ADMIN — HEPARIN SODIUM 5000 UNITS: 5000 INJECTION INTRAVENOUS; SUBCUTANEOUS at 14:04

## 2020-09-03 RX ADMIN — METHYLPREDNISOLONE SODIUM SUCCINATE 20 MG: 40 INJECTION, POWDER, FOR SOLUTION INTRAMUSCULAR; INTRAVENOUS at 23:32

## 2020-09-03 RX ADMIN — SODIUM CHLORIDE, PRESERVATIVE FREE 10 ML: 5 INJECTION INTRAVENOUS at 08:05

## 2020-09-03 RX ADMIN — GUAIFENESIN 1200 MG: 600 TABLET, EXTENDED RELEASE ORAL at 08:01

## 2020-09-04 LAB
ALBUMIN SERPL-MCNC: 2.84 G/DL (ref 3.5–5.2)
ALBUMIN/GLOB SERPL: 1 G/DL
ALP SERPL-CCNC: 78 U/L (ref 39–117)
ALT SERPL W P-5'-P-CCNC: 44 U/L (ref 1–41)
ANION GAP SERPL CALCULATED.3IONS-SCNC: 12 MMOL/L (ref 5–15)
ANION GAP SERPL CALCULATED.3IONS-SCNC: 14.4 MMOL/L (ref 5–15)
AST SERPL-CCNC: 18 U/L (ref 1–40)
BASOPHILS # BLD AUTO: 0.02 10*3/MM3 (ref 0–0.2)
BASOPHILS NFR BLD AUTO: 0.2 % (ref 0–1.5)
BILIRUB SERPL-MCNC: <0.2 MG/DL (ref 0–1.2)
BUN SERPL-MCNC: 37 MG/DL (ref 8–23)
BUN SERPL-MCNC: 37 MG/DL (ref 8–23)
BUN/CREAT SERPL: 26.4 (ref 7–25)
BUN/CREAT SERPL: 27.8 (ref 7–25)
CALCIUM SPEC-SCNC: 8.9 MG/DL (ref 8.6–10.5)
CALCIUM SPEC-SCNC: 9 MG/DL (ref 8.6–10.5)
CHLORIDE SERPL-SCNC: 98 MMOL/L (ref 98–107)
CHLORIDE SERPL-SCNC: 99 MMOL/L (ref 98–107)
CO2 SERPL-SCNC: 25 MMOL/L (ref 22–29)
CO2 SERPL-SCNC: 26.6 MMOL/L (ref 22–29)
CREAT SERPL-MCNC: 1.33 MG/DL (ref 0.76–1.27)
CREAT SERPL-MCNC: 1.4 MG/DL (ref 0.76–1.27)
CRP SERPL-MCNC: 0.82 MG/DL (ref 0–0.5)
DEPRECATED RDW RBC AUTO: 43 FL (ref 37–54)
EOSINOPHIL # BLD AUTO: 0.21 10*3/MM3 (ref 0–0.4)
EOSINOPHIL NFR BLD AUTO: 2.1 % (ref 0.3–6.2)
ERYTHROCYTE [DISTWIDTH] IN BLOOD BY AUTOMATED COUNT: 13.3 % (ref 12.3–15.4)
GFR SERPL CREATININE-BSD FRML MDRD: 50 ML/MIN/1.73
GFR SERPL CREATININE-BSD FRML MDRD: 53 ML/MIN/1.73
GLOBULIN UR ELPH-MCNC: 3 GM/DL
GLUCOSE BLDC GLUCOMTR-MCNC: 189 MG/DL (ref 70–130)
GLUCOSE BLDC GLUCOMTR-MCNC: 199 MG/DL (ref 70–130)
GLUCOSE BLDC GLUCOMTR-MCNC: 246 MG/DL (ref 70–130)
GLUCOSE BLDC GLUCOMTR-MCNC: 311 MG/DL (ref 70–130)
GLUCOSE BLDC GLUCOMTR-MCNC: 397 MG/DL (ref 70–130)
GLUCOSE BLDC GLUCOMTR-MCNC: 415 MG/DL (ref 70–130)
GLUCOSE BLDC GLUCOMTR-MCNC: 436 MG/DL (ref 70–130)
GLUCOSE BLDC GLUCOMTR-MCNC: 461 MG/DL (ref 70–130)
GLUCOSE BLDC GLUCOMTR-MCNC: 463 MG/DL (ref 70–130)
GLUCOSE SERPL-MCNC: 302 MG/DL (ref 65–99)
GLUCOSE SERPL-MCNC: 398 MG/DL (ref 65–99)
HCT VFR BLD AUTO: 42.7 % (ref 37.5–51)
HGB BLD-MCNC: 12.8 G/DL (ref 13–17.7)
IMM GRANULOCYTES # BLD AUTO: 0.09 10*3/MM3 (ref 0–0.05)
IMM GRANULOCYTES NFR BLD AUTO: 0.9 % (ref 0–0.5)
LYMPHOCYTES # BLD AUTO: 1.22 10*3/MM3 (ref 0.7–3.1)
LYMPHOCYTES NFR BLD AUTO: 12.1 % (ref 19.6–45.3)
MAGNESIUM SERPL-MCNC: 1.7 MG/DL (ref 1.6–2.4)
MCH RBC QN AUTO: 26.4 PG (ref 26.6–33)
MCHC RBC AUTO-ENTMCNC: 30 G/DL (ref 31.5–35.7)
MCV RBC AUTO: 88 FL (ref 79–97)
MONOCYTES # BLD AUTO: 0.61 10*3/MM3 (ref 0.1–0.9)
MONOCYTES NFR BLD AUTO: 6 % (ref 5–12)
NEUTROPHILS NFR BLD AUTO: 7.94 10*3/MM3 (ref 1.7–7)
NEUTROPHILS NFR BLD AUTO: 78.7 % (ref 42.7–76)
NRBC BLD AUTO-RTO: 0 /100 WBC (ref 0–0.2)
NT-PROBNP SERPL-MCNC: 90.2 PG/ML (ref 0–900)
PHOSPHATE SERPL-MCNC: 4.4 MG/DL (ref 2.5–4.5)
PLATELET # BLD AUTO: 318 10*3/MM3 (ref 140–450)
PMV BLD AUTO: 8.9 FL (ref 6–12)
POTASSIUM SERPL-SCNC: 4.3 MMOL/L (ref 3.5–5.2)
POTASSIUM SERPL-SCNC: 4.5 MMOL/L (ref 3.5–5.2)
PROT SERPL-MCNC: 5.8 G/DL (ref 6–8.5)
RBC # BLD AUTO: 4.85 10*6/MM3 (ref 4.14–5.8)
SODIUM SERPL-SCNC: 136 MMOL/L (ref 136–145)
SODIUM SERPL-SCNC: 139 MMOL/L (ref 136–145)
WBC # BLD AUTO: 10.09 10*3/MM3 (ref 3.4–10.8)

## 2020-09-04 PROCEDURE — 63710000001 INSULIN DETEMIR PER 5 UNITS: Performed by: HOSPITALIST

## 2020-09-04 PROCEDURE — 85025 COMPLETE CBC W/AUTO DIFF WBC: CPT | Performed by: INTERNAL MEDICINE

## 2020-09-04 PROCEDURE — 25010000002 LORAZEPAM PER 2 MG: Performed by: INTERNAL MEDICINE

## 2020-09-04 PROCEDURE — 94799 UNLISTED PULMONARY SVC/PX: CPT

## 2020-09-04 PROCEDURE — 63710000001 INSULIN ASPART PER 5 UNITS: Performed by: HOSPITALIST

## 2020-09-04 PROCEDURE — 86140 C-REACTIVE PROTEIN: CPT | Performed by: INTERNAL MEDICINE

## 2020-09-04 PROCEDURE — 25010000002 HEPARIN (PORCINE) PER 1000 UNITS: Performed by: NURSE PRACTITIONER

## 2020-09-04 PROCEDURE — 63710000001 INSULIN DETEMIR PER 5 UNITS: Performed by: INTERNAL MEDICINE

## 2020-09-04 PROCEDURE — 25010000002 MAGNESIUM SULFATE 2 GM/50ML SOLUTION: Performed by: INTERNAL MEDICINE

## 2020-09-04 PROCEDURE — 25010000002 METHYLPREDNISOLONE PER 40 MG: Performed by: INTERNAL MEDICINE

## 2020-09-04 PROCEDURE — 63710000001 INSULIN ASPART PER 5 UNITS: Performed by: INTERNAL MEDICINE

## 2020-09-04 PROCEDURE — 83880 ASSAY OF NATRIURETIC PEPTIDE: CPT | Performed by: HOSPITALIST

## 2020-09-04 PROCEDURE — 25010000002 CEFTRIAXONE PER 250 MG: Performed by: INTERNAL MEDICINE

## 2020-09-04 PROCEDURE — 84100 ASSAY OF PHOSPHORUS: CPT | Performed by: INTERNAL MEDICINE

## 2020-09-04 PROCEDURE — 83735 ASSAY OF MAGNESIUM: CPT | Performed by: INTERNAL MEDICINE

## 2020-09-04 PROCEDURE — 99233 SBSQ HOSP IP/OBS HIGH 50: CPT | Performed by: INTERNAL MEDICINE

## 2020-09-04 PROCEDURE — 80053 COMPREHEN METABOLIC PANEL: CPT | Performed by: INTERNAL MEDICINE

## 2020-09-04 PROCEDURE — 82962 GLUCOSE BLOOD TEST: CPT

## 2020-09-04 RX ORDER — LORAZEPAM 2 MG/ML
1 INJECTION INTRAMUSCULAR
Status: DISCONTINUED | OUTPATIENT
Start: 2020-09-04 | End: 2020-09-10

## 2020-09-04 RX ORDER — LORAZEPAM 1 MG/1
1 TABLET ORAL
Status: DISCONTINUED | OUTPATIENT
Start: 2020-09-04 | End: 2020-09-10

## 2020-09-04 RX ORDER — MAGNESIUM SULFATE HEPTAHYDRATE 40 MG/ML
2 INJECTION, SOLUTION INTRAVENOUS ONCE
Status: COMPLETED | OUTPATIENT
Start: 2020-09-04 | End: 2020-09-04

## 2020-09-04 RX ORDER — LORAZEPAM 2 MG/1
2 TABLET ORAL
Status: DISCONTINUED | OUTPATIENT
Start: 2020-09-04 | End: 2020-09-10

## 2020-09-04 RX ORDER — LORAZEPAM 2 MG/ML
2 INJECTION INTRAMUSCULAR
Status: DISCONTINUED | OUTPATIENT
Start: 2020-09-04 | End: 2020-09-10

## 2020-09-04 RX ORDER — CHOLECALCIFEROL (VITAMIN D3) 125 MCG
5 CAPSULE ORAL NIGHTLY PRN
Status: DISCONTINUED | OUTPATIENT
Start: 2020-09-04 | End: 2020-09-25 | Stop reason: HOSPADM

## 2020-09-04 RX ORDER — LORAZEPAM 2 MG/ML
0.5 INJECTION INTRAMUSCULAR
Status: DISCONTINUED | OUTPATIENT
Start: 2020-09-04 | End: 2020-09-10

## 2020-09-04 RX ORDER — LORAZEPAM 0.5 MG/1
0.5 TABLET ORAL
Status: DISCONTINUED | OUTPATIENT
Start: 2020-09-04 | End: 2020-09-10

## 2020-09-04 RX ADMIN — LAMOTRIGINE 12.5 MG: 25 TABLET ORAL at 09:46

## 2020-09-04 RX ADMIN — HYDROCODONE BITARTRATE AND ACETAMINOPHEN 1 TABLET: 5; 325 TABLET ORAL at 15:37

## 2020-09-04 RX ADMIN — Medication 1 CAPSULE: at 09:45

## 2020-09-04 RX ADMIN — BUDESONIDE 0.5 MG: 0.5 INHALANT RESPIRATORY (INHALATION) at 19:23

## 2020-09-04 RX ADMIN — BUDESONIDE 0.5 MG: 0.5 INHALANT RESPIRATORY (INHALATION) at 07:00

## 2020-09-04 RX ADMIN — COLLAGENASE SANTYL: 250 OINTMENT TOPICAL at 20:12

## 2020-09-04 RX ADMIN — DOXYCYCLINE 100 MG: 100 INJECTION, POWDER, LYOPHILIZED, FOR SOLUTION INTRAVENOUS at 15:39

## 2020-09-04 RX ADMIN — IPRATROPIUM BROMIDE AND ALBUTEROL SULFATE 3 ML: .5; 3 SOLUTION RESPIRATORY (INHALATION) at 07:00

## 2020-09-04 RX ADMIN — IPRATROPIUM BROMIDE AND ALBUTEROL SULFATE 3 ML: .5; 3 SOLUTION RESPIRATORY (INHALATION) at 19:22

## 2020-09-04 RX ADMIN — SILVER SULFADIAZINE 1 APPLICATION: 10 CREAM TOPICAL at 20:06

## 2020-09-04 RX ADMIN — FAMOTIDINE 20 MG: 20 TABLET, FILM COATED ORAL at 05:00

## 2020-09-04 RX ADMIN — SILVER SULFADIAZINE: 10 CREAM TOPICAL at 08:34

## 2020-09-04 RX ADMIN — CHLORDIAZEPOXIDE HYDROCHLORIDE 25 MG: 25 CAPSULE ORAL at 21:09

## 2020-09-04 RX ADMIN — MEMANTINE HYDROCHLORIDE 5 MG: 5 TABLET, FILM COATED ORAL at 09:44

## 2020-09-04 RX ADMIN — SODIUM CHLORIDE, PRESERVATIVE FREE 10 ML: 5 INJECTION INTRAVENOUS at 08:35

## 2020-09-04 RX ADMIN — MEMANTINE HYDROCHLORIDE 5 MG: 5 TABLET, FILM COATED ORAL at 20:07

## 2020-09-04 RX ADMIN — LORAZEPAM 2 MG: 2 INJECTION INTRAMUSCULAR; INTRAVENOUS at 23:20

## 2020-09-04 RX ADMIN — HEPARIN SODIUM 5000 UNITS: 5000 INJECTION INTRAVENOUS; SUBCUTANEOUS at 05:00

## 2020-09-04 RX ADMIN — OLANZAPINE 2.5 MG: 2.5 TABLET ORAL at 20:07

## 2020-09-04 RX ADMIN — INSULIN ASPART 8 UNITS: 100 INJECTION, SOLUTION INTRAVENOUS; SUBCUTANEOUS at 11:53

## 2020-09-04 RX ADMIN — SODIUM CHLORIDE, PRESERVATIVE FREE 10 ML: 5 INJECTION INTRAVENOUS at 20:13

## 2020-09-04 RX ADMIN — IPRATROPIUM BROMIDE AND ALBUTEROL SULFATE 3 ML: .5; 3 SOLUTION RESPIRATORY (INHALATION) at 00:21

## 2020-09-04 RX ADMIN — HEPARIN SODIUM 5000 UNITS: 5000 INJECTION INTRAVENOUS; SUBCUTANEOUS at 15:33

## 2020-09-04 RX ADMIN — INSULIN ASPART 10 UNITS: 100 INJECTION, SOLUTION INTRAVENOUS; SUBCUTANEOUS at 19:18

## 2020-09-04 RX ADMIN — TAMSULOSIN HYDROCHLORIDE 0.4 MG: 0.4 CAPSULE ORAL at 09:46

## 2020-09-04 RX ADMIN — INSULIN ASPART 24 UNITS: 100 INJECTION, SOLUTION INTRAVENOUS; SUBCUTANEOUS at 16:14

## 2020-09-04 RX ADMIN — METHYLPREDNISOLONE SODIUM SUCCINATE 20 MG: 40 INJECTION, POWDER, FOR SOLUTION INTRAMUSCULAR; INTRAVENOUS at 11:45

## 2020-09-04 RX ADMIN — COLLAGENASE SANTYL: 250 OINTMENT TOPICAL at 09:47

## 2020-09-04 RX ADMIN — FAMOTIDINE 20 MG: 20 TABLET, FILM COATED ORAL at 16:52

## 2020-09-04 RX ADMIN — INSULIN DETEMIR 10 UNITS: 100 INJECTION, SOLUTION SUBCUTANEOUS at 20:07

## 2020-09-04 RX ADMIN — ASPIRIN 81 MG: 81 TABLET, COATED ORAL at 09:44

## 2020-09-04 RX ADMIN — METRONIDAZOLE 500 MG: 500 INJECTION, SOLUTION INTRAVENOUS at 05:00

## 2020-09-04 RX ADMIN — GUAIFENESIN 1200 MG: 600 TABLET, EXTENDED RELEASE ORAL at 20:07

## 2020-09-04 RX ADMIN — INSULIN ASPART 10 UNITS: 100 INJECTION, SOLUTION INTRAVENOUS; SUBCUTANEOUS at 00:33

## 2020-09-04 RX ADMIN — Medication 100 MG: at 09:45

## 2020-09-04 RX ADMIN — CEFTRIAXONE 2 G: 2 INJECTION, POWDER, FOR SOLUTION INTRAMUSCULAR; INTRAVENOUS at 21:09

## 2020-09-04 RX ADMIN — LORAZEPAM 1 MG: 2 INJECTION INTRAMUSCULAR; INTRAVENOUS at 19:52

## 2020-09-04 RX ADMIN — METRONIDAZOLE 500 MG: 500 INJECTION, SOLUTION INTRAVENOUS at 15:38

## 2020-09-04 RX ADMIN — INSULIN DETEMIR 10 UNITS: 100 INJECTION, SOLUTION SUBCUTANEOUS at 00:53

## 2020-09-04 RX ADMIN — LORAZEPAM 2 MG: 2 INJECTION INTRAMUSCULAR; INTRAVENOUS at 02:27

## 2020-09-04 RX ADMIN — Medication 5 MG: at 23:20

## 2020-09-04 RX ADMIN — HYDROCODONE BITARTRATE AND ACETAMINOPHEN 1 TABLET: 5; 325 TABLET ORAL at 21:09

## 2020-09-04 RX ADMIN — METOPROLOL SUCCINATE 25 MG: 25 TABLET, EXTENDED RELEASE ORAL at 09:44

## 2020-09-04 RX ADMIN — METHYLPREDNISOLONE SODIUM SUCCINATE 20 MG: 40 INJECTION, POWDER, FOR SOLUTION INTRAMUSCULAR; INTRAVENOUS at 23:20

## 2020-09-04 RX ADMIN — HEPARIN SODIUM 5000 UNITS: 5000 INJECTION INTRAVENOUS; SUBCUTANEOUS at 21:09

## 2020-09-04 RX ADMIN — MAGNESIUM SULFATE 2 G: 2 INJECTION INTRAVENOUS at 04:49

## 2020-09-04 RX ADMIN — INSULIN DETEMIR 15 UNITS: 100 INJECTION, SOLUTION SUBCUTANEOUS at 08:30

## 2020-09-04 RX ADMIN — CHLORDIAZEPOXIDE HYDROCHLORIDE 25 MG: 25 CAPSULE ORAL at 05:00

## 2020-09-04 RX ADMIN — LEVOTHYROXINE SODIUM 75 MCG: 75 TABLET ORAL at 09:44

## 2020-09-04 RX ADMIN — CHLORDIAZEPOXIDE HYDROCHLORIDE 25 MG: 25 CAPSULE ORAL at 15:32

## 2020-09-04 RX ADMIN — GUAIFENESIN 1200 MG: 600 TABLET, EXTENDED RELEASE ORAL at 09:44

## 2020-09-04 RX ADMIN — SODIUM CHLORIDE, PRESERVATIVE FREE 10 ML: 5 INJECTION INTRAVENOUS at 20:07

## 2020-09-04 RX ADMIN — SODIUM CHLORIDE, PRESERVATIVE FREE 10 ML: 5 INJECTION INTRAVENOUS at 09:45

## 2020-09-04 RX ADMIN — METRONIDAZOLE 500 MG: 500 INJECTION, SOLUTION INTRAVENOUS at 21:11

## 2020-09-04 RX ADMIN — IPRATROPIUM BROMIDE AND ALBUTEROL SULFATE 3 ML: .5; 3 SOLUTION RESPIRATORY (INHALATION) at 13:55

## 2020-09-04 RX ADMIN — INSULIN ASPART 2 UNITS: 100 INJECTION, SOLUTION INTRAVENOUS; SUBCUTANEOUS at 05:15

## 2020-09-04 RX ADMIN — DOXYCYCLINE 100 MG: 100 INJECTION, POWDER, LYOPHILIZED, FOR SOLUTION INTRAVENOUS at 02:32

## 2020-09-04 RX ADMIN — SODIUM CHLORIDE, PRESERVATIVE FREE 10 ML: 5 INJECTION INTRAVENOUS at 20:12

## 2020-09-05 LAB
ALBUMIN SERPL-MCNC: 2.75 G/DL (ref 3.5–5.2)
ALBUMIN/GLOB SERPL: 0.9 G/DL
ALP SERPL-CCNC: 75 U/L (ref 39–117)
ALT SERPL W P-5'-P-CCNC: 37 U/L (ref 1–41)
ANION GAP SERPL CALCULATED.3IONS-SCNC: 10.9 MMOL/L (ref 5–15)
AST SERPL-CCNC: 19 U/L (ref 1–40)
BASOPHILS # BLD AUTO: 0.01 10*3/MM3 (ref 0–0.2)
BASOPHILS NFR BLD AUTO: 0.1 % (ref 0–1.5)
BILIRUB SERPL-MCNC: 0.2 MG/DL (ref 0–1.2)
BUN SERPL-MCNC: 35 MG/DL (ref 8–23)
BUN/CREAT SERPL: 28.7 (ref 7–25)
CALCIUM SPEC-SCNC: 8.8 MG/DL (ref 8.6–10.5)
CHLORIDE SERPL-SCNC: 101 MMOL/L (ref 98–107)
CO2 SERPL-SCNC: 24.1 MMOL/L (ref 22–29)
CREAT SERPL-MCNC: 1.22 MG/DL (ref 0.76–1.27)
CRP SERPL-MCNC: 0.68 MG/DL (ref 0–0.5)
DEPRECATED RDW RBC AUTO: 44.9 FL (ref 37–54)
EOSINOPHIL # BLD AUTO: 0.01 10*3/MM3 (ref 0–0.4)
EOSINOPHIL NFR BLD AUTO: 0.1 % (ref 0.3–6.2)
ERYTHROCYTE [DISTWIDTH] IN BLOOD BY AUTOMATED COUNT: 13.5 % (ref 12.3–15.4)
GFR SERPL CREATININE-BSD FRML MDRD: 58 ML/MIN/1.73
GLOBULIN UR ELPH-MCNC: 3 GM/DL
GLUCOSE BLDC GLUCOMTR-MCNC: 115 MG/DL (ref 70–130)
GLUCOSE BLDC GLUCOMTR-MCNC: 219 MG/DL (ref 70–130)
GLUCOSE BLDC GLUCOMTR-MCNC: 222 MG/DL (ref 70–130)
GLUCOSE BLDC GLUCOMTR-MCNC: 226 MG/DL (ref 70–130)
GLUCOSE BLDC GLUCOMTR-MCNC: 300 MG/DL (ref 70–130)
GLUCOSE BLDC GLUCOMTR-MCNC: 60 MG/DL (ref 70–130)
GLUCOSE BLDC GLUCOMTR-MCNC: 82 MG/DL (ref 70–130)
GLUCOSE BLDC GLUCOMTR-MCNC: 93 MG/DL (ref 70–130)
GLUCOSE SERPL-MCNC: 330 MG/DL (ref 65–99)
HCT VFR BLD AUTO: 42.7 % (ref 37.5–51)
HGB BLD-MCNC: 12.2 G/DL (ref 13–17.7)
HYPOCHROMIA BLD QL: NORMAL
IMM GRANULOCYTES # BLD AUTO: 0.11 10*3/MM3 (ref 0–0.05)
IMM GRANULOCYTES NFR BLD AUTO: 1 % (ref 0–0.5)
LYMPHOCYTES # BLD AUTO: 0.59 10*3/MM3 (ref 0.7–3.1)
LYMPHOCYTES NFR BLD AUTO: 5.5 % (ref 19.6–45.3)
MAGNESIUM SERPL-MCNC: 2.2 MG/DL (ref 1.6–2.4)
MCH RBC QN AUTO: 26.1 PG (ref 26.6–33)
MCHC RBC AUTO-ENTMCNC: 28.6 G/DL (ref 31.5–35.7)
MCV RBC AUTO: 91.4 FL (ref 79–97)
MONOCYTES # BLD AUTO: 0.56 10*3/MM3 (ref 0.1–0.9)
MONOCYTES NFR BLD AUTO: 5.2 % (ref 5–12)
NEUTROPHILS NFR BLD AUTO: 88.1 % (ref 42.7–76)
NEUTROPHILS NFR BLD AUTO: 9.41 10*3/MM3 (ref 1.7–7)
NRBC BLD AUTO-RTO: 0 /100 WBC (ref 0–0.2)
PLAT MORPH BLD: NORMAL
PLATELET # BLD AUTO: 308 10*3/MM3 (ref 140–450)
PMV BLD AUTO: 9.3 FL (ref 6–12)
POTASSIUM SERPL-SCNC: 5 MMOL/L (ref 3.5–5.2)
PROT SERPL-MCNC: 5.7 G/DL (ref 6–8.5)
RBC # BLD AUTO: 4.67 10*6/MM3 (ref 4.14–5.8)
SODIUM SERPL-SCNC: 136 MMOL/L (ref 136–145)
WBC # BLD AUTO: 10.69 10*3/MM3 (ref 3.4–10.8)

## 2020-09-05 PROCEDURE — 85007 BL SMEAR W/DIFF WBC COUNT: CPT | Performed by: INTERNAL MEDICINE

## 2020-09-05 PROCEDURE — 92610 EVALUATE SWALLOWING FUNCTION: CPT | Performed by: SPEECH-LANGUAGE PATHOLOGIST

## 2020-09-05 PROCEDURE — 80053 COMPREHEN METABOLIC PANEL: CPT | Performed by: INTERNAL MEDICINE

## 2020-09-05 PROCEDURE — 83735 ASSAY OF MAGNESIUM: CPT | Performed by: INTERNAL MEDICINE

## 2020-09-05 PROCEDURE — 25010000002 CEFTRIAXONE PER 250 MG: Performed by: INTERNAL MEDICINE

## 2020-09-05 PROCEDURE — 86140 C-REACTIVE PROTEIN: CPT | Performed by: INTERNAL MEDICINE

## 2020-09-05 PROCEDURE — 63710000001 INSULIN DETEMIR PER 5 UNITS: Performed by: INTERNAL MEDICINE

## 2020-09-05 PROCEDURE — 25010000002 METHYLPREDNISOLONE PER 40 MG: Performed by: INTERNAL MEDICINE

## 2020-09-05 PROCEDURE — 25010000002 HEPARIN (PORCINE) PER 1000 UNITS: Performed by: NURSE PRACTITIONER

## 2020-09-05 PROCEDURE — 63710000001 INSULIN DETEMIR PER 5 UNITS: Performed by: HOSPITALIST

## 2020-09-05 PROCEDURE — 63710000001 INSULIN ASPART PER 5 UNITS: Performed by: INTERNAL MEDICINE

## 2020-09-05 PROCEDURE — 63710000001 INSULIN ASPART PER 5 UNITS: Performed by: HOSPITALIST

## 2020-09-05 PROCEDURE — 94799 UNLISTED PULMONARY SVC/PX: CPT

## 2020-09-05 PROCEDURE — 25010000002 LORAZEPAM PER 2 MG: Performed by: HOSPITALIST

## 2020-09-05 PROCEDURE — 99233 SBSQ HOSP IP/OBS HIGH 50: CPT | Performed by: INTERNAL MEDICINE

## 2020-09-05 PROCEDURE — 82962 GLUCOSE BLOOD TEST: CPT

## 2020-09-05 PROCEDURE — 85025 COMPLETE CBC W/AUTO DIFF WBC: CPT | Performed by: INTERNAL MEDICINE

## 2020-09-05 RX ORDER — CHLORDIAZEPOXIDE HYDROCHLORIDE 10 MG/1
10 CAPSULE, GELATIN COATED ORAL EVERY 8 HOURS SCHEDULED
Status: DISCONTINUED | OUTPATIENT
Start: 2020-09-05 | End: 2020-09-10

## 2020-09-05 RX ADMIN — GUAIFENESIN 1200 MG: 600 TABLET, EXTENDED RELEASE ORAL at 20:30

## 2020-09-05 RX ADMIN — FAMOTIDINE 20 MG: 20 TABLET, FILM COATED ORAL at 06:13

## 2020-09-05 RX ADMIN — HYDROCODONE BITARTRATE AND ACETAMINOPHEN 1 TABLET: 5; 325 TABLET ORAL at 20:29

## 2020-09-05 RX ADMIN — LEVOTHYROXINE SODIUM 75 MCG: 75 TABLET ORAL at 08:32

## 2020-09-05 RX ADMIN — HEPARIN SODIUM 5000 UNITS: 5000 INJECTION INTRAVENOUS; SUBCUTANEOUS at 21:00

## 2020-09-05 RX ADMIN — CHLORDIAZEPOXIDE HYDROCHLORIDE 10 MG: 10 CAPSULE ORAL at 15:01

## 2020-09-05 RX ADMIN — MEMANTINE HYDROCHLORIDE 5 MG: 5 TABLET, FILM COATED ORAL at 20:30

## 2020-09-05 RX ADMIN — BUDESONIDE 0.5 MG: 0.5 INHALANT RESPIRATORY (INHALATION) at 07:08

## 2020-09-05 RX ADMIN — TAMSULOSIN HYDROCHLORIDE 0.4 MG: 0.4 CAPSULE ORAL at 08:21

## 2020-09-05 RX ADMIN — SODIUM CHLORIDE, PRESERVATIVE FREE 10 ML: 5 INJECTION INTRAVENOUS at 08:22

## 2020-09-05 RX ADMIN — SODIUM CHLORIDE, PRESERVATIVE FREE 10 ML: 5 INJECTION INTRAVENOUS at 20:32

## 2020-09-05 RX ADMIN — INSULIN ASPART 10 UNITS: 100 INJECTION, SOLUTION INTRAVENOUS; SUBCUTANEOUS at 00:43

## 2020-09-05 RX ADMIN — HYDROCODONE BITARTRATE AND ACETAMINOPHEN 1 TABLET: 5; 325 TABLET ORAL at 04:00

## 2020-09-05 RX ADMIN — Medication 100 MG: at 08:25

## 2020-09-05 RX ADMIN — SODIUM CHLORIDE, PRESERVATIVE FREE 10 ML: 5 INJECTION INTRAVENOUS at 08:23

## 2020-09-05 RX ADMIN — OLANZAPINE 2.5 MG: 2.5 TABLET ORAL at 20:30

## 2020-09-05 RX ADMIN — METRONIDAZOLE 500 MG: 500 INJECTION, SOLUTION INTRAVENOUS at 06:13

## 2020-09-05 RX ADMIN — ASPIRIN 81 MG: 81 TABLET, COATED ORAL at 08:21

## 2020-09-05 RX ADMIN — MEMANTINE HYDROCHLORIDE 5 MG: 5 TABLET, FILM COATED ORAL at 08:21

## 2020-09-05 RX ADMIN — METHYLPREDNISOLONE SODIUM SUCCINATE 20 MG: 40 INJECTION, POWDER, FOR SOLUTION INTRAMUSCULAR; INTRAVENOUS at 11:14

## 2020-09-05 RX ADMIN — INSULIN ASPART 16 UNITS: 100 INJECTION, SOLUTION INTRAVENOUS; SUBCUTANEOUS at 11:13

## 2020-09-05 RX ADMIN — COLLAGENASE SANTYL 1 APPLICATION: 250 OINTMENT TOPICAL at 20:29

## 2020-09-05 RX ADMIN — SILVER SULFADIAZINE: 10 CREAM TOPICAL at 20:31

## 2020-09-05 RX ADMIN — LORAZEPAM 1 MG: 2 INJECTION INTRAMUSCULAR; INTRAVENOUS at 20:30

## 2020-09-05 RX ADMIN — IPRATROPIUM BROMIDE AND ALBUTEROL SULFATE 3 ML: .5; 3 SOLUTION RESPIRATORY (INHALATION) at 13:25

## 2020-09-05 RX ADMIN — FAMOTIDINE 20 MG: 20 TABLET, FILM COATED ORAL at 17:42

## 2020-09-05 RX ADMIN — GUAIFENESIN 1200 MG: 600 TABLET, EXTENDED RELEASE ORAL at 08:23

## 2020-09-05 RX ADMIN — SODIUM CHLORIDE, PRESERVATIVE FREE 10 ML: 5 INJECTION INTRAVENOUS at 08:21

## 2020-09-05 RX ADMIN — METOPROLOL SUCCINATE 25 MG: 25 TABLET, EXTENDED RELEASE ORAL at 08:24

## 2020-09-05 RX ADMIN — INSULIN DETEMIR 18 UNITS: 100 INJECTION, SOLUTION SUBCUTANEOUS at 20:30

## 2020-09-05 RX ADMIN — COLLAGENASE SANTYL 1 APPLICATION: 250 OINTMENT TOPICAL at 08:22

## 2020-09-05 RX ADMIN — HEPARIN SODIUM 5000 UNITS: 5000 INJECTION INTRAVENOUS; SUBCUTANEOUS at 15:02

## 2020-09-05 RX ADMIN — DOXYCYCLINE 100 MG: 100 INJECTION, POWDER, LYOPHILIZED, FOR SOLUTION INTRAVENOUS at 15:02

## 2020-09-05 RX ADMIN — DOXYCYCLINE 100 MG: 100 INJECTION, POWDER, LYOPHILIZED, FOR SOLUTION INTRAVENOUS at 03:52

## 2020-09-05 RX ADMIN — HEPARIN SODIUM 5000 UNITS: 5000 INJECTION INTRAVENOUS; SUBCUTANEOUS at 06:16

## 2020-09-05 RX ADMIN — CEFTRIAXONE 2 G: 2 INJECTION, POWDER, FOR SOLUTION INTRAMUSCULAR; INTRAVENOUS at 21:00

## 2020-09-05 RX ADMIN — METRONIDAZOLE 500 MG: 500 INJECTION, SOLUTION INTRAVENOUS at 15:01

## 2020-09-05 RX ADMIN — INSULIN ASPART 8 UNITS: 100 INJECTION, SOLUTION INTRAVENOUS; SUBCUTANEOUS at 06:24

## 2020-09-05 RX ADMIN — IPRATROPIUM BROMIDE AND ALBUTEROL SULFATE 3 ML: .5; 3 SOLUTION RESPIRATORY (INHALATION) at 07:08

## 2020-09-05 RX ADMIN — CHLORDIAZEPOXIDE HYDROCHLORIDE 25 MG: 25 CAPSULE ORAL at 06:13

## 2020-09-05 RX ADMIN — INSULIN DETEMIR 5 UNITS: 100 INJECTION, SOLUTION SUBCUTANEOUS at 00:43

## 2020-09-05 RX ADMIN — Medication 1 CAPSULE: at 08:23

## 2020-09-05 RX ADMIN — LORAZEPAM 2 MG: 2 INJECTION INTRAMUSCULAR; INTRAVENOUS at 03:55

## 2020-09-05 RX ADMIN — INSULIN DETEMIR 15 UNITS: 100 INJECTION, SOLUTION SUBCUTANEOUS at 06:16

## 2020-09-05 RX ADMIN — SILVER SULFADIAZINE 1 APPLICATION: 10 CREAM TOPICAL at 08:22

## 2020-09-05 RX ADMIN — CHLORDIAZEPOXIDE HYDROCHLORIDE 10 MG: 10 CAPSULE ORAL at 21:00

## 2020-09-05 RX ADMIN — METRONIDAZOLE 500 MG: 500 INJECTION, SOLUTION INTRAVENOUS at 21:00

## 2020-09-06 LAB
ALBUMIN SERPL-MCNC: 2.74 G/DL (ref 3.5–5.2)
ALBUMIN/GLOB SERPL: 1 G/DL
ALP SERPL-CCNC: 74 U/L (ref 39–117)
ALT SERPL W P-5'-P-CCNC: 30 U/L (ref 1–41)
ANION GAP SERPL CALCULATED.3IONS-SCNC: 7.1 MMOL/L (ref 5–15)
AST SERPL-CCNC: 19 U/L (ref 1–40)
BASOPHILS # BLD AUTO: 0.02 10*3/MM3 (ref 0–0.2)
BASOPHILS NFR BLD AUTO: 0.2 % (ref 0–1.5)
BILIRUB SERPL-MCNC: 0.2 MG/DL (ref 0–1.2)
BUN SERPL-MCNC: 28 MG/DL (ref 8–23)
BUN/CREAT SERPL: 25.7 (ref 7–25)
CALCIUM SPEC-SCNC: 9 MG/DL (ref 8.6–10.5)
CHLORIDE SERPL-SCNC: 104 MMOL/L (ref 98–107)
CO2 SERPL-SCNC: 26.9 MMOL/L (ref 22–29)
CREAT SERPL-MCNC: 1.09 MG/DL (ref 0.76–1.27)
CRP SERPL-MCNC: 0.43 MG/DL (ref 0–0.5)
DEPRECATED RDW RBC AUTO: 44.4 FL (ref 37–54)
EOSINOPHIL # BLD AUTO: 0.05 10*3/MM3 (ref 0–0.4)
EOSINOPHIL NFR BLD AUTO: 0.5 % (ref 0.3–6.2)
ERYTHROCYTE [DISTWIDTH] IN BLOOD BY AUTOMATED COUNT: 13.7 % (ref 12.3–15.4)
GFR SERPL CREATININE-BSD FRML MDRD: 66 ML/MIN/1.73
GLOBULIN UR ELPH-MCNC: 2.9 GM/DL
GLUCOSE BLDC GLUCOMTR-MCNC: 104 MG/DL (ref 70–130)
GLUCOSE BLDC GLUCOMTR-MCNC: 126 MG/DL (ref 70–130)
GLUCOSE BLDC GLUCOMTR-MCNC: 154 MG/DL (ref 70–130)
GLUCOSE BLDC GLUCOMTR-MCNC: 170 MG/DL (ref 70–130)
GLUCOSE BLDC GLUCOMTR-MCNC: 201 MG/DL (ref 70–130)
GLUCOSE BLDC GLUCOMTR-MCNC: 280 MG/DL (ref 70–130)
GLUCOSE BLDC GLUCOMTR-MCNC: 333 MG/DL (ref 70–130)
GLUCOSE SERPL-MCNC: 169 MG/DL (ref 65–99)
HCT VFR BLD AUTO: 41.6 % (ref 37.5–51)
HGB BLD-MCNC: 12.2 G/DL (ref 13–17.7)
IMM GRANULOCYTES # BLD AUTO: 0.09 10*3/MM3 (ref 0–0.05)
IMM GRANULOCYTES NFR BLD AUTO: 0.9 % (ref 0–0.5)
LYMPHOCYTES # BLD AUTO: 0.86 10*3/MM3 (ref 0.7–3.1)
LYMPHOCYTES NFR BLD AUTO: 8.6 % (ref 19.6–45.3)
MAGNESIUM SERPL-MCNC: 1.9 MG/DL (ref 1.6–2.4)
MCH RBC QN AUTO: 26.1 PG (ref 26.6–33)
MCHC RBC AUTO-ENTMCNC: 29.3 G/DL (ref 31.5–35.7)
MCV RBC AUTO: 89.1 FL (ref 79–97)
MONOCYTES # BLD AUTO: 0.28 10*3/MM3 (ref 0.1–0.9)
MONOCYTES NFR BLD AUTO: 2.8 % (ref 5–12)
NEUTROPHILS NFR BLD AUTO: 8.68 10*3/MM3 (ref 1.7–7)
NEUTROPHILS NFR BLD AUTO: 87 % (ref 42.7–76)
NRBC BLD AUTO-RTO: 0 /100 WBC (ref 0–0.2)
PHOSPHATE SERPL-MCNC: 3.4 MG/DL (ref 2.5–4.5)
PLATELET # BLD AUTO: 341 10*3/MM3 (ref 140–450)
PMV BLD AUTO: 9.4 FL (ref 6–12)
POTASSIUM SERPL-SCNC: 5 MMOL/L (ref 3.5–5.2)
PROT SERPL-MCNC: 5.6 G/DL (ref 6–8.5)
RBC # BLD AUTO: 4.67 10*6/MM3 (ref 4.14–5.8)
SODIUM SERPL-SCNC: 138 MMOL/L (ref 136–145)
WBC # BLD AUTO: 9.98 10*3/MM3 (ref 3.4–10.8)

## 2020-09-06 PROCEDURE — 99232 SBSQ HOSP IP/OBS MODERATE 35: CPT | Performed by: INTERNAL MEDICINE

## 2020-09-06 PROCEDURE — 82962 GLUCOSE BLOOD TEST: CPT

## 2020-09-06 PROCEDURE — 63710000001 INSULIN DETEMIR PER 5 UNITS: Performed by: INTERNAL MEDICINE

## 2020-09-06 PROCEDURE — 25010000002 MAGNESIUM SULFATE 2 GM/50ML SOLUTION: Performed by: INTERNAL MEDICINE

## 2020-09-06 PROCEDURE — 25010000002 HEPARIN (PORCINE) PER 1000 UNITS: Performed by: NURSE PRACTITIONER

## 2020-09-06 PROCEDURE — 94799 UNLISTED PULMONARY SVC/PX: CPT

## 2020-09-06 PROCEDURE — 83735 ASSAY OF MAGNESIUM: CPT | Performed by: INTERNAL MEDICINE

## 2020-09-06 PROCEDURE — 25010000002 LORAZEPAM PER 2 MG: Performed by: HOSPITALIST

## 2020-09-06 PROCEDURE — 63710000001 INSULIN ASPART PER 5 UNITS: Performed by: INTERNAL MEDICINE

## 2020-09-06 PROCEDURE — 86140 C-REACTIVE PROTEIN: CPT | Performed by: INTERNAL MEDICINE

## 2020-09-06 PROCEDURE — 85025 COMPLETE CBC W/AUTO DIFF WBC: CPT | Performed by: INTERNAL MEDICINE

## 2020-09-06 PROCEDURE — 25010000002 METHYLPREDNISOLONE PER 40 MG: Performed by: INTERNAL MEDICINE

## 2020-09-06 PROCEDURE — 80053 COMPREHEN METABOLIC PANEL: CPT | Performed by: INTERNAL MEDICINE

## 2020-09-06 PROCEDURE — 84100 ASSAY OF PHOSPHORUS: CPT | Performed by: INTERNAL MEDICINE

## 2020-09-06 RX ORDER — MAGNESIUM SULFATE HEPTAHYDRATE 40 MG/ML
2 INJECTION, SOLUTION INTRAVENOUS ONCE
Status: COMPLETED | OUTPATIENT
Start: 2020-09-06 | End: 2020-09-06

## 2020-09-06 RX ADMIN — METHYLPREDNISOLONE SODIUM SUCCINATE 20 MG: 40 INJECTION, POWDER, FOR SOLUTION INTRAMUSCULAR; INTRAVENOUS at 11:25

## 2020-09-06 RX ADMIN — Medication 1 CAPSULE: at 08:56

## 2020-09-06 RX ADMIN — TAMSULOSIN HYDROCHLORIDE 0.4 MG: 0.4 CAPSULE ORAL at 08:56

## 2020-09-06 RX ADMIN — MEMANTINE HYDROCHLORIDE 5 MG: 5 TABLET, FILM COATED ORAL at 08:56

## 2020-09-06 RX ADMIN — GUAIFENESIN 1200 MG: 600 TABLET, EXTENDED RELEASE ORAL at 20:37

## 2020-09-06 RX ADMIN — SODIUM CHLORIDE, PRESERVATIVE FREE 10 ML: 5 INJECTION INTRAVENOUS at 08:57

## 2020-09-06 RX ADMIN — COLLAGENASE SANTYL: 250 OINTMENT TOPICAL at 20:38

## 2020-09-06 RX ADMIN — DOXYCYCLINE 100 MG: 100 INJECTION, POWDER, LYOPHILIZED, FOR SOLUTION INTRAVENOUS at 03:19

## 2020-09-06 RX ADMIN — METHYLPREDNISOLONE SODIUM SUCCINATE 20 MG: 40 INJECTION, POWDER, FOR SOLUTION INTRAMUSCULAR; INTRAVENOUS at 00:07

## 2020-09-06 RX ADMIN — INSULIN ASPART 4 UNITS: 100 INJECTION, SOLUTION INTRAVENOUS; SUBCUTANEOUS at 06:01

## 2020-09-06 RX ADMIN — INSULIN DETEMIR 18 UNITS: 100 INJECTION, SOLUTION SUBCUTANEOUS at 20:43

## 2020-09-06 RX ADMIN — ASPIRIN 81 MG: 81 TABLET, COATED ORAL at 08:56

## 2020-09-06 RX ADMIN — METOPROLOL SUCCINATE 25 MG: 25 TABLET, EXTENDED RELEASE ORAL at 08:57

## 2020-09-06 RX ADMIN — Medication 5 MG: at 00:06

## 2020-09-06 RX ADMIN — CHLORDIAZEPOXIDE HYDROCHLORIDE 10 MG: 10 CAPSULE ORAL at 13:36

## 2020-09-06 RX ADMIN — MEMANTINE HYDROCHLORIDE 5 MG: 5 TABLET, FILM COATED ORAL at 20:37

## 2020-09-06 RX ADMIN — GUAIFENESIN 1200 MG: 600 TABLET, EXTENDED RELEASE ORAL at 08:57

## 2020-09-06 RX ADMIN — LEVOTHYROXINE SODIUM 75 MCG: 75 TABLET ORAL at 08:56

## 2020-09-06 RX ADMIN — HYDROCODONE BITARTRATE AND ACETAMINOPHEN 1 TABLET: 5; 325 TABLET ORAL at 21:12

## 2020-09-06 RX ADMIN — COLLAGENASE SANTYL: 250 OINTMENT TOPICAL at 08:55

## 2020-09-06 RX ADMIN — CHLORDIAZEPOXIDE HYDROCHLORIDE 10 MG: 10 CAPSULE ORAL at 05:33

## 2020-09-06 RX ADMIN — SODIUM CHLORIDE, PRESERVATIVE FREE 10 ML: 5 INJECTION INTRAVENOUS at 08:56

## 2020-09-06 RX ADMIN — INSULIN DETEMIR 18 UNITS: 100 INJECTION, SOLUTION SUBCUTANEOUS at 05:33

## 2020-09-06 RX ADMIN — METRONIDAZOLE 500 MG: 500 INJECTION, SOLUTION INTRAVENOUS at 05:33

## 2020-09-06 RX ADMIN — SODIUM CHLORIDE, PRESERVATIVE FREE 10 ML: 5 INJECTION INTRAVENOUS at 20:39

## 2020-09-06 RX ADMIN — HEPARIN SODIUM 5000 UNITS: 5000 INJECTION INTRAVENOUS; SUBCUTANEOUS at 13:36

## 2020-09-06 RX ADMIN — INSULIN ASPART 12 UNITS: 100 INJECTION, SOLUTION INTRAVENOUS; SUBCUTANEOUS at 17:22

## 2020-09-06 RX ADMIN — FAMOTIDINE 20 MG: 20 TABLET, FILM COATED ORAL at 05:32

## 2020-09-06 RX ADMIN — Medication 5 MG: at 22:10

## 2020-09-06 RX ADMIN — FAMOTIDINE 20 MG: 20 TABLET, FILM COATED ORAL at 17:18

## 2020-09-06 RX ADMIN — OLANZAPINE 2.5 MG: 2.5 TABLET ORAL at 20:37

## 2020-09-06 RX ADMIN — SILVER SULFADIAZINE: 10 CREAM TOPICAL at 20:39

## 2020-09-06 RX ADMIN — HEPARIN SODIUM 5000 UNITS: 5000 INJECTION INTRAVENOUS; SUBCUTANEOUS at 21:12

## 2020-09-06 RX ADMIN — MAGNESIUM SULFATE 2 G: 2 INJECTION INTRAVENOUS at 08:56

## 2020-09-06 RX ADMIN — INSULIN ASPART 5 UNITS: 100 INJECTION, SOLUTION INTRAVENOUS; SUBCUTANEOUS at 17:22

## 2020-09-06 RX ADMIN — SODIUM CHLORIDE, PRESERVATIVE FREE 10 ML: 5 INJECTION INTRAVENOUS at 08:55

## 2020-09-06 RX ADMIN — LORAZEPAM 1 MG: 2 INJECTION INTRAMUSCULAR; INTRAVENOUS at 00:07

## 2020-09-06 RX ADMIN — Medication 100 MG: at 08:56

## 2020-09-06 RX ADMIN — IPRATROPIUM BROMIDE AND ALBUTEROL SULFATE 3 ML: .5; 3 SOLUTION RESPIRATORY (INHALATION) at 13:13

## 2020-09-06 RX ADMIN — HEPARIN SODIUM 5000 UNITS: 5000 INJECTION INTRAVENOUS; SUBCUTANEOUS at 05:33

## 2020-09-06 RX ADMIN — CHLORDIAZEPOXIDE HYDROCHLORIDE 10 MG: 10 CAPSULE ORAL at 21:12

## 2020-09-06 RX ADMIN — SILVER SULFADIAZINE: 10 CREAM TOPICAL at 08:57

## 2020-09-07 ENCOUNTER — APPOINTMENT (OUTPATIENT)
Dept: GENERAL RADIOLOGY | Facility: HOSPITAL | Age: 75
End: 2020-09-07

## 2020-09-07 LAB
ALBUMIN SERPL-MCNC: 2.71 G/DL (ref 3.5–5.2)
ALBUMIN/GLOB SERPL: 1 G/DL
ALP SERPL-CCNC: 68 U/L (ref 39–117)
ALT SERPL W P-5'-P-CCNC: 25 U/L (ref 1–41)
ANION GAP SERPL CALCULATED.3IONS-SCNC: 8.7 MMOL/L (ref 5–15)
AST SERPL-CCNC: 12 U/L (ref 1–40)
BASOPHILS # BLD AUTO: 0.02 10*3/MM3 (ref 0–0.2)
BASOPHILS NFR BLD AUTO: 0.2 % (ref 0–1.5)
BILIRUB SERPL-MCNC: 0.2 MG/DL (ref 0–1.2)
BUN SERPL-MCNC: 32 MG/DL (ref 8–23)
BUN/CREAT SERPL: 25 (ref 7–25)
CALCIUM SPEC-SCNC: 8.8 MG/DL (ref 8.6–10.5)
CHLORIDE SERPL-SCNC: 102 MMOL/L (ref 98–107)
CO2 SERPL-SCNC: 25.3 MMOL/L (ref 22–29)
CREAT SERPL-MCNC: 1.28 MG/DL (ref 0.76–1.27)
DEPRECATED RDW RBC AUTO: 44.1 FL (ref 37–54)
EOSINOPHIL # BLD AUTO: 0.04 10*3/MM3 (ref 0–0.4)
EOSINOPHIL NFR BLD AUTO: 0.4 % (ref 0.3–6.2)
ERYTHROCYTE [DISTWIDTH] IN BLOOD BY AUTOMATED COUNT: 13.6 % (ref 12.3–15.4)
GFR SERPL CREATININE-BSD FRML MDRD: 55 ML/MIN/1.73
GLOBULIN UR ELPH-MCNC: 2.8 GM/DL
GLUCOSE BLDC GLUCOMTR-MCNC: 226 MG/DL (ref 70–130)
GLUCOSE BLDC GLUCOMTR-MCNC: 257 MG/DL (ref 70–130)
GLUCOSE BLDC GLUCOMTR-MCNC: 276 MG/DL (ref 70–130)
GLUCOSE BLDC GLUCOMTR-MCNC: 290 MG/DL (ref 70–130)
GLUCOSE BLDC GLUCOMTR-MCNC: 298 MG/DL (ref 70–130)
GLUCOSE BLDC GLUCOMTR-MCNC: 318 MG/DL (ref 70–130)
GLUCOSE SERPL-MCNC: 274 MG/DL (ref 65–99)
HCT VFR BLD AUTO: 41.3 % (ref 37.5–51)
HGB BLD-MCNC: 12 G/DL (ref 13–17.7)
IMM GRANULOCYTES # BLD AUTO: 0.12 10*3/MM3 (ref 0–0.05)
IMM GRANULOCYTES NFR BLD AUTO: 1.2 % (ref 0–0.5)
LYMPHOCYTES # BLD AUTO: 1.58 10*3/MM3 (ref 0.7–3.1)
LYMPHOCYTES NFR BLD AUTO: 15.3 % (ref 19.6–45.3)
MAGNESIUM SERPL-MCNC: 2 MG/DL (ref 1.6–2.4)
MCH RBC QN AUTO: 25.9 PG (ref 26.6–33)
MCHC RBC AUTO-ENTMCNC: 29.1 G/DL (ref 31.5–35.7)
MCV RBC AUTO: 89 FL (ref 79–97)
MONOCYTES # BLD AUTO: 0.58 10*3/MM3 (ref 0.1–0.9)
MONOCYTES NFR BLD AUTO: 5.6 % (ref 5–12)
NEUTROPHILS NFR BLD AUTO: 7.96 10*3/MM3 (ref 1.7–7)
NEUTROPHILS NFR BLD AUTO: 77.3 % (ref 42.7–76)
NRBC BLD AUTO-RTO: 0 /100 WBC (ref 0–0.2)
PHOSPHATE SERPL-MCNC: 3.2 MG/DL (ref 2.5–4.5)
PLATELET # BLD AUTO: 321 10*3/MM3 (ref 140–450)
PMV BLD AUTO: 9.7 FL (ref 6–12)
POTASSIUM SERPL-SCNC: 4.5 MMOL/L (ref 3.5–5.2)
PROT SERPL-MCNC: 5.5 G/DL (ref 6–8.5)
RBC # BLD AUTO: 4.64 10*6/MM3 (ref 4.14–5.8)
SODIUM SERPL-SCNC: 136 MMOL/L (ref 136–145)
WBC # BLD AUTO: 10.3 10*3/MM3 (ref 3.4–10.8)

## 2020-09-07 PROCEDURE — 71045 X-RAY EXAM CHEST 1 VIEW: CPT

## 2020-09-07 PROCEDURE — 85025 COMPLETE CBC W/AUTO DIFF WBC: CPT | Performed by: INTERNAL MEDICINE

## 2020-09-07 PROCEDURE — 94799 UNLISTED PULMONARY SVC/PX: CPT

## 2020-09-07 PROCEDURE — 97110 THERAPEUTIC EXERCISES: CPT

## 2020-09-07 PROCEDURE — 25010000002 HEPARIN (PORCINE) PER 1000 UNITS: Performed by: NURSE PRACTITIONER

## 2020-09-07 PROCEDURE — 92610 EVALUATE SWALLOWING FUNCTION: CPT | Performed by: SPEECH-LANGUAGE PATHOLOGIST

## 2020-09-07 PROCEDURE — 97530 THERAPEUTIC ACTIVITIES: CPT

## 2020-09-07 PROCEDURE — 25010000002 METHYLPREDNISOLONE PER 40 MG: Performed by: INTERNAL MEDICINE

## 2020-09-07 PROCEDURE — 25010000002 LORAZEPAM PER 2 MG: Performed by: HOSPITALIST

## 2020-09-07 PROCEDURE — 80053 COMPREHEN METABOLIC PANEL: CPT | Performed by: INTERNAL MEDICINE

## 2020-09-07 PROCEDURE — 84100 ASSAY OF PHOSPHORUS: CPT | Performed by: INTERNAL MEDICINE

## 2020-09-07 PROCEDURE — 63710000001 INSULIN ASPART PER 5 UNITS: Performed by: INTERNAL MEDICINE

## 2020-09-07 PROCEDURE — 63710000001 INSULIN DETEMIR PER 5 UNITS: Performed by: INTERNAL MEDICINE

## 2020-09-07 PROCEDURE — 83735 ASSAY OF MAGNESIUM: CPT | Performed by: INTERNAL MEDICINE

## 2020-09-07 PROCEDURE — 82962 GLUCOSE BLOOD TEST: CPT

## 2020-09-07 PROCEDURE — 99233 SBSQ HOSP IP/OBS HIGH 50: CPT | Performed by: INTERNAL MEDICINE

## 2020-09-07 RX ADMIN — METOPROLOL SUCCINATE 25 MG: 25 TABLET, EXTENDED RELEASE ORAL at 08:03

## 2020-09-07 RX ADMIN — FAMOTIDINE 20 MG: 20 TABLET, FILM COATED ORAL at 17:46

## 2020-09-07 RX ADMIN — LORAZEPAM 0.5 MG: 2 INJECTION INTRAMUSCULAR; INTRAVENOUS at 20:22

## 2020-09-07 RX ADMIN — HYDROCODONE BITARTRATE AND ACETAMINOPHEN 1 TABLET: 5; 325 TABLET ORAL at 20:22

## 2020-09-07 RX ADMIN — HYDROCODONE BITARTRATE AND ACETAMINOPHEN 1 TABLET: 5; 325 TABLET ORAL at 06:21

## 2020-09-07 RX ADMIN — SODIUM CHLORIDE, PRESERVATIVE FREE 10 ML: 5 INJECTION INTRAVENOUS at 08:05

## 2020-09-07 RX ADMIN — BUDESONIDE 0.5 MG: 0.5 INHALANT RESPIRATORY (INHALATION) at 18:52

## 2020-09-07 RX ADMIN — SODIUM CHLORIDE, PRESERVATIVE FREE 10 ML: 5 INJECTION INTRAVENOUS at 08:04

## 2020-09-07 RX ADMIN — INSULIN ASPART 5 UNITS: 100 INJECTION, SOLUTION INTRAVENOUS; SUBCUTANEOUS at 08:03

## 2020-09-07 RX ADMIN — COLLAGENASE SANTYL: 250 OINTMENT TOPICAL at 08:04

## 2020-09-07 RX ADMIN — HEPARIN SODIUM 5000 UNITS: 5000 INJECTION INTRAVENOUS; SUBCUTANEOUS at 21:14

## 2020-09-07 RX ADMIN — INSULIN DETEMIR 18 UNITS: 100 INJECTION, SOLUTION SUBCUTANEOUS at 06:22

## 2020-09-07 RX ADMIN — Medication 100 MG: at 08:04

## 2020-09-07 RX ADMIN — INSULIN ASPART 5 UNITS: 100 INJECTION, SOLUTION INTRAVENOUS; SUBCUTANEOUS at 11:20

## 2020-09-07 RX ADMIN — IPRATROPIUM BROMIDE AND ALBUTEROL SULFATE 3 ML: .5; 3 SOLUTION RESPIRATORY (INHALATION) at 06:58

## 2020-09-07 RX ADMIN — IPRATROPIUM BROMIDE AND ALBUTEROL SULFATE 3 ML: .5; 3 SOLUTION RESPIRATORY (INHALATION) at 00:47

## 2020-09-07 RX ADMIN — BUDESONIDE 0.5 MG: 0.5 INHALANT RESPIRATORY (INHALATION) at 06:59

## 2020-09-07 RX ADMIN — SODIUM CHLORIDE, PRESERVATIVE FREE 10 ML: 5 INJECTION INTRAVENOUS at 20:24

## 2020-09-07 RX ADMIN — TAMSULOSIN HYDROCHLORIDE 0.4 MG: 0.4 CAPSULE ORAL at 08:03

## 2020-09-07 RX ADMIN — HEPARIN SODIUM 5000 UNITS: 5000 INJECTION INTRAVENOUS; SUBCUTANEOUS at 14:23

## 2020-09-07 RX ADMIN — LEVOTHYROXINE SODIUM 75 MCG: 75 TABLET ORAL at 08:03

## 2020-09-07 RX ADMIN — INSULIN ASPART 16 UNITS: 100 INJECTION, SOLUTION INTRAVENOUS; SUBCUTANEOUS at 06:22

## 2020-09-07 RX ADMIN — MEMANTINE HYDROCHLORIDE 5 MG: 5 TABLET, FILM COATED ORAL at 08:03

## 2020-09-07 RX ADMIN — SILVER SULFADIAZINE: 10 CREAM TOPICAL at 08:04

## 2020-09-07 RX ADMIN — LORAZEPAM 1 MG: 2 INJECTION INTRAMUSCULAR; INTRAVENOUS at 00:25

## 2020-09-07 RX ADMIN — OLANZAPINE 2.5 MG: 2.5 TABLET ORAL at 20:22

## 2020-09-07 RX ADMIN — FAMOTIDINE 20 MG: 20 TABLET, FILM COATED ORAL at 06:14

## 2020-09-07 RX ADMIN — INSULIN ASPART 12 UNITS: 100 INJECTION, SOLUTION INTRAVENOUS; SUBCUTANEOUS at 11:20

## 2020-09-07 RX ADMIN — IPRATROPIUM BROMIDE AND ALBUTEROL SULFATE 3 ML: .5; 3 SOLUTION RESPIRATORY (INHALATION) at 18:52

## 2020-09-07 RX ADMIN — INSULIN ASPART 5 UNITS: 100 INJECTION, SOLUTION INTRAVENOUS; SUBCUTANEOUS at 17:50

## 2020-09-07 RX ADMIN — INSULIN DETEMIR 18 UNITS: 100 INJECTION, SOLUTION SUBCUTANEOUS at 20:38

## 2020-09-07 RX ADMIN — CHLORDIAZEPOXIDE HYDROCHLORIDE 10 MG: 10 CAPSULE ORAL at 21:14

## 2020-09-07 RX ADMIN — GUAIFENESIN 1200 MG: 600 TABLET, EXTENDED RELEASE ORAL at 08:04

## 2020-09-07 RX ADMIN — ASPIRIN 81 MG: 81 TABLET, COATED ORAL at 08:04

## 2020-09-07 RX ADMIN — METHYLPREDNISOLONE SODIUM SUCCINATE 20 MG: 40 INJECTION, POWDER, FOR SOLUTION INTRAMUSCULAR; INTRAVENOUS at 11:14

## 2020-09-07 RX ADMIN — HEPARIN SODIUM 5000 UNITS: 5000 INJECTION INTRAVENOUS; SUBCUTANEOUS at 06:14

## 2020-09-07 RX ADMIN — SILVER SULFADIAZINE: 10 CREAM TOPICAL at 20:23

## 2020-09-07 RX ADMIN — METHYLPREDNISOLONE SODIUM SUCCINATE 20 MG: 40 INJECTION, POWDER, FOR SOLUTION INTRAMUSCULAR; INTRAVENOUS at 00:28

## 2020-09-07 RX ADMIN — COLLAGENASE SANTYL: 250 OINTMENT TOPICAL at 20:23

## 2020-09-07 RX ADMIN — SODIUM CHLORIDE, PRESERVATIVE FREE 10 ML: 5 INJECTION INTRAVENOUS at 20:23

## 2020-09-07 RX ADMIN — IPRATROPIUM BROMIDE AND ALBUTEROL SULFATE 3 ML: .5; 3 SOLUTION RESPIRATORY (INHALATION) at 12:59

## 2020-09-07 RX ADMIN — CHLORDIAZEPOXIDE HYDROCHLORIDE 10 MG: 10 CAPSULE ORAL at 06:14

## 2020-09-07 RX ADMIN — Medication 1 CAPSULE: at 08:04

## 2020-09-07 RX ADMIN — METHYLPREDNISOLONE SODIUM SUCCINATE 20 MG: 40 INJECTION, POWDER, FOR SOLUTION INTRAMUSCULAR; INTRAVENOUS at 23:07

## 2020-09-07 RX ADMIN — Medication 5 MG: at 20:22

## 2020-09-07 RX ADMIN — MEMANTINE HYDROCHLORIDE 5 MG: 5 TABLET, FILM COATED ORAL at 20:22

## 2020-09-07 RX ADMIN — GUAIFENESIN 1200 MG: 600 TABLET, EXTENDED RELEASE ORAL at 20:21

## 2020-09-07 RX ADMIN — INSULIN ASPART 8 UNITS: 100 INJECTION, SOLUTION INTRAVENOUS; SUBCUTANEOUS at 17:50

## 2020-09-07 RX ADMIN — CHLORDIAZEPOXIDE HYDROCHLORIDE 10 MG: 10 CAPSULE ORAL at 14:23

## 2020-09-08 LAB
ALBUMIN SERPL-MCNC: 3.07 G/DL (ref 3.5–5.2)
ALBUMIN/GLOB SERPL: 1 G/DL
ALP SERPL-CCNC: 75 U/L (ref 39–117)
ALT SERPL W P-5'-P-CCNC: 26 U/L (ref 1–41)
ANION GAP SERPL CALCULATED.3IONS-SCNC: 11.5 MMOL/L (ref 5–15)
AST SERPL-CCNC: 17 U/L (ref 1–40)
BASOPHILS # BLD AUTO: 0.01 10*3/MM3 (ref 0–0.2)
BASOPHILS NFR BLD AUTO: 0.1 % (ref 0–1.5)
BILIRUB SERPL-MCNC: 0.2 MG/DL (ref 0–1.2)
BUN SERPL-MCNC: 29 MG/DL (ref 8–23)
BUN/CREAT SERPL: 24 (ref 7–25)
CALCIUM SPEC-SCNC: 8.9 MG/DL (ref 8.6–10.5)
CHLORIDE SERPL-SCNC: 100 MMOL/L (ref 98–107)
CO2 SERPL-SCNC: 24.5 MMOL/L (ref 22–29)
CREAT SERPL-MCNC: 1.21 MG/DL (ref 0.76–1.27)
CRP SERPL-MCNC: 0.23 MG/DL (ref 0–0.5)
DEPRECATED RDW RBC AUTO: 45.1 FL (ref 37–54)
EOSINOPHIL # BLD AUTO: 0.01 10*3/MM3 (ref 0–0.4)
EOSINOPHIL NFR BLD AUTO: 0.1 % (ref 0.3–6.2)
ERYTHROCYTE [DISTWIDTH] IN BLOOD BY AUTOMATED COUNT: 13.7 % (ref 12.3–15.4)
GFR SERPL CREATININE-BSD FRML MDRD: 59 ML/MIN/1.73
GLOBULIN UR ELPH-MCNC: 3 GM/DL
GLUCOSE BLDC GLUCOMTR-MCNC: 149 MG/DL (ref 70–130)
GLUCOSE BLDC GLUCOMTR-MCNC: 329 MG/DL (ref 70–130)
GLUCOSE BLDC GLUCOMTR-MCNC: 391 MG/DL (ref 70–130)
GLUCOSE BLDC GLUCOMTR-MCNC: 459 MG/DL (ref 70–130)
GLUCOSE BLDC GLUCOMTR-MCNC: 486 MG/DL (ref 70–130)
GLUCOSE BLDC GLUCOMTR-MCNC: 97 MG/DL (ref 70–130)
GLUCOSE SERPL-MCNC: 368 MG/DL (ref 65–99)
HCT VFR BLD AUTO: 43.6 % (ref 37.5–51)
HGB BLD-MCNC: 12.7 G/DL (ref 13–17.7)
IMM GRANULOCYTES # BLD AUTO: 0.11 10*3/MM3 (ref 0–0.05)
IMM GRANULOCYTES NFR BLD AUTO: 1.1 % (ref 0–0.5)
LYMPHOCYTES # BLD AUTO: 0.85 10*3/MM3 (ref 0.7–3.1)
LYMPHOCYTES NFR BLD AUTO: 8.2 % (ref 19.6–45.3)
MAGNESIUM SERPL-MCNC: 2 MG/DL (ref 1.6–2.4)
MCH RBC QN AUTO: 26.2 PG (ref 26.6–33)
MCHC RBC AUTO-ENTMCNC: 29.1 G/DL (ref 31.5–35.7)
MCV RBC AUTO: 89.9 FL (ref 79–97)
MONOCYTES # BLD AUTO: 0.25 10*3/MM3 (ref 0.1–0.9)
MONOCYTES NFR BLD AUTO: 2.4 % (ref 5–12)
NEUTROPHILS NFR BLD AUTO: 88.1 % (ref 42.7–76)
NEUTROPHILS NFR BLD AUTO: 9.13 10*3/MM3 (ref 1.7–7)
NRBC BLD AUTO-RTO: 0 /100 WBC (ref 0–0.2)
PHOSPHATE SERPL-MCNC: 3.4 MG/DL (ref 2.5–4.5)
PLATELET # BLD AUTO: 307 10*3/MM3 (ref 140–450)
PMV BLD AUTO: 9.9 FL (ref 6–12)
POTASSIUM SERPL-SCNC: 4.8 MMOL/L (ref 3.5–5.2)
PROT SERPL-MCNC: 6.1 G/DL (ref 6–8.5)
RBC # BLD AUTO: 4.85 10*6/MM3 (ref 4.14–5.8)
SODIUM SERPL-SCNC: 136 MMOL/L (ref 136–145)
WBC # BLD AUTO: 10.36 10*3/MM3 (ref 3.4–10.8)

## 2020-09-08 PROCEDURE — 99233 SBSQ HOSP IP/OBS HIGH 50: CPT | Performed by: FAMILY MEDICINE

## 2020-09-08 PROCEDURE — 25010000002 METHYLPREDNISOLONE PER 40 MG: Performed by: INTERNAL MEDICINE

## 2020-09-08 PROCEDURE — 86140 C-REACTIVE PROTEIN: CPT | Performed by: INTERNAL MEDICINE

## 2020-09-08 PROCEDURE — 63710000001 INSULIN ASPART PER 5 UNITS: Performed by: INTERNAL MEDICINE

## 2020-09-08 PROCEDURE — 85025 COMPLETE CBC W/AUTO DIFF WBC: CPT | Performed by: INTERNAL MEDICINE

## 2020-09-08 PROCEDURE — 94799 UNLISTED PULMONARY SVC/PX: CPT

## 2020-09-08 PROCEDURE — 82962 GLUCOSE BLOOD TEST: CPT

## 2020-09-08 PROCEDURE — 25010000002 LORAZEPAM PER 2 MG: Performed by: INTERNAL MEDICINE

## 2020-09-08 PROCEDURE — 25010000002 HEPARIN (PORCINE) PER 1000 UNITS: Performed by: INTERNAL MEDICINE

## 2020-09-08 PROCEDURE — 83735 ASSAY OF MAGNESIUM: CPT | Performed by: INTERNAL MEDICINE

## 2020-09-08 PROCEDURE — 63710000001 INSULIN DETEMIR PER 5 UNITS: Performed by: INTERNAL MEDICINE

## 2020-09-08 PROCEDURE — 84100 ASSAY OF PHOSPHORUS: CPT | Performed by: INTERNAL MEDICINE

## 2020-09-08 PROCEDURE — 99232 SBSQ HOSP IP/OBS MODERATE 35: CPT | Performed by: SURGERY

## 2020-09-08 PROCEDURE — 80053 COMPREHEN METABOLIC PANEL: CPT | Performed by: INTERNAL MEDICINE

## 2020-09-08 PROCEDURE — 63710000001 INSULIN REGULAR HUMAN PER 5 UNITS: Performed by: INTERNAL MEDICINE

## 2020-09-08 RX ADMIN — MEMANTINE HYDROCHLORIDE 5 MG: 5 TABLET, FILM COATED ORAL at 21:26

## 2020-09-08 RX ADMIN — IPRATROPIUM BROMIDE AND ALBUTEROL SULFATE 3 ML: .5; 3 SOLUTION RESPIRATORY (INHALATION) at 00:25

## 2020-09-08 RX ADMIN — IPRATROPIUM BROMIDE AND ALBUTEROL SULFATE 3 ML: .5; 3 SOLUTION RESPIRATORY (INHALATION) at 18:36

## 2020-09-08 RX ADMIN — GUAIFENESIN 1200 MG: 600 TABLET, EXTENDED RELEASE ORAL at 08:09

## 2020-09-08 RX ADMIN — IPRATROPIUM BROMIDE AND ALBUTEROL SULFATE 3 ML: .5; 3 SOLUTION RESPIRATORY (INHALATION) at 06:48

## 2020-09-08 RX ADMIN — FAMOTIDINE 20 MG: 20 TABLET, FILM COATED ORAL at 18:04

## 2020-09-08 RX ADMIN — ASPIRIN 81 MG: 81 TABLET, COATED ORAL at 08:09

## 2020-09-08 RX ADMIN — BUDESONIDE 0.5 MG: 0.5 INHALANT RESPIRATORY (INHALATION) at 18:36

## 2020-09-08 RX ADMIN — CHLORDIAZEPOXIDE HYDROCHLORIDE 10 MG: 10 CAPSULE ORAL at 21:27

## 2020-09-08 RX ADMIN — METOPROLOL SUCCINATE 25 MG: 25 TABLET, EXTENDED RELEASE ORAL at 08:09

## 2020-09-08 RX ADMIN — INSULIN DETEMIR 18 UNITS: 100 INJECTION, SOLUTION SUBCUTANEOUS at 21:25

## 2020-09-08 RX ADMIN — INSULIN ASPART 5 UNITS: 100 INJECTION, SOLUTION INTRAVENOUS; SUBCUTANEOUS at 11:45

## 2020-09-08 RX ADMIN — CHLORDIAZEPOXIDE HYDROCHLORIDE 10 MG: 10 CAPSULE ORAL at 13:31

## 2020-09-08 RX ADMIN — SILVER SULFADIAZINE 1 APPLICATION: 10 CREAM TOPICAL at 21:28

## 2020-09-08 RX ADMIN — GUAIFENESIN 1200 MG: 600 TABLET, EXTENDED RELEASE ORAL at 21:27

## 2020-09-08 RX ADMIN — MEMANTINE HYDROCHLORIDE 5 MG: 5 TABLET, FILM COATED ORAL at 08:09

## 2020-09-08 RX ADMIN — SODIUM CHLORIDE, PRESERVATIVE FREE 10 ML: 5 INJECTION INTRAVENOUS at 21:25

## 2020-09-08 RX ADMIN — Medication 5 MG: at 21:27

## 2020-09-08 RX ADMIN — SODIUM CHLORIDE, PRESERVATIVE FREE 10 ML: 5 INJECTION INTRAVENOUS at 08:10

## 2020-09-08 RX ADMIN — Medication 1 CAPSULE: at 08:09

## 2020-09-08 RX ADMIN — HEPARIN SODIUM 5000 UNITS: 5000 INJECTION INTRAVENOUS; SUBCUTANEOUS at 13:31

## 2020-09-08 RX ADMIN — METHYLPREDNISOLONE SODIUM SUCCINATE 20 MG: 40 INJECTION, POWDER, FOR SOLUTION INTRAMUSCULAR; INTRAVENOUS at 11:44

## 2020-09-08 RX ADMIN — HYDROCODONE BITARTRATE AND ACETAMINOPHEN 1 TABLET: 5; 325 TABLET ORAL at 17:16

## 2020-09-08 RX ADMIN — TAMSULOSIN HYDROCHLORIDE 0.4 MG: 0.4 CAPSULE ORAL at 08:09

## 2020-09-08 RX ADMIN — HEPARIN SODIUM 5000 UNITS: 5000 INJECTION INTRAVENOUS; SUBCUTANEOUS at 21:26

## 2020-09-08 RX ADMIN — LORAZEPAM 1 MG: 1 TABLET ORAL at 21:26

## 2020-09-08 RX ADMIN — CHLORDIAZEPOXIDE HYDROCHLORIDE 10 MG: 10 CAPSULE ORAL at 05:09

## 2020-09-08 RX ADMIN — FAMOTIDINE 20 MG: 20 TABLET, FILM COATED ORAL at 07:48

## 2020-09-08 RX ADMIN — SILVER SULFADIAZINE: 10 CREAM TOPICAL at 08:54

## 2020-09-08 RX ADMIN — BUDESONIDE 0.5 MG: 0.5 INHALANT RESPIRATORY (INHALATION) at 07:00

## 2020-09-08 RX ADMIN — COLLAGENASE SANTYL: 250 OINTMENT TOPICAL at 08:09

## 2020-09-08 RX ADMIN — Medication 5 MG: at 02:33

## 2020-09-08 RX ADMIN — INSULIN DETEMIR 18 UNITS: 100 INJECTION, SOLUTION SUBCUTANEOUS at 07:47

## 2020-09-08 RX ADMIN — LORAZEPAM 2 MG: 2 INJECTION INTRAMUSCULAR; INTRAVENOUS at 02:33

## 2020-09-08 RX ADMIN — LEVOTHYROXINE SODIUM 75 MCG: 75 TABLET ORAL at 08:09

## 2020-09-08 RX ADMIN — Medication 100 MG: at 08:13

## 2020-09-08 RX ADMIN — HUMAN INSULIN 10 UNITS: 100 INJECTION, SOLUTION SUBCUTANEOUS at 06:18

## 2020-09-08 RX ADMIN — IPRATROPIUM BROMIDE AND ALBUTEROL SULFATE 3 ML: .5; 3 SOLUTION RESPIRATORY (INHALATION) at 12:58

## 2020-09-08 RX ADMIN — SODIUM CHLORIDE, PRESERVATIVE FREE 10 ML: 5 INJECTION INTRAVENOUS at 08:09

## 2020-09-08 RX ADMIN — HYDROCODONE BITARTRATE AND ACETAMINOPHEN 1 TABLET: 5; 325 TABLET ORAL at 02:33

## 2020-09-08 RX ADMIN — HEPARIN SODIUM 5000 UNITS: 5000 INJECTION INTRAVENOUS; SUBCUTANEOUS at 05:08

## 2020-09-08 RX ADMIN — INSULIN ASPART 20 UNITS: 100 INJECTION, SOLUTION INTRAVENOUS; SUBCUTANEOUS at 07:47

## 2020-09-08 RX ADMIN — OLANZAPINE 2.5 MG: 2.5 TABLET ORAL at 21:27

## 2020-09-09 LAB
GLUCOSE BLDC GLUCOMTR-MCNC: 133 MG/DL (ref 70–130)
GLUCOSE BLDC GLUCOMTR-MCNC: 144 MG/DL (ref 70–130)
GLUCOSE BLDC GLUCOMTR-MCNC: 257 MG/DL (ref 70–130)
GLUCOSE BLDC GLUCOMTR-MCNC: 318 MG/DL (ref 70–130)

## 2020-09-09 PROCEDURE — 25010000002 METHYLPREDNISOLONE PER 40 MG: Performed by: INTERNAL MEDICINE

## 2020-09-09 PROCEDURE — 82962 GLUCOSE BLOOD TEST: CPT

## 2020-09-09 PROCEDURE — 25010000002 LORAZEPAM PER 2 MG: Performed by: INTERNAL MEDICINE

## 2020-09-09 PROCEDURE — 63710000001 INSULIN DETEMIR PER 5 UNITS: Performed by: INTERNAL MEDICINE

## 2020-09-09 PROCEDURE — 94799 UNLISTED PULMONARY SVC/PX: CPT

## 2020-09-09 PROCEDURE — 99233 SBSQ HOSP IP/OBS HIGH 50: CPT | Performed by: FAMILY MEDICINE

## 2020-09-09 PROCEDURE — 25010000002 HEPARIN (PORCINE) PER 1000 UNITS: Performed by: INTERNAL MEDICINE

## 2020-09-09 PROCEDURE — 63710000001 INSULIN ASPART PER 5 UNITS: Performed by: INTERNAL MEDICINE

## 2020-09-09 RX ORDER — ACETAMINOPHEN 325 MG/1
650 TABLET ORAL EVERY 6 HOURS PRN
Status: DISCONTINUED | OUTPATIENT
Start: 2020-09-09 | End: 2020-09-25 | Stop reason: HOSPADM

## 2020-09-09 RX ADMIN — INSULIN ASPART 5 UNITS: 100 INJECTION, SOLUTION INTRAVENOUS; SUBCUTANEOUS at 11:24

## 2020-09-09 RX ADMIN — GUAIFENESIN 1200 MG: 600 TABLET, EXTENDED RELEASE ORAL at 21:00

## 2020-09-09 RX ADMIN — HYDROCODONE BITARTRATE AND ACETAMINOPHEN 1 TABLET: 5; 325 TABLET ORAL at 21:43

## 2020-09-09 RX ADMIN — SODIUM CHLORIDE, PRESERVATIVE FREE 10 ML: 5 INJECTION INTRAVENOUS at 08:13

## 2020-09-09 RX ADMIN — METHYLPREDNISOLONE SODIUM SUCCINATE 20 MG: 40 INJECTION, POWDER, FOR SOLUTION INTRAMUSCULAR; INTRAVENOUS at 11:24

## 2020-09-09 RX ADMIN — SILVER SULFADIAZINE: 10 CREAM TOPICAL at 21:01

## 2020-09-09 RX ADMIN — BUDESONIDE 0.5 MG: 0.5 INHALANT RESPIRATORY (INHALATION) at 06:36

## 2020-09-09 RX ADMIN — IPRATROPIUM BROMIDE AND ALBUTEROL SULFATE 3 ML: .5; 3 SOLUTION RESPIRATORY (INHALATION) at 13:12

## 2020-09-09 RX ADMIN — CHLORDIAZEPOXIDE HYDROCHLORIDE 10 MG: 10 CAPSULE ORAL at 13:38

## 2020-09-09 RX ADMIN — GUAIFENESIN 1200 MG: 600 TABLET, EXTENDED RELEASE ORAL at 08:12

## 2020-09-09 RX ADMIN — HEPARIN SODIUM 5000 UNITS: 5000 INJECTION INTRAVENOUS; SUBCUTANEOUS at 13:38

## 2020-09-09 RX ADMIN — HEPARIN SODIUM 5000 UNITS: 5000 INJECTION INTRAVENOUS; SUBCUTANEOUS at 21:00

## 2020-09-09 RX ADMIN — FAMOTIDINE 20 MG: 20 TABLET, FILM COATED ORAL at 17:03

## 2020-09-09 RX ADMIN — INSULIN DETEMIR 18 UNITS: 100 INJECTION, SOLUTION SUBCUTANEOUS at 06:22

## 2020-09-09 RX ADMIN — BUDESONIDE 0.5 MG: 0.5 INHALANT RESPIRATORY (INHALATION) at 19:08

## 2020-09-09 RX ADMIN — MEMANTINE HYDROCHLORIDE 5 MG: 5 TABLET, FILM COATED ORAL at 21:00

## 2020-09-09 RX ADMIN — TAMSULOSIN HYDROCHLORIDE 0.4 MG: 0.4 CAPSULE ORAL at 08:13

## 2020-09-09 RX ADMIN — INSULIN ASPART 5 UNITS: 100 INJECTION, SOLUTION INTRAVENOUS; SUBCUTANEOUS at 08:11

## 2020-09-09 RX ADMIN — IPRATROPIUM BROMIDE AND ALBUTEROL SULFATE 3 ML: .5; 3 SOLUTION RESPIRATORY (INHALATION) at 19:08

## 2020-09-09 RX ADMIN — LORAZEPAM 0.5 MG: 2 INJECTION INTRAMUSCULAR; INTRAVENOUS at 08:12

## 2020-09-09 RX ADMIN — FAMOTIDINE 20 MG: 20 TABLET, FILM COATED ORAL at 06:21

## 2020-09-09 RX ADMIN — LORAZEPAM 0.5 MG: 0.5 TABLET ORAL at 22:59

## 2020-09-09 RX ADMIN — IPRATROPIUM BROMIDE AND ALBUTEROL SULFATE 3 ML: .5; 3 SOLUTION RESPIRATORY (INHALATION) at 00:33

## 2020-09-09 RX ADMIN — Medication 100 MG: at 08:12

## 2020-09-09 RX ADMIN — INSULIN ASPART 12 UNITS: 100 INJECTION, SOLUTION INTRAVENOUS; SUBCUTANEOUS at 06:23

## 2020-09-09 RX ADMIN — SODIUM CHLORIDE, PRESERVATIVE FREE 10 ML: 5 INJECTION INTRAVENOUS at 21:01

## 2020-09-09 RX ADMIN — HYDROCODONE BITARTRATE AND ACETAMINOPHEN 1 TABLET: 5; 325 TABLET ORAL at 06:22

## 2020-09-09 RX ADMIN — INSULIN ASPART 5 UNITS: 100 INJECTION, SOLUTION INTRAVENOUS; SUBCUTANEOUS at 17:03

## 2020-09-09 RX ADMIN — INSULIN DETEMIR 18 UNITS: 100 INJECTION, SOLUTION SUBCUTANEOUS at 21:01

## 2020-09-09 RX ADMIN — METOPROLOL SUCCINATE 25 MG: 25 TABLET, EXTENDED RELEASE ORAL at 08:12

## 2020-09-09 RX ADMIN — OLANZAPINE 2.5 MG: 2.5 TABLET ORAL at 21:00

## 2020-09-09 RX ADMIN — CHLORDIAZEPOXIDE HYDROCHLORIDE 10 MG: 10 CAPSULE ORAL at 06:22

## 2020-09-09 RX ADMIN — SILVER SULFADIAZINE: 10 CREAM TOPICAL at 08:14

## 2020-09-09 RX ADMIN — IPRATROPIUM BROMIDE AND ALBUTEROL SULFATE 3 ML: .5; 3 SOLUTION RESPIRATORY (INHALATION) at 06:36

## 2020-09-09 RX ADMIN — LEVOTHYROXINE SODIUM 75 MCG: 75 TABLET ORAL at 08:12

## 2020-09-09 RX ADMIN — ACETAMINOPHEN 650 MG: 325 TABLET ORAL at 17:03

## 2020-09-09 RX ADMIN — CHLORDIAZEPOXIDE HYDROCHLORIDE 10 MG: 10 CAPSULE ORAL at 21:00

## 2020-09-09 RX ADMIN — ASPIRIN 81 MG: 81 TABLET, COATED ORAL at 08:13

## 2020-09-09 RX ADMIN — SODIUM CHLORIDE, PRESERVATIVE FREE 10 ML: 5 INJECTION INTRAVENOUS at 21:00

## 2020-09-09 RX ADMIN — HEPARIN SODIUM 5000 UNITS: 5000 INJECTION INTRAVENOUS; SUBCUTANEOUS at 06:21

## 2020-09-09 RX ADMIN — SODIUM CHLORIDE, PRESERVATIVE FREE 10 ML: 5 INJECTION INTRAVENOUS at 08:14

## 2020-09-09 RX ADMIN — MEMANTINE HYDROCHLORIDE 5 MG: 5 TABLET, FILM COATED ORAL at 08:12

## 2020-09-09 RX ADMIN — FAMOTIDINE 20 MG: 20 TABLET, FILM COATED ORAL at 08:13

## 2020-09-09 RX ADMIN — SODIUM CHLORIDE, PRESERVATIVE FREE 10 ML: 5 INJECTION INTRAVENOUS at 08:15

## 2020-09-10 LAB
GLUCOSE BLDC GLUCOMTR-MCNC: 179 MG/DL (ref 70–130)
GLUCOSE BLDC GLUCOMTR-MCNC: 215 MG/DL (ref 70–130)
GLUCOSE BLDC GLUCOMTR-MCNC: 221 MG/DL (ref 70–130)
GLUCOSE BLDC GLUCOMTR-MCNC: 233 MG/DL (ref 70–130)
GLUCOSE BLDC GLUCOMTR-MCNC: 265 MG/DL (ref 70–130)

## 2020-09-10 PROCEDURE — 99233 SBSQ HOSP IP/OBS HIGH 50: CPT | Performed by: FAMILY MEDICINE

## 2020-09-10 PROCEDURE — 94799 UNLISTED PULMONARY SVC/PX: CPT

## 2020-09-10 PROCEDURE — 25010000002 METHYLPREDNISOLONE PER 40 MG: Performed by: INTERNAL MEDICINE

## 2020-09-10 PROCEDURE — 63710000001 INSULIN DETEMIR PER 5 UNITS: Performed by: INTERNAL MEDICINE

## 2020-09-10 PROCEDURE — 25010000002 HEPARIN (PORCINE) PER 1000 UNITS: Performed by: INTERNAL MEDICINE

## 2020-09-10 PROCEDURE — 63710000001 INSULIN ASPART PER 5 UNITS: Performed by: INTERNAL MEDICINE

## 2020-09-10 PROCEDURE — 82962 GLUCOSE BLOOD TEST: CPT

## 2020-09-10 RX ORDER — OLANZAPINE 10 MG/1
10 TABLET ORAL ONCE
Status: COMPLETED | OUTPATIENT
Start: 2020-09-10 | End: 2020-09-10

## 2020-09-10 RX ORDER — LORAZEPAM 1 MG/1
1 TABLET ORAL 2 TIMES DAILY PRN
Status: DISCONTINUED | OUTPATIENT
Start: 2020-09-10 | End: 2020-09-10

## 2020-09-10 RX ADMIN — SODIUM CHLORIDE, PRESERVATIVE FREE 10 ML: 5 INJECTION INTRAVENOUS at 08:02

## 2020-09-10 RX ADMIN — BUDESONIDE 0.5 MG: 0.5 INHALANT RESPIRATORY (INHALATION) at 07:55

## 2020-09-10 RX ADMIN — INSULIN DETEMIR 18 UNITS: 100 INJECTION, SOLUTION SUBCUTANEOUS at 06:16

## 2020-09-10 RX ADMIN — HEPARIN SODIUM 5000 UNITS: 5000 INJECTION INTRAVENOUS; SUBCUTANEOUS at 21:09

## 2020-09-10 RX ADMIN — SILVER SULFADIAZINE: 10 CREAM TOPICAL at 21:13

## 2020-09-10 RX ADMIN — IPRATROPIUM BROMIDE AND ALBUTEROL SULFATE 3 ML: .5; 3 SOLUTION RESPIRATORY (INHALATION) at 01:19

## 2020-09-10 RX ADMIN — GUAIFENESIN 1200 MG: 600 TABLET, EXTENDED RELEASE ORAL at 21:08

## 2020-09-10 RX ADMIN — TAMSULOSIN HYDROCHLORIDE 0.4 MG: 0.4 CAPSULE ORAL at 08:01

## 2020-09-10 RX ADMIN — SODIUM CHLORIDE, PRESERVATIVE FREE 10 ML: 5 INJECTION INTRAVENOUS at 08:01

## 2020-09-10 RX ADMIN — CHLORDIAZEPOXIDE HYDROCHLORIDE 10 MG: 10 CAPSULE ORAL at 06:17

## 2020-09-10 RX ADMIN — LEVOTHYROXINE SODIUM 75 MCG: 75 TABLET ORAL at 08:01

## 2020-09-10 RX ADMIN — BUDESONIDE 0.5 MG: 0.5 INHALANT RESPIRATORY (INHALATION) at 19:08

## 2020-09-10 RX ADMIN — ASPIRIN 81 MG: 81 TABLET, COATED ORAL at 08:01

## 2020-09-10 RX ADMIN — INSULIN ASPART 5 UNITS: 100 INJECTION, SOLUTION INTRAVENOUS; SUBCUTANEOUS at 17:51

## 2020-09-10 RX ADMIN — IPRATROPIUM BROMIDE AND ALBUTEROL SULFATE 3 ML: .5; 3 SOLUTION RESPIRATORY (INHALATION) at 14:11

## 2020-09-10 RX ADMIN — OLANZAPINE 10 MG: 10 TABLET, FILM COATED ORAL at 12:28

## 2020-09-10 RX ADMIN — INSULIN DETEMIR 18 UNITS: 100 INJECTION, SOLUTION SUBCUTANEOUS at 21:34

## 2020-09-10 RX ADMIN — INSULIN ASPART 8 UNITS: 100 INJECTION, SOLUTION INTRAVENOUS; SUBCUTANEOUS at 17:51

## 2020-09-10 RX ADMIN — FAMOTIDINE 20 MG: 20 TABLET, FILM COATED ORAL at 08:01

## 2020-09-10 RX ADMIN — IPRATROPIUM BROMIDE AND ALBUTEROL SULFATE 3 ML: .5; 3 SOLUTION RESPIRATORY (INHALATION) at 07:56

## 2020-09-10 RX ADMIN — INSULIN ASPART 4 UNITS: 100 INJECTION, SOLUTION INTRAVENOUS; SUBCUTANEOUS at 11:30

## 2020-09-10 RX ADMIN — IPRATROPIUM BROMIDE AND ALBUTEROL SULFATE 3 ML: .5; 3 SOLUTION RESPIRATORY (INHALATION) at 19:08

## 2020-09-10 RX ADMIN — SILVER SULFADIAZINE: 10 CREAM TOPICAL at 08:03

## 2020-09-10 RX ADMIN — MEMANTINE HYDROCHLORIDE 5 MG: 5 TABLET, FILM COATED ORAL at 21:08

## 2020-09-10 RX ADMIN — ACETAMINOPHEN 650 MG: 325 TABLET ORAL at 21:09

## 2020-09-10 RX ADMIN — METHYLPREDNISOLONE SODIUM SUCCINATE 20 MG: 40 INJECTION, POWDER, FOR SOLUTION INTRAMUSCULAR; INTRAVENOUS at 01:38

## 2020-09-10 RX ADMIN — GUAIFENESIN 1200 MG: 600 TABLET, EXTENDED RELEASE ORAL at 08:01

## 2020-09-10 RX ADMIN — INSULIN ASPART 8 UNITS: 100 INJECTION, SOLUTION INTRAVENOUS; SUBCUTANEOUS at 08:02

## 2020-09-10 RX ADMIN — METOPROLOL SUCCINATE 25 MG: 25 TABLET, EXTENDED RELEASE ORAL at 08:01

## 2020-09-10 RX ADMIN — Medication 100 MG: at 08:01

## 2020-09-10 RX ADMIN — INSULIN ASPART 5 UNITS: 100 INJECTION, SOLUTION INTRAVENOUS; SUBCUTANEOUS at 11:30

## 2020-09-10 RX ADMIN — INSULIN ASPART 5 UNITS: 100 INJECTION, SOLUTION INTRAVENOUS; SUBCUTANEOUS at 08:00

## 2020-09-10 RX ADMIN — HEPARIN SODIUM 5000 UNITS: 5000 INJECTION INTRAVENOUS; SUBCUTANEOUS at 14:36

## 2020-09-10 RX ADMIN — FAMOTIDINE 20 MG: 20 TABLET, FILM COATED ORAL at 17:51

## 2020-09-10 RX ADMIN — SODIUM CHLORIDE, PRESERVATIVE FREE 10 ML: 5 INJECTION INTRAVENOUS at 08:03

## 2020-09-10 RX ADMIN — MEMANTINE HYDROCHLORIDE 5 MG: 5 TABLET, FILM COATED ORAL at 08:00

## 2020-09-10 RX ADMIN — OLANZAPINE 7.5 MG: 2.5 TABLET ORAL at 21:07

## 2020-09-10 RX ADMIN — HYDROCODONE BITARTRATE AND ACETAMINOPHEN 1 TABLET: 5; 325 TABLET ORAL at 09:44

## 2020-09-10 RX ADMIN — HEPARIN SODIUM 5000 UNITS: 5000 INJECTION INTRAVENOUS; SUBCUTANEOUS at 06:13

## 2020-09-11 LAB
GLUCOSE BLDC GLUCOMTR-MCNC: 190 MG/DL (ref 70–130)
GLUCOSE BLDC GLUCOMTR-MCNC: 194 MG/DL (ref 70–130)
GLUCOSE BLDC GLUCOMTR-MCNC: 295 MG/DL (ref 70–130)
GLUCOSE BLDC GLUCOMTR-MCNC: 353 MG/DL (ref 70–130)
GLUCOSE BLDC GLUCOMTR-MCNC: 66 MG/DL (ref 70–130)

## 2020-09-11 PROCEDURE — 63710000001 INSULIN ASPART PER 5 UNITS: Performed by: INTERNAL MEDICINE

## 2020-09-11 PROCEDURE — 99233 SBSQ HOSP IP/OBS HIGH 50: CPT | Performed by: FAMILY MEDICINE

## 2020-09-11 PROCEDURE — 94799 UNLISTED PULMONARY SVC/PX: CPT

## 2020-09-11 PROCEDURE — 25010000002 HEPARIN (PORCINE) PER 1000 UNITS: Performed by: INTERNAL MEDICINE

## 2020-09-11 PROCEDURE — 99231 SBSQ HOSP IP/OBS SF/LOW 25: CPT | Performed by: SURGERY

## 2020-09-11 PROCEDURE — 82962 GLUCOSE BLOOD TEST: CPT

## 2020-09-11 RX ADMIN — BUDESONIDE 0.5 MG: 0.5 INHALANT RESPIRATORY (INHALATION) at 06:20

## 2020-09-11 RX ADMIN — IPRATROPIUM BROMIDE AND ALBUTEROL SULFATE 3 ML: .5; 3 SOLUTION RESPIRATORY (INHALATION) at 18:50

## 2020-09-11 RX ADMIN — LEVOTHYROXINE SODIUM 75 MCG: 75 TABLET ORAL at 08:05

## 2020-09-11 RX ADMIN — FAMOTIDINE 20 MG: 20 TABLET, FILM COATED ORAL at 08:05

## 2020-09-11 RX ADMIN — INSULIN ASPART 7 UNITS: 100 INJECTION, SOLUTION INTRAVENOUS; SUBCUTANEOUS at 14:16

## 2020-09-11 RX ADMIN — OLANZAPINE 7.5 MG: 2.5 TABLET ORAL at 21:19

## 2020-09-11 RX ADMIN — GUAIFENESIN 1200 MG: 600 TABLET, EXTENDED RELEASE ORAL at 21:19

## 2020-09-11 RX ADMIN — SILVER SULFADIAZINE: 10 CREAM TOPICAL at 08:06

## 2020-09-11 RX ADMIN — METOPROLOL SUCCINATE 25 MG: 25 TABLET, EXTENDED RELEASE ORAL at 08:05

## 2020-09-11 RX ADMIN — SILVER SULFADIAZINE: 10 CREAM TOPICAL at 21:19

## 2020-09-11 RX ADMIN — HEPARIN SODIUM 5000 UNITS: 5000 INJECTION INTRAVENOUS; SUBCUTANEOUS at 14:31

## 2020-09-11 RX ADMIN — BUDESONIDE 0.5 MG: 0.5 INHALANT RESPIRATORY (INHALATION) at 18:51

## 2020-09-11 RX ADMIN — IPRATROPIUM BROMIDE AND ALBUTEROL SULFATE 3 ML: .5; 3 SOLUTION RESPIRATORY (INHALATION) at 06:20

## 2020-09-11 RX ADMIN — TAMSULOSIN HYDROCHLORIDE 0.4 MG: 0.4 CAPSULE ORAL at 08:05

## 2020-09-11 RX ADMIN — ASPIRIN 81 MG: 81 TABLET, COATED ORAL at 08:05

## 2020-09-11 RX ADMIN — MEMANTINE HYDROCHLORIDE 5 MG: 5 TABLET, FILM COATED ORAL at 21:19

## 2020-09-11 RX ADMIN — HEPARIN SODIUM 5000 UNITS: 5000 INJECTION INTRAVENOUS; SUBCUTANEOUS at 06:08

## 2020-09-11 RX ADMIN — GUAIFENESIN 1200 MG: 600 TABLET, EXTENDED RELEASE ORAL at 08:05

## 2020-09-11 RX ADMIN — MEMANTINE HYDROCHLORIDE 5 MG: 5 TABLET, FILM COATED ORAL at 08:05

## 2020-09-11 RX ADMIN — IPRATROPIUM BROMIDE AND ALBUTEROL SULFATE 3 ML: .5; 3 SOLUTION RESPIRATORY (INHALATION) at 13:36

## 2020-09-11 RX ADMIN — ACETAMINOPHEN 650 MG: 325 TABLET ORAL at 08:10

## 2020-09-11 RX ADMIN — HEPARIN SODIUM 5000 UNITS: 5000 INJECTION INTRAVENOUS; SUBCUTANEOUS at 21:18

## 2020-09-11 RX ADMIN — Medication 100 MG: at 08:05

## 2020-09-12 LAB
GLUCOSE BLDC GLUCOMTR-MCNC: 221 MG/DL (ref 70–130)
GLUCOSE BLDC GLUCOMTR-MCNC: 226 MG/DL (ref 70–130)
GLUCOSE BLDC GLUCOMTR-MCNC: 233 MG/DL (ref 70–130)
GLUCOSE BLDC GLUCOMTR-MCNC: 378 MG/DL (ref 70–130)

## 2020-09-12 PROCEDURE — 99233 SBSQ HOSP IP/OBS HIGH 50: CPT | Performed by: FAMILY MEDICINE

## 2020-09-12 PROCEDURE — 94799 UNLISTED PULMONARY SVC/PX: CPT

## 2020-09-12 PROCEDURE — 63710000001 INSULIN DETEMIR PER 5 UNITS: Performed by: FAMILY MEDICINE

## 2020-09-12 PROCEDURE — 82962 GLUCOSE BLOOD TEST: CPT

## 2020-09-12 PROCEDURE — 25010000002 HEPARIN (PORCINE) PER 1000 UNITS: Performed by: INTERNAL MEDICINE

## 2020-09-12 PROCEDURE — 63710000001 INSULIN ASPART PER 5 UNITS: Performed by: FAMILY MEDICINE

## 2020-09-12 RX ORDER — DOCUSATE SODIUM 100 MG/1
200 CAPSULE, LIQUID FILLED ORAL 2 TIMES DAILY
Status: COMPLETED | OUTPATIENT
Start: 2020-09-12 | End: 2020-09-14

## 2020-09-12 RX ORDER — POLYETHYLENE GLYCOL 3350 17 G/17G
17 POWDER, FOR SOLUTION ORAL 2 TIMES DAILY
Status: DISPENSED | OUTPATIENT
Start: 2020-09-12 | End: 2020-09-14

## 2020-09-12 RX ADMIN — GUAIFENESIN 1200 MG: 600 TABLET, EXTENDED RELEASE ORAL at 08:27

## 2020-09-12 RX ADMIN — SILVER SULFADIAZINE: 10 CREAM TOPICAL at 08:27

## 2020-09-12 RX ADMIN — LEVOTHYROXINE SODIUM 75 MCG: 75 TABLET ORAL at 08:25

## 2020-09-12 RX ADMIN — ACETAMINOPHEN 650 MG: 325 TABLET ORAL at 08:24

## 2020-09-12 RX ADMIN — MEMANTINE HYDROCHLORIDE 5 MG: 5 TABLET, FILM COATED ORAL at 08:27

## 2020-09-12 RX ADMIN — INSULIN ASPART 7 UNITS: 100 INJECTION, SOLUTION INTRAVENOUS; SUBCUTANEOUS at 08:27

## 2020-09-12 RX ADMIN — HEPARIN SODIUM 5000 UNITS: 5000 INJECTION INTRAVENOUS; SUBCUTANEOUS at 13:13

## 2020-09-12 RX ADMIN — INSULIN ASPART 3 UNITS: 100 INJECTION, SOLUTION INTRAVENOUS; SUBCUTANEOUS at 17:43

## 2020-09-12 RX ADMIN — OLANZAPINE 7.5 MG: 2.5 TABLET ORAL at 21:52

## 2020-09-12 RX ADMIN — IPRATROPIUM BROMIDE AND ALBUTEROL SULFATE 3 ML: .5; 3 SOLUTION RESPIRATORY (INHALATION) at 13:31

## 2020-09-12 RX ADMIN — HEPARIN SODIUM 5000 UNITS: 5000 INJECTION INTRAVENOUS; SUBCUTANEOUS at 21:52

## 2020-09-12 RX ADMIN — GUAIFENESIN 1200 MG: 600 TABLET, EXTENDED RELEASE ORAL at 21:52

## 2020-09-12 RX ADMIN — HEPARIN SODIUM 5000 UNITS: 5000 INJECTION INTRAVENOUS; SUBCUTANEOUS at 06:12

## 2020-09-12 RX ADMIN — METOPROLOL SUCCINATE 25 MG: 25 TABLET, EXTENDED RELEASE ORAL at 08:26

## 2020-09-12 RX ADMIN — INSULIN ASPART 3 UNITS: 100 INJECTION, SOLUTION INTRAVENOUS; SUBCUTANEOUS at 13:12

## 2020-09-12 RX ADMIN — FAMOTIDINE 20 MG: 20 TABLET, FILM COATED ORAL at 17:48

## 2020-09-12 RX ADMIN — ASPIRIN 81 MG: 81 TABLET, COATED ORAL at 08:27

## 2020-09-12 RX ADMIN — SILVER SULFADIAZINE: 10 CREAM TOPICAL at 21:52

## 2020-09-12 RX ADMIN — MEMANTINE HYDROCHLORIDE 5 MG: 5 TABLET, FILM COATED ORAL at 21:52

## 2020-09-12 RX ADMIN — FAMOTIDINE 20 MG: 20 TABLET, FILM COATED ORAL at 08:26

## 2020-09-12 RX ADMIN — DOCUSATE SODIUM 200 MG: 100 CAPSULE ORAL at 11:58

## 2020-09-12 RX ADMIN — POLYETHYLENE GLYCOL (3350) 17 G: 17 POWDER, FOR SOLUTION ORAL at 21:52

## 2020-09-12 RX ADMIN — INSULIN DETEMIR 20 UNITS: 100 INJECTION, SOLUTION SUBCUTANEOUS at 12:00

## 2020-09-12 RX ADMIN — INSULIN DETEMIR 20 UNITS: 100 INJECTION, SOLUTION SUBCUTANEOUS at 21:53

## 2020-09-12 RX ADMIN — POLYETHYLENE GLYCOL (3350) 17 G: 17 POWDER, FOR SOLUTION ORAL at 11:58

## 2020-09-12 RX ADMIN — DOCUSATE SODIUM 200 MG: 100 CAPSULE ORAL at 21:52

## 2020-09-12 RX ADMIN — TAMSULOSIN HYDROCHLORIDE 0.4 MG: 0.4 CAPSULE ORAL at 08:27

## 2020-09-12 RX ADMIN — Medication 100 MG: at 08:26

## 2020-09-13 LAB
GLUCOSE BLDC GLUCOMTR-MCNC: 112 MG/DL (ref 70–130)
GLUCOSE BLDC GLUCOMTR-MCNC: 138 MG/DL (ref 70–130)
GLUCOSE BLDC GLUCOMTR-MCNC: 174 MG/DL (ref 70–130)
GLUCOSE BLDC GLUCOMTR-MCNC: 184 MG/DL (ref 70–130)
GLUCOSE BLDC GLUCOMTR-MCNC: 292 MG/DL (ref 70–130)
GLUCOSE BLDC GLUCOMTR-MCNC: 51 MG/DL (ref 70–130)
GLUCOSE BLDC GLUCOMTR-MCNC: 52 MG/DL (ref 70–130)
GLUCOSE BLDC GLUCOMTR-MCNC: 66 MG/DL (ref 70–130)

## 2020-09-13 PROCEDURE — 63710000001 INSULIN ASPART PER 5 UNITS: Performed by: FAMILY MEDICINE

## 2020-09-13 PROCEDURE — 82962 GLUCOSE BLOOD TEST: CPT

## 2020-09-13 PROCEDURE — 94799 UNLISTED PULMONARY SVC/PX: CPT

## 2020-09-13 PROCEDURE — 99233 SBSQ HOSP IP/OBS HIGH 50: CPT | Performed by: FAMILY MEDICINE

## 2020-09-13 PROCEDURE — 63710000001 INSULIN DETEMIR PER 5 UNITS: Performed by: FAMILY MEDICINE

## 2020-09-13 PROCEDURE — 25010000002 HEPARIN (PORCINE) PER 1000 UNITS: Performed by: INTERNAL MEDICINE

## 2020-09-13 RX ADMIN — HEPARIN SODIUM 5000 UNITS: 5000 INJECTION INTRAVENOUS; SUBCUTANEOUS at 15:22

## 2020-09-13 RX ADMIN — METOPROLOL SUCCINATE 25 MG: 25 TABLET, EXTENDED RELEASE ORAL at 08:12

## 2020-09-13 RX ADMIN — Medication 100 MG: at 08:13

## 2020-09-13 RX ADMIN — DEXTROSE 15 G: 15 GEL ORAL at 12:21

## 2020-09-13 RX ADMIN — SILVER SULFADIAZINE: 10 CREAM TOPICAL at 08:09

## 2020-09-13 RX ADMIN — MEMANTINE HYDROCHLORIDE 5 MG: 5 TABLET, FILM COATED ORAL at 08:13

## 2020-09-13 RX ADMIN — FAMOTIDINE 20 MG: 20 TABLET, FILM COATED ORAL at 17:10

## 2020-09-13 RX ADMIN — GUAIFENESIN 1200 MG: 600 TABLET, EXTENDED RELEASE ORAL at 22:09

## 2020-09-13 RX ADMIN — DOCUSATE SODIUM 200 MG: 100 CAPSULE ORAL at 22:09

## 2020-09-13 RX ADMIN — DOCUSATE SODIUM 200 MG: 100 CAPSULE ORAL at 08:12

## 2020-09-13 RX ADMIN — INSULIN DETEMIR 20 UNITS: 100 INJECTION, SOLUTION SUBCUTANEOUS at 22:07

## 2020-09-13 RX ADMIN — INSULIN ASPART 3 UNITS: 100 INJECTION, SOLUTION INTRAVENOUS; SUBCUTANEOUS at 17:10

## 2020-09-13 RX ADMIN — ASPIRIN 81 MG: 81 TABLET, COATED ORAL at 08:13

## 2020-09-13 RX ADMIN — GUAIFENESIN 1200 MG: 600 TABLET, EXTENDED RELEASE ORAL at 08:13

## 2020-09-13 RX ADMIN — POLYETHYLENE GLYCOL (3350) 17 G: 17 POWDER, FOR SOLUTION ORAL at 08:11

## 2020-09-13 RX ADMIN — DEXTROSE MONOHYDRATE 50 ML: 25 INJECTION, SOLUTION INTRAVENOUS at 12:58

## 2020-09-13 RX ADMIN — OLANZAPINE 7.5 MG: 2.5 TABLET ORAL at 22:08

## 2020-09-13 RX ADMIN — FAMOTIDINE 20 MG: 20 TABLET, FILM COATED ORAL at 08:13

## 2020-09-13 RX ADMIN — SILVER SULFADIAZINE: 10 CREAM TOPICAL at 22:12

## 2020-09-13 RX ADMIN — HEPARIN SODIUM 5000 UNITS: 5000 INJECTION INTRAVENOUS; SUBCUTANEOUS at 06:13

## 2020-09-13 RX ADMIN — SODIUM CHLORIDE, PRESERVATIVE FREE 10 ML: 5 INJECTION INTRAVENOUS at 22:10

## 2020-09-13 RX ADMIN — BUDESONIDE 0.5 MG: 0.5 INHALANT RESPIRATORY (INHALATION) at 18:36

## 2020-09-13 RX ADMIN — IPRATROPIUM BROMIDE AND ALBUTEROL SULFATE 3 ML: .5; 3 SOLUTION RESPIRATORY (INHALATION) at 13:03

## 2020-09-13 RX ADMIN — TAMSULOSIN HYDROCHLORIDE 0.4 MG: 0.4 CAPSULE ORAL at 08:13

## 2020-09-13 RX ADMIN — LEVOTHYROXINE SODIUM 75 MCG: 75 TABLET ORAL at 08:12

## 2020-09-13 RX ADMIN — INSULIN DETEMIR 20 UNITS: 100 INJECTION, SOLUTION SUBCUTANEOUS at 09:10

## 2020-09-13 RX ADMIN — MEMANTINE HYDROCHLORIDE 5 MG: 5 TABLET, FILM COATED ORAL at 22:08

## 2020-09-13 RX ADMIN — SODIUM CHLORIDE, PRESERVATIVE FREE 10 ML: 5 INJECTION INTRAVENOUS at 22:06

## 2020-09-13 RX ADMIN — IPRATROPIUM BROMIDE AND ALBUTEROL SULFATE 3 ML: .5; 3 SOLUTION RESPIRATORY (INHALATION) at 18:36

## 2020-09-13 RX ADMIN — HEPARIN SODIUM 5000 UNITS: 5000 INJECTION INTRAVENOUS; SUBCUTANEOUS at 22:08

## 2020-09-14 PROBLEM — T30.0 BURN: Status: ACTIVE | Noted: 2020-08-26

## 2020-09-14 LAB
ALBUMIN SERPL-MCNC: 2.71 G/DL (ref 3.5–5.2)
ALBUMIN/GLOB SERPL: 0.8 G/DL
ALP SERPL-CCNC: 81 U/L (ref 39–117)
ALT SERPL W P-5'-P-CCNC: 20 U/L (ref 1–41)
ANION GAP SERPL CALCULATED.3IONS-SCNC: 10.3 MMOL/L (ref 5–15)
AST SERPL-CCNC: 17 U/L (ref 1–40)
BASOPHILS # BLD AUTO: 0.01 10*3/MM3 (ref 0–0.2)
BASOPHILS NFR BLD AUTO: 0.1 % (ref 0–1.5)
BILIRUB SERPL-MCNC: 0.4 MG/DL (ref 0–1.2)
BUN SERPL-MCNC: 40 MG/DL (ref 8–23)
BUN/CREAT SERPL: 24.5 (ref 7–25)
CALCIUM SPEC-SCNC: 8.3 MG/DL (ref 8.6–10.5)
CHLORIDE SERPL-SCNC: 102 MMOL/L (ref 98–107)
CO2 SERPL-SCNC: 22.7 MMOL/L (ref 22–29)
CREAT SERPL-MCNC: 1.63 MG/DL (ref 0.76–1.27)
DEPRECATED RDW RBC AUTO: 46.3 FL (ref 37–54)
EOSINOPHIL # BLD AUTO: 0.11 10*3/MM3 (ref 0–0.4)
EOSINOPHIL NFR BLD AUTO: 1.4 % (ref 0.3–6.2)
ERYTHROCYTE [DISTWIDTH] IN BLOOD BY AUTOMATED COUNT: 14.5 % (ref 12.3–15.4)
GFR SERPL CREATININE-BSD FRML MDRD: 42 ML/MIN/1.73
GLOBULIN UR ELPH-MCNC: 3.2 GM/DL
GLUCOSE BLDC GLUCOMTR-MCNC: 113 MG/DL (ref 70–130)
GLUCOSE BLDC GLUCOMTR-MCNC: 170 MG/DL (ref 70–130)
GLUCOSE BLDC GLUCOMTR-MCNC: 193 MG/DL (ref 70–130)
GLUCOSE BLDC GLUCOMTR-MCNC: 197 MG/DL (ref 70–130)
GLUCOSE BLDC GLUCOMTR-MCNC: 272 MG/DL (ref 70–130)
GLUCOSE BLDC GLUCOMTR-MCNC: 48 MG/DL (ref 70–130)
GLUCOSE BLDC GLUCOMTR-MCNC: 59 MG/DL (ref 70–130)
GLUCOSE BLDC GLUCOMTR-MCNC: 84 MG/DL (ref 70–130)
GLUCOSE SERPL-MCNC: 257 MG/DL (ref 65–99)
HCT VFR BLD AUTO: 41.2 % (ref 37.5–51)
HGB BLD-MCNC: 12.2 G/DL (ref 13–17.7)
IMM GRANULOCYTES # BLD AUTO: 0.05 10*3/MM3 (ref 0–0.05)
IMM GRANULOCYTES NFR BLD AUTO: 0.6 % (ref 0–0.5)
LYMPHOCYTES # BLD AUTO: 1.01 10*3/MM3 (ref 0.7–3.1)
LYMPHOCYTES NFR BLD AUTO: 12.4 % (ref 19.6–45.3)
MCH RBC QN AUTO: 26.4 PG (ref 26.6–33)
MCHC RBC AUTO-ENTMCNC: 29.6 G/DL (ref 31.5–35.7)
MCV RBC AUTO: 89.2 FL (ref 79–97)
MONOCYTES # BLD AUTO: 0.6 10*3/MM3 (ref 0.1–0.9)
MONOCYTES NFR BLD AUTO: 7.4 % (ref 5–12)
NEUTROPHILS NFR BLD AUTO: 6.36 10*3/MM3 (ref 1.7–7)
NEUTROPHILS NFR BLD AUTO: 78.1 % (ref 42.7–76)
NRBC BLD AUTO-RTO: 0 /100 WBC (ref 0–0.2)
PLATELET # BLD AUTO: 250 10*3/MM3 (ref 140–450)
PMV BLD AUTO: 9.4 FL (ref 6–12)
POTASSIUM SERPL-SCNC: 3.9 MMOL/L (ref 3.5–5.2)
PROT SERPL-MCNC: 5.9 G/DL (ref 6–8.5)
RBC # BLD AUTO: 4.62 10*6/MM3 (ref 4.14–5.8)
SARS-COV-2 RDRP RESP QL NAA+PROBE: NOT DETECTED
SODIUM SERPL-SCNC: 135 MMOL/L (ref 136–145)
WBC # BLD AUTO: 8.14 10*3/MM3 (ref 3.4–10.8)

## 2020-09-14 PROCEDURE — 63710000001 INSULIN ASPART PER 5 UNITS: Performed by: FAMILY MEDICINE

## 2020-09-14 PROCEDURE — 82962 GLUCOSE BLOOD TEST: CPT

## 2020-09-14 PROCEDURE — 63710000001 INSULIN DETEMIR PER 5 UNITS: Performed by: FAMILY MEDICINE

## 2020-09-14 PROCEDURE — 87635 SARS-COV-2 COVID-19 AMP PRB: CPT | Performed by: SURGERY

## 2020-09-14 PROCEDURE — 80053 COMPREHEN METABOLIC PANEL: CPT | Performed by: INTERNAL MEDICINE

## 2020-09-14 PROCEDURE — 94799 UNLISTED PULMONARY SVC/PX: CPT

## 2020-09-14 PROCEDURE — 85025 COMPLETE CBC W/AUTO DIFF WBC: CPT | Performed by: INTERNAL MEDICINE

## 2020-09-14 PROCEDURE — 99232 SBSQ HOSP IP/OBS MODERATE 35: CPT | Performed by: INTERNAL MEDICINE

## 2020-09-14 PROCEDURE — 25010000002 HEPARIN (PORCINE) PER 1000 UNITS: Performed by: INTERNAL MEDICINE

## 2020-09-14 PROCEDURE — 63710000001 INSULIN DETEMIR PER 5 UNITS: Performed by: INTERNAL MEDICINE

## 2020-09-14 RX ORDER — CEFAZOLIN SODIUM 2 G/50ML
2 SOLUTION INTRAVENOUS ONCE
Status: COMPLETED | OUTPATIENT
Start: 2020-09-15 | End: 2020-09-15

## 2020-09-14 RX ORDER — CEFAZOLIN SODIUM 2 G/50ML
2 SOLUTION INTRAVENOUS ONCE
Status: DISCONTINUED | OUTPATIENT
Start: 2020-09-14 | End: 2020-09-14

## 2020-09-14 RX ADMIN — IPRATROPIUM BROMIDE AND ALBUTEROL SULFATE 3 ML: .5; 3 SOLUTION RESPIRATORY (INHALATION) at 06:26

## 2020-09-14 RX ADMIN — METOPROLOL SUCCINATE 25 MG: 25 TABLET, EXTENDED RELEASE ORAL at 09:02

## 2020-09-14 RX ADMIN — LEVOTHYROXINE SODIUM 75 MCG: 75 TABLET ORAL at 09:02

## 2020-09-14 RX ADMIN — GUAIFENESIN 1200 MG: 600 TABLET, EXTENDED RELEASE ORAL at 09:02

## 2020-09-14 RX ADMIN — BUDESONIDE 0.5 MG: 0.5 INHALANT RESPIRATORY (INHALATION) at 06:27

## 2020-09-14 RX ADMIN — Medication 5 MG: at 20:29

## 2020-09-14 RX ADMIN — INSULIN DETEMIR 20 UNITS: 100 INJECTION, SOLUTION SUBCUTANEOUS at 09:03

## 2020-09-14 RX ADMIN — DOCUSATE SODIUM 200 MG: 100 CAPSULE ORAL at 09:02

## 2020-09-14 RX ADMIN — TAMSULOSIN HYDROCHLORIDE 0.4 MG: 0.4 CAPSULE ORAL at 09:02

## 2020-09-14 RX ADMIN — DEXTROSE MONOHYDRATE 25 G: 25 INJECTION, SOLUTION INTRAVENOUS at 05:47

## 2020-09-14 RX ADMIN — HEPARIN SODIUM 5000 UNITS: 5000 INJECTION INTRAVENOUS; SUBCUTANEOUS at 14:34

## 2020-09-14 RX ADMIN — DOCUSATE SODIUM 200 MG: 100 CAPSULE ORAL at 20:29

## 2020-09-14 RX ADMIN — ASPIRIN 81 MG: 81 TABLET, COATED ORAL at 09:02

## 2020-09-14 RX ADMIN — SILVER SULFADIAZINE: 10 CREAM TOPICAL at 09:01

## 2020-09-14 RX ADMIN — FAMOTIDINE 20 MG: 20 TABLET, FILM COATED ORAL at 17:46

## 2020-09-14 RX ADMIN — IPRATROPIUM BROMIDE AND ALBUTEROL SULFATE 3 ML: .5; 3 SOLUTION RESPIRATORY (INHALATION) at 13:07

## 2020-09-14 RX ADMIN — Medication 100 MG: at 09:02

## 2020-09-14 RX ADMIN — GUAIFENESIN 1200 MG: 600 TABLET, EXTENDED RELEASE ORAL at 20:30

## 2020-09-14 RX ADMIN — ACETAMINOPHEN 650 MG: 325 TABLET ORAL at 20:30

## 2020-09-14 RX ADMIN — INSULIN ASPART 3 UNITS: 100 INJECTION, SOLUTION INTRAVENOUS; SUBCUTANEOUS at 11:12

## 2020-09-14 RX ADMIN — MEMANTINE HYDROCHLORIDE 5 MG: 5 TABLET, FILM COATED ORAL at 20:30

## 2020-09-14 RX ADMIN — DEXTROSE MONOHYDRATE 25 G: 25 INJECTION, SOLUTION INTRAVENOUS at 16:26

## 2020-09-14 RX ADMIN — HEPARIN SODIUM 5000 UNITS: 5000 INJECTION INTRAVENOUS; SUBCUTANEOUS at 23:05

## 2020-09-14 RX ADMIN — SILVER SULFADIAZINE 1 APPLICATION: 10 CREAM TOPICAL at 20:30

## 2020-09-14 RX ADMIN — SODIUM CHLORIDE, PRESERVATIVE FREE 10 ML: 5 INJECTION INTRAVENOUS at 20:31

## 2020-09-14 RX ADMIN — MEMANTINE HYDROCHLORIDE 5 MG: 5 TABLET, FILM COATED ORAL at 09:02

## 2020-09-14 RX ADMIN — INSULIN DETEMIR 15 UNITS: 100 INJECTION, SOLUTION SUBCUTANEOUS at 23:05

## 2020-09-14 RX ADMIN — HEPARIN SODIUM 5000 UNITS: 5000 INJECTION INTRAVENOUS; SUBCUTANEOUS at 05:36

## 2020-09-14 RX ADMIN — SODIUM CHLORIDE, PRESERVATIVE FREE 10 ML: 5 INJECTION INTRAVENOUS at 20:30

## 2020-09-14 RX ADMIN — FAMOTIDINE 20 MG: 20 TABLET, FILM COATED ORAL at 09:02

## 2020-09-14 RX ADMIN — SODIUM CHLORIDE, PRESERVATIVE FREE 10 ML: 5 INJECTION INTRAVENOUS at 09:01

## 2020-09-14 RX ADMIN — OLANZAPINE 7.5 MG: 2.5 TABLET ORAL at 20:29

## 2020-09-15 ENCOUNTER — ANESTHESIA (OUTPATIENT)
Dept: PERIOP | Facility: HOSPITAL | Age: 75
End: 2020-09-15

## 2020-09-15 ENCOUNTER — ANESTHESIA EVENT (OUTPATIENT)
Dept: PERIOP | Facility: HOSPITAL | Age: 75
End: 2020-09-15

## 2020-09-15 LAB
ALBUMIN SERPL-MCNC: 2.84 G/DL (ref 3.5–5.2)
ALBUMIN/GLOB SERPL: 1 G/DL
ALP SERPL-CCNC: 80 U/L (ref 39–117)
ALT SERPL W P-5'-P-CCNC: 18 U/L (ref 1–41)
ANION GAP SERPL CALCULATED.3IONS-SCNC: 9.5 MMOL/L (ref 5–15)
AST SERPL-CCNC: 15 U/L (ref 1–40)
BASOPHILS # BLD AUTO: 0.01 10*3/MM3 (ref 0–0.2)
BASOPHILS NFR BLD AUTO: 0.1 % (ref 0–1.5)
BILIRUB SERPL-MCNC: 0.2 MG/DL (ref 0–1.2)
BUN SERPL-MCNC: 35 MG/DL (ref 8–23)
BUN/CREAT SERPL: 23.3 (ref 7–25)
CALCIUM SPEC-SCNC: 8.6 MG/DL (ref 8.6–10.5)
CHLORIDE SERPL-SCNC: 103 MMOL/L (ref 98–107)
CO2 SERPL-SCNC: 26.5 MMOL/L (ref 22–29)
CREAT SERPL-MCNC: 1.5 MG/DL (ref 0.76–1.27)
DEPRECATED RDW RBC AUTO: 46.8 FL (ref 37–54)
EOSINOPHIL # BLD AUTO: 0.16 10*3/MM3 (ref 0–0.4)
EOSINOPHIL NFR BLD AUTO: 2.2 % (ref 0.3–6.2)
ERYTHROCYTE [DISTWIDTH] IN BLOOD BY AUTOMATED COUNT: 14.4 % (ref 12.3–15.4)
GFR SERPL CREATININE-BSD FRML MDRD: 46 ML/MIN/1.73
GLOBULIN UR ELPH-MCNC: 3 GM/DL
GLUCOSE BLDC GLUCOMTR-MCNC: 132 MG/DL (ref 70–130)
GLUCOSE BLDC GLUCOMTR-MCNC: 141 MG/DL (ref 70–130)
GLUCOSE BLDC GLUCOMTR-MCNC: 142 MG/DL (ref 70–130)
GLUCOSE BLDC GLUCOMTR-MCNC: 193 MG/DL (ref 70–130)
GLUCOSE BLDC GLUCOMTR-MCNC: 361 MG/DL (ref 70–130)
GLUCOSE BLDC GLUCOMTR-MCNC: 56 MG/DL (ref 70–130)
GLUCOSE BLDC GLUCOMTR-MCNC: 70 MG/DL (ref 70–130)
GLUCOSE BLDC GLUCOMTR-MCNC: 85 MG/DL (ref 70–130)
GLUCOSE SERPL-MCNC: 127 MG/DL (ref 65–99)
HCT VFR BLD AUTO: 39.4 % (ref 37.5–51)
HGB BLD-MCNC: 11.3 G/DL (ref 13–17.7)
HYPOCHROMIA BLD QL: NORMAL
IMM GRANULOCYTES # BLD AUTO: 0.05 10*3/MM3 (ref 0–0.05)
IMM GRANULOCYTES NFR BLD AUTO: 0.7 % (ref 0–0.5)
LYMPHOCYTES # BLD AUTO: 1.34 10*3/MM3 (ref 0.7–3.1)
LYMPHOCYTES NFR BLD AUTO: 18.3 % (ref 19.6–45.3)
MCH RBC QN AUTO: 25.7 PG (ref 26.6–33)
MCHC RBC AUTO-ENTMCNC: 28.7 G/DL (ref 31.5–35.7)
MCV RBC AUTO: 89.5 FL (ref 79–97)
MONOCYTES # BLD AUTO: 0.54 10*3/MM3 (ref 0.1–0.9)
MONOCYTES NFR BLD AUTO: 7.4 % (ref 5–12)
NEUTROPHILS NFR BLD AUTO: 5.24 10*3/MM3 (ref 1.7–7)
NEUTROPHILS NFR BLD AUTO: 71.3 % (ref 42.7–76)
NRBC BLD AUTO-RTO: 0 /100 WBC (ref 0–0.2)
PLAT MORPH BLD: NORMAL
PLATELET # BLD AUTO: 258 10*3/MM3 (ref 140–450)
PMV BLD AUTO: 9.2 FL (ref 6–12)
POTASSIUM SERPL-SCNC: 3.6 MMOL/L (ref 3.5–5.2)
PROT SERPL-MCNC: 5.8 G/DL (ref 6–8.5)
RBC # BLD AUTO: 4.4 10*6/MM3 (ref 4.14–5.8)
SODIUM SERPL-SCNC: 139 MMOL/L (ref 136–145)
WBC # BLD AUTO: 7.34 10*3/MM3 (ref 3.4–10.8)

## 2020-09-15 PROCEDURE — 25010000002 PHENYLEPHRINE PER 1 ML: Performed by: NURSE ANESTHETIST, CERTIFIED REGISTERED

## 2020-09-15 PROCEDURE — 0HBJXZZ EXCISION OF LEFT UPPER LEG SKIN, EXTERNAL APPROACH: ICD-10-PCS | Performed by: SURGERY

## 2020-09-15 PROCEDURE — 25010000002 HEPARIN (PORCINE) PER 1000 UNITS: Performed by: INTERNAL MEDICINE

## 2020-09-15 PROCEDURE — 25010000002 PROPOFOL 10 MG/ML EMULSION: Performed by: NURSE ANESTHETIST, CERTIFIED REGISTERED

## 2020-09-15 PROCEDURE — 25010000003 CEFAZOLIN SODIUM-DEXTROSE 2-3 GM-%(50ML) RECONSTITUTED SOLUTION: Performed by: SURGERY

## 2020-09-15 PROCEDURE — 85025 COMPLETE CBC W/AUTO DIFF WBC: CPT | Performed by: INTERNAL MEDICINE

## 2020-09-15 PROCEDURE — 85007 BL SMEAR W/DIFF WBC COUNT: CPT | Performed by: INTERNAL MEDICINE

## 2020-09-15 PROCEDURE — 63710000001 INSULIN DETEMIR PER 5 UNITS: Performed by: INTERNAL MEDICINE

## 2020-09-15 PROCEDURE — 94799 UNLISTED PULMONARY SVC/PX: CPT

## 2020-09-15 PROCEDURE — 82962 GLUCOSE BLOOD TEST: CPT

## 2020-09-15 PROCEDURE — 63710000001 INSULIN DETEMIR PER 5 UNITS: Performed by: SURGERY

## 2020-09-15 PROCEDURE — 16025 DRESS/DEBRID P-THICK BURN M: CPT | Performed by: SURGERY

## 2020-09-15 PROCEDURE — 25010000002 HEPARIN (PORCINE) PER 1000 UNITS: Performed by: SURGERY

## 2020-09-15 PROCEDURE — 99232 SBSQ HOSP IP/OBS MODERATE 35: CPT | Performed by: INTERNAL MEDICINE

## 2020-09-15 PROCEDURE — 25010000002 FENTANYL CITRATE (PF) 100 MCG/2ML SOLUTION: Performed by: NURSE ANESTHETIST, CERTIFIED REGISTERED

## 2020-09-15 PROCEDURE — 0HBLXZZ EXCISION OF LEFT LOWER LEG SKIN, EXTERNAL APPROACH: ICD-10-PCS | Performed by: SURGERY

## 2020-09-15 PROCEDURE — 80053 COMPREHEN METABOLIC PANEL: CPT | Performed by: INTERNAL MEDICINE

## 2020-09-15 RX ORDER — IPRATROPIUM BROMIDE AND ALBUTEROL SULFATE 2.5; .5 MG/3ML; MG/3ML
3 SOLUTION RESPIRATORY (INHALATION) ONCE AS NEEDED
Status: DISCONTINUED | OUTPATIENT
Start: 2020-09-15 | End: 2020-09-15 | Stop reason: HOSPADM

## 2020-09-15 RX ORDER — MAGNESIUM HYDROXIDE 1200 MG/15ML
LIQUID ORAL AS NEEDED
Status: DISCONTINUED | OUTPATIENT
Start: 2020-09-15 | End: 2020-09-15 | Stop reason: HOSPADM

## 2020-09-15 RX ORDER — ACETAMINOPHEN 500 MG
1000 TABLET ORAL EVERY 6 HOURS
Status: DISPENSED | OUTPATIENT
Start: 2020-09-15 | End: 2020-09-17

## 2020-09-15 RX ORDER — FENTANYL CITRATE 50 UG/ML
50 INJECTION, SOLUTION INTRAMUSCULAR; INTRAVENOUS
Status: DISCONTINUED | OUTPATIENT
Start: 2020-09-15 | End: 2020-09-15 | Stop reason: HOSPADM

## 2020-09-15 RX ORDER — ONDANSETRON 2 MG/ML
4 INJECTION INTRAMUSCULAR; INTRAVENOUS AS NEEDED
Status: DISCONTINUED | OUTPATIENT
Start: 2020-09-15 | End: 2020-09-15 | Stop reason: HOSPADM

## 2020-09-15 RX ORDER — FENTANYL CITRATE 50 UG/ML
INJECTION, SOLUTION INTRAMUSCULAR; INTRAVENOUS AS NEEDED
Status: DISCONTINUED | OUTPATIENT
Start: 2020-09-15 | End: 2020-09-15 | Stop reason: SURG

## 2020-09-15 RX ORDER — SODIUM CHLORIDE 0.9 % (FLUSH) 0.9 %
10 SYRINGE (ML) INJECTION EVERY 12 HOURS SCHEDULED
Status: DISCONTINUED | OUTPATIENT
Start: 2020-09-15 | End: 2020-09-15 | Stop reason: HOSPADM

## 2020-09-15 RX ORDER — OXYCODONE HYDROCHLORIDE AND ACETAMINOPHEN 5; 325 MG/1; MG/1
1 TABLET ORAL ONCE AS NEEDED
Status: DISCONTINUED | OUTPATIENT
Start: 2020-09-15 | End: 2020-09-15 | Stop reason: HOSPADM

## 2020-09-15 RX ORDER — MEPERIDINE HYDROCHLORIDE 25 MG/ML
12.5 INJECTION INTRAMUSCULAR; INTRAVENOUS; SUBCUTANEOUS
Status: DISCONTINUED | OUTPATIENT
Start: 2020-09-15 | End: 2020-09-15 | Stop reason: HOSPADM

## 2020-09-15 RX ORDER — PROPOFOL 10 MG/ML
VIAL (ML) INTRAVENOUS AS NEEDED
Status: DISCONTINUED | OUTPATIENT
Start: 2020-09-15 | End: 2020-09-15 | Stop reason: SURG

## 2020-09-15 RX ORDER — SODIUM CHLORIDE 0.9 % (FLUSH) 0.9 %
10 SYRINGE (ML) INJECTION AS NEEDED
Status: DISCONTINUED | OUTPATIENT
Start: 2020-09-15 | End: 2020-09-15 | Stop reason: HOSPADM

## 2020-09-15 RX ORDER — SODIUM CHLORIDE, SODIUM LACTATE, POTASSIUM CHLORIDE, CALCIUM CHLORIDE 600; 310; 30; 20 MG/100ML; MG/100ML; MG/100ML; MG/100ML
125 INJECTION, SOLUTION INTRAVENOUS CONTINUOUS
Status: DISCONTINUED | OUTPATIENT
Start: 2020-09-15 | End: 2020-09-16

## 2020-09-15 RX ADMIN — FENTANYL CITRATE 50 MCG: 50 INJECTION INTRAMUSCULAR; INTRAVENOUS at 13:22

## 2020-09-15 RX ADMIN — SODIUM CHLORIDE, PRESERVATIVE FREE 10 ML: 5 INJECTION INTRAVENOUS at 09:34

## 2020-09-15 RX ADMIN — IPRATROPIUM BROMIDE AND ALBUTEROL SULFATE 3 ML: .5; 3 SOLUTION RESPIRATORY (INHALATION) at 18:50

## 2020-09-15 RX ADMIN — SODIUM CHLORIDE, POTASSIUM CHLORIDE, SODIUM LACTATE AND CALCIUM CHLORIDE 125 ML/HR: 600; 310; 30; 20 INJECTION, SOLUTION INTRAVENOUS at 22:51

## 2020-09-15 RX ADMIN — SODIUM CHLORIDE, POTASSIUM CHLORIDE, SODIUM LACTATE AND CALCIUM CHLORIDE 125 ML/HR: 600; 310; 30; 20 INJECTION, SOLUTION INTRAVENOUS at 15:17

## 2020-09-15 RX ADMIN — BUDESONIDE 0.5 MG: 0.5 INHALANT RESPIRATORY (INHALATION) at 06:36

## 2020-09-15 RX ADMIN — Medication 5 MG: at 22:50

## 2020-09-15 RX ADMIN — SILVER SULFADIAZINE: 10 CREAM TOPICAL at 09:38

## 2020-09-15 RX ADMIN — GUAIFENESIN 1200 MG: 600 TABLET, EXTENDED RELEASE ORAL at 22:50

## 2020-09-15 RX ADMIN — DEXTROSE MONOHYDRATE 25 ML: 25 INJECTION, SOLUTION INTRAVENOUS at 11:33

## 2020-09-15 RX ADMIN — CEFAZOLIN SODIUM 2 G: 2 SOLUTION INTRAVENOUS at 05:12

## 2020-09-15 RX ADMIN — SODIUM CHLORIDE, PRESERVATIVE FREE 10 ML: 5 INJECTION INTRAVENOUS at 22:50

## 2020-09-15 RX ADMIN — INSULIN DETEMIR 15 UNITS: 100 INJECTION, SOLUTION SUBCUTANEOUS at 09:36

## 2020-09-15 RX ADMIN — BUDESONIDE 0.5 MG: 0.5 INHALANT RESPIRATORY (INHALATION) at 18:50

## 2020-09-15 RX ADMIN — CEFAZOLIN 2 G: 1 INJECTION, POWDER, FOR SOLUTION INTRAMUSCULAR; INTRAVENOUS; PARENTERAL at 13:20

## 2020-09-15 RX ADMIN — SODIUM CHLORIDE, POTASSIUM CHLORIDE, SODIUM LACTATE AND CALCIUM CHLORIDE 125 ML/HR: 600; 310; 30; 20 INJECTION, SOLUTION INTRAVENOUS at 13:01

## 2020-09-15 RX ADMIN — INSULIN DETEMIR 15 UNITS: 100 INJECTION, SOLUTION SUBCUTANEOUS at 23:09

## 2020-09-15 RX ADMIN — PROPOFOL 80 MG: 10 INJECTION, EMULSION INTRAVENOUS at 13:18

## 2020-09-15 RX ADMIN — PHENYLEPHRINE HYDROCHLORIDE 100 MCG: 10 INJECTION, SOLUTION INTRAMUSCULAR; INTRAVENOUS; SUBCUTANEOUS at 13:53

## 2020-09-15 RX ADMIN — ACETAMINOPHEN 1000 MG: 500 TABLET ORAL at 22:49

## 2020-09-15 RX ADMIN — OLANZAPINE 7.5 MG: 2.5 TABLET ORAL at 22:49

## 2020-09-15 RX ADMIN — IPRATROPIUM BROMIDE AND ALBUTEROL SULFATE 3 ML: .5; 3 SOLUTION RESPIRATORY (INHALATION) at 00:32

## 2020-09-15 RX ADMIN — FENTANYL CITRATE 50 MCG: 50 INJECTION INTRAMUSCULAR; INTRAVENOUS at 13:42

## 2020-09-15 RX ADMIN — DEXTROSE MONOHYDRATE 50 ML: 25 INJECTION, SOLUTION INTRAVENOUS at 15:14

## 2020-09-15 RX ADMIN — ACETAMINOPHEN 1000 MG: 500 TABLET ORAL at 17:07

## 2020-09-15 RX ADMIN — PHENYLEPHRINE HYDROCHLORIDE 100 MCG: 10 INJECTION, SOLUTION INTRAMUSCULAR; INTRAVENOUS; SUBCUTANEOUS at 13:30

## 2020-09-15 RX ADMIN — FAMOTIDINE 20 MG: 20 TABLET, FILM COATED ORAL at 17:07

## 2020-09-15 RX ADMIN — HEPARIN SODIUM 5000 UNITS: 5000 INJECTION INTRAVENOUS; SUBCUTANEOUS at 22:50

## 2020-09-15 RX ADMIN — HEPARIN SODIUM 5000 UNITS: 5000 INJECTION INTRAVENOUS; SUBCUTANEOUS at 05:10

## 2020-09-15 RX ADMIN — IPRATROPIUM BROMIDE AND ALBUTEROL SULFATE 3 ML: .5; 3 SOLUTION RESPIRATORY (INHALATION) at 06:35
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
## 2020-09-15 NOTE — ANESTHESIA PROCEDURE NOTES
Airway  Urgency: elective    Date/Time: 9/15/2020 1:19 PM  Airway not difficult    General Information and Staff    Patient location during procedure: OR  Anesthesiologist: Rashaad Carrera DO  CRNA: Maria C Ferguson CRNA    Indications and Patient Condition  Indications for airway management: airway protection    Preoxygenated: yes      Final Airway Details  Final airway type: supraglottic airway      Successful airway: unique  Size 4    Number of attempts at approach: 1  Assessment: lips, teeth, and gum same as pre-op

## 2020-09-15 NOTE — ANESTHESIA PREPROCEDURE EVALUATION
Anesthesia Evaluation     Patient summary reviewed and Nursing notes reviewed   no history of anesthetic complications:  NPO Solid Status: > 8 hours  NPO Liquid Status: > 8 hours           Airway   Mallampati: II  TM distance: >3 FB  No difficulty expected  Dental    (+) poor dentition and upper dentures    Pulmonary - negative pulmonary ROS    breath sounds clear to auscultation  Cardiovascular     ECG reviewed  Patient on routine beta blocker and Beta blocker given within 24 hours of surgery  Rhythm: regular  Rate: normal    (+) hypertension, CAD, angina, CHF Diastolic >=55%, hyperlipidemia,       Neuro/Psych  (+) tremors (Parkinson's ), psychiatric history Depression and Anxiety, dementia, poor historian.,     GI/Hepatic/Renal/Endo    (+)  GERD,  renal disease (BPH) CRI, diabetes mellitus type 2 poorly controlled, thyroid problem hypothyroidism    Musculoskeletal     (+) arthralgias, gait problem, joint swelling,   Abdominal    Substance History   (+) alcohol use (moderate use...beer daily),      OB/GYN negative ob/gyn ROS         Other   arthritis,      ROS/Med Hx Other: Echo:  ·Normal left ventricular cavity size and wall thickness noted. All left ventricular wall segments contract normally.  ·Left ventricular diastolic dysfunction is noted (grade I) consistent with impaired relaxation.  ·Estimated EF appears to be in the range of 61 - 65%.  ·Left ventricular diastolic dysfunction (grade I) consistent with impaired relaxation.  ·The aortic valve is structurally normal. No aortic valve regurgitation is present. No aortic valve stenosis is present.  ·The mitral valve is normal in structure. No mitral valve regurgitation is present. No significant mitral valve stenosis is present.  ·The tricuspid valve is normal. No tricuspid valve regurgitation is present.  ·There is no evidence of pericardial effusion.         Phys Exam Other: Edent upper and very poor dent lower.                 Anesthesia Plan    ASA 3      general   (Patient prefers GA vs SAB)  intravenous induction     Anesthetic plan, all risks, benefits, and alternatives have been provided, discussed and informed consent has been obtained with: patient.    Plan discussed with CRNA.

## 2020-09-16 LAB
ALBUMIN SERPL-MCNC: 2.8 G/DL (ref 3.5–5.2)
ALBUMIN/GLOB SERPL: 0.9 G/DL
ALP SERPL-CCNC: 79 U/L (ref 39–117)
ALT SERPL W P-5'-P-CCNC: 13 U/L (ref 1–41)
ANION GAP SERPL CALCULATED.3IONS-SCNC: 9.7 MMOL/L (ref 5–15)
AST SERPL-CCNC: 15 U/L (ref 1–40)
BASOPHILS # BLD AUTO: 0.02 10*3/MM3 (ref 0–0.2)
BASOPHILS NFR BLD AUTO: 0.2 % (ref 0–1.5)
BILIRUB SERPL-MCNC: 0.3 MG/DL (ref 0–1.2)
BUN SERPL-MCNC: 23 MG/DL (ref 8–23)
BUN/CREAT SERPL: 19.7 (ref 7–25)
CALCIUM SPEC-SCNC: 8.5 MG/DL (ref 8.6–10.5)
CHLORIDE SERPL-SCNC: 106 MMOL/L (ref 98–107)
CO2 SERPL-SCNC: 24.3 MMOL/L (ref 22–29)
CREAT SERPL-MCNC: 1.17 MG/DL (ref 0.76–1.27)
DEPRECATED RDW RBC AUTO: 48.1 FL (ref 37–54)
EOSINOPHIL # BLD AUTO: 0.16 10*3/MM3 (ref 0–0.4)
EOSINOPHIL NFR BLD AUTO: 1.6 % (ref 0.3–6.2)
ERYTHROCYTE [DISTWIDTH] IN BLOOD BY AUTOMATED COUNT: 14.5 % (ref 12.3–15.4)
GFR SERPL CREATININE-BSD FRML MDRD: 61 ML/MIN/1.73
GLOBULIN UR ELPH-MCNC: 3 GM/DL
GLUCOSE BLDC GLUCOMTR-MCNC: 141 MG/DL (ref 70–130)
GLUCOSE BLDC GLUCOMTR-MCNC: 153 MG/DL (ref 70–130)
GLUCOSE BLDC GLUCOMTR-MCNC: 185 MG/DL (ref 70–130)
GLUCOSE BLDC GLUCOMTR-MCNC: 99 MG/DL (ref 70–130)
GLUCOSE SERPL-MCNC: 210 MG/DL (ref 65–99)
HCT VFR BLD AUTO: 38.5 % (ref 37.5–51)
HGB BLD-MCNC: 11.1 G/DL (ref 13–17.7)
HYPOCHROMIA BLD QL: NORMAL
IMM GRANULOCYTES # BLD AUTO: 0.03 10*3/MM3 (ref 0–0.05)
IMM GRANULOCYTES NFR BLD AUTO: 0.3 % (ref 0–0.5)
LYMPHOCYTES # BLD AUTO: 0.75 10*3/MM3 (ref 0.7–3.1)
LYMPHOCYTES NFR BLD AUTO: 7.3 % (ref 19.6–45.3)
MCH RBC QN AUTO: 26.4 PG (ref 26.6–33)
MCHC RBC AUTO-ENTMCNC: 28.8 G/DL (ref 31.5–35.7)
MCV RBC AUTO: 91.4 FL (ref 79–97)
MONOCYTES # BLD AUTO: 0.51 10*3/MM3 (ref 0.1–0.9)
MONOCYTES NFR BLD AUTO: 5 % (ref 5–12)
NEUTROPHILS NFR BLD AUTO: 8.75 10*3/MM3 (ref 1.7–7)
NEUTROPHILS NFR BLD AUTO: 85.6 % (ref 42.7–76)
NRBC BLD AUTO-RTO: 0 /100 WBC (ref 0–0.2)
PLAT MORPH BLD: NORMAL
PLATELET # BLD AUTO: 236 10*3/MM3 (ref 140–450)
PMV BLD AUTO: 9.1 FL (ref 6–12)
POTASSIUM SERPL-SCNC: 4 MMOL/L (ref 3.5–5.2)
PROT SERPL-MCNC: 5.8 G/DL (ref 6–8.5)
RBC # BLD AUTO: 4.21 10*6/MM3 (ref 4.14–5.8)
SODIUM SERPL-SCNC: 140 MMOL/L (ref 136–145)
WBC # BLD AUTO: 10.22 10*3/MM3 (ref 3.4–10.8)

## 2020-09-16 PROCEDURE — 82962 GLUCOSE BLOOD TEST: CPT

## 2020-09-16 PROCEDURE — 97164 PT RE-EVAL EST PLAN CARE: CPT

## 2020-09-16 PROCEDURE — 80053 COMPREHEN METABOLIC PANEL: CPT | Performed by: SURGERY

## 2020-09-16 PROCEDURE — 85025 COMPLETE CBC W/AUTO DIFF WBC: CPT | Performed by: SURGERY

## 2020-09-16 PROCEDURE — 94799 UNLISTED PULMONARY SVC/PX: CPT

## 2020-09-16 PROCEDURE — 85007 BL SMEAR W/DIFF WBC COUNT: CPT | Performed by: SURGERY

## 2020-09-16 PROCEDURE — 99232 SBSQ HOSP IP/OBS MODERATE 35: CPT | Performed by: INTERNAL MEDICINE

## 2020-09-16 PROCEDURE — 97530 THERAPEUTIC ACTIVITIES: CPT

## 2020-09-16 PROCEDURE — 63710000001 INSULIN DETEMIR PER 5 UNITS: Performed by: SURGERY

## 2020-09-16 PROCEDURE — 97116 GAIT TRAINING THERAPY: CPT

## 2020-09-16 PROCEDURE — 25010000002 HEPARIN (PORCINE) PER 1000 UNITS: Performed by: SURGERY

## 2020-09-16 RX ORDER — TRAZODONE HYDROCHLORIDE 50 MG/1
25 TABLET ORAL NIGHTLY PRN
Status: DISCONTINUED | OUTPATIENT
Start: 2020-09-16 | End: 2020-09-25 | Stop reason: HOSPADM

## 2020-09-16 RX ADMIN — MEMANTINE HYDROCHLORIDE 5 MG: 5 TABLET, FILM COATED ORAL at 08:22

## 2020-09-16 RX ADMIN — INSULIN DETEMIR 15 UNITS: 100 INJECTION, SOLUTION SUBCUTANEOUS at 08:30

## 2020-09-16 RX ADMIN — TRAZODONE HYDROCHLORIDE 25 MG: 50 TABLET ORAL at 21:00

## 2020-09-16 RX ADMIN — LEVOTHYROXINE SODIUM 75 MCG: 75 TABLET ORAL at 08:22

## 2020-09-16 RX ADMIN — INSULIN DETEMIR 15 UNITS: 100 INJECTION, SOLUTION SUBCUTANEOUS at 20:43

## 2020-09-16 RX ADMIN — OLANZAPINE 7.5 MG: 2.5 TABLET ORAL at 21:00

## 2020-09-16 RX ADMIN — GUAIFENESIN 1200 MG: 600 TABLET, EXTENDED RELEASE ORAL at 08:22

## 2020-09-16 RX ADMIN — SODIUM CHLORIDE, PRESERVATIVE FREE 10 ML: 5 INJECTION INTRAVENOUS at 08:24

## 2020-09-16 RX ADMIN — HEPARIN SODIUM 5000 UNITS: 5000 INJECTION INTRAVENOUS; SUBCUTANEOUS at 14:32

## 2020-09-16 RX ADMIN — METOPROLOL SUCCINATE 25 MG: 25 TABLET, EXTENDED RELEASE ORAL at 08:22

## 2020-09-16 RX ADMIN — SODIUM CHLORIDE, PRESERVATIVE FREE 10 ML: 5 INJECTION INTRAVENOUS at 20:58

## 2020-09-16 RX ADMIN — BUDESONIDE 0.5 MG: 0.5 INHALANT RESPIRATORY (INHALATION) at 19:25

## 2020-09-16 RX ADMIN — HEPARIN SODIUM 5000 UNITS: 5000 INJECTION INTRAVENOUS; SUBCUTANEOUS at 06:19

## 2020-09-16 RX ADMIN — FAMOTIDINE 20 MG: 20 TABLET, FILM COATED ORAL at 08:22

## 2020-09-16 RX ADMIN — ASPIRIN 81 MG: 81 TABLET, COATED ORAL at 08:22

## 2020-09-16 RX ADMIN — FAMOTIDINE 20 MG: 20 TABLET, FILM COATED ORAL at 16:35

## 2020-09-16 RX ADMIN — LAMOTRIGINE 12.5 MG: 25 TABLET ORAL at 14:33

## 2020-09-16 RX ADMIN — GUAIFENESIN 1200 MG: 600 TABLET, EXTENDED RELEASE ORAL at 21:01

## 2020-09-16 RX ADMIN — IPRATROPIUM BROMIDE AND ALBUTEROL SULFATE 3 ML: .5; 3 SOLUTION RESPIRATORY (INHALATION) at 19:25

## 2020-09-16 RX ADMIN — MEMANTINE HYDROCHLORIDE 5 MG: 5 TABLET, FILM COATED ORAL at 21:01

## 2020-09-16 RX ADMIN — ACETAMINOPHEN 1000 MG: 500 TABLET ORAL at 21:00

## 2020-09-16 RX ADMIN — ACETAMINOPHEN 1000 MG: 500 TABLET ORAL at 15:39

## 2020-09-16 RX ADMIN — Medication 5 MG: at 21:11

## 2020-09-16 RX ADMIN — Medication 100 MG: at 08:22

## 2020-09-16 RX ADMIN — ACETAMINOPHEN 1000 MG: 500 TABLET ORAL at 04:25

## 2020-09-16 RX ADMIN — TAMSULOSIN HYDROCHLORIDE 0.4 MG: 0.4 CAPSULE ORAL at 08:22

## 2020-09-16 RX ADMIN — ACETAMINOPHEN 1000 MG: 500 TABLET ORAL at 08:30

## 2020-09-16 RX ADMIN — HEPARIN SODIUM 5000 UNITS: 5000 INJECTION INTRAVENOUS; SUBCUTANEOUS at 21:01

## 2020-09-16 RX ADMIN — SODIUM CHLORIDE, PRESERVATIVE FREE 10 ML: 5 INJECTION INTRAVENOUS at 08:23

## 2020-09-16 RX ADMIN — SODIUM CHLORIDE, POTASSIUM CHLORIDE, SODIUM LACTATE AND CALCIUM CHLORIDE 125 ML/HR: 600; 310; 30; 20 INJECTION, SOLUTION INTRAVENOUS at 06:23

## 2020-09-17 LAB
ALBUMIN SERPL-MCNC: 2.77 G/DL (ref 3.5–5.2)
ALBUMIN/GLOB SERPL: 0.9 G/DL
ALP SERPL-CCNC: 74 U/L (ref 39–117)
ALT SERPL W P-5'-P-CCNC: 9 U/L (ref 1–41)
ANION GAP SERPL CALCULATED.3IONS-SCNC: 7.5 MMOL/L (ref 5–15)
AST SERPL-CCNC: 12 U/L (ref 1–40)
BASOPHILS # BLD AUTO: 0.01 10*3/MM3 (ref 0–0.2)
BASOPHILS NFR BLD AUTO: 0.2 % (ref 0–1.5)
BILIRUB SERPL-MCNC: 0.2 MG/DL (ref 0–1.2)
BUN SERPL-MCNC: 20 MG/DL (ref 8–23)
BUN/CREAT SERPL: 14.8 (ref 7–25)
CALCIUM SPEC-SCNC: 8.8 MG/DL (ref 8.6–10.5)
CHLORIDE SERPL-SCNC: 108 MMOL/L (ref 98–107)
CO2 SERPL-SCNC: 25.5 MMOL/L (ref 22–29)
CREAT SERPL-MCNC: 1.35 MG/DL (ref 0.76–1.27)
DEPRECATED RDW RBC AUTO: 47.3 FL (ref 37–54)
EOSINOPHIL # BLD AUTO: 0.18 10*3/MM3 (ref 0–0.4)
EOSINOPHIL NFR BLD AUTO: 2.9 % (ref 0.3–6.2)
ERYTHROCYTE [DISTWIDTH] IN BLOOD BY AUTOMATED COUNT: 14.4 % (ref 12.3–15.4)
GFR SERPL CREATININE-BSD FRML MDRD: 52 ML/MIN/1.73
GLOBULIN UR ELPH-MCNC: 3 GM/DL
GLUCOSE BLDC GLUCOMTR-MCNC: 106 MG/DL (ref 70–130)
GLUCOSE BLDC GLUCOMTR-MCNC: 144 MG/DL (ref 70–130)
GLUCOSE BLDC GLUCOMTR-MCNC: 179 MG/DL (ref 70–130)
GLUCOSE BLDC GLUCOMTR-MCNC: 191 MG/DL (ref 70–130)
GLUCOSE SERPL-MCNC: 130 MG/DL (ref 65–99)
HCT VFR BLD AUTO: 36.7 % (ref 37.5–51)
HGB BLD-MCNC: 10.8 G/DL (ref 13–17.7)
IMM GRANULOCYTES # BLD AUTO: 0.03 10*3/MM3 (ref 0–0.05)
IMM GRANULOCYTES NFR BLD AUTO: 0.5 % (ref 0–0.5)
LYMPHOCYTES # BLD AUTO: 1.03 10*3/MM3 (ref 0.7–3.1)
LYMPHOCYTES NFR BLD AUTO: 16.8 % (ref 19.6–45.3)
MCH RBC QN AUTO: 26.7 PG (ref 26.6–33)
MCHC RBC AUTO-ENTMCNC: 29.4 G/DL (ref 31.5–35.7)
MCV RBC AUTO: 90.6 FL (ref 79–97)
MONOCYTES # BLD AUTO: 0.34 10*3/MM3 (ref 0.1–0.9)
MONOCYTES NFR BLD AUTO: 5.5 % (ref 5–12)
NEUTROPHILS NFR BLD AUTO: 4.54 10*3/MM3 (ref 1.7–7)
NEUTROPHILS NFR BLD AUTO: 74.1 % (ref 42.7–76)
NRBC BLD AUTO-RTO: 0 /100 WBC (ref 0–0.2)
PLATELET # BLD AUTO: 206 10*3/MM3 (ref 140–450)
PMV BLD AUTO: 8.8 FL (ref 6–12)
POTASSIUM SERPL-SCNC: 3.9 MMOL/L (ref 3.5–5.2)
PROT SERPL-MCNC: 5.8 G/DL (ref 6–8.5)
RBC # BLD AUTO: 4.05 10*6/MM3 (ref 4.14–5.8)
SODIUM SERPL-SCNC: 141 MMOL/L (ref 136–145)
WBC # BLD AUTO: 6.13 10*3/MM3 (ref 3.4–10.8)

## 2020-09-17 PROCEDURE — 94799 UNLISTED PULMONARY SVC/PX: CPT

## 2020-09-17 PROCEDURE — 97116 GAIT TRAINING THERAPY: CPT

## 2020-09-17 PROCEDURE — 25010000002 HEPARIN (PORCINE) PER 1000 UNITS: Performed by: SURGERY

## 2020-09-17 PROCEDURE — 97530 THERAPEUTIC ACTIVITIES: CPT

## 2020-09-17 PROCEDURE — 82962 GLUCOSE BLOOD TEST: CPT

## 2020-09-17 PROCEDURE — 99232 SBSQ HOSP IP/OBS MODERATE 35: CPT | Performed by: INTERNAL MEDICINE

## 2020-09-17 PROCEDURE — 99231 SBSQ HOSP IP/OBS SF/LOW 25: CPT | Performed by: SURGERY

## 2020-09-17 PROCEDURE — 80053 COMPREHEN METABOLIC PANEL: CPT | Performed by: INTERNAL MEDICINE

## 2020-09-17 PROCEDURE — 63710000001 INSULIN DETEMIR PER 5 UNITS: Performed by: SURGERY

## 2020-09-17 PROCEDURE — 85025 COMPLETE CBC W/AUTO DIFF WBC: CPT | Performed by: INTERNAL MEDICINE

## 2020-09-17 RX ADMIN — FAMOTIDINE 20 MG: 20 TABLET, FILM COATED ORAL at 08:31

## 2020-09-17 RX ADMIN — MEMANTINE HYDROCHLORIDE 5 MG: 5 TABLET, FILM COATED ORAL at 20:16

## 2020-09-17 RX ADMIN — ACETAMINOPHEN 1000 MG: 500 TABLET ORAL at 08:30

## 2020-09-17 RX ADMIN — SODIUM CHLORIDE, PRESERVATIVE FREE 10 ML: 5 INJECTION INTRAVENOUS at 20:17

## 2020-09-17 RX ADMIN — IPRATROPIUM BROMIDE AND ALBUTEROL SULFATE 3 ML: .5; 3 SOLUTION RESPIRATORY (INHALATION) at 13:25

## 2020-09-17 RX ADMIN — METOPROLOL SUCCINATE 25 MG: 25 TABLET, EXTENDED RELEASE ORAL at 08:30

## 2020-09-17 RX ADMIN — GUAIFENESIN 1200 MG: 600 TABLET, EXTENDED RELEASE ORAL at 20:16

## 2020-09-17 RX ADMIN — BUDESONIDE 0.5 MG: 0.5 INHALANT RESPIRATORY (INHALATION) at 18:15

## 2020-09-17 RX ADMIN — LAMOTRIGINE 12.5 MG: 25 TABLET ORAL at 08:30

## 2020-09-17 RX ADMIN — OLANZAPINE 7.5 MG: 2.5 TABLET ORAL at 20:16

## 2020-09-17 RX ADMIN — MEMANTINE HYDROCHLORIDE 5 MG: 5 TABLET, FILM COATED ORAL at 08:30

## 2020-09-17 RX ADMIN — BUDESONIDE 0.5 MG: 0.5 INHALANT RESPIRATORY (INHALATION) at 06:27

## 2020-09-17 RX ADMIN — Medication 100 MG: at 08:30

## 2020-09-17 RX ADMIN — GUAIFENESIN 1200 MG: 600 TABLET, EXTENDED RELEASE ORAL at 08:30

## 2020-09-17 RX ADMIN — HEPARIN SODIUM 5000 UNITS: 5000 INJECTION INTRAVENOUS; SUBCUTANEOUS at 22:18

## 2020-09-17 RX ADMIN — ASPIRIN 81 MG: 81 TABLET, COATED ORAL at 08:31

## 2020-09-17 RX ADMIN — LEVOTHYROXINE SODIUM 75 MCG: 75 TABLET ORAL at 08:30

## 2020-09-17 RX ADMIN — Medication 5 MG: at 20:33

## 2020-09-17 RX ADMIN — INSULIN DETEMIR 15 UNITS: 100 INJECTION, SOLUTION SUBCUTANEOUS at 08:31

## 2020-09-17 RX ADMIN — HEPARIN SODIUM 5000 UNITS: 5000 INJECTION INTRAVENOUS; SUBCUTANEOUS at 06:00

## 2020-09-17 RX ADMIN — TAMSULOSIN HYDROCHLORIDE 0.4 MG: 0.4 CAPSULE ORAL at 08:30

## 2020-09-17 RX ADMIN — IPRATROPIUM BROMIDE AND ALBUTEROL SULFATE 3 ML: .5; 3 SOLUTION RESPIRATORY (INHALATION) at 06:26

## 2020-09-17 RX ADMIN — SODIUM CHLORIDE, PRESERVATIVE FREE 10 ML: 5 INJECTION INTRAVENOUS at 08:31

## 2020-09-17 RX ADMIN — LAMOTRIGINE 12.5 MG: 25 TABLET ORAL at 20:16

## 2020-09-17 RX ADMIN — HEPARIN SODIUM 5000 UNITS: 5000 INJECTION INTRAVENOUS; SUBCUTANEOUS at 14:01

## 2020-09-17 RX ADMIN — FAMOTIDINE 20 MG: 20 TABLET, FILM COATED ORAL at 17:20

## 2020-09-17 RX ADMIN — INSULIN DETEMIR 15 UNITS: 100 INJECTION, SOLUTION SUBCUTANEOUS at 20:21

## 2020-09-17 RX ADMIN — IPRATROPIUM BROMIDE AND ALBUTEROL SULFATE 3 ML: .5; 3 SOLUTION RESPIRATORY (INHALATION) at 18:15

## 2020-09-18 LAB
ANION GAP SERPL CALCULATED.3IONS-SCNC: 10.4 MMOL/L (ref 5–15)
BASOPHILS # BLD AUTO: 0.01 10*3/MM3 (ref 0–0.2)
BASOPHILS NFR BLD AUTO: 0.1 % (ref 0–1.5)
BUN SERPL-MCNC: 16 MG/DL (ref 8–23)
BUN/CREAT SERPL: 13.7 (ref 7–25)
CALCIUM SPEC-SCNC: 8.5 MG/DL (ref 8.6–10.5)
CHLORIDE SERPL-SCNC: 109 MMOL/L (ref 98–107)
CO2 SERPL-SCNC: 21.6 MMOL/L (ref 22–29)
CREAT SERPL-MCNC: 1.17 MG/DL (ref 0.76–1.27)
DEPRECATED RDW RBC AUTO: 48.8 FL (ref 37–54)
EOSINOPHIL # BLD AUTO: 0.18 10*3/MM3 (ref 0–0.4)
EOSINOPHIL NFR BLD AUTO: 2.1 % (ref 0.3–6.2)
ERYTHROCYTE [DISTWIDTH] IN BLOOD BY AUTOMATED COUNT: 14.4 % (ref 12.3–15.4)
GFR SERPL CREATININE-BSD FRML MDRD: 61 ML/MIN/1.73
GLUCOSE BLDC GLUCOMTR-MCNC: 152 MG/DL (ref 70–130)
GLUCOSE BLDC GLUCOMTR-MCNC: 162 MG/DL (ref 70–130)
GLUCOSE BLDC GLUCOMTR-MCNC: 188 MG/DL (ref 70–130)
GLUCOSE BLDC GLUCOMTR-MCNC: 202 MG/DL (ref 70–130)
GLUCOSE BLDC GLUCOMTR-MCNC: 47 MG/DL (ref 70–130)
GLUCOSE BLDC GLUCOMTR-MCNC: 48 MG/DL (ref 70–130)
GLUCOSE BLDC GLUCOMTR-MCNC: 65 MG/DL (ref 70–130)
GLUCOSE BLDC GLUCOMTR-MCNC: 84 MG/DL (ref 70–130)
GLUCOSE SERPL-MCNC: 138 MG/DL (ref 65–99)
HCT VFR BLD AUTO: 38.7 % (ref 37.5–51)
HGB BLD-MCNC: 11.1 G/DL (ref 13–17.7)
HYPOCHROMIA BLD QL: NORMAL
IMM GRANULOCYTES # BLD AUTO: 0.03 10*3/MM3 (ref 0–0.05)
IMM GRANULOCYTES NFR BLD AUTO: 0.3 % (ref 0–0.5)
LYMPHOCYTES # BLD AUTO: 1.06 10*3/MM3 (ref 0.7–3.1)
LYMPHOCYTES NFR BLD AUTO: 12.1 % (ref 19.6–45.3)
MCH RBC QN AUTO: 26.5 PG (ref 26.6–33)
MCHC RBC AUTO-ENTMCNC: 28.7 G/DL (ref 31.5–35.7)
MCV RBC AUTO: 92.4 FL (ref 79–97)
MONOCYTES # BLD AUTO: 0.42 10*3/MM3 (ref 0.1–0.9)
MONOCYTES NFR BLD AUTO: 4.8 % (ref 5–12)
NEUTROPHILS NFR BLD AUTO: 7.03 10*3/MM3 (ref 1.7–7)
NEUTROPHILS NFR BLD AUTO: 80.6 % (ref 42.7–76)
NRBC BLD AUTO-RTO: 0 /100 WBC (ref 0–0.2)
PLAT MORPH BLD: NORMAL
PLATELET # BLD AUTO: 188 10*3/MM3 (ref 140–450)
PMV BLD AUTO: 9.1 FL (ref 6–12)
POTASSIUM SERPL-SCNC: 4.1 MMOL/L (ref 3.5–5.2)
RBC # BLD AUTO: 4.19 10*6/MM3 (ref 4.14–5.8)
SODIUM SERPL-SCNC: 141 MMOL/L (ref 136–145)
WBC # BLD AUTO: 8.73 10*3/MM3 (ref 3.4–10.8)

## 2020-09-18 PROCEDURE — 94799 UNLISTED PULMONARY SVC/PX: CPT

## 2020-09-18 PROCEDURE — 82962 GLUCOSE BLOOD TEST: CPT

## 2020-09-18 PROCEDURE — 85007 BL SMEAR W/DIFF WBC COUNT: CPT | Performed by: INTERNAL MEDICINE

## 2020-09-18 PROCEDURE — 25010000002 HEPARIN (PORCINE) PER 1000 UNITS: Performed by: INTERNAL MEDICINE

## 2020-09-18 PROCEDURE — 99232 SBSQ HOSP IP/OBS MODERATE 35: CPT | Performed by: INTERNAL MEDICINE

## 2020-09-18 PROCEDURE — 25010000002 HEPARIN (PORCINE) PER 1000 UNITS: Performed by: SURGERY

## 2020-09-18 PROCEDURE — 63710000001 INSULIN DETEMIR PER 5 UNITS: Performed by: INTERNAL MEDICINE

## 2020-09-18 PROCEDURE — 85025 COMPLETE CBC W/AUTO DIFF WBC: CPT | Performed by: INTERNAL MEDICINE

## 2020-09-18 PROCEDURE — 80048 BASIC METABOLIC PNL TOTAL CA: CPT | Performed by: INTERNAL MEDICINE

## 2020-09-18 RX ADMIN — INSULIN DETEMIR 10 UNITS: 100 INJECTION, SOLUTION SUBCUTANEOUS at 23:43

## 2020-09-18 RX ADMIN — IPRATROPIUM BROMIDE AND ALBUTEROL SULFATE 3 ML: .5; 3 SOLUTION RESPIRATORY (INHALATION) at 06:58

## 2020-09-18 RX ADMIN — FAMOTIDINE 20 MG: 20 TABLET, FILM COATED ORAL at 06:35

## 2020-09-18 RX ADMIN — GUAIFENESIN 1200 MG: 600 TABLET, EXTENDED RELEASE ORAL at 10:35

## 2020-09-18 RX ADMIN — LEVOTHYROXINE SODIUM 75 MCG: 75 TABLET ORAL at 10:34

## 2020-09-18 RX ADMIN — MEMANTINE HYDROCHLORIDE 5 MG: 5 TABLET, FILM COATED ORAL at 10:34

## 2020-09-18 RX ADMIN — HEPARIN SODIUM 5000 UNITS: 5000 INJECTION INTRAVENOUS; SUBCUTANEOUS at 06:24

## 2020-09-18 RX ADMIN — HEPARIN SODIUM 5000 UNITS: 5000 INJECTION INTRAVENOUS; SUBCUTANEOUS at 15:07

## 2020-09-18 RX ADMIN — ACETAMINOPHEN 650 MG: 325 TABLET ORAL at 15:27

## 2020-09-18 RX ADMIN — SODIUM CHLORIDE, PRESERVATIVE FREE 10 ML: 5 INJECTION INTRAVENOUS at 10:36

## 2020-09-18 RX ADMIN — MEMANTINE HYDROCHLORIDE 5 MG: 5 TABLET, FILM COATED ORAL at 22:47

## 2020-09-18 RX ADMIN — SODIUM CHLORIDE, PRESERVATIVE FREE 10 ML: 5 INJECTION INTRAVENOUS at 10:37

## 2020-09-18 RX ADMIN — TRAZODONE HYDROCHLORIDE 25 MG: 50 TABLET ORAL at 02:33

## 2020-09-18 RX ADMIN — LAMOTRIGINE 12.5 MG: 25 TABLET ORAL at 23:39

## 2020-09-18 RX ADMIN — GUAIFENESIN 1200 MG: 600 TABLET, EXTENDED RELEASE ORAL at 22:47

## 2020-09-18 RX ADMIN — TAMSULOSIN HYDROCHLORIDE 0.4 MG: 0.4 CAPSULE ORAL at 10:35

## 2020-09-18 RX ADMIN — Medication 100 MG: at 10:35

## 2020-09-18 RX ADMIN — INSULIN DETEMIR 10 UNITS: 100 INJECTION, SOLUTION SUBCUTANEOUS at 10:35

## 2020-09-18 RX ADMIN — OLANZAPINE 7.5 MG: 2.5 TABLET ORAL at 22:48

## 2020-09-18 RX ADMIN — BUDESONIDE 0.5 MG: 0.5 INHALANT RESPIRATORY (INHALATION) at 06:59

## 2020-09-18 RX ADMIN — ASPIRIN 81 MG: 81 TABLET, COATED ORAL at 10:35

## 2020-09-18 RX ADMIN — LAMOTRIGINE 12.5 MG: 25 TABLET ORAL at 10:36

## 2020-09-18 RX ADMIN — METOPROLOL SUCCINATE 25 MG: 25 TABLET, EXTENDED RELEASE ORAL at 10:34

## 2020-09-18 RX ADMIN — SODIUM CHLORIDE, PRESERVATIVE FREE 10 ML: 5 INJECTION INTRAVENOUS at 22:48

## 2020-09-18 RX ADMIN — HEPARIN SODIUM 5000 UNITS: 5000 INJECTION INTRAVENOUS; SUBCUTANEOUS at 22:48

## 2020-09-18 RX ADMIN — SODIUM CHLORIDE, PRESERVATIVE FREE 10 ML: 5 INJECTION INTRAVENOUS at 22:49

## 2020-09-18 RX ADMIN — FAMOTIDINE 20 MG: 20 TABLET, FILM COATED ORAL at 16:59

## 2020-09-19 LAB
ANION GAP SERPL CALCULATED.3IONS-SCNC: 10 MMOL/L (ref 5–15)
BASOPHILS # BLD AUTO: 0.02 10*3/MM3 (ref 0–0.2)
BASOPHILS NFR BLD AUTO: 0.3 % (ref 0–1.5)
BUN SERPL-MCNC: 20 MG/DL (ref 8–23)
BUN/CREAT SERPL: 18.2 (ref 7–25)
CALCIUM SPEC-SCNC: 8.8 MG/DL (ref 8.6–10.5)
CHLORIDE SERPL-SCNC: 109 MMOL/L (ref 98–107)
CO2 SERPL-SCNC: 23 MMOL/L (ref 22–29)
CREAT SERPL-MCNC: 1.1 MG/DL (ref 0.76–1.27)
DEPRECATED RDW RBC AUTO: 47.3 FL (ref 37–54)
EOSINOPHIL # BLD AUTO: 0.23 10*3/MM3 (ref 0–0.4)
EOSINOPHIL NFR BLD AUTO: 3.7 % (ref 0.3–6.2)
ERYTHROCYTE [DISTWIDTH] IN BLOOD BY AUTOMATED COUNT: 14.4 % (ref 12.3–15.4)
GFR SERPL CREATININE-BSD FRML MDRD: 66 ML/MIN/1.73
GLUCOSE BLDC GLUCOMTR-MCNC: 191 MG/DL (ref 70–130)
GLUCOSE BLDC GLUCOMTR-MCNC: 217 MG/DL (ref 70–130)
GLUCOSE BLDC GLUCOMTR-MCNC: 224 MG/DL (ref 70–130)
GLUCOSE BLDC GLUCOMTR-MCNC: 99 MG/DL (ref 70–130)
GLUCOSE SERPL-MCNC: 143 MG/DL (ref 65–99)
HCT VFR BLD AUTO: 37.4 % (ref 37.5–51)
HGB BLD-MCNC: 10.9 G/DL (ref 13–17.7)
IMM GRANULOCYTES # BLD AUTO: 0.02 10*3/MM3 (ref 0–0.05)
IMM GRANULOCYTES NFR BLD AUTO: 0.3 % (ref 0–0.5)
LYMPHOCYTES # BLD AUTO: 1.16 10*3/MM3 (ref 0.7–3.1)
LYMPHOCYTES NFR BLD AUTO: 18.9 % (ref 19.6–45.3)
MCH RBC QN AUTO: 26.2 PG (ref 26.6–33)
MCHC RBC AUTO-ENTMCNC: 29.1 G/DL (ref 31.5–35.7)
MCV RBC AUTO: 89.9 FL (ref 79–97)
MONOCYTES # BLD AUTO: 0.4 10*3/MM3 (ref 0.1–0.9)
MONOCYTES NFR BLD AUTO: 6.5 % (ref 5–12)
NEUTROPHILS NFR BLD AUTO: 4.32 10*3/MM3 (ref 1.7–7)
NEUTROPHILS NFR BLD AUTO: 70.3 % (ref 42.7–76)
NRBC BLD AUTO-RTO: 0 /100 WBC (ref 0–0.2)
PLATELET # BLD AUTO: 191 10*3/MM3 (ref 140–450)
PMV BLD AUTO: 8.8 FL (ref 6–12)
POTASSIUM SERPL-SCNC: 4.2 MMOL/L (ref 3.5–5.2)
RBC # BLD AUTO: 4.16 10*6/MM3 (ref 4.14–5.8)
SODIUM SERPL-SCNC: 142 MMOL/L (ref 136–145)
WBC # BLD AUTO: 6.15 10*3/MM3 (ref 3.4–10.8)

## 2020-09-19 PROCEDURE — 99232 SBSQ HOSP IP/OBS MODERATE 35: CPT | Performed by: INTERNAL MEDICINE

## 2020-09-19 PROCEDURE — 85025 COMPLETE CBC W/AUTO DIFF WBC: CPT | Performed by: INTERNAL MEDICINE

## 2020-09-19 PROCEDURE — 63710000001 INSULIN DETEMIR PER 5 UNITS: Performed by: INTERNAL MEDICINE

## 2020-09-19 PROCEDURE — 80048 BASIC METABOLIC PNL TOTAL CA: CPT | Performed by: INTERNAL MEDICINE

## 2020-09-19 PROCEDURE — 94799 UNLISTED PULMONARY SVC/PX: CPT

## 2020-09-19 PROCEDURE — 25010000002 HEPARIN (PORCINE) PER 1000 UNITS: Performed by: INTERNAL MEDICINE

## 2020-09-19 PROCEDURE — 82962 GLUCOSE BLOOD TEST: CPT

## 2020-09-19 RX ORDER — TRAMADOL HYDROCHLORIDE 50 MG/1
50 TABLET ORAL EVERY 8 HOURS PRN
Status: DISCONTINUED | OUTPATIENT
Start: 2020-09-19 | End: 2020-09-25 | Stop reason: HOSPADM

## 2020-09-19 RX ADMIN — INSULIN DETEMIR 10 UNITS: 100 INJECTION, SOLUTION SUBCUTANEOUS at 09:56

## 2020-09-19 RX ADMIN — GUAIFENESIN 1200 MG: 600 TABLET, EXTENDED RELEASE ORAL at 21:32

## 2020-09-19 RX ADMIN — INSULIN DETEMIR 10 UNITS: 100 INJECTION, SOLUTION SUBCUTANEOUS at 21:33

## 2020-09-19 RX ADMIN — GUAIFENESIN 1200 MG: 600 TABLET, EXTENDED RELEASE ORAL at 09:58

## 2020-09-19 RX ADMIN — LEVOTHYROXINE SODIUM 75 MCG: 75 TABLET ORAL at 09:57

## 2020-09-19 RX ADMIN — HEPARIN SODIUM 5000 UNITS: 5000 INJECTION INTRAVENOUS; SUBCUTANEOUS at 21:32

## 2020-09-19 RX ADMIN — LAMOTRIGINE 12.5 MG: 25 TABLET ORAL at 09:59

## 2020-09-19 RX ADMIN — METOPROLOL SUCCINATE 25 MG: 25 TABLET, EXTENDED RELEASE ORAL at 09:57

## 2020-09-19 RX ADMIN — ACETAMINOPHEN 650 MG: 325 TABLET ORAL at 02:04

## 2020-09-19 RX ADMIN — FAMOTIDINE 20 MG: 20 TABLET, FILM COATED ORAL at 16:53

## 2020-09-19 RX ADMIN — TAMSULOSIN HYDROCHLORIDE 0.4 MG: 0.4 CAPSULE ORAL at 09:57

## 2020-09-19 RX ADMIN — SODIUM CHLORIDE, PRESERVATIVE FREE 10 ML: 5 INJECTION INTRAVENOUS at 21:33

## 2020-09-19 RX ADMIN — OLANZAPINE 7.5 MG: 2.5 TABLET ORAL at 21:32

## 2020-09-19 RX ADMIN — MEMANTINE HYDROCHLORIDE 5 MG: 5 TABLET, FILM COATED ORAL at 21:32

## 2020-09-19 RX ADMIN — Medication 100 MG: at 09:58

## 2020-09-19 RX ADMIN — LAMOTRIGINE 12.5 MG: 25 TABLET ORAL at 21:32

## 2020-09-19 RX ADMIN — MEMANTINE HYDROCHLORIDE 5 MG: 5 TABLET, FILM COATED ORAL at 09:57

## 2020-09-19 RX ADMIN — HEPARIN SODIUM 5000 UNITS: 5000 INJECTION INTRAVENOUS; SUBCUTANEOUS at 06:52

## 2020-09-19 RX ADMIN — FAMOTIDINE 20 MG: 20 TABLET, FILM COATED ORAL at 06:52

## 2020-09-19 RX ADMIN — HEPARIN SODIUM 5000 UNITS: 5000 INJECTION INTRAVENOUS; SUBCUTANEOUS at 13:17

## 2020-09-19 RX ADMIN — SODIUM CHLORIDE, PRESERVATIVE FREE 10 ML: 5 INJECTION INTRAVENOUS at 10:02

## 2020-09-19 RX ADMIN — ASPIRIN 81 MG: 81 TABLET, COATED ORAL at 09:57

## 2020-09-20 LAB
ANION GAP SERPL CALCULATED.3IONS-SCNC: 8.6 MMOL/L (ref 5–15)
BASOPHILS # BLD AUTO: 0.01 10*3/MM3 (ref 0–0.2)
BASOPHILS NFR BLD AUTO: 0.1 % (ref 0–1.5)
BUN SERPL-MCNC: 25 MG/DL (ref 8–23)
BUN/CREAT SERPL: 22.1 (ref 7–25)
CALCIUM SPEC-SCNC: 8.9 MG/DL (ref 8.6–10.5)
CHLORIDE SERPL-SCNC: 110 MMOL/L (ref 98–107)
CO2 SERPL-SCNC: 24.4 MMOL/L (ref 22–29)
CREAT SERPL-MCNC: 1.13 MG/DL (ref 0.76–1.27)
DEPRECATED RDW RBC AUTO: 47 FL (ref 37–54)
EOSINOPHIL # BLD AUTO: 0.3 10*3/MM3 (ref 0–0.4)
EOSINOPHIL NFR BLD AUTO: 4.4 % (ref 0.3–6.2)
ERYTHROCYTE [DISTWIDTH] IN BLOOD BY AUTOMATED COUNT: 14.3 % (ref 12.3–15.4)
GFR SERPL CREATININE-BSD FRML MDRD: 64 ML/MIN/1.73
GLUCOSE BLDC GLUCOMTR-MCNC: 144 MG/DL (ref 70–130)
GLUCOSE BLDC GLUCOMTR-MCNC: 168 MG/DL (ref 70–130)
GLUCOSE BLDC GLUCOMTR-MCNC: 184 MG/DL (ref 70–130)
GLUCOSE BLDC GLUCOMTR-MCNC: 232 MG/DL (ref 70–130)
GLUCOSE SERPL-MCNC: 88 MG/DL (ref 65–99)
HCT VFR BLD AUTO: 37.8 % (ref 37.5–51)
HGB BLD-MCNC: 10.9 G/DL (ref 13–17.7)
HYPOCHROMIA BLD QL: NORMAL
IMM GRANULOCYTES # BLD AUTO: 0.03 10*3/MM3 (ref 0–0.05)
IMM GRANULOCYTES NFR BLD AUTO: 0.4 % (ref 0–0.5)
LYMPHOCYTES # BLD AUTO: 1.63 10*3/MM3 (ref 0.7–3.1)
LYMPHOCYTES NFR BLD AUTO: 24 % (ref 19.6–45.3)
MCH RBC QN AUTO: 26 PG (ref 26.6–33)
MCHC RBC AUTO-ENTMCNC: 28.8 G/DL (ref 31.5–35.7)
MCV RBC AUTO: 90.2 FL (ref 79–97)
MONOCYTES # BLD AUTO: 0.38 10*3/MM3 (ref 0.1–0.9)
MONOCYTES NFR BLD AUTO: 5.6 % (ref 5–12)
NEUTROPHILS NFR BLD AUTO: 4.45 10*3/MM3 (ref 1.7–7)
NEUTROPHILS NFR BLD AUTO: 65.5 % (ref 42.7–76)
NRBC BLD AUTO-RTO: 0 /100 WBC (ref 0–0.2)
PLAT MORPH BLD: NORMAL
PLATELET # BLD AUTO: 199 10*3/MM3 (ref 140–450)
PMV BLD AUTO: 8.8 FL (ref 6–12)
POTASSIUM SERPL-SCNC: 4.4 MMOL/L (ref 3.5–5.2)
RBC # BLD AUTO: 4.19 10*6/MM3 (ref 4.14–5.8)
SODIUM SERPL-SCNC: 143 MMOL/L (ref 136–145)
WBC # BLD AUTO: 6.8 10*3/MM3 (ref 3.4–10.8)

## 2020-09-20 PROCEDURE — 99232 SBSQ HOSP IP/OBS MODERATE 35: CPT | Performed by: INTERNAL MEDICINE

## 2020-09-20 PROCEDURE — 85025 COMPLETE CBC W/AUTO DIFF WBC: CPT | Performed by: INTERNAL MEDICINE

## 2020-09-20 PROCEDURE — 94799 UNLISTED PULMONARY SVC/PX: CPT

## 2020-09-20 PROCEDURE — 25010000002 HEPARIN (PORCINE) PER 1000 UNITS: Performed by: INTERNAL MEDICINE

## 2020-09-20 PROCEDURE — 80048 BASIC METABOLIC PNL TOTAL CA: CPT | Performed by: INTERNAL MEDICINE

## 2020-09-20 PROCEDURE — 82962 GLUCOSE BLOOD TEST: CPT

## 2020-09-20 PROCEDURE — 85007 BL SMEAR W/DIFF WBC COUNT: CPT | Performed by: INTERNAL MEDICINE

## 2020-09-20 PROCEDURE — 63710000001 INSULIN DETEMIR PER 5 UNITS: Performed by: INTERNAL MEDICINE

## 2020-09-20 RX ORDER — IPRATROPIUM BROMIDE AND ALBUTEROL SULFATE 2.5; .5 MG/3ML; MG/3ML
3 SOLUTION RESPIRATORY (INHALATION)
Status: DISCONTINUED | OUTPATIENT
Start: 2020-09-20 | End: 2020-09-25 | Stop reason: HOSPADM

## 2020-09-20 RX ADMIN — Medication 100 MG: at 09:03

## 2020-09-20 RX ADMIN — LEVOTHYROXINE SODIUM 75 MCG: 75 TABLET ORAL at 09:03

## 2020-09-20 RX ADMIN — SODIUM CHLORIDE, PRESERVATIVE FREE 10 ML: 5 INJECTION INTRAVENOUS at 09:04

## 2020-09-20 RX ADMIN — IPRATROPIUM BROMIDE AND ALBUTEROL SULFATE 3 ML: .5; 3 SOLUTION RESPIRATORY (INHALATION) at 08:28

## 2020-09-20 RX ADMIN — HEPARIN SODIUM 5000 UNITS: 5000 INJECTION INTRAVENOUS; SUBCUTANEOUS at 13:39

## 2020-09-20 RX ADMIN — HEPARIN SODIUM 5000 UNITS: 5000 INJECTION INTRAVENOUS; SUBCUTANEOUS at 05:21

## 2020-09-20 RX ADMIN — ASPIRIN 81 MG: 81 TABLET, COATED ORAL at 09:03

## 2020-09-20 RX ADMIN — HEPARIN SODIUM 5000 UNITS: 5000 INJECTION INTRAVENOUS; SUBCUTANEOUS at 20:48

## 2020-09-20 RX ADMIN — FAMOTIDINE 20 MG: 20 TABLET, FILM COATED ORAL at 17:07

## 2020-09-20 RX ADMIN — BUDESONIDE 0.5 MG: 0.5 INHALANT RESPIRATORY (INHALATION) at 08:29

## 2020-09-20 RX ADMIN — INSULIN DETEMIR 10 UNITS: 100 INJECTION, SOLUTION SUBCUTANEOUS at 21:14

## 2020-09-20 RX ADMIN — MEMANTINE HYDROCHLORIDE 5 MG: 5 TABLET, FILM COATED ORAL at 20:48

## 2020-09-20 RX ADMIN — MEMANTINE HYDROCHLORIDE 5 MG: 5 TABLET, FILM COATED ORAL at 09:03

## 2020-09-20 RX ADMIN — METOPROLOL SUCCINATE 25 MG: 25 TABLET, EXTENDED RELEASE ORAL at 09:03

## 2020-09-20 RX ADMIN — GUAIFENESIN 1200 MG: 600 TABLET, EXTENDED RELEASE ORAL at 20:48

## 2020-09-20 RX ADMIN — LAMOTRIGINE 12.5 MG: 25 TABLET ORAL at 09:03

## 2020-09-20 RX ADMIN — SODIUM CHLORIDE, PRESERVATIVE FREE 10 ML: 5 INJECTION INTRAVENOUS at 20:49

## 2020-09-20 RX ADMIN — GUAIFENESIN 1200 MG: 600 TABLET, EXTENDED RELEASE ORAL at 09:03

## 2020-09-20 RX ADMIN — INSULIN DETEMIR 10 UNITS: 100 INJECTION, SOLUTION SUBCUTANEOUS at 09:04

## 2020-09-20 RX ADMIN — OLANZAPINE 7.5 MG: 2.5 TABLET ORAL at 20:48

## 2020-09-20 RX ADMIN — TAMSULOSIN HYDROCHLORIDE 0.4 MG: 0.4 CAPSULE ORAL at 09:03

## 2020-09-20 RX ADMIN — LAMOTRIGINE 12.5 MG: 25 TABLET ORAL at 20:49

## 2020-09-20 RX ADMIN — FAMOTIDINE 20 MG: 20 TABLET, FILM COATED ORAL at 09:03

## 2020-09-21 LAB
ALBUMIN SERPL-MCNC: 2.76 G/DL (ref 3.5–5.2)
ALBUMIN/GLOB SERPL: 0.8 G/DL
ALP SERPL-CCNC: 85 U/L (ref 39–117)
ALT SERPL W P-5'-P-CCNC: 15 U/L (ref 1–41)
ANION GAP SERPL CALCULATED.3IONS-SCNC: 10.8 MMOL/L (ref 5–15)
AST SERPL-CCNC: 16 U/L (ref 1–40)
BASOPHILS # BLD AUTO: 0.02 10*3/MM3 (ref 0–0.2)
BASOPHILS NFR BLD AUTO: 0.3 % (ref 0–1.5)
BILIRUB SERPL-MCNC: 0.2 MG/DL (ref 0–1.2)
BUN SERPL-MCNC: 26 MG/DL (ref 8–23)
BUN/CREAT SERPL: 20 (ref 7–25)
CALCIUM SPEC-SCNC: 8.7 MG/DL (ref 8.6–10.5)
CHLORIDE SERPL-SCNC: 107 MMOL/L (ref 98–107)
CO2 SERPL-SCNC: 22.2 MMOL/L (ref 22–29)
CREAT SERPL-MCNC: 1.3 MG/DL (ref 0.76–1.27)
DEPRECATED RDW RBC AUTO: 46.4 FL (ref 37–54)
EOSINOPHIL # BLD AUTO: 0.28 10*3/MM3 (ref 0–0.4)
EOSINOPHIL NFR BLD AUTO: 4.5 % (ref 0.3–6.2)
ERYTHROCYTE [DISTWIDTH] IN BLOOD BY AUTOMATED COUNT: 14.3 % (ref 12.3–15.4)
GFR SERPL CREATININE-BSD FRML MDRD: 54 ML/MIN/1.73
GLOBULIN UR ELPH-MCNC: 3.3 GM/DL
GLUCOSE BLDC GLUCOMTR-MCNC: 113 MG/DL (ref 70–130)
GLUCOSE BLDC GLUCOMTR-MCNC: 143 MG/DL (ref 70–130)
GLUCOSE BLDC GLUCOMTR-MCNC: 152 MG/DL (ref 70–130)
GLUCOSE BLDC GLUCOMTR-MCNC: 232 MG/DL (ref 70–130)
GLUCOSE SERPL-MCNC: 212 MG/DL (ref 65–99)
HCT VFR BLD AUTO: 36.5 % (ref 37.5–51)
HGB BLD-MCNC: 10.6 G/DL (ref 13–17.7)
IMM GRANULOCYTES # BLD AUTO: 0.02 10*3/MM3 (ref 0–0.05)
IMM GRANULOCYTES NFR BLD AUTO: 0.3 % (ref 0–0.5)
LYMPHOCYTES # BLD AUTO: 1.3 10*3/MM3 (ref 0.7–3.1)
LYMPHOCYTES NFR BLD AUTO: 20.9 % (ref 19.6–45.3)
MCH RBC QN AUTO: 26 PG (ref 26.6–33)
MCHC RBC AUTO-ENTMCNC: 29 G/DL (ref 31.5–35.7)
MCV RBC AUTO: 89.5 FL (ref 79–97)
MONOCYTES # BLD AUTO: 0.37 10*3/MM3 (ref 0.1–0.9)
MONOCYTES NFR BLD AUTO: 6 % (ref 5–12)
NEUTROPHILS NFR BLD AUTO: 4.22 10*3/MM3 (ref 1.7–7)
NEUTROPHILS NFR BLD AUTO: 68 % (ref 42.7–76)
NRBC BLD AUTO-RTO: 0 /100 WBC (ref 0–0.2)
PLATELET # BLD AUTO: 189 10*3/MM3 (ref 140–450)
PMV BLD AUTO: 8.9 FL (ref 6–12)
POTASSIUM SERPL-SCNC: 4.2 MMOL/L (ref 3.5–5.2)
PROT SERPL-MCNC: 6.1 G/DL (ref 6–8.5)
RBC # BLD AUTO: 4.08 10*6/MM3 (ref 4.14–5.8)
SODIUM SERPL-SCNC: 140 MMOL/L (ref 136–145)
WBC # BLD AUTO: 6.21 10*3/MM3 (ref 3.4–10.8)

## 2020-09-21 PROCEDURE — 94799 UNLISTED PULMONARY SVC/PX: CPT

## 2020-09-21 PROCEDURE — 82962 GLUCOSE BLOOD TEST: CPT

## 2020-09-21 PROCEDURE — 80053 COMPREHEN METABOLIC PANEL: CPT | Performed by: INTERNAL MEDICINE

## 2020-09-21 PROCEDURE — 99232 SBSQ HOSP IP/OBS MODERATE 35: CPT | Performed by: STUDENT IN AN ORGANIZED HEALTH CARE EDUCATION/TRAINING PROGRAM

## 2020-09-21 PROCEDURE — 63710000001 INSULIN DETEMIR PER 5 UNITS: Performed by: INTERNAL MEDICINE

## 2020-09-21 PROCEDURE — 85025 COMPLETE CBC W/AUTO DIFF WBC: CPT | Performed by: INTERNAL MEDICINE

## 2020-09-21 PROCEDURE — 25010000002 HEPARIN (PORCINE) PER 1000 UNITS: Performed by: INTERNAL MEDICINE

## 2020-09-21 RX ADMIN — MEMANTINE HYDROCHLORIDE 5 MG: 5 TABLET, FILM COATED ORAL at 20:59

## 2020-09-21 RX ADMIN — OLANZAPINE 7.5 MG: 2.5 TABLET ORAL at 20:59

## 2020-09-21 RX ADMIN — METOPROLOL SUCCINATE 25 MG: 25 TABLET, EXTENDED RELEASE ORAL at 08:25

## 2020-09-21 RX ADMIN — ASPIRIN 81 MG: 81 TABLET, COATED ORAL at 08:25

## 2020-09-21 RX ADMIN — ACETAMINOPHEN 650 MG: 325 TABLET ORAL at 18:25

## 2020-09-21 RX ADMIN — INSULIN DETEMIR 10 UNITS: 100 INJECTION, SOLUTION SUBCUTANEOUS at 08:26

## 2020-09-21 RX ADMIN — HEPARIN SODIUM 5000 UNITS: 5000 INJECTION INTRAVENOUS; SUBCUTANEOUS at 21:00

## 2020-09-21 RX ADMIN — FAMOTIDINE 20 MG: 20 TABLET, FILM COATED ORAL at 08:25

## 2020-09-21 RX ADMIN — SODIUM CHLORIDE, PRESERVATIVE FREE 10 ML: 5 INJECTION INTRAVENOUS at 21:00

## 2020-09-21 RX ADMIN — TAMSULOSIN HYDROCHLORIDE 0.4 MG: 0.4 CAPSULE ORAL at 08:25

## 2020-09-21 RX ADMIN — GUAIFENESIN 1200 MG: 600 TABLET, EXTENDED RELEASE ORAL at 20:59

## 2020-09-21 RX ADMIN — HEPARIN SODIUM 5000 UNITS: 5000 INJECTION INTRAVENOUS; SUBCUTANEOUS at 13:41

## 2020-09-21 RX ADMIN — SODIUM CHLORIDE, PRESERVATIVE FREE 10 ML: 5 INJECTION INTRAVENOUS at 08:25

## 2020-09-21 RX ADMIN — LAMOTRIGINE 12.5 MG: 25 TABLET ORAL at 20:58

## 2020-09-21 RX ADMIN — LAMOTRIGINE 12.5 MG: 25 TABLET ORAL at 08:25

## 2020-09-21 RX ADMIN — MEMANTINE HYDROCHLORIDE 5 MG: 5 TABLET, FILM COATED ORAL at 08:25

## 2020-09-21 RX ADMIN — HEPARIN SODIUM 5000 UNITS: 5000 INJECTION INTRAVENOUS; SUBCUTANEOUS at 05:24

## 2020-09-21 RX ADMIN — Medication 100 MG: at 08:25

## 2020-09-21 RX ADMIN — LEVOTHYROXINE SODIUM 75 MCG: 75 TABLET ORAL at 08:25

## 2020-09-21 RX ADMIN — FAMOTIDINE 20 MG: 20 TABLET, FILM COATED ORAL at 16:40

## 2020-09-21 RX ADMIN — INSULIN DETEMIR 10 UNITS: 100 INJECTION, SOLUTION SUBCUTANEOUS at 21:10

## 2020-09-21 RX ADMIN — GUAIFENESIN 1200 MG: 600 TABLET, EXTENDED RELEASE ORAL at 08:25

## 2020-09-21 RX ADMIN — SODIUM CHLORIDE, PRESERVATIVE FREE 10 ML: 5 INJECTION INTRAVENOUS at 08:26

## 2020-09-22 LAB
ANION GAP SERPL CALCULATED.3IONS-SCNC: 10.4 MMOL/L (ref 5–15)
BASOPHILS # BLD AUTO: 0.02 10*3/MM3 (ref 0–0.2)
BASOPHILS NFR BLD AUTO: 0.3 % (ref 0–1.5)
BUN SERPL-MCNC: 33 MG/DL (ref 8–23)
BUN/CREAT SERPL: 24.3 (ref 7–25)
CALCIUM SPEC-SCNC: 9.1 MG/DL (ref 8.6–10.5)
CHLORIDE SERPL-SCNC: 107 MMOL/L (ref 98–107)
CO2 SERPL-SCNC: 23.6 MMOL/L (ref 22–29)
CREAT SERPL-MCNC: 1.36 MG/DL (ref 0.76–1.27)
DEPRECATED RDW RBC AUTO: 45.9 FL (ref 37–54)
EOSINOPHIL # BLD AUTO: 0.31 10*3/MM3 (ref 0–0.4)
EOSINOPHIL NFR BLD AUTO: 4.9 % (ref 0.3–6.2)
ERYTHROCYTE [DISTWIDTH] IN BLOOD BY AUTOMATED COUNT: 14.1 % (ref 12.3–15.4)
GFR SERPL CREATININE-BSD FRML MDRD: 52 ML/MIN/1.73
GLUCOSE BLDC GLUCOMTR-MCNC: 170 MG/DL (ref 70–130)
GLUCOSE BLDC GLUCOMTR-MCNC: 225 MG/DL (ref 70–130)
GLUCOSE BLDC GLUCOMTR-MCNC: 261 MG/DL (ref 70–130)
GLUCOSE BLDC GLUCOMTR-MCNC: 80 MG/DL (ref 70–130)
GLUCOSE SERPL-MCNC: 99 MG/DL (ref 65–99)
HCT VFR BLD AUTO: 36.9 % (ref 37.5–51)
HGB BLD-MCNC: 10.8 G/DL (ref 13–17.7)
IMM GRANULOCYTES # BLD AUTO: 0.04 10*3/MM3 (ref 0–0.05)
IMM GRANULOCYTES NFR BLD AUTO: 0.6 % (ref 0–0.5)
LYMPHOCYTES # BLD AUTO: 1.43 10*3/MM3 (ref 0.7–3.1)
LYMPHOCYTES NFR BLD AUTO: 22.6 % (ref 19.6–45.3)
MCH RBC QN AUTO: 26 PG (ref 26.6–33)
MCHC RBC AUTO-ENTMCNC: 29.3 G/DL (ref 31.5–35.7)
MCV RBC AUTO: 88.9 FL (ref 79–97)
MONOCYTES # BLD AUTO: 0.36 10*3/MM3 (ref 0.1–0.9)
MONOCYTES NFR BLD AUTO: 5.7 % (ref 5–12)
NEUTROPHILS NFR BLD AUTO: 4.18 10*3/MM3 (ref 1.7–7)
NEUTROPHILS NFR BLD AUTO: 65.9 % (ref 42.7–76)
NRBC BLD AUTO-RTO: 0 /100 WBC (ref 0–0.2)
PLATELET # BLD AUTO: 195 10*3/MM3 (ref 140–450)
PMV BLD AUTO: 9.2 FL (ref 6–12)
POTASSIUM SERPL-SCNC: 4.1 MMOL/L (ref 3.5–5.2)
RBC # BLD AUTO: 4.15 10*6/MM3 (ref 4.14–5.8)
SODIUM SERPL-SCNC: 141 MMOL/L (ref 136–145)
WBC # BLD AUTO: 6.34 10*3/MM3 (ref 3.4–10.8)

## 2020-09-22 PROCEDURE — 63710000001 INSULIN DETEMIR PER 5 UNITS: Performed by: INTERNAL MEDICINE

## 2020-09-22 PROCEDURE — 80048 BASIC METABOLIC PNL TOTAL CA: CPT | Performed by: STUDENT IN AN ORGANIZED HEALTH CARE EDUCATION/TRAINING PROGRAM

## 2020-09-22 PROCEDURE — 99232 SBSQ HOSP IP/OBS MODERATE 35: CPT | Performed by: STUDENT IN AN ORGANIZED HEALTH CARE EDUCATION/TRAINING PROGRAM

## 2020-09-22 PROCEDURE — 94799 UNLISTED PULMONARY SVC/PX: CPT

## 2020-09-22 PROCEDURE — 82962 GLUCOSE BLOOD TEST: CPT

## 2020-09-22 PROCEDURE — 85025 COMPLETE CBC W/AUTO DIFF WBC: CPT | Performed by: STUDENT IN AN ORGANIZED HEALTH CARE EDUCATION/TRAINING PROGRAM

## 2020-09-22 PROCEDURE — 25010000002 HEPARIN (PORCINE) PER 1000 UNITS: Performed by: INTERNAL MEDICINE

## 2020-09-22 PROCEDURE — 25010000002 HYDROMORPHONE PER 4 MG: Performed by: STUDENT IN AN ORGANIZED HEALTH CARE EDUCATION/TRAINING PROGRAM

## 2020-09-22 RX ORDER — HYDROMORPHONE HYDROCHLORIDE 1 MG/ML
0.5 INJECTION, SOLUTION INTRAMUSCULAR; INTRAVENOUS; SUBCUTANEOUS ONCE
Status: COMPLETED | OUTPATIENT
Start: 2020-09-22 | End: 2020-09-22

## 2020-09-22 RX ADMIN — GUAIFENESIN 1200 MG: 600 TABLET, EXTENDED RELEASE ORAL at 21:17

## 2020-09-22 RX ADMIN — Medication 100 MG: at 08:14

## 2020-09-22 RX ADMIN — LEVOTHYROXINE SODIUM 75 MCG: 75 TABLET ORAL at 08:14

## 2020-09-22 RX ADMIN — HEPARIN SODIUM 5000 UNITS: 5000 INJECTION INTRAVENOUS; SUBCUTANEOUS at 21:17

## 2020-09-22 RX ADMIN — BUDESONIDE 0.5 MG: 0.5 INHALANT RESPIRATORY (INHALATION) at 07:46

## 2020-09-22 RX ADMIN — METOPROLOL SUCCINATE 25 MG: 25 TABLET, EXTENDED RELEASE ORAL at 08:14

## 2020-09-22 RX ADMIN — SODIUM CHLORIDE, PRESERVATIVE FREE 10 ML: 5 INJECTION INTRAVENOUS at 08:13

## 2020-09-22 RX ADMIN — HYDROMORPHONE HYDROCHLORIDE 0.5 MG: 1 INJECTION, SOLUTION INTRAMUSCULAR; INTRAVENOUS; SUBCUTANEOUS at 14:46

## 2020-09-22 RX ADMIN — MEMANTINE HYDROCHLORIDE 5 MG: 5 TABLET, FILM COATED ORAL at 08:14

## 2020-09-22 RX ADMIN — LAMOTRIGINE 12.5 MG: 25 TABLET ORAL at 08:14

## 2020-09-22 RX ADMIN — LAMOTRIGINE 12.5 MG: 25 TABLET ORAL at 21:17

## 2020-09-22 RX ADMIN — GUAIFENESIN 1200 MG: 600 TABLET, EXTENDED RELEASE ORAL at 08:14

## 2020-09-22 RX ADMIN — FAMOTIDINE 20 MG: 20 TABLET, FILM COATED ORAL at 08:14

## 2020-09-22 RX ADMIN — OLANZAPINE 7.5 MG: 2.5 TABLET ORAL at 21:17

## 2020-09-22 RX ADMIN — TAMSULOSIN HYDROCHLORIDE 0.4 MG: 0.4 CAPSULE ORAL at 08:14

## 2020-09-22 RX ADMIN — SODIUM CHLORIDE, PRESERVATIVE FREE 10 ML: 5 INJECTION INTRAVENOUS at 21:22

## 2020-09-22 RX ADMIN — MEMANTINE HYDROCHLORIDE 5 MG: 5 TABLET, FILM COATED ORAL at 21:17

## 2020-09-22 RX ADMIN — INSULIN DETEMIR 10 UNITS: 100 INJECTION, SOLUTION SUBCUTANEOUS at 21:53

## 2020-09-22 RX ADMIN — HEPARIN SODIUM 5000 UNITS: 5000 INJECTION INTRAVENOUS; SUBCUTANEOUS at 06:09

## 2020-09-22 RX ADMIN — HEPARIN SODIUM 5000 UNITS: 5000 INJECTION INTRAVENOUS; SUBCUTANEOUS at 13:49

## 2020-09-22 RX ADMIN — IPRATROPIUM BROMIDE AND ALBUTEROL SULFATE 3 ML: .5; 3 SOLUTION RESPIRATORY (INHALATION) at 07:46

## 2020-09-22 RX ADMIN — ASPIRIN 81 MG: 81 TABLET, COATED ORAL at 08:14

## 2020-09-22 RX ADMIN — SODIUM CHLORIDE, PRESERVATIVE FREE 10 ML: 5 INJECTION INTRAVENOUS at 21:18

## 2020-09-22 RX ADMIN — FAMOTIDINE 20 MG: 20 TABLET, FILM COATED ORAL at 16:36

## 2020-09-23 LAB
GLUCOSE BLDC GLUCOMTR-MCNC: 131 MG/DL (ref 70–130)
GLUCOSE BLDC GLUCOMTR-MCNC: 181 MG/DL (ref 70–130)
GLUCOSE BLDC GLUCOMTR-MCNC: 197 MG/DL (ref 70–130)
GLUCOSE BLDC GLUCOMTR-MCNC: 296 MG/DL (ref 70–130)

## 2020-09-23 PROCEDURE — 25010000002 HEPARIN (PORCINE) PER 1000 UNITS: Performed by: INTERNAL MEDICINE

## 2020-09-23 PROCEDURE — 82962 GLUCOSE BLOOD TEST: CPT

## 2020-09-23 PROCEDURE — 97116 GAIT TRAINING THERAPY: CPT

## 2020-09-23 PROCEDURE — 99232 SBSQ HOSP IP/OBS MODERATE 35: CPT | Performed by: STUDENT IN AN ORGANIZED HEALTH CARE EDUCATION/TRAINING PROGRAM

## 2020-09-23 PROCEDURE — 94799 UNLISTED PULMONARY SVC/PX: CPT

## 2020-09-23 PROCEDURE — 97530 THERAPEUTIC ACTIVITIES: CPT

## 2020-09-23 PROCEDURE — 63710000001 INSULIN DETEMIR PER 5 UNITS: Performed by: INTERNAL MEDICINE

## 2020-09-23 RX ADMIN — LEVOTHYROXINE SODIUM 75 MCG: 75 TABLET ORAL at 08:17

## 2020-09-23 RX ADMIN — TRAMADOL HYDROCHLORIDE 50 MG: 50 TABLET, FILM COATED ORAL at 11:11

## 2020-09-23 RX ADMIN — INSULIN DETEMIR 10 UNITS: 100 INJECTION, SOLUTION SUBCUTANEOUS at 08:18

## 2020-09-23 RX ADMIN — TRAMADOL HYDROCHLORIDE 50 MG: 50 TABLET, FILM COATED ORAL at 19:55

## 2020-09-23 RX ADMIN — ACETAMINOPHEN 650 MG: 325 TABLET ORAL at 18:24

## 2020-09-23 RX ADMIN — METOPROLOL SUCCINATE 25 MG: 25 TABLET, EXTENDED RELEASE ORAL at 08:17

## 2020-09-23 RX ADMIN — SODIUM CHLORIDE, PRESERVATIVE FREE 10 ML: 5 INJECTION INTRAVENOUS at 08:19

## 2020-09-23 RX ADMIN — ASPIRIN 81 MG: 81 TABLET, COATED ORAL at 08:17

## 2020-09-23 RX ADMIN — MEMANTINE HYDROCHLORIDE 5 MG: 5 TABLET, FILM COATED ORAL at 20:00

## 2020-09-23 RX ADMIN — GUAIFENESIN 1200 MG: 600 TABLET, EXTENDED RELEASE ORAL at 08:18

## 2020-09-23 RX ADMIN — LAMOTRIGINE 12.5 MG: 25 TABLET ORAL at 08:18

## 2020-09-23 RX ADMIN — HEPARIN SODIUM 5000 UNITS: 5000 INJECTION INTRAVENOUS; SUBCUTANEOUS at 22:57

## 2020-09-23 RX ADMIN — SODIUM CHLORIDE, PRESERVATIVE FREE 10 ML: 5 INJECTION INTRAVENOUS at 20:02

## 2020-09-23 RX ADMIN — LAMOTRIGINE 12.5 MG: 25 TABLET ORAL at 20:01

## 2020-09-23 RX ADMIN — HEPARIN SODIUM 5000 UNITS: 5000 INJECTION INTRAVENOUS; SUBCUTANEOUS at 06:33

## 2020-09-23 RX ADMIN — FAMOTIDINE 20 MG: 20 TABLET, FILM COATED ORAL at 06:33

## 2020-09-23 RX ADMIN — HEPARIN SODIUM 5000 UNITS: 5000 INJECTION INTRAVENOUS; SUBCUTANEOUS at 13:16

## 2020-09-23 RX ADMIN — OLANZAPINE 7.5 MG: 2.5 TABLET ORAL at 20:00

## 2020-09-23 RX ADMIN — FAMOTIDINE 20 MG: 20 TABLET, FILM COATED ORAL at 17:07

## 2020-09-23 RX ADMIN — Medication 100 MG: at 08:18

## 2020-09-23 RX ADMIN — GUAIFENESIN 1200 MG: 600 TABLET, EXTENDED RELEASE ORAL at 20:00

## 2020-09-23 RX ADMIN — TAMSULOSIN HYDROCHLORIDE 0.4 MG: 0.4 CAPSULE ORAL at 08:18

## 2020-09-23 RX ADMIN — INSULIN DETEMIR 10 UNITS: 100 INJECTION, SOLUTION SUBCUTANEOUS at 20:00

## 2020-09-23 RX ADMIN — MEMANTINE HYDROCHLORIDE 5 MG: 5 TABLET, FILM COATED ORAL at 08:18

## 2020-09-24 LAB
ANION GAP SERPL CALCULATED.3IONS-SCNC: 10.8 MMOL/L (ref 5–15)
BASOPHILS # BLD AUTO: 0.02 10*3/MM3 (ref 0–0.2)
BASOPHILS NFR BLD AUTO: 0.4 % (ref 0–1.5)
BUN SERPL-MCNC: 44 MG/DL (ref 8–23)
BUN/CREAT SERPL: 31.2 (ref 7–25)
CALCIUM SPEC-SCNC: 8.7 MG/DL (ref 8.6–10.5)
CHLORIDE SERPL-SCNC: 104 MMOL/L (ref 98–107)
CO2 SERPL-SCNC: 23.2 MMOL/L (ref 22–29)
CREAT SERPL-MCNC: 1.41 MG/DL (ref 0.76–1.27)
DEPRECATED RDW RBC AUTO: 46.5 FL (ref 37–54)
EOSINOPHIL # BLD AUTO: 0.33 10*3/MM3 (ref 0–0.4)
EOSINOPHIL NFR BLD AUTO: 6.4 % (ref 0.3–6.2)
ERYTHROCYTE [DISTWIDTH] IN BLOOD BY AUTOMATED COUNT: 14.5 % (ref 12.3–15.4)
GFR SERPL CREATININE-BSD FRML MDRD: 49 ML/MIN/1.73
GLUCOSE BLDC GLUCOMTR-MCNC: 163 MG/DL (ref 70–130)
GLUCOSE BLDC GLUCOMTR-MCNC: 165 MG/DL (ref 70–130)
GLUCOSE BLDC GLUCOMTR-MCNC: 188 MG/DL (ref 70–130)
GLUCOSE BLDC GLUCOMTR-MCNC: 244 MG/DL (ref 70–130)
GLUCOSE SERPL-MCNC: 254 MG/DL (ref 65–99)
HCT VFR BLD AUTO: 33.7 % (ref 37.5–51)
HGB BLD-MCNC: 10 G/DL (ref 13–17.7)
IMM GRANULOCYTES # BLD AUTO: 0.05 10*3/MM3 (ref 0–0.05)
IMM GRANULOCYTES NFR BLD AUTO: 1 % (ref 0–0.5)
LYMPHOCYTES # BLD AUTO: 1.42 10*3/MM3 (ref 0.7–3.1)
LYMPHOCYTES NFR BLD AUTO: 27.6 % (ref 19.6–45.3)
MCH RBC QN AUTO: 26 PG (ref 26.6–33)
MCHC RBC AUTO-ENTMCNC: 29.7 G/DL (ref 31.5–35.7)
MCV RBC AUTO: 87.5 FL (ref 79–97)
MONOCYTES # BLD AUTO: 0.32 10*3/MM3 (ref 0.1–0.9)
MONOCYTES NFR BLD AUTO: 6.2 % (ref 5–12)
NEUTROPHILS NFR BLD AUTO: 3 10*3/MM3 (ref 1.7–7)
NEUTROPHILS NFR BLD AUTO: 58.4 % (ref 42.7–76)
NRBC BLD AUTO-RTO: 0 /100 WBC (ref 0–0.2)
PLATELET # BLD AUTO: 198 10*3/MM3 (ref 140–450)
PMV BLD AUTO: 9.4 FL (ref 6–12)
POTASSIUM SERPL-SCNC: 4.2 MMOL/L (ref 3.5–5.2)
RBC # BLD AUTO: 3.85 10*6/MM3 (ref 4.14–5.8)
SARS-COV-2 RNA RESP QL NAA+PROBE: NOT DETECTED
SODIUM SERPL-SCNC: 138 MMOL/L (ref 136–145)
WBC # BLD AUTO: 5.14 10*3/MM3 (ref 3.4–10.8)

## 2020-09-24 PROCEDURE — 94799 UNLISTED PULMONARY SVC/PX: CPT

## 2020-09-24 PROCEDURE — 25010000002 HEPARIN (PORCINE) PER 1000 UNITS: Performed by: INTERNAL MEDICINE

## 2020-09-24 PROCEDURE — 80048 BASIC METABOLIC PNL TOTAL CA: CPT | Performed by: STUDENT IN AN ORGANIZED HEALTH CARE EDUCATION/TRAINING PROGRAM

## 2020-09-24 PROCEDURE — 82962 GLUCOSE BLOOD TEST: CPT

## 2020-09-24 PROCEDURE — 99232 SBSQ HOSP IP/OBS MODERATE 35: CPT | Performed by: STUDENT IN AN ORGANIZED HEALTH CARE EDUCATION/TRAINING PROGRAM

## 2020-09-24 PROCEDURE — 87635 SARS-COV-2 COVID-19 AMP PRB: CPT | Performed by: STUDENT IN AN ORGANIZED HEALTH CARE EDUCATION/TRAINING PROGRAM

## 2020-09-24 PROCEDURE — 97530 THERAPEUTIC ACTIVITIES: CPT

## 2020-09-24 PROCEDURE — 97116 GAIT TRAINING THERAPY: CPT

## 2020-09-24 PROCEDURE — 63710000001 INSULIN DETEMIR PER 5 UNITS: Performed by: INTERNAL MEDICINE

## 2020-09-24 PROCEDURE — 85025 COMPLETE CBC W/AUTO DIFF WBC: CPT | Performed by: STUDENT IN AN ORGANIZED HEALTH CARE EDUCATION/TRAINING PROGRAM

## 2020-09-24 RX ADMIN — MEMANTINE HYDROCHLORIDE 5 MG: 5 TABLET, FILM COATED ORAL at 08:49

## 2020-09-24 RX ADMIN — HEPARIN SODIUM 5000 UNITS: 5000 INJECTION INTRAVENOUS; SUBCUTANEOUS at 13:53

## 2020-09-24 RX ADMIN — TRAMADOL HYDROCHLORIDE 50 MG: 50 TABLET, FILM COATED ORAL at 20:30

## 2020-09-24 RX ADMIN — INSULIN DETEMIR 10 UNITS: 100 INJECTION, SOLUTION SUBCUTANEOUS at 20:15

## 2020-09-24 RX ADMIN — HEPARIN SODIUM 5000 UNITS: 5000 INJECTION INTRAVENOUS; SUBCUTANEOUS at 06:49

## 2020-09-24 RX ADMIN — LAMOTRIGINE 12.5 MG: 25 TABLET ORAL at 08:50

## 2020-09-24 RX ADMIN — Medication 100 MG: at 08:50

## 2020-09-24 RX ADMIN — HEPARIN SODIUM 5000 UNITS: 5000 INJECTION INTRAVENOUS; SUBCUTANEOUS at 23:25

## 2020-09-24 RX ADMIN — FAMOTIDINE 20 MG: 20 TABLET, FILM COATED ORAL at 17:07

## 2020-09-24 RX ADMIN — GUAIFENESIN 1200 MG: 600 TABLET, EXTENDED RELEASE ORAL at 08:50

## 2020-09-24 RX ADMIN — GUAIFENESIN 1200 MG: 600 TABLET, EXTENDED RELEASE ORAL at 20:10

## 2020-09-24 RX ADMIN — ASPIRIN 81 MG: 81 TABLET, COATED ORAL at 08:50

## 2020-09-24 RX ADMIN — MEMANTINE HYDROCHLORIDE 5 MG: 5 TABLET, FILM COATED ORAL at 20:11

## 2020-09-24 RX ADMIN — FAMOTIDINE 20 MG: 20 TABLET, FILM COATED ORAL at 06:45

## 2020-09-24 RX ADMIN — TRAMADOL HYDROCHLORIDE 50 MG: 50 TABLET, FILM COATED ORAL at 09:42

## 2020-09-24 RX ADMIN — OLANZAPINE 7.5 MG: 2.5 TABLET ORAL at 20:11

## 2020-09-24 RX ADMIN — TAMSULOSIN HYDROCHLORIDE 0.4 MG: 0.4 CAPSULE ORAL at 08:50

## 2020-09-24 RX ADMIN — INSULIN DETEMIR 10 UNITS: 100 INJECTION, SOLUTION SUBCUTANEOUS at 08:51

## 2020-09-24 RX ADMIN — METOPROLOL SUCCINATE 25 MG: 25 TABLET, EXTENDED RELEASE ORAL at 08:49

## 2020-09-24 RX ADMIN — LAMOTRIGINE 12.5 MG: 25 TABLET ORAL at 20:10

## 2020-09-24 RX ADMIN — SODIUM CHLORIDE, PRESERVATIVE FREE 10 ML: 5 INJECTION INTRAVENOUS at 20:12

## 2020-09-24 RX ADMIN — LEVOTHYROXINE SODIUM 75 MCG: 75 TABLET ORAL at 08:49

## 2020-09-24 RX ADMIN — SODIUM CHLORIDE, PRESERVATIVE FREE 10 ML: 5 INJECTION INTRAVENOUS at 08:50

## 2020-09-24 RX ADMIN — SODIUM CHLORIDE, PRESERVATIVE FREE 10 ML: 5 INJECTION INTRAVENOUS at 20:11

## 2020-09-25 VITALS
TEMPERATURE: 98.6 F | HEART RATE: 92 BPM | SYSTOLIC BLOOD PRESSURE: 130 MMHG | RESPIRATION RATE: 20 BRPM | WEIGHT: 204.5 LBS | BODY MASS INDEX: 26.24 KG/M2 | OXYGEN SATURATION: 96 % | HEIGHT: 74 IN | DIASTOLIC BLOOD PRESSURE: 82 MMHG

## 2020-09-25 LAB
GLUCOSE BLDC GLUCOMTR-MCNC: 117 MG/DL (ref 70–130)
GLUCOSE BLDC GLUCOMTR-MCNC: 144 MG/DL (ref 70–130)

## 2020-09-25 PROCEDURE — 63710000001 INSULIN DETEMIR PER 5 UNITS: Performed by: INTERNAL MEDICINE

## 2020-09-25 PROCEDURE — 99239 HOSP IP/OBS DSCHRG MGMT >30: CPT | Performed by: STUDENT IN AN ORGANIZED HEALTH CARE EDUCATION/TRAINING PROGRAM

## 2020-09-25 PROCEDURE — 97110 THERAPEUTIC EXERCISES: CPT

## 2020-09-25 PROCEDURE — 25010000002 HEPARIN (PORCINE) PER 1000 UNITS: Performed by: INTERNAL MEDICINE

## 2020-09-25 PROCEDURE — 82962 GLUCOSE BLOOD TEST: CPT

## 2020-09-25 PROCEDURE — 97530 THERAPEUTIC ACTIVITIES: CPT

## 2020-09-25 PROCEDURE — 94799 UNLISTED PULMONARY SVC/PX: CPT

## 2020-09-25 RX ORDER — LANOLIN ALCOHOL/MO/W.PET/CERES
100 CREAM (GRAM) TOPICAL DAILY
Start: 2020-09-26

## 2020-09-25 RX ORDER — TRAMADOL HYDROCHLORIDE 50 MG/1
50 TABLET ORAL EVERY 8 HOURS PRN
Start: 2020-09-25 | End: 2020-09-26

## 2020-09-25 RX ORDER — OLANZAPINE 7.5 MG/1
7.5 TABLET ORAL NIGHTLY
Start: 2020-09-25 | End: 2021-04-25

## 2020-09-25 RX ORDER — FAMOTIDINE 20 MG/1
20 TABLET, FILM COATED ORAL
Start: 2020-09-25

## 2020-09-25 RX ORDER — BUDESONIDE 0.5 MG/2ML
0.5 INHALANT ORAL
Start: 2020-09-25 | End: 2020-11-14

## 2020-09-25 RX ADMIN — INSULIN DETEMIR 10 UNITS: 100 INJECTION, SOLUTION SUBCUTANEOUS at 09:23

## 2020-09-25 RX ADMIN — LEVOTHYROXINE SODIUM 75 MCG: 75 TABLET ORAL at 09:22

## 2020-09-25 RX ADMIN — TRAMADOL HYDROCHLORIDE 50 MG: 50 TABLET, FILM COATED ORAL at 15:53

## 2020-09-25 RX ADMIN — Medication 100 MG: at 09:21

## 2020-09-25 RX ADMIN — HEPARIN SODIUM 5000 UNITS: 5000 INJECTION INTRAVENOUS; SUBCUTANEOUS at 06:01

## 2020-09-25 RX ADMIN — TAMSULOSIN HYDROCHLORIDE 0.4 MG: 0.4 CAPSULE ORAL at 09:21

## 2020-09-25 RX ADMIN — ASPIRIN 81 MG: 81 TABLET, COATED ORAL at 09:21

## 2020-09-25 RX ADMIN — SODIUM CHLORIDE, PRESERVATIVE FREE 10 ML: 5 INJECTION INTRAVENOUS at 09:22

## 2020-09-25 RX ADMIN — HEPARIN SODIUM 5000 UNITS: 5000 INJECTION INTRAVENOUS; SUBCUTANEOUS at 15:52

## 2020-09-25 RX ADMIN — MEMANTINE HYDROCHLORIDE 5 MG: 5 TABLET, FILM COATED ORAL at 09:22

## 2020-09-25 RX ADMIN — GUAIFENESIN 1200 MG: 600 TABLET, EXTENDED RELEASE ORAL at 09:21

## 2020-09-25 RX ADMIN — SODIUM CHLORIDE, PRESERVATIVE FREE 10 ML: 5 INJECTION INTRAVENOUS at 09:25

## 2020-09-25 RX ADMIN — METOPROLOL SUCCINATE 25 MG: 25 TABLET, EXTENDED RELEASE ORAL at 09:22

## 2020-09-25 RX ADMIN — FAMOTIDINE 20 MG: 20 TABLET, FILM COATED ORAL at 09:22

## 2020-09-25 RX ADMIN — LAMOTRIGINE 12.5 MG: 25 TABLET ORAL at 09:24

## 2020-10-01 NOTE — DISCHARGE SUMMARY
[facility]OSPITAL[facility] DISCHARGE SUMMARY    Patient Identification:  Name:  Chris Ferguson  Age:  74 y.o.  Sex:  male  :  1945  MRN:  [id number]  Visit Number:  [id number]    Date of Admission: 2020  Date of Discharge:  2020     PCP: Gisela Sotelo APRN    DISCHARGE DIAGNOSIS    Septic Shock  Severe Cellulitis  Community Acquired Pneumonia  Alcohol Abuse  Acute Alcohol withdrawal  Nonexertional Heat Stroke  Rhabdomyolysis  DIANA on CKD Stage III  Diabetes mellitus Type 2  Left leg burn with blistering  Essential Hypertension  Hypothyroidism  Dementia  CONSULTS     Nephrology  General Surgery  PROCEDURES PERFORMED      HOSPITAL COURSE  Patient is a 74 y.o. male presented to [facility] after raffy found down.  Please see the admitting history and physical for further details.  Patient initially admitted to the hospital after being found down with septic shock with lactate greater than 4 likely related to cellulitis and development of pnueumonia all of which were present on admission.  He was treated for both with normalization of lactate, WBC, and CRP.  Initially had 1/2 blood cultures with coagulase negative Staph, suspected to be a contaminant as repeat blood cultures had no growth.  He completed a course of Doxycycline, Rocephin, and Flagyl and remained stable post treatment.  Patient did also suffer from acute alcohol withdrawal but completed a librium taper and returned to baseline mental status which does include baseline dementia with intermittent confusion.  Nonexertional heatstroke and rhabdomyolysis were also treated with supportive care with completed resolution during the hospital stay. He did have DIANA on CKD III which improved with treatment of his acute problems and supportive care.  Patient also with burn of the left leg with skin blistering and required debridement and placement of a wound vac which he was discharged with.  He also completed a course of  antibiotics as above for component of cellulitis.  The patient's chronic issues; HTN, hypothyroidism, and dementia with metoprolol, levothyroxine and home medications respectively.  Placement for the patient was initiated and found at [facility] in [place] and ordering of wound vac and availability at receiving SNF was confirmed.       VITAL SIGNS:        on   ;        Body mass index is 26.26 kg/m².  Wt Readings from Last 3 Encounters:   09/24/20 92.8 kg (204 lb 8 oz)   02/04/20 99.8 kg (220 lb)   12/02/19 101 kg (222 lb)       PHYSICAL EXAM:  Constitutional:  Well-developed and well-nourished.  No acute distress.      HENT:  Head:  Normocephalic and atraumatic.  Mouth:  Moist mucous membranes.    Eyes:  Conjunctivae and EOM are normal. No scleral icterus.    Neck:  Neck supple.  No JVD present.    Cardiovascular:  Normal rate, regular rhythm and normal heart sounds with no murmur.  Pulmonary/Chest:  No respiratory distress, no wheezes, no crackles, with normal breath sounds and good air movement.  Abdominal:  Soft.  Bowel sounds are normal.  No distension and no tenderness.   Musculoskeletal:  No edema, no tenderness, and no deformity.  No red or swollen joints anywhere.  Functional ROM intact.   Neurological:  Alert and oriented to person, place, and time.  No cranial nerve deficit.  No tongue deviation.  No facial droop.  No slurred speech. Intact Sensation throughout  Skin:  Skin is warm and dry.  Mild peripheral edema in the left lower extremity near wound VAC otherwise wound VAC dressing clean dry and intact.  Peripheral vascular:  Pulses in all 4 extremities with no clubbing, no cyanosis, no edema.  Psychiatric: Appropriate mood and affect, pleasant.     DISCHARGE DISPOSITION   Stable    DISCHARGE MEDICATIONS:     Discharge Medications      New Medications      Instructions Start Date   budesonide 0.5 MG/2ML nebulizer solution  Commonly known as: PULMICORT   0.5 mg, Nebulization, 2 Times Daily - RT       famotidine 20 MG tablet  Commonly known as: PEPCID   20 mg, Oral, 2 Times Daily Before Meals      insulin detemir 100 UNIT/ML injection  Commonly known as: LEVEMIR   10 Units, Subcutaneous, Every 12 Hours Scheduled      thiamine 100 MG tablet  Commonly known as: VITAMIN B1   100 mg, Oral, Daily         Changes to Medications      Instructions Start Date   OLANZapine 7.5 MG tablet  Commonly known as: zyPREXA  What changed:   · medication strength  · how much to take   7.5 mg, Oral, Nightly         Continue These Medications      Instructions Start Date   atorvastatin 20 MG tablet  Commonly known as: LIPITOR   20 mg, Oral, Daily      lamoTRIgine 25 MG tablet  Commonly known as: LaMICtal   12.5 mg, Oral, 2 Times Daily      levothyroxine 75 MCG tablet  Commonly known as: SYNTHROID, LEVOTHROID   75 mcg, Oral, Daily      memantine 5 MG tablet  Commonly known as: NAMENDA   5 mg, Oral, 2 Times Daily      metoprolol succinate XL 25 MG 24 hr tablet  Commonly known as: TOPROL-XL   25 mg, Oral, Daily      tamsulosin 0.4 MG capsule 24 hr capsule  Commonly known as: FLOMAX   1 capsule, Oral, Daily         Stop These Medications    DULoxetine 60 MG capsule  Commonly known as: CYMBALTA     escitalopram 10 MG tablet  Commonly known as: LEXAPRO     insulin aspart 100 UNIT/ML injection  Commonly known as: novoLOG     metFORMIN 500 MG tablet  Commonly known as: GLUCOPHAGE        ASK your doctor about these medications      Instructions Start Date   traMADol 50 MG tablet  Commonly known as: ULTRAM  Ask about: Should I take this medication?   50 mg, Oral, Every 8 Hours PRN                Contact information for follow-up providers     Gisela Sotelo, APRN .    Specialties: Nurse Practitioner, Family Medicine  Contact information:  [address] [facility]  [address]  [place] [address] [phone]                   Contact information for after-discharge care     Destination     [facility] .    Service:  Skilled Nursing  Contact information:  [address]  [facility] [address] [phone]                              TEST  RESULTS PENDING AT DISCHARGE       CODE STATUS  Code Status and Medical Interventions:   Ordered at: 08/1945     Code Status:    CPR     Medical Interventions (Level of Support Prior to Arrest):    Full       Seth Ernst DO  [facility]  10/01/20  06:13 EDT    Please note that this discharge summary required more than 30 minutes to complete.

## 2020-11-14 ENCOUNTER — APPOINTMENT (OUTPATIENT)
Dept: GENERAL RADIOLOGY | Facility: HOSPITAL | Age: 75
End: 2020-11-14

## 2020-11-14 ENCOUNTER — HOSPITAL ENCOUNTER (OUTPATIENT)
Facility: HOSPITAL | Age: 75
Discharge: NURSING, REHAB OR LONG-TERM CARE | End: 2020-11-18
Attending: INTERNAL MEDICINE | Admitting: INTERNAL MEDICINE

## 2020-11-14 DIAGNOSIS — I10 ESSENTIAL HYPERTENSION: ICD-10-CM

## 2020-11-14 DIAGNOSIS — T24.332A BURN OF THIRD DEGREE OF LEFT LOWER LEG, INITIAL ENCOUNTER: ICD-10-CM

## 2020-11-14 DIAGNOSIS — R07.9 CHEST PAIN, UNSPECIFIED TYPE: ICD-10-CM

## 2020-11-14 DIAGNOSIS — I20.0 UNSTABLE ANGINA (HCC): Primary | ICD-10-CM

## 2020-11-14 DIAGNOSIS — E78.2 MIXED HYPERLIPIDEMIA: ICD-10-CM

## 2020-11-14 PROBLEM — S81.802A LEG WOUND, LEFT: Chronic | Status: ACTIVE | Noted: 2020-11-14

## 2020-11-14 LAB
ALBUMIN SERPL-MCNC: 3.3 G/DL (ref 3.5–5.2)
ALBUMIN/GLOB SERPL: 1.2 G/DL
ALP SERPL-CCNC: 137 U/L (ref 39–117)
ALT SERPL W P-5'-P-CCNC: 27 U/L (ref 1–41)
AMPHET+METHAMPHET UR QL: NEGATIVE
ANION GAP SERPL CALCULATED.3IONS-SCNC: 10 MMOL/L (ref 5–15)
AST SERPL-CCNC: 28 U/L (ref 1–40)
BARBITURATES UR QL SCN: NEGATIVE
BASOPHILS # BLD AUTO: 0 10*3/MM3 (ref 0–0.2)
BASOPHILS NFR BLD AUTO: 0.5 % (ref 0–1.5)
BENZODIAZ UR QL SCN: NEGATIVE
BILIRUB SERPL-MCNC: 0.2 MG/DL (ref 0–1.2)
BUN SERPL-MCNC: 20 MG/DL (ref 8–23)
BUN/CREAT SERPL: 20.6 (ref 7–25)
CALCIUM SPEC-SCNC: 8.6 MG/DL (ref 8.6–10.5)
CANNABINOIDS SERPL QL: NEGATIVE
CHLORIDE SERPL-SCNC: 101 MMOL/L (ref 98–107)
CO2 SERPL-SCNC: 25 MMOL/L (ref 22–29)
COCAINE UR QL: NEGATIVE
CREAT SERPL-MCNC: 0.97 MG/DL (ref 0.76–1.27)
DEPRECATED RDW RBC AUTO: 45.1 FL (ref 37–54)
EOSINOPHIL # BLD AUTO: 0.4 10*3/MM3 (ref 0–0.4)
EOSINOPHIL NFR BLD AUTO: 5.6 % (ref 0.3–6.2)
ERYTHROCYTE [DISTWIDTH] IN BLOOD BY AUTOMATED COUNT: 15.9 % (ref 12.3–15.4)
ETHANOL UR QL: <0.01 %
GFR SERPL CREATININE-BSD FRML MDRD: 76 ML/MIN/1.73
GLOBULIN UR ELPH-MCNC: 2.8 GM/DL
GLUCOSE SERPL-MCNC: 132 MG/DL (ref 65–99)
HCT VFR BLD AUTO: 30.9 % (ref 37.5–51)
HGB BLD-MCNC: 9.9 G/DL (ref 13–17.7)
LIPASE SERPL-CCNC: 16 U/L (ref 13–60)
LYMPHOCYTES # BLD AUTO: 2.8 10*3/MM3 (ref 0.7–3.1)
LYMPHOCYTES NFR BLD AUTO: 43.6 % (ref 19.6–45.3)
MCH RBC QN AUTO: 26 PG (ref 26.6–33)
MCHC RBC AUTO-ENTMCNC: 31.9 G/DL (ref 31.5–35.7)
MCV RBC AUTO: 81.4 FL (ref 79–97)
METHADONE UR QL SCN: NEGATIVE
MONOCYTES # BLD AUTO: 0.5 10*3/MM3 (ref 0.1–0.9)
MONOCYTES NFR BLD AUTO: 7.5 % (ref 5–12)
NEUTROPHILS NFR BLD AUTO: 2.8 10*3/MM3 (ref 1.7–7)
NEUTROPHILS NFR BLD AUTO: 42.8 % (ref 42.7–76)
NRBC BLD AUTO-RTO: 0.1 /100 WBC (ref 0–0.2)
NT-PROBNP SERPL-MCNC: 129.4 PG/ML (ref 0–900)
OPIATES UR QL: POSITIVE
OXYCODONE UR QL SCN: NEGATIVE
PLATELET # BLD AUTO: 291 10*3/MM3 (ref 140–450)
PMV BLD AUTO: 7.2 FL (ref 6–12)
POTASSIUM SERPL-SCNC: 3.9 MMOL/L (ref 3.5–5.2)
PROT SERPL-MCNC: 6.1 G/DL (ref 6–8.5)
RBC # BLD AUTO: 3.8 10*6/MM3 (ref 4.14–5.8)
SODIUM SERPL-SCNC: 136 MMOL/L (ref 136–145)
TROPONIN T SERPL-MCNC: 0.02 NG/ML (ref 0–0.03)
TROPONIN T SERPL-MCNC: 0.02 NG/ML (ref 0–0.03)
WBC # BLD AUTO: 6.5 10*3/MM3 (ref 3.4–10.8)
WHOLE BLOOD HOLD SPECIMEN: NORMAL

## 2020-11-14 PROCEDURE — 99284 EMERGENCY DEPT VISIT MOD MDM: CPT

## 2020-11-14 PROCEDURE — G0378 HOSPITAL OBSERVATION PER HR: HCPCS

## 2020-11-14 PROCEDURE — 87070 CULTURE OTHR SPECIMN AEROBIC: CPT | Performed by: INTERNAL MEDICINE

## 2020-11-14 PROCEDURE — 80307 DRUG TEST PRSMV CHEM ANLYZR: CPT | Performed by: PHYSICIAN ASSISTANT

## 2020-11-14 PROCEDURE — 85025 COMPLETE CBC W/AUTO DIFF WBC: CPT | Performed by: PHYSICIAN ASSISTANT

## 2020-11-14 PROCEDURE — 93005 ELECTROCARDIOGRAM TRACING: CPT | Performed by: INTERNAL MEDICINE

## 2020-11-14 PROCEDURE — 96375 TX/PRO/DX INJ NEW DRUG ADDON: CPT

## 2020-11-14 PROCEDURE — 99220 PR INITIAL OBSERVATION CARE/DAY 70 MINUTES: CPT | Performed by: INTERNAL MEDICINE

## 2020-11-14 PROCEDURE — 83036 HEMOGLOBIN GLYCOSYLATED A1C: CPT | Performed by: INTERNAL MEDICINE

## 2020-11-14 PROCEDURE — 93005 ELECTROCARDIOGRAM TRACING: CPT

## 2020-11-14 PROCEDURE — 83690 ASSAY OF LIPASE: CPT | Performed by: PHYSICIAN ASSISTANT

## 2020-11-14 PROCEDURE — 84484 ASSAY OF TROPONIN QUANT: CPT | Performed by: PHYSICIAN ASSISTANT

## 2020-11-14 PROCEDURE — 96374 THER/PROPH/DIAG INJ IV PUSH: CPT

## 2020-11-14 PROCEDURE — 25010000002 MORPHINE PER 10 MG: Performed by: EMERGENCY MEDICINE

## 2020-11-14 PROCEDURE — 71045 X-RAY EXAM CHEST 1 VIEW: CPT

## 2020-11-14 PROCEDURE — 83880 ASSAY OF NATRIURETIC PEPTIDE: CPT | Performed by: PHYSICIAN ASSISTANT

## 2020-11-14 PROCEDURE — 80053 COMPREHEN METABOLIC PANEL: CPT | Performed by: PHYSICIAN ASSISTANT

## 2020-11-14 PROCEDURE — 87205 SMEAR GRAM STAIN: CPT | Performed by: INTERNAL MEDICINE

## 2020-11-14 PROCEDURE — 84484 ASSAY OF TROPONIN QUANT: CPT | Performed by: INTERNAL MEDICINE

## 2020-11-14 PROCEDURE — 63710000001 INSULIN GLARGINE PER 5 UNITS: Performed by: INTERNAL MEDICINE

## 2020-11-14 RX ORDER — ACETAMINOPHEN 650 MG/1
650 SUPPOSITORY RECTAL EVERY 4 HOURS PRN
Status: DISCONTINUED | OUTPATIENT
Start: 2020-11-14 | End: 2020-11-18 | Stop reason: HOSPADM

## 2020-11-14 RX ORDER — FOLIC ACID 1 MG/1
1 TABLET ORAL DAILY
Status: DISCONTINUED | OUTPATIENT
Start: 2020-11-15 | End: 2020-11-18 | Stop reason: HOSPADM

## 2020-11-14 RX ORDER — ASCORBIC ACID 500 MG
500 TABLET ORAL 2 TIMES DAILY
COMMUNITY
End: 2021-06-10 | Stop reason: HOSPADM

## 2020-11-14 RX ORDER — HYDROCODONE BITARTRATE AND ACETAMINOPHEN 7.5; 325 MG/1; MG/1
1 TABLET ORAL EVERY 4 HOURS PRN
Status: DISCONTINUED | OUTPATIENT
Start: 2020-11-14 | End: 2020-11-18 | Stop reason: HOSPADM

## 2020-11-14 RX ORDER — BISACODYL 10 MG
10 SUPPOSITORY, RECTAL RECTAL DAILY PRN
Status: DISCONTINUED | OUTPATIENT
Start: 2020-11-14 | End: 2020-11-18 | Stop reason: HOSPADM

## 2020-11-14 RX ORDER — INSULIN GLARGINE 100 [IU]/ML
15 INJECTION, SOLUTION SUBCUTANEOUS 2 TIMES DAILY
Status: DISCONTINUED | OUTPATIENT
Start: 2020-11-14 | End: 2020-11-18 | Stop reason: HOSPADM

## 2020-11-14 RX ORDER — TAMSULOSIN HYDROCHLORIDE 0.4 MG/1
0.4 CAPSULE ORAL DAILY
Status: DISCONTINUED | OUTPATIENT
Start: 2020-11-15 | End: 2020-11-18 | Stop reason: HOSPADM

## 2020-11-14 RX ORDER — INSULIN LISPRO 100 [IU]/ML
0-9 INJECTION, SOLUTION INTRAVENOUS; SUBCUTANEOUS AS NEEDED
Status: DISCONTINUED | OUTPATIENT
Start: 2020-11-14 | End: 2020-11-18 | Stop reason: HOSPADM

## 2020-11-14 RX ORDER — THIAMINE MONONITRATE (VIT B1) 100 MG
100 TABLET ORAL DAILY
Status: DISCONTINUED | OUTPATIENT
Start: 2020-11-15 | End: 2020-11-18 | Stop reason: HOSPADM

## 2020-11-14 RX ORDER — SODIUM CHLORIDE 0.9 % (FLUSH) 0.9 %
10 SYRINGE (ML) INJECTION EVERY 12 HOURS SCHEDULED
Status: DISCONTINUED | OUTPATIENT
Start: 2020-11-14 | End: 2020-11-18 | Stop reason: HOSPADM

## 2020-11-14 RX ORDER — ATORVASTATIN CALCIUM 20 MG/1
20 TABLET, FILM COATED ORAL NIGHTLY
Status: DISCONTINUED | OUTPATIENT
Start: 2020-11-14 | End: 2020-11-18 | Stop reason: HOSPADM

## 2020-11-14 RX ORDER — DEXTROSE MONOHYDRATE 25 G/50ML
25 INJECTION, SOLUTION INTRAVENOUS
Status: DISCONTINUED | OUTPATIENT
Start: 2020-11-14 | End: 2020-11-18 | Stop reason: HOSPADM

## 2020-11-14 RX ORDER — NICOTINE POLACRILEX 4 MG
15 LOZENGE BUCCAL
COMMUNITY
End: 2021-04-25

## 2020-11-14 RX ORDER — FAMOTIDINE 20 MG/1
20 TABLET, FILM COATED ORAL
Status: DISCONTINUED | OUTPATIENT
Start: 2020-11-14 | End: 2020-11-18 | Stop reason: HOSPADM

## 2020-11-14 RX ORDER — TRAZODONE HYDROCHLORIDE 50 MG/1
50 TABLET ORAL 2 TIMES DAILY
COMMUNITY

## 2020-11-14 RX ORDER — METOPROLOL SUCCINATE 25 MG/1
25 TABLET, EXTENDED RELEASE ORAL DAILY
Status: DISCONTINUED | OUTPATIENT
Start: 2020-11-15 | End: 2020-11-18 | Stop reason: HOSPADM

## 2020-11-14 RX ORDER — INSULIN GLARGINE 100 [IU]/ML
15 INJECTION, SOLUTION SUBCUTANEOUS 2 TIMES DAILY
COMMUNITY
End: 2021-04-25

## 2020-11-14 RX ORDER — LAMOTRIGINE 25 MG/1
12.5 TABLET ORAL 2 TIMES DAILY
Status: DISCONTINUED | OUTPATIENT
Start: 2020-11-14 | End: 2020-11-18 | Stop reason: HOSPADM

## 2020-11-14 RX ORDER — ISOSORBIDE MONONITRATE 30 MG/1
30 TABLET, EXTENDED RELEASE ORAL DAILY
Status: DISCONTINUED | OUTPATIENT
Start: 2020-11-15 | End: 2020-11-18 | Stop reason: HOSPADM

## 2020-11-14 RX ORDER — BUDESONIDE 0.5 MG/2ML
0.5 INHALANT ORAL EVERY MORNING
Status: DISCONTINUED | OUTPATIENT
Start: 2020-11-15 | End: 2020-11-18 | Stop reason: HOSPADM

## 2020-11-14 RX ORDER — DIPHENOXYLATE HYDROCHLORIDE AND ATROPINE SULFATE 2.5; .025 MG/1; MG/1
1 TABLET ORAL DAILY
COMMUNITY

## 2020-11-14 RX ORDER — HYDROCODONE BITARTRATE AND ACETAMINOPHEN 7.5; 325 MG/1; MG/1
1 TABLET ORAL EVERY 4 HOURS PRN
Status: ON HOLD | COMMUNITY
End: 2020-11-18 | Stop reason: SDUPTHER

## 2020-11-14 RX ORDER — FOLIC ACID 1 MG/1
1 TABLET ORAL DAILY
COMMUNITY

## 2020-11-14 RX ORDER — LEVOTHYROXINE SODIUM 0.1 MG/1
100 TABLET ORAL
Status: DISCONTINUED | OUTPATIENT
Start: 2020-11-15 | End: 2020-11-18 | Stop reason: HOSPADM

## 2020-11-14 RX ORDER — SODIUM CHLORIDE 0.9 % (FLUSH) 0.9 %
10 SYRINGE (ML) INJECTION AS NEEDED
Status: DISCONTINUED | OUTPATIENT
Start: 2020-11-14 | End: 2020-11-18 | Stop reason: HOSPADM

## 2020-11-14 RX ORDER — ISOSORBIDE MONONITRATE 30 MG/1
30 TABLET, EXTENDED RELEASE ORAL DAILY
COMMUNITY

## 2020-11-14 RX ORDER — IBUPROFEN 600 MG/1
1 TABLET ORAL EVERY 12 HOURS PRN
COMMUNITY
End: 2021-06-10 | Stop reason: HOSPADM

## 2020-11-14 RX ORDER — NICOTINE POLACRILEX 4 MG
15 LOZENGE BUCCAL
Status: DISCONTINUED | OUTPATIENT
Start: 2020-11-14 | End: 2020-11-18 | Stop reason: HOSPADM

## 2020-11-14 RX ORDER — HEPARIN SODIUM 5000 [USP'U]/ML
5000 INJECTION, SOLUTION INTRAVENOUS; SUBCUTANEOUS EVERY 12 HOURS SCHEDULED
Status: DISCONTINUED | OUTPATIENT
Start: 2020-11-14 | End: 2020-11-18 | Stop reason: HOSPADM

## 2020-11-14 RX ORDER — BUDESONIDE 0.5 MG/2ML
0.5 INHALANT ORAL EVERY MORNING
COMMUNITY
End: 2021-04-25

## 2020-11-14 RX ORDER — ACETAMINOPHEN 160 MG/5ML
325 SOLUTION ORAL EVERY 4 HOURS PRN
Status: DISCONTINUED | OUTPATIENT
Start: 2020-11-14 | End: 2020-11-18 | Stop reason: HOSPADM

## 2020-11-14 RX ORDER — MORPHINE SULFATE 4 MG/ML
2 INJECTION, SOLUTION INTRAMUSCULAR; INTRAVENOUS ONCE
Status: COMPLETED | OUTPATIENT
Start: 2020-11-14 | End: 2020-11-14

## 2020-11-14 RX ORDER — MEMANTINE HYDROCHLORIDE 5 MG/1
5 TABLET ORAL DAILY
Status: DISCONTINUED | OUTPATIENT
Start: 2020-11-15 | End: 2020-11-18 | Stop reason: HOSPADM

## 2020-11-14 RX ORDER — ASPIRIN 81 MG/1
81 TABLET, CHEWABLE ORAL ONCE
Status: COMPLETED | OUTPATIENT
Start: 2020-11-14 | End: 2020-11-14

## 2020-11-14 RX ORDER — SACCHAROMYCES BOULARDII 250 MG
250 CAPSULE ORAL DAILY
Status: DISCONTINUED | OUTPATIENT
Start: 2020-11-15 | End: 2020-11-18 | Stop reason: HOSPADM

## 2020-11-14 RX ORDER — DIPHENOXYLATE HYDROCHLORIDE AND ATROPINE SULFATE 2.5; .025 MG/1; MG/1
1 TABLET ORAL DAILY
Status: DISCONTINUED | OUTPATIENT
Start: 2020-11-15 | End: 2020-11-18 | Stop reason: HOSPADM

## 2020-11-14 RX ORDER — INSULIN LISPRO 100 [IU]/ML
0-9 INJECTION, SOLUTION INTRAVENOUS; SUBCUTANEOUS
Status: DISCONTINUED | OUTPATIENT
Start: 2020-11-15 | End: 2020-11-18 | Stop reason: HOSPADM

## 2020-11-14 RX ORDER — ACETAMINOPHEN 325 MG/1
325 TABLET ORAL EVERY 4 HOURS PRN
Status: DISCONTINUED | OUTPATIENT
Start: 2020-11-14 | End: 2020-11-18 | Stop reason: HOSPADM

## 2020-11-14 RX ORDER — ASCORBIC ACID 500 MG
500 TABLET ORAL DAILY
Status: DISCONTINUED | OUTPATIENT
Start: 2020-11-15 | End: 2020-11-18 | Stop reason: HOSPADM

## 2020-11-14 RX ORDER — INSULIN LISPRO 100 [IU]/ML
3 INJECTION, SOLUTION INTRAVENOUS; SUBCUTANEOUS
Status: DISCONTINUED | OUTPATIENT
Start: 2020-11-14 | End: 2020-11-18 | Stop reason: HOSPADM

## 2020-11-14 RX ADMIN — NITROGLYCERIN 1 INCH: 20 OINTMENT TOPICAL at 16:09

## 2020-11-14 RX ADMIN — FAMOTIDINE 20 MG: 20 TABLET ORAL at 20:06

## 2020-11-14 RX ADMIN — Medication 10 ML: at 20:08

## 2020-11-14 RX ADMIN — HYDROCODONE BITARTRATE AND ACETAMINOPHEN 1 TABLET: 7.5; 325 TABLET ORAL at 20:08

## 2020-11-14 RX ADMIN — ASPIRIN 81 MG: 81 TABLET, CHEWABLE ORAL at 16:10

## 2020-11-14 RX ADMIN — LAMOTRIGINE 12.5 MG: 25 TABLET ORAL at 20:07

## 2020-11-14 RX ADMIN — INSULIN GLARGINE 15 UNITS: 100 INJECTION, SOLUTION SUBCUTANEOUS at 20:08

## 2020-11-14 RX ADMIN — MORPHINE SULFATE 2 MG: 4 INJECTION INTRAVENOUS at 16:26

## 2020-11-14 RX ADMIN — ATORVASTATIN CALCIUM 20 MG: 20 TABLET, FILM COATED ORAL at 20:07

## 2020-11-14 RX ADMIN — OLANZAPINE 7.5 MG: 5 TABLET ORAL at 20:17

## 2020-11-14 NOTE — H&P
[facility] HOSPITALIST     Gisela Sotelo APRN    09/[birth year]  Patient Care Team:  Gisela Sotelo APRN as PCP - Yanci Buckley MD as Consulting Physician (Cardiology)      CHIEF COMPLAINT:     Chief Complaint   Patient presents with   • Chest Pain       HISTORY OF PRESENT ILLNESS:    Patient is 74-year-old male who presents to the ER with chest pain he reports it feels like an elephant is sitting on his chest.  He denies any history of heart disease.  He denies anything makes it better or worse it is fairly constant.     Onset: 1 day  Location: Chest  Duration: Constant  Character: Pressure-like pain  Aggravating/Alleviating factors: None  Radiation none  Severity: Moderate       No prior cardiac work-up.  Left leg wound for the past 2 months from trauma.  Is been seeing a physician in [place].        Past Medical History:   Diagnosis Date   • Anemia    • Angina pectoris (CMS/HCC)    • Anxiety    • Arthritis     OSTEO   • BPH (benign prostatic hyperplasia)    • Dementia (CMS/HCC)    • Dementia (CMS/HCC)    • Depression    • Diabetes mellitus (CMS/HCC)     type 2   • Disease of thyroid gland    • GERD (gastroesophageal reflux disease)    • Hypertension    • Parkinson's disease (CMS/HCC)    • Short-term memory loss      Past Surgical History:   Procedure Laterality Date   • APPENDECTOMY     • CHOLECYSTECTOMY     • COLONOSCOPY     • EYE SURGERY      kallie cataracts w iol   • FEMUR FRACTURE SURGERY Left     1/2018   • LEG DEBRIDEMENT Left 9/15/2020    Procedure: LOWER EXTREMITY DEBRIDEMENT;  Surgeon: Joslyn Mistry MD;  Location: [facility] OR;  Service: General;  Laterality: Left;   • MULTIPLE TOOTH EXTRACTIONS      ALL TEETH REMOVED   • STOMACH SURGERY      gastric ulcer   • TOTAL HIP ARTHROPLASTY Right 4/23/2019    Procedure: TOTAL HIP ARTHROPLASTY POSTERIOR;  Surgeon: Valente Giang MD;  Location: [facility] OR;  Service: Orthopedics   • TOTAL KNEE ARTHROPLASTY Left 8/31/2018  "   Procedure: REMOVAL OF LEFT DISTAL FEMUR PLATE AND SCREWS WITH COMPLEX TOTAL KNEE REPLACEMENT DISTAL FEMUR HINGED;  Surgeon: Valente Giang MD;  Location: [facility];  Service: Orthopedics     Family History   Problem Relation Age of Onset   • Heart disease Mother    • Stroke Father    • Heart disease Father    • Seizures Son    • Seizures Son    • Malig Hyperthermia Neg Hx      Social History     Tobacco Use   • Smoking status: Never Smoker   • Smokeless tobacco: Never Used   Substance Use Topics   • Alcohol use: Yes     Comment: pt reports mod amt    • Drug use: No     (Not in a hospital admission)    Allergies:  Codeine    Immunization History   Administered Date(s) Administered   • Fluzone High Dose =>65 Years ([employer] ONLY) 10/01/2019           REVIEW OF SYSTEMS:     ROS  Constitutional: Negative for chills, diaphoresis, fatigue and fever.   Respiratory: Negative for cough, choking and shortness of breath.    Cardiovascular: Positive for chest pain. Negative for palpitations and leg swelling.   Gastrointestinal: Negative for abdominal pain, nausea and vomiting.   Genitourinary: Negative.    Musculoskeletal: Negative.    Skin: Negative.    Neurological: Negative.    Psychiatric/Behavioral: Negative.    Noted        Vital Signs  Temp:  [97.8 °F (36.6 °C)] 97.8 °F (36.6 °C)  Heart Rate:  [73] 73  Resp:  [16] 16  BP: (126)/(76) 126/76    Flowsheet Rows      First Filed Value   Admission Height  177.8 cm (70\") Documented at 11/14/2020 1545   Admission Weight  95.3 kg (210 lb) Documented at 11/14/2020 1545           Physical Exam:    Physical Exam  Vitals signs and nursing note reviewed.   Constitutional:       General: He is not in acute distress.     Appearance: Normal appearance. He is well-developed. He is not ill-appearing, toxic-appearing or diaphoretic.   HENT:      Head: Normocephalic and atraumatic.      Right Ear: Ear canal and external ear normal.      Left Ear: Ear canal and external ear " normal.      Nose: Nose normal. No congestion or rhinorrhea.      Mouth/Throat:      Mouth: Mucous membranes are moist.      Pharynx: No oropharyngeal exudate.   Eyes:      General: No scleral icterus.        Right eye: No discharge.         Left eye: No discharge.      Extraocular Movements: Extraocular movements intact.      Conjunctiva/sclera: Conjunctivae normal.      Pupils: Pupils are equal, round, and reactive to light.   Neck:      Musculoskeletal: Normal range of motion and neck supple. No neck rigidity or muscular tenderness.      Thyroid: No thyromegaly.      Vascular: No carotid bruit or JVD.      Trachea: No tracheal deviation.   Cardiovascular:      Rate and Rhythm: Normal rate and regular rhythm.      Pulses: Normal pulses.      Heart sounds: Normal heart sounds. No murmur. No friction rub. No gallop.    Pulmonary:      Effort: Pulmonary effort is normal. No respiratory distress.      Breath sounds: Normal breath sounds. No stridor. No wheezing, rhonchi or rales.   Chest:      Chest wall: No tenderness.   Abdominal:      General: Bowel sounds are normal. There is no distension.      Palpations: Abdomen is soft. There is no mass.      Tenderness: There is no abdominal tenderness. There is no guarding or rebound.      Hernia: No hernia is present.   Musculoskeletal: Normal range of motion.         General: No swelling, tenderness, deformity or signs of injury.      Right lower leg: No edema.      Left lower leg: No edema.   Lymphadenopathy:      Cervical: No cervical adenopathy.   Skin:     General: Skin is warm and dry.      Coloration: Skin is not jaundiced or pale.      Findings: Lesion present. No bruising, erythema or rash.      Comments: Large wound on the lateral aspect of the left lower leg running almost the entire length of the lower leg about 7 cm wide at the widest point.  Appears clean and well taken care of   Neurological:      General: No focal deficit present.      Mental Status: He is  alert and oriented to person, place, and time. Mental status is at baseline.      Cranial Nerves: No cranial nerve deficit.      Sensory: No sensory deficit.      Motor: No weakness or abnormal muscle tone.      Coordination: Coordination normal.   Psychiatric:         Mood and Affect: Mood normal.         Behavior: Behavior normal.         Thought Content: Thought content normal.         Judgment: Judgment normal.              Wounds (last 24 hours)      LDA Wound     Row Name               Wound 08/28/20 0800 Left anterior;lower leg Other (comment)    Wound - Properties Group Placement Date: 08/28/20  -ES Placement Time: 0800  -ES Present on Hospital Admission: Y  -ES Side: Left  -ES Location: leg  -ES Primary Wound Type: Other  -ES, open blisters from sun burn      Retired Wound - Properties Group Date first assessed: 08/28/20  -ES Time first assessed: 0800  -ES Present on Hospital Admission: Y  -ES Side: Left  -ES Retired Orientation: anterior;lower  -ES Location: leg  -ES Primary Wound Type: Other  -ES, open blisters from sun burn   Retired Stage, Pressure Injury : other (see comments)  -ES      User Key  (r) = Recorded By, (t) = Taken By, (c) = Cosigned By    Initials Name Provider Type    Marlee Vasques RN Registered Nurse          Lab Results (most recent)     Procedure Component Value Units Date/Time    Extra Tubes [[id number]] Collected: 11/14/20 1611    Specimen: Blood, Venous Line Updated: 11/14/20 1715    Narrative:      The following orders were created for panel order Extra Tubes.  Procedure                               Abnormality         Status                     ---------                               -----------         ------                     Light Blue Top[[id number]]                                   Final result                 Please view results for these tests on the individual orders.    Light Blue Top [[id number]] Collected: 11/14/20 1611    Specimen: Blood Updated: 11/14/20 1715      Extra Tube hold for add-on     Comment: Auto resulted       Comprehensive Metabolic Panel [[id number]]  (Abnormal) Collected: 11/14/20 1611    Specimen: Blood Updated: 11/14/20 1637     Glucose 132 mg/dL      BUN 20 mg/dL      Creatinine 0.97 mg/dL      Sodium 136 mmol/L      Potassium 3.9 mmol/L      Chloride 101 mmol/L      CO2 25.0 mmol/L      Calcium 8.6 mg/dL      Total Protein 6.1 g/dL      Albumin 3.30 g/dL      ALT (SGPT) 27 U/L      AST (SGOT) 28 U/L      Alkaline Phosphatase 137 U/L      Total Bilirubin 0.2 mg/dL      eGFR Non African Amer 76 mL/min/1.73      Globulin 2.8 gm/dL      A/G Ratio 1.2 g/dL      BUN/Creatinine Ratio 20.6     Anion Gap 10.0 mmol/L     Narrative:      GFR Normal >60  Chronic Kidney Disease <60  Kidney Failure <15      Lipase [[id number]]  (Normal) Collected: 11/14/20 1611    Specimen: Blood Updated: 11/14/20 1637     Lipase 16 U/L     Troponin [[id number]]  (Normal) Collected: 11/14/20 1611    Specimen: Blood Updated: 11/14/20 1637     Troponin T 0.018 ng/mL     Narrative:      Troponin T Reference Range:  <= 0.03 ng/mL-   Negative for AMI  >0.03 ng/mL-     Abnormal for myocardial necrosis.  Clinicians would have to utilize clinical acumen, EKG, Troponin and serial changes to determine if it is an Acute Myocardial Infarction or myocardial injury due to an underlying chronic condition.       Results may be falsely decreased if patient taking Biotin.      Ethanol [[id number]] Collected: 11/14/20 1611    Specimen: Blood Updated: 11/14/20 1637     Ethanol % <0.010 %     Narrative:      Plasma Ethanol Clinical Symptoms:    ETOH (%)               Clinical Symptom  .01-.05              No apparent influence  .03-.12              Euphoria, Diminished judgment and attention   .09-.25              Impaired comprehension, Muscle incoordination  .18-.30              Confusion, Staggered gait, Slurred speech  .25-.40              Markedly decreased response to stimuli, unable to stand or                         walk, vomitting, sleep or stupor  .35-.50              Comatose, Anesthesia, Subnormal body temperature        BNP [[id number]]  (Normal) Collected: 11/14/20 1611    Specimen: Blood Updated: 11/14/20 1635     proBNP 129.4 pg/mL     Narrative:      Among patients with dyspnea, NT-proBNP is highly sensitive for the detection of acute congestive heart failure. In addition NT-proBNP of <300 pg/ml effectively rules out acute congestive heart failure with 99% negative predictive value.    Results may be falsely decreased if patient taking Biotin.      CBC & Differential [[id number]]  (Abnormal) Collected: 11/14/20 1611    Specimen: Blood Updated: 11/14/20 1616    Narrative:      The following orders were created for panel order CBC & Differential.  Procedure                               Abnormality         Status                     ---------                               -----------         ------                     CBC Auto Differential[[id number]]        Abnormal            Final result                 Please view results for these tests on the individual orders.    CBC Auto Differential [[id number]]  (Abnormal) Collected: 11/14/20 1611    Specimen: Blood Updated: 11/14/20 1616     WBC 6.50 10*3/mm3      RBC 3.80 10*6/mm3      Hemoglobin 9.9 g/dL      Hematocrit 30.9 %      MCV 81.4 fL      MCH 26.0 pg      MCHC 31.9 g/dL      RDW 15.9 %      RDW-SD 45.1 fl      MPV 7.2 fL      Platelets 291 10*3/mm3      Neutrophil % 42.8 %      Lymphocyte % 43.6 %      Monocyte % 7.5 %      Eosinophil % 5.6 %      Basophil % 0.5 %      Neutrophils, Absolute 2.80 10*3/mm3      Lymphocytes, Absolute 2.80 10*3/mm3      Monocytes, Absolute 0.50 10*3/mm3      Eosinophils, Absolute 0.40 10*3/mm3      Basophils, Absolute 0.00 10*3/mm3      nRBC 0.1 /100 WBC          Results from last 7 days   Lab Units 11/14/20 1611   WBC 10*3/mm3 6.50   HEMOGLOBIN g/dL 9.9*   HEMATOCRIT % 30.9*   MCV fL 81.4   MCH pg 26.0*   PLATELETS  10*3/mm3 291     Results from last 7 days   Lab Units 11/14/20  1611   SODIUM mmol/L 136   POTASSIUM mmol/L 3.9   CHLORIDE mmol/L 101   CO2 mmol/L 25.0   BUN mg/dL 20   CREATININE mg/dL 0.97   CALCIUM mg/dL 8.6   BILIRUBIN mg/dL 0.2   ALK PHOS U/L 137*   ALT (SGPT) U/L 27   AST (SGOT) U/L 28   GLUCOSE mg/dL 132*     Lab Results   Component Value Date    CALCIUM 8.6 11/14/2020    PHOS 3.4 09/08/2020     No results found for: HGBA1C  Lab Results   Component Value Date    CHOL 265 (H) 12/04/2019    CHLPL 118 10/11/2020    TRIG 167 (H) 10/11/2020    HDL 20 (L) 10/11/2020    LDL 65 10/11/2020     Lab Results   Component Value Date    LIPASE 16 11/14/2020           No results found for: INTRAOP, PREDX, FINALDX, COMDX  Inflammatory Biomarkers        Invalid input(s): ESR, D-DIMER QUANTITATIVE,  PROCALCITONIN  COVID19   Date Value Ref Range Status   09/24/2020 Not Detected Not Detected - Ref. Range Final        Microbiology Results (last 10 days)     ** No results found for the last 240 hours. **          ECG/EMG Results (most recent)     Procedure Component Value Units Date/Time    ECG 12 Lead [[id number]] Collected: 11/14/20 1549     Updated: 11/14/20 1551     QT Interval 421 ms     Narrative:      HEART RATE= 69  bpm  RR Interval= 872  ms  ND Interval= 164  ms  P Horizontal Axis= 0  deg  P Front Axis= 50  deg  QRSD Interval= 81  ms  QT Interval= 421  ms  QRS Axis= -23  deg  T Wave Axis= 68  deg  - OTHERWISE NORMAL ECG -  Sinus rhythm  Low voltage, extremity leads  When compared with ECG of 08-Apr-2019 11:08:55,  Significant axis, voltage or hypertrophy change  Electronically Signed By:   Date and Time of Study: 2020-11-14 15:49:03          Results for orders placed during the hospital encounter of 08/31/18   Duplex Venous Lower Extremity - Bilateral CAR    Narrative · Normal bilateral lower extremity venous duplex scan.          Results for orders placed during the hospital encounter of 08/26/20   Adult Transthoracic Echo  Complete W/ Cont if Necessary Per Protocol    Narrative · Normal left ventricular cavity size and wall thickness noted. All left   ventricular wall segments contract normally.  · Left ventricular diastolic dysfunction is noted (grade I) consistent   with impaired relaxation.  · Estimated EF appears to be in the range of 61 - 65%.  · Left ventricular diastolic dysfunction (grade I) consistent with   impaired relaxation.  · The aortic valve is structurally normal. No aortic valve regurgitation   is present. No aortic valve stenosis is present.  · The mitral valve is normal in structure. No mitral valve regurgitation   is present. No significant mitral valve stenosis is present.  · The tricuspid valve is normal. No tricuspid valve regurgitation is   present.  · There is no evidence of pericardial effusion.          Xr Chest 1 View    Result Date: 11/14/2020  Cardiomegaly. Right basilar atelectasis or scar with elevation of the right hemidiaphragm.  Electronically Signed By-Skyler Cotter MD On:11/14/2020 4:09 PM This report was finalized on [id number] by  Skyler Cotter MD.      Results Review:    I reviewed the patient's new clinical results.    Assessment/Plan     Active Hospital Problems    Diagnosis  POA   • **Chest pain [R07.9]  Yes     Priority: High   • DM (diabetes mellitus), type 2 (CMS/HCC) [E11.9]  Yes     Priority: Medium   • Leg wound, left [S81.802A]  Yes   • Essential hypertension [I10]  Yes   • Hyperlipidemia [E78.5]  Yes      Resolved Hospital Problems   No resolved problems to display.        MEDICAL DECISION MAKING COMPLEXITY BY PROBLEM:       Chest pain:  Nitrates if needed  Narcotics if needed  Add beta-blockers if appropriate  Add ACE inhibitor if appropriate  At [facility] a reductase inhibitor if appropriate  Empiric aspirin  Control heart rate  Oxygen to keep sat up to 94%.  Stress test      Diabetes mellitus:  Monitor blood sugars  Basal bolus insulin  Sliding scale as needed  Diabetic education  as needed  Supportive treatment  Nursing do not hold long-acting insulin without orders       Hypertension:  Continue home medications   Options include-  beta-blockers  Calcium channel blockers  ACE inhibitor  Vasodilators  Low-dose diuretics  PRN medications have not been shown to affect outcomes-to be avoided        Hyperlipidemia:  Check lipid panel  Statins if indicated  Add Ezetimibe if appropriate  No role for omega-3 demonstrated.    Wound:  Continue local wound care  He probably needs a skin graft to make up for the large defect    Plan    Care coordination with nursing 11/14/20 6:00 PM EST    Estimated Creatinine Clearance: 79.7 mL/min (by C-G formula based on SCr of 0.97 mg/dL).    Code Status and Medical Interventions:   Ordered at: 11/14/20 1800     Code Status:    CPR     Medical Interventions (Level of Support Prior to Arrest):    Full       DVT prophylaxis  Mechanical Order History:     None      Pharmalogical Order History:      Ordered     Dose Route Frequency Stop    Signed and Held  heparin (porcine) 5000 UNIT/ML injection 5,000 Units      5,000 Units SC Every 12 Hours Scheduled --                I personally reviewed patient's x-ray films and my findings are: 0    I personally reviewed patient's EKG strip and my findings are: 0        Disposition        Continued Care and Services - Admitted Since 11/14/2020    Coordination has not been started for this encounter.     Selected Continued Care - Prior Encounters Includes selections from prior encounters from 8/16/2020 to 11/14/2020    Discharged on 9/25/2020 Admission date: 8/26/2020 - Discharge disposition: Skilled Nursing Facility (DC - External)    Destination     Service Provider Selected Services Address Phone Fax    [facility] [address] [place] [id number] [phone]                      Benjamin Lane MD  11/14/20  18:00 EST      MDM

## 2020-11-14 NOTE — ED PROVIDER NOTES
Subjective   History:  Patient is 74-year-old male who presents to the ER with chest pain he reports it feels like an elephant is sitting on his chest.  He denies any history of heart disease.  He denies anything makes it better or worse it is fairly constant.    Onset: 1 day  Location: Chest  Duration: Constant  Character: Pressure-like pain  Aggravating/Alleviating factors: None  Radiation none  Severity: Moderate            Review of Systems   Constitutional: Negative for chills, diaphoresis, fatigue and fever.   Respiratory: Negative for cough, choking and shortness of breath.    Cardiovascular: Positive for chest pain. Negative for palpitations and leg swelling.   Gastrointestinal: Negative for abdominal pain, nausea and vomiting.   Genitourinary: Negative.    Musculoskeletal: Negative.    Skin: Negative.    Neurological: Negative.    Psychiatric/Behavioral: Negative.        Past Medical History:   Diagnosis Date   • Anemia    • Angina pectoris (CMS/HCC)    • Anxiety    • Arthritis     OSTEO   • BPH (benign prostatic hyperplasia)    • Dementia (CMS/HCC)    • Dementia (CMS/HCC)    • Depression    • Diabetes mellitus (CMS/HCC)     type 2   • Disease of thyroid gland    • GERD (gastroesophageal reflux disease)    • Hypertension    • Parkinson's disease (CMS/HCC)    • Short-term memory loss        Allergies   Allergen Reactions   • Codeine GI Intolerance       Past Surgical History:   Procedure Laterality Date   • APPENDECTOMY     • CHOLECYSTECTOMY     • COLONOSCOPY     • EYE SURGERY      kallie cataracts w iol   • FEMUR FRACTURE SURGERY Left     1/2018   • LEG DEBRIDEMENT Left 9/15/2020    Procedure: LOWER EXTREMITY DEBRIDEMENT;  Surgeon: Joslyn Mistry MD;  Location: [facility];  Service: General;  Laterality: Left;   • MULTIPLE TOOTH EXTRACTIONS      ALL TEETH REMOVED   • STOMACH SURGERY      gastric ulcer   • TOTAL HIP ARTHROPLASTY Right 4/23/2019    Procedure: TOTAL HIP ARTHROPLASTY POSTERIOR;  Surgeon:  Valente Giang MD;  Location: [facility] OR;  Service: Orthopedics   • TOTAL KNEE ARTHROPLASTY Left 8/31/2018    Procedure: REMOVAL OF LEFT DISTAL FEMUR PLATE AND SCREWS WITH COMPLEX TOTAL KNEE REPLACEMENT DISTAL FEMUR HINGED;  Surgeon: Valente Giang MD;  Location: [facility] OR;  Service: Orthopedics       Family History   Problem Relation Age of Onset   • Heart disease Mother    • Stroke Father    • Heart disease Father    • Seizures Son    • Seizures Son    • Malig Hyperthermia Neg Hx        Social History     Socioeconomic History   • Marital status:      Spouse name: Not on file   • Number of children: Not on file   • Years of education: Not on file   • Highest education level: Not on file   Tobacco Use   • Smoking status: Never Smoker   • Smokeless tobacco: Never Used   Substance and Sexual Activity   • Alcohol use: Yes     Comment: pt reports mod amt    • Drug use: No   • Sexual activity: Defer           Objective   Physical Exam  Vitals signs and nursing note reviewed.   Constitutional:       Appearance: He is well-developed.   HENT:      Head: Normocephalic and atraumatic.   Eyes:      Pupils: Pupils are equal, round, and reactive to light.   Neck:      Musculoskeletal: Normal range of motion.   Cardiovascular:      Rate and Rhythm: Normal rate and regular rhythm.   Pulmonary:      Effort: Pulmonary effort is normal.      Breath sounds: Normal breath sounds. No decreased breath sounds, wheezing, rhonchi or rales.   Musculoskeletal: Normal range of motion.   Skin:     General: Skin is warm and dry.   Neurological:      Mental Status: He is alert and oriented to person, place, and time.   Psychiatric:         Behavior: Behavior normal.         Procedures           ED Course  ED Course as of Nov 14 1652   Sat Nov 14, 2020   1631 EKG interpreted by ER physician reviewed myself.  Sinus rhythm rate of 69    [MG]      ED Course User Index  [MG] Terra Grey PA-C      Xr Chest 1  View    Result Date: 11/14/2020  Cardiomegaly. Right basilar atelectasis or scar with elevation of the right hemidiaphragm.  Electronically Signed By-Skyler Cotter MD On:11/14/2020 4:09 PM This report was finalized on [id number] by  Skyler Cotter MD.    Labs Reviewed   COMPREHENSIVE METABOLIC PANEL - Abnormal; Notable for the following components:       Result Value    Glucose 132 (*)     Albumin 3.30 (*)     Alkaline Phosphatase 137 (*)     All other components within normal limits    Narrative:     GFR Normal >60  Chronic Kidney Disease <60  Kidney Failure <15     CBC WITH AUTO DIFFERENTIAL - Abnormal; Notable for the following components:    RBC 3.80 (*)     Hemoglobin 9.9 (*)     Hematocrit 30.9 (*)     MCH 26.0 (*)     RDW 15.9 (*)     All other components within normal limits   LIPASE - Normal   BNP (IN-HOUSE) - Normal    Narrative:     Among patients with dyspnea, NT-proBNP is highly sensitive for the detection of acute congestive heart failure. In addition NT-proBNP of <300 pg/ml effectively rules out acute congestive heart failure with 99% negative predictive value.    Results may be falsely decreased if patient taking Biotin.     TROPONIN (IN-HOUSE) - Normal    Narrative:     Troponin T Reference Range:  <= 0.03 ng/mL-   Negative for AMI  >0.03 ng/mL-     Abnormal for myocardial necrosis.  Clinicians would have to utilize clinical acumen, EKG, Troponin and serial changes to determine if it is an Acute Myocardial Infarction or myocardial injury due to an underlying chronic condition.       Results may be falsely decreased if patient taking Biotin.     ETHANOL    Narrative:     Plasma Ethanol Clinical Symptoms:    ETOH (%)               Clinical Symptom  .01-.05              No apparent influence  .03-.12              Euphoria, Diminished judgment and attention   .09-.25              Impaired comprehension, Muscle incoordination  .18-.30              Confusion, Staggered gait, Slurred speech  .25-.40               Markedly decreased response to stimuli, unable to stand or                        walk, vomitting, sleep or stupor  .35-.50              Comatose, Anesthesia, Subnormal body temperature       URINE DRUG SCREEN   CBC AND DIFFERENTIAL    Narrative:     The following orders were created for panel order CBC & Differential.  Procedure                               Abnormality         Status                     ---------                               -----------         ------                     CBC Auto Differential[[id number]]        Abnormal            Final result                 Please view results for these tests on the individual orders.   EXTRA TUBES    Narrative:     The following orders were created for panel order Extra Tubes.  Procedure                               Abnormality         Status                     ---------                               -----------         ------                     Light Blue Top[[id number]]                                   In process                   Please view results for these tests on the individual orders.   LIGHT BLUE TOP     Medications   sodium chloride 0.9 % flush 10 mL (has no administration in time range)   nitroglycerin (NITROSTAT) ointment 1 inch (1 inch Topical Given 11/14/20 1609)   aspirin chewable tablet 81 mg (81 mg Oral Given 11/14/20 1610)   Morphine sulfate (PF) injection 2 mg (2 mg Intravenous Given 11/14/20 1626)                                          MDM  Number of Diagnoses or Management Options  Chest pain, unspecified type:   Diagnosis management comments: I examined the patient using the appropriate personal protective equipment.      DISPOSITION:   Chart Review:  Comorbidity:  has a past medical history of Anemia, Angina pectoris (CMS/HCC), Anxiety, Arthritis, BPH (benign prostatic hyperplasia), Dementia (CMS/HCC), Dementia (CMS/HCC), Depression, Diabetes mellitus (CMS/HCC), Disease of thyroid gland, GERD (gastroesophageal reflux  disease), Hypertension, Parkinson's disease (CMS/HCC), and Short-term memory loss.  Differentials:this list is not all inclusive and does not constitute the entirety of considered causes --> CAD, musculoskeletal pain, PE, pneumonia  ECG: interpreted by ER physician and reviewed by myself: Sinus rhythm rate of 69  Labs: Glucose 132, Alk phos 137    Imaging: Was interpreted by physician and reviewed by myself:  Xr Chest 1 View    Result Date: 11/14/2020  Cardiomegaly. Right basilar atelectasis or scar with elevation of the right hemidiaphragm.  Electronically Signed By-Skyler Cotter MD On:11/14/2020 4:09 PM This report was finalized on [id number] by  Skyler Cotter MD.      Disposition/Treatment:    Patient 74-year-old male who presents to the ER with chest pressure.  EKG was sinus rhythm.  Patient's lab work was fairly unremarkable U tox pending at time of admittance he was brought into the hospitalist for chest pain rule out he was stable and in agreement with plan       Amount and/or Complexity of Data Reviewed  Clinical lab tests: reviewed  Tests in the radiology section of CPT®: reviewed  Tests in the medicine section of CPT®: reviewed  Decide to obtain previous medical records or to obtain history from someone other than the patient: yes    Patient Progress  Patient progress: stable      Final diagnoses:   Chest pain, unspecified type            Terra Grey PA-C  11/14/20 [id number]

## 2020-11-15 ENCOUNTER — APPOINTMENT (OUTPATIENT)
Dept: NUCLEAR MEDICINE | Facility: HOSPITAL | Age: 75
End: 2020-11-15

## 2020-11-15 LAB
ANION GAP SERPL CALCULATED.3IONS-SCNC: 9 MMOL/L (ref 5–15)
BASOPHILS # BLD AUTO: 0 10*3/MM3 (ref 0–0.2)
BASOPHILS NFR BLD AUTO: 0.4 % (ref 0–1.5)
BUN SERPL-MCNC: 18 MG/DL (ref 8–23)
BUN/CREAT SERPL: 17 (ref 7–25)
CALCIUM SPEC-SCNC: 8.7 MG/DL (ref 8.6–10.5)
CHLORIDE SERPL-SCNC: 102 MMOL/L (ref 98–107)
CO2 SERPL-SCNC: 29 MMOL/L (ref 22–29)
CREAT SERPL-MCNC: 1.06 MG/DL (ref 0.76–1.27)
DEPRECATED RDW RBC AUTO: 44.2 FL (ref 37–54)
EOSINOPHIL # BLD AUTO: 0.4 10*3/MM3 (ref 0–0.4)
EOSINOPHIL NFR BLD AUTO: 7.3 % (ref 0.3–6.2)
ERYTHROCYTE [DISTWIDTH] IN BLOOD BY AUTOMATED COUNT: 15.5 % (ref 12.3–15.4)
GFR SERPL CREATININE-BSD FRML MDRD: 69 ML/MIN/1.73
GLUCOSE BLDC GLUCOMTR-MCNC: 121 MG/DL (ref 70–105)
GLUCOSE BLDC GLUCOMTR-MCNC: 150 MG/DL (ref 70–105)
GLUCOSE BLDC GLUCOMTR-MCNC: 157 MG/DL (ref 70–105)
GLUCOSE BLDC GLUCOMTR-MCNC: 74 MG/DL (ref 70–105)
GLUCOSE SERPL-MCNC: 195 MG/DL (ref 65–99)
HCT VFR BLD AUTO: 30.2 % (ref 37.5–51)
HGB BLD-MCNC: 9.8 G/DL (ref 13–17.7)
LYMPHOCYTES # BLD AUTO: 2.3 10*3/MM3 (ref 0.7–3.1)
LYMPHOCYTES NFR BLD AUTO: 41.1 % (ref 19.6–45.3)
MCH RBC QN AUTO: 25.7 PG (ref 26.6–33)
MCHC RBC AUTO-ENTMCNC: 32.3 G/DL (ref 31.5–35.7)
MCV RBC AUTO: 79.7 FL (ref 79–97)
MONOCYTES # BLD AUTO: 0.4 10*3/MM3 (ref 0.1–0.9)
MONOCYTES NFR BLD AUTO: 7.1 % (ref 5–12)
NEUTROPHILS NFR BLD AUTO: 2.5 10*3/MM3 (ref 1.7–7)
NEUTROPHILS NFR BLD AUTO: 44.1 % (ref 42.7–76)
NRBC BLD AUTO-RTO: 0 /100 WBC (ref 0–0.2)
PLATELET # BLD AUTO: 285 10*3/MM3 (ref 140–450)
PMV BLD AUTO: 6.9 FL (ref 6–12)
POTASSIUM SERPL-SCNC: 4.1 MMOL/L (ref 3.5–5.2)
RBC # BLD AUTO: 3.79 10*6/MM3 (ref 4.14–5.8)
SODIUM SERPL-SCNC: 140 MMOL/L (ref 136–145)
TROPONIN T SERPL-MCNC: 0.02 NG/ML (ref 0–0.03)
WBC # BLD AUTO: 5.7 10*3/MM3 (ref 3.4–10.8)

## 2020-11-15 PROCEDURE — 82962 GLUCOSE BLOOD TEST: CPT

## 2020-11-15 PROCEDURE — 96372 THER/PROPH/DIAG INJ SC/IM: CPT

## 2020-11-15 PROCEDURE — 94640 AIRWAY INHALATION TREATMENT: CPT

## 2020-11-15 PROCEDURE — 63710000001 INSULIN LISPRO (HUMAN) PER 5 UNITS: Performed by: INTERNAL MEDICINE

## 2020-11-15 PROCEDURE — 78452 HT MUSCLE IMAGE SPECT MULT: CPT

## 2020-11-15 PROCEDURE — 25010000002 HEPARIN (PORCINE) PER 1000 UNITS: Performed by: INTERNAL MEDICINE

## 2020-11-15 PROCEDURE — G0378 HOSPITAL OBSERVATION PER HR: HCPCS

## 2020-11-15 PROCEDURE — 99225 PR SBSQ OBSERVATION CARE/DAY 25 MINUTES: CPT | Performed by: INTERNAL MEDICINE

## 2020-11-15 PROCEDURE — 84484 ASSAY OF TROPONIN QUANT: CPT | Performed by: INTERNAL MEDICINE

## 2020-11-15 PROCEDURE — A9500 TC99M SESTAMIBI: HCPCS | Performed by: INTERNAL MEDICINE

## 2020-11-15 PROCEDURE — 78452 HT MUSCLE IMAGE SPECT MULT: CPT | Performed by: INTERNAL MEDICINE

## 2020-11-15 PROCEDURE — 93018 CV STRESS TEST I&R ONLY: CPT | Performed by: NURSE PRACTITIONER

## 2020-11-15 PROCEDURE — 85025 COMPLETE CBC W/AUTO DIFF WBC: CPT | Performed by: INTERNAL MEDICINE

## 2020-11-15 PROCEDURE — 93017 CV STRESS TEST TRACING ONLY: CPT

## 2020-11-15 PROCEDURE — 63710000001 INSULIN GLARGINE PER 5 UNITS: Performed by: INTERNAL MEDICINE

## 2020-11-15 PROCEDURE — 0 TECHNETIUM SESTAMIBI: Performed by: INTERNAL MEDICINE

## 2020-11-15 PROCEDURE — 80048 BASIC METABOLIC PNL TOTAL CA: CPT | Performed by: INTERNAL MEDICINE

## 2020-11-15 PROCEDURE — 94799 UNLISTED PULMONARY SVC/PX: CPT

## 2020-11-15 RX ADMIN — INSULIN LISPRO 3 UNITS: 100 INJECTION, SOLUTION INTRAVENOUS; SUBCUTANEOUS at 12:08

## 2020-11-15 RX ADMIN — INSULIN LISPRO 2 UNITS: 100 INJECTION, SOLUTION INTRAVENOUS; SUBCUTANEOUS at 08:26

## 2020-11-15 RX ADMIN — TAMSULOSIN HYDROCHLORIDE 0.4 MG: 0.4 CAPSULE ORAL at 11:00

## 2020-11-15 RX ADMIN — OLANZAPINE 7.5 MG: 5 TABLET ORAL at 21:45

## 2020-11-15 RX ADMIN — INSULIN LISPRO 3 UNITS: 100 INJECTION, SOLUTION INTRAVENOUS; SUBCUTANEOUS at 08:26

## 2020-11-15 RX ADMIN — Medication 10 ML: at 21:43

## 2020-11-15 RX ADMIN — HYDROCODONE BITARTRATE AND ACETAMINOPHEN 1 TABLET: 7.5; 325 TABLET ORAL at 11:24

## 2020-11-15 RX ADMIN — TECHNETIUM TC 99M SESTAMIBI 1 DOSE: 1 INJECTION INTRAVENOUS at 07:30

## 2020-11-15 RX ADMIN — FOLIC ACID 1 MG: 1 TABLET ORAL at 11:00

## 2020-11-15 RX ADMIN — HEPARIN SODIUM 5000 UNITS: 5000 INJECTION INTRAVENOUS; SUBCUTANEOUS at 21:46

## 2020-11-15 RX ADMIN — METOPROLOL SUCCINATE 25 MG: 25 TABLET, EXTENDED RELEASE ORAL at 11:00

## 2020-11-15 RX ADMIN — ISOSORBIDE MONONITRATE 30 MG: 30 TABLET, EXTENDED RELEASE ORAL at 10:59

## 2020-11-15 RX ADMIN — INSULIN LISPRO 3 UNITS: 100 INJECTION, SOLUTION INTRAVENOUS; SUBCUTANEOUS at 18:06

## 2020-11-15 RX ADMIN — LAMOTRIGINE 12.5 MG: 25 TABLET ORAL at 21:44

## 2020-11-15 RX ADMIN — Medication 250 MG: at 11:00

## 2020-11-15 RX ADMIN — Medication 10 ML: at 08:00

## 2020-11-15 RX ADMIN — FAMOTIDINE 20 MG: 20 TABLET ORAL at 17:59

## 2020-11-15 RX ADMIN — LAMOTRIGINE 12.5 MG: 25 TABLET ORAL at 11:01

## 2020-11-15 RX ADMIN — HYDROCODONE BITARTRATE AND ACETAMINOPHEN 1 TABLET: 7.5; 325 TABLET ORAL at 17:47

## 2020-11-15 RX ADMIN — INSULIN GLARGINE 15 UNITS: 100 INJECTION, SOLUTION SUBCUTANEOUS at 11:02

## 2020-11-15 RX ADMIN — OXYCODONE HYDROCHLORIDE AND ACETAMINOPHEN 500 MG: 500 TABLET ORAL at 11:00

## 2020-11-15 RX ADMIN — INSULIN LISPRO 2 UNITS: 100 INJECTION, SOLUTION INTRAVENOUS; SUBCUTANEOUS at 18:06

## 2020-11-15 RX ADMIN — FAMOTIDINE 20 MG: 20 TABLET ORAL at 11:00

## 2020-11-15 RX ADMIN — Medication 100 MG: at 10:59

## 2020-11-15 RX ADMIN — HEPARIN SODIUM 5000 UNITS: 5000 INJECTION INTRAVENOUS; SUBCUTANEOUS at 11:01

## 2020-11-15 RX ADMIN — HYDROCODONE BITARTRATE AND ACETAMINOPHEN 1 TABLET: 7.5; 325 TABLET ORAL at 21:43

## 2020-11-15 RX ADMIN — BUDESONIDE 0.5 MG: 0.5 INHALANT RESPIRATORY (INHALATION) at 07:21

## 2020-11-15 RX ADMIN — ATORVASTATIN CALCIUM 20 MG: 20 TABLET, FILM COATED ORAL at 21:45

## 2020-11-15 RX ADMIN — THERA TABS 1 TABLET: TAB at 11:01

## 2020-11-15 RX ADMIN — MEMANTINE 5 MG: 5 TABLET ORAL at 11:02

## 2020-11-15 NOTE — PLAN OF CARE
Goal Outcome Evaluation:  Plan of Care Reviewed With: patient  Progress: improving  Outcome Summary: No c/o chest pain or SOA, wound care consulted ordered.  pt in siezure percautions per hx.Will continue with current orders, care plans and monitoring

## 2020-11-15 NOTE — PROGRESS NOTES
[facility]  INPATIENT PROGRESS NOTE  380/1   Hospitalist Team  LOS 0 days      Patient Care Team:  Gisela Sotelo APRN as PCP - Yanci Buckley MD as Consulting Physician (Cardiology)      Patient was examined with relevant and adequate PPE keeping in mind the current coronavirus pandemic.    Chief Complaint / Subjective  Chief Complaint   Patient presents with   • Chest Pain       HPI  Patient is 74-year-old male who presents to the ER with chest pain he reports it feels like an elephant is sitting on his chest.  He denies any history of heart disease.  He denies anything makes it better or worse it is fairly constant.     Onset: 1 day  Location: Chest  Duration: Constant  Character: Pressure-like pain  Aggravating/Alleviating factors: None  Radiation none  Severity: Moderate       No prior cardiac work-up.  Left leg wound for the past 2 months from trauma.  Is been seeing a physician in [place].        Interval History and ROS:   (4 hpi elements or status of 3 chronic)  No more chest pain      History taken from: patient    ROS  Constitutional: Negative for chills, diaphoresis, fatigue and fever.   Respiratory: Negative for cough, choking and shortness of breath.    Cardiovascular: Resolved chest pain. Negative for palpitations and leg swelling.   Gastrointestinal: Negative for abdominal pain, nausea and vomiting.   Genitourinary: Negative.    Musculoskeletal: Negative.    Skin: Negative.    Neurological: Negative.    Psychiatric/Behavioral: Negative.        Family History   Problem Relation Age of Onset   • Heart disease Mother    • Stroke Father    • Heart disease Father    • Seizures Son    • Seizures Son    • Malig Hyperthermia Neg Hx        Past Medical History:   Diagnosis Date   • Anemia    • Angina pectoris (CMS/HCC)    • Anxiety    • Arthritis     OSTEO   • BPH (benign prostatic hyperplasia)    • Dementia (CMS/HCC)    • Dementia (CMS/HCC)    •  Depression    • Diabetes mellitus (CMS/HCC)     type 2   • Disease of thyroid gland    • GERD (gastroesophageal reflux disease)    • Hypertension    • Parkinson's disease (CMS/HCC)    • Short-term memory loss        Social History     Socioeconomic History   • Marital status:      Spouse name: Not on file   • Number of children: Not on file   • Years of education: Not on file   • Highest education level: Not on file   Tobacco Use   • Smoking status: Never Smoker   • Smokeless tobacco: Never Used   Substance and Sexual Activity   • Alcohol use: Yes     Comment: pt reports mod amt    • Drug use: No   • Sexual activity: Defer   Social History Narrative    From nursing home       Prior to Admission medications    Medication Sig Start Date End Date Taking? Authorizing Provider   atorvastatin (LIPITOR) 20 MG tablet Take 20 mg by mouth Every Night.   Yes    budesonide (PULMICORT) 0.5 MG/2ML nebulizer solution Take 0.5 mg by nebulization Every Morning.   Yes Bess Mcclain MD   dextrose (GLUTOSE) 40 % gel Take 15 g by mouth Every 1 (One) Hour As Needed for Low Blood Sugar.   Yes Bess Mcclain MD   enoxaparin (LOVENOX) 40 MG/0.4ML solution syringe Inject 40 mg under the skin into the appropriate area as directed Daily.   Yes Bess Mcclain MD   famotidine (PEPCID) 20 MG tablet Take 1 tablet by mouth 2 (Two) Times a Day Before Meals. 9/25/20  Yes Seth Ernst,    folic acid (FOLVITE) 1 MG tablet Take 1 mg by mouth Daily.   Yes Bess Mcclain MD   glucagon (Glucagon Emergency) 1 MG injection Inject 1 mg into the appropriate muscle as directed by prescriber Every 12 (Twelve) Hours As Needed for Low Blood Sugar.   Yes Bess Mcclain MD   HYDROcodone-acetaminophen (NORCO) 7.5-325 MG per tablet Take 1 tablet by mouth Every 4 (Four) Hours As Needed for Mild Pain  or Moderate Pain .   Yes Bess Mcclain MD   insulin glargine (LANTUS) 100 UNIT/ML injection Inject 15 Units under  the skin into the appropriate area as directed 2 (Two) Times a Day.   Yes Bess Mcclain MD   insulin lispro (humaLOG) 100 UNIT/ML injection Inject 3 Units under the skin into the appropriate area as directed 4 (Four) Times a Day With Meals & at Bedtime. Hold for bs <70 and call MD for bs >400   Yes Bess Mcclain MD   isosorbide mononitrate (IMDUR) 30 MG 24 hr tablet Take 30 mg by mouth Daily.   Yes Bess Mcclain MD   LACTOBACILLUS RHAMNOSUS, GG, PO Take 1 capsule by mouth Daily.   Yes Bess Mcclain MD   levothyroxine (SYNTHROID, LEVOTHROID) 100 MCG tablet Take 100 mcg by mouth Daily.   Yes Bess Mcclain MD   memantine (NAMENDA) 5 MG tablet Take 5 mg by mouth Daily.   Yes Bess Mcclain MD   metoprolol succinate XL (TOPROL-XL) 25 MG 24 hr tablet Take 25 mg by mouth Daily.   Yes Bess Mcclain MD   multivitamin (Therems) tablet tablet Take 1 tablet by mouth Daily.   Yes Bess Mcclain MD   OLANZapine (zyPREXA) 7.5 MG tablet Take 1 tablet by mouth Every Night. 9/25/20  Yes Seth Ernst DO   tamsulosin (FLOMAX) 0.4 MG capsule 24 hr capsule Take 1 capsule by mouth Daily.   Yes Bess Mcclain MD   thiamine (VITAMIN B1) 100 MG tablet Take 1 tablet by mouth Daily. 9/26/20  Yes Seth Ernst DO   traZODone (DESYREL) 50 MG tablet Take 50 mg by mouth Every Night.   Yes Bess Mcclain MD   vitamin C (ASCORBIC ACID) 500 MG tablet Take 500 mg by mouth 2 (two) times a day.   Yes Bess Mcclain MD   lamoTRIgine (LaMICtal) 25 MG tablet Take 12.5 mg by mouth 2 (Two) Times a Day.    Bess Mcclain MD        Objective    Physical Exam     Vital Signs  Temp:  [97.8 °F (36.6 °C)-98.3 °F (36.8 °C)] 98.1 °F (36.7 °C)  Heart Rate:  [63-95] 63  Resp:  [16-18] 18  BP: (120-134)/(55-80) 131/68    Oxygen Therapy  SpO2: 96 %  Pulse Oximetry Type: Intermittent  Device (Oxygen Therapy): room air  Flowsheet Rows      First Filed Value   Admission Height   "177.8 cm (70\") Documented at 11/14/2020 1545   Admission Weight  95.3 kg (210 lb) Documented at 11/14/2020 1545        Weight change:    Intake & Output (last 3 days)       11/12 0701 - 11/13 0700 11/13 0701 - 11/14 0700 11/14 0701 - 11/15 0700 11/15 0701 - 11/16 0700    P.O.    220    Total Intake(mL/kg)    220 (2.3)    Urine (mL/kg/hr)   350 400 (0.6)    Stool    0    Total Output   350 400    Net   -350 -180            Stool Unmeasured Occurrence    1 x        Lines, Drains & Airways    Active LDAs     Name:   Placement date:   Placement time:   Site:   Days:    Peripheral IV 09/22/20 1525 Anterior;Right Forearm   09/22/20    1525    Forearm   53    Peripheral IV 11/14/20 1610 Left Hand   11/14/20    1610    Hand   less than 1                Physical Exam:    Physical Exam  Vitals signs and nursing note reviewed.   Constitutional:       General: He is not in acute distress.     Appearance: Normal appearance. He is well-developed. He is not ill-appearing, toxic-appearing or diaphoretic.   HENT:      Head: Normocephalic and atraumatic.      Right Ear: Ear canal and external ear normal.      Left Ear: Ear canal and external ear normal.      Nose: Nose normal. No congestion or rhinorrhea.      Mouth/Throat:      Mouth: Mucous membranes are moist.      Pharynx: No oropharyngeal exudate.   Eyes:      General: No scleral icterus.        Right eye: No discharge.         Left eye: No discharge.      Extraocular Movements: Extraocular movements intact.      Conjunctiva/sclera: Conjunctivae normal.      Pupils: Pupils are equal, round, and reactive to light.   Neck:      Musculoskeletal: Normal range of motion and neck supple. No neck rigidity or muscular tenderness.      Thyroid: No thyromegaly.      Vascular: No carotid bruit or JVD.      Trachea: No tracheal deviation.   Cardiovascular:      Rate and Rhythm: Normal rate and regular rhythm.      Pulses: Normal pulses.      Heart sounds: Normal heart sounds. No murmur. No " friction rub. No gallop.    Pulmonary:      Effort: Pulmonary effort is normal. No respiratory distress.      Breath sounds: Normal breath sounds. No stridor. No wheezing, rhonchi or rales.   Chest:      Chest wall: No tenderness.   Abdominal:      General: Bowel sounds are normal. There is no distension.      Palpations: Abdomen is soft. There is no mass.      Tenderness: There is no abdominal tenderness. There is no guarding or rebound.      Hernia: No hernia is present.   Musculoskeletal: Normal range of motion.         General: No swelling, tenderness, deformity or signs of injury.      Right lower leg: No edema.      Left lower leg: No edema.   Lymphadenopathy:      Cervical: No cervical adenopathy.   Skin:     General: Skin is warm and dry.      Coloration: Skin is not jaundiced or pale.      Findings: Lesion present. No bruising, erythema or rash.      Comments: Large wound on the lateral aspect of the left lower leg running almost the entire length of the lower leg about 7 cm wide at the widest point.  Appears clean and well taken care of   Neurological:      General: No focal deficit present.      Mental Status: He is alert and oriented to person, place, and time. Mental status is at baseline.      Cranial Nerves: No cranial nerve deficit.      Sensory: No sensory deficit.      Motor: No weakness or abnormal muscle tone.      Coordination: Coordination normal.   Psychiatric:         Mood and Affect: Mood normal.         Behavior: Behavior normal.         Thought Content: Thought content normal.         Judgment: Judgment normal.   Reviewed, no change in above data from the prior day.         Wounds (last 24 hours)      LDA Wound     Row Name 11/14/20 2234 11/14/20 2018 11/14/20 1850       Wound 08/28/20 0800 Left anterior;lower leg Other (comment)    Wound - Properties Group Placement Date: 08/28/20  -ES Placement Time: 0800  -ES Present on Hospital Admission: Y  -ES Side: Left  -ES Location: leg  -ES  Primary Wound Type: Other  -ES, open blisters from sun burn      Wound Image  Images linked: 1  -KW  --  --    Dressing Appearance  --  dry;intact  -KW  dry;intact  -JM    Retired Wound - Properties Group Date first assessed: 08/28/20  -ES Time first assessed: 0800  -ES Present on Hospital Admission: Y  -ES Side: Left  -ES Retired Orientation: anterior;lower  -ES Location: leg  -ES Primary Wound Type: Other  -ES, open blisters from sun burn   Retired Stage, Pressure Injury : other (see comments)  -ES       [REMOVED] Wound 08/31/20 1054 chest Blisters    Wound - Properties Group Placement Date: 08/31/20  -TW Placement Time: 1054 -TW Location: chest  -TW Primary Wound Type: Blisters  -TW Removal Date: 11/14/20  -KW Removal Time: 2130  -KW    Retired Wound - Properties Group Date first assessed: 08/31/20  -TW Time first assessed: 1054 -TW Retired Orientation: midline  -TW Location: chest  -TW Primary Wound Type: Blisters  -TW Resolution Date: 11/14/20  -KW Resolution Time: 2130  -KW       [REMOVED] Wound 04/23/19 0835 Right hip    Wound - Properties Group Placement Date: 04/23/19  -JT Placement Time: 0835  -JT Side: Right  -JT Location: hip  -JT Removal Date: 11/14/20  -KW Removal Time: 2130  -KW Type: incision  -JT    Retired Wound - Properties Group Date first assessed: 04/23/19  -JT Time first assessed: 0835  -JT Side: Right  -JT Location: hip  -JT Resolution Date: 11/14/20  -KW Resolution Time: 2130  -KW Type: incision  -JT       [REMOVED] NPWT (Negative Pressure Wound Therapy) 09/15/20 1420 LEFT LEG    NPWT (Negative Pressure Wound Therapy) - Properties Group Placement Date: 09/15/20  -LT Placement Time: 1420  -LT Location: LEFT LEG  -LT Removal Date: 11/14/20  -KW Removal Time: 2130  -KW    Retired NPWT (Negative Pressure Wound Therapy) - Properties Group Placement Date: 09/15/20  -LT Placement Time: 1420  -LT Location: LEFT LEG  -LT Removal Date: 11/14/20  -KW Removal Time: 2130  -KW      User Key  (r) =  Recorded By, (t) = Taken By, (c) = Cosigned By    Initials Name Provider Type    Rebekah Treadwell, RN Registered Nurse    Constance Jean, RN Registered Nurse    Marlee Vasques RN Registered Nurse    Slava Mccarty RN Registered Nurse    Falguni Kimble RN Registered Nurse    Yasmin Matamoros LPN Licensed Nurse    Yasmin Matamoros LPN Licensed Nurse          PT Recommendation and Plan             Procedures:        Assessment/Plan with Problem wise       Active Hospital Problems    Diagnosis  POA   • **Chest pain [R07.9]  Yes     Priority: High   • DM (diabetes mellitus), type 2 (CMS/HCC) [E11.9]  Yes     Priority: Medium   • Leg wound, left [S81.802A]  Yes   • Essential hypertension [I10]  Yes   • Hyperlipidemia [E78.5]  Yes      Resolved Hospital Problems   No resolved problems to display.        Estimated Creatinine Clearance: 72.6 mL/min (by C-G formula based on SCr of 1.06 mg/dL).    Code Status and Medical Interventions:   Ordered at: 11/14/20 1800     Code Status:    CPR     Medical Interventions (Level of Support Prior to Arrest):    Full       MEDICAL DECISION MAKING COMPLEXITY BY PROBLEM:     Chest pain:  Nitrates if needed  Narcotics if needed  Add beta-blockers if appropriate  Add ACE inhibitor if appropriate  At [employer] a reductase inhibitor if appropriate  Empiric aspirin  Control heart rate  Oxygen to keep sat up to 94%.  Stress test        Diabetes mellitus:  Monitor blood sugars  Basal bolus insulin  Sliding scale as needed  Diabetic education as needed  Supportive treatment  Nursing do not hold long-acting insulin without orders         Hypertension:  Continue home medications   Options include-  beta-blockers  Calcium channel blockers  ACE inhibitor  Vasodilators  Low-dose diuretics  PRN medications have not been shown to affect outcomes-to be avoided           Hyperlipidemia:  Check lipid panel  Statins if indicated  Add Ezetimibe if appropriate  No role for omega-3  demonstrated.     Wound:  Continue local wound care  He probably needs a skin graft to make up for the large defect      Care coordination with nursing.  Patient was to be discharged today after the stress test if negative.  However he was not made n.p.o. by nursing this morning.  Hence loss of stay 1 day and this test will be completed tomorrow.  Necessitating 1 night extra stay 11/15/20 1:42 PM EST.    Plan for disposition:            Continued Care and Services - Admitted Since 11/14/2020    Coordination has not been started for this encounter.     Selected Continued Care - Prior Encounters Includes selections from prior encounters from 8/16/2020 to 11/15/2020    Discharged on 9/25/2020 Admission date: 8/26/2020 - Discharge disposition: Skilled Nursing Facility (DC - External)    Destination     Service Provider Selected Services Address Phone Fax    [facility] AT [address] [address] [phone]                     Historical & Objective Data     Results Review:    I reviewed the patient's new lab and radiology results. 11/15/20     Results from last 7 days   Lab Units 11/15/20  0517 11/14/20  1611   WBC 10*3/mm3 5.70 6.50   HEMOGLOBIN g/dL 9.8* 9.9*   HEMATOCRIT % 30.2* 30.9*   MCV fL 79.7 81.4   MCH pg 25.7* 26.0*   MPV fL 6.9 7.2   RDW % 15.5* 15.9*   PLATELETS 10*3/mm3 285 291     Results from last 7 days   Lab Units 11/15/20  0517 11/14/20  1611   SODIUM mmol/L 140 136   POTASSIUM mmol/L 4.1 3.9   CHLORIDE mmol/L 102 101   CO2 mmol/L 29.0 25.0   BUN mg/dL 18 20   CREATININE mg/dL 1.06 0.97   CALCIUM mg/dL 8.7 8.6   BILIRUBIN mg/dL  --  0.2   ALK PHOS U/L  --  137*   ALT (SGPT) U/L  --  27   AST (SGOT) U/L  --  28   GLUCOSE mg/dL 195* 132*     Inflammatory Biomarkers        Invalid input(s): ESR, D-DIMER QUANTITATIVE,  PROCALCITONIN,    Lab Results   Component Value Date    PHOS 3.4 09/08/2020     No results found for: HGBA1C  Lab  Results   Component Value Date    CHOL 265 (H) 12/04/2019    CHLPL 118 10/11/2020    TRIG 167 (H) 10/11/2020    HDL 20 (L) 10/11/2020    LDL 65 10/11/2020     Lab Results   Component Value Date    LIPASE 16 11/14/2020               No results found for: INTRAOP, PREDX, FINALDX, COMDX  COVID19   Date Value Ref Range Status   09/24/2020 Not Detected Not Detected - Ref. Range Final        Microbiology Results (last 10 days)     Procedure Component Value - Date/Time    Wound Culture - Wound, Foot, Left [[id number]] Collected: 11/14/20 1758    Lab Status: Preliminary result Specimen: Wound from Foot, Left Updated: 11/15/20 1227     Wound Culture No growth at less than 24 hours     Gram Stain Many (4+) WBCs per low power field      Many (4+) Gram positive cocci in clusters          ECG/EMG Results (most recent)     Procedure Component Value Units Date/Time    ECG 12 Lead [[id number]] Collected: 11/14/20 1549     Updated: 11/14/20 1551     QT Interval 421 ms     Narrative:      HEART RATE= 69  bpm  RR Interval= 872  ms  AK Interval= 164  ms  P Horizontal Axis= 0  deg  P Front Axis= 50  deg  QRSD Interval= 81  ms  QT Interval= 421  ms  QRS Axis= -23  deg  T Wave Axis= 68  deg  - OTHERWISE NORMAL ECG -  Sinus rhythm  Low voltage, extremity leads  When compared with ECG of 08-Apr-2019 11:08:55,  Significant axis, voltage or hypertrophy change  Electronically Signed By:   Date and Time of Study: 2020-11-14 15:49:03          Results for orders placed during the hospital encounter of 08/31/18   Duplex Venous Lower Extremity - Bilateral CAR    Narrative · Normal bilateral lower extremity venous duplex scan.          Results for orders placed during the hospital encounter of 08/26/20   Adult Transthoracic Echo Complete W/ Cont if Necessary Per Protocol    Narrative · Normal left ventricular cavity size and wall thickness noted. All left   ventricular wall segments contract normally.  · Left ventricular diastolic dysfunction is noted  (grade I) consistent   with impaired relaxation.  · Estimated EF appears to be in the range of 61 - 65%.  · Left ventricular diastolic dysfunction (grade I) consistent with   impaired relaxation.  · The aortic valve is structurally normal. No aortic valve regurgitation   is present. No aortic valve stenosis is present.  · The mitral valve is normal in structure. No mitral valve regurgitation   is present. No significant mitral valve stenosis is present.  · The tricuspid valve is normal. No tricuspid valve regurgitation is   present.  · There is no evidence of pericardial effusion.          Xr Chest 1 View    Result Date: 11/14/2020  Cardiomegaly. Right basilar atelectasis or scar with elevation of the right hemidiaphragm.  Electronically Signed By-Skyler Cotter MD On:11/14/2020 4:09 PM This report was finalized on [id number] by  Skyler Cotter MD.      I personally reviewed patient's x-ray films and my findings are: 0    I personally reviewed patient's EKG strip and my findings are: 0    Medication Review:   I have reviewed the patient's current medication list 11/15/20     Scheduled Meds  atorvastatin, 20 mg, Oral, Nightly  budesonide, 0.5 mg, Nebulization, QAM  famotidine, 20 mg, Oral, BID AC  folic acid, 1 mg, Oral, Daily  heparin (porcine), 5,000 Units, Subcutaneous, Q12H  insulin glargine, 15 Units, Subcutaneous, BID  insulin lispro, 0-9 Units, Subcutaneous, TID AC  insulin lispro, 3 Units, Subcutaneous, 4x Daily With Meals & Nightly  isosorbide mononitrate, 30 mg, Oral, Daily  lamoTRIgine, 12.5 mg, Oral, BID  levothyroxine, 100 mcg, Oral, Q AM  memantine, 5 mg, Oral, Daily  metoprolol succinate XL, 25 mg, Oral, Daily  multivitamin, 1 tablet, Oral, Daily  OLANZapine, 7.5 mg, Oral, Nightly  saccharomyces boulardii, 250 mg, Oral, Daily  sodium chloride, 10 mL, Intravenous, Q12H  tamsulosin, 0.4 mg, Oral, Daily  thiamine, 100 mg, Oral, Daily  vitamin C, 500 mg, Oral, Daily        Meds Infusions       DVT  prophylaxis  Mechanical Order History:     None      Pharmalogical Order History:      Ordered     Dose Route Frequency Stop    11/14/20 1910  heparin (porcine) 5000 UNIT/ML injection 5,000 Units      5,000 Units SC Every 12 Hours Scheduled --                Meds PRN  •  acetaminophen **OR** acetaminophen **OR** acetaminophen  •  bisacodyl  •  dextrose  •  dextrose  •  glucagon (human recombinant)  •  HYDROcodone-acetaminophen  •  insulin lispro **AND** insulin lispro  •  [COMPLETED] Insert peripheral IV **AND** sodium chloride  •  sodium chloride      Benjamin Lane MD  11/15/20  13:42 EST

## 2020-11-16 PROBLEM — T24.332A BURN OF THIRD DEGREE OF LEFT LOWER LEG, INITIAL ENCOUNTER: Status: ACTIVE | Noted: 2020-11-16

## 2020-11-16 LAB
ANION GAP SERPL CALCULATED.3IONS-SCNC: 13 MMOL/L (ref 5–15)
BASOPHILS # BLD AUTO: 0 10*3/MM3 (ref 0–0.2)
BASOPHILS NFR BLD AUTO: 0.4 % (ref 0–1.5)
BH CV NUCLEAR PRIOR STUDY: 3
BH CV STRESS BP STAGE 1: NORMAL
BH CV STRESS BP STAGE 2: NORMAL
BH CV STRESS BP STAGE 3: NORMAL
BH CV STRESS COMMENTS STAGE 1: NORMAL
BH CV STRESS DOSE REGADENOSON STAGE 1: 0.4
BH CV STRESS DURATION MIN STAGE 1: 1
BH CV STRESS DURATION MIN STAGE 2: 1
BH CV STRESS DURATION MIN STAGE 3: 0
BH CV STRESS DURATION SEC STAGE 2: 0
BH CV STRESS DURATION SEC STAGE 3: 50
BH CV STRESS HR STAGE 1: 89
BH CV STRESS HR STAGE 2: 113
BH CV STRESS HR STAGE 3: 97
BH CV STRESS PROTOCOL 1: NORMAL
BH CV STRESS RECOVERY BP: NORMAL MMHG
BH CV STRESS RECOVERY HR: 88 BPM
BH CV STRESS STAGE 1: 1
BH CV STRESS STAGE 2: 2
BH CV STRESS STAGE 3: 3
BUN SERPL-MCNC: 17 MG/DL (ref 8–23)
BUN/CREAT SERPL: 13.6 (ref 7–25)
CALCIUM SPEC-SCNC: 8.7 MG/DL (ref 8.6–10.5)
CHLORIDE SERPL-SCNC: 98 MMOL/L (ref 98–107)
CO2 SERPL-SCNC: 24 MMOL/L (ref 22–29)
CREAT SERPL-MCNC: 1.25 MG/DL (ref 0.76–1.27)
DEPRECATED RDW RBC AUTO: 45.1 FL (ref 37–54)
EOSINOPHIL # BLD AUTO: 0.4 10*3/MM3 (ref 0–0.4)
EOSINOPHIL NFR BLD AUTO: 5.6 % (ref 0.3–6.2)
ERYTHROCYTE [DISTWIDTH] IN BLOOD BY AUTOMATED COUNT: 15.9 % (ref 12.3–15.4)
GFR SERPL CREATININE-BSD FRML MDRD: 57 ML/MIN/1.73
GLUCOSE BLDC GLUCOMTR-MCNC: 122 MG/DL (ref 70–105)
GLUCOSE BLDC GLUCOMTR-MCNC: 127 MG/DL (ref 70–105)
GLUCOSE BLDC GLUCOMTR-MCNC: 168 MG/DL (ref 70–105)
GLUCOSE BLDC GLUCOMTR-MCNC: 241 MG/DL (ref 70–105)
GLUCOSE SERPL-MCNC: 253 MG/DL (ref 65–99)
HBA1C MFR BLD: 7.1 % (ref 3.5–5.6)
HCT VFR BLD AUTO: 32.5 % (ref 37.5–51)
HGB BLD-MCNC: 10.2 G/DL (ref 13–17.7)
LV EF NUC BP: 68 %
LYMPHOCYTES # BLD AUTO: 2.7 10*3/MM3 (ref 0.7–3.1)
LYMPHOCYTES NFR BLD AUTO: 38.4 % (ref 19.6–45.3)
MAXIMAL PREDICTED HEART RATE: 148 BPM
MCH RBC QN AUTO: 25.3 PG (ref 26.6–33)
MCHC RBC AUTO-ENTMCNC: 31.4 G/DL (ref 31.5–35.7)
MCV RBC AUTO: 80.5 FL (ref 79–97)
MONOCYTES # BLD AUTO: 0.4 10*3/MM3 (ref 0.1–0.9)
MONOCYTES NFR BLD AUTO: 5.6 % (ref 5–12)
NEUTROPHILS NFR BLD AUTO: 3.5 10*3/MM3 (ref 1.7–7)
NEUTROPHILS NFR BLD AUTO: 50 % (ref 42.7–76)
NRBC BLD AUTO-RTO: 0.1 /100 WBC (ref 0–0.2)
PERCENT MAX PREDICTED HR: 91.89 %
PLATELET # BLD AUTO: 336 10*3/MM3 (ref 140–450)
PMV BLD AUTO: 6.9 FL (ref 6–12)
POTASSIUM SERPL-SCNC: 4.3 MMOL/L (ref 3.5–5.2)
RBC # BLD AUTO: 4.04 10*6/MM3 (ref 4.14–5.8)
SODIUM SERPL-SCNC: 135 MMOL/L (ref 136–145)
STRESS BASELINE BP: NORMAL MMHG
STRESS BASELINE HR: 76 BPM
STRESS PERCENT HR: 108 %
STRESS POST PEAK BP: NORMAL MMHG
STRESS POST PEAK HR: 136 BPM
STRESS TARGET HR: 126 BPM
WBC # BLD AUTO: 6.9 10*3/MM3 (ref 3.4–10.8)

## 2020-11-16 PROCEDURE — 25010000002 REGADENOSON 0.4 MG/5ML SOLUTION: Performed by: INTERNAL MEDICINE

## 2020-11-16 PROCEDURE — 17250 CHEM CAUT OF GRANLTJ TISSUE: CPT | Performed by: NURSE PRACTITIONER

## 2020-11-16 PROCEDURE — 80048 BASIC METABOLIC PNL TOTAL CA: CPT | Performed by: INTERNAL MEDICINE

## 2020-11-16 PROCEDURE — 97165 OT EVAL LOW COMPLEX 30 MIN: CPT

## 2020-11-16 PROCEDURE — 96372 THER/PROPH/DIAG INJ SC/IM: CPT

## 2020-11-16 PROCEDURE — 85025 COMPLETE CBC W/AUTO DIFF WBC: CPT | Performed by: INTERNAL MEDICINE

## 2020-11-16 PROCEDURE — 63710000001 INSULIN LISPRO (HUMAN) PER 5 UNITS: Performed by: INTERNAL MEDICINE

## 2020-11-16 PROCEDURE — 0 TECHNETIUM SESTAMIBI: Performed by: INTERNAL MEDICINE

## 2020-11-16 PROCEDURE — G0378 HOSPITAL OBSERVATION PER HR: HCPCS

## 2020-11-16 PROCEDURE — A9500 TC99M SESTAMIBI: HCPCS | Performed by: INTERNAL MEDICINE

## 2020-11-16 PROCEDURE — 25010000002 HEPARIN (PORCINE) PER 1000 UNITS: Performed by: INTERNAL MEDICINE

## 2020-11-16 PROCEDURE — 63710000001 INSULIN GLARGINE PER 5 UNITS: Performed by: INTERNAL MEDICINE

## 2020-11-16 PROCEDURE — 99225 PR SBSQ OBSERVATION CARE/DAY 25 MINUTES: CPT | Performed by: INTERNAL MEDICINE

## 2020-11-16 PROCEDURE — 82962 GLUCOSE BLOOD TEST: CPT

## 2020-11-16 PROCEDURE — 97162 PT EVAL MOD COMPLEX 30 MIN: CPT

## 2020-11-16 RX ADMIN — OLANZAPINE 7.5 MG: 5 TABLET ORAL at 21:02

## 2020-11-16 RX ADMIN — FAMOTIDINE 20 MG: 20 TABLET ORAL at 17:39

## 2020-11-16 RX ADMIN — INSULIN LISPRO 3 UNITS: 100 INJECTION, SOLUTION INTRAVENOUS; SUBCUTANEOUS at 17:40

## 2020-11-16 RX ADMIN — METOPROLOL SUCCINATE 25 MG: 25 TABLET, EXTENDED RELEASE ORAL at 09:07

## 2020-11-16 RX ADMIN — Medication 250 MG: at 09:07

## 2020-11-16 RX ADMIN — LEVOTHYROXINE SODIUM 100 MCG: 100 TABLET ORAL at 06:24

## 2020-11-16 RX ADMIN — HYDROCODONE BITARTRATE AND ACETAMINOPHEN 1 TABLET: 7.5; 325 TABLET ORAL at 17:39

## 2020-11-16 RX ADMIN — REGADENOSON 0.4 MG: 0.08 INJECTION, SOLUTION INTRAVENOUS at 08:05

## 2020-11-16 RX ADMIN — INSULIN LISPRO 3 UNITS: 100 INJECTION, SOLUTION INTRAVENOUS; SUBCUTANEOUS at 12:24

## 2020-11-16 RX ADMIN — HYDROCODONE BITARTRATE AND ACETAMINOPHEN 1 TABLET: 7.5; 325 TABLET ORAL at 09:06

## 2020-11-16 RX ADMIN — OXYCODONE HYDROCHLORIDE AND ACETAMINOPHEN 500 MG: 500 TABLET ORAL at 09:07

## 2020-11-16 RX ADMIN — LAMOTRIGINE 12.5 MG: 25 TABLET ORAL at 21:04

## 2020-11-16 RX ADMIN — Medication 100 MG: at 09:07

## 2020-11-16 RX ADMIN — INSULIN LISPRO 3 UNITS: 100 INJECTION, SOLUTION INTRAVENOUS; SUBCUTANEOUS at 17:39

## 2020-11-16 RX ADMIN — ISOSORBIDE MONONITRATE 30 MG: 30 TABLET, EXTENDED RELEASE ORAL at 09:07

## 2020-11-16 RX ADMIN — INSULIN GLARGINE 15 UNITS: 100 INJECTION, SOLUTION SUBCUTANEOUS at 21:05

## 2020-11-16 RX ADMIN — INSULIN LISPRO 3 UNITS: 100 INJECTION, SOLUTION INTRAVENOUS; SUBCUTANEOUS at 21:05

## 2020-11-16 RX ADMIN — TAMSULOSIN HYDROCHLORIDE 0.4 MG: 0.4 CAPSULE ORAL at 09:06

## 2020-11-16 RX ADMIN — TECHNETIUM TC 99M SESTAMIBI 1 DOSE: 1 INJECTION INTRAVENOUS at 08:05

## 2020-11-16 RX ADMIN — Medication 10 ML: at 21:02

## 2020-11-16 RX ADMIN — LAMOTRIGINE 12.5 MG: 25 TABLET ORAL at 09:07

## 2020-11-16 RX ADMIN — ATORVASTATIN CALCIUM 20 MG: 20 TABLET, FILM COATED ORAL at 21:03

## 2020-11-16 RX ADMIN — Medication 10 ML: at 09:07

## 2020-11-16 RX ADMIN — HYDROCODONE BITARTRATE AND ACETAMINOPHEN 1 TABLET: 7.5; 325 TABLET ORAL at 21:17

## 2020-11-16 RX ADMIN — HEPARIN SODIUM 5000 UNITS: 5000 INJECTION INTRAVENOUS; SUBCUTANEOUS at 21:05

## 2020-11-16 RX ADMIN — FOLIC ACID 1 MG: 1 TABLET ORAL at 09:07

## 2020-11-16 RX ADMIN — FAMOTIDINE 20 MG: 20 TABLET ORAL at 09:07

## 2020-11-16 RX ADMIN — MEMANTINE 5 MG: 5 TABLET ORAL at 09:06

## 2020-11-16 RX ADMIN — THERA TABS 1 TABLET: TAB at 09:07
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
## 2020-11-16 NOTE — PLAN OF CARE
73 yo male, admitting dx of Chest pain.  Pt denies any reports or complaints of chest pain or difficulty breathing this shift.  Dressing changed performed to area on left leg with significant yellow drainage on previous dressing, wound care consulted.   Pt resting well in bed, will continue to monitor and observe.    Problem: Adult Inpatient Plan of Care  Goal: Plan of Care Review  Outcome: Ongoing, Progressing  Flowsheets (Taken 11/16/2020 0726)  Plan of Care Reviewed With: patient       Problem: Chest Pain  Goal: Resolution of Chest Pain Symptoms  Outcome: Ongoing, Progressing

## 2020-11-16 NOTE — DISCHARGE PLACEMENT REQUEST
"Chris Grullon (74 y.o. Male)     Date of Birth Social Security Number Address Home Phone MRN    1945  [address] [place] [address] [phone] [id number]    Yarsani Marital Status          Bahai        Admission Date Admission Type Admitting Provider Attending Provider Department, Room/Bed    11/14/20 Emergency Nia Austin DO [place], [place], DO [facility] 3C MEDICAL INPATIENT, 380/1    Discharge Date Discharge Disposition Discharge Destination                       Attending Provider: Nia Austin DO    Allergies: Codeine    Isolation: None   Infection: None   Code Status: CPR    Ht: 177.8 cm (70\")   Wt: 86 kg (189 lb 9.5 oz)    Admission Cmt: None   Principal Problem: Chest pain [R07.9]                 Active Insurance as of 11/14/2020     Primary Coverage     Payor Plan Insurance Group Employer/Plan Group    HUMANA MEDICARE REPLACEMENT HU[employer]NA MEDICARE REPLACEMENT [id number]     Payor Plan Address Payor Plan Phone Number Payor Plan Fax Number Effective Dates    [address] [address] [phone]  1/1/2018 - None Entered    [facility] [id number]       Subscriber Name Subscriber Birth Date Member ID       CHRIS GRULLON 1945 [id number]                 Emergency Contacts      (Rel.) Home Phone Work Phone Mobile Phone    Norbert Lynch (Power of ) [phone] -- --    Constance Moreau (Friend) -- -- [phone]    Marjorie Valdez (Sister) -- -- [phone]              "
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
## 2020-11-16 NOTE — PROGRESS NOTES
Continued Stay Note   [facility]     Patient Name: Chris Ferguson  MRN: [id number]  Today's Date: 11/16/2020    Admit Date: 11/14/2020    Discharge Plan     Row Name 11/16/20 1607       Plan    Plan  Return to [facility] - pending pre-cert. Pre-cert started 11/16. No PASRR needed for return.    Plan Comments  Per text with [facility] liaison, CM and SW asked [facility] liaison to start pre-cert. Pre-cert started 11/16.     Phone communication only - no physical contact with patient or family.      [facility]iscilla Caro [facility] [place], [place]    Office: [phone]  Cell: [phone]  Fax: [phone]  E-mail: [email or web address]

## 2020-11-16 NOTE — PLAN OF CARE
Patient reports he is ready to go back to [place]. Patient to continue second half of stress test tomorrow. Assured NPO and caffeine free status. Medicating patient as needed for pain. Continue current plan of care.   Problem: Adult Inpatient Plan of Care  Goal: Plan of Care Review  11/15/2020 1943 by Rita Guillen RN  Outcome: Ongoing, Progressing  11/15/2020 1942 by Rita Guillen RN  Outcome: Ongoing, Progressing  11/15/2020 1942 by Rita Guillen RN  Outcome: Ongoing, Progressing  Goal: Patient-Specific Goal (Individualized)  11/15/2020 1943 by Rita Guillen RN  Outcome: Ongoing, Progressing  11/15/2020 1942 by Rita Guillen RN  Outcome: Ongoing, Progressing  11/15/2020 1942 by Rita Guillen RN  Outcome: Ongoing, Progressing  Goal: Absence of Hospital-Acquired Illness or Injury  11/15/2020 1943 by Rita Guillen RN  Outcome: Ongoing, Progressing  11/15/2020 1942 by Rita Guillen RN  Outcome: Ongoing, Progressing  11/15/2020 1942 by Rita Guillen RN  Outcome: Ongoing, Progressing  Intervention: Identify and Manage Fall Risk  Recent Flowsheet Documentation  Taken 11/15/2020 1500 by Rita Guillen RN  Safety Promotion/Fall Prevention:   assistive device/personal items within reach   clutter free environment maintained   fall prevention program maintained   nonskid shoes/slippers when out of bed   safety round/check completed   room organization consistent  Taken 11/15/2020 1106 by Rita Guillen RN  Safety Promotion/Fall Prevention:   assistive device/personal items within reach   clutter free environment maintained   fall prevention program maintained   muscle strengthening facilitated   nonskid shoes/slippers when out of bed   room organization consistent   safety round/check completed  Taken 11/15/2020 0710 by Rita Guillen RN  Safety Promotion/Fall Prevention:   assistive device/personal items within reach   clutter free environment maintained   room organization  consistent   safety round/check completed   nonskid shoes/slippers when out of bed   fall prevention program maintained  Intervention: Prevent Infection  Recent Flowsheet Documentation  Taken 11/15/2020 1500 by Rita Guillen RN  Infection Prevention:   environmental surveillance performed   equipment surfaces disinfected   hand hygiene promoted   personal protective equipment utilized   rest/sleep promoted   visitors restricted/screened   single patient room provided  Taken 11/15/2020 1106 by Rita Guillen RN  Infection Prevention:   environmental surveillance performed   equipment surfaces disinfected   visitors restricted/screened   single patient room provided   rest/sleep promoted   personal protective equipment utilized   hand hygiene promoted  Taken 11/15/2020 0710 by Rita Guillen RN  Infection Prevention:   environmental surveillance performed   equipment surfaces disinfected   hand hygiene promoted   personal protective equipment utilized   rest/sleep promoted   visitors restricted/screened   single patient room provided  Goal: Optimal Comfort and Wellbeing  11/15/2020 1943 by Rita Guillen RN  Outcome: Ongoing, Progressing  11/15/2020 1942 by Rita Guillen RN  Outcome: Ongoing, Progressing  11/15/2020 1942 by Rita Guillen RN  Outcome: Ongoing, Progressing  Intervention: Provide Person-Centered Care  Recent Flowsheet Documentation  Taken 11/15/2020 0710 by Rita Guillen RN  Trust Relationship/Rapport: care explained  Goal: Readiness for Transition of Care  11/15/2020 1943 by Rita Guillen RN  Outcome: Ongoing, Progressing  11/15/2020 1942 by Rita Guillen RN  Outcome: Ongoing, Progressing  11/15/2020 1942 by Rita Guillen RN  Outcome: Ongoing, Progressing   Goal Outcome Evaluation:  Plan of Care Reviewed With: patient  Progress: improving

## 2020-11-16 NOTE — PLAN OF CARE
Problem: Adult Inpatient Plan of Care  Goal: Plan of Care Review  Recent Flowsheet Documentation  Taken 11/16/2020 1636 by Priscilla Hernandez, OT  Outcome Summary: Pt admitted to hospital from [facility] for chest pain. Troponins (-) and undergoing cardiac testing.  He demos significant LE weakness and requires min assist to stand for lower body dressing and bathing.  He is eager to participate in order to gain strength.  He will require therapy services at return to [facility].  PPE: mask, shield, gloves.

## 2020-11-16 NOTE — PROGRESS NOTES
Discharge Planning Assessment  [facility]     Patient Name: Chris Ferguson  MRN: [id number]  Today's Date: 11/16/2020    Admit Date: 11/14/2020    Discharge Needs Assessment     Row Name 11/16/20 1431       Living Environment    Lives With  facility resident from [place]    Current Living Arrangements  other (see comments) rehab facility    Primary Care Provided by  -- staff at facility    Provides Primary Care For  no one, unable/limited ability to care for self    Family Caregiver if Needed  friend(s)    Quality of Family Relationships  unable to assess    Able to Return to Prior Arrangements  yes       Transition Planning    Patient/Family Anticipated Services at Transition      Transportation Anticipated  health plan transportation       Discharge Needs Assessment    Readmission Within the Last 30 Days  no previous admission in last 30 days    Equipment Currently Used at Home  walker, rolling    Concerns to be Addressed  discharge planning    Provided Post Acute Provider List?  N/A    N/A Provider List Comment  patient is currently from [place] and wants to return        Discharge Plan     Row Name 11/16/20 1433       Plan    Plan  return to [place] and will need precert    Patient/Family in Agreement with Plan  yes    Plan Comments  spoke to patient in room with ppe(mask and goggles); staying 6 feet away and less than 15 min in room; he states he was at [place] for rehab and would like to return; verified with rep julieta that patient will need precert to return       Carol naegele rn  Case management  Office number [phone]  Cell phone [phone]
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
## 2020-11-16 NOTE — PLAN OF CARE
Goal Outcome Evaluation:  Plan of Care Reviewed With: patient  Progress: improving   No complaints. Patient waiting discharge back to [facility] after stress test results.Will continue to monitor.
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
## 2020-11-16 NOTE — CONSULTS
"Diabetes Education  Assessment/Teaching    Patient Name:  Chris Ferguson  YOB: 1945  MRN: [id number]  Admit Date:  11/14/2020      Assessment Date:  11/16/2020    Most Recent Value   General Information    Referral From:  MD Zeke order [MD consult to teach blood glucose monitoring and on 10/11/2020 A1c was 10.6%.]   Height  177.8 cm (70\")   Height Method  Stated   Weight  86 kg (189 lb 9.5 oz)   Pregnancy Assessment   Diabetes History   What type of diabetes do you have?  Type 2   Current DM knowledge  fair   Education Preferences   What areas of diabetes would you like to learn about?  diabetes complications   Nutrition Information   Assessment Topics   Reducing Risk - Assessment  Needs education   DM Goals   Reducing Risk - Goal  Today            Most Recent Value   DM Education Needs   Reducing Risks  A1C testing [On 10/11/2020 A1c was 10.6%.  A1c info sheet given with discussion on A1c target and healthy blood sugar range.]   Discharge Plan  Facility   Teaching Method  Discussion, Handouts   Patient Response  Verbalized understanding            Other Comments:  Patient able to state name and birth date. Patient stated that he had a one year old meter and checked his blood sugar 2X a day with results 160-170.  Further in the conversation patient stated that he lived at [facility] [place] and that they checked his blood sugar and gave him his insulin. Patient's home med list has Lantus 15 units BID and Humalog 3 units before meals and at bedtime if blood sugar >150. Patient has no further questions or concerns related to diabetes at this time.        Electronically signed by:  Terra Marie RN  11/16/20 09:47 EST  "

## 2020-11-17 PROBLEM — I20.0 UNSTABLE ANGINA (HCC): Status: ACTIVE | Noted: 2020-11-14

## 2020-11-17 PROBLEM — E78.2 MIXED HYPERLIPIDEMIA: Status: ACTIVE | Noted: 2020-11-14

## 2020-11-17 LAB
ANION GAP SERPL CALCULATED.3IONS-SCNC: 9 MMOL/L (ref 5–15)
BACTERIA SPEC AEROBE CULT: NORMAL
BACTERIA UR QL AUTO: ABNORMAL /HPF
BILIRUB UR QL STRIP: NEGATIVE
BUN SERPL-MCNC: 18 MG/DL (ref 8–23)
BUN/CREAT SERPL: 12.5 (ref 7–25)
CALCIUM SPEC-SCNC: 8.8 MG/DL (ref 8.6–10.5)
CHLORIDE SERPL-SCNC: 99 MMOL/L (ref 98–107)
CHOLEST SERPL-MCNC: 137 MG/DL (ref 0–200)
CLARITY UR: CLEAR
CO2 SERPL-SCNC: 27 MMOL/L (ref 22–29)
COLOR UR: YELLOW
CREAT SERPL-MCNC: 1.44 MG/DL (ref 0.76–1.27)
GFR SERPL CREATININE-BSD FRML MDRD: 48 ML/MIN/1.73
GLUCOSE BLDC GLUCOMTR-MCNC: 151 MG/DL (ref 70–105)
GLUCOSE BLDC GLUCOMTR-MCNC: 196 MG/DL (ref 70–105)
GLUCOSE BLDC GLUCOMTR-MCNC: 209 MG/DL (ref 70–105)
GLUCOSE BLDC GLUCOMTR-MCNC: 265 MG/DL (ref 70–105)
GLUCOSE BLDC GLUCOMTR-MCNC: 96 MG/DL (ref 70–105)
GLUCOSE SERPL-MCNC: 188 MG/DL (ref 65–99)
GLUCOSE UR STRIP-MCNC: NEGATIVE MG/DL
GRAM STN SPEC: NORMAL
GRAM STN SPEC: NORMAL
HDLC SERPL-MCNC: 37 MG/DL (ref 40–60)
HGB UR QL STRIP.AUTO: NEGATIVE
HYALINE CASTS UR QL AUTO: ABNORMAL /LPF
KETONES UR QL STRIP: NEGATIVE
LDLC SERPL CALC-MCNC: 77 MG/DL (ref 0–100)
LDLC/HDLC SERPL: 2.01 {RATIO}
LEUKOCYTE ESTERASE UR QL STRIP.AUTO: ABNORMAL
MAGNESIUM SERPL-MCNC: 1.6 MG/DL (ref 1.6–2.4)
NITRITE UR QL STRIP: NEGATIVE
PH UR STRIP.AUTO: 6 [PH] (ref 5–8)
POTASSIUM SERPL-SCNC: 4.3 MMOL/L (ref 3.5–5.2)
PROT UR QL STRIP: NEGATIVE
QT INTERVAL: 421 MS
RBC # UR: ABNORMAL /HPF
REF LAB TEST METHOD: ABNORMAL
SODIUM SERPL-SCNC: 135 MMOL/L (ref 136–145)
SP GR UR STRIP: 1.01 (ref 1–1.03)
SQUAMOUS #/AREA URNS HPF: ABNORMAL /HPF
TRIGL SERPL-MCNC: 128 MG/DL (ref 0–150)
UROBILINOGEN UR QL STRIP: ABNORMAL
VLDLC SERPL-MCNC: 23 MG/DL (ref 5–40)
WBC UR QL AUTO: ABNORMAL /HPF
WHOLE BLOOD HOLD SPECIMEN: NORMAL

## 2020-11-17 PROCEDURE — 63710000001 INSULIN GLARGINE PER 5 UNITS: Performed by: INTERNAL MEDICINE

## 2020-11-17 PROCEDURE — G0378 HOSPITAL OBSERVATION PER HR: HCPCS

## 2020-11-17 PROCEDURE — 99226 PR SBSQ OBSERVATION CARE/DAY 35 MINUTES: CPT | Performed by: INTERNAL MEDICINE

## 2020-11-17 PROCEDURE — 93005 ELECTROCARDIOGRAM TRACING: CPT | Performed by: INTERNAL MEDICINE

## 2020-11-17 PROCEDURE — 86335 IMMUNFIX E-PHORSIS/URINE/CSF: CPT | Performed by: INTERNAL MEDICINE

## 2020-11-17 PROCEDURE — 96372 THER/PROPH/DIAG INJ SC/IM: CPT

## 2020-11-17 PROCEDURE — 80061 LIPID PANEL: CPT | Performed by: INTERNAL MEDICINE

## 2020-11-17 PROCEDURE — 96361 HYDRATE IV INFUSION ADD-ON: CPT

## 2020-11-17 PROCEDURE — 82570 ASSAY OF URINE CREATININE: CPT | Performed by: INTERNAL MEDICINE

## 2020-11-17 PROCEDURE — 84156 ASSAY OF PROTEIN URINE: CPT | Performed by: INTERNAL MEDICINE

## 2020-11-17 PROCEDURE — 99215 OFFICE O/P EST HI 40 MIN: CPT | Performed by: INTERNAL MEDICINE

## 2020-11-17 PROCEDURE — 93010 ELECTROCARDIOGRAM REPORT: CPT | Performed by: INTERNAL MEDICINE

## 2020-11-17 PROCEDURE — 80048 BASIC METABOLIC PNL TOTAL CA: CPT | Performed by: INTERNAL MEDICINE

## 2020-11-17 PROCEDURE — 82962 GLUCOSE BLOOD TEST: CPT

## 2020-11-17 PROCEDURE — 81001 URINALYSIS AUTO W/SCOPE: CPT | Performed by: INTERNAL MEDICINE

## 2020-11-17 PROCEDURE — 25010000002 HEPARIN (PORCINE) PER 1000 UNITS: Performed by: INTERNAL MEDICINE

## 2020-11-17 PROCEDURE — 63710000001 INSULIN LISPRO (HUMAN) PER 5 UNITS: Performed by: INTERNAL MEDICINE

## 2020-11-17 PROCEDURE — 83735 ASSAY OF MAGNESIUM: CPT | Performed by: INTERNAL MEDICINE

## 2020-11-17 RX ORDER — SODIUM CHLORIDE 0.9 % (FLUSH) 0.9 %
3 SYRINGE (ML) INJECTION EVERY 12 HOURS SCHEDULED
Status: DISCONTINUED | OUTPATIENT
Start: 2020-11-17 | End: 2020-11-18

## 2020-11-17 RX ORDER — SODIUM CHLORIDE 9 MG/ML
100 INJECTION, SOLUTION INTRAVENOUS CONTINUOUS
Status: DISCONTINUED | OUTPATIENT
Start: 2020-11-17 | End: 2020-11-18 | Stop reason: HOSPADM

## 2020-11-17 RX ORDER — SODIUM CHLORIDE 9 MG/ML
75 INJECTION, SOLUTION INTRAVENOUS CONTINUOUS
Status: DISCONTINUED | OUTPATIENT
Start: 2020-11-17 | End: 2020-11-17

## 2020-11-17 RX ORDER — SODIUM CHLORIDE 0.9 % (FLUSH) 0.9 %
10 SYRINGE (ML) INJECTION AS NEEDED
Status: DISCONTINUED | OUTPATIENT
Start: 2020-11-17 | End: 2020-11-18

## 2020-11-17 RX ADMIN — METOPROLOL SUCCINATE 25 MG: 25 TABLET, EXTENDED RELEASE ORAL at 09:38

## 2020-11-17 RX ADMIN — INSULIN GLARGINE 15 UNITS: 100 INJECTION, SOLUTION SUBCUTANEOUS at 09:55

## 2020-11-17 RX ADMIN — SODIUM CHLORIDE 100 ML/HR: 9 INJECTION, SOLUTION INTRAVENOUS at 15:33

## 2020-11-17 RX ADMIN — INSULIN LISPRO 2 UNITS: 100 INJECTION, SOLUTION INTRAVENOUS; SUBCUTANEOUS at 09:54

## 2020-11-17 RX ADMIN — SODIUM CHLORIDE 75 ML/HR: 9 INJECTION, SOLUTION INTRAVENOUS at 12:28

## 2020-11-17 RX ADMIN — INSULIN LISPRO 2 UNITS: 100 INJECTION, SOLUTION INTRAVENOUS; SUBCUTANEOUS at 17:11

## 2020-11-17 RX ADMIN — THERA TABS 1 TABLET: TAB at 09:38

## 2020-11-17 RX ADMIN — Medication 10 ML: at 09:00

## 2020-11-17 RX ADMIN — INSULIN LISPRO 3 UNITS: 100 INJECTION, SOLUTION INTRAVENOUS; SUBCUTANEOUS at 12:28

## 2020-11-17 RX ADMIN — INSULIN GLARGINE 15 UNITS: 100 INJECTION, SOLUTION SUBCUTANEOUS at 22:18

## 2020-11-17 RX ADMIN — OLANZAPINE 7.5 MG: 5 TABLET ORAL at 22:16

## 2020-11-17 RX ADMIN — HEPARIN SODIUM 5000 UNITS: 5000 INJECTION INTRAVENOUS; SUBCUTANEOUS at 22:18

## 2020-11-17 RX ADMIN — OXYCODONE HYDROCHLORIDE AND ACETAMINOPHEN 500 MG: 500 TABLET ORAL at 09:38

## 2020-11-17 RX ADMIN — Medication 3 ML: at 22:14

## 2020-11-17 RX ADMIN — FAMOTIDINE 20 MG: 20 TABLET ORAL at 17:11

## 2020-11-17 RX ADMIN — LAMOTRIGINE 12.5 MG: 25 TABLET ORAL at 09:37

## 2020-11-17 RX ADMIN — HYDROCODONE BITARTRATE AND ACETAMINOPHEN 1 TABLET: 7.5; 325 TABLET ORAL at 22:14

## 2020-11-17 RX ADMIN — MEMANTINE 5 MG: 5 TABLET ORAL at 09:37

## 2020-11-17 RX ADMIN — HYDROCODONE BITARTRATE AND ACETAMINOPHEN 1 TABLET: 7.5; 325 TABLET ORAL at 18:30

## 2020-11-17 RX ADMIN — HEPARIN SODIUM 5000 UNITS: 5000 INJECTION INTRAVENOUS; SUBCUTANEOUS at 09:38

## 2020-11-17 RX ADMIN — INSULIN LISPRO 3 UNITS: 100 INJECTION, SOLUTION INTRAVENOUS; SUBCUTANEOUS at 09:54

## 2020-11-17 RX ADMIN — Medication 250 MG: at 09:35

## 2020-11-17 RX ADMIN — LAMOTRIGINE 12.5 MG: 25 TABLET ORAL at 22:15

## 2020-11-17 RX ADMIN — TAMSULOSIN HYDROCHLORIDE 0.4 MG: 0.4 CAPSULE ORAL at 09:35

## 2020-11-17 RX ADMIN — INSULIN LISPRO 4 UNITS: 100 INJECTION, SOLUTION INTRAVENOUS; SUBCUTANEOUS at 12:28

## 2020-11-17 RX ADMIN — FAMOTIDINE 20 MG: 20 TABLET ORAL at 09:37

## 2020-11-17 RX ADMIN — Medication 10 ML: at 22:14

## 2020-11-17 RX ADMIN — INSULIN LISPRO 3 UNITS: 100 INJECTION, SOLUTION INTRAVENOUS; SUBCUTANEOUS at 17:11

## 2020-11-17 RX ADMIN — ATORVASTATIN CALCIUM 20 MG: 20 TABLET, FILM COATED ORAL at 22:15

## 2020-11-17 RX ADMIN — Medication 100 MG: at 09:38

## 2020-11-17 RX ADMIN — FOLIC ACID 1 MG: 1 TABLET ORAL at 09:38

## 2020-11-17 RX ADMIN — ISOSORBIDE MONONITRATE 30 MG: 30 TABLET, EXTENDED RELEASE ORAL at 09:38

## 2020-11-17 RX ADMIN — LEVOTHYROXINE SODIUM 100 MCG: 100 TABLET ORAL at 06:26

## 2020-11-17 RX ADMIN — INSULIN LISPRO 3 UNITS: 100 INJECTION, SOLUTION INTRAVENOUS; SUBCUTANEOUS at 22:18

## 2020-11-17 NOTE — PLAN OF CARE
Pt presented with no acute issues or reports of chest pain this shift.  Pt awaiting precert for return to [facility].  Pt resting well in bed, will continue to monitor and observe.    Problem: Adult Inpatient Plan of Care  Goal: Plan of Care Review  Outcome: Ongoing, Progressing  Flowsheets (Taken 11/17/2020 0583)  Plan of Care Reviewed With: patient        Problem: Chest Pain  Goal: Resolution of Chest Pain Symptoms  Outcome: Ongoing, Progressing
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
## 2020-11-17 NOTE — PLAN OF CARE
Problem: Adult Inpatient Plan of Care  Goal: Plan of Care Review  Outcome: Ongoing, Progressing  Goal: Patient-Specific Goal (Individualized)  Outcome: Ongoing, Progressing  Goal: Absence of Hospital-Acquired Illness or Injury  Outcome: Ongoing, Progressing  Intervention: Identify and Manage Fall Risk  Recent Flowsheet Documentation  Taken 11/17/2020 1509 by Falguni Valadez RN  Safety Promotion/Fall Prevention: safety round/check completed  Taken 11/17/2020 1335 by Falguni Valadez RN  Safety Promotion/Fall Prevention: safety round/check completed  Taken 11/17/2020 1139 by Falguni Valadez RN  Safety Promotion/Fall Prevention: safety round/check completed  Taken 11/17/2020 0946 by Falguni Valadez RN  Safety Promotion/Fall Prevention: safety round/check completed  Taken 11/17/2020 0900 by Falguni Valadez RN  Safety Promotion/Fall Prevention: safety round/check completed  Intervention: Prevent and Manage VTE (venous thromboembolism) Risk  Recent Flowsheet Documentation  Taken 11/17/2020 0946 by Falguni Valadez RN  VTE Prevention/Management: (subq heparin) other (see comments)  Intervention: Prevent Infection  Recent Flowsheet Documentation  Taken 11/17/2020 1509 by Falguni Valadez RN  Infection Prevention:   hand hygiene promoted   single patient room provided  Taken 11/17/2020 1335 by Falguni Valadez RN  Infection Prevention:   hand hygiene promoted   single patient room provided  Taken 11/17/2020 1139 by Falguni Valadez RN  Infection Prevention:   hand hygiene promoted   single patient room provided  Taken 11/17/2020 0946 by Falguni Valadez RN  Infection Prevention:   hand hygiene promoted   single patient room provided  Taken 11/17/2020 0900 by Falguni Valadez RN  Infection Prevention:   hand hygiene promoted   single patient room provided  Goal: Optimal Comfort and Wellbeing  Outcome: Ongoing, Progressing  Intervention: Provide Person-Centered Care  Recent Flowsheet Documentation  Taken  11/17/2020 0946 by Falguni Valadez RN  Trust Relationship/Rapport:   care explained   emotional support provided  Goal: Readiness for Transition of Care  Outcome: Ongoing, Progressing     Problem: Fall Injury Risk  Goal: Absence of Fall and Fall-Related Injury  Outcome: Ongoing, Progressing  Intervention: Identify and Manage Contributors to Fall Injury Risk  Recent Flowsheet Documentation  Taken 11/17/2020 1509 by Falguni Valadez RN  Medication Review/Management: medications reviewed  Taken 11/17/2020 1335 by Falguni Valadez RN  Medication Review/Management: medications reviewed  Taken 11/17/2020 1139 by Falguni Valadez RN  Medication Review/Management: medications reviewed  Taken 11/17/2020 0946 by Falguni Valadez RN  Medication Review/Management: medications reviewed  Taken 11/17/2020 0900 by Falguni Valadez RN  Medication Review/Management: medications reviewed  Intervention: Promote Injury-Free Environment  Recent Flowsheet Documentation  Taken 11/17/2020 1509 by Falguni Valadez RN  Safety Promotion/Fall Prevention: safety round/check completed  Taken 11/17/2020 1335 by Falguni Valadez RN  Safety Promotion/Fall Prevention: safety round/check completed  Taken 11/17/2020 1139 by Falguni Valadez RN  Safety Promotion/Fall Prevention: safety round/check completed  Taken 11/17/2020 0946 by Falguni Valadez RN  Safety Promotion/Fall Prevention: safety round/check completed  Taken 11/17/2020 0900 by Falguni Valadez RN  Safety Promotion/Fall Prevention: safety round/check completed     Problem: Asthma Comorbidity  Goal: Maintenance of Asthma Control  Outcome: Ongoing, Progressing  Intervention: Maintain Asthma Symptom Control  Recent Flowsheet Documentation  Taken 11/17/2020 1509 by Falguni Valadez RN  Medication Review/Management: medications reviewed  Taken 11/17/2020 1335 by Falguni Valadez RN  Medication Review/Management: medications reviewed  Taken 11/17/2020 1139 by Falguni Valadez  RN  Medication Review/Management: medications reviewed  Taken 11/17/2020 0946 by Falguni Valadez RN  Medication Review/Management: medications reviewed  Taken 11/17/2020 0900 by Falguni Valadez RN  Medication Review/Management: medications reviewed     Problem: COPD Comorbidity  Goal: Maintenance of COPD Symptom Control  Outcome: Ongoing, Progressing  Intervention: Maintain COPD-Symptom Control  Recent Flowsheet Documentation  Taken 11/17/2020 1509 by Falguni Valadez RN  Medication Review/Management: medications reviewed  Taken 11/17/2020 1335 by Falguni Valadez RN  Medication Review/Management: medications reviewed  Taken 11/17/2020 1139 by Falguni Valadez RN  Medication Review/Management: medications reviewed  Taken 11/17/2020 0946 by Falguni Valadez RN  Medication Review/Management: medications reviewed  Taken 11/17/2020 0900 by Falguni Valadez RN  Medication Review/Management: medications reviewed     Problem: Diabetes Comorbidity  Goal: Blood Glucose Level Within Desired Range  Outcome: Ongoing, Progressing  Intervention: Maintain Glycemic Control  Recent Flowsheet Documentation  Taken 11/17/2020 0946 by Falguni Valadez RN  Glycemic Management: blood glucose monitoring     Problem: Heart Failure Comorbidity  Goal: Maintenance of Heart Failure Symptom Control  Outcome: Ongoing, Progressing  Intervention: Maintain Heart Failure-Management Strategies  Recent Flowsheet Documentation  Taken 11/17/2020 1509 by Falguni Valadez RN  Medication Review/Management: medications reviewed  Taken 11/17/2020 1335 by Falguni Valadez RN  Medication Review/Management: medications reviewed  Taken 11/17/2020 1139 by Falguni Valadez RN  Medication Review/Management: medications reviewed  Taken 11/17/2020 0946 by Falguni Valadez RN  Medication Review/Management: medications reviewed  Taken 11/17/2020 0900 by Falguni Valadez RN  Medication Review/Management: medications reviewed     Problem: Hypertension  Comorbidity  Goal: Blood Pressure in Desired Range  Outcome: Ongoing, Progressing  Intervention: Maintain Hypertension-Management Strategies  Recent Flowsheet Documentation  Taken 11/17/2020 1509 by Falguni Valadez RN  Medication Review/Management: medications reviewed  Taken 11/17/2020 1335 by Falguni Valadez RN  Medication Review/Management: medications reviewed  Taken 11/17/2020 1139 by Falguni Valadez RN  Medication Review/Management: medications reviewed  Taken 11/17/2020 0946 by Falguni Valadez RN  Medication Review/Management: medications reviewed  Taken 11/17/2020 0900 by Falguni Valadez RN  Medication Review/Management: medications reviewed     Problem: Obstructive Sleep Apnea Risk or Actual (Comorbidity Management)  Goal: Unobstructed Breathing During Sleep  Outcome: Ongoing, Progressing     Problem: Pain Chronic (Persistent) (Comorbidity Management)  Goal: Acceptable Pain Control and Functional Ability  Outcome: Ongoing, Progressing  Intervention: Develop Pain Management Plan  Recent Flowsheet Documentation  Taken 11/17/2020 0946 by Falguni Valadez RN  Pain Management Interventions:   see MAR   quiet environment facilitated  Intervention: Manage Persistent Pain  Recent Flowsheet Documentation  Taken 11/17/2020 1509 by Falguni Valadez RN  Medication Review/Management: medications reviewed  Taken 11/17/2020 1335 by Falguni Valadez RN  Medication Review/Management: medications reviewed  Taken 11/17/2020 1139 by Falguni Valadez RN  Medication Review/Management: medications reviewed  Taken 11/17/2020 0946 by Falguni Valadez RN  Medication Review/Management: medications reviewed  Taken 11/17/2020 0900 by Falguni Valadez RN  Medication Review/Management: medications reviewed  Intervention: Optimize Psychosocial Wellbeing  Recent Flowsheet Documentation  Taken 11/17/2020 0946 by Falguni Valadez RN  Supportive Measures: active listening utilized  Diversional Activities:  television  Family/Support System Care: self-care encouraged     Problem: Seizure Disorder Comorbidity  Goal: Maintenance of Seizure Control  Outcome: Ongoing, Progressing  Intervention: Maintain Seizure-Symptom Control  Recent Flowsheet Documentation  Taken 11/17/2020 0946 by Falguni Valadez, RUBY  Seizure Precautions: side rails padded     Problem: Chest Pain  Goal: Resolution of Chest Pain Symptoms  Outcome: Ongoing, Progressing     Problem: Skin Injury Risk Increased  Goal: Skin Health and Integrity  Outcome: Ongoing, Progressing   Goal Outcome Evaluation:  Plan of Care Reviewed With: patient  Progress: improving       Awaiting precert to return to [place].  Urine, pvr done per nephrology. Ekg/npo at midnight for heart cath tomorrow, consent signed.  IVF infusing.  No complaints at this time, will continue to monitor.
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
## 2020-11-18 VITALS
TEMPERATURE: 97.4 F | HEART RATE: 86 BPM | OXYGEN SATURATION: 95 % | HEIGHT: 70 IN | RESPIRATION RATE: 16 BRPM | WEIGHT: 193.9 LBS | SYSTOLIC BLOOD PRESSURE: 110 MMHG | BODY MASS INDEX: 27.76 KG/M2 | DIASTOLIC BLOOD PRESSURE: 69 MMHG

## 2020-11-18 PROBLEM — R07.9 CHEST PAIN: Status: RESOLVED | Noted: 2020-11-14 | Resolved: 2020-11-18

## 2020-11-18 LAB
ALBUMIN SERPL-MCNC: 3.1 G/DL (ref 3.5–5.2)
ANION GAP SERPL CALCULATED.3IONS-SCNC: 10 MMOL/L (ref 5–15)
ANISOCYTOSIS BLD QL: ABNORMAL
BUN SERPL-MCNC: 23 MG/DL (ref 8–23)
BUN/CREAT SERPL: 19.3 (ref 7–25)
CALCIUM SPEC-SCNC: 8.3 MG/DL (ref 8.6–10.5)
CHLORIDE SERPL-SCNC: 104 MMOL/L (ref 98–107)
CO2 SERPL-SCNC: 24 MMOL/L (ref 22–29)
CREAT SERPL-MCNC: 1.19 MG/DL (ref 0.76–1.27)
CREAT UR-MCNC: 48.9 MG/DL
DEPRECATED RDW RBC AUTO: 45.9 FL (ref 37–54)
EOSINOPHIL # BLD MANUAL: 0.35 10*3/MM3 (ref 0–0.4)
EOSINOPHIL NFR BLD MANUAL: 6 % (ref 0.3–6.2)
ERYTHROCYTE [DISTWIDTH] IN BLOOD BY AUTOMATED COUNT: 15.7 % (ref 12.3–15.4)
GFR SERPL CREATININE-BSD FRML MDRD: 60 ML/MIN/1.73
GLUCOSE BLDC GLUCOMTR-MCNC: 199 MG/DL (ref 70–105)
GLUCOSE BLDC GLUCOMTR-MCNC: 73 MG/DL (ref 70–105)
GLUCOSE BLDC GLUCOMTR-MCNC: 80 MG/DL (ref 70–105)
GLUCOSE SERPL-MCNC: 97 MG/DL (ref 65–99)
HCT VFR BLD AUTO: 32.6 % (ref 37.5–51)
HGB BLD-MCNC: 10.4 G/DL (ref 13–17.7)
INR PPP: 0.99 (ref 0.93–1.1)
LYMPHOCYTES # BLD MANUAL: 3.13 10*3/MM3 (ref 0.7–3.1)
LYMPHOCYTES NFR BLD MANUAL: 5 % (ref 5–12)
LYMPHOCYTES NFR BLD MANUAL: 54 % (ref 19.6–45.3)
MAGNESIUM SERPL-MCNC: 1.7 MG/DL (ref 1.6–2.4)
MCH RBC QN AUTO: 26.3 PG (ref 26.6–33)
MCHC RBC AUTO-ENTMCNC: 31.8 G/DL (ref 31.5–35.7)
MCV RBC AUTO: 82.5 FL (ref 79–97)
METAMYELOCYTES NFR BLD MANUAL: 2 % (ref 0–0)
MONOCYTES # BLD AUTO: 0.29 10*3/MM3 (ref 0.1–0.9)
NEUTROPHILS # BLD AUTO: 1.91 10*3/MM3 (ref 1.7–7)
NEUTROPHILS NFR BLD MANUAL: 32 % (ref 42.7–76)
NEUTS BAND NFR BLD MANUAL: 1 % (ref 0–5)
PHOSPHATE SERPL-MCNC: 4 MG/DL (ref 2.5–4.5)
PLAT MORPH BLD: NORMAL
PLATELET # BLD AUTO: 372 10*3/MM3 (ref 140–450)
PMV BLD AUTO: 7.2 FL (ref 6–12)
POTASSIUM SERPL-SCNC: 4.1 MMOL/L (ref 3.5–5.2)
PROT UR-MCNC: 6.6 MG/DL
PROT/CREAT UR: 135 MG/G CREA (ref 0–200)
PROTHROMBIN TIME: 10.9 SECONDS (ref 9.6–11.7)
RBC # BLD AUTO: 3.95 10*6/MM3 (ref 4.14–5.8)
SCAN SLIDE: NORMAL
SODIUM SERPL-SCNC: 138 MMOL/L (ref 136–145)
TROPONIN T SERPL-MCNC: 0.01 NG/ML (ref 0–0.03)
TSH SERPL DL<=0.05 MIU/L-ACNC: 3.28 UIU/ML (ref 0.27–4.2)
WBC # BLD AUTO: 5.8 10*3/MM3 (ref 3.4–10.8)
WBC MORPH BLD: NORMAL

## 2020-11-18 PROCEDURE — 63710000001 PROBIOTIC 250 MG CAPSULE: Performed by: INTERNAL MEDICINE

## 2020-11-18 PROCEDURE — 82784 ASSAY IGA/IGD/IGG/IGM EACH: CPT | Performed by: INTERNAL MEDICINE

## 2020-11-18 PROCEDURE — A9270 NON-COVERED ITEM OR SERVICE: HCPCS | Performed by: INTERNAL MEDICINE

## 2020-11-18 PROCEDURE — 99152 MOD SED SAME PHYS/QHP 5/>YRS: CPT | Performed by: INTERNAL MEDICINE

## 2020-11-18 PROCEDURE — 63710000001 TAMSULOSIN 0.4 MG CAPSULE: Performed by: INTERNAL MEDICINE

## 2020-11-18 PROCEDURE — 63710000001 FOLIC ACID 1 MG TABLET: Performed by: INTERNAL MEDICINE

## 2020-11-18 PROCEDURE — 63710000001 LAMOTRIGINE 25 MG TABLET: Performed by: INTERNAL MEDICINE

## 2020-11-18 PROCEDURE — 25010000002 MIDAZOLAM PER 1 MG: Performed by: INTERNAL MEDICINE

## 2020-11-18 PROCEDURE — 80069 RENAL FUNCTION PANEL: CPT | Performed by: INTERNAL MEDICINE

## 2020-11-18 PROCEDURE — 93458 L HRT ARTERY/VENTRICLE ANGIO: CPT | Performed by: INTERNAL MEDICINE

## 2020-11-18 PROCEDURE — 84443 ASSAY THYROID STIM HORMONE: CPT | Performed by: INTERNAL MEDICINE

## 2020-11-18 PROCEDURE — 63710000001 INSULIN GLARGINE PER 5 UNITS: Performed by: INTERNAL MEDICINE

## 2020-11-18 PROCEDURE — 63710000001 HYDROCODONE-ACETAMINOPHEN 7.5-325 MG TABLET: Performed by: INTERNAL MEDICINE

## 2020-11-18 PROCEDURE — C1769 GUIDE WIRE: HCPCS | Performed by: INTERNAL MEDICINE

## 2020-11-18 PROCEDURE — 25010000002 FENTANYL CITRATE (PF) 100 MCG/2ML SOLUTION: Performed by: INTERNAL MEDICINE

## 2020-11-18 PROCEDURE — 63710000001 MEMANTINE 5 MG TABLET: Performed by: INTERNAL MEDICINE

## 2020-11-18 PROCEDURE — 63710000001 VITAMIN C 500 MG TABLET: Performed by: INTERNAL MEDICINE

## 2020-11-18 PROCEDURE — 0 IOPAMIDOL PER 1 ML: Performed by: INTERNAL MEDICINE

## 2020-11-18 PROCEDURE — 85610 PROTHROMBIN TIME: CPT | Performed by: INTERNAL MEDICINE

## 2020-11-18 PROCEDURE — 63710000001 MULTIVITAMIN TABLET: Performed by: INTERNAL MEDICINE

## 2020-11-18 PROCEDURE — 99217 PR OBSERVATION CARE DISCHARGE MANAGEMENT: CPT | Performed by: INTERNAL MEDICINE

## 2020-11-18 PROCEDURE — C1894 INTRO/SHEATH, NON-LASER: HCPCS | Performed by: INTERNAL MEDICINE

## 2020-11-18 PROCEDURE — 83735 ASSAY OF MAGNESIUM: CPT | Performed by: INTERNAL MEDICINE

## 2020-11-18 PROCEDURE — 85025 COMPLETE CBC W/AUTO DIFF WBC: CPT | Performed by: INTERNAL MEDICINE

## 2020-11-18 PROCEDURE — 86334 IMMUNOFIX E-PHORESIS SERUM: CPT | Performed by: INTERNAL MEDICINE

## 2020-11-18 PROCEDURE — 82962 GLUCOSE BLOOD TEST: CPT

## 2020-11-18 PROCEDURE — 85007 BL SMEAR W/DIFF WBC COUNT: CPT | Performed by: INTERNAL MEDICINE

## 2020-11-18 PROCEDURE — 63710000001 THIAMINE 100 MG TABLET: Performed by: INTERNAL MEDICINE

## 2020-11-18 PROCEDURE — G0378 HOSPITAL OBSERVATION PER HR: HCPCS

## 2020-11-18 PROCEDURE — 63710000001 FAMOTIDINE 20 MG TABLET: Performed by: INTERNAL MEDICINE

## 2020-11-18 PROCEDURE — 25010000002 MAGNESIUM SULFATE 2 GM/50ML SOLUTION: Performed by: INTERNAL MEDICINE

## 2020-11-18 PROCEDURE — 63710000001 METOPROLOL SUCCINATE XL 25 MG TABLET SUSTAINED-RELEASE 24 HOUR: Performed by: INTERNAL MEDICINE

## 2020-11-18 PROCEDURE — 63710000001 ISOSORBIDE MONONITRATE 30 MG TABLET SUSTAINED-RELEASE 24 HOUR: Performed by: INTERNAL MEDICINE

## 2020-11-18 PROCEDURE — 99214 OFFICE O/P EST MOD 30 MIN: CPT | Performed by: INTERNAL MEDICINE

## 2020-11-18 PROCEDURE — 84484 ASSAY OF TROPONIN QUANT: CPT | Performed by: INTERNAL MEDICINE

## 2020-11-18 PROCEDURE — 63710000001 INSULIN LISPRO (HUMAN) PER 5 UNITS: Performed by: INTERNAL MEDICINE

## 2020-11-18 RX ORDER — LIDOCAINE HYDROCHLORIDE 20 MG/ML
INJECTION, SOLUTION INFILTRATION; PERINEURAL AS NEEDED
Status: DISCONTINUED | OUTPATIENT
Start: 2020-11-18 | End: 2020-11-18 | Stop reason: HOSPADM

## 2020-11-18 RX ORDER — MAGNESIUM SULFATE HEPTAHYDRATE 40 MG/ML
2 INJECTION, SOLUTION INTRAVENOUS ONCE
Status: COMPLETED | OUTPATIENT
Start: 2020-11-18 | End: 2020-11-18

## 2020-11-18 RX ORDER — HYDROCODONE BITARTRATE AND ACETAMINOPHEN 7.5; 325 MG/1; MG/1
1 TABLET ORAL EVERY 4 HOURS PRN
Qty: 12 TABLET | Refills: 0 | Status: ON HOLD | OUTPATIENT
Start: 2020-11-18 | End: 2021-06-09 | Stop reason: SDUPTHER

## 2020-11-18 RX ORDER — ONDANSETRON 4 MG/1
4 TABLET, FILM COATED ORAL EVERY 6 HOURS PRN
Status: DISCONTINUED | OUTPATIENT
Start: 2020-11-18 | End: 2020-11-18 | Stop reason: HOSPADM

## 2020-11-18 RX ORDER — FENTANYL CITRATE 50 UG/ML
25 INJECTION, SOLUTION INTRAMUSCULAR; INTRAVENOUS ONCE
Status: COMPLETED | OUTPATIENT
Start: 2020-11-18 | End: 2020-11-18

## 2020-11-18 RX ORDER — DIPHENHYDRAMINE HCL 25 MG
25 CAPSULE ORAL EVERY 6 HOURS PRN
Status: DISCONTINUED | OUTPATIENT
Start: 2020-11-18 | End: 2020-11-18 | Stop reason: HOSPADM

## 2020-11-18 RX ORDER — SODIUM CHLORIDE 9 MG/ML
INJECTION, SOLUTION INTRAVENOUS CONTINUOUS PRN
Status: COMPLETED | OUTPATIENT
Start: 2020-11-18 | End: 2020-11-18

## 2020-11-18 RX ORDER — ACETAMINOPHEN 325 MG/1
650 TABLET ORAL EVERY 4 HOURS PRN
Status: DISCONTINUED | OUTPATIENT
Start: 2020-11-18 | End: 2020-11-18 | Stop reason: SDUPTHER

## 2020-11-18 RX ORDER — SODIUM CHLORIDE 9 MG/ML
250 INJECTION, SOLUTION INTRAVENOUS ONCE AS NEEDED
Status: DISCONTINUED | OUTPATIENT
Start: 2020-11-18 | End: 2020-11-18 | Stop reason: HOSPADM

## 2020-11-18 RX ORDER — MIDAZOLAM HYDROCHLORIDE 1 MG/ML
INJECTION INTRAMUSCULAR; INTRAVENOUS AS NEEDED
Status: DISCONTINUED | OUTPATIENT
Start: 2020-11-18 | End: 2020-11-18 | Stop reason: HOSPADM

## 2020-11-18 RX ORDER — ONDANSETRON 2 MG/ML
4 INJECTION INTRAMUSCULAR; INTRAVENOUS EVERY 6 HOURS PRN
Status: DISCONTINUED | OUTPATIENT
Start: 2020-11-18 | End: 2020-11-18 | Stop reason: HOSPADM

## 2020-11-18 RX ORDER — FENTANYL CITRATE 50 UG/ML
INJECTION, SOLUTION INTRAMUSCULAR; INTRAVENOUS AS NEEDED
Status: DISCONTINUED | OUTPATIENT
Start: 2020-11-18 | End: 2020-11-18 | Stop reason: HOSPADM

## 2020-11-18 RX ADMIN — FOLIC ACID 1 MG: 1 TABLET ORAL at 11:50

## 2020-11-18 RX ADMIN — Medication 100 MG: at 11:55

## 2020-11-18 RX ADMIN — METOPROLOL SUCCINATE 25 MG: 25 TABLET, EXTENDED RELEASE ORAL at 11:54

## 2020-11-18 RX ADMIN — FENTANYL CITRATE 25 MCG: 50 INJECTION, SOLUTION INTRAMUSCULAR; INTRAVENOUS at 09:39

## 2020-11-18 RX ADMIN — ISOSORBIDE MONONITRATE 30 MG: 30 TABLET, EXTENDED RELEASE ORAL at 11:51

## 2020-11-18 RX ADMIN — MEMANTINE 5 MG: 5 TABLET ORAL at 11:53

## 2020-11-18 RX ADMIN — OXYCODONE HYDROCHLORIDE AND ACETAMINOPHEN 500 MG: 500 TABLET ORAL at 11:55

## 2020-11-18 RX ADMIN — MAGNESIUM SULFATE HEPTAHYDRATE 2 G: 40 INJECTION, SOLUTION INTRAVENOUS at 11:56

## 2020-11-18 RX ADMIN — INSULIN LISPRO 3 UNITS: 100 INJECTION, SOLUTION INTRAVENOUS; SUBCUTANEOUS at 17:27

## 2020-11-18 RX ADMIN — TAMSULOSIN HYDROCHLORIDE 0.4 MG: 0.4 CAPSULE ORAL at 11:55

## 2020-11-18 RX ADMIN — INSULIN LISPRO 2 UNITS: 100 INJECTION, SOLUTION INTRAVENOUS; SUBCUTANEOUS at 17:27

## 2020-11-18 RX ADMIN — LEVOTHYROXINE SODIUM 100 MCG: 100 TABLET ORAL at 07:05

## 2020-11-18 RX ADMIN — THERA TABS 1 TABLET: TAB at 11:54

## 2020-11-18 RX ADMIN — Medication 250 MG: at 11:52

## 2020-11-18 RX ADMIN — HYDROCODONE BITARTRATE AND ACETAMINOPHEN 1 TABLET: 7.5; 325 TABLET ORAL at 12:00

## 2020-11-18 RX ADMIN — INSULIN GLARGINE 15 UNITS: 100 INJECTION, SOLUTION SUBCUTANEOUS at 11:51

## 2020-11-18 RX ADMIN — HYDROCODONE BITARTRATE AND ACETAMINOPHEN 1 TABLET: 7.5; 325 TABLET ORAL at 17:26

## 2020-11-18 RX ADMIN — FAMOTIDINE 20 MG: 20 TABLET ORAL at 17:26

## 2020-11-18 RX ADMIN — Medication 10 ML: at 11:56

## 2020-11-18 RX ADMIN — LAMOTRIGINE 12.5 MG: 25 TABLET ORAL at 11:53

## 2020-11-18 NOTE — PLAN OF CARE
Goal Outcome Evaluation:  Plan of Care Reviewed With: patient  Progress: improving  Patient alert and oriented. On room air. Had heart cath today. Dressing clean, dry and intact. Discharging to [place].
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
## 2020-11-18 NOTE — PROGRESS NOTES
Continued Stay Note   [facility]     Patient Name: Chris Ferguson  MRN: [id number]  Today's Date: 2020    Admit Date: 2020    Discharge Plan     Row Name 20 0822       Plan    Plan  Return to [facility]. Pre-cert approved , will  EOD . No PASRR needed for return.    Plan Comments  Per after hours text on  from [facility] liaison, pt's pre-cert is approved . On morning of , KATERINA noted text message and sent secure chat to MD, RN, and CM.        Phone communication only - no physical contact with patient or family.    Priscilla Caro, [facility] [place] [place]    Office: [phone]  Cell: [phone]  Fax: [phone]  E-mail: [email or web address]
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
## 2020-11-18 NOTE — PROGRESS NOTES
Discharge Planning Assessment   [facility]     Patient Name: Chris Ferguson  MRN: [id number]  Today's Date: 11/18/2020    Admit Date: 11/14/2020          Plan    Plan Comments  barrier to discharge was cardiac cath done this am and with probable discharge to [facility] today since precert is obtained       Carol naegele rn  Case management  Office number [phone]  Cell phone [phone]

## 2020-11-19 LAB
IGA SERPL-MCNC: 118 MG/DL (ref 61–437)
IGG SERPL-MCNC: 1015 MG/DL (ref 603–1613)
IGM SERPL-MCNC: 58 MG/DL (ref 15–143)
PROT PATTERN SERPL IFE-IMP: NORMAL

## 2020-11-19 NOTE — PROGRESS NOTES
Discharge Planning Assessment   [facility]     Patient Name: Chris Ferguson  MRN: [id number]  Today's Date: 11/19/2020    Admit Date: 11/14/2020          Plan    Final Discharge Disposition Code  03 - skilled nursing facility (SNF)    Final Note  to [facility] for rehab       Carol naegele rn  Case management  Office number [phone]  Cell phone [phone]

## 2020-11-20 LAB — INTERPRETATION UR IFE-IMP: NORMAL

## 2020-11-29 LAB — QT INTERVAL: 387 MS

## 2021-04-25 ENCOUNTER — APPOINTMENT (OUTPATIENT)
Dept: CARDIOLOGY | Facility: HOSPITAL | Age: 76
End: 2021-04-25

## 2021-04-25 ENCOUNTER — APPOINTMENT (OUTPATIENT)
Dept: GENERAL RADIOLOGY | Facility: HOSPITAL | Age: 76
End: 2021-04-25

## 2021-04-25 ENCOUNTER — APPOINTMENT (OUTPATIENT)
Dept: CT IMAGING | Facility: HOSPITAL | Age: 76
End: 2021-04-25

## 2021-04-25 ENCOUNTER — HOSPITAL ENCOUNTER (INPATIENT)
Facility: HOSPITAL | Age: 76
LOS: 5 days | Discharge: NURSING, REHAB OR LONG-TERM CARE | End: 2021-04-30
Attending: INTERNAL MEDICINE | Admitting: INTERNAL MEDICINE

## 2021-04-25 DIAGNOSIS — R50.9 FEVER IN ADULT: Primary | ICD-10-CM

## 2021-04-25 DIAGNOSIS — L03.116 CELLULITIS OF LEFT LOWER EXTREMITY: ICD-10-CM

## 2021-04-25 DIAGNOSIS — T14.8XXA FOREIGN BODY IN SKIN OR SUBCUTANEOUS TISSUE: ICD-10-CM

## 2021-04-25 LAB
ALBUMIN SERPL-MCNC: 3.9 G/DL (ref 3.5–5.2)
ALBUMIN/GLOB SERPL: 1.1 G/DL
ALP SERPL-CCNC: 116 U/L (ref 39–117)
ALT SERPL W P-5'-P-CCNC: 43 U/L (ref 1–41)
ANION GAP SERPL CALCULATED.3IONS-SCNC: 11 MMOL/L (ref 5–15)
ANION GAP SERPL CALCULATED.3IONS-SCNC: 13 MMOL/L (ref 5–15)
ANISOCYTOSIS BLD QL: ABNORMAL
ANISOCYTOSIS BLD QL: ABNORMAL
AST SERPL-CCNC: 24 U/L (ref 1–40)
BACTERIA UR QL AUTO: ABNORMAL /HPF
BH CV LOWER VASCULAR LEFT COMMON FEMORAL AUGMENT: NORMAL
BH CV LOWER VASCULAR LEFT COMMON FEMORAL COMPETENT: NORMAL
BH CV LOWER VASCULAR LEFT COMMON FEMORAL COMPRESS: NORMAL
BH CV LOWER VASCULAR LEFT COMMON FEMORAL PHASIC: NORMAL
BH CV LOWER VASCULAR LEFT COMMON FEMORAL SPONT: NORMAL
BH CV LOWER VASCULAR LEFT DISTAL FEMORAL COMPRESS: NORMAL
BH CV LOWER VASCULAR LEFT GASTRONEMIUS COMPRESS: NORMAL
BH CV LOWER VASCULAR LEFT GREATER SAPH AK COMPRESS: NORMAL
BH CV LOWER VASCULAR LEFT GREATER SAPH BK COMPRESS: NORMAL
BH CV LOWER VASCULAR LEFT LESSER SAPH COMPRESS: NORMAL
BH CV LOWER VASCULAR LEFT MID FEMORAL AUGMENT: NORMAL
BH CV LOWER VASCULAR LEFT MID FEMORAL COMPETENT: NORMAL
BH CV LOWER VASCULAR LEFT MID FEMORAL COMPRESS: NORMAL
BH CV LOWER VASCULAR LEFT MID FEMORAL PHASIC: NORMAL
BH CV LOWER VASCULAR LEFT MID FEMORAL SPONT: NORMAL
BH CV LOWER VASCULAR LEFT PERONEAL COMPRESS: NORMAL
BH CV LOWER VASCULAR LEFT POPLITEAL AUGMENT: NORMAL
BH CV LOWER VASCULAR LEFT POPLITEAL COMPETENT: NORMAL
BH CV LOWER VASCULAR LEFT POPLITEAL COMPRESS: NORMAL
BH CV LOWER VASCULAR LEFT POPLITEAL PHASIC: NORMAL
BH CV LOWER VASCULAR LEFT POPLITEAL SPONT: NORMAL
BH CV LOWER VASCULAR LEFT POSTERIOR TIBIAL COMPRESS: NORMAL
BH CV LOWER VASCULAR LEFT PROXIMAL FEMORAL COMPRESS: NORMAL
BH CV LOWER VASCULAR LEFT SAPHENOFEMORAL JUNCTION COMPRESS: NORMAL
BH CV LOWER VASCULAR RIGHT COMMON FEMORAL AUGMENT: NORMAL
BH CV LOWER VASCULAR RIGHT COMMON FEMORAL COMPETENT: NORMAL
BH CV LOWER VASCULAR RIGHT COMMON FEMORAL COMPRESS: NORMAL
BH CV LOWER VASCULAR RIGHT COMMON FEMORAL PHASIC: NORMAL
BH CV LOWER VASCULAR RIGHT COMMON FEMORAL SPONT: NORMAL
BILIRUB SERPL-MCNC: 0.6 MG/DL (ref 0–1.2)
BILIRUB UR QL STRIP: NEGATIVE
BUN SERPL-MCNC: 25 MG/DL (ref 8–23)
BUN SERPL-MCNC: 33 MG/DL (ref 8–23)
BUN/CREAT SERPL: 15.1 (ref 7–25)
BUN/CREAT SERPL: 16.3 (ref 7–25)
CALCIUM SPEC-SCNC: 8.5 MG/DL (ref 8.6–10.5)
CALCIUM SPEC-SCNC: 9.5 MG/DL (ref 8.6–10.5)
CHLORIDE SERPL-SCNC: 101 MMOL/L (ref 98–107)
CHLORIDE SERPL-SCNC: 104 MMOL/L (ref 98–107)
CLARITY UR: ABNORMAL
CO2 SERPL-SCNC: 22 MMOL/L (ref 22–29)
CO2 SERPL-SCNC: 24 MMOL/L (ref 22–29)
COLOR UR: ABNORMAL
CREAT SERPL-MCNC: 1.66 MG/DL (ref 0.76–1.27)
CREAT SERPL-MCNC: 2.03 MG/DL (ref 0.76–1.27)
D-LACTATE SERPL-SCNC: 1.6 MMOL/L (ref 0.5–2)
DEPRECATED RDW RBC AUTO: 54.7 FL (ref 37–54)
DEPRECATED RDW RBC AUTO: 55.6 FL (ref 37–54)
ERYTHROCYTE [DISTWIDTH] IN BLOOD BY AUTOMATED COUNT: 21.5 % (ref 12.3–15.4)
ERYTHROCYTE [DISTWIDTH] IN BLOOD BY AUTOMATED COUNT: 21.6 % (ref 12.3–15.4)
GFR SERPL CREATININE-BSD FRML MDRD: 32 ML/MIN/1.73
GFR SERPL CREATININE-BSD FRML MDRD: 41 ML/MIN/1.73
GIANT PLATELETS: ABNORMAL
GLOBULIN UR ELPH-MCNC: 3.5 GM/DL
GLUCOSE BLDC GLUCOMTR-MCNC: 81 MG/DL (ref 70–105)
GLUCOSE SERPL-MCNC: 67 MG/DL (ref 65–99)
GLUCOSE SERPL-MCNC: 76 MG/DL (ref 65–99)
GLUCOSE UR STRIP-MCNC: NEGATIVE MG/DL
GRAN CASTS URNS QL MICRO: ABNORMAL /LPF
HCT VFR BLD AUTO: 31 % (ref 37.5–51)
HCT VFR BLD AUTO: 35.3 % (ref 37.5–51)
HGB BLD-MCNC: 10 G/DL (ref 13–17.7)
HGB BLD-MCNC: 11.7 G/DL (ref 13–17.7)
HGB UR QL STRIP.AUTO: ABNORMAL
HOLD SPECIMEN: NORMAL
HOLD SPECIMEN: NORMAL
HYALINE CASTS UR QL AUTO: ABNORMAL /LPF
KETONES UR QL STRIP: ABNORMAL
LEUKOCYTE ESTERASE UR QL STRIP.AUTO: ABNORMAL
LYMPHOCYTES # BLD MANUAL: 1.01 10*3/MM3 (ref 0.7–3.1)
LYMPHOCYTES # BLD MANUAL: 2.46 10*3/MM3 (ref 0.7–3.1)
LYMPHOCYTES NFR BLD MANUAL: 12 % (ref 19.6–45.3)
LYMPHOCYTES NFR BLD MANUAL: 2 % (ref 5–12)
LYMPHOCYTES NFR BLD MANUAL: 5 % (ref 5–12)
LYMPHOCYTES NFR BLD MANUAL: 6 % (ref 19.6–45.3)
MCH RBC QN AUTO: 23.7 PG (ref 26.6–33)
MCH RBC QN AUTO: 23.9 PG (ref 26.6–33)
MCHC RBC AUTO-ENTMCNC: 32.2 G/DL (ref 31.5–35.7)
MCHC RBC AUTO-ENTMCNC: 33.1 G/DL (ref 31.5–35.7)
MCV RBC AUTO: 72.1 FL (ref 79–97)
MCV RBC AUTO: 73.7 FL (ref 79–97)
MONOCYTES # BLD AUTO: 0.41 10*3/MM3 (ref 0.1–0.9)
MONOCYTES # BLD AUTO: 0.84 10*3/MM3 (ref 0.1–0.9)
MUCOUS THREADS URNS QL MICRO: ABNORMAL /HPF
NEUTROPHILS # BLD AUTO: 14.95 10*3/MM3 (ref 1.7–7)
NEUTROPHILS # BLD AUTO: 17.63 10*3/MM3 (ref 1.7–7)
NEUTROPHILS NFR BLD MANUAL: 73 % (ref 42.7–76)
NEUTROPHILS NFR BLD MANUAL: 74 % (ref 42.7–76)
NEUTS BAND NFR BLD MANUAL: 12 % (ref 0–5)
NEUTS BAND NFR BLD MANUAL: 16 % (ref 0–5)
NEUTS VAC BLD QL SMEAR: ABNORMAL
NEUTS VAC BLD QL SMEAR: ABNORMAL
NITRITE UR QL STRIP: NEGATIVE
NT-PROBNP SERPL-MCNC: 1198 PG/ML (ref 0–900)
PH UR STRIP.AUTO: 5.5 [PH] (ref 5–8)
PLAT MORPH BLD: NORMAL
PLATELET # BLD AUTO: 207 10*3/MM3 (ref 140–450)
PLATELET # BLD AUTO: 251 10*3/MM3 (ref 140–450)
PMV BLD AUTO: 7.6 FL (ref 6–12)
PMV BLD AUTO: 8.1 FL (ref 6–12)
POTASSIUM SERPL-SCNC: 3.8 MMOL/L (ref 3.5–5.2)
POTASSIUM SERPL-SCNC: 4.1 MMOL/L (ref 3.5–5.2)
PROCALCITONIN SERPL-MCNC: 7.05 NG/ML (ref 0–0.25)
PROT SERPL-MCNC: 7.4 G/DL (ref 6–8.5)
PROT UR QL STRIP: ABNORMAL
RBC # BLD AUTO: 4.21 10*6/MM3 (ref 4.14–5.8)
RBC # BLD AUTO: 4.89 10*6/MM3 (ref 4.14–5.8)
RBC # UR: ABNORMAL /HPF
REF LAB TEST METHOD: ABNORMAL
SCAN SLIDE: NORMAL
SCAN SLIDE: NORMAL
SODIUM SERPL-SCNC: 137 MMOL/L (ref 136–145)
SODIUM SERPL-SCNC: 138 MMOL/L (ref 136–145)
SP GR UR STRIP: 1.02 (ref 1–1.03)
SQUAMOUS #/AREA URNS HPF: ABNORMAL /HPF
TOXIC GRANULATION: ABNORMAL
TRANS CELLS #/AREA URNS HPF: ABNORMAL /HPF
TROPONIN T SERPL-MCNC: 0.03 NG/ML (ref 0–0.03)
UROBILINOGEN UR QL STRIP: ABNORMAL
WBC # BLD AUTO: 16.8 10*3/MM3 (ref 3.4–10.8)
WBC # BLD AUTO: 20.5 10*3/MM3 (ref 3.4–10.8)
WBC UR QL AUTO: ABNORMAL /HPF
WHOLE BLOOD HOLD SPECIMEN: NORMAL
WHOLE BLOOD HOLD SPECIMEN: NORMAL

## 2021-04-25 PROCEDURE — 87147 CULTURE TYPE IMMUNOLOGIC: CPT | Performed by: INTERNAL MEDICINE

## 2021-04-25 PROCEDURE — 85025 COMPLETE CBC W/AUTO DIFF WBC: CPT

## 2021-04-25 PROCEDURE — 36415 COLL VENOUS BLD VENIPUNCTURE: CPT

## 2021-04-25 PROCEDURE — 71045 X-RAY EXAM CHEST 1 VIEW: CPT

## 2021-04-25 PROCEDURE — 83605 ASSAY OF LACTIC ACID: CPT

## 2021-04-25 PROCEDURE — P9612 CATHETERIZE FOR URINE SPEC: HCPCS

## 2021-04-25 PROCEDURE — 73562 X-RAY EXAM OF KNEE 3: CPT

## 2021-04-25 PROCEDURE — 84145 PROCALCITONIN (PCT): CPT | Performed by: INTERNAL MEDICINE

## 2021-04-25 PROCEDURE — 85007 BL SMEAR W/DIFF WBC COUNT: CPT | Performed by: INTERNAL MEDICINE

## 2021-04-25 PROCEDURE — 85025 COMPLETE CBC W/AUTO DIFF WBC: CPT | Performed by: INTERNAL MEDICINE

## 2021-04-25 PROCEDURE — G0378 HOSPITAL OBSERVATION PER HR: HCPCS

## 2021-04-25 PROCEDURE — 25010000002 VANCOMYCIN 10 G RECONSTITUTED SOLUTION: Performed by: NURSE PRACTITIONER

## 2021-04-25 PROCEDURE — 87086 URINE CULTURE/COLONY COUNT: CPT | Performed by: NURSE PRACTITIONER

## 2021-04-25 PROCEDURE — 87150 DNA/RNA AMPLIFIED PROBE: CPT | Performed by: INTERNAL MEDICINE

## 2021-04-25 PROCEDURE — 87040 BLOOD CULTURE FOR BACTERIA: CPT

## 2021-04-25 PROCEDURE — 85007 BL SMEAR W/DIFF WBC COUNT: CPT

## 2021-04-25 PROCEDURE — 73590 X-RAY EXAM OF LOWER LEG: CPT

## 2021-04-25 PROCEDURE — 99285 EMERGENCY DEPT VISIT HI MDM: CPT

## 2021-04-25 PROCEDURE — 93005 ELECTROCARDIOGRAM TRACING: CPT | Performed by: NURSE PRACTITIONER

## 2021-04-25 PROCEDURE — 99223 1ST HOSP IP/OBS HIGH 75: CPT | Performed by: INTERNAL MEDICINE

## 2021-04-25 PROCEDURE — 25010000002 CEFTRIAXONE PER 250 MG: Performed by: INTERNAL MEDICINE

## 2021-04-25 PROCEDURE — 80053 COMPREHEN METABOLIC PANEL: CPT

## 2021-04-25 PROCEDURE — 81001 URINALYSIS AUTO W/SCOPE: CPT | Performed by: NURSE PRACTITIONER

## 2021-04-25 PROCEDURE — 73630 X-RAY EXAM OF FOOT: CPT

## 2021-04-25 PROCEDURE — 70450 CT HEAD/BRAIN W/O DYE: CPT

## 2021-04-25 PROCEDURE — 25010000002 HEPARIN (PORCINE) PER 1000 UNITS: Performed by: INTERNAL MEDICINE

## 2021-04-25 PROCEDURE — 82962 GLUCOSE BLOOD TEST: CPT

## 2021-04-25 PROCEDURE — 83880 ASSAY OF NATRIURETIC PEPTIDE: CPT | Performed by: NURSE PRACTITIONER

## 2021-04-25 PROCEDURE — 84484 ASSAY OF TROPONIN QUANT: CPT | Performed by: NURSE PRACTITIONER

## 2021-04-25 PROCEDURE — 93971 EXTREMITY STUDY: CPT

## 2021-04-25 RX ORDER — DEXTROSE MONOHYDRATE 25 G/50ML
25 INJECTION, SOLUTION INTRAVENOUS
Status: DISCONTINUED | OUTPATIENT
Start: 2021-04-25 | End: 2021-05-01 | Stop reason: HOSPADM

## 2021-04-25 RX ORDER — INSULIN LISPRO 100 [IU]/ML
0-7 INJECTION, SOLUTION INTRAVENOUS; SUBCUTANEOUS AS NEEDED
Status: DISCONTINUED | OUTPATIENT
Start: 2021-04-25 | End: 2021-05-01 | Stop reason: HOSPADM

## 2021-04-25 RX ORDER — TAMSULOSIN HYDROCHLORIDE 0.4 MG/1
0.4 CAPSULE ORAL DAILY
Status: DISCONTINUED | OUTPATIENT
Start: 2021-04-26 | End: 2021-05-01 | Stop reason: HOSPADM

## 2021-04-25 RX ORDER — SODIUM CHLORIDE 9 MG/ML
75 INJECTION, SOLUTION INTRAVENOUS CONTINUOUS
Status: DISCONTINUED | OUTPATIENT
Start: 2021-04-25 | End: 2021-04-30

## 2021-04-25 RX ORDER — ACETAMINOPHEN 325 MG/1
650 TABLET ORAL EVERY 4 HOURS PRN
Status: DISCONTINUED | OUTPATIENT
Start: 2021-04-25 | End: 2021-05-01 | Stop reason: HOSPADM

## 2021-04-25 RX ORDER — SACCHAROMYCES BOULARDII 250 MG
250 CAPSULE ORAL DAILY
Status: DISCONTINUED | OUTPATIENT
Start: 2021-04-26 | End: 2021-05-01 | Stop reason: HOSPADM

## 2021-04-25 RX ORDER — ATORVASTATIN CALCIUM 20 MG/1
20 TABLET, FILM COATED ORAL NIGHTLY
Status: DISCONTINUED | OUTPATIENT
Start: 2021-04-25 | End: 2021-05-01 | Stop reason: HOSPADM

## 2021-04-25 RX ORDER — METOPROLOL SUCCINATE 25 MG/1
25 TABLET, EXTENDED RELEASE ORAL DAILY
Status: DISCONTINUED | OUTPATIENT
Start: 2021-04-26 | End: 2021-05-01 | Stop reason: HOSPADM

## 2021-04-25 RX ORDER — OLANZAPINE 2.5 MG/1
2.5 TABLET ORAL NIGHTLY
Status: DISCONTINUED | OUTPATIENT
Start: 2021-04-25 | End: 2021-05-01 | Stop reason: HOSPADM

## 2021-04-25 RX ORDER — LEVOTHYROXINE SODIUM 0.1 MG/1
100 TABLET ORAL
Status: DISCONTINUED | OUTPATIENT
Start: 2021-04-26 | End: 2021-05-01 | Stop reason: HOSPADM

## 2021-04-25 RX ORDER — FAMOTIDINE 20 MG/1
20 TABLET, FILM COATED ORAL
Status: DISCONTINUED | OUTPATIENT
Start: 2021-04-26 | End: 2021-05-01 | Stop reason: HOSPADM

## 2021-04-25 RX ORDER — FOLIC ACID 1 MG/1
1 TABLET ORAL DAILY
Status: DISCONTINUED | OUTPATIENT
Start: 2021-04-26 | End: 2021-05-01 | Stop reason: HOSPADM

## 2021-04-25 RX ORDER — DIPHENOXYLATE HYDROCHLORIDE AND ATROPINE SULFATE 2.5; .025 MG/1; MG/1
1 TABLET ORAL DAILY
Status: DISCONTINUED | OUTPATIENT
Start: 2021-04-26 | End: 2021-05-01 | Stop reason: HOSPADM

## 2021-04-25 RX ORDER — SODIUM CHLORIDE 0.9 % (FLUSH) 0.9 %
10 SYRINGE (ML) INJECTION EVERY 12 HOURS SCHEDULED
Status: DISCONTINUED | OUTPATIENT
Start: 2021-04-25 | End: 2021-05-01 | Stop reason: HOSPADM

## 2021-04-25 RX ORDER — INSULIN LISPRO 100 [IU]/ML
5 INJECTION, SOLUTION INTRAVENOUS; SUBCUTANEOUS
Status: DISCONTINUED | OUTPATIENT
Start: 2021-04-26 | End: 2021-05-01 | Stop reason: HOSPADM

## 2021-04-25 RX ORDER — TRAZODONE HYDROCHLORIDE 50 MG/1
50 TABLET ORAL EVERY 12 HOURS SCHEDULED
Status: DISCONTINUED | OUTPATIENT
Start: 2021-04-25 | End: 2021-05-01 | Stop reason: HOSPADM

## 2021-04-25 RX ORDER — SODIUM CHLORIDE 0.9 % (FLUSH) 0.9 %
10 SYRINGE (ML) INJECTION AS NEEDED
Status: DISCONTINUED | OUTPATIENT
Start: 2021-04-25 | End: 2021-05-01 | Stop reason: HOSPADM

## 2021-04-25 RX ORDER — NICOTINE POLACRILEX 4 MG
15 LOZENGE BUCCAL
Status: DISCONTINUED | OUTPATIENT
Start: 2021-04-25 | End: 2021-05-01 | Stop reason: HOSPADM

## 2021-04-25 RX ORDER — LAMOTRIGINE 25 MG/1
12.5 TABLET ORAL 2 TIMES DAILY
Status: DISCONTINUED | OUTPATIENT
Start: 2021-04-25 | End: 2021-05-01 | Stop reason: HOSPADM

## 2021-04-25 RX ORDER — ASCORBIC ACID 500 MG
500 TABLET ORAL 2 TIMES DAILY
Status: DISCONTINUED | OUTPATIENT
Start: 2021-04-25 | End: 2021-05-01 | Stop reason: HOSPADM

## 2021-04-25 RX ORDER — INSULIN GLARGINE 100 [IU]/ML
20 INJECTION, SOLUTION SUBCUTANEOUS 2 TIMES DAILY
Status: ON HOLD | COMMUNITY
End: 2021-05-21

## 2021-04-25 RX ORDER — HEPARIN SODIUM 5000 [USP'U]/ML
5000 INJECTION, SOLUTION INTRAVENOUS; SUBCUTANEOUS EVERY 8 HOURS SCHEDULED
Status: DISCONTINUED | OUTPATIENT
Start: 2021-04-25 | End: 2021-05-01 | Stop reason: HOSPADM

## 2021-04-25 RX ORDER — IBUPROFEN 400 MG/1
400 TABLET ORAL EVERY 6 HOURS PRN
Status: DISCONTINUED | OUTPATIENT
Start: 2021-04-25 | End: 2021-04-25

## 2021-04-25 RX ORDER — VANCOMYCIN 1.75 GRAM/500 ML IN 0.9 % SODIUM CHLORIDE INTRAVENOUS
20 ONCE
Status: COMPLETED | OUTPATIENT
Start: 2021-04-25 | End: 2021-04-25

## 2021-04-25 RX ORDER — MEMANTINE HYDROCHLORIDE 5 MG/1
5 TABLET ORAL DAILY
Status: DISCONTINUED | OUTPATIENT
Start: 2021-04-26 | End: 2021-05-01 | Stop reason: HOSPADM

## 2021-04-25 RX ORDER — IBUPROFEN 600 MG/1
600 TABLET ORAL EVERY 6 HOURS PRN
Status: DISCONTINUED | OUTPATIENT
Start: 2021-04-25 | End: 2021-05-01 | Stop reason: HOSPADM

## 2021-04-25 RX ORDER — OLANZAPINE 5 MG/1
5 TABLET ORAL NIGHTLY
COMMUNITY

## 2021-04-25 RX ORDER — INSULIN LISPRO 100 [IU]/ML
0-7 INJECTION, SOLUTION INTRAVENOUS; SUBCUTANEOUS
Status: DISCONTINUED | OUTPATIENT
Start: 2021-04-26 | End: 2021-05-01 | Stop reason: HOSPADM

## 2021-04-25 RX ORDER — INSULIN GLARGINE 100 [IU]/ML
20 INJECTION, SOLUTION SUBCUTANEOUS EVERY 12 HOURS SCHEDULED
Status: DISCONTINUED | OUTPATIENT
Start: 2021-04-25 | End: 2021-05-01 | Stop reason: HOSPADM

## 2021-04-25 RX ORDER — ACETAMINOPHEN 500 MG
1000 TABLET ORAL ONCE
Status: COMPLETED | OUTPATIENT
Start: 2021-04-25 | End: 2021-04-25

## 2021-04-25 RX ORDER — ISOSORBIDE MONONITRATE 30 MG/1
30 TABLET, EXTENDED RELEASE ORAL DAILY
Status: DISCONTINUED | OUTPATIENT
Start: 2021-04-26 | End: 2021-04-26

## 2021-04-25 RX ADMIN — ATORVASTATIN CALCIUM 20 MG: 20 TABLET, FILM COATED ORAL at 23:58

## 2021-04-25 RX ADMIN — TRAZODONE HYDROCHLORIDE 50 MG: 50 TABLET ORAL at 23:59

## 2021-04-25 RX ADMIN — ACETAMINOPHEN 1000 MG: 500 TABLET, FILM COATED ORAL at 15:51

## 2021-04-25 RX ADMIN — CEFTRIAXONE SODIUM 1 G: 1 INJECTION, POWDER, FOR SOLUTION INTRAMUSCULAR; INTRAVENOUS at 23:58

## 2021-04-25 RX ADMIN — OLANZAPINE 2.5 MG: 2.5 TABLET ORAL at 23:59

## 2021-04-25 RX ADMIN — LAMOTRIGINE 12.5 MG: 25 TABLET ORAL at 23:58

## 2021-04-25 RX ADMIN — OXYCODONE HYDROCHLORIDE AND ACETAMINOPHEN 500 MG: 500 TABLET ORAL at 23:57

## 2021-04-25 RX ADMIN — VANCOMYCIN HYDROCHLORIDE 1750 MG: 10 INJECTION, POWDER, LYOPHILIZED, FOR SOLUTION INTRAVENOUS at 15:50

## 2021-04-25 RX ADMIN — IBUPROFEN 600 MG: 600 TABLET ORAL at 18:11

## 2021-04-25 RX ADMIN — HEPARIN SODIUM 5000 UNITS: 5000 INJECTION INTRAVENOUS; SUBCUTANEOUS at 23:58

## 2021-04-26 ENCOUNTER — APPOINTMENT (OUTPATIENT)
Dept: CARDIOLOGY | Facility: HOSPITAL | Age: 76
End: 2021-04-26

## 2021-04-26 LAB
ANION GAP SERPL CALCULATED.3IONS-SCNC: 12 MMOL/L (ref 5–15)
ANISOCYTOSIS BLD QL: NORMAL
BACTERIA BLD CULT: ABNORMAL
BACTERIA SPEC AEROBE CULT: NO GROWTH
BASOPHILS # BLD AUTO: 0 10*3/MM3 (ref 0–0.2)
BASOPHILS NFR BLD AUTO: 0.3 % (ref 0–1.5)
BH CV ECHO MEAS - BSA(HAYCOCK): 2.1 M^2
BH CV ECHO MEAS - BSA: 2.1 M^2
BH CV ECHO MEAS - BZI_BMI: 27.2 KILOGRAMS/M^2
BH CV ECHO MEAS - BZI_METRIC_HEIGHT: 180.3 CM
BH CV ECHO MEAS - BZI_METRIC_WEIGHT: 88.5 KG
BOTTLE TYPE: ABNORMAL
BUN SERPL-MCNC: 39 MG/DL (ref 8–23)
BUN/CREAT SERPL: 18.1 (ref 7–25)
CALCIUM SPEC-SCNC: 8.7 MG/DL (ref 8.6–10.5)
CHLORIDE SERPL-SCNC: 104 MMOL/L (ref 98–107)
CO2 SERPL-SCNC: 23 MMOL/L (ref 22–29)
CREAT SERPL-MCNC: 2.15 MG/DL (ref 0.76–1.27)
DEPRECATED RDW RBC AUTO: 59.5 FL (ref 37–54)
EOSINOPHIL # BLD AUTO: 0 10*3/MM3 (ref 0–0.4)
EOSINOPHIL NFR BLD AUTO: 0.1 % (ref 0.3–6.2)
ERYTHROCYTE [DISTWIDTH] IN BLOOD BY AUTOMATED COUNT: 21.8 % (ref 12.3–15.4)
GFR SERPL CREATININE-BSD FRML MDRD: 30 ML/MIN/1.73
GLUCOSE BLDC GLUCOMTR-MCNC: 149 MG/DL (ref 70–105)
GLUCOSE BLDC GLUCOMTR-MCNC: 46 MG/DL (ref 70–105)
GLUCOSE BLDC GLUCOMTR-MCNC: 70 MG/DL (ref 70–105)
GLUCOSE BLDC GLUCOMTR-MCNC: 74 MG/DL (ref 70–105)
GLUCOSE BLDC GLUCOMTR-MCNC: 84 MG/DL (ref 70–105)
GLUCOSE SERPL-MCNC: 72 MG/DL (ref 65–99)
HBA1C MFR BLD: 7.7 % (ref 3.5–5.6)
HCT VFR BLD AUTO: 36 % (ref 37.5–51)
HGB BLD-MCNC: 11.4 G/DL (ref 13–17.7)
LARGE PLATELETS: NORMAL
LYMPHOCYTES # BLD AUTO: 1.9 10*3/MM3 (ref 0.7–3.1)
LYMPHOCYTES NFR BLD AUTO: 9.9 % (ref 19.6–45.3)
MCH RBC QN AUTO: 24.7 PG (ref 26.6–33)
MCHC RBC AUTO-ENTMCNC: 31.6 G/DL (ref 31.5–35.7)
MCV RBC AUTO: 78 FL (ref 79–97)
MICROCYTES BLD QL: NORMAL
MONOCYTES # BLD AUTO: 0.7 10*3/MM3 (ref 0.1–0.9)
MONOCYTES NFR BLD AUTO: 3.7 % (ref 5–12)
NEUTROPHILS NFR BLD AUTO: 16.3 10*3/MM3 (ref 1.7–7)
NEUTROPHILS NFR BLD AUTO: 86 % (ref 42.7–76)
NEUTS VAC BLD QL SMEAR: NORMAL
NRBC BLD AUTO-RTO: 0.1 /100 WBC (ref 0–0.2)
PLATELET # BLD AUTO: 171 10*3/MM3 (ref 140–450)
PMV BLD AUTO: 8.1 FL (ref 6–12)
POTASSIUM SERPL-SCNC: 3.9 MMOL/L (ref 3.5–5.2)
RBC # BLD AUTO: 4.62 10*6/MM3 (ref 4.14–5.8)
SODIUM SERPL-SCNC: 139 MMOL/L (ref 136–145)
TOXIC GRANULATION: NORMAL
WBC # BLD AUTO: 19 10*3/MM3 (ref 3.4–10.8)

## 2021-04-26 PROCEDURE — 93308 TTE F-UP OR LMTD: CPT

## 2021-04-26 PROCEDURE — 97161 PT EVAL LOW COMPLEX 20 MIN: CPT

## 2021-04-26 PROCEDURE — G0378 HOSPITAL OBSERVATION PER HR: HCPCS

## 2021-04-26 PROCEDURE — 25010000002 VANCOMYCIN 10 G RECONSTITUTED SOLUTION: Performed by: INTERNAL MEDICINE

## 2021-04-26 PROCEDURE — 25010000002 HEPARIN (PORCINE) PER 1000 UNITS: Performed by: INTERNAL MEDICINE

## 2021-04-26 PROCEDURE — 25010000002 CEFTRIAXONE PER 250 MG: Performed by: INTERNAL MEDICINE

## 2021-04-26 PROCEDURE — 63710000001 INSULIN LISPRO (HUMAN) PER 5 UNITS: Performed by: INTERNAL MEDICINE

## 2021-04-26 PROCEDURE — 85025 COMPLETE CBC W/AUTO DIFF WBC: CPT | Performed by: INTERNAL MEDICINE

## 2021-04-26 PROCEDURE — 93325 DOPPLER ECHO COLOR FLOW MAPG: CPT | Performed by: INTERNAL MEDICINE

## 2021-04-26 PROCEDURE — 93321 DOPPLER ECHO F-UP/LMTD STD: CPT

## 2021-04-26 PROCEDURE — 63710000001 INSULIN GLARGINE PER 5 UNITS: Performed by: INTERNAL MEDICINE

## 2021-04-26 PROCEDURE — 93321 DOPPLER ECHO F-UP/LMTD STD: CPT | Performed by: INTERNAL MEDICINE

## 2021-04-26 PROCEDURE — 85007 BL SMEAR W/DIFF WBC COUNT: CPT | Performed by: INTERNAL MEDICINE

## 2021-04-26 PROCEDURE — 93308 TTE F-UP OR LMTD: CPT | Performed by: INTERNAL MEDICINE

## 2021-04-26 PROCEDURE — 82962 GLUCOSE BLOOD TEST: CPT

## 2021-04-26 PROCEDURE — 83036 HEMOGLOBIN GLYCOSYLATED A1C: CPT | Performed by: INTERNAL MEDICINE

## 2021-04-26 PROCEDURE — 99233 SBSQ HOSP IP/OBS HIGH 50: CPT | Performed by: INTERNAL MEDICINE

## 2021-04-26 PROCEDURE — 93325 DOPPLER ECHO COLOR FLOW MAPG: CPT

## 2021-04-26 PROCEDURE — 99231 SBSQ HOSP IP/OBS SF/LOW 25: CPT | Performed by: NURSE PRACTITIONER

## 2021-04-26 PROCEDURE — 80048 BASIC METABOLIC PNL TOTAL CA: CPT | Performed by: INTERNAL MEDICINE

## 2021-04-26 RX ORDER — ISOSORBIDE MONONITRATE 30 MG/1
30 TABLET, EXTENDED RELEASE ORAL DAILY
Status: DISCONTINUED | OUTPATIENT
Start: 2021-04-26 | End: 2021-05-01 | Stop reason: HOSPADM

## 2021-04-26 RX ADMIN — INSULIN LISPRO 5 UNITS: 100 INJECTION, SOLUTION INTRAVENOUS; SUBCUTANEOUS at 09:09

## 2021-04-26 RX ADMIN — TRAZODONE HYDROCHLORIDE 50 MG: 50 TABLET ORAL at 20:37

## 2021-04-26 RX ADMIN — INSULIN GLARGINE 20 UNITS: 100 INJECTION, SOLUTION SUBCUTANEOUS at 00:03

## 2021-04-26 RX ADMIN — ISOSORBIDE MONONITRATE 30 MG: 30 TABLET, EXTENDED RELEASE ORAL at 09:08

## 2021-04-26 RX ADMIN — FAMOTIDINE 20 MG: 20 TABLET, FILM COATED ORAL at 09:07

## 2021-04-26 RX ADMIN — OXYCODONE HYDROCHLORIDE AND ACETAMINOPHEN 500 MG: 500 TABLET ORAL at 09:08

## 2021-04-26 RX ADMIN — TAMSULOSIN HYDROCHLORIDE 0.4 MG: 0.4 CAPSULE ORAL at 09:09

## 2021-04-26 RX ADMIN — THERA TABS 1 TABLET: TAB at 09:07

## 2021-04-26 RX ADMIN — ATORVASTATIN CALCIUM 20 MG: 20 TABLET, FILM COATED ORAL at 20:37

## 2021-04-26 RX ADMIN — FAMOTIDINE 20 MG: 20 TABLET, FILM COATED ORAL at 17:24

## 2021-04-26 RX ADMIN — ACETAMINOPHEN 650 MG: 325 TABLET, FILM COATED ORAL at 20:37

## 2021-04-26 RX ADMIN — HEPARIN SODIUM 5000 UNITS: 5000 INJECTION INTRAVENOUS; SUBCUTANEOUS at 15:19

## 2021-04-26 RX ADMIN — Medication 10 ML: at 00:05

## 2021-04-26 RX ADMIN — Medication 100 MG: at 09:08

## 2021-04-26 RX ADMIN — LAMOTRIGINE 12.5 MG: 25 TABLET ORAL at 09:08

## 2021-04-26 RX ADMIN — ACETAMINOPHEN 650 MG: 325 TABLET, FILM COATED ORAL at 09:07

## 2021-04-26 RX ADMIN — MEMANTINE 5 MG: 5 TABLET ORAL at 09:09

## 2021-04-26 RX ADMIN — OLANZAPINE 2.5 MG: 2.5 TABLET ORAL at 20:42

## 2021-04-26 RX ADMIN — Medication 10 ML: at 20:38

## 2021-04-26 RX ADMIN — METOPROLOL SUCCINATE 25 MG: 25 TABLET, EXTENDED RELEASE ORAL at 09:08

## 2021-04-26 RX ADMIN — Medication 10 ML: at 09:09

## 2021-04-26 RX ADMIN — IBUPROFEN 600 MG: 600 TABLET ORAL at 23:59

## 2021-04-26 RX ADMIN — SODIUM CHLORIDE 75 ML/HR: 9 INJECTION, SOLUTION INTRAVENOUS at 00:09

## 2021-04-26 RX ADMIN — OXYCODONE HYDROCHLORIDE AND ACETAMINOPHEN 500 MG: 500 TABLET ORAL at 20:37

## 2021-04-26 RX ADMIN — TRAZODONE HYDROCHLORIDE 50 MG: 50 TABLET ORAL at 09:09

## 2021-04-26 RX ADMIN — HEPARIN SODIUM 5000 UNITS: 5000 INJECTION INTRAVENOUS; SUBCUTANEOUS at 20:43

## 2021-04-26 RX ADMIN — LEVOTHYROXINE SODIUM 100 MCG: 0.1 TABLET ORAL at 06:03

## 2021-04-26 RX ADMIN — INSULIN GLARGINE 20 UNITS: 100 INJECTION, SOLUTION SUBCUTANEOUS at 09:11

## 2021-04-26 RX ADMIN — LAMOTRIGINE 12.5 MG: 25 TABLET ORAL at 20:37

## 2021-04-26 RX ADMIN — Medication 250 MG: at 09:08

## 2021-04-26 RX ADMIN — HEPARIN SODIUM 5000 UNITS: 5000 INJECTION INTRAVENOUS; SUBCUTANEOUS at 06:03

## 2021-04-26 RX ADMIN — CEFTRIAXONE SODIUM 1 G: 1 INJECTION, POWDER, FOR SOLUTION INTRAMUSCULAR; INTRAVENOUS at 23:59

## 2021-04-26 RX ADMIN — FOLIC ACID 1 MG: 1 TABLET ORAL at 09:08

## 2021-04-26 RX ADMIN — VANCOMYCIN HYDROCHLORIDE 1500 MG: 10 INJECTION, POWDER, LYOPHILIZED, FOR SOLUTION INTRAVENOUS at 15:19

## 2021-04-27 LAB
ANION GAP SERPL CALCULATED.3IONS-SCNC: 11 MMOL/L (ref 5–15)
BACTERIA SPEC AEROBE CULT: ABNORMAL
BASOPHILS # BLD AUTO: 0 10*3/MM3 (ref 0–0.2)
BASOPHILS NFR BLD AUTO: 0.2 % (ref 0–1.5)
BUN SERPL-MCNC: 42 MG/DL (ref 8–23)
BUN/CREAT SERPL: 22 (ref 7–25)
CALCIUM SPEC-SCNC: 8 MG/DL (ref 8.6–10.5)
CHLORIDE SERPL-SCNC: 107 MMOL/L (ref 98–107)
CO2 SERPL-SCNC: 20 MMOL/L (ref 22–29)
CREAT SERPL-MCNC: 1.91 MG/DL (ref 0.76–1.27)
DEPRECATED RDW RBC AUTO: 54.3 FL (ref 37–54)
EOSINOPHIL # BLD AUTO: 0 10*3/MM3 (ref 0–0.4)
EOSINOPHIL NFR BLD AUTO: 0 % (ref 0.3–6.2)
ERYTHROCYTE [DISTWIDTH] IN BLOOD BY AUTOMATED COUNT: 21.1 % (ref 12.3–15.4)
GFR SERPL CREATININE-BSD FRML MDRD: 35 ML/MIN/1.73
GLUCOSE BLDC GLUCOMTR-MCNC: 103 MG/DL (ref 70–105)
GLUCOSE BLDC GLUCOMTR-MCNC: 177 MG/DL (ref 70–105)
GLUCOSE BLDC GLUCOMTR-MCNC: 239 MG/DL (ref 70–105)
GLUCOSE BLDC GLUCOMTR-MCNC: 98 MG/DL (ref 70–105)
GLUCOSE SERPL-MCNC: 94 MG/DL (ref 65–99)
GRAM STN SPEC: ABNORMAL
HCT VFR BLD AUTO: 24.4 % (ref 37.5–51)
HGB BLD-MCNC: 8.1 G/DL (ref 13–17.7)
ISOLATED FROM: ABNORMAL
LYMPHOCYTES # BLD AUTO: 1 10*3/MM3 (ref 0.7–3.1)
LYMPHOCYTES NFR BLD AUTO: 8.1 % (ref 19.6–45.3)
MCH RBC QN AUTO: 24.2 PG (ref 26.6–33)
MCHC RBC AUTO-ENTMCNC: 33.3 G/DL (ref 31.5–35.7)
MCV RBC AUTO: 72.7 FL (ref 79–97)
MONOCYTES # BLD AUTO: 0.6 10*3/MM3 (ref 0.1–0.9)
MONOCYTES NFR BLD AUTO: 5.1 % (ref 5–12)
NEUTROPHILS NFR BLD AUTO: 10.2 10*3/MM3 (ref 1.7–7)
NEUTROPHILS NFR BLD AUTO: 86.6 % (ref 42.7–76)
NRBC BLD AUTO-RTO: 0 /100 WBC (ref 0–0.2)
PLATELET # BLD AUTO: 174 10*3/MM3 (ref 140–450)
PMV BLD AUTO: 8 FL (ref 6–12)
POTASSIUM SERPL-SCNC: 3.6 MMOL/L (ref 3.5–5.2)
QT INTERVAL: 310 MS
RBC # BLD AUTO: 3.35 10*6/MM3 (ref 4.14–5.8)
SODIUM SERPL-SCNC: 138 MMOL/L (ref 136–145)
WBC # BLD AUTO: 11.8 10*3/MM3 (ref 3.4–10.8)

## 2021-04-27 PROCEDURE — 25010000002 VANCOMYCIN 10 G RECONSTITUTED SOLUTION: Performed by: INTERNAL MEDICINE

## 2021-04-27 PROCEDURE — 63710000001 INSULIN GLARGINE PER 5 UNITS: Performed by: INTERNAL MEDICINE

## 2021-04-27 PROCEDURE — 80048 BASIC METABOLIC PNL TOTAL CA: CPT | Performed by: INTERNAL MEDICINE

## 2021-04-27 PROCEDURE — 25010000002 CEFTRIAXONE PER 250 MG: Performed by: INTERNAL MEDICINE

## 2021-04-27 PROCEDURE — 82962 GLUCOSE BLOOD TEST: CPT

## 2021-04-27 PROCEDURE — 99233 SBSQ HOSP IP/OBS HIGH 50: CPT | Performed by: INTERNAL MEDICINE

## 2021-04-27 PROCEDURE — 85025 COMPLETE CBC W/AUTO DIFF WBC: CPT | Performed by: INTERNAL MEDICINE

## 2021-04-27 PROCEDURE — 63710000001 INSULIN LISPRO (HUMAN) PER 5 UNITS: Performed by: INTERNAL MEDICINE

## 2021-04-27 PROCEDURE — 25010000002 HEPARIN (PORCINE) PER 1000 UNITS: Performed by: INTERNAL MEDICINE

## 2021-04-27 PROCEDURE — 97166 OT EVAL MOD COMPLEX 45 MIN: CPT

## 2021-04-27 RX ADMIN — FAMOTIDINE 20 MG: 20 TABLET, FILM COATED ORAL at 17:35

## 2021-04-27 RX ADMIN — HEPARIN SODIUM 5000 UNITS: 5000 INJECTION INTRAVENOUS; SUBCUTANEOUS at 21:54

## 2021-04-27 RX ADMIN — Medication 10 ML: at 09:13

## 2021-04-27 RX ADMIN — MEMANTINE 5 MG: 5 TABLET ORAL at 09:13

## 2021-04-27 RX ADMIN — HEPARIN SODIUM 5000 UNITS: 5000 INJECTION INTRAVENOUS; SUBCUTANEOUS at 14:17

## 2021-04-27 RX ADMIN — LAMOTRIGINE 12.5 MG: 25 TABLET ORAL at 21:52

## 2021-04-27 RX ADMIN — VANCOMYCIN HYDROCHLORIDE 1500 MG: 10 INJECTION, POWDER, LYOPHILIZED, FOR SOLUTION INTRAVENOUS at 17:35

## 2021-04-27 RX ADMIN — ISOSORBIDE MONONITRATE 30 MG: 30 TABLET, EXTENDED RELEASE ORAL at 09:12

## 2021-04-27 RX ADMIN — Medication 250 MG: at 09:12

## 2021-04-27 RX ADMIN — TAMSULOSIN HYDROCHLORIDE 0.4 MG: 0.4 CAPSULE ORAL at 09:13

## 2021-04-27 RX ADMIN — OXYCODONE HYDROCHLORIDE AND ACETAMINOPHEN 500 MG: 500 TABLET ORAL at 21:52

## 2021-04-27 RX ADMIN — LEVOTHYROXINE SODIUM 100 MCG: 0.1 TABLET ORAL at 06:16

## 2021-04-27 RX ADMIN — SODIUM CHLORIDE 75 ML/HR: 9 INJECTION, SOLUTION INTRAVENOUS at 22:06

## 2021-04-27 RX ADMIN — INSULIN GLARGINE 20 UNITS: 100 INJECTION, SOLUTION SUBCUTANEOUS at 21:55

## 2021-04-27 RX ADMIN — OLANZAPINE 2.5 MG: 2.5 TABLET ORAL at 21:52

## 2021-04-27 RX ADMIN — Medication 100 MG: at 09:12

## 2021-04-27 RX ADMIN — CEFTRIAXONE SODIUM 1 G: 1 INJECTION, POWDER, FOR SOLUTION INTRAMUSCULAR; INTRAVENOUS at 22:07

## 2021-04-27 RX ADMIN — FAMOTIDINE 20 MG: 20 TABLET, FILM COATED ORAL at 09:12

## 2021-04-27 RX ADMIN — TRAZODONE HYDROCHLORIDE 50 MG: 50 TABLET ORAL at 21:52

## 2021-04-27 RX ADMIN — OXYCODONE HYDROCHLORIDE AND ACETAMINOPHEN 500 MG: 500 TABLET ORAL at 09:12

## 2021-04-27 RX ADMIN — TRAZODONE HYDROCHLORIDE 50 MG: 50 TABLET ORAL at 09:13

## 2021-04-27 RX ADMIN — INSULIN LISPRO 5 UNITS: 100 INJECTION, SOLUTION INTRAVENOUS; SUBCUTANEOUS at 17:34

## 2021-04-27 RX ADMIN — INSULIN GLARGINE 20 UNITS: 100 INJECTION, SOLUTION SUBCUTANEOUS at 09:11

## 2021-04-27 RX ADMIN — SODIUM CHLORIDE 75 ML/HR: 9 INJECTION, SOLUTION INTRAVENOUS at 06:15

## 2021-04-27 RX ADMIN — HEPARIN SODIUM 5000 UNITS: 5000 INJECTION INTRAVENOUS; SUBCUTANEOUS at 06:16

## 2021-04-27 RX ADMIN — INSULIN LISPRO 2 UNITS: 100 INJECTION, SOLUTION INTRAVENOUS; SUBCUTANEOUS at 17:35

## 2021-04-27 RX ADMIN — THERA TABS 1 TABLET: TAB at 09:12

## 2021-04-27 RX ADMIN — IBUPROFEN 600 MG: 600 TABLET ORAL at 10:28

## 2021-04-27 RX ADMIN — FOLIC ACID 1 MG: 1 TABLET ORAL at 09:13

## 2021-04-27 RX ADMIN — INSULIN LISPRO 5 UNITS: 100 INJECTION, SOLUTION INTRAVENOUS; SUBCUTANEOUS at 12:40

## 2021-04-27 RX ADMIN — LAMOTRIGINE 12.5 MG: 25 TABLET ORAL at 09:12

## 2021-04-27 RX ADMIN — ATORVASTATIN CALCIUM 20 MG: 20 TABLET, FILM COATED ORAL at 21:52

## 2021-04-28 PROBLEM — E78.2 MIXED HYPERLIPIDEMIA: Status: ACTIVE | Noted: 2021-04-28

## 2021-04-28 PROBLEM — F02.80 DEMENTIA IN ALZHEIMER'S DISEASE WITH EARLY ONSET (HCC): Status: ACTIVE | Noted: 2021-04-28

## 2021-04-28 PROBLEM — A41.9 SEPSIS WITHOUT ACUTE ORGAN DYSFUNCTION: Status: ACTIVE | Noted: 2021-04-28

## 2021-04-28 PROBLEM — N18.9 ACUTE KIDNEY INJURY SUPERIMPOSED ON CHRONIC KIDNEY DISEASE: Status: ACTIVE | Noted: 2021-04-28

## 2021-04-28 PROBLEM — L03.116 CELLULITIS OF LEFT LOWER EXTREMITY: Status: ACTIVE | Noted: 2021-04-28

## 2021-04-28 PROBLEM — I10 ESSENTIAL HYPERTENSION: Status: ACTIVE | Noted: 2021-04-28

## 2021-04-28 PROBLEM — N17.9 ACUTE KIDNEY INJURY SUPERIMPOSED ON CHRONIC KIDNEY DISEASE (HCC): Status: ACTIVE | Noted: 2021-04-28

## 2021-04-28 PROBLEM — Z79.4 TYPE 2 DIABETES MELLITUS WITH HYPERGLYCEMIA, WITH LONG-TERM CURRENT USE OF INSULIN (HCC): Status: ACTIVE | Noted: 2021-04-28

## 2021-04-28 PROBLEM — E11.65 TYPE 2 DIABETES MELLITUS WITH HYPERGLYCEMIA, WITH LONG-TERM CURRENT USE OF INSULIN (HCC): Status: ACTIVE | Noted: 2021-04-28

## 2021-04-28 PROBLEM — S81.802A LEG WOUND, LEFT: Status: ACTIVE | Noted: 2021-04-28

## 2021-04-28 PROBLEM — G30.0 DEMENTIA IN ALZHEIMER'S DISEASE WITH EARLY ONSET: Status: ACTIVE | Noted: 2021-04-28

## 2021-04-28 PROBLEM — N30.00 ACUTE CYSTITIS WITHOUT HEMATURIA: Status: ACTIVE | Noted: 2021-04-28

## 2021-04-28 LAB
ANION GAP SERPL CALCULATED.3IONS-SCNC: 11 MMOL/L (ref 5–15)
BASOPHILS # BLD AUTO: 0 10*3/MM3 (ref 0–0.2)
BASOPHILS NFR BLD AUTO: 0.1 % (ref 0–1.5)
BUN SERPL-MCNC: 34 MG/DL (ref 8–23)
BUN/CREAT SERPL: 23 (ref 7–25)
CALCIUM SPEC-SCNC: 8.4 MG/DL (ref 8.6–10.5)
CHLORIDE SERPL-SCNC: 107 MMOL/L (ref 98–107)
CK SERPL-CCNC: 276 U/L (ref 20–200)
CO2 SERPL-SCNC: 19 MMOL/L (ref 22–29)
CREAT SERPL-MCNC: 1.48 MG/DL (ref 0.76–1.27)
DEPRECATED RDW RBC AUTO: 56.4 FL (ref 37–54)
EOSINOPHIL # BLD AUTO: 0 10*3/MM3 (ref 0–0.4)
EOSINOPHIL NFR BLD AUTO: 0.3 % (ref 0.3–6.2)
ERYTHROCYTE [DISTWIDTH] IN BLOOD BY AUTOMATED COUNT: 21.8 % (ref 12.3–15.4)
GFR SERPL CREATININE-BSD FRML MDRD: 47 ML/MIN/1.73
GLUCOSE BLDC GLUCOMTR-MCNC: 113 MG/DL (ref 70–105)
GLUCOSE BLDC GLUCOMTR-MCNC: 145 MG/DL (ref 70–105)
GLUCOSE BLDC GLUCOMTR-MCNC: 266 MG/DL (ref 70–105)
GLUCOSE BLDC GLUCOMTR-MCNC: 61 MG/DL (ref 70–105)
GLUCOSE BLDC GLUCOMTR-MCNC: 65 MG/DL (ref 70–105)
GLUCOSE BLDC GLUCOMTR-MCNC: 71 MG/DL (ref 70–105)
GLUCOSE SERPL-MCNC: 89 MG/DL (ref 65–99)
HCT VFR BLD AUTO: 26.9 % (ref 37.5–51)
HGB BLD-MCNC: 8.6 G/DL (ref 13–17.7)
IRON 24H UR-MRATE: 9 MCG/DL (ref 59–158)
LYMPHOCYTES # BLD AUTO: 1.3 10*3/MM3 (ref 0.7–3.1)
LYMPHOCYTES NFR BLD AUTO: 12.2 % (ref 19.6–45.3)
MCH RBC QN AUTO: 24.1 PG (ref 26.6–33)
MCHC RBC AUTO-ENTMCNC: 32.1 G/DL (ref 31.5–35.7)
MCV RBC AUTO: 75 FL (ref 79–97)
MONOCYTES # BLD AUTO: 0.7 10*3/MM3 (ref 0.1–0.9)
MONOCYTES NFR BLD AUTO: 6.7 % (ref 5–12)
NEUTROPHILS NFR BLD AUTO: 8.7 10*3/MM3 (ref 1.7–7)
NEUTROPHILS NFR BLD AUTO: 80.7 % (ref 42.7–76)
NRBC BLD AUTO-RTO: 0.1 /100 WBC (ref 0–0.2)
PLATELET # BLD AUTO: 183 10*3/MM3 (ref 140–450)
PMV BLD AUTO: 7.9 FL (ref 6–12)
POTASSIUM SERPL-SCNC: 3.6 MMOL/L (ref 3.5–5.2)
PTH-INTACT SERPL-MCNC: 38.5 PG/ML (ref 15–65)
RBC # BLD AUTO: 3.58 10*6/MM3 (ref 4.14–5.8)
SODIUM SERPL-SCNC: 137 MMOL/L (ref 136–145)
URATE SERPL-MCNC: 4.2 MG/DL (ref 3.4–7)
VANCOMYCIN TROUGH SERPL-MCNC: 12.9 MCG/ML (ref 5–20)
WBC # BLD AUTO: 10.8 10*3/MM3 (ref 3.4–10.8)

## 2021-04-28 PROCEDURE — 83540 ASSAY OF IRON: CPT | Performed by: INTERNAL MEDICINE

## 2021-04-28 PROCEDURE — 80202 ASSAY OF VANCOMYCIN: CPT | Performed by: INTERNAL MEDICINE

## 2021-04-28 PROCEDURE — U0003 INFECTIOUS AGENT DETECTION BY NUCLEIC ACID (DNA OR RNA); SEVERE ACUTE RESPIRATORY SYNDROME CORONAVIRUS 2 (SARS-COV-2) (CORONAVIRUS DISEASE [COVID-19]), AMPLIFIED PROBE TECHNIQUE, MAKING USE OF HIGH THROUGHPUT TECHNOLOGIES AS DESCRIBED BY CMS-2020-01-R: HCPCS | Performed by: INTERNAL MEDICINE

## 2021-04-28 PROCEDURE — 85025 COMPLETE CBC W/AUTO DIFF WBC: CPT | Performed by: INTERNAL MEDICINE

## 2021-04-28 PROCEDURE — 25010000002 VANCOMYCIN PER 500 MG: Performed by: INTERNAL MEDICINE

## 2021-04-28 PROCEDURE — 83970 ASSAY OF PARATHORMONE: CPT | Performed by: INTERNAL MEDICINE

## 2021-04-28 PROCEDURE — 82962 GLUCOSE BLOOD TEST: CPT

## 2021-04-28 PROCEDURE — 63710000001 INSULIN GLARGINE PER 5 UNITS: Performed by: INTERNAL MEDICINE

## 2021-04-28 PROCEDURE — 84550 ASSAY OF BLOOD/URIC ACID: CPT | Performed by: INTERNAL MEDICINE

## 2021-04-28 PROCEDURE — 25010000002 VANCOMYCIN 10 G RECONSTITUTED SOLUTION: Performed by: INTERNAL MEDICINE

## 2021-04-28 PROCEDURE — 80048 BASIC METABOLIC PNL TOTAL CA: CPT | Performed by: INTERNAL MEDICINE

## 2021-04-28 PROCEDURE — 63710000001 INSULIN LISPRO (HUMAN) PER 5 UNITS: Performed by: INTERNAL MEDICINE

## 2021-04-28 PROCEDURE — 99233 SBSQ HOSP IP/OBS HIGH 50: CPT | Performed by: INTERNAL MEDICINE

## 2021-04-28 PROCEDURE — 25010000002 HEPARIN (PORCINE) PER 1000 UNITS: Performed by: INTERNAL MEDICINE

## 2021-04-28 PROCEDURE — 25010000002 CEFTAROLINE FOSAMIL PER 10 MG: Performed by: INTERNAL MEDICINE

## 2021-04-28 PROCEDURE — 87040 BLOOD CULTURE FOR BACTERIA: CPT | Performed by: INTERNAL MEDICINE

## 2021-04-28 PROCEDURE — 82550 ASSAY OF CK (CPK): CPT | Performed by: INTERNAL MEDICINE

## 2021-04-28 PROCEDURE — 51798 US URINE CAPACITY MEASURE: CPT

## 2021-04-28 RX ORDER — CLINDAMYCIN PHOSPHATE 900 MG/50ML
900 INJECTION, SOLUTION INTRAVENOUS EVERY 8 HOURS
Status: DISCONTINUED | OUTPATIENT
Start: 2021-04-28 | End: 2021-04-30

## 2021-04-28 RX ORDER — SODIUM BICARBONATE 650 MG/1
650 TABLET ORAL 2 TIMES DAILY
Status: DISCONTINUED | OUTPATIENT
Start: 2021-04-28 | End: 2021-05-01 | Stop reason: HOSPADM

## 2021-04-28 RX ADMIN — CLINDAMYCIN PHOSPHATE 900 MG: 900 INJECTION, SOLUTION INTRAVENOUS at 20:16

## 2021-04-28 RX ADMIN — FAMOTIDINE 20 MG: 20 TABLET, FILM COATED ORAL at 17:30

## 2021-04-28 RX ADMIN — METOPROLOL SUCCINATE 25 MG: 25 TABLET, EXTENDED RELEASE ORAL at 08:57

## 2021-04-28 RX ADMIN — SODIUM BICARBONATE 650 MG TABLET 650 MG: at 21:55

## 2021-04-28 RX ADMIN — HEPARIN SODIUM 5000 UNITS: 5000 INJECTION INTRAVENOUS; SUBCUTANEOUS at 05:16

## 2021-04-28 RX ADMIN — LAMOTRIGINE 12.5 MG: 25 TABLET ORAL at 08:58

## 2021-04-28 RX ADMIN — THERA TABS 1 TABLET: TAB at 08:58

## 2021-04-28 RX ADMIN — CEFTAROLINE FOSAMIL 600 MG: 600 POWDER, FOR SOLUTION INTRAVENOUS at 21:56

## 2021-04-28 RX ADMIN — OXYCODONE HYDROCHLORIDE AND ACETAMINOPHEN 500 MG: 500 TABLET ORAL at 08:57

## 2021-04-28 RX ADMIN — ISOSORBIDE MONONITRATE 30 MG: 30 TABLET, EXTENDED RELEASE ORAL at 08:57

## 2021-04-28 RX ADMIN — OXYCODONE HYDROCHLORIDE AND ACETAMINOPHEN 500 MG: 500 TABLET ORAL at 20:16

## 2021-04-28 RX ADMIN — ACETAMINOPHEN 650 MG: 325 TABLET, FILM COATED ORAL at 20:15

## 2021-04-28 RX ADMIN — INSULIN GLARGINE 20 UNITS: 100 INJECTION, SOLUTION SUBCUTANEOUS at 08:54

## 2021-04-28 RX ADMIN — FOLIC ACID 1 MG: 1 TABLET ORAL at 08:58

## 2021-04-28 RX ADMIN — FAMOTIDINE 20 MG: 20 TABLET, FILM COATED ORAL at 08:58

## 2021-04-28 RX ADMIN — MEMANTINE 5 MG: 5 TABLET ORAL at 08:58

## 2021-04-28 RX ADMIN — Medication 10 ML: at 09:00

## 2021-04-28 RX ADMIN — OLANZAPINE 2.5 MG: 2.5 TABLET ORAL at 20:15

## 2021-04-28 RX ADMIN — INSULIN GLARGINE 20 UNITS: 100 INJECTION, SOLUTION SUBCUTANEOUS at 20:16

## 2021-04-28 RX ADMIN — LEVOTHYROXINE SODIUM 100 MCG: 0.1 TABLET ORAL at 05:15

## 2021-04-28 RX ADMIN — Medication 100 MG: at 08:57

## 2021-04-28 RX ADMIN — TRAZODONE HYDROCHLORIDE 50 MG: 50 TABLET ORAL at 08:58

## 2021-04-28 RX ADMIN — Medication 10 ML: at 20:16

## 2021-04-28 RX ADMIN — Medication 250 MG: at 08:57

## 2021-04-28 RX ADMIN — INSULIN LISPRO 5 UNITS: 100 INJECTION, SOLUTION INTRAVENOUS; SUBCUTANEOUS at 12:17

## 2021-04-28 RX ADMIN — INSULIN LISPRO 5 UNITS: 100 INJECTION, SOLUTION INTRAVENOUS; SUBCUTANEOUS at 17:27

## 2021-04-28 RX ADMIN — HEPARIN SODIUM 5000 UNITS: 5000 INJECTION INTRAVENOUS; SUBCUTANEOUS at 21:55

## 2021-04-28 RX ADMIN — HEPARIN SODIUM 5000 UNITS: 5000 INJECTION INTRAVENOUS; SUBCUTANEOUS at 17:30

## 2021-04-28 RX ADMIN — LAMOTRIGINE 12.5 MG: 25 TABLET ORAL at 20:15

## 2021-04-28 RX ADMIN — TRAZODONE HYDROCHLORIDE 50 MG: 50 TABLET ORAL at 20:15

## 2021-04-28 RX ADMIN — IBUPROFEN 600 MG: 600 TABLET ORAL at 05:41

## 2021-04-28 RX ADMIN — TAMSULOSIN HYDROCHLORIDE 0.4 MG: 0.4 CAPSULE ORAL at 08:58

## 2021-04-28 RX ADMIN — ATORVASTATIN CALCIUM 20 MG: 20 TABLET, FILM COATED ORAL at 20:16

## 2021-04-29 LAB
ALBUMIN SERPL-MCNC: 2.4 G/DL (ref 3.5–5.2)
ALBUMIN/GLOB SERPL: 0.6 G/DL
ALP SERPL-CCNC: 106 U/L (ref 39–117)
ALT SERPL W P-5'-P-CCNC: 54 U/L (ref 1–41)
ANION GAP SERPL CALCULATED.3IONS-SCNC: 8 MMOL/L (ref 5–15)
AST SERPL-CCNC: 54 U/L (ref 1–40)
BASOPHILS # BLD AUTO: 0 10*3/MM3 (ref 0–0.2)
BASOPHILS NFR BLD AUTO: 0.2 % (ref 0–1.5)
BILIRUB SERPL-MCNC: 0.2 MG/DL (ref 0–1.2)
BUN SERPL-MCNC: 25 MG/DL (ref 8–23)
BUN/CREAT SERPL: 18.4 (ref 7–25)
CA-I SERPL ISE-MCNC: 1.17 MMOL/L (ref 1.2–1.3)
CALCIUM SPEC-SCNC: 8.4 MG/DL (ref 8.6–10.5)
CHLORIDE SERPL-SCNC: 109 MMOL/L (ref 98–107)
CO2 SERPL-SCNC: 22 MMOL/L (ref 22–29)
CREAT SERPL-MCNC: 1.36 MG/DL (ref 0.76–1.27)
DEPRECATED RDW RBC AUTO: 56 FL (ref 37–54)
EOSINOPHIL # BLD AUTO: 0.1 10*3/MM3 (ref 0–0.4)
EOSINOPHIL NFR BLD AUTO: 0.9 % (ref 0.3–6.2)
ERYTHROCYTE [DISTWIDTH] IN BLOOD BY AUTOMATED COUNT: 21.9 % (ref 12.3–15.4)
GFR SERPL CREATININE-BSD FRML MDRD: 51 ML/MIN/1.73
GLOBULIN UR ELPH-MCNC: 3.8 GM/DL
GLUCOSE BLDC GLUCOMTR-MCNC: 133 MG/DL (ref 70–105)
GLUCOSE BLDC GLUCOMTR-MCNC: 220 MG/DL (ref 70–105)
GLUCOSE BLDC GLUCOMTR-MCNC: 63 MG/DL (ref 70–105)
GLUCOSE BLDC GLUCOMTR-MCNC: 72 MG/DL (ref 70–105)
GLUCOSE BLDC GLUCOMTR-MCNC: 94 MG/DL (ref 70–105)
GLUCOSE SERPL-MCNC: 97 MG/DL (ref 65–99)
HCT VFR BLD AUTO: 23.3 % (ref 37.5–51)
HGB BLD-MCNC: 7.9 G/DL (ref 13–17.7)
LYMPHOCYTES # BLD AUTO: 1.1 10*3/MM3 (ref 0.7–3.1)
LYMPHOCYTES NFR BLD AUTO: 12.2 % (ref 19.6–45.3)
MAGNESIUM SERPL-MCNC: 1.5 MG/DL (ref 1.6–2.4)
MCH RBC QN AUTO: 24.7 PG (ref 26.6–33)
MCHC RBC AUTO-ENTMCNC: 33.8 G/DL (ref 31.5–35.7)
MCV RBC AUTO: 73 FL (ref 79–97)
MONOCYTES # BLD AUTO: 0.6 10*3/MM3 (ref 0.1–0.9)
MONOCYTES NFR BLD AUTO: 6.8 % (ref 5–12)
NEUTROPHILS NFR BLD AUTO: 7.1 10*3/MM3 (ref 1.7–7)
NEUTROPHILS NFR BLD AUTO: 79.9 % (ref 42.7–76)
NRBC BLD AUTO-RTO: 0 /100 WBC (ref 0–0.2)
PHOSPHATE SERPL-MCNC: 2.9 MG/DL (ref 2.5–4.5)
PLATELET # BLD AUTO: 195 10*3/MM3 (ref 140–450)
PMV BLD AUTO: 7.6 FL (ref 6–12)
POTASSIUM SERPL-SCNC: 3.9 MMOL/L (ref 3.5–5.2)
PROT SERPL-MCNC: 6.2 G/DL (ref 6–8.5)
RBC # BLD AUTO: 3.2 10*6/MM3 (ref 4.14–5.8)
SARS-COV-2 RNA PNL SPEC NAA+PROBE: NOT DETECTED
SODIUM SERPL-SCNC: 139 MMOL/L (ref 136–145)
TSH SERPL DL<=0.05 MIU/L-ACNC: 1.31 UIU/ML (ref 0.27–4.2)
WBC # BLD AUTO: 8.8 10*3/MM3 (ref 3.4–10.8)

## 2021-04-29 PROCEDURE — 63710000001 INSULIN GLARGINE PER 5 UNITS: Performed by: INTERNAL MEDICINE

## 2021-04-29 PROCEDURE — 63710000001 INSULIN LISPRO (HUMAN) PER 5 UNITS: Performed by: INTERNAL MEDICINE

## 2021-04-29 PROCEDURE — 25010000002 HEPARIN (PORCINE) PER 1000 UNITS: Performed by: INTERNAL MEDICINE

## 2021-04-29 PROCEDURE — 84100 ASSAY OF PHOSPHORUS: CPT | Performed by: INTERNAL MEDICINE

## 2021-04-29 PROCEDURE — 85025 COMPLETE CBC W/AUTO DIFF WBC: CPT | Performed by: INTERNAL MEDICINE

## 2021-04-29 PROCEDURE — 83735 ASSAY OF MAGNESIUM: CPT | Performed by: INTERNAL MEDICINE

## 2021-04-29 PROCEDURE — 82962 GLUCOSE BLOOD TEST: CPT

## 2021-04-29 PROCEDURE — 97530 THERAPEUTIC ACTIVITIES: CPT

## 2021-04-29 PROCEDURE — 82330 ASSAY OF CALCIUM: CPT | Performed by: INTERNAL MEDICINE

## 2021-04-29 PROCEDURE — 99233 SBSQ HOSP IP/OBS HIGH 50: CPT | Performed by: INTERNAL MEDICINE

## 2021-04-29 PROCEDURE — 25010000002 CALCIUM GLUCONATE-NACL 1-0.675 GM/50ML-% SOLUTION: Performed by: INTERNAL MEDICINE

## 2021-04-29 PROCEDURE — 84443 ASSAY THYROID STIM HORMONE: CPT | Performed by: INTERNAL MEDICINE

## 2021-04-29 PROCEDURE — 25010000002 MAGNESIUM SULFATE 2 GM/50ML SOLUTION: Performed by: INTERNAL MEDICINE

## 2021-04-29 PROCEDURE — 25010000002 CEFTAROLINE FOSAMIL PER 10 MG: Performed by: INTERNAL MEDICINE

## 2021-04-29 PROCEDURE — 80053 COMPREHEN METABOLIC PANEL: CPT | Performed by: INTERNAL MEDICINE

## 2021-04-29 RX ORDER — BENZONATATE 100 MG/1
100 CAPSULE ORAL 3 TIMES DAILY PRN
Status: DISCONTINUED | OUTPATIENT
Start: 2021-04-29 | End: 2021-05-01 | Stop reason: HOSPADM

## 2021-04-29 RX ORDER — CALCIUM GLUCONATE 20 MG/ML
1 INJECTION, SOLUTION INTRAVENOUS EVERY 12 HOURS
Status: COMPLETED | OUTPATIENT
Start: 2021-04-29 | End: 2021-04-29

## 2021-04-29 RX ORDER — MAGNESIUM SULFATE HEPTAHYDRATE 40 MG/ML
2 INJECTION, SOLUTION INTRAVENOUS EVERY 12 HOURS
Status: COMPLETED | OUTPATIENT
Start: 2021-04-29 | End: 2021-04-29

## 2021-04-29 RX ADMIN — CLINDAMYCIN PHOSPHATE 900 MG: 900 INJECTION, SOLUTION INTRAVENOUS at 22:34

## 2021-04-29 RX ADMIN — HEPARIN SODIUM 5000 UNITS: 5000 INJECTION INTRAVENOUS; SUBCUTANEOUS at 06:32

## 2021-04-29 RX ADMIN — INSULIN GLARGINE 20 UNITS: 100 INJECTION, SOLUTION SUBCUTANEOUS at 22:08

## 2021-04-29 RX ADMIN — OLANZAPINE 2.5 MG: 2.5 TABLET ORAL at 21:59

## 2021-04-29 RX ADMIN — FAMOTIDINE 20 MG: 20 TABLET, FILM COATED ORAL at 10:52

## 2021-04-29 RX ADMIN — Medication 10 ML: at 22:00

## 2021-04-29 RX ADMIN — INSULIN LISPRO 5 UNITS: 100 INJECTION, SOLUTION INTRAVENOUS; SUBCUTANEOUS at 10:52

## 2021-04-29 RX ADMIN — ISOSORBIDE MONONITRATE 30 MG: 30 TABLET, EXTENDED RELEASE ORAL at 10:52

## 2021-04-29 RX ADMIN — TRAZODONE HYDROCHLORIDE 50 MG: 50 TABLET ORAL at 10:52

## 2021-04-29 RX ADMIN — SODIUM CHLORIDE 75 ML/HR: 9 INJECTION, SOLUTION INTRAVENOUS at 18:39

## 2021-04-29 RX ADMIN — LAMOTRIGINE 12.5 MG: 25 TABLET ORAL at 10:52

## 2021-04-29 RX ADMIN — TRAZODONE HYDROCHLORIDE 50 MG: 50 TABLET ORAL at 21:59

## 2021-04-29 RX ADMIN — CLINDAMYCIN PHOSPHATE 900 MG: 900 INJECTION, SOLUTION INTRAVENOUS at 14:44

## 2021-04-29 RX ADMIN — HEPARIN SODIUM 5000 UNITS: 5000 INJECTION INTRAVENOUS; SUBCUTANEOUS at 14:45

## 2021-04-29 RX ADMIN — IBUPROFEN 600 MG: 600 TABLET ORAL at 02:53

## 2021-04-29 RX ADMIN — FAMOTIDINE 20 MG: 20 TABLET, FILM COATED ORAL at 17:59

## 2021-04-29 RX ADMIN — INSULIN LISPRO 5 UNITS: 100 INJECTION, SOLUTION INTRAVENOUS; SUBCUTANEOUS at 17:59

## 2021-04-29 RX ADMIN — THERA TABS 1 TABLET: TAB at 10:52

## 2021-04-29 RX ADMIN — SODIUM BICARBONATE 650 MG TABLET 650 MG: at 21:59

## 2021-04-29 RX ADMIN — MEMANTINE 5 MG: 5 TABLET ORAL at 10:52

## 2021-04-29 RX ADMIN — CALCIUM GLUCONATE 1 G: 20 INJECTION, SOLUTION INTRAVENOUS at 10:56

## 2021-04-29 RX ADMIN — BENZONATATE 100 MG: 100 CAPSULE ORAL at 02:53

## 2021-04-29 RX ADMIN — OXYCODONE HYDROCHLORIDE AND ACETAMINOPHEN 500 MG: 500 TABLET ORAL at 21:59

## 2021-04-29 RX ADMIN — INSULIN GLARGINE 20 UNITS: 100 INJECTION, SOLUTION SUBCUTANEOUS at 10:52

## 2021-04-29 RX ADMIN — HEPARIN SODIUM 5000 UNITS: 5000 INJECTION INTRAVENOUS; SUBCUTANEOUS at 21:59

## 2021-04-29 RX ADMIN — ACETAMINOPHEN 650 MG: 325 TABLET, FILM COATED ORAL at 14:45

## 2021-04-29 RX ADMIN — BENZONATATE 100 MG: 100 CAPSULE ORAL at 21:59

## 2021-04-29 RX ADMIN — TAMSULOSIN HYDROCHLORIDE 0.4 MG: 0.4 CAPSULE ORAL at 10:52

## 2021-04-29 RX ADMIN — MAGNESIUM SULFATE HEPTAHYDRATE 2 G: 40 INJECTION, SOLUTION INTRAVENOUS at 19:57

## 2021-04-29 RX ADMIN — Medication 10 ML: at 10:55

## 2021-04-29 RX ADMIN — CALCIUM GLUCONATE 1 G: 20 INJECTION, SOLUTION INTRAVENOUS at 19:57

## 2021-04-29 RX ADMIN — ATORVASTATIN CALCIUM 20 MG: 20 TABLET, FILM COATED ORAL at 21:59

## 2021-04-29 RX ADMIN — MAGNESIUM SULFATE HEPTAHYDRATE 2 G: 40 INJECTION, SOLUTION INTRAVENOUS at 10:56

## 2021-04-29 RX ADMIN — CEFTAROLINE FOSAMIL 600 MG: 600 POWDER, FOR SOLUTION INTRAVENOUS at 21:59

## 2021-04-29 RX ADMIN — LAMOTRIGINE 12.5 MG: 25 TABLET ORAL at 21:59

## 2021-04-29 RX ADMIN — Medication 100 MG: at 10:52

## 2021-04-29 RX ADMIN — METOPROLOL SUCCINATE 25 MG: 25 TABLET, EXTENDED RELEASE ORAL at 10:52

## 2021-04-29 RX ADMIN — LEVOTHYROXINE SODIUM 100 MCG: 0.1 TABLET ORAL at 06:32

## 2021-04-29 RX ADMIN — Medication 250 MG: at 10:52

## 2021-04-29 RX ADMIN — FOLIC ACID 1 MG: 1 TABLET ORAL at 10:52

## 2021-04-29 RX ADMIN — OXYCODONE HYDROCHLORIDE AND ACETAMINOPHEN 500 MG: 500 TABLET ORAL at 10:52

## 2021-04-29 RX ADMIN — CEFTAROLINE FOSAMIL 600 MG: 600 POWDER, FOR SOLUTION INTRAVENOUS at 10:55

## 2021-04-29 RX ADMIN — SODIUM BICARBONATE 650 MG TABLET 650 MG: at 10:52

## 2021-04-29 RX ADMIN — CLINDAMYCIN PHOSPHATE 900 MG: 900 INJECTION, SOLUTION INTRAVENOUS at 03:01

## 2021-04-30 ENCOUNTER — READMISSION MANAGEMENT (OUTPATIENT)
Dept: CALL CENTER | Facility: HOSPITAL | Age: 76
End: 2021-04-30

## 2021-04-30 VITALS
DIASTOLIC BLOOD PRESSURE: 54 MMHG | RESPIRATION RATE: 20 BRPM | WEIGHT: 200.18 LBS | HEIGHT: 71 IN | TEMPERATURE: 99.9 F | HEART RATE: 97 BPM | BODY MASS INDEX: 28.02 KG/M2 | SYSTOLIC BLOOD PRESSURE: 106 MMHG | OXYGEN SATURATION: 96 %

## 2021-04-30 LAB
ANION GAP SERPL CALCULATED.3IONS-SCNC: 11 MMOL/L (ref 5–15)
ANISOCYTOSIS BLD QL: NORMAL
BACTERIA SPEC AEROBE CULT: NORMAL
BACTERIA UR QL AUTO: ABNORMAL /HPF
BASOPHILS # BLD AUTO: 0 10*3/MM3 (ref 0–0.2)
BASOPHILS NFR BLD AUTO: 0.3 % (ref 0–1.5)
BILIRUB UR QL STRIP: NEGATIVE
BUN SERPL-MCNC: 18 MG/DL (ref 8–23)
BUN/CREAT SERPL: 14.3 (ref 7–25)
CALCIUM SPEC-SCNC: 8.7 MG/DL (ref 8.6–10.5)
CHLORIDE SERPL-SCNC: 107 MMOL/L (ref 98–107)
CLARITY UR: CLEAR
CO2 SERPL-SCNC: 21 MMOL/L (ref 22–29)
COLOR UR: YELLOW
CREAT SERPL-MCNC: 1.26 MG/DL (ref 0.76–1.27)
DEPRECATED RDW RBC AUTO: 55.1 FL (ref 37–54)
EOSINOPHIL # BLD AUTO: 0.1 10*3/MM3 (ref 0–0.4)
EOSINOPHIL NFR BLD AUTO: 0.8 % (ref 0.3–6.2)
EOSINOPHIL SPEC QL MICRO: 0 % EOS/100 CELLS (ref 0–0)
ERYTHROCYTE [DISTWIDTH] IN BLOOD BY AUTOMATED COUNT: 21.3 % (ref 12.3–15.4)
GFR SERPL CREATININE-BSD FRML MDRD: 56 ML/MIN/1.73
GLUCOSE BLDC GLUCOMTR-MCNC: 108 MG/DL (ref 70–105)
GLUCOSE BLDC GLUCOMTR-MCNC: 159 MG/DL (ref 70–105)
GLUCOSE BLDC GLUCOMTR-MCNC: 36 MG/DL (ref 70–105)
GLUCOSE BLDC GLUCOMTR-MCNC: 51 MG/DL (ref 70–105)
GLUCOSE BLDC GLUCOMTR-MCNC: 70 MG/DL (ref 70–105)
GLUCOSE BLDC GLUCOMTR-MCNC: 82 MG/DL (ref 70–105)
GLUCOSE BLDC GLUCOMTR-MCNC: 84 MG/DL (ref 70–105)
GLUCOSE SERPL-MCNC: 76 MG/DL (ref 65–99)
GLUCOSE UR STRIP-MCNC: NEGATIVE MG/DL
HCT VFR BLD AUTO: 26.2 % (ref 37.5–51)
HGB BLD-MCNC: 8.3 G/DL (ref 13–17.7)
HGB UR QL STRIP.AUTO: NEGATIVE
HYALINE CASTS UR QL AUTO: ABNORMAL /LPF
KETONES UR QL STRIP: NEGATIVE
LEUKOCYTE ESTERASE UR QL STRIP.AUTO: NEGATIVE
LYMPHOCYTES # BLD AUTO: 2.1 10*3/MM3 (ref 0.7–3.1)
LYMPHOCYTES NFR BLD AUTO: 18.2 % (ref 19.6–45.3)
MCH RBC QN AUTO: 23.8 PG (ref 26.6–33)
MCHC RBC AUTO-ENTMCNC: 31.7 G/DL (ref 31.5–35.7)
MCV RBC AUTO: 75.1 FL (ref 79–97)
MICROCYTES BLD QL: NORMAL
MONOCYTES # BLD AUTO: 0.8 10*3/MM3 (ref 0.1–0.9)
MONOCYTES NFR BLD AUTO: 7.4 % (ref 5–12)
NEUTROPHILS NFR BLD AUTO: 73.3 % (ref 42.7–76)
NEUTROPHILS NFR BLD AUTO: 8.4 10*3/MM3 (ref 1.7–7)
NEUTS VAC BLD QL SMEAR: NORMAL
NITRITE UR QL STRIP: NEGATIVE
NRBC BLD AUTO-RTO: 0 /100 WBC (ref 0–0.2)
PATHOLOGY REVIEW: YES
PH UR STRIP.AUTO: 5.5 [PH] (ref 5–8)
PLAT MORPH BLD: NORMAL
PLATELET # BLD AUTO: 259 10*3/MM3 (ref 140–450)
PMV BLD AUTO: 7.1 FL (ref 6–12)
POTASSIUM SERPL-SCNC: 3.8 MMOL/L (ref 3.5–5.2)
PROT UR QL STRIP: ABNORMAL
RBC # BLD AUTO: 3.49 10*6/MM3 (ref 4.14–5.8)
RBC # UR: ABNORMAL /HPF
REF LAB TEST METHOD: ABNORMAL
ROULEAUX BLD QL SMEAR: NORMAL
SODIUM SERPL-SCNC: 139 MMOL/L (ref 136–145)
SODIUM UR-SCNC: 146 MMOL/L
SP GR UR STRIP: 1.01 (ref 1–1.03)
SQUAMOUS #/AREA URNS HPF: ABNORMAL /HPF
UROBILINOGEN UR QL STRIP: ABNORMAL
WBC # BLD AUTO: 11.4 10*3/MM3 (ref 3.4–10.8)
WBC UR QL AUTO: ABNORMAL /HPF
YEAST URNS QL MICRO: ABNORMAL /HPF

## 2021-04-30 PROCEDURE — 85025 COMPLETE CBC W/AUTO DIFF WBC: CPT | Performed by: INTERNAL MEDICINE

## 2021-04-30 PROCEDURE — 81001 URINALYSIS AUTO W/SCOPE: CPT | Performed by: INTERNAL MEDICINE

## 2021-04-30 PROCEDURE — 84300 ASSAY OF URINE SODIUM: CPT | Performed by: INTERNAL MEDICINE

## 2021-04-30 PROCEDURE — 87205 SMEAR GRAM STAIN: CPT | Performed by: INTERNAL MEDICINE

## 2021-04-30 PROCEDURE — 63710000001 INSULIN LISPRO (HUMAN) PER 5 UNITS: Performed by: INTERNAL MEDICINE

## 2021-04-30 PROCEDURE — 80048 BASIC METABOLIC PNL TOTAL CA: CPT | Performed by: INTERNAL MEDICINE

## 2021-04-30 PROCEDURE — 25010000002 HEPARIN (PORCINE) PER 1000 UNITS: Performed by: INTERNAL MEDICINE

## 2021-04-30 PROCEDURE — 63710000001 INSULIN GLARGINE PER 5 UNITS: Performed by: INTERNAL MEDICINE

## 2021-04-30 PROCEDURE — 99239 HOSP IP/OBS DSCHRG MGMT >30: CPT | Performed by: INTERNAL MEDICINE

## 2021-04-30 PROCEDURE — 25010000002 CEFTAROLINE FOSAMIL PER 10 MG: Performed by: INTERNAL MEDICINE

## 2021-04-30 PROCEDURE — 85007 BL SMEAR W/DIFF WBC COUNT: CPT | Performed by: INTERNAL MEDICINE

## 2021-04-30 PROCEDURE — 82962 GLUCOSE BLOOD TEST: CPT

## 2021-04-30 RX ORDER — BENZONATATE 100 MG/1
100 CAPSULE ORAL 3 TIMES DAILY PRN
Qty: 30 CAPSULE | Refills: 0 | Status: SHIPPED | OUTPATIENT
Start: 2021-04-30 | End: 2021-05-10

## 2021-04-30 RX ORDER — DOXYCYCLINE HYCLATE 100 MG/1
100 TABLET, DELAYED RELEASE ORAL 2 TIMES DAILY
Qty: 20 TABLET | Refills: 0 | Status: SHIPPED | OUTPATIENT
Start: 2021-04-30 | End: 2021-05-10

## 2021-04-30 RX ORDER — CEPHALEXIN 500 MG/1
500 CAPSULE ORAL 3 TIMES DAILY
Qty: 30 CAPSULE | Refills: 0 | Status: SHIPPED | OUTPATIENT
Start: 2021-04-30 | End: 2021-05-10

## 2021-04-30 RX ADMIN — FAMOTIDINE 20 MG: 20 TABLET, FILM COATED ORAL at 16:55

## 2021-04-30 RX ADMIN — TAMSULOSIN HYDROCHLORIDE 0.4 MG: 0.4 CAPSULE ORAL at 09:49

## 2021-04-30 RX ADMIN — HEPARIN SODIUM 5000 UNITS: 5000 INJECTION INTRAVENOUS; SUBCUTANEOUS at 14:13

## 2021-04-30 RX ADMIN — ISOSORBIDE MONONITRATE 30 MG: 30 TABLET, EXTENDED RELEASE ORAL at 09:49

## 2021-04-30 RX ADMIN — SODIUM BICARBONATE 650 MG TABLET 650 MG: at 09:50

## 2021-04-30 RX ADMIN — HEPARIN SODIUM 5000 UNITS: 5000 INJECTION INTRAVENOUS; SUBCUTANEOUS at 21:32

## 2021-04-30 RX ADMIN — CEFTAROLINE FOSAMIL 600 MG: 600 POWDER, FOR SOLUTION INTRAVENOUS at 09:45

## 2021-04-30 RX ADMIN — OXYCODONE HYDROCHLORIDE AND ACETAMINOPHEN 500 MG: 500 TABLET ORAL at 21:31

## 2021-04-30 RX ADMIN — Medication 10 ML: at 09:46

## 2021-04-30 RX ADMIN — INSULIN LISPRO 5 UNITS: 100 INJECTION, SOLUTION INTRAVENOUS; SUBCUTANEOUS at 09:43

## 2021-04-30 RX ADMIN — THERA TABS 1 TABLET: TAB at 09:50

## 2021-04-30 RX ADMIN — LAMOTRIGINE 12.5 MG: 25 TABLET ORAL at 21:31

## 2021-04-30 RX ADMIN — LEVOTHYROXINE SODIUM 100 MCG: 0.1 TABLET ORAL at 06:25

## 2021-04-30 RX ADMIN — LAMOTRIGINE 12.5 MG: 25 TABLET ORAL at 09:49

## 2021-04-30 RX ADMIN — HEPARIN SODIUM 5000 UNITS: 5000 INJECTION INTRAVENOUS; SUBCUTANEOUS at 06:25

## 2021-04-30 RX ADMIN — Medication 100 MG: at 09:49

## 2021-04-30 RX ADMIN — FOLIC ACID 1 MG: 1 TABLET ORAL at 09:49

## 2021-04-30 RX ADMIN — MEMANTINE 5 MG: 5 TABLET ORAL at 09:50

## 2021-04-30 RX ADMIN — FAMOTIDINE 20 MG: 20 TABLET, FILM COATED ORAL at 09:49

## 2021-04-30 RX ADMIN — OXYCODONE HYDROCHLORIDE AND ACETAMINOPHEN 500 MG: 500 TABLET ORAL at 09:50

## 2021-04-30 RX ADMIN — INSULIN GLARGINE 20 UNITS: 100 INJECTION, SOLUTION SUBCUTANEOUS at 09:43

## 2021-04-30 RX ADMIN — TRAZODONE HYDROCHLORIDE 50 MG: 50 TABLET ORAL at 09:50

## 2021-04-30 RX ADMIN — ATORVASTATIN CALCIUM 20 MG: 20 TABLET, FILM COATED ORAL at 21:31

## 2021-04-30 RX ADMIN — CLINDAMYCIN PHOSPHATE 900 MG: 900 INJECTION, SOLUTION INTRAVENOUS at 06:25

## 2021-04-30 RX ADMIN — DEXTROSE MONOHYDRATE 25 G: 25 INJECTION, SOLUTION INTRAVENOUS at 14:55

## 2021-04-30 RX ADMIN — METOPROLOL SUCCINATE 25 MG: 25 TABLET, EXTENDED RELEASE ORAL at 09:50

## 2021-04-30 RX ADMIN — SODIUM BICARBONATE 650 MG TABLET 650 MG: at 21:31

## 2021-04-30 RX ADMIN — TRAZODONE HYDROCHLORIDE 50 MG: 50 TABLET ORAL at 21:31

## 2021-04-30 RX ADMIN — OLANZAPINE 2.5 MG: 2.5 TABLET ORAL at 21:31

## 2021-04-30 RX ADMIN — CLINDAMYCIN PHOSPHATE 900 MG: 900 INJECTION, SOLUTION INTRAVENOUS at 14:13

## 2021-04-30 RX ADMIN — Medication 250 MG: at 09:49

## 2021-05-03 ENCOUNTER — TRANSITIONAL CARE MANAGEMENT TELEPHONE ENCOUNTER (OUTPATIENT)
Dept: CALL CENTER | Facility: HOSPITAL | Age: 76
End: 2021-05-03

## 2021-05-03 LAB
BACTERIA SPEC AEROBE CULT: NORMAL
LAB AP CASE REPORT: NORMAL
PATH REPORT.FINAL DX SPEC: NORMAL

## 2021-05-03 NOTE — CASE MANAGEMENT/SOCIAL WORK
Case Management Discharge Note      Final Note: [facility]    Provided Post Acute Provider List?: N/A  N/A Provider List Comment: from [facility]    Selected Continued Care - Discharged on 4/30/2021 Admission date: 4/25/2021 - Discharge disposition: Home or Self Care    Destination Coordination complete    Service Provider Selected Services Address Phone Fax Patient [facility] [address] [facility] [place] [address] [phone] --                       Final Discharge Disposition Code: 04 - intermediate care facility

## 2021-05-03 NOTE — OUTREACH NOTE
Call Center TCM Note      Responses   [facility] patient discharged from?  [facility]   Does the patient have one of the following disease processes/diagnoses(primary or secondary)?  Sepsis   TCM attempt successful?  Yes   Call start time  1143   Call end time  1148   General alerts for this patient  Patient lives in Nursing Home.    Discharge diagnosis  Sepsis/Cellulitis   Is patient permission given to speak with other caregiver?  Yes   Person spoke with today (if not patient) and relationship  Norbert/ BOBBI   Meds reviewed with patient/caregiver?  Yes   Is the patient having any side effects they believe may be caused by any medication additions or changes?  No   Does the patient have all medications related to this admission filled (includes all antibiotics, inhalers, nebulizers,steroids,etc.)  Yes   Is the patient taking all medications as directed (includes completed medication regime)?  Yes   Does the patient have a primary care provider?   Yes   Comments regarding PCP  BOBBI Toussaint declines a PCP followup at present time. States care is very good in home.    Does the patient have an appointment with their PCP within 7 days of discharge?  No   What is preventing the patient from scheduling follow up appointments within 7 days of discharge?  Uncomfortable attending in person appointment   Nursing Interventions  Educated patient on importance of making appointment, Advised patient to make appointment   Has the patient kept scheduled appointments due by today?  N/A   Has home health visited the patient within 72 hours of discharge?  N/A   Psychosocial issues?  No   Did the patient receive a copy of their discharge instructions?  Yes   Nursing interventions  Reviewed instructions with patient   What is the patient's perception of their health status since discharge?  Improving   Nursing interventions  Nurse provided patient education   Is the patient/caregiver able to teach back Sepsis?  S - Shivering,fever or very cold,  E - Extreme pain or generalized discomfort (worst ever,especially abdomen), P - Pale or discolored skin, S - Sleepy, difficult to arouse,confused, I -   I feel like I might die-a feeling of hopelessness, S - Short of breath   Nursing interventions  Nurse provided reassurance to patient   Is patient/caregiver able to teach back steps to recovery at home?  Set small, achievable goals for return to baseline health, Rest and regain strength, Make a list of questions for PCP appoinment   Is the patient/caregiver able to teach back signs and symptoms of worsening condition:  Fever, Hyperthermia, Shortness of breath/rapid respiratory rate, Rapid heart rate (>90)   If the patient is a current smoker, are they able to teach back resources for cessation?  Not a smoker   Is the patient/caregiver able to teach back the hierarchy of who to call/visit for symptoms/problems? PCP, Specialist, Home health nurse, Urgent Care, ED, 911  Yes   TCM call completed?  Yes          Suhali Leon RN    5/3/2021, 11:48 EDT

## 2021-05-11 ENCOUNTER — READMISSION MANAGEMENT (OUTPATIENT)
Dept: CALL CENTER | Facility: HOSPITAL | Age: 76
End: 2021-05-11

## 2021-05-11 NOTE — OUTREACH NOTE
Sepsis Week 2 Survey      Responses   [facility] patient discharged from?  [facility]   Does the patient have one of the following disease processes/diagnoses(primary or secondary)?  Sepsis   Week 2 attempt successful?  Yes   Call start time  1339   Rescheduled  Revoked   Revoke  Change in health status-moved to LTC/SNF/Hospice   Discharge diagnosis  Sepsis/Cellulitis   Is patient permission given to speak with other caregiver?  Yes   Person spoke with today (if not patient) and relationship  Norbert/ BOBBI Carrasquillo RN

## 2021-05-21 ENCOUNTER — APPOINTMENT (OUTPATIENT)
Dept: GENERAL RADIOLOGY | Facility: HOSPITAL | Age: 76
End: 2021-05-21

## 2021-05-21 ENCOUNTER — HOSPITAL ENCOUNTER (INPATIENT)
Facility: HOSPITAL | Age: 76
LOS: 10 days | Discharge: ANOTHER HOSPITAL | End: 2021-06-03
Attending: EMERGENCY MEDICINE

## 2021-05-21 DIAGNOSIS — Z96.652 HISTORY OF KNEE REPLACEMENT PROCEDURE OF LEFT KNEE: ICD-10-CM

## 2021-05-21 DIAGNOSIS — S81.802A LEG WOUND, LEFT, INITIAL ENCOUNTER: ICD-10-CM

## 2021-05-21 DIAGNOSIS — F03.91 DEMENTIA WITH BEHAVIORAL DISTURBANCE, UNSPECIFIED DEMENTIA TYPE: ICD-10-CM

## 2021-05-21 DIAGNOSIS — L03.116 CELLULITIS OF LEFT LOWER EXTREMITY: Primary | ICD-10-CM

## 2021-05-21 LAB
ALBUMIN SERPL-MCNC: 3 G/DL (ref 3.5–5.2)
ALBUMIN/GLOB SERPL: 0.5 G/DL
ALP SERPL-CCNC: 138 U/L (ref 39–117)
ALT SERPL W P-5'-P-CCNC: 22 U/L (ref 1–41)
ANION GAP SERPL CALCULATED.3IONS-SCNC: 12 MMOL/L (ref 5–15)
APTT PPP: 25.1 SECONDS (ref 24–31)
AST SERPL-CCNC: 28 U/L (ref 1–40)
BASOPHILS # BLD AUTO: 0 10*3/MM3 (ref 0–0.2)
BASOPHILS NFR BLD AUTO: 0.4 % (ref 0–1.5)
BILIRUB SERPL-MCNC: 0.3 MG/DL (ref 0–1.2)
BUN SERPL-MCNC: 27 MG/DL (ref 8–23)
BUN/CREAT SERPL: 19.7 (ref 7–25)
CALCIUM SPEC-SCNC: 8.6 MG/DL (ref 8.6–10.5)
CHLORIDE SERPL-SCNC: 98 MMOL/L (ref 98–107)
CK SERPL-CCNC: 67 U/L (ref 20–200)
CO2 SERPL-SCNC: 23 MMOL/L (ref 22–29)
CREAT SERPL-MCNC: 1.37 MG/DL (ref 0.76–1.27)
D-LACTATE SERPL-SCNC: 1.8 MMOL/L (ref 0.5–2)
DEPRECATED RDW RBC AUTO: 49.4 FL (ref 37–54)
EOSINOPHIL # BLD AUTO: 0.1 10*3/MM3 (ref 0–0.4)
EOSINOPHIL NFR BLD AUTO: 1.1 % (ref 0.3–6.2)
ERYTHROCYTE [DISTWIDTH] IN BLOOD BY AUTOMATED COUNT: 18.7 % (ref 12.3–15.4)
GFR SERPL CREATININE-BSD FRML MDRD: 51 ML/MIN/1.73
GLOBULIN UR ELPH-MCNC: 5.9 GM/DL
GLUCOSE BLDC GLUCOMTR-MCNC: 228 MG/DL (ref 70–105)
GLUCOSE BLDC GLUCOMTR-MCNC: 290 MG/DL (ref 70–105)
GLUCOSE SERPL-MCNC: 154 MG/DL (ref 65–99)
HCT VFR BLD AUTO: 30.9 % (ref 37.5–51)
HGB BLD-MCNC: 10.1 G/DL (ref 13–17.7)
HOLD SPECIMEN: NORMAL
INR PPP: 1.07 (ref 0.93–1.1)
LYMPHOCYTES # BLD AUTO: 1.9 10*3/MM3 (ref 0.7–3.1)
LYMPHOCYTES NFR BLD AUTO: 16.5 % (ref 19.6–45.3)
MCH RBC QN AUTO: 24.2 PG (ref 26.6–33)
MCHC RBC AUTO-ENTMCNC: 32.7 G/DL (ref 31.5–35.7)
MCV RBC AUTO: 74.1 FL (ref 79–97)
MONOCYTES # BLD AUTO: 0.4 10*3/MM3 (ref 0.1–0.9)
MONOCYTES NFR BLD AUTO: 3.9 % (ref 5–12)
NEUTROPHILS NFR BLD AUTO: 78.1 % (ref 42.7–76)
NEUTROPHILS NFR BLD AUTO: 8.9 10*3/MM3 (ref 1.7–7)
NRBC BLD AUTO-RTO: 0 /100 WBC (ref 0–0.2)
PLATELET # BLD AUTO: 487 10*3/MM3 (ref 140–450)
PMV BLD AUTO: 6.5 FL (ref 6–12)
POTASSIUM SERPL-SCNC: 4.9 MMOL/L (ref 3.5–5.2)
PROT SERPL-MCNC: 8.9 G/DL (ref 6–8.5)
PROTHROMBIN TIME: 11.7 SECONDS (ref 9.6–11.7)
RBC # BLD AUTO: 4.17 10*6/MM3 (ref 4.14–5.8)
SODIUM SERPL-SCNC: 133 MMOL/L (ref 136–145)
WBC # BLD AUTO: 11.3 10*3/MM3 (ref 3.4–10.8)

## 2021-05-21 PROCEDURE — 85025 COMPLETE CBC W/AUTO DIFF WBC: CPT | Performed by: EMERGENCY MEDICINE

## 2021-05-21 PROCEDURE — 82550 ASSAY OF CK (CPK): CPT | Performed by: EMERGENCY MEDICINE

## 2021-05-21 PROCEDURE — 80053 COMPREHEN METABOLIC PANEL: CPT | Performed by: EMERGENCY MEDICINE

## 2021-05-21 PROCEDURE — 36415 COLL VENOUS BLD VENIPUNCTURE: CPT

## 2021-05-21 PROCEDURE — 85610 PROTHROMBIN TIME: CPT | Performed by: EMERGENCY MEDICINE

## 2021-05-21 PROCEDURE — 25010000002 VANCOMYCIN 10 G RECONSTITUTED SOLUTION: Performed by: HOSPITALIST

## 2021-05-21 PROCEDURE — 73564 X-RAY EXAM KNEE 4 OR MORE: CPT

## 2021-05-21 PROCEDURE — 85730 THROMBOPLASTIN TIME PARTIAL: CPT | Performed by: EMERGENCY MEDICINE

## 2021-05-21 PROCEDURE — 63710000001 INSULIN LISPRO (HUMAN) PER 5 UNITS: Performed by: HOSPITALIST

## 2021-05-21 PROCEDURE — 25010000002 CEFAZOLIN PER 500 MG: Performed by: HOSPITALIST

## 2021-05-21 PROCEDURE — 83605 ASSAY OF LACTIC ACID: CPT

## 2021-05-21 PROCEDURE — 82962 GLUCOSE BLOOD TEST: CPT

## 2021-05-21 PROCEDURE — 99222 1ST HOSP IP/OBS MODERATE 55: CPT | Performed by: HOSPITALIST

## 2021-05-21 PROCEDURE — 99284 EMERGENCY DEPT VISIT MOD MDM: CPT

## 2021-05-21 PROCEDURE — 25010000002 ENOXAPARIN PER 10 MG: Performed by: HOSPITALIST

## 2021-05-21 PROCEDURE — 87040 BLOOD CULTURE FOR BACTERIA: CPT | Performed by: HOSPITALIST

## 2021-05-21 PROCEDURE — 63710000001 INSULIN GLARGINE PER 5 UNITS: Performed by: HOSPITALIST

## 2021-05-21 PROCEDURE — 87040 BLOOD CULTURE FOR BACTERIA: CPT | Performed by: EMERGENCY MEDICINE

## 2021-05-21 PROCEDURE — G0378 HOSPITAL OBSERVATION PER HR: HCPCS

## 2021-05-21 RX ORDER — OLANZAPINE 5 MG/1
5 TABLET ORAL NIGHTLY
Status: DISCONTINUED | OUTPATIENT
Start: 2021-05-21 | End: 2021-06-03 | Stop reason: HOSPADM

## 2021-05-21 RX ORDER — INSULIN GLARGINE 100 [IU]/ML
20 INJECTION, SOLUTION SUBCUTANEOUS 2 TIMES DAILY
Status: DISCONTINUED | OUTPATIENT
Start: 2021-05-21 | End: 2021-05-24

## 2021-05-21 RX ORDER — FOLIC ACID 1 MG/1
1 TABLET ORAL DAILY
Status: DISCONTINUED | OUTPATIENT
Start: 2021-05-22 | End: 2021-06-03 | Stop reason: HOSPADM

## 2021-05-21 RX ORDER — TAMSULOSIN HYDROCHLORIDE 0.4 MG/1
0.4 CAPSULE ORAL DAILY
Status: DISCONTINUED | OUTPATIENT
Start: 2021-05-22 | End: 2021-06-03 | Stop reason: HOSPADM

## 2021-05-21 RX ORDER — LAMOTRIGINE 25 MG/1
12.5 TABLET ORAL 2 TIMES DAILY
Status: DISCONTINUED | OUTPATIENT
Start: 2021-05-21 | End: 2021-06-03 | Stop reason: HOSPADM

## 2021-05-21 RX ORDER — FAMOTIDINE 20 MG/1
20 TABLET, FILM COATED ORAL
Status: DISCONTINUED | OUTPATIENT
Start: 2021-05-21 | End: 2021-06-03 | Stop reason: HOSPADM

## 2021-05-21 RX ORDER — SODIUM CHLORIDE 0.9 % (FLUSH) 0.9 %
10 SYRINGE (ML) INJECTION EVERY 12 HOURS SCHEDULED
Status: DISCONTINUED | OUTPATIENT
Start: 2021-05-21 | End: 2021-06-03 | Stop reason: HOSPADM

## 2021-05-21 RX ORDER — ISOSORBIDE MONONITRATE 30 MG/1
30 TABLET, EXTENDED RELEASE ORAL DAILY
Status: DISCONTINUED | OUTPATIENT
Start: 2021-05-22 | End: 2021-06-03 | Stop reason: HOSPADM

## 2021-05-21 RX ORDER — INSULIN LISPRO 100 [IU]/ML
5 INJECTION, SOLUTION INTRAVENOUS; SUBCUTANEOUS
Status: DISCONTINUED | OUTPATIENT
Start: 2021-05-21 | End: 2021-05-24

## 2021-05-21 RX ORDER — LEVOTHYROXINE SODIUM 0.1 MG/1
100 TABLET ORAL DAILY
Status: DISCONTINUED | OUTPATIENT
Start: 2021-05-22 | End: 2021-06-03 | Stop reason: HOSPADM

## 2021-05-21 RX ORDER — SODIUM CHLORIDE 0.9 % (FLUSH) 0.9 %
10 SYRINGE (ML) INJECTION AS NEEDED
Status: DISCONTINUED | OUTPATIENT
Start: 2021-05-21 | End: 2021-06-03 | Stop reason: HOSPADM

## 2021-05-21 RX ORDER — HYDROCODONE BITARTRATE AND ACETAMINOPHEN 7.5; 325 MG/1; MG/1
1 TABLET ORAL EVERY 4 HOURS PRN
Status: DISCONTINUED | OUTPATIENT
Start: 2021-05-21 | End: 2021-06-03 | Stop reason: HOSPADM

## 2021-05-21 RX ORDER — DIPHENOXYLATE HYDROCHLORIDE AND ATROPINE SULFATE 2.5; .025 MG/1; MG/1
1 TABLET ORAL DAILY
Status: DISCONTINUED | OUTPATIENT
Start: 2021-05-22 | End: 2021-06-03 | Stop reason: HOSPADM

## 2021-05-21 RX ORDER — NICOTINE POLACRILEX 4 MG
15 LOZENGE BUCCAL
COMMUNITY

## 2021-05-21 RX ORDER — INSULIN GLARGINE 100 [IU]/ML
20 INJECTION, SOLUTION SUBCUTANEOUS 2 TIMES DAILY
COMMUNITY

## 2021-05-21 RX ORDER — ACETAMINOPHEN 325 MG/1
650 TABLET ORAL EVERY 8 HOURS PRN
COMMUNITY

## 2021-05-21 RX ORDER — FERROUS SULFATE 325(65) MG
325 TABLET ORAL 2 TIMES DAILY
COMMUNITY
End: 2021-06-10 | Stop reason: HOSPADM

## 2021-05-21 RX ORDER — SENNA PLUS 8.6 MG/1
1 TABLET ORAL DAILY PRN
Status: DISCONTINUED | OUTPATIENT
Start: 2021-05-21 | End: 2021-06-03 | Stop reason: HOSPADM

## 2021-05-21 RX ORDER — SENNA PLUS 8.6 MG/1
1 TABLET ORAL DAILY PRN
COMMUNITY

## 2021-05-21 RX ORDER — METOPROLOL SUCCINATE 25 MG/1
25 TABLET, EXTENDED RELEASE ORAL DAILY
Status: DISCONTINUED | OUTPATIENT
Start: 2021-05-22 | End: 2021-06-03 | Stop reason: HOSPADM

## 2021-05-21 RX ORDER — ATORVASTATIN CALCIUM 20 MG/1
20 TABLET, FILM COATED ORAL NIGHTLY
Status: DISCONTINUED | OUTPATIENT
Start: 2021-05-21 | End: 2021-06-03 | Stop reason: HOSPADM

## 2021-05-21 RX ORDER — ACETAMINOPHEN 325 MG/1
650 TABLET ORAL EVERY 8 HOURS PRN
Status: DISCONTINUED | OUTPATIENT
Start: 2021-05-21 | End: 2021-06-03 | Stop reason: HOSPADM

## 2021-05-21 RX ORDER — ACETAMINOPHEN 500 MG
1000 TABLET ORAL ONCE
Status: CANCELLED | OUTPATIENT
Start: 2021-05-21 | End: 2021-05-21

## 2021-05-21 RX ORDER — MEMANTINE HYDROCHLORIDE 5 MG/1
5 TABLET ORAL DAILY
Status: DISCONTINUED | OUTPATIENT
Start: 2021-05-22 | End: 2021-06-03 | Stop reason: HOSPADM

## 2021-05-21 RX ORDER — VANCOMYCIN 1.75 GRAM/500 ML IN 0.9 % SODIUM CHLORIDE INTRAVENOUS
20 ONCE
Status: COMPLETED | OUTPATIENT
Start: 2021-05-21 | End: 2021-05-21

## 2021-05-21 RX ADMIN — OLANZAPINE 5 MG: 5 TABLET ORAL at 21:08

## 2021-05-21 RX ADMIN — ATORVASTATIN CALCIUM 20 MG: 20 TABLET, FILM COATED ORAL at 21:08

## 2021-05-21 RX ADMIN — FAMOTIDINE 20 MG: 20 TABLET ORAL at 21:09

## 2021-05-21 RX ADMIN — HYDROCODONE BITARTRATE AND ACETAMINOPHEN 1 TABLET: 7.5; 325 TABLET ORAL at 21:10

## 2021-05-21 RX ADMIN — INSULIN LISPRO 5 UNITS: 100 INJECTION, SOLUTION INTRAVENOUS; SUBCUTANEOUS at 21:09

## 2021-05-21 RX ADMIN — ENOXAPARIN SODIUM 40 MG: 40 INJECTION SUBCUTANEOUS at 21:09

## 2021-05-21 RX ADMIN — CEFAZOLIN SODIUM 2 G: 10 INJECTION, POWDER, FOR SOLUTION INTRAVENOUS at 22:26

## 2021-05-21 RX ADMIN — INSULIN GLARGINE 20 UNITS: 100 INJECTION, SOLUTION SUBCUTANEOUS at 21:55

## 2021-05-21 RX ADMIN — LAMOTRIGINE 12.5 MG: 25 TABLET ORAL at 21:08

## 2021-05-21 RX ADMIN — VANCOMYCIN HYDROCHLORIDE 1750 MG: 10 INJECTION, POWDER, LYOPHILIZED, FOR SOLUTION INTRAVENOUS at 22:30

## 2021-05-21 RX ADMIN — Medication 10 ML: at 21:10

## 2021-05-22 LAB
ANION GAP SERPL CALCULATED.3IONS-SCNC: 8 MMOL/L (ref 5–15)
BASOPHILS # BLD AUTO: 0.1 10*3/MM3 (ref 0–0.2)
BASOPHILS NFR BLD AUTO: 0.7 % (ref 0–1.5)
BUN SERPL-MCNC: 30 MG/DL (ref 8–23)
BUN/CREAT SERPL: 24.8 (ref 7–25)
CALCIUM SPEC-SCNC: 7.8 MG/DL (ref 8.6–10.5)
CHLORIDE SERPL-SCNC: 103 MMOL/L (ref 98–107)
CO2 SERPL-SCNC: 24 MMOL/L (ref 22–29)
CREAT SERPL-MCNC: 1.21 MG/DL (ref 0.76–1.27)
DEPRECATED RDW RBC AUTO: 48.1 FL (ref 37–54)
EOSINOPHIL # BLD AUTO: 0.2 10*3/MM3 (ref 0–0.4)
EOSINOPHIL NFR BLD AUTO: 2.3 % (ref 0.3–6.2)
ERYTHROCYTE [DISTWIDTH] IN BLOOD BY AUTOMATED COUNT: 18.5 % (ref 12.3–15.4)
GFR SERPL CREATININE-BSD FRML MDRD: 59 ML/MIN/1.73
GLUCOSE BLDC GLUCOMTR-MCNC: 194 MG/DL (ref 70–105)
GLUCOSE BLDC GLUCOMTR-MCNC: 77 MG/DL (ref 70–105)
GLUCOSE BLDC GLUCOMTR-MCNC: 79 MG/DL (ref 70–105)
GLUCOSE SERPL-MCNC: 136 MG/DL (ref 65–99)
HCT VFR BLD AUTO: 25.3 % (ref 37.5–51)
HGB BLD-MCNC: 8.4 G/DL (ref 13–17.7)
LYMPHOCYTES # BLD AUTO: 2.4 10*3/MM3 (ref 0.7–3.1)
LYMPHOCYTES NFR BLD AUTO: 29.2 % (ref 19.6–45.3)
MCH RBC QN AUTO: 24.4 PG (ref 26.6–33)
MCHC RBC AUTO-ENTMCNC: 33.1 G/DL (ref 31.5–35.7)
MCV RBC AUTO: 73.5 FL (ref 79–97)
MONOCYTES # BLD AUTO: 0.4 10*3/MM3 (ref 0.1–0.9)
MONOCYTES NFR BLD AUTO: 5.4 % (ref 5–12)
NEUTROPHILS NFR BLD AUTO: 5.1 10*3/MM3 (ref 1.7–7)
NEUTROPHILS NFR BLD AUTO: 62.4 % (ref 42.7–76)
NRBC BLD AUTO-RTO: 0 /100 WBC (ref 0–0.2)
PLATELET # BLD AUTO: 416 10*3/MM3 (ref 140–450)
PMV BLD AUTO: 6.2 FL (ref 6–12)
POTASSIUM SERPL-SCNC: 4.1 MMOL/L (ref 3.5–5.2)
RBC # BLD AUTO: 3.44 10*6/MM3 (ref 4.14–5.8)
SODIUM SERPL-SCNC: 135 MMOL/L (ref 136–145)
WBC # BLD AUTO: 8.1 10*3/MM3 (ref 3.4–10.8)

## 2021-05-22 PROCEDURE — 25010000002 CEFAZOLIN PER 500 MG: Performed by: HOSPITALIST

## 2021-05-22 PROCEDURE — 63710000001 INSULIN LISPRO (HUMAN) PER 5 UNITS: Performed by: HOSPITALIST

## 2021-05-22 PROCEDURE — G0378 HOSPITAL OBSERVATION PER HR: HCPCS

## 2021-05-22 PROCEDURE — 25010000002 ENOXAPARIN PER 10 MG: Performed by: HOSPITALIST

## 2021-05-22 PROCEDURE — 25010000002 VANCOMYCIN PER 500 MG: Performed by: HOSPITALIST

## 2021-05-22 PROCEDURE — 25010000002 VANCOMYCIN 10 G RECONSTITUTED SOLUTION: Performed by: HOSPITALIST

## 2021-05-22 PROCEDURE — 82962 GLUCOSE BLOOD TEST: CPT

## 2021-05-22 PROCEDURE — 85025 COMPLETE CBC W/AUTO DIFF WBC: CPT | Performed by: HOSPITALIST

## 2021-05-22 PROCEDURE — 63710000001 INSULIN GLARGINE PER 5 UNITS: Performed by: HOSPITALIST

## 2021-05-22 PROCEDURE — 80048 BASIC METABOLIC PNL TOTAL CA: CPT | Performed by: HOSPITALIST

## 2021-05-22 PROCEDURE — 99232 SBSQ HOSP IP/OBS MODERATE 35: CPT | Performed by: HOSPITALIST

## 2021-05-22 RX ADMIN — CEFAZOLIN SODIUM 2 G: 10 INJECTION, POWDER, FOR SOLUTION INTRAVENOUS at 06:01

## 2021-05-22 RX ADMIN — LEVOTHYROXINE SODIUM 100 MCG: 0.1 TABLET ORAL at 06:01

## 2021-05-22 RX ADMIN — CEFAZOLIN SODIUM 2 G: 10 INJECTION, POWDER, FOR SOLUTION INTRAVENOUS at 11:53

## 2021-05-22 RX ADMIN — OLANZAPINE 5 MG: 5 TABLET ORAL at 20:21

## 2021-05-22 RX ADMIN — ISOSORBIDE MONONITRATE 30 MG: 30 TABLET, EXTENDED RELEASE ORAL at 09:50

## 2021-05-22 RX ADMIN — MEMANTINE 5 MG: 5 TABLET ORAL at 09:50

## 2021-05-22 RX ADMIN — ENOXAPARIN SODIUM 40 MG: 40 INJECTION SUBCUTANEOUS at 17:28

## 2021-05-22 RX ADMIN — LAMOTRIGINE 12.5 MG: 25 TABLET ORAL at 20:21

## 2021-05-22 RX ADMIN — INSULIN LISPRO 5 UNITS: 100 INJECTION, SOLUTION INTRAVENOUS; SUBCUTANEOUS at 17:28

## 2021-05-22 RX ADMIN — FAMOTIDINE 20 MG: 20 TABLET ORAL at 09:50

## 2021-05-22 RX ADMIN — LAMOTRIGINE 12.5 MG: 25 TABLET ORAL at 09:50

## 2021-05-22 RX ADMIN — FAMOTIDINE 20 MG: 20 TABLET ORAL at 17:28

## 2021-05-22 RX ADMIN — METOPROLOL SUCCINATE 25 MG: 25 TABLET, EXTENDED RELEASE ORAL at 09:50

## 2021-05-22 RX ADMIN — ATORVASTATIN CALCIUM 20 MG: 20 TABLET, FILM COATED ORAL at 20:21

## 2021-05-22 RX ADMIN — INSULIN GLARGINE 20 UNITS: 100 INJECTION, SOLUTION SUBCUTANEOUS at 20:22

## 2021-05-22 RX ADMIN — TAMSULOSIN HYDROCHLORIDE 0.4 MG: 0.4 CAPSULE ORAL at 20:21

## 2021-05-22 RX ADMIN — CEFAZOLIN SODIUM 2 G: 10 INJECTION, POWDER, FOR SOLUTION INTRAVENOUS at 20:21

## 2021-05-22 RX ADMIN — FOLIC ACID 1 MG: 1 TABLET ORAL at 09:50

## 2021-05-22 RX ADMIN — Medication 10 ML: at 20:25

## 2021-05-22 RX ADMIN — Medication 100 MG: at 09:50

## 2021-05-22 RX ADMIN — THERA TABS 1 TABLET: TAB at 09:50

## 2021-05-22 RX ADMIN — VANCOMYCIN HYDROCHLORIDE 1500 MG: 10 INJECTION, POWDER, LYOPHILIZED, FOR SOLUTION INTRAVENOUS at 20:22

## 2021-05-22 RX ADMIN — INSULIN GLARGINE 20 UNITS: 100 INJECTION, SOLUTION SUBCUTANEOUS at 09:50

## 2021-05-23 LAB
GLUCOSE BLDC GLUCOMTR-MCNC: 145 MG/DL (ref 70–105)
GLUCOSE BLDC GLUCOMTR-MCNC: 169 MG/DL (ref 70–105)
GLUCOSE BLDC GLUCOMTR-MCNC: 211 MG/DL (ref 70–105)
GLUCOSE BLDC GLUCOMTR-MCNC: 67 MG/DL (ref 70–105)

## 2021-05-23 PROCEDURE — 25010000002 CEFTRIAXONE PER 250 MG: Performed by: NURSE PRACTITIONER

## 2021-05-23 PROCEDURE — 97162 PT EVAL MOD COMPLEX 30 MIN: CPT

## 2021-05-23 PROCEDURE — 25010000002 CEFAZOLIN PER 500 MG: Performed by: HOSPITALIST

## 2021-05-23 PROCEDURE — 63710000001 INSULIN GLARGINE PER 5 UNITS: Performed by: HOSPITALIST

## 2021-05-23 PROCEDURE — 82962 GLUCOSE BLOOD TEST: CPT

## 2021-05-23 PROCEDURE — 25010000002 VANCOMYCIN 10 G RECONSTITUTED SOLUTION: Performed by: HOSPITALIST

## 2021-05-23 PROCEDURE — 25010000002 ENOXAPARIN PER 10 MG: Performed by: HOSPITALIST

## 2021-05-23 PROCEDURE — G0378 HOSPITAL OBSERVATION PER HR: HCPCS

## 2021-05-23 PROCEDURE — 25010000002 VANCOMYCIN PER 500 MG: Performed by: HOSPITALIST

## 2021-05-23 PROCEDURE — 87186 SC STD MICRODIL/AGAR DIL: CPT | Performed by: NURSE PRACTITIONER

## 2021-05-23 PROCEDURE — 87205 SMEAR GRAM STAIN: CPT | Performed by: NURSE PRACTITIONER

## 2021-05-23 PROCEDURE — 87147 CULTURE TYPE IMMUNOLOGIC: CPT | Performed by: NURSE PRACTITIONER

## 2021-05-23 PROCEDURE — 63710000001 INSULIN LISPRO (HUMAN) PER 5 UNITS: Performed by: HOSPITALIST

## 2021-05-23 PROCEDURE — 87076 CULTURE ANAEROBE IDENT EACH: CPT | Performed by: NURSE PRACTITIONER

## 2021-05-23 PROCEDURE — 87070 CULTURE OTHR SPECIMN AEROBIC: CPT | Performed by: NURSE PRACTITIONER

## 2021-05-23 PROCEDURE — 99232 SBSQ HOSP IP/OBS MODERATE 35: CPT | Performed by: HOSPITALIST

## 2021-05-23 RX ADMIN — HYDROCODONE BITARTRATE AND ACETAMINOPHEN 1 TABLET: 7.5; 325 TABLET ORAL at 15:08

## 2021-05-23 RX ADMIN — FAMOTIDINE 20 MG: 20 TABLET ORAL at 16:51

## 2021-05-23 RX ADMIN — ATORVASTATIN CALCIUM 20 MG: 20 TABLET, FILM COATED ORAL at 21:06

## 2021-05-23 RX ADMIN — TAMSULOSIN HYDROCHLORIDE 0.4 MG: 0.4 CAPSULE ORAL at 21:06

## 2021-05-23 RX ADMIN — CEFTRIAXONE SODIUM 2 G: 2 INJECTION, POWDER, FOR SOLUTION INTRAMUSCULAR; INTRAVENOUS at 13:45

## 2021-05-23 RX ADMIN — OLANZAPINE 5 MG: 5 TABLET ORAL at 21:06

## 2021-05-23 RX ADMIN — VANCOMYCIN HYDROCHLORIDE 1500 MG: 10 INJECTION, POWDER, LYOPHILIZED, FOR SOLUTION INTRAVENOUS at 21:06

## 2021-05-23 RX ADMIN — Medication 100 MG: at 09:51

## 2021-05-23 RX ADMIN — LAMOTRIGINE 12.5 MG: 25 TABLET ORAL at 21:06

## 2021-05-23 RX ADMIN — FAMOTIDINE 20 MG: 20 TABLET ORAL at 09:52

## 2021-05-23 RX ADMIN — MEMANTINE 5 MG: 5 TABLET ORAL at 09:52

## 2021-05-23 RX ADMIN — CEFAZOLIN SODIUM 2 G: 10 INJECTION, POWDER, FOR SOLUTION INTRAVENOUS at 05:14

## 2021-05-23 RX ADMIN — ENOXAPARIN SODIUM 40 MG: 40 INJECTION SUBCUTANEOUS at 16:51

## 2021-05-23 RX ADMIN — THERA TABS 1 TABLET: TAB at 09:51

## 2021-05-23 RX ADMIN — ISOSORBIDE MONONITRATE 30 MG: 30 TABLET, EXTENDED RELEASE ORAL at 09:51

## 2021-05-23 RX ADMIN — INSULIN LISPRO 5 UNITS: 100 INJECTION, SOLUTION INTRAVENOUS; SUBCUTANEOUS at 12:47

## 2021-05-23 RX ADMIN — Medication 10 ML: at 21:07

## 2021-05-23 RX ADMIN — HYDROCODONE BITARTRATE AND ACETAMINOPHEN 1 TABLET: 7.5; 325 TABLET ORAL at 21:06

## 2021-05-23 RX ADMIN — INSULIN GLARGINE 20 UNITS: 100 INJECTION, SOLUTION SUBCUTANEOUS at 09:51

## 2021-05-23 RX ADMIN — LEVOTHYROXINE SODIUM 100 MCG: 0.1 TABLET ORAL at 05:14

## 2021-05-23 RX ADMIN — INSULIN LISPRO 5 UNITS: 100 INJECTION, SOLUTION INTRAVENOUS; SUBCUTANEOUS at 16:51

## 2021-05-23 RX ADMIN — LAMOTRIGINE 12.5 MG: 25 TABLET ORAL at 09:52

## 2021-05-23 RX ADMIN — FOLIC ACID 1 MG: 1 TABLET ORAL at 09:51

## 2021-05-23 RX ADMIN — METOPROLOL SUCCINATE 25 MG: 25 TABLET, EXTENDED RELEASE ORAL at 09:51

## 2021-05-23 RX ADMIN — INSULIN GLARGINE 20 UNITS: 100 INJECTION, SOLUTION SUBCUTANEOUS at 21:05

## 2021-05-23 RX ADMIN — CEFAZOLIN SODIUM 2 G: 10 INJECTION, POWDER, FOR SOLUTION INTRAVENOUS at 12:48

## 2021-05-24 ENCOUNTER — APPOINTMENT (OUTPATIENT)
Dept: CARDIOLOGY | Facility: HOSPITAL | Age: 76
End: 2021-05-24

## 2021-05-24 LAB
ANION GAP SERPL CALCULATED.3IONS-SCNC: 9 MMOL/L (ref 5–15)
BASOPHILS # BLD AUTO: 0 10*3/MM3 (ref 0–0.2)
BASOPHILS NFR BLD AUTO: 0.7 % (ref 0–1.5)
BH CV LOWER VASCULAR LEFT COMMON FEMORAL AUGMENT: NORMAL
BH CV LOWER VASCULAR LEFT COMMON FEMORAL COMPETENT: NORMAL
BH CV LOWER VASCULAR LEFT COMMON FEMORAL COMPRESS: NORMAL
BH CV LOWER VASCULAR LEFT COMMON FEMORAL PHASIC: NORMAL
BH CV LOWER VASCULAR LEFT COMMON FEMORAL SPONT: NORMAL
BH CV LOWER VASCULAR LEFT DISTAL FEMORAL COMPRESS: NORMAL
BH CV LOWER VASCULAR LEFT GASTRONEMIUS COMPRESS: NORMAL
BH CV LOWER VASCULAR LEFT GREATER SAPH AK COMPRESS: NORMAL
BH CV LOWER VASCULAR LEFT GREATER SAPH BK COMPRESS: NORMAL
BH CV LOWER VASCULAR LEFT LESSER SAPH COMPRESS: NORMAL
BH CV LOWER VASCULAR LEFT MID FEMORAL AUGMENT: NORMAL
BH CV LOWER VASCULAR LEFT MID FEMORAL COMPETENT: NORMAL
BH CV LOWER VASCULAR LEFT MID FEMORAL COMPRESS: NORMAL
BH CV LOWER VASCULAR LEFT MID FEMORAL PHASIC: NORMAL
BH CV LOWER VASCULAR LEFT MID FEMORAL SPONT: NORMAL
BH CV LOWER VASCULAR LEFT PERONEAL COMPRESS: NORMAL
BH CV LOWER VASCULAR LEFT POPLITEAL AUGMENT: NORMAL
BH CV LOWER VASCULAR LEFT POPLITEAL COMPETENT: NORMAL
BH CV LOWER VASCULAR LEFT POPLITEAL COMPRESS: NORMAL
BH CV LOWER VASCULAR LEFT POPLITEAL PHASIC: NORMAL
BH CV LOWER VASCULAR LEFT POPLITEAL SPONT: NORMAL
BH CV LOWER VASCULAR LEFT POSTERIOR TIBIAL COMPRESS: NORMAL
BH CV LOWER VASCULAR LEFT PROXIMAL FEMORAL COMPRESS: NORMAL
BH CV LOWER VASCULAR LEFT SAPHENOFEMORAL JUNCTION COMPRESS: NORMAL
BH CV LOWER VASCULAR RIGHT COMMON FEMORAL AUGMENT: NORMAL
BH CV LOWER VASCULAR RIGHT COMMON FEMORAL COMPETENT: NORMAL
BH CV LOWER VASCULAR RIGHT COMMON FEMORAL COMPRESS: NORMAL
BH CV LOWER VASCULAR RIGHT COMMON FEMORAL PHASIC: NORMAL
BH CV LOWER VASCULAR RIGHT COMMON FEMORAL SPONT: NORMAL
BH CV LOWER VASCULAR RIGHT DISTAL FEMORAL COMPRESS: NORMAL
BH CV LOWER VASCULAR RIGHT GASTRONEMIUS COMPRESS: NORMAL
BH CV LOWER VASCULAR RIGHT GREATER SAPH AK COMPRESS: NORMAL
BH CV LOWER VASCULAR RIGHT GREATER SAPH BK COMPRESS: NORMAL
BH CV LOWER VASCULAR RIGHT LESSER SAPH COMPRESS: NORMAL
BH CV LOWER VASCULAR RIGHT MID FEMORAL AUGMENT: NORMAL
BH CV LOWER VASCULAR RIGHT MID FEMORAL COMPETENT: NORMAL
BH CV LOWER VASCULAR RIGHT MID FEMORAL COMPRESS: NORMAL
BH CV LOWER VASCULAR RIGHT MID FEMORAL PHASIC: NORMAL
BH CV LOWER VASCULAR RIGHT MID FEMORAL SPONT: NORMAL
BH CV LOWER VASCULAR RIGHT PERONEAL COMPRESS: NORMAL
BH CV LOWER VASCULAR RIGHT POPLITEAL AUGMENT: NORMAL
BH CV LOWER VASCULAR RIGHT POPLITEAL COMPETENT: NORMAL
BH CV LOWER VASCULAR RIGHT POPLITEAL COMPRESS: NORMAL
BH CV LOWER VASCULAR RIGHT POPLITEAL PHASIC: NORMAL
BH CV LOWER VASCULAR RIGHT POPLITEAL SPONT: NORMAL
BH CV LOWER VASCULAR RIGHT POSTERIOR TIBIAL COMPRESS: NORMAL
BH CV LOWER VASCULAR RIGHT PROXIMAL FEMORAL COMPRESS: NORMAL
BH CV LOWER VASCULAR RIGHT SAPHENOFEMORAL JUNCTION COMPRESS: NORMAL
BUN SERPL-MCNC: 24 MG/DL (ref 8–23)
BUN/CREAT SERPL: 25.8 (ref 7–25)
CALCIUM SPEC-SCNC: 7.4 MG/DL (ref 8.6–10.5)
CHLORIDE SERPL-SCNC: 104 MMOL/L (ref 98–107)
CO2 SERPL-SCNC: 23 MMOL/L (ref 22–29)
CREAT SERPL-MCNC: 0.93 MG/DL (ref 0.76–1.27)
CRP SERPL-MCNC: 3.9 MG/DL (ref 0–0.5)
DEPRECATED RDW RBC AUTO: 49 FL (ref 37–54)
EOSINOPHIL # BLD AUTO: 0.2 10*3/MM3 (ref 0–0.4)
EOSINOPHIL NFR BLD AUTO: 4 % (ref 0.3–6.2)
ERYTHROCYTE [DISTWIDTH] IN BLOOD BY AUTOMATED COUNT: 18.9 % (ref 12.3–15.4)
ERYTHROCYTE [SEDIMENTATION RATE] IN BLOOD: 61 MM/HR (ref 0–20)
GFR SERPL CREATININE-BSD FRML MDRD: 80 ML/MIN/1.73
GLUCOSE BLDC GLUCOMTR-MCNC: 101 MG/DL (ref 70–105)
GLUCOSE BLDC GLUCOMTR-MCNC: 126 MG/DL (ref 70–105)
GLUCOSE BLDC GLUCOMTR-MCNC: 194 MG/DL (ref 70–105)
GLUCOSE BLDC GLUCOMTR-MCNC: 221 MG/DL (ref 70–105)
GLUCOSE BLDC GLUCOMTR-MCNC: 58 MG/DL (ref 70–105)
GLUCOSE BLDC GLUCOMTR-MCNC: 65 MG/DL (ref 70–105)
GLUCOSE BLDC GLUCOMTR-MCNC: 95 MG/DL (ref 70–105)
GLUCOSE SERPL-MCNC: 100 MG/DL (ref 65–99)
HBA1C MFR BLD: 7.6 % (ref 3.5–5.6)
HCT VFR BLD AUTO: 23.5 % (ref 37.5–51)
HGB BLD-MCNC: 7.9 G/DL (ref 13–17.7)
HOLD SPECIMEN: NORMAL
HOLD SPECIMEN: NORMAL
LYMPHOCYTES # BLD AUTO: 1.8 10*3/MM3 (ref 0.7–3.1)
LYMPHOCYTES NFR BLD AUTO: 33 % (ref 19.6–45.3)
MAXIMAL PREDICTED HEART RATE: 147 BPM
MCH RBC QN AUTO: 24.6 PG (ref 26.6–33)
MCHC RBC AUTO-ENTMCNC: 33.6 G/DL (ref 31.5–35.7)
MCV RBC AUTO: 73.3 FL (ref 79–97)
MONOCYTES # BLD AUTO: 0.4 10*3/MM3 (ref 0.1–0.9)
MONOCYTES NFR BLD AUTO: 6.4 % (ref 5–12)
NEUTROPHILS NFR BLD AUTO: 3.1 10*3/MM3 (ref 1.7–7)
NEUTROPHILS NFR BLD AUTO: 55.9 % (ref 42.7–76)
NRBC BLD AUTO-RTO: 0.1 /100 WBC (ref 0–0.2)
PLATELET # BLD AUTO: 317 10*3/MM3 (ref 140–450)
PMV BLD AUTO: 6.4 FL (ref 6–12)
POTASSIUM SERPL-SCNC: 4.4 MMOL/L (ref 3.5–5.2)
RBC # BLD AUTO: 3.2 10*6/MM3 (ref 4.14–5.8)
SARS-COV-2 ORF1AB RESP QL NAA+PROBE: NOT DETECTED
SODIUM SERPL-SCNC: 136 MMOL/L (ref 136–145)
STRESS TARGET HR: 125 BPM
VANCOMYCIN PEAK SERPL-MCNC: 26.1 MCG/ML (ref 20–40)
VANCOMYCIN TROUGH SERPL-MCNC: 12 MCG/ML (ref 5–20)
WBC # BLD AUTO: 5.6 10*3/MM3 (ref 3.4–10.8)

## 2021-05-24 PROCEDURE — 82962 GLUCOSE BLOOD TEST: CPT

## 2021-05-24 PROCEDURE — 80048 BASIC METABOLIC PNL TOTAL CA: CPT | Performed by: INTERNAL MEDICINE

## 2021-05-24 PROCEDURE — 85652 RBC SED RATE AUTOMATED: CPT | Performed by: NURSE PRACTITIONER

## 2021-05-24 PROCEDURE — 80048 BASIC METABOLIC PNL TOTAL CA: CPT | Performed by: NURSE PRACTITIONER

## 2021-05-24 PROCEDURE — 63710000001 INSULIN GLARGINE PER 5 UNITS: Performed by: INTERNAL MEDICINE

## 2021-05-24 PROCEDURE — 63710000001 INSULIN LISPRO (HUMAN) PER 5 UNITS: Performed by: HOSPITALIST

## 2021-05-24 PROCEDURE — 80202 ASSAY OF VANCOMYCIN: CPT | Performed by: HOSPITALIST

## 2021-05-24 PROCEDURE — U0004 COV-19 TEST NON-CDC HGH THRU: HCPCS | Performed by: NURSE PRACTITIONER

## 2021-05-24 PROCEDURE — 93970 EXTREMITY STUDY: CPT

## 2021-05-24 PROCEDURE — 63710000001 INSULIN GLARGINE PER 5 UNITS: Performed by: HOSPITALIST

## 2021-05-24 PROCEDURE — 25010000002 VANCOMYCIN PER 500 MG: Performed by: INTERNAL MEDICINE

## 2021-05-24 PROCEDURE — 86140 C-REACTIVE PROTEIN: CPT | Performed by: NURSE PRACTITIONER

## 2021-05-24 PROCEDURE — 63710000001 INSULIN LISPRO (HUMAN) PER 5 UNITS: Performed by: INTERNAL MEDICINE

## 2021-05-24 PROCEDURE — 25010000002 CEFTRIAXONE PER 250 MG: Performed by: NURSE PRACTITIONER

## 2021-05-24 PROCEDURE — 99233 SBSQ HOSP IP/OBS HIGH 50: CPT | Performed by: INTERNAL MEDICINE

## 2021-05-24 PROCEDURE — 85025 COMPLETE CBC W/AUTO DIFF WBC: CPT | Performed by: NURSE PRACTITIONER

## 2021-05-24 PROCEDURE — 25010000002 VANCOMYCIN 10 G RECONSTITUTED SOLUTION: Performed by: INTERNAL MEDICINE

## 2021-05-24 PROCEDURE — 83036 HEMOGLOBIN GLYCOSYLATED A1C: CPT | Performed by: NURSE PRACTITIONER

## 2021-05-24 PROCEDURE — 25010000002 ENOXAPARIN PER 10 MG: Performed by: HOSPITALIST

## 2021-05-24 RX ORDER — DEXTROSE MONOHYDRATE 25 G/50ML
25 INJECTION, SOLUTION INTRAVENOUS
Status: DISCONTINUED | OUTPATIENT
Start: 2021-05-24 | End: 2021-06-03 | Stop reason: HOSPADM

## 2021-05-24 RX ORDER — INSULIN LISPRO 100 [IU]/ML
0-7 INJECTION, SOLUTION INTRAVENOUS; SUBCUTANEOUS
Status: DISCONTINUED | OUTPATIENT
Start: 2021-05-24 | End: 2021-05-28

## 2021-05-24 RX ORDER — INSULIN GLARGINE 100 [IU]/ML
15 INJECTION, SOLUTION SUBCUTANEOUS NIGHTLY
Status: DISCONTINUED | OUTPATIENT
Start: 2021-05-24 | End: 2021-05-25

## 2021-05-24 RX ORDER — NICOTINE POLACRILEX 4 MG
15 LOZENGE BUCCAL
Status: DISCONTINUED | OUTPATIENT
Start: 2021-05-24 | End: 2021-06-03 | Stop reason: HOSPADM

## 2021-05-24 RX ORDER — INSULIN LISPRO 100 [IU]/ML
0-7 INJECTION, SOLUTION INTRAVENOUS; SUBCUTANEOUS AS NEEDED
Status: DISCONTINUED | OUTPATIENT
Start: 2021-05-24 | End: 2021-05-28

## 2021-05-24 RX ORDER — VANCOMYCIN 1.75 GRAM/500 ML IN 0.9 % SODIUM CHLORIDE INTRAVENOUS
1750 EVERY 24 HOURS
Status: DISCONTINUED | OUTPATIENT
Start: 2021-05-24 | End: 2021-05-26

## 2021-05-24 RX ADMIN — OLANZAPINE 5 MG: 5 TABLET ORAL at 19:36

## 2021-05-24 RX ADMIN — MEMANTINE 5 MG: 5 TABLET ORAL at 08:30

## 2021-05-24 RX ADMIN — FAMOTIDINE 20 MG: 20 TABLET ORAL at 16:55

## 2021-05-24 RX ADMIN — INSULIN LISPRO 5 UNITS: 100 INJECTION, SOLUTION INTRAVENOUS; SUBCUTANEOUS at 08:29

## 2021-05-24 RX ADMIN — Medication 10 ML: at 08:31

## 2021-05-24 RX ADMIN — FAMOTIDINE 20 MG: 20 TABLET ORAL at 08:29

## 2021-05-24 RX ADMIN — VANCOMYCIN HYDROCHLORIDE 1750 MG: 10 INJECTION, POWDER, LYOPHILIZED, FOR SOLUTION INTRAVENOUS at 22:03

## 2021-05-24 RX ADMIN — ISOSORBIDE MONONITRATE 30 MG: 30 TABLET, EXTENDED RELEASE ORAL at 08:30

## 2021-05-24 RX ADMIN — Medication 10 ML: at 19:38

## 2021-05-24 RX ADMIN — Medication: at 22:03

## 2021-05-24 RX ADMIN — ATORVASTATIN CALCIUM 20 MG: 20 TABLET, FILM COATED ORAL at 19:36

## 2021-05-24 RX ADMIN — INSULIN LISPRO 3 UNITS: 100 INJECTION, SOLUTION INTRAVENOUS; SUBCUTANEOUS at 14:17

## 2021-05-24 RX ADMIN — INSULIN GLARGINE 20 UNITS: 100 INJECTION, SOLUTION SUBCUTANEOUS at 09:58

## 2021-05-24 RX ADMIN — Medication 100 MG: at 08:30

## 2021-05-24 RX ADMIN — LAMOTRIGINE 12.5 MG: 25 TABLET ORAL at 08:30

## 2021-05-24 RX ADMIN — INSULIN LISPRO 2 UNITS: 100 INJECTION, SOLUTION INTRAVENOUS; SUBCUTANEOUS at 16:58

## 2021-05-24 RX ADMIN — LEVOTHYROXINE SODIUM 100 MCG: 0.1 TABLET ORAL at 05:24

## 2021-05-24 RX ADMIN — INSULIN GLARGINE 15 UNITS: 100 INJECTION, SOLUTION SUBCUTANEOUS at 22:03

## 2021-05-24 RX ADMIN — TAMSULOSIN HYDROCHLORIDE 0.4 MG: 0.4 CAPSULE ORAL at 19:36

## 2021-05-24 RX ADMIN — CEFTRIAXONE SODIUM 2 G: 2 INJECTION, POWDER, FOR SOLUTION INTRAMUSCULAR; INTRAVENOUS at 08:30

## 2021-05-24 RX ADMIN — FOLIC ACID 1 MG: 1 TABLET ORAL at 08:30

## 2021-05-24 RX ADMIN — LAMOTRIGINE 12.5 MG: 25 TABLET ORAL at 19:36

## 2021-05-24 RX ADMIN — HYDROCODONE BITARTRATE AND ACETAMINOPHEN 1 TABLET: 7.5; 325 TABLET ORAL at 19:36

## 2021-05-24 RX ADMIN — THERA TABS 1 TABLET: TAB at 08:30

## 2021-05-24 RX ADMIN — INSULIN LISPRO 2 UNITS: 100 INJECTION, SOLUTION INTRAVENOUS; SUBCUTANEOUS at 16:55

## 2021-05-24 RX ADMIN — METOPROLOL SUCCINATE 25 MG: 25 TABLET, EXTENDED RELEASE ORAL at 08:30

## 2021-05-24 RX ADMIN — ENOXAPARIN SODIUM 40 MG: 40 INJECTION SUBCUTANEOUS at 16:55

## 2021-05-25 LAB
ALBUMIN SERPL-MCNC: 2.2 G/DL (ref 3.5–5.2)
ALBUMIN/GLOB SERPL: 0.5 G/DL
ALP SERPL-CCNC: 112 U/L (ref 39–117)
ALT SERPL W P-5'-P-CCNC: 10 U/L (ref 1–41)
ANION GAP SERPL CALCULATED.3IONS-SCNC: 7 MMOL/L (ref 5–15)
APPEARANCE FLD: ABNORMAL
AST SERPL-CCNC: 18 U/L (ref 1–40)
BASOPHILS # BLD AUTO: 0 10*3/MM3 (ref 0–0.2)
BASOPHILS NFR BLD AUTO: 0.5 % (ref 0–1.5)
BILIRUB SERPL-MCNC: 0.2 MG/DL (ref 0–1.2)
BUN SERPL-MCNC: 18 MG/DL (ref 8–23)
BUN/CREAT SERPL: 17 (ref 7–25)
CALCIUM SPEC-SCNC: 8.1 MG/DL (ref 8.6–10.5)
CHLORIDE SERPL-SCNC: 104 MMOL/L (ref 98–107)
CO2 SERPL-SCNC: 26 MMOL/L (ref 22–29)
COLOR FLD: ABNORMAL
CREAT SERPL-MCNC: 1.06 MG/DL (ref 0.76–1.27)
DEPRECATED RDW RBC AUTO: 48.6 FL (ref 37–54)
EOSINOPHIL # BLD AUTO: 0.2 10*3/MM3 (ref 0–0.4)
EOSINOPHIL NFR BLD AUTO: 2.7 % (ref 0.3–6.2)
ERYTHROCYTE [DISTWIDTH] IN BLOOD BY AUTOMATED COUNT: 18.7 % (ref 12.3–15.4)
GFR SERPL CREATININE-BSD FRML MDRD: 68 ML/MIN/1.73
GLOBULIN UR ELPH-MCNC: 4.5 GM/DL
GLUCOSE BLDC GLUCOMTR-MCNC: 105 MG/DL (ref 70–105)
GLUCOSE BLDC GLUCOMTR-MCNC: 154 MG/DL (ref 70–105)
GLUCOSE BLDC GLUCOMTR-MCNC: 225 MG/DL (ref 70–105)
GLUCOSE BLDC GLUCOMTR-MCNC: 457 MG/DL (ref 70–105)
GLUCOSE BLDC GLUCOMTR-MCNC: 67 MG/DL (ref 70–105)
GLUCOSE BLDC GLUCOMTR-MCNC: 69 MG/DL (ref 70–105)
GLUCOSE BLDC GLUCOMTR-MCNC: 71 MG/DL (ref 70–105)
GLUCOSE BLDC GLUCOMTR-MCNC: 90 MG/DL (ref 70–105)
GLUCOSE SERPL-MCNC: 49 MG/DL (ref 65–99)
HCT VFR BLD AUTO: 26.3 % (ref 37.5–51)
HGB BLD-MCNC: 8.6 G/DL (ref 13–17.7)
LYMPHOCYTES # BLD AUTO: 2.2 10*3/MM3 (ref 0.7–3.1)
LYMPHOCYTES NFR BLD AUTO: 29.2 % (ref 19.6–45.3)
LYMPHOCYTES NFR FLD MANUAL: 2 %
MCH RBC QN AUTO: 24.3 PG (ref 26.6–33)
MCHC RBC AUTO-ENTMCNC: 32.7 G/DL (ref 31.5–35.7)
MCV RBC AUTO: 74.4 FL (ref 79–97)
MONOCYTES # BLD AUTO: 0.4 10*3/MM3 (ref 0.1–0.9)
MONOCYTES NFR BLD AUTO: 5.8 % (ref 5–12)
NEUTROPHILS NFR BLD AUTO: 4.7 10*3/MM3 (ref 1.7–7)
NEUTROPHILS NFR BLD AUTO: 61.8 % (ref 42.7–76)
NEUTROPHILS NFR FLD MANUAL: 98 %
NRBC BLD AUTO-RTO: 0 /100 WBC (ref 0–0.2)
NUC CELL # FLD: ABNORMAL /MM3
PLATELET # BLD AUTO: 332 10*3/MM3 (ref 140–450)
PMV BLD AUTO: 6.5 FL (ref 6–12)
POTASSIUM SERPL-SCNC: 3.9 MMOL/L (ref 3.5–5.2)
PROT SERPL-MCNC: 6.7 G/DL (ref 6–8.5)
RBC # BLD AUTO: 3.54 10*6/MM3 (ref 4.14–5.8)
SODIUM SERPL-SCNC: 137 MMOL/L (ref 136–145)
WBC # BLD AUTO: 7.6 10*3/MM3 (ref 3.4–10.8)

## 2021-05-25 PROCEDURE — 87070 CULTURE OTHR SPECIMN AEROBIC: CPT | Performed by: NURSE PRACTITIONER

## 2021-05-25 PROCEDURE — 87205 SMEAR GRAM STAIN: CPT | Performed by: NURSE PRACTITIONER

## 2021-05-25 PROCEDURE — 25010000002 VANCOMYCIN 10 G RECONSTITUTED SOLUTION: Performed by: INTERNAL MEDICINE

## 2021-05-25 PROCEDURE — 25010000002 CEFTRIAXONE PER 250 MG: Performed by: NURSE PRACTITIONER

## 2021-05-25 PROCEDURE — 97110 THERAPEUTIC EXERCISES: CPT

## 2021-05-25 PROCEDURE — 63710000001 INSULIN LISPRO (HUMAN) PER 5 UNITS: Performed by: INTERNAL MEDICINE

## 2021-05-25 PROCEDURE — 99232 SBSQ HOSP IP/OBS MODERATE 35: CPT | Performed by: INTERNAL MEDICINE

## 2021-05-25 PROCEDURE — 85025 COMPLETE CBC W/AUTO DIFF WBC: CPT | Performed by: INTERNAL MEDICINE

## 2021-05-25 PROCEDURE — 80053 COMPREHEN METABOLIC PANEL: CPT | Performed by: INTERNAL MEDICINE

## 2021-05-25 PROCEDURE — 82962 GLUCOSE BLOOD TEST: CPT

## 2021-05-25 PROCEDURE — 63710000001 INSULIN GLARGINE PER 5 UNITS: Performed by: INTERNAL MEDICINE

## 2021-05-25 PROCEDURE — 89051 BODY FLUID CELL COUNT: CPT | Performed by: NURSE PRACTITIONER

## 2021-05-25 PROCEDURE — 25010000002 ENOXAPARIN PER 10 MG: Performed by: HOSPITALIST

## 2021-05-25 RX ORDER — INSULIN GLARGINE 100 [IU]/ML
8 INJECTION, SOLUTION SUBCUTANEOUS NIGHTLY
Status: DISCONTINUED | OUTPATIENT
Start: 2021-05-25 | End: 2021-05-29

## 2021-05-25 RX ADMIN — ENOXAPARIN SODIUM 40 MG: 40 INJECTION SUBCUTANEOUS at 17:29

## 2021-05-25 RX ADMIN — HYDROCODONE BITARTRATE AND ACETAMINOPHEN 1 TABLET: 7.5; 325 TABLET ORAL at 12:24

## 2021-05-25 RX ADMIN — INSULIN LISPRO 7 UNITS: 100 INJECTION, SOLUTION INTRAVENOUS; SUBCUTANEOUS at 17:29

## 2021-05-25 RX ADMIN — Medication 100 MG: at 08:18

## 2021-05-25 RX ADMIN — CEFTRIAXONE SODIUM 2 G: 2 INJECTION, POWDER, FOR SOLUTION INTRAMUSCULAR; INTRAVENOUS at 08:19

## 2021-05-25 RX ADMIN — INSULIN GLARGINE 8 UNITS: 100 INJECTION, SOLUTION SUBCUTANEOUS at 20:29

## 2021-05-25 RX ADMIN — LAMOTRIGINE 12.5 MG: 25 TABLET ORAL at 20:19

## 2021-05-25 RX ADMIN — ATORVASTATIN CALCIUM 20 MG: 20 TABLET, FILM COATED ORAL at 20:19

## 2021-05-25 RX ADMIN — THERA TABS 1 TABLET: TAB at 08:19

## 2021-05-25 RX ADMIN — FOLIC ACID 1 MG: 1 TABLET ORAL at 08:19

## 2021-05-25 RX ADMIN — ISOSORBIDE MONONITRATE 30 MG: 30 TABLET, EXTENDED RELEASE ORAL at 08:19

## 2021-05-25 RX ADMIN — Medication 10 ML: at 08:20

## 2021-05-25 RX ADMIN — LAMOTRIGINE 12.5 MG: 25 TABLET ORAL at 08:19

## 2021-05-25 RX ADMIN — FAMOTIDINE 20 MG: 20 TABLET ORAL at 08:19

## 2021-05-25 RX ADMIN — VANCOMYCIN HYDROCHLORIDE 1750 MG: 10 INJECTION, POWDER, LYOPHILIZED, FOR SOLUTION INTRAVENOUS at 20:19

## 2021-05-25 RX ADMIN — MEMANTINE 5 MG: 5 TABLET ORAL at 08:18

## 2021-05-25 RX ADMIN — Medication 10 ML: at 20:19

## 2021-05-25 RX ADMIN — Medication: at 20:21

## 2021-05-25 RX ADMIN — OLANZAPINE 5 MG: 5 TABLET ORAL at 20:19

## 2021-05-25 RX ADMIN — FAMOTIDINE 20 MG: 20 TABLET ORAL at 17:29

## 2021-05-25 RX ADMIN — TAMSULOSIN HYDROCHLORIDE 0.4 MG: 0.4 CAPSULE ORAL at 20:19

## 2021-05-25 RX ADMIN — Medication: at 08:20

## 2021-05-26 LAB
ALBUMIN SERPL-MCNC: 2.4 G/DL (ref 3.5–5.2)
ALBUMIN/GLOB SERPL: 0.6 G/DL
ALP SERPL-CCNC: 114 U/L (ref 39–117)
ALT SERPL W P-5'-P-CCNC: 13 U/L (ref 1–41)
ANION GAP SERPL CALCULATED.3IONS-SCNC: 7 MMOL/L (ref 5–15)
ANION GAP SERPL CALCULATED.3IONS-SCNC: 9 MMOL/L (ref 5–15)
AST SERPL-CCNC: 20 U/L (ref 1–40)
BACTERIA SPEC AEROBE CULT: ABNORMAL
BACTERIA SPEC AEROBE CULT: ABNORMAL
BACTERIA SPEC AEROBE CULT: NORMAL
BACTERIA SPEC AEROBE CULT: NORMAL
BASOPHILS # BLD AUTO: 0 10*3/MM3 (ref 0–0.2)
BASOPHILS NFR BLD AUTO: 0.6 % (ref 0–1.5)
BILIRUB SERPL-MCNC: 0.2 MG/DL (ref 0–1.2)
BUN SERPL-MCNC: 13 MG/DL (ref 8–23)
BUN SERPL-MCNC: 21 MG/DL (ref 8–23)
BUN/CREAT SERPL: 13.4 (ref 7–25)
BUN/CREAT SERPL: 18.6 (ref 7–25)
CALCIUM SPEC-SCNC: 7.7 MG/DL (ref 8.6–10.5)
CALCIUM SPEC-SCNC: 8 MG/DL (ref 8.6–10.5)
CHLORIDE SERPL-SCNC: 101 MMOL/L (ref 98–107)
CHLORIDE SERPL-SCNC: 104 MMOL/L (ref 98–107)
CO2 SERPL-SCNC: 24 MMOL/L (ref 22–29)
CO2 SERPL-SCNC: 27 MMOL/L (ref 22–29)
CREAT SERPL-MCNC: 0.97 MG/DL (ref 0.76–1.27)
CREAT SERPL-MCNC: 1.13 MG/DL (ref 0.76–1.27)
DEPRECATED RDW RBC AUTO: 49.4 FL (ref 37–54)
EOSINOPHIL # BLD AUTO: 0.2 10*3/MM3 (ref 0–0.4)
EOSINOPHIL NFR BLD AUTO: 3.4 % (ref 0.3–6.2)
ERYTHROCYTE [DISTWIDTH] IN BLOOD BY AUTOMATED COUNT: 18.8 % (ref 12.3–15.4)
GFR SERPL CREATININE-BSD FRML MDRD: 64 ML/MIN/1.73
GFR SERPL CREATININE-BSD FRML MDRD: 76 ML/MIN/1.73
GLOBULIN UR ELPH-MCNC: 4.3 GM/DL
GLUCOSE BLDC GLUCOMTR-MCNC: 164 MG/DL (ref 70–105)
GLUCOSE BLDC GLUCOMTR-MCNC: 256 MG/DL (ref 70–105)
GLUCOSE BLDC GLUCOMTR-MCNC: 277 MG/DL (ref 70–105)
GLUCOSE BLDC GLUCOMTR-MCNC: 83 MG/DL (ref 70–105)
GLUCOSE SERPL-MCNC: 141 MG/DL (ref 65–99)
GLUCOSE SERPL-MCNC: 83 MG/DL (ref 65–99)
GRAM STN SPEC: ABNORMAL
HCT VFR BLD AUTO: 27.3 % (ref 37.5–51)
HGB BLD-MCNC: 8.9 G/DL (ref 13–17.7)
LYMPHOCYTES # BLD AUTO: 2.2 10*3/MM3 (ref 0.7–3.1)
LYMPHOCYTES NFR BLD AUTO: 32.3 % (ref 19.6–45.3)
MCH RBC QN AUTO: 24.2 PG (ref 26.6–33)
MCHC RBC AUTO-ENTMCNC: 32.7 G/DL (ref 31.5–35.7)
MCV RBC AUTO: 74.1 FL (ref 79–97)
MONOCYTES # BLD AUTO: 0.4 10*3/MM3 (ref 0.1–0.9)
MONOCYTES NFR BLD AUTO: 5.9 % (ref 5–12)
NEUTROPHILS NFR BLD AUTO: 3.9 10*3/MM3 (ref 1.7–7)
NEUTROPHILS NFR BLD AUTO: 57.8 % (ref 42.7–76)
NRBC BLD AUTO-RTO: 0.2 /100 WBC (ref 0–0.2)
PLATELET # BLD AUTO: 337 10*3/MM3 (ref 140–450)
PMV BLD AUTO: 6.3 FL (ref 6–12)
POTASSIUM SERPL-SCNC: 4.4 MMOL/L (ref 3.5–5.2)
POTASSIUM SERPL-SCNC: 4.4 MMOL/L (ref 3.5–5.2)
PROT SERPL-MCNC: 6.7 G/DL (ref 6–8.5)
RBC # BLD AUTO: 3.69 10*6/MM3 (ref 4.14–5.8)
SODIUM SERPL-SCNC: 134 MMOL/L (ref 136–145)
SODIUM SERPL-SCNC: 138 MMOL/L (ref 136–145)
VANCOMYCIN TROUGH SERPL-MCNC: 18.8 MCG/ML (ref 5–20)
WBC # BLD AUTO: 6.8 10*3/MM3 (ref 3.4–10.8)

## 2021-05-26 PROCEDURE — 63710000001 INSULIN LISPRO (HUMAN) PER 5 UNITS: Performed by: INTERNAL MEDICINE

## 2021-05-26 PROCEDURE — 80053 COMPREHEN METABOLIC PANEL: CPT | Performed by: INTERNAL MEDICINE

## 2021-05-26 PROCEDURE — 25010000002 VANCOMYCIN PER 500 MG: Performed by: INTERNAL MEDICINE

## 2021-05-26 PROCEDURE — 82962 GLUCOSE BLOOD TEST: CPT

## 2021-05-26 PROCEDURE — 25010000002 ENOXAPARIN PER 10 MG: Performed by: HOSPITALIST

## 2021-05-26 PROCEDURE — 80202 ASSAY OF VANCOMYCIN: CPT | Performed by: INTERNAL MEDICINE

## 2021-05-26 PROCEDURE — 63710000001 INSULIN GLARGINE PER 5 UNITS: Performed by: INTERNAL MEDICINE

## 2021-05-26 PROCEDURE — 85025 COMPLETE CBC W/AUTO DIFF WBC: CPT | Performed by: INTERNAL MEDICINE

## 2021-05-26 PROCEDURE — 97166 OT EVAL MOD COMPLEX 45 MIN: CPT

## 2021-05-26 PROCEDURE — 25010000002 VANCOMYCIN 10 G RECONSTITUTED SOLUTION: Performed by: INTERNAL MEDICINE

## 2021-05-26 PROCEDURE — 97535 SELF CARE MNGMENT TRAINING: CPT

## 2021-05-26 PROCEDURE — 99232 SBSQ HOSP IP/OBS MODERATE 35: CPT | Performed by: INTERNAL MEDICINE

## 2021-05-26 PROCEDURE — 25010000002 LORAZEPAM PER 2 MG: Performed by: INTERNAL MEDICINE

## 2021-05-26 RX ORDER — VANCOMYCIN 1.75 GRAM/500 ML IN 0.9 % SODIUM CHLORIDE INTRAVENOUS
1750 EVERY 24 HOURS
Status: DISCONTINUED | OUTPATIENT
Start: 2021-05-26 | End: 2021-06-03 | Stop reason: HOSPADM

## 2021-05-26 RX ORDER — LORAZEPAM 2 MG/ML
1 INJECTION INTRAMUSCULAR ONCE
Status: COMPLETED | OUTPATIENT
Start: 2021-05-26 | End: 2021-05-26

## 2021-05-26 RX ADMIN — METOPROLOL SUCCINATE 25 MG: 25 TABLET, EXTENDED RELEASE ORAL at 08:05

## 2021-05-26 RX ADMIN — MEMANTINE 5 MG: 5 TABLET ORAL at 08:04

## 2021-05-26 RX ADMIN — Medication: at 08:05

## 2021-05-26 RX ADMIN — INSULIN GLARGINE 8 UNITS: 100 INJECTION, SOLUTION SUBCUTANEOUS at 20:09

## 2021-05-26 RX ADMIN — ATORVASTATIN CALCIUM 20 MG: 20 TABLET, FILM COATED ORAL at 20:09

## 2021-05-26 RX ADMIN — HYDROCODONE BITARTRATE AND ACETAMINOPHEN 1 TABLET: 7.5; 325 TABLET ORAL at 12:23

## 2021-05-26 RX ADMIN — ISOSORBIDE MONONITRATE 30 MG: 30 TABLET, EXTENDED RELEASE ORAL at 08:05

## 2021-05-26 RX ADMIN — VANCOMYCIN HYDROCHLORIDE 1750 MG: 10 INJECTION, POWDER, LYOPHILIZED, FOR SOLUTION INTRAVENOUS at 23:06

## 2021-05-26 RX ADMIN — THERA TABS 1 TABLET: TAB at 08:04

## 2021-05-26 RX ADMIN — HYDROCODONE BITARTRATE AND ACETAMINOPHEN 1 TABLET: 7.5; 325 TABLET ORAL at 20:09

## 2021-05-26 RX ADMIN — ENOXAPARIN SODIUM 40 MG: 40 INJECTION SUBCUTANEOUS at 17:19

## 2021-05-26 RX ADMIN — FOLIC ACID 1 MG: 1 TABLET ORAL at 08:04

## 2021-05-26 RX ADMIN — LAMOTRIGINE 12.5 MG: 25 TABLET ORAL at 20:09

## 2021-05-26 RX ADMIN — INSULIN LISPRO 4 UNITS: 100 INJECTION, SOLUTION INTRAVENOUS; SUBCUTANEOUS at 17:19

## 2021-05-26 RX ADMIN — Medication: at 20:16

## 2021-05-26 RX ADMIN — OLANZAPINE 5 MG: 5 TABLET ORAL at 20:09

## 2021-05-26 RX ADMIN — Medication 100 MG: at 08:05

## 2021-05-26 RX ADMIN — INSULIN LISPRO 2 UNITS: 100 INJECTION, SOLUTION INTRAVENOUS; SUBCUTANEOUS at 12:04

## 2021-05-26 RX ADMIN — LAMOTRIGINE 12.5 MG: 25 TABLET ORAL at 08:04

## 2021-05-26 RX ADMIN — TAMSULOSIN HYDROCHLORIDE 0.4 MG: 0.4 CAPSULE ORAL at 20:09

## 2021-05-26 RX ADMIN — Medication 10 ML: at 20:19

## 2021-05-26 RX ADMIN — LORAZEPAM 1 MG: 2 INJECTION INTRAMUSCULAR; INTRAVENOUS at 21:28

## 2021-05-26 RX ADMIN — FAMOTIDINE 20 MG: 20 TABLET ORAL at 08:05

## 2021-05-26 RX ADMIN — Medication 10 ML: at 08:05

## 2021-05-26 RX ADMIN — FAMOTIDINE 20 MG: 20 TABLET ORAL at 17:19

## 2021-05-26 RX ADMIN — LEVOTHYROXINE SODIUM 100 MCG: 0.1 TABLET ORAL at 05:05

## 2021-05-27 LAB
ALBUMIN SERPL-MCNC: 2.2 G/DL (ref 3.5–5.2)
ALBUMIN/GLOB SERPL: 0.5 G/DL
ALP SERPL-CCNC: 107 U/L (ref 39–117)
ALT SERPL W P-5'-P-CCNC: 15 U/L (ref 1–41)
ANION GAP SERPL CALCULATED.3IONS-SCNC: 9 MMOL/L (ref 5–15)
AST SERPL-CCNC: 21 U/L (ref 1–40)
BASOPHILS # BLD AUTO: 0.1 10*3/MM3 (ref 0–0.2)
BASOPHILS NFR BLD AUTO: 1.5 % (ref 0–1.5)
BILIRUB SERPL-MCNC: 0.2 MG/DL (ref 0–1.2)
BUN SERPL-MCNC: 20 MG/DL (ref 8–23)
BUN/CREAT SERPL: 21.1 (ref 7–25)
CALCIUM SPEC-SCNC: 7.8 MG/DL (ref 8.6–10.5)
CHLORIDE SERPL-SCNC: 101 MMOL/L (ref 98–107)
CO2 SERPL-SCNC: 24 MMOL/L (ref 22–29)
CREAT SERPL-MCNC: 0.95 MG/DL (ref 0.76–1.27)
DEPRECATED RDW RBC AUTO: 49.9 FL (ref 37–54)
EOSINOPHIL # BLD AUTO: 0.2 10*3/MM3 (ref 0–0.4)
EOSINOPHIL NFR BLD AUTO: 3.5 % (ref 0.3–6.2)
ERYTHROCYTE [DISTWIDTH] IN BLOOD BY AUTOMATED COUNT: 19.1 % (ref 12.3–15.4)
GFR SERPL CREATININE-BSD FRML MDRD: 78 ML/MIN/1.73
GLOBULIN UR ELPH-MCNC: 4.1 GM/DL
GLUCOSE BLDC GLUCOMTR-MCNC: 176 MG/DL (ref 70–105)
GLUCOSE BLDC GLUCOMTR-MCNC: 193 MG/DL (ref 70–105)
GLUCOSE BLDC GLUCOMTR-MCNC: 196 MG/DL (ref 70–105)
GLUCOSE BLDC GLUCOMTR-MCNC: 309 MG/DL (ref 70–105)
GLUCOSE SERPL-MCNC: 199 MG/DL (ref 65–99)
HCT VFR BLD AUTO: 25 % (ref 37.5–51)
HGB BLD-MCNC: 8.3 G/DL (ref 13–17.7)
LYMPHOCYTES # BLD AUTO: 2 10*3/MM3 (ref 0.7–3.1)
LYMPHOCYTES NFR BLD AUTO: 34.5 % (ref 19.6–45.3)
MCH RBC QN AUTO: 24.7 PG (ref 26.6–33)
MCHC RBC AUTO-ENTMCNC: 33.3 G/DL (ref 31.5–35.7)
MCV RBC AUTO: 74.1 FL (ref 79–97)
MONOCYTES # BLD AUTO: 0.4 10*3/MM3 (ref 0.1–0.9)
MONOCYTES NFR BLD AUTO: 6.2 % (ref 5–12)
NEUTROPHILS NFR BLD AUTO: 3.1 10*3/MM3 (ref 1.7–7)
NEUTROPHILS NFR BLD AUTO: 54.3 % (ref 42.7–76)
NRBC BLD AUTO-RTO: 0.2 /100 WBC (ref 0–0.2)
PLATELET # BLD AUTO: 254 10*3/MM3 (ref 140–450)
PMV BLD AUTO: 6.4 FL (ref 6–12)
POTASSIUM SERPL-SCNC: 4.7 MMOL/L (ref 3.5–5.2)
PROT SERPL-MCNC: 6.3 G/DL (ref 6–8.5)
RBC # BLD AUTO: 3.38 10*6/MM3 (ref 4.14–5.8)
SODIUM SERPL-SCNC: 134 MMOL/L (ref 136–145)
WBC # BLD AUTO: 5.8 10*3/MM3 (ref 3.4–10.8)

## 2021-05-27 PROCEDURE — 25010000002 ENOXAPARIN PER 10 MG: Performed by: HOSPITALIST

## 2021-05-27 PROCEDURE — 82962 GLUCOSE BLOOD TEST: CPT

## 2021-05-27 PROCEDURE — 97530 THERAPEUTIC ACTIVITIES: CPT

## 2021-05-27 PROCEDURE — 99232 SBSQ HOSP IP/OBS MODERATE 35: CPT | Performed by: INTERNAL MEDICINE

## 2021-05-27 PROCEDURE — 85025 COMPLETE CBC W/AUTO DIFF WBC: CPT | Performed by: INTERNAL MEDICINE

## 2021-05-27 PROCEDURE — 63710000001 INSULIN LISPRO (HUMAN) PER 5 UNITS: Performed by: INTERNAL MEDICINE

## 2021-05-27 PROCEDURE — 63710000001 INSULIN GLARGINE PER 5 UNITS: Performed by: INTERNAL MEDICINE

## 2021-05-27 PROCEDURE — 25010000002 VANCOMYCIN 10 G RECONSTITUTED SOLUTION: Performed by: INTERNAL MEDICINE

## 2021-05-27 PROCEDURE — 80053 COMPREHEN METABOLIC PANEL: CPT | Performed by: INTERNAL MEDICINE

## 2021-05-27 RX ORDER — TRAZODONE HYDROCHLORIDE 50 MG/1
25 TABLET ORAL NIGHTLY
Status: DISCONTINUED | OUTPATIENT
Start: 2021-05-27 | End: 2021-06-03 | Stop reason: HOSPADM

## 2021-05-27 RX ADMIN — INSULIN LISPRO 2 UNITS: 100 INJECTION, SOLUTION INTRAVENOUS; SUBCUTANEOUS at 17:17

## 2021-05-27 RX ADMIN — THERA TABS 1 TABLET: TAB at 10:15

## 2021-05-27 RX ADMIN — FAMOTIDINE 20 MG: 20 TABLET ORAL at 07:23

## 2021-05-27 RX ADMIN — HYDROCODONE BITARTRATE AND ACETAMINOPHEN 1 TABLET: 7.5; 325 TABLET ORAL at 11:41

## 2021-05-27 RX ADMIN — ATORVASTATIN CALCIUM 20 MG: 20 TABLET, FILM COATED ORAL at 22:01

## 2021-05-27 RX ADMIN — HYDROCODONE BITARTRATE AND ACETAMINOPHEN 1 TABLET: 7.5; 325 TABLET ORAL at 07:28

## 2021-05-27 RX ADMIN — MEMANTINE 5 MG: 5 TABLET ORAL at 10:15

## 2021-05-27 RX ADMIN — OLANZAPINE 5 MG: 5 TABLET ORAL at 22:01

## 2021-05-27 RX ADMIN — Medication: at 22:02

## 2021-05-27 RX ADMIN — ISOSORBIDE MONONITRATE 30 MG: 30 TABLET, EXTENDED RELEASE ORAL at 10:15

## 2021-05-27 RX ADMIN — INSULIN LISPRO 2 UNITS: 100 INJECTION, SOLUTION INTRAVENOUS; SUBCUTANEOUS at 07:23

## 2021-05-27 RX ADMIN — INSULIN GLARGINE 8 UNITS: 100 INJECTION, SOLUTION SUBCUTANEOUS at 22:11

## 2021-05-27 RX ADMIN — FOLIC ACID 1 MG: 1 TABLET ORAL at 10:15

## 2021-05-27 RX ADMIN — INSULIN LISPRO 2 UNITS: 100 INJECTION, SOLUTION INTRAVENOUS; SUBCUTANEOUS at 11:41

## 2021-05-27 RX ADMIN — VANCOMYCIN HYDROCHLORIDE 1750 MG: 10 INJECTION, POWDER, LYOPHILIZED, FOR SOLUTION INTRAVENOUS at 22:01

## 2021-05-27 RX ADMIN — ENOXAPARIN SODIUM 40 MG: 40 INJECTION SUBCUTANEOUS at 17:17

## 2021-05-27 RX ADMIN — Medication: at 10:17

## 2021-05-27 RX ADMIN — LAMOTRIGINE 12.5 MG: 25 TABLET ORAL at 10:15

## 2021-05-27 RX ADMIN — Medication 10 ML: at 10:17

## 2021-05-27 RX ADMIN — LEVOTHYROXINE SODIUM 100 MCG: 0.1 TABLET ORAL at 05:20

## 2021-05-27 RX ADMIN — Medication 10 ML: at 22:02

## 2021-05-27 RX ADMIN — TAMSULOSIN HYDROCHLORIDE 0.4 MG: 0.4 CAPSULE ORAL at 22:01

## 2021-05-27 RX ADMIN — FAMOTIDINE 20 MG: 20 TABLET ORAL at 17:17

## 2021-05-27 RX ADMIN — METOPROLOL SUCCINATE 25 MG: 25 TABLET, EXTENDED RELEASE ORAL at 10:15

## 2021-05-27 RX ADMIN — HYDROCODONE BITARTRATE AND ACETAMINOPHEN 1 TABLET: 7.5; 325 TABLET ORAL at 22:01

## 2021-05-27 RX ADMIN — Medication 100 MG: at 10:15

## 2021-05-27 RX ADMIN — TRAZODONE HYDROCHLORIDE 25 MG: 50 TABLET ORAL at 22:12

## 2021-05-27 RX ADMIN — LAMOTRIGINE 12.5 MG: 25 TABLET ORAL at 22:01

## 2021-05-28 LAB
ANION GAP SERPL CALCULATED.3IONS-SCNC: 9 MMOL/L (ref 5–15)
BACTERIA SPEC AEROBE CULT: NORMAL
BASOPHILS # BLD AUTO: 0.1 10*3/MM3 (ref 0–0.2)
BASOPHILS NFR BLD AUTO: 0.7 % (ref 0–1.5)
BUN SERPL-MCNC: 19 MG/DL (ref 8–23)
BUN/CREAT SERPL: 18.4 (ref 7–25)
CALCIUM SPEC-SCNC: 8.3 MG/DL (ref 8.6–10.5)
CHLORIDE SERPL-SCNC: 103 MMOL/L (ref 98–107)
CO2 SERPL-SCNC: 25 MMOL/L (ref 22–29)
CREAT SERPL-MCNC: 1.03 MG/DL (ref 0.76–1.27)
DEPRECATED RDW RBC AUTO: 49.4 FL (ref 37–54)
EOSINOPHIL # BLD AUTO: 0.3 10*3/MM3 (ref 0–0.4)
EOSINOPHIL NFR BLD AUTO: 4.3 % (ref 0.3–6.2)
ERYTHROCYTE [DISTWIDTH] IN BLOOD BY AUTOMATED COUNT: 18.6 % (ref 12.3–15.4)
GFR SERPL CREATININE-BSD FRML MDRD: 71 ML/MIN/1.73
GLUCOSE BLDC GLUCOMTR-MCNC: 119 MG/DL (ref 70–105)
GLUCOSE BLDC GLUCOMTR-MCNC: 156 MG/DL (ref 70–105)
GLUCOSE BLDC GLUCOMTR-MCNC: 204 MG/DL (ref 70–105)
GLUCOSE BLDC GLUCOMTR-MCNC: 243 MG/DL (ref 70–105)
GLUCOSE SERPL-MCNC: 166 MG/DL (ref 65–99)
GRAM STN SPEC: NORMAL
GRAM STN SPEC: NORMAL
HCT VFR BLD AUTO: 28.7 % (ref 37.5–51)
HGB BLD-MCNC: 9.5 G/DL (ref 13–17.7)
LYMPHOCYTES # BLD AUTO: 2.4 10*3/MM3 (ref 0.7–3.1)
LYMPHOCYTES NFR BLD AUTO: 33.6 % (ref 19.6–45.3)
MCH RBC QN AUTO: 24.7 PG (ref 26.6–33)
MCHC RBC AUTO-ENTMCNC: 33.1 G/DL (ref 31.5–35.7)
MCV RBC AUTO: 74.9 FL (ref 79–97)
MONOCYTES # BLD AUTO: 0.4 10*3/MM3 (ref 0.1–0.9)
MONOCYTES NFR BLD AUTO: 5.1 % (ref 5–12)
NEUTROPHILS NFR BLD AUTO: 4 10*3/MM3 (ref 1.7–7)
NEUTROPHILS NFR BLD AUTO: 56.3 % (ref 42.7–76)
NRBC BLD AUTO-RTO: 0 /100 WBC (ref 0–0.2)
PLATELET # BLD AUTO: 333 10*3/MM3 (ref 140–450)
PMV BLD AUTO: 6.3 FL (ref 6–12)
POTASSIUM SERPL-SCNC: 4.4 MMOL/L (ref 3.5–5.2)
RBC # BLD AUTO: 3.83 10*6/MM3 (ref 4.14–5.8)
SODIUM SERPL-SCNC: 137 MMOL/L (ref 136–145)
WBC # BLD AUTO: 7 10*3/MM3 (ref 3.4–10.8)

## 2021-05-28 PROCEDURE — 63710000001 INSULIN GLARGINE PER 5 UNITS: Performed by: INTERNAL MEDICINE

## 2021-05-28 PROCEDURE — 99232 SBSQ HOSP IP/OBS MODERATE 35: CPT | Performed by: INTERNAL MEDICINE

## 2021-05-28 PROCEDURE — 25010000002 VANCOMYCIN 10 G RECONSTITUTED SOLUTION: Performed by: INTERNAL MEDICINE

## 2021-05-28 PROCEDURE — 97535 SELF CARE MNGMENT TRAINING: CPT

## 2021-05-28 PROCEDURE — 80048 BASIC METABOLIC PNL TOTAL CA: CPT | Performed by: INTERNAL MEDICINE

## 2021-05-28 PROCEDURE — 25010000002 ENOXAPARIN PER 10 MG: Performed by: HOSPITALIST

## 2021-05-28 PROCEDURE — 97530 THERAPEUTIC ACTIVITIES: CPT

## 2021-05-28 PROCEDURE — 85025 COMPLETE CBC W/AUTO DIFF WBC: CPT | Performed by: NURSE PRACTITIONER

## 2021-05-28 PROCEDURE — 82962 GLUCOSE BLOOD TEST: CPT

## 2021-05-28 PROCEDURE — 63710000001 INSULIN LISPRO (HUMAN) PER 5 UNITS: Performed by: INTERNAL MEDICINE

## 2021-05-28 RX ORDER — INSULIN LISPRO 100 [IU]/ML
0-7 INJECTION, SOLUTION INTRAVENOUS; SUBCUTANEOUS
Status: DISCONTINUED | OUTPATIENT
Start: 2021-05-28 | End: 2021-06-03 | Stop reason: HOSPADM

## 2021-05-28 RX ORDER — INSULIN LISPRO 100 [IU]/ML
0-7 INJECTION, SOLUTION INTRAVENOUS; SUBCUTANEOUS AS NEEDED
Status: DISCONTINUED | OUTPATIENT
Start: 2021-05-28 | End: 2021-06-03 | Stop reason: HOSPADM

## 2021-05-28 RX ADMIN — INSULIN LISPRO 3 UNITS: 100 INJECTION, SOLUTION INTRAVENOUS; SUBCUTANEOUS at 17:27

## 2021-05-28 RX ADMIN — Medication 10 ML: at 23:12

## 2021-05-28 RX ADMIN — Medication 100 MG: at 08:20

## 2021-05-28 RX ADMIN — FAMOTIDINE 20 MG: 20 TABLET ORAL at 17:27

## 2021-05-28 RX ADMIN — HYDROCODONE BITARTRATE AND ACETAMINOPHEN 1 TABLET: 7.5; 325 TABLET ORAL at 16:25

## 2021-05-28 RX ADMIN — Medication: at 23:24

## 2021-05-28 RX ADMIN — THERA TABS 1 TABLET: TAB at 08:20

## 2021-05-28 RX ADMIN — ENOXAPARIN SODIUM 40 MG: 40 INJECTION SUBCUTANEOUS at 16:25

## 2021-05-28 RX ADMIN — LEVOTHYROXINE SODIUM 100 MCG: 0.1 TABLET ORAL at 05:58

## 2021-05-28 RX ADMIN — FOLIC ACID 1 MG: 1 TABLET ORAL at 08:29

## 2021-05-28 RX ADMIN — Medication: at 09:37

## 2021-05-28 RX ADMIN — VANCOMYCIN HYDROCHLORIDE 1750 MG: 10 INJECTION, POWDER, LYOPHILIZED, FOR SOLUTION INTRAVENOUS at 23:12

## 2021-05-28 RX ADMIN — OLANZAPINE 5 MG: 5 TABLET ORAL at 23:09

## 2021-05-28 RX ADMIN — ISOSORBIDE MONONITRATE 30 MG: 30 TABLET, EXTENDED RELEASE ORAL at 08:19

## 2021-05-28 RX ADMIN — INSULIN LISPRO 2 UNITS: 100 INJECTION, SOLUTION INTRAVENOUS; SUBCUTANEOUS at 23:08

## 2021-05-28 RX ADMIN — MEMANTINE 5 MG: 5 TABLET ORAL at 08:19

## 2021-05-28 RX ADMIN — LAMOTRIGINE 12.5 MG: 25 TABLET ORAL at 23:09

## 2021-05-28 RX ADMIN — TRAZODONE HYDROCHLORIDE 25 MG: 50 TABLET ORAL at 23:17

## 2021-05-28 RX ADMIN — TAMSULOSIN HYDROCHLORIDE 0.4 MG: 0.4 CAPSULE ORAL at 23:09

## 2021-05-28 RX ADMIN — Medication 10 ML: at 08:32

## 2021-05-28 RX ADMIN — INSULIN LISPRO 2 UNITS: 100 INJECTION, SOLUTION INTRAVENOUS; SUBCUTANEOUS at 08:29

## 2021-05-28 RX ADMIN — HYDROCODONE BITARTRATE AND ACETAMINOPHEN 1 TABLET: 7.5; 325 TABLET ORAL at 11:43

## 2021-05-28 RX ADMIN — LAMOTRIGINE 12.5 MG: 25 TABLET ORAL at 08:20

## 2021-05-28 RX ADMIN — METOPROLOL SUCCINATE 25 MG: 25 TABLET, EXTENDED RELEASE ORAL at 08:20

## 2021-05-28 RX ADMIN — INSULIN GLARGINE 8 UNITS: 100 INJECTION, SOLUTION SUBCUTANEOUS at 23:18

## 2021-05-28 RX ADMIN — ATORVASTATIN CALCIUM 20 MG: 20 TABLET, FILM COATED ORAL at 23:09

## 2021-05-28 RX ADMIN — HYDROCODONE BITARTRATE AND ACETAMINOPHEN 1 TABLET: 7.5; 325 TABLET ORAL at 23:09

## 2021-05-28 RX ADMIN — FAMOTIDINE 20 MG: 20 TABLET ORAL at 08:20

## 2021-05-29 ENCOUNTER — APPOINTMENT (OUTPATIENT)
Dept: NUCLEAR MEDICINE | Facility: HOSPITAL | Age: 76
End: 2021-05-29

## 2021-05-29 LAB
GLUCOSE BLDC GLUCOMTR-MCNC: 159 MG/DL (ref 70–105)
GLUCOSE BLDC GLUCOMTR-MCNC: 176 MG/DL (ref 70–105)
GLUCOSE BLDC GLUCOMTR-MCNC: 177 MG/DL (ref 70–105)

## 2021-05-29 PROCEDURE — 99232 SBSQ HOSP IP/OBS MODERATE 35: CPT | Performed by: INTERNAL MEDICINE

## 2021-05-29 PROCEDURE — A9503 TC99M MEDRONATE: HCPCS | Performed by: INTERNAL MEDICINE

## 2021-05-29 PROCEDURE — 78315 BONE IMAGING 3 PHASE: CPT

## 2021-05-29 PROCEDURE — 82962 GLUCOSE BLOOD TEST: CPT

## 2021-05-29 PROCEDURE — 63710000001 INSULIN GLARGINE PER 5 UNITS: Performed by: INTERNAL MEDICINE

## 2021-05-29 PROCEDURE — 25010000002 ENOXAPARIN PER 10 MG: Performed by: HOSPITALIST

## 2021-05-29 PROCEDURE — 25010000002 VANCOMYCIN 10 G RECONSTITUTED SOLUTION: Performed by: INTERNAL MEDICINE

## 2021-05-29 PROCEDURE — 63710000001 INSULIN LISPRO (HUMAN) PER 5 UNITS: Performed by: INTERNAL MEDICINE

## 2021-05-29 PROCEDURE — 0 TECHNETIUM MEDRONATE KIT: Performed by: INTERNAL MEDICINE

## 2021-05-29 RX ORDER — INSULIN GLARGINE 100 [IU]/ML
12 INJECTION, SOLUTION SUBCUTANEOUS NIGHTLY
Status: DISCONTINUED | OUTPATIENT
Start: 2021-05-29 | End: 2021-06-01

## 2021-05-29 RX ORDER — TC 99M MEDRONATE 20 MG/10ML
26 INJECTION, POWDER, LYOPHILIZED, FOR SOLUTION INTRAVENOUS
Status: COMPLETED | OUTPATIENT
Start: 2021-05-29 | End: 2021-05-29

## 2021-05-29 RX ADMIN — Medication 100 MG: at 10:37

## 2021-05-29 RX ADMIN — Medication 10 ML: at 10:38

## 2021-05-29 RX ADMIN — LEVOTHYROXINE SODIUM 100 MCG: 0.1 TABLET ORAL at 05:00

## 2021-05-29 RX ADMIN — THERA TABS 1 TABLET: TAB at 10:37

## 2021-05-29 RX ADMIN — HYDROCODONE BITARTRATE AND ACETAMINOPHEN 1 TABLET: 7.5; 325 TABLET ORAL at 15:36

## 2021-05-29 RX ADMIN — VANCOMYCIN HYDROCHLORIDE 1750 MG: 10 INJECTION, POWDER, LYOPHILIZED, FOR SOLUTION INTRAVENOUS at 22:08

## 2021-05-29 RX ADMIN — Medication 10 ML: at 21:50

## 2021-05-29 RX ADMIN — ATORVASTATIN CALCIUM 20 MG: 20 TABLET, FILM COATED ORAL at 21:50

## 2021-05-29 RX ADMIN — INSULIN LISPRO 2 UNITS: 100 INJECTION, SOLUTION INTRAVENOUS; SUBCUTANEOUS at 10:38

## 2021-05-29 RX ADMIN — MEMANTINE 5 MG: 5 TABLET ORAL at 10:37

## 2021-05-29 RX ADMIN — TRAZODONE HYDROCHLORIDE 25 MG: 50 TABLET ORAL at 21:50

## 2021-05-29 RX ADMIN — Medication: at 21:51

## 2021-05-29 RX ADMIN — INSULIN LISPRO 2 UNITS: 100 INJECTION, SOLUTION INTRAVENOUS; SUBCUTANEOUS at 18:23

## 2021-05-29 RX ADMIN — OLANZAPINE 5 MG: 5 TABLET ORAL at 21:49

## 2021-05-29 RX ADMIN — FOLIC ACID 1 MG: 1 TABLET ORAL at 10:37

## 2021-05-29 RX ADMIN — HYDROCODONE BITARTRATE AND ACETAMINOPHEN 1 TABLET: 7.5; 325 TABLET ORAL at 21:49

## 2021-05-29 RX ADMIN — ENOXAPARIN SODIUM 40 MG: 40 INJECTION SUBCUTANEOUS at 15:36

## 2021-05-29 RX ADMIN — TC 99M MEDRONATE 26 MILLICURIE: 20 INJECTION, POWDER, LYOPHILIZED, FOR SOLUTION INTRAVENOUS at 09:50

## 2021-05-29 RX ADMIN — INSULIN LISPRO 2 UNITS: 100 INJECTION, SOLUTION INTRAVENOUS; SUBCUTANEOUS at 22:09

## 2021-05-29 RX ADMIN — FAMOTIDINE 20 MG: 20 TABLET ORAL at 10:37

## 2021-05-29 RX ADMIN — LAMOTRIGINE 12.5 MG: 25 TABLET ORAL at 21:50

## 2021-05-29 RX ADMIN — INSULIN GLARGINE 12 UNITS: 100 INJECTION, SOLUTION SUBCUTANEOUS at 21:49

## 2021-05-29 RX ADMIN — TAMSULOSIN HYDROCHLORIDE 0.4 MG: 0.4 CAPSULE ORAL at 21:50

## 2021-05-29 RX ADMIN — Medication: at 10:56

## 2021-05-29 RX ADMIN — FAMOTIDINE 20 MG: 20 TABLET ORAL at 15:36

## 2021-05-29 RX ADMIN — LAMOTRIGINE 12.5 MG: 25 TABLET ORAL at 10:37

## 2021-05-29 RX ADMIN — METOPROLOL SUCCINATE 25 MG: 25 TABLET, EXTENDED RELEASE ORAL at 10:37

## 2021-05-29 RX ADMIN — ISOSORBIDE MONONITRATE 30 MG: 30 TABLET, EXTENDED RELEASE ORAL at 10:37

## 2021-05-30 LAB
ANION GAP SERPL CALCULATED.3IONS-SCNC: 7 MMOL/L (ref 5–15)
BASOPHILS # BLD AUTO: 0 10*3/MM3 (ref 0–0.2)
BASOPHILS NFR BLD AUTO: 0.5 % (ref 0–1.5)
BUN SERPL-MCNC: 20 MG/DL (ref 8–23)
BUN/CREAT SERPL: 19.2 (ref 7–25)
CALCIUM SPEC-SCNC: 8.5 MG/DL (ref 8.6–10.5)
CHLORIDE SERPL-SCNC: 104 MMOL/L (ref 98–107)
CO2 SERPL-SCNC: 26 MMOL/L (ref 22–29)
CREAT SERPL-MCNC: 1.04 MG/DL (ref 0.76–1.27)
DEPRECATED RDW RBC AUTO: 50.8 FL (ref 37–54)
EOSINOPHIL # BLD AUTO: 0.2 10*3/MM3 (ref 0–0.4)
EOSINOPHIL NFR BLD AUTO: 3.8 % (ref 0.3–6.2)
ERYTHROCYTE [DISTWIDTH] IN BLOOD BY AUTOMATED COUNT: 19 % (ref 12.3–15.4)
GFR SERPL CREATININE-BSD FRML MDRD: 70 ML/MIN/1.73
GLUCOSE BLDC GLUCOMTR-MCNC: 120 MG/DL (ref 70–105)
GLUCOSE BLDC GLUCOMTR-MCNC: 133 MG/DL (ref 70–105)
GLUCOSE BLDC GLUCOMTR-MCNC: 203 MG/DL (ref 70–105)
GLUCOSE SERPL-MCNC: 166 MG/DL (ref 65–99)
HCT VFR BLD AUTO: 25.8 % (ref 37.5–51)
HGB BLD-MCNC: 8.5 G/DL (ref 13–17.7)
LYMPHOCYTES # BLD AUTO: 1.9 10*3/MM3 (ref 0.7–3.1)
LYMPHOCYTES NFR BLD AUTO: 33.7 % (ref 19.6–45.3)
MCH RBC QN AUTO: 24.7 PG (ref 26.6–33)
MCHC RBC AUTO-ENTMCNC: 32.9 G/DL (ref 31.5–35.7)
MCV RBC AUTO: 75.2 FL (ref 79–97)
MONOCYTES # BLD AUTO: 0.3 10*3/MM3 (ref 0.1–0.9)
MONOCYTES NFR BLD AUTO: 6 % (ref 5–12)
NEUTROPHILS NFR BLD AUTO: 3.1 10*3/MM3 (ref 1.7–7)
NEUTROPHILS NFR BLD AUTO: 56 % (ref 42.7–76)
NRBC BLD AUTO-RTO: 0.1 /100 WBC (ref 0–0.2)
PLATELET # BLD AUTO: 305 10*3/MM3 (ref 140–450)
PMV BLD AUTO: 6.3 FL (ref 6–12)
POTASSIUM SERPL-SCNC: 4.4 MMOL/L (ref 3.5–5.2)
RBC # BLD AUTO: 3.43 10*6/MM3 (ref 4.14–5.8)
SODIUM SERPL-SCNC: 137 MMOL/L (ref 136–145)
VANCOMYCIN TROUGH SERPL-MCNC: 17.1 MCG/ML (ref 5–20)
WBC # BLD AUTO: 5.6 10*3/MM3 (ref 3.4–10.8)

## 2021-05-30 PROCEDURE — 25010000002 ENOXAPARIN PER 10 MG: Performed by: HOSPITALIST

## 2021-05-30 PROCEDURE — 85025 COMPLETE CBC W/AUTO DIFF WBC: CPT | Performed by: NURSE PRACTITIONER

## 2021-05-30 PROCEDURE — 25010000002 VANCOMYCIN 10 G RECONSTITUTED SOLUTION: Performed by: INTERNAL MEDICINE

## 2021-05-30 PROCEDURE — 63710000001 INSULIN GLARGINE PER 5 UNITS: Performed by: INTERNAL MEDICINE

## 2021-05-30 PROCEDURE — 80202 ASSAY OF VANCOMYCIN: CPT | Performed by: INTERNAL MEDICINE

## 2021-05-30 PROCEDURE — 25010000002 VANCOMYCIN PER 500 MG: Performed by: INTERNAL MEDICINE

## 2021-05-30 PROCEDURE — 99232 SBSQ HOSP IP/OBS MODERATE 35: CPT | Performed by: INTERNAL MEDICINE

## 2021-05-30 PROCEDURE — 63710000001 INSULIN LISPRO (HUMAN) PER 5 UNITS: Performed by: INTERNAL MEDICINE

## 2021-05-30 PROCEDURE — 80048 BASIC METABOLIC PNL TOTAL CA: CPT | Performed by: NURSE PRACTITIONER

## 2021-05-30 PROCEDURE — 82962 GLUCOSE BLOOD TEST: CPT

## 2021-05-30 RX ADMIN — FAMOTIDINE 20 MG: 20 TABLET ORAL at 17:10

## 2021-05-30 RX ADMIN — INSULIN LISPRO 2 UNITS: 100 INJECTION, SOLUTION INTRAVENOUS; SUBCUTANEOUS at 17:51

## 2021-05-30 RX ADMIN — Medication 10 ML: at 22:52

## 2021-05-30 RX ADMIN — MEMANTINE 5 MG: 5 TABLET ORAL at 09:44

## 2021-05-30 RX ADMIN — ENOXAPARIN SODIUM 40 MG: 40 INJECTION SUBCUTANEOUS at 17:51

## 2021-05-30 RX ADMIN — Medication: at 09:45

## 2021-05-30 RX ADMIN — ATORVASTATIN CALCIUM 20 MG: 20 TABLET, FILM COATED ORAL at 22:47

## 2021-05-30 RX ADMIN — ISOSORBIDE MONONITRATE 30 MG: 30 TABLET, EXTENDED RELEASE ORAL at 09:44

## 2021-05-30 RX ADMIN — THERA TABS 1 TABLET: TAB at 09:44

## 2021-05-30 RX ADMIN — VANCOMYCIN HYDROCHLORIDE 1750 MG: 10 INJECTION, POWDER, LYOPHILIZED, FOR SOLUTION INTRAVENOUS at 22:52

## 2021-05-30 RX ADMIN — LAMOTRIGINE 12.5 MG: 25 TABLET ORAL at 22:47

## 2021-05-30 RX ADMIN — LAMOTRIGINE 12.5 MG: 25 TABLET ORAL at 09:44

## 2021-05-30 RX ADMIN — HYDROCODONE BITARTRATE AND ACETAMINOPHEN 1 TABLET: 7.5; 325 TABLET ORAL at 22:47

## 2021-05-30 RX ADMIN — HYDROCODONE BITARTRATE AND ACETAMINOPHEN 1 TABLET: 7.5; 325 TABLET ORAL at 06:19

## 2021-05-30 RX ADMIN — TRAZODONE HYDROCHLORIDE 25 MG: 50 TABLET ORAL at 22:47

## 2021-05-30 RX ADMIN — FOLIC ACID 1 MG: 1 TABLET ORAL at 09:44

## 2021-05-30 RX ADMIN — METOPROLOL SUCCINATE 25 MG: 25 TABLET, EXTENDED RELEASE ORAL at 09:44

## 2021-05-30 RX ADMIN — OLANZAPINE 5 MG: 5 TABLET ORAL at 22:48

## 2021-05-30 RX ADMIN — LEVOTHYROXINE SODIUM 100 MCG: 0.1 TABLET ORAL at 06:19

## 2021-05-30 RX ADMIN — HYDROCODONE BITARTRATE AND ACETAMINOPHEN 1 TABLET: 7.5; 325 TABLET ORAL at 12:53

## 2021-05-30 RX ADMIN — INSULIN GLARGINE 12 UNITS: 100 INJECTION, SOLUTION SUBCUTANEOUS at 22:46

## 2021-05-30 RX ADMIN — Medication 100 MG: at 09:45

## 2021-05-30 RX ADMIN — Medication: at 22:50

## 2021-05-30 RX ADMIN — FAMOTIDINE 20 MG: 20 TABLET ORAL at 09:44

## 2021-05-30 RX ADMIN — INSULIN LISPRO 3 UNITS: 100 INJECTION, SOLUTION INTRAVENOUS; SUBCUTANEOUS at 22:58

## 2021-05-30 RX ADMIN — TAMSULOSIN HYDROCHLORIDE 0.4 MG: 0.4 CAPSULE ORAL at 22:48

## 2021-05-31 LAB
ALBUMIN SERPL-MCNC: 2.5 G/DL (ref 3.5–5.2)
ALBUMIN/GLOB SERPL: 0.6 G/DL
ALP SERPL-CCNC: 116 U/L (ref 39–117)
ALT SERPL W P-5'-P-CCNC: 18 U/L (ref 1–41)
ANION GAP SERPL CALCULATED.3IONS-SCNC: 6 MMOL/L (ref 5–15)
AST SERPL-CCNC: 18 U/L (ref 1–40)
BASOPHILS # BLD AUTO: 0 10*3/MM3 (ref 0–0.2)
BASOPHILS NFR BLD AUTO: 0.7 % (ref 0–1.5)
BILIRUB SERPL-MCNC: 0.3 MG/DL (ref 0–1.2)
BUN SERPL-MCNC: 21 MG/DL (ref 8–23)
BUN/CREAT SERPL: 19.1 (ref 7–25)
CALCIUM SPEC-SCNC: 8.6 MG/DL (ref 8.6–10.5)
CHLORIDE SERPL-SCNC: 103 MMOL/L (ref 98–107)
CO2 SERPL-SCNC: 27 MMOL/L (ref 22–29)
CREAT SERPL-MCNC: 1.1 MG/DL (ref 0.76–1.27)
DEPRECATED RDW RBC AUTO: 49.4 FL (ref 37–54)
EOSINOPHIL # BLD AUTO: 0.3 10*3/MM3 (ref 0–0.4)
EOSINOPHIL NFR BLD AUTO: 5.2 % (ref 0.3–6.2)
ERYTHROCYTE [DISTWIDTH] IN BLOOD BY AUTOMATED COUNT: 18.7 % (ref 12.3–15.4)
GFR SERPL CREATININE-BSD FRML MDRD: 66 ML/MIN/1.73
GLOBULIN UR ELPH-MCNC: 4.1 GM/DL
GLUCOSE BLDC GLUCOMTR-MCNC: 118 MG/DL (ref 70–105)
GLUCOSE BLDC GLUCOMTR-MCNC: 121 MG/DL (ref 70–105)
GLUCOSE BLDC GLUCOMTR-MCNC: 179 MG/DL (ref 70–105)
GLUCOSE SERPL-MCNC: 123 MG/DL (ref 65–99)
HCT VFR BLD AUTO: 26.7 % (ref 37.5–51)
HGB BLD-MCNC: 8.8 G/DL (ref 13–17.7)
LYMPHOCYTES # BLD AUTO: 1.9 10*3/MM3 (ref 0.7–3.1)
LYMPHOCYTES NFR BLD AUTO: 34.2 % (ref 19.6–45.3)
MCH RBC QN AUTO: 24.7 PG (ref 26.6–33)
MCHC RBC AUTO-ENTMCNC: 33 G/DL (ref 31.5–35.7)
MCV RBC AUTO: 74.9 FL (ref 79–97)
MONOCYTES # BLD AUTO: 0.4 10*3/MM3 (ref 0.1–0.9)
MONOCYTES NFR BLD AUTO: 6.5 % (ref 5–12)
NEUTROPHILS NFR BLD AUTO: 3 10*3/MM3 (ref 1.7–7)
NEUTROPHILS NFR BLD AUTO: 53.4 % (ref 42.7–76)
NRBC BLD AUTO-RTO: 0.1 /100 WBC (ref 0–0.2)
PLATELET # BLD AUTO: 332 10*3/MM3 (ref 140–450)
PMV BLD AUTO: 6.4 FL (ref 6–12)
POTASSIUM SERPL-SCNC: 4.1 MMOL/L (ref 3.5–5.2)
PROT SERPL-MCNC: 6.6 G/DL (ref 6–8.5)
RBC # BLD AUTO: 3.56 10*6/MM3 (ref 4.14–5.8)
SODIUM SERPL-SCNC: 136 MMOL/L (ref 136–145)
WBC # BLD AUTO: 5.6 10*3/MM3 (ref 3.4–10.8)

## 2021-05-31 PROCEDURE — 82962 GLUCOSE BLOOD TEST: CPT

## 2021-05-31 PROCEDURE — 63710000001 INSULIN LISPRO (HUMAN) PER 5 UNITS: Performed by: INTERNAL MEDICINE

## 2021-05-31 PROCEDURE — 63710000001 INSULIN GLARGINE PER 5 UNITS: Performed by: INTERNAL MEDICINE

## 2021-05-31 PROCEDURE — 80053 COMPREHEN METABOLIC PANEL: CPT | Performed by: INTERNAL MEDICINE

## 2021-05-31 PROCEDURE — 85025 COMPLETE CBC W/AUTO DIFF WBC: CPT | Performed by: INTERNAL MEDICINE

## 2021-05-31 PROCEDURE — 25010000002 VANCOMYCIN 10 G RECONSTITUTED SOLUTION: Performed by: INTERNAL MEDICINE

## 2021-05-31 PROCEDURE — 25010000002 ENOXAPARIN PER 10 MG: Performed by: HOSPITALIST

## 2021-05-31 PROCEDURE — 25010000002 VANCOMYCIN PER 500 MG: Performed by: INTERNAL MEDICINE

## 2021-05-31 PROCEDURE — 99232 SBSQ HOSP IP/OBS MODERATE 35: CPT | Performed by: INTERNAL MEDICINE

## 2021-05-31 RX ORDER — RIFAMPIN 300 MG/1
300 CAPSULE ORAL EVERY 12 HOURS SCHEDULED
Status: DISCONTINUED | OUTPATIENT
Start: 2021-05-31 | End: 2021-06-03 | Stop reason: HOSPADM

## 2021-05-31 RX ADMIN — LAMOTRIGINE 12.5 MG: 25 TABLET ORAL at 09:28

## 2021-05-31 RX ADMIN — HYDROCODONE BITARTRATE AND ACETAMINOPHEN 1 TABLET: 7.5; 325 TABLET ORAL at 20:36

## 2021-05-31 RX ADMIN — VANCOMYCIN HYDROCHLORIDE 1750 MG: 10 INJECTION, POWDER, LYOPHILIZED, FOR SOLUTION INTRAVENOUS at 20:35

## 2021-05-31 RX ADMIN — Medication 10 ML: at 09:28

## 2021-05-31 RX ADMIN — METOPROLOL SUCCINATE 25 MG: 25 TABLET, EXTENDED RELEASE ORAL at 09:27

## 2021-05-31 RX ADMIN — INSULIN LISPRO 2 UNITS: 100 INJECTION, SOLUTION INTRAVENOUS; SUBCUTANEOUS at 17:48

## 2021-05-31 RX ADMIN — LAMOTRIGINE 12.5 MG: 25 TABLET ORAL at 20:36

## 2021-05-31 RX ADMIN — HYDROCODONE BITARTRATE AND ACETAMINOPHEN 1 TABLET: 7.5; 325 TABLET ORAL at 15:04

## 2021-05-31 RX ADMIN — MEMANTINE 5 MG: 5 TABLET ORAL at 09:27

## 2021-05-31 RX ADMIN — OLANZAPINE 5 MG: 5 TABLET ORAL at 20:35

## 2021-05-31 RX ADMIN — Medication 100 MG: at 09:27

## 2021-05-31 RX ADMIN — TAMSULOSIN HYDROCHLORIDE 0.4 MG: 0.4 CAPSULE ORAL at 20:35

## 2021-05-31 RX ADMIN — INSULIN GLARGINE 12 UNITS: 100 INJECTION, SOLUTION SUBCUTANEOUS at 20:35

## 2021-05-31 RX ADMIN — Medication 1 APPLICATION: at 09:29

## 2021-05-31 RX ADMIN — ATORVASTATIN CALCIUM 20 MG: 20 TABLET, FILM COATED ORAL at 20:35

## 2021-05-31 RX ADMIN — LEVOTHYROXINE SODIUM 100 MCG: 0.1 TABLET ORAL at 08:03

## 2021-05-31 RX ADMIN — ENOXAPARIN SODIUM 40 MG: 40 INJECTION SUBCUTANEOUS at 15:04

## 2021-05-31 RX ADMIN — RIFAMPIN 300 MG: 300 CAPSULE ORAL at 14:54

## 2021-05-31 RX ADMIN — FAMOTIDINE 20 MG: 20 TABLET ORAL at 17:47

## 2021-05-31 RX ADMIN — TRAZODONE HYDROCHLORIDE 25 MG: 50 TABLET ORAL at 20:35

## 2021-05-31 RX ADMIN — Medication: at 20:36

## 2021-05-31 RX ADMIN — THERA TABS 1 TABLET: TAB at 09:28

## 2021-05-31 RX ADMIN — FOLIC ACID 1 MG: 1 TABLET ORAL at 09:27

## 2021-05-31 RX ADMIN — FAMOTIDINE 20 MG: 20 TABLET ORAL at 08:03

## 2021-05-31 RX ADMIN — HYDROCODONE BITARTRATE AND ACETAMINOPHEN 1 TABLET: 7.5; 325 TABLET ORAL at 09:36

## 2021-05-31 RX ADMIN — ISOSORBIDE MONONITRATE 30 MG: 30 TABLET, EXTENDED RELEASE ORAL at 09:27

## 2021-06-01 LAB
ALBUMIN SERPL-MCNC: 2.6 G/DL (ref 3.5–5.2)
ALBUMIN/GLOB SERPL: 0.6 G/DL
ALP SERPL-CCNC: 117 U/L (ref 39–117)
ALT SERPL W P-5'-P-CCNC: 16 U/L (ref 1–41)
ANION GAP SERPL CALCULATED.3IONS-SCNC: 8 MMOL/L (ref 5–15)
AST SERPL-CCNC: 24 U/L (ref 1–40)
BASOPHILS # BLD AUTO: 0 10*3/MM3 (ref 0–0.2)
BASOPHILS NFR BLD AUTO: 0.5 % (ref 0–1.5)
BILIRUB SERPL-MCNC: 0.4 MG/DL (ref 0–1.2)
BUN SERPL-MCNC: 22 MG/DL (ref 8–23)
BUN/CREAT SERPL: 20.2 (ref 7–25)
CALCIUM SPEC-SCNC: 8 MG/DL (ref 8.6–10.5)
CHLORIDE SERPL-SCNC: 103 MMOL/L (ref 98–107)
CO2 SERPL-SCNC: 24 MMOL/L (ref 22–29)
CREAT SERPL-MCNC: 1.09 MG/DL (ref 0.76–1.27)
DEPRECATED RDW RBC AUTO: 49.4 FL (ref 37–54)
EOSINOPHIL # BLD AUTO: 0.2 10*3/MM3 (ref 0–0.4)
EOSINOPHIL NFR BLD AUTO: 5.4 % (ref 0.3–6.2)
ERYTHROCYTE [DISTWIDTH] IN BLOOD BY AUTOMATED COUNT: 18.8 % (ref 12.3–15.4)
GFR SERPL CREATININE-BSD FRML MDRD: 66 ML/MIN/1.73
GLOBULIN UR ELPH-MCNC: 4.1 GM/DL
GLUCOSE BLDC GLUCOMTR-MCNC: 121 MG/DL (ref 70–105)
GLUCOSE BLDC GLUCOMTR-MCNC: 177 MG/DL (ref 70–105)
GLUCOSE BLDC GLUCOMTR-MCNC: 199 MG/DL (ref 70–105)
GLUCOSE BLDC GLUCOMTR-MCNC: 225 MG/DL (ref 70–105)
GLUCOSE SERPL-MCNC: 200 MG/DL (ref 65–99)
HCT VFR BLD AUTO: 26.8 % (ref 37.5–51)
HGB BLD-MCNC: 8.8 G/DL (ref 13–17.7)
LYMPHOCYTES # BLD AUTO: 1.6 10*3/MM3 (ref 0.7–3.1)
LYMPHOCYTES NFR BLD AUTO: 34.7 % (ref 19.6–45.3)
MCH RBC QN AUTO: 24.7 PG (ref 26.6–33)
MCHC RBC AUTO-ENTMCNC: 32.7 G/DL (ref 31.5–35.7)
MCV RBC AUTO: 75.6 FL (ref 79–97)
MONOCYTES # BLD AUTO: 0.3 10*3/MM3 (ref 0.1–0.9)
MONOCYTES NFR BLD AUTO: 7.3 % (ref 5–12)
NEUTROPHILS NFR BLD AUTO: 2.3 10*3/MM3 (ref 1.7–7)
NEUTROPHILS NFR BLD AUTO: 52.1 % (ref 42.7–76)
NRBC BLD AUTO-RTO: 0 /100 WBC (ref 0–0.2)
PLATELET # BLD AUTO: 325 10*3/MM3 (ref 140–450)
PMV BLD AUTO: 6.8 FL (ref 6–12)
POTASSIUM SERPL-SCNC: 4.2 MMOL/L (ref 3.5–5.2)
PROT SERPL-MCNC: 6.7 G/DL (ref 6–8.5)
RBC # BLD AUTO: 3.55 10*6/MM3 (ref 4.14–5.8)
SODIUM SERPL-SCNC: 135 MMOL/L (ref 136–145)
WBC # BLD AUTO: 4.5 10*3/MM3 (ref 3.4–10.8)

## 2021-06-01 PROCEDURE — 63710000001 INSULIN LISPRO (HUMAN) PER 5 UNITS: Performed by: INTERNAL MEDICINE

## 2021-06-01 PROCEDURE — 82962 GLUCOSE BLOOD TEST: CPT

## 2021-06-01 PROCEDURE — 97535 SELF CARE MNGMENT TRAINING: CPT

## 2021-06-01 PROCEDURE — 25010000002 VANCOMYCIN PER 500 MG: Performed by: INTERNAL MEDICINE

## 2021-06-01 PROCEDURE — 63710000001 INSULIN GLARGINE PER 5 UNITS: Performed by: INTERNAL MEDICINE

## 2021-06-01 PROCEDURE — 25010000002 ENOXAPARIN PER 10 MG: Performed by: HOSPITALIST

## 2021-06-01 PROCEDURE — 25010000002 VANCOMYCIN 10 G RECONSTITUTED SOLUTION: Performed by: INTERNAL MEDICINE

## 2021-06-01 PROCEDURE — 99232 SBSQ HOSP IP/OBS MODERATE 35: CPT | Performed by: INTERNAL MEDICINE

## 2021-06-01 PROCEDURE — 85025 COMPLETE CBC W/AUTO DIFF WBC: CPT | Performed by: INTERNAL MEDICINE

## 2021-06-01 PROCEDURE — 80053 COMPREHEN METABOLIC PANEL: CPT | Performed by: INTERNAL MEDICINE

## 2021-06-01 RX ORDER — SODIUM CHLORIDE 0.9 % (FLUSH) 0.9 %
10 SYRINGE (ML) INJECTION AS NEEDED
Status: CANCELLED | OUTPATIENT
Start: 2021-06-01

## 2021-06-01 RX ORDER — VANCOMYCIN 1.75 GRAM/500 ML IN 0.9 % SODIUM CHLORIDE INTRAVENOUS
1750 EVERY 24 HOURS
Status: CANCELLED | OUTPATIENT
Start: 2021-06-01 | End: 2021-07-05

## 2021-06-01 RX ORDER — TRAZODONE HYDROCHLORIDE 50 MG/1
25 TABLET ORAL NIGHTLY
Status: CANCELLED | OUTPATIENT
Start: 2021-06-01

## 2021-06-01 RX ORDER — TAMSULOSIN HYDROCHLORIDE 0.4 MG/1
0.4 CAPSULE ORAL DAILY
Status: CANCELLED | OUTPATIENT
Start: 2021-06-01

## 2021-06-01 RX ORDER — OLANZAPINE 5 MG/1
5 TABLET ORAL NIGHTLY
Status: CANCELLED | OUTPATIENT
Start: 2021-06-01

## 2021-06-01 RX ORDER — INSULIN GLARGINE 100 [IU]/ML
15 INJECTION, SOLUTION SUBCUTANEOUS NIGHTLY
Status: DISCONTINUED | OUTPATIENT
Start: 2021-06-01 | End: 2021-06-03 | Stop reason: HOSPADM

## 2021-06-01 RX ORDER — HYDROCODONE BITARTRATE AND ACETAMINOPHEN 7.5; 325 MG/1; MG/1
1 TABLET ORAL EVERY 4 HOURS PRN
Status: CANCELLED | OUTPATIENT
Start: 2021-06-01

## 2021-06-01 RX ORDER — ACETAMINOPHEN 325 MG/1
650 TABLET ORAL EVERY 8 HOURS PRN
Status: CANCELLED | OUTPATIENT
Start: 2021-06-01

## 2021-06-01 RX ORDER — NICOTINE POLACRILEX 4 MG
15 LOZENGE BUCCAL
Status: CANCELLED | OUTPATIENT
Start: 2021-06-01

## 2021-06-01 RX ORDER — INSULIN GLARGINE 100 [IU]/ML
15 INJECTION, SOLUTION SUBCUTANEOUS NIGHTLY
Status: CANCELLED | OUTPATIENT
Start: 2021-06-01

## 2021-06-01 RX ORDER — MEMANTINE HYDROCHLORIDE 5 MG/1
5 TABLET ORAL DAILY
Status: CANCELLED | OUTPATIENT
Start: 2021-06-02

## 2021-06-01 RX ORDER — FOLIC ACID 1 MG/1
1 TABLET ORAL DAILY
Status: CANCELLED | OUTPATIENT
Start: 2021-06-02

## 2021-06-01 RX ORDER — DEXTROSE MONOHYDRATE 25 G/50ML
25 INJECTION, SOLUTION INTRAVENOUS
Status: CANCELLED | OUTPATIENT
Start: 2021-06-01

## 2021-06-01 RX ORDER — METOPROLOL SUCCINATE 25 MG/1
25 TABLET, EXTENDED RELEASE ORAL DAILY
Status: CANCELLED | OUTPATIENT
Start: 2021-06-02

## 2021-06-01 RX ORDER — ATORVASTATIN CALCIUM 20 MG/1
20 TABLET, FILM COATED ORAL NIGHTLY
Status: CANCELLED | OUTPATIENT
Start: 2021-06-01

## 2021-06-01 RX ORDER — SENNA PLUS 8.6 MG/1
1 TABLET ORAL DAILY PRN
Status: CANCELLED | OUTPATIENT
Start: 2021-06-01

## 2021-06-01 RX ORDER — INSULIN LISPRO 100 [IU]/ML
0-7 INJECTION, SOLUTION INTRAVENOUS; SUBCUTANEOUS
Status: CANCELLED | OUTPATIENT
Start: 2021-06-01

## 2021-06-01 RX ORDER — RIFAMPIN 300 MG/1
300 CAPSULE ORAL EVERY 12 HOURS SCHEDULED
Status: CANCELLED | OUTPATIENT
Start: 2021-06-01 | End: 2021-06-30

## 2021-06-01 RX ORDER — LAMOTRIGINE 25 MG/1
12.5 TABLET ORAL 2 TIMES DAILY
Status: CANCELLED | OUTPATIENT
Start: 2021-06-01

## 2021-06-01 RX ORDER — INSULIN LISPRO 100 [IU]/ML
0-7 INJECTION, SOLUTION INTRAVENOUS; SUBCUTANEOUS AS NEEDED
Status: CANCELLED | OUTPATIENT
Start: 2021-06-01

## 2021-06-01 RX ORDER — DIPHENOXYLATE HYDROCHLORIDE AND ATROPINE SULFATE 2.5; .025 MG/1; MG/1
1 TABLET ORAL DAILY
Status: CANCELLED | OUTPATIENT
Start: 2021-06-02

## 2021-06-01 RX ORDER — ISOSORBIDE MONONITRATE 30 MG/1
30 TABLET, EXTENDED RELEASE ORAL DAILY
Status: CANCELLED | OUTPATIENT
Start: 2021-06-02

## 2021-06-01 RX ORDER — FAMOTIDINE 20 MG/1
20 TABLET, FILM COATED ORAL
Status: CANCELLED | OUTPATIENT
Start: 2021-06-02

## 2021-06-01 RX ORDER — LEVOTHYROXINE SODIUM 0.1 MG/1
100 TABLET ORAL DAILY
Status: CANCELLED | OUTPATIENT
Start: 2021-06-02

## 2021-06-01 RX ORDER — SODIUM CHLORIDE 0.9 % (FLUSH) 0.9 %
10 SYRINGE (ML) INJECTION EVERY 12 HOURS SCHEDULED
Status: CANCELLED | OUTPATIENT
Start: 2021-06-01

## 2021-06-01 RX ADMIN — METOPROLOL SUCCINATE 25 MG: 25 TABLET, EXTENDED RELEASE ORAL at 08:52

## 2021-06-01 RX ADMIN — Medication: at 21:13

## 2021-06-01 RX ADMIN — RIFAMPIN 300 MG: 300 CAPSULE ORAL at 00:09

## 2021-06-01 RX ADMIN — VANCOMYCIN HYDROCHLORIDE 1750 MG: 10 INJECTION, POWDER, LYOPHILIZED, FOR SOLUTION INTRAVENOUS at 21:12

## 2021-06-01 RX ADMIN — ENOXAPARIN SODIUM 40 MG: 40 INJECTION SUBCUTANEOUS at 16:51

## 2021-06-01 RX ADMIN — INSULIN LISPRO 2 UNITS: 100 INJECTION, SOLUTION INTRAVENOUS; SUBCUTANEOUS at 17:36

## 2021-06-01 RX ADMIN — MEMANTINE 5 MG: 5 TABLET ORAL at 08:53

## 2021-06-01 RX ADMIN — Medication 10 ML: at 08:53

## 2021-06-01 RX ADMIN — INSULIN GLARGINE 15 UNITS: 100 INJECTION, SOLUTION SUBCUTANEOUS at 21:13

## 2021-06-01 RX ADMIN — Medication 100 MG: at 08:52

## 2021-06-01 RX ADMIN — OLANZAPINE 5 MG: 5 TABLET ORAL at 21:13

## 2021-06-01 RX ADMIN — RIFAMPIN 300 MG: 300 CAPSULE ORAL at 08:52

## 2021-06-01 RX ADMIN — LEVOTHYROXINE SODIUM 100 MCG: 0.1 TABLET ORAL at 06:49

## 2021-06-01 RX ADMIN — FAMOTIDINE 20 MG: 20 TABLET ORAL at 08:52

## 2021-06-01 RX ADMIN — INSULIN LISPRO 2 UNITS: 100 INJECTION, SOLUTION INTRAVENOUS; SUBCUTANEOUS at 21:28

## 2021-06-01 RX ADMIN — INSULIN LISPRO 3 UNITS: 100 INJECTION, SOLUTION INTRAVENOUS; SUBCUTANEOUS at 12:50

## 2021-06-01 RX ADMIN — Medication 10 ML: at 21:12

## 2021-06-01 RX ADMIN — RIFAMPIN 300 MG: 300 CAPSULE ORAL at 00:11

## 2021-06-01 RX ADMIN — HYDROCODONE BITARTRATE AND ACETAMINOPHEN 1 TABLET: 7.5; 325 TABLET ORAL at 17:36

## 2021-06-01 RX ADMIN — ISOSORBIDE MONONITRATE 30 MG: 30 TABLET, EXTENDED RELEASE ORAL at 08:52

## 2021-06-01 RX ADMIN — THERA TABS 1 TABLET: TAB at 08:52

## 2021-06-01 RX ADMIN — HYDROCODONE BITARTRATE AND ACETAMINOPHEN 1 TABLET: 7.5; 325 TABLET ORAL at 21:13

## 2021-06-01 RX ADMIN — FAMOTIDINE 20 MG: 20 TABLET ORAL at 16:51

## 2021-06-01 RX ADMIN — FOLIC ACID 1 MG: 1 TABLET ORAL at 08:52

## 2021-06-01 RX ADMIN — TAMSULOSIN HYDROCHLORIDE 0.4 MG: 0.4 CAPSULE ORAL at 21:13

## 2021-06-01 RX ADMIN — LAMOTRIGINE 12.5 MG: 25 TABLET ORAL at 08:52

## 2021-06-01 RX ADMIN — LAMOTRIGINE 12.5 MG: 25 TABLET ORAL at 21:13

## 2021-06-01 RX ADMIN — Medication 1 APPLICATION: at 08:55

## 2021-06-01 RX ADMIN — RIFAMPIN 300 MG: 300 CAPSULE ORAL at 21:14

## 2021-06-01 RX ADMIN — TRAZODONE HYDROCHLORIDE 25 MG: 50 TABLET ORAL at 21:13

## 2021-06-01 RX ADMIN — HYDROCODONE BITARTRATE AND ACETAMINOPHEN 1 TABLET: 7.5; 325 TABLET ORAL at 12:54

## 2021-06-01 RX ADMIN — ATORVASTATIN CALCIUM 20 MG: 20 TABLET, FILM COATED ORAL at 21:13
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
## 2021-06-01 NOTE — PROGRESS NOTES
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  His left leg wound is showing signs of healing.  There is still some areas of nonhealing wound.    The patient has a fully cemented stem.  There is no cement removal kit available at [facility] operating room.  I will transfer the patient to [facility].  Patient and his power of  is willing.      Plan for surgery possibly Josie third/fourth

## 2021-06-01 NOTE — CASE MANAGEMENT/SOCIAL WORK
Continued Stay Note   [facility]     Patient Name: Chris Ferguson  MRN: [id number]  Today's Date: 6/1/2021    Admit Date: 5/21/2021    Discharge Plan     Row Name 06/01/21 1342       Plan    Plan Comments  DC barriers: ortho sx following, will need PICC line for iv abx    Row Name 06/01/21 1155       Plan    Plan  Return to [place], [facility],at d/c. Precert approved 6/1 (valid 48 hours). No PASRR needed for return.    Plan Comments  Informed by liaison (Yomaira) that precet was obtained for skilled rehab today, 6/1. Notified MD via text. Pharmacy updated to [facility].        Expected Discharge Date and Time     Expected Discharge Date Expected Discharge Time    Jun 2, 2021         Phone communication or documentation only - no physical contact with patient or family.      Cheryl Tapia RN

## 2021-06-01 NOTE — THERAPY TREATMENT NOTE
Subjective: Pt agreeable to therapeutic plan of care.  Cognition: oriented to Person, Place and Situation    Objective:     Bed Mobility: Max-A  Functional Transfers: N/A or Not attempted.  Functional Ambulation: N/A or Not attempted.    Grooming: SBA for set up  ADL Position: supine  ADL Comments: wash face, oral swab. Declines toothbrush    Toileting: Min-A  ADL Position: supine  ADL Comments: urinal    Pain: 8 VAS knee  Education: Provided education on importance of mobility and skilled verbal / tactile cueing throughout intervention.     Assessment: Chris Ferguson presents with ADL impairments below baseline abilities which indicate the need for continued skilled intervention while inpatient. Pt declines OOB activity this date, citing significant LE pain.  He completes BADL tasks in supine, and is agreeable for attempt at OOB activity later if OT caseload allows.  Tolerating session today without incident. Will continue to follow and progress as tolerated.     Plan/Recommendations:   Pt would benefit from Inpatient Rehabilitation placement at discharge from facility.   Pt desires Inpatient Rehabilitation placement at discharge. Pt cooperative; agreeable to therapeutic recommendations and plan of care.     Modified Buckhorn: 4 = Moderately severe disability (Unable to attend to own bodily needs without assistance, and unable to walk unassisted)     Post-Tx Position: Supine with HOB Elevated, Alarms activated and Call light and personal items within reach  PPE: gloves, surgical mask, eyewear protection

## 2021-06-01 NOTE — PROGRESS NOTES
[facility] Medicine Services Daily Progress Note      Hospitalist Team  LOS 8 days      Patient Care Team:  Gisela Sotelo APRN as PCP - Yanci Buckley MD as Consulting Physician (Cardiology)  Hector Stovall MD as Consulting Physician (Nephrology)    Patient Location: [facility]2/1      Subjective   Subjective     Chief Complaint / Subjective  Chief Complaint   Patient presents with   • Knee Pain         Brief Synopsis of Hospital Course/HPI    75-year-old man with multiple comorbidities including insulin-dependent diabetes mellitus type 2, hypothyroidism, hypertension and BPH.  Presented to [facility] ED on 5/21/2021 with complaints worsening left knee pain.  He also related having significant swelling of the left lower leg with increased pain and drainage from his previous incision site.  Had subjective fever and chills.  After evaluating in the ED, patient was admitted for further care.  5/24/2021  Patient seen and examined  On room air  Tolerating IV antibiotics  Has been afebrile for the last 48 hours  Still complaining of left lower extremity pain    5/25/2021  Seen and examined  Complaining of left knee pain  Patient continues to have episodes of hypoglycemia-oral intake is quite poor-dietitian following    5/26/2021  Patient seen and examined  No new complaints    5/27/2021  No new complaints    5/28/2021  Patient seen and examined  No new complaints  Tolerating antibiotics    5/29/2021  Continued complaining of left knee pain  Had a bone scan today    5/30/2021  Patient seen and examined  Complaint of left knee pain  Results of three-phase bone scan reviewed      Date::      5/31/2021: Poor historian having some mild knee pain no new issues  6/1/2021: Reports an increase in pain in his left knee as compared to yesterday    Review of Systems   Constitutional: Negative for fever.   Musculoskeletal: Positive for joint pain.         Objective   Objective      Vital  "Signs  Temp:  [97.9 °F (36.6 °C)-98.3 °F (36.8 °C)] 98 °F (36.7 °C)  Heart Rate:  [74-96] 96  Resp:  [14-18] 14  BP: ()/(59-73) 99/59  Oxygen Therapy  SpO2: 95 %  Pulse Oximetry Type: Intermittent  Device (Oxygen Therapy): room air  Flowsheet Rows      First Filed Value   Admission Height  177.8 cm (70\") Documented at 05/21/2021 1255   Admission Weight  90.7 kg (200 lb) Documented at 05/21/2021 1255        Intake & Output (last 3 days)       05/29 0701 - 05/30 0700 05/30 0701 - 05/31 0700 05/31 0701 - 06/01 0700 06/01 0701 - 06/02 0700    P.O. 720 440 240     Total Intake(mL/kg) 720 (8.5) 440 (5.2) 240 (2.8)     Urine (mL/kg/hr) 0 (0)  600 (0.3) 25 (0)    Stool 0       Total Output 0  600 25    Net +720 +440 -360 -25            Urine Unmeasured Occurrence 5 x 6 x 1 x 1 x    Stool Unmeasured Occurrence  1 x          Lines, Drains & Airways    Active LDAs     Name:   Placement date:   Placement time:   Site:   Days:    Peripheral IV 05/21/21 2224 Posterior;Right Forearm   05/21/21 2224    Forearm   less than 1                  Physical Exam:    Physical Exam  Vitals reviewed.   Eyes:      Pupils: Pupils are equal, round, and reactive to light.   Cardiovascular:      Rate and Rhythm: Normal rate.   Pulmonary:      Effort: Pulmonary effort is normal. No respiratory distress.   Abdominal:      General: There is no distension.      Palpations: Abdomen is soft.   Musculoskeletal:      Comments: Dressing to the left knee clean dry and intact, TTP left knee   Skin:     General: Skin is warm.   Neurological:      Mental Status: He is alert.      Comments: Oriented x2              Wounds (last 24 hours)      LDA Wound     Row Name 06/01/21 0705 05/31/21 1919          Wound 05/21/21 2015 Left lower leg    Wound - Properties Group Placement Date: 05/21/21  -AB Placement Time: 2015  -AB Present on Hospital Admission: Y  -AB Side: Left  -AB Orientation: lower  -AB Location: leg  -AB Stage, Pressure Injury : other (see " comments)  -AB, cellulitis     Dressing Appearance  dry;intact  -HW  --     Closure  GENE  -HW  GENE  -SR     Base  dressing in place, unable to visualize  -HW  dressing in place, unable to visualize  -SR     Drainage Amount  none  -HW  none  -SR     Retired Wound - Properties Group Date first assessed: 05/21/21 -AB Time first assessed: 2015 -AB Present on Hospital Admission: Y  -AB Side: Left  -AB Location: leg  -AB       Wound 05/21/21 2016 coccyx    Wound - Properties Group Placement Date: 05/21/21  -AB Placement Time: 2016 -AB Present on Hospital Admission: Y  -AB Location: coccyx  -AB    Dressing Appearance  open to air  -HW  open to air  -SR     Closure  --  --  -SR     Base  --  --  -SR     Retired Wound - Properties Group Date first assessed: 05/21/21 -AB Time first assessed: 2016 -AB Present on Hospital Admission: Y  -AB Location: coccyx  -AB       Wound 05/21/21 2016 Left lower leg Skin Tear    Wound - Properties Group Placement Date: 05/21/21  -AB Placement Time: 2016 -AB Side: Left  -AB Orientation: lower  -AB Location: leg  -AB Primary Wound Type: Skin tear  -AB    Dressing Appearance  dry;intact  -HW  intact;dried drainage  -SR     Closure  GENE  -HW  GENE  -SR     Base  dressing in place, unable to visualize  -HW  dressing in place, unable to visualize  -SR     Retired Wound - Properties Group Date first assessed: 05/21/21 -AB Time first assessed: 2016  -AB Side: Left  -AB Location: leg  -AB Primary Wound Type: Skin tear  -AB       Wound 05/21/21 2017 Left heel Pressure Injury    Wound - Properties Group Placement Date: 05/21/21  -AB Placement Time: 2017 -AB Side: Left  -AB Location: heel  -AB Primary Wound Type: Pressure inj  -AB Stage, Pressure Injury : other (see comments)  -AB    Dressing Appearance  dry;intact  -HW  dry;intact  -SR     Closure  GENE  -HW  --     Base  --  red  -SR     Drainage Amount  --  none  -SR     Retired Wound - Properties Group Date first assessed: 05/21/21 -AB Time  first assessed: 2017  -AB Side: Left  -AB Location: heel  -AB Primary Wound Type: Pressure inj  -AB      User Key  (r) = Recorded By, (t) = Taken By, (c) = Cosigned By    Initials Name Provider Type    Jose Michel RN Registered Nurse    Abeba Arauz RN Registered Nurse    Priscilla Chavez RN Registered Nurse          Procedures:              Results Review:     I reviewed the patient's new clinical results.      Lab Results (last 24 hours)     Procedure Component Value Units Date/Time    POC Glucose Once [[id number]]  (Abnormal) Collected: 06/01/21 1222    Specimen: Blood Updated: 06/01/21 1223     Glucose 225 mg/dL      Comment: Serial Number: [id number]:  [id number]       POC Glucose Once [[id number]]  (Abnormal) Collected: 06/01/21 0837    Specimen: Blood Updated: 06/01/21 0838     Glucose 121 mg/dL      Comment: Serial Number: [id number]:  [id number]       Comprehensive Metabolic Panel [[id number]]  (Abnormal) Collected: 06/01/21 0344    Specimen: Blood Updated: 06/01/21 0501     Glucose 200 mg/dL      BUN 22 mg/dL      Creatinine 1.09 mg/dL      Sodium 135 mmol/L      Potassium 4.2 mmol/L      Chloride 103 mmol/L      CO2 24.0 mmol/L      Calcium 8.0 mg/dL      Total Protein 6.7 g/dL      Albumin 2.60 g/dL      ALT (SGPT) 16 U/L      AST (SGOT) 24 U/L      Alkaline Phosphatase 117 U/L      Total Bilirubin 0.4 mg/dL      eGFR Non African Amer 66 mL/min/1.73      Globulin 4.1 gm/dL      A/G Ratio 0.6 g/dL      BUN/Creatinine Ratio 20.2     Anion Gap 8.0 mmol/L     Narrative:      GFR Normal >60  Chronic Kidney Disease <60  Kidney Failure <15      CBC & Differential [[id number]]  (Abnormal) Collected: 06/01/21 0344    Specimen: Blood Updated: 06/01/21 0420    Narrative:      The following orders were created for panel order CBC & Differential.  Procedure                               Abnormality         Status                     ---------                               -----------          ------                     CBC Auto Differential[[id number]]        Abnormal            Final result                 Please view results for these tests on the individual orders.    CBC Auto Differential [[id number]]  (Abnormal) Collected: 06/01/21 0344    Specimen: Blood Updated: 06/01/21 0420     WBC 4.50 10*3/mm3      RBC 3.55 10*6/mm3      Hemoglobin 8.8 g/dL      Hematocrit 26.8 %      MCV 75.6 fL      MCH 24.7 pg      MCHC 32.7 g/dL      RDW 18.8 %      RDW-SD 49.4 fl      MPV 6.8 fL      Platelets 325 10*3/mm3      Neutrophil % 52.1 %      Lymphocyte % 34.7 %      Monocyte % 7.3 %      Eosinophil % 5.4 %      Basophil % 0.5 %      Neutrophils, Absolute 2.30 10*3/mm3      Lymphocytes, Absolute 1.60 10*3/mm3      Monocytes, Absolute 0.30 10*3/mm3      Eosinophils, Absolute 0.20 10*3/mm3      Basophils, Absolute 0.00 10*3/mm3      nRBC 0.0 /100 WBC         No results found for: HGBA1C            Lab Results   Component Value Date    LIPASE 16 11/14/2020     Lab Results   Component Value Date    CHOL 137 11/17/2020    CHLPL 118 10/11/2020    TRIG 128 11/17/2020    HDL 37 (L) 11/17/2020    LDL 77 11/17/2020       Lab Results   Lab Value Date/Time    FINALDX  04/30/2021 1142     Leukocytosis  Microcytic anemia  No blasts identified         Microbiology Results (last 10 days)     Procedure Component Value - Date/Time    Wound Culture - Wound, Knee, Left [[id number]] Collected: 05/25/21 1124    Lab Status: Final result Specimen: Wound from Knee, Left Updated: 05/28/21 0811     Wound Culture No growth at 3 days     Gram Stain Many (4+) WBCs per low power field      No organisms seen    COVID PRE-OP / PRE-PROCEDURE SCREENING ORDER (NO ISOLATION) - Swab, Nasopharynx [[id number]]  (Normal) Collected: 05/24/21 0449    Lab Status: Final result Specimen: Swab from Nasopharynx Updated: 05/24/21 1436    Narrative:      The following orders were created for panel order COVID PRE-OP / PRE-PROCEDURE SCREENING ORDER (NO ISOLATION) -  Swab, Nasopharynx.  Procedure                               Abnormality         Status                     ---------                               -----------         ------                     COVID-19,APTIMA PANTHER,...[[id number]]  Normal              Final result                 Please view results for these tests on the individual orders.    COVID-19,APTIMA PANTHER,[facility] IN-HOUSE, NP/OP SWAB IN UTM/VTM/SALINE TRANSPORT MEDIA,24 HR TAT - Swab, Nasopharynx [[id number]]  (Normal) Collected: 05/24/21 0449    Lab Status: Final result Specimen: Swab from Nasopharynx Updated: 05/24/21 1436     COVID19 Not Detected    Narrative:      Fact sheet for providers: https://www.fda.gov/media/149537/download     Fact sheet for patients: https://www.fda.gov/media/744590/download    Test performed by RT PCR.    Wound Culture - Wound, Leg, Left [[id number]]  (Abnormal)  (Susceptibility) Collected: 05/23/21 1651    Lab Status: Final result Specimen: Wound from Leg, Left Updated: 05/26/21 0733     Wound Culture Light growth (2+) Staphylococcus aureus, MRSA     Comment: Methicillin resistant Staphylococcus aureus, Patient may be an isolation risk.         Light growth (2+) Corynebacterium species     Comment: No definitive guidelines. All are susceptible to vancomycin. Resistance to penicillins & cephalosporins does occur.        Gram Stain Moderate (3+) WBCs per low power field      Rare (1+) Gram positive bacilli      Rare (1+) Gram positive cocci    Susceptibility      Staphylococcus aureus, MRSA      NAIDA      Clindamycin Resistant      Erythromycin Resistant      Inducible Clindamycin Resistance Negative      Oxacillin Resistant      Penicillin G Resistant      Rifampin Susceptible      Tetracycline Resistant      Trimethoprim + Sulfamethoxazole Susceptible      Vancomycin Susceptible               Linear View                         ECG/EMG Results (most recent)     None          Results for orders placed during the hospital  encounter of 05/21/21    Duplex Venous Lower Extremity - Bilateral CAR    Interpretation Summary  · Normal bilateral lower extremity venous duplex scan.      Results for orders placed during the hospital encounter of 04/25/21    Adult Transthoracic Echo Limited W/ Cont if Necessary Per Protocol    Interpretation Summary  · Left ventricular ejection fraction appears to be 56 - 60%. Left ventricular systolic function is normal.    Technically difficult study with very limited views shows only apical four-chamber images of mildly tachycardic/hyperdynamic ventricle with LVEF normal range 55 to 60% and no segmental wall motion abnormalities.  Poorly visualized mitral and tricuspid valves appear grossly structurally normal with satisfactory leaflet excursion.  The aortic and pulmonic valves are not sufficiently imaged.  No pericardial effusion is seen.  Very limited screening color-flow Doppler shows no significant MR or TR jets.      NM Bone Scan 3 Phase    Result Date: 5/29/2021  Positive three-phase bone scan, consistent with the history of prostatic joint infection.  Electronically Signed By-Rita Thomas MD On:5/29/2021 12:40 PM This report was finalized on [id number] by  Rita Thomas MD.          Xrays, labs reviewed personally by physician.    Medication Review:   I have reviewed the patient's current medication list      Scheduled Meds  atorvastatin, 20 mg, Oral, Nightly  enoxaparin, 40 mg, Subcutaneous, Q24H  famotidine, 20 mg, Oral, BID AC  folic acid, 1 mg, Oral, Daily  insulin glargine, 12 Units, Subcutaneous, Nightly  insulin lispro, 0-7 Units, Subcutaneous, 4x Daily With Meals & Nightly  isosorbide mononitrate, 30 mg, Oral, Daily  lamoTRIgine, 12.5 mg, Oral, BID  levothyroxine, 100 mcg, Oral, Daily  memantine, 5 mg, Oral, Daily  metoprolol succinate XL, 25 mg, Oral, Daily  multivitamin, 1 tablet, Oral, Daily  OLANZapine, 5 mg, Oral, Nightly  rifAMPin, 300 mg, Oral, Q12H  sodium chloride, 10 mL,  Intravenous, Q12H  tamsulosin, 0.4 mg, Oral, Daily  thiamine, 100 mg, Oral, Daily  traZODone, 25 mg, Oral, Nightly  vancomycin, 1,750 mg, Intravenous, Q24H  Zinc Oxide, , Apply externally, BID        Meds Infusions  Pharmacy to dose vancomycin,         Meds PRN  •  acetaminophen  •  dextrose  •  dextrose  •  glucagon (human recombinant)  •  HYDROcodone-acetaminophen  •  insulin lispro **AND** insulin lispro  •  Pharmacy to dose vancomycin  •  senna  •  sodium chloride  •  Zinc Oxide        Assessment/Plan   Assessment/Plan     Active Hospital Problems    Diagnosis  POA   • Cellulitis of left lower extremity [L03.116]  Yes   • Coronary heart disease [I25.10]  Yes   • Essential hypertension [I10]  Yes   • DM (diabetes mellitus), type 2 (CMS/HCC) [E11.9]  Yes   • BPH (benign prostatic hyperplasia) [N40.0]  Yes   • Hypothyroidism [E03.9]  Yes      Resolved Hospital Problems   No resolved problems to display.       MEDICAL DECISION MAKING COMPLEXITY BY PROBLEM:     Left lower extremity cellulitis/Possible left total knee infection; both related to type 2 diabetes  -Bone scan on 5/29/2021 consistent with history of prosthetic joint infection  -Patient has history of left knee surgeries-left knee replacement, supracondylar femoral fracture status post open reduction and internal fixation 1/9/2021 complicated by another fracture and underwent knee revision and distal femoral replacement 08/2018, Left leg debridements 09/20/2020  -Wound culture growing MRSA  -Blood culture-no growth   -ID following , on IV vancomycin, rifampin added 5/31/2021  -Left knee aspiration with fluid sent for culture 5/25/2021  -ID recommending IV antibiotics x6 weeks.  -Per Ortho considering 1 phase to 2 phase knee procedure still awaiting for their definitive plans for procedure?    Hypothyroidism  -on Synthroid    Insulin-dependent diabetes mellitus  A1c 7.6  Patient had episodes of hypoglycemia   -increase Lantus 15 units  -Monitor blood sugar  and adjust insulin as needed    BPH  -on Flomax    Hyperlipidemia  -on statin    CAD with previous stenting  -No active angina  On beta-blocker, Imdur, statin    Hypertension  - continue Imdur, metoprolol.       Mechanical Order History:     None      Pharmalogical Order History:      Ordered     Dose Route Frequency Stop    05/21/21 1842  enoxaparin (LOVENOX) syringe 40 mg      40 mg SC Every 24 Hours Scheduled --                  Code Status -   Code Status and Medical Interventions:   Ordered at: 05/21/21 1842     Level Of Support Discussed With:    Patient     Code Status:    CPR     Medical Interventions (Level of Support Prior to Arrest):    Full       This patient has been examined wearing appropriate Personal Protective Equipment and discussed with hospital infection control department. 06/01/21        Discharge Planning    Per ID and orthopedic surgeon pending possible procedures. Currently is accepted to rehab precert expires 48 hours.     Electronically signed by Timo Blake DO, 06/01/21, 15:25 EDT.  [facility] Hospitalist Team

## 2021-06-01 NOTE — PROGRESS NOTES
FromInfectious Diseases Progress Note      LOS: 8 days   Patient Care Team:  Gisela Sotelo APRN as PCP - General  Yanci Howard MD as Consulting Physician (Cardiology)  Hector Stovall MD as Consulting Physician (Nephrology)    Chief Complaint: Left leg wound and swelling    Subjective     The patient had no fever during the last 24 hours.  The patient remained hemodynamically stable.  The patient remained confused.       Review of Systems:   Review of Systems   Unable to perform ROS: Mental status change   Musculoskeletal:        Continued left knee and left leg pain   Skin: Positive for wound.   Psychiatric/Behavioral: Positive for confusion.        Objective     Vital Signs  Temp:  [97.9 °F (36.6 °C)-98.3 °F (36.8 °C)] 98 °F (36.7 °C)  Heart Rate:  [74-96] 96  Resp:  [14-18] 14  BP: ()/(59-73) 99/59    Physical Exam:  Physical Exam  Vitals and nursing note reviewed.   Constitutional:       Appearance: He is well-developed.   HENT:      Head: Normocephalic and atraumatic.   Eyes:      Pupils: Pupils are equal, round, and reactive to light.   Cardiovascular:      Rate and Rhythm: Normal rate and regular rhythm.      Heart sounds: Normal heart sounds.   Pulmonary:      Effort: Pulmonary effort is normal. No respiratory distress.      Breath sounds: Normal breath sounds. No wheezing or rales.   Abdominal:      General: Bowel sounds are normal. There is no distension.      Palpations: Abdomen is soft. There is no mass.      Tenderness: There is no abdominal tenderness. There is no guarding or rebound.   Musculoskeletal:         General: No deformity. Normal range of motion.      Cervical back: Normal range of motion and neck supple.      Left lower leg: Edema present.      Comments: Superficial wound is present on the left leg calf    Effusion of the left knee with warmness   Skin:     General: Skin is warm.      Findings: No erythema or rash.   Neurological:      Mental Status: He is alert and oriented  to person, place, and time.      Cranial Nerves: No cranial nerve deficit.          Results Review:    I have reviewed all clinical data, test, lab, and imaging results.     Radiology  No Radiology Exams Resulted Within Past 24 Hours    Cardiology    Laboratory    Results from last 7 days   Lab Units 06/01/21 0344 05/31/21 0738 05/30/21  0232 05/28/21  0950 05/27/21  0702 05/26/21  0800   WBC 10*3/mm3 4.50 5.60 5.60 7.00 5.80 6.80   HEMOGLOBIN g/dL 8.8* 8.8* 8.5* 9.5* 8.3* 8.9*   HEMATOCRIT % 26.8* 26.7* 25.8* 28.7* 25.0* 27.3*   PLATELETS 10*3/mm3 325 332 305 333 254 337     Results from last 7 days   Lab Units 06/01/21 0344 05/31/21 0738 05/30/21  0232 05/28/21  0950 05/27/21  0702 05/26/21  0800   SODIUM mmol/L 135* 136 137 137 134* 138   POTASSIUM mmol/L 4.2 4.1 4.4 4.4 4.7 4.4   CHLORIDE mmol/L 103 103 104 103 101 104   CO2 mmol/L 24.0 27.0 26.0 25.0 24.0 27.0   BUN mg/dL 22 21 20 19 20 13   CREATININE mg/dL 1.09 1.10 1.04 1.03 0.95 0.97   GLUCOSE mg/dL 200* 123* 166* 166* 199* 83   ALBUMIN g/dL 2.60* 2.50*  --   --  2.20* 2.40*   BILIRUBIN mg/dL 0.4 0.3  --   --  0.2 0.2   ALK PHOS U/L 117 116  --   --  107 114   AST (SGOT) U/L 24 18  --   --  21 20   ALT (SGPT) U/L 16 18  --   --  15 13   CALCIUM mg/dL 8.0* 8.6 8.5* 8.3* 7.8* 8.0*                 Microbiology   Microbiology Results (last 10 days)     Procedure Component Value - Date/Time    Wound Culture - Wound, Knee, Left [[id number]] Collected: 05/25/21 1124    Lab Status: Final result Specimen: Wound from Knee, Left Updated: 05/28/21 0811     Wound Culture No growth at 3 days     Gram Stain Many (4+) WBCs per low power field      No organisms seen    COVID PRE-OP / PRE-PROCEDURE SCREENING ORDER (NO ISOLATION) - Swab, Nasopharynx [[id number]]  (Normal) Collected: 05/24/21 0449    Lab Status: Final result Specimen: Swab from Nasopharynx Updated: 05/24/21 1436    Narrative:      The following orders were created for panel order COVID PRE-OP / PRE-PROCEDURE  SCREENING ORDER (NO ISOLATION) - Swab, Nasopharynx.  Procedure                               Abnormality         Status                     ---------                               -----------         ------                     COVID-19,APTIMA PANTHER,...[[id number]]  Normal              Final result                 Please view results for these tests on the individual orders.    COVID-19,APTIMA PANTHER,[facility], NP/OP SWAB IN UTM/VTM/SALINE TRANSPORT MEDIA,24 HR TAT - Swab, Nasopharynx [[id number]]  (Normal) Collected: 05/24/21 0449    Lab Status: Final result Specimen: Swab from Nasopharynx Updated: 05/24/21 1436     COVID19 Not Detected    Narrative:      Fact sheet for providers: https://www.fda.gov/media/858908/download     Fact sheet for patients: https://www.fda.gov/media/308283/download    Test performed by RT PCR.    Wound Culture - Wound, Leg, Left [[id number]]  (Abnormal)  (Susceptibility) Collected: 05/23/21 1651    Lab Status: Final result Specimen: Wound from Leg, Left Updated: 05/26/21 0733     Wound Culture Light growth (2+) Staphylococcus aureus, MRSA     Comment: Methicillin resistant Staphylococcus aureus, Patient may be an isolation risk.         Light growth (2+) Corynebacterium species     Comment: No definitive guidelines. All are susceptible to vancomycin. Resistance to penicillins & cephalosporins does occur.        Gram Stain Moderate (3+) WBCs per low power field      Rare (1+) Gram positive bacilli      Rare (1+) Gram positive cocci    Susceptibility      Staphylococcus aureus, MRSA      NAIDA      Clindamycin Resistant      Erythromycin Resistant      Inducible Clindamycin Resistance Negative      Oxacillin Resistant      Penicillin G Resistant      Rifampin Susceptible      Tetracycline Resistant      Trimethoprim + Sulfamethoxazole Susceptible      Vancomycin Susceptible               Linear View                         Medication Review:       Schedule Meds  atorvastatin, 20 mg, Oral,  Nightly  enoxaparin, 40 mg, Subcutaneous, Q24H  famotidine, 20 mg, Oral, BID AC  folic acid, 1 mg, Oral, Daily  insulin glargine, 15 Units, Subcutaneous, Nightly  insulin lispro, 0-7 Units, Subcutaneous, 4x Daily With Meals & Nightly  isosorbide mononitrate, 30 mg, Oral, Daily  lamoTRIgine, 12.5 mg, Oral, BID  levothyroxine, 100 mcg, Oral, Daily  memantine, 5 mg, Oral, Daily  metoprolol succinate XL, 25 mg, Oral, Daily  multivitamin, 1 tablet, Oral, Daily  OLANZapine, 5 mg, Oral, Nightly  rifAMPin, 300 mg, Oral, Q12H  sodium chloride, 10 mL, Intravenous, Q12H  tamsulosin, 0.4 mg, Oral, Daily  thiamine, 100 mg, Oral, Daily  traZODone, 25 mg, Oral, Nightly  vancomycin, 1,750 mg, Intravenous, Q24H  Zinc Oxide, , Apply externally, BID        Infusion Meds  Pharmacy to dose vancomycin,         PRN Meds  •  acetaminophen  •  dextrose  •  dextrose  •  glucagon (human recombinant)  •  HYDROcodone-acetaminophen  •  insulin lispro **AND** insulin lispro  •  Pharmacy to dose vancomycin  •  senna  •  sodium chloride  •  Zinc Oxide        Assessment/Plan       Antimicrobial Therapy   1.  IV vancomycin      day  2.        day  3.      Day  4.      Day  5.      Day      Assessment     Possible infected left total knee.  Current x-ray showed loosening of the hardware.  The patient was admitted to the hospital recently with left leg cellulitis and x-ray of the left knee showed stable hardware.  The patient is currently have superficial wounds in the left leg.  Wound cultures are growing methicillin resistant Staphylococcus aureus and corynebacterium   -Had a aspiration of the left knee on 5/25/2021-cultures was negative but patient was already on antimicrobial therapy  3 phases bone scan from May 29, 2021 was positive for prosthetic joint infection    Patient has an extensive history of the left leg with a remote left knee replacement.  Patient had a supracondylar femur fracture and underwent an open reduction internal fixation on  1/9/2018.  He then had another fracture in this area and Dr. Giang performed knee revision with a distal femoral replacement in August 2018.  Patient then had a left leg debridement in September 2020.     Low-grade fever probably secondary to above  -Resolved     Type 2 diabetes     History of right total hip replacement     History of Parkinson's disease with dementia        Plan     Continue IV vancomycin for now, pharmacy is following and dosing  Continue rifampin 300 mg p.o. twice daily  Continue supportive care  A.m. labs  The patient apparently will be transferred to [facility] since he required equipment not available here for cement removal.  The patient will need to have infectious disease consultation upon arrival to manage his antimicrobial therapy      Enrique Kolb MD  06/01/21  17:55 EDT      Note is dictated utilizing voice recognition software/Dragon

## 2021-06-01 NOTE — CASE MANAGEMENT/SOCIAL WORK
Continued Stay Note   [facility]     Patient Name: Chris Ferguson  MRN: [id number]  Today's Date: 6/1/2021    Admit Date: 5/21/2021    Discharge Plan     Row Name 06/01/21 1153       Plan    Plan  Return to [facility],at d/c. Precert approved 6/1 (valid 48 hours). No PASRR needed for return.    Plan Comments  Informed by liaison (Yomaira) that precet was obtained for [facility] rehab today, 6/1. Notified MD via text. Pharmacy updated to [facility].     Phone communication or documentation only - no physical contact with patient or family.  RUSTY Basurto    Phone: [phone]  Cell: [phone]  Fax: [phone]  [email or web address]

## 2021-06-01 NOTE — PLAN OF CARE
Chris Ferguson presents with ADL impairments below baseline abilities which indicate the need for continued skilled intervention while inpatient. Pt declines OOB activity this date, citing significant LE pain.  He completes BADL tasks in supine, and is agreeable for attempt at OOB activity later if OT caseload allows.  Tolerating session today without incident. Will continue to follow and progress as tolerated.

## 2021-06-01 NOTE — PLAN OF CARE
Goal Outcome Evaluation:  Plan of Care Reviewed With: patient  Progress: no change  Outcome Summary: pt has minimal complaints. pain in LLE treated per MAR. ortho discussed with pt surgery and transfer to [facility].

## 2021-06-01 NOTE — NURSING NOTE
Pt to transfer to [facility] for surgery per ortho. Bed not available at this time but transfer center to call when bed becomes available.

## 2021-06-02 LAB
ALBUMIN SERPL-MCNC: 2.8 G/DL (ref 3.5–5.2)
ALBUMIN/GLOB SERPL: 0.6 G/DL
ALP SERPL-CCNC: 127 U/L (ref 39–117)
ALT SERPL W P-5'-P-CCNC: 17 U/L (ref 1–41)
ANION GAP SERPL CALCULATED.3IONS-SCNC: 8 MMOL/L (ref 5–15)
AST SERPL-CCNC: 15 U/L (ref 1–40)
BASOPHILS # BLD AUTO: 0 10*3/MM3 (ref 0–0.2)
BASOPHILS NFR BLD AUTO: 0.7 % (ref 0–1.5)
BILIRUB SERPL-MCNC: 0.3 MG/DL (ref 0–1.2)
BUN SERPL-MCNC: 21 MG/DL (ref 8–23)
BUN/CREAT SERPL: 16.8 (ref 7–25)
CALCIUM SPEC-SCNC: 8.9 MG/DL (ref 8.6–10.5)
CHLORIDE SERPL-SCNC: 102 MMOL/L (ref 98–107)
CO2 SERPL-SCNC: 25 MMOL/L (ref 22–29)
CREAT SERPL-MCNC: 1.25 MG/DL (ref 0.76–1.27)
DEPRECATED RDW RBC AUTO: 50.8 FL (ref 37–54)
EOSINOPHIL # BLD AUTO: 0.3 10*3/MM3 (ref 0–0.4)
EOSINOPHIL NFR BLD AUTO: 4.7 % (ref 0.3–6.2)
ERYTHROCYTE [DISTWIDTH] IN BLOOD BY AUTOMATED COUNT: 19.3 % (ref 12.3–15.4)
GFR SERPL CREATININE-BSD FRML MDRD: 57 ML/MIN/1.73
GLOBULIN UR ELPH-MCNC: 4.6 GM/DL
GLUCOSE BLDC GLUCOMTR-MCNC: 135 MG/DL (ref 70–105)
GLUCOSE BLDC GLUCOMTR-MCNC: 180 MG/DL (ref 70–105)
GLUCOSE BLDC GLUCOMTR-MCNC: 293 MG/DL (ref 70–105)
GLUCOSE BLDC GLUCOMTR-MCNC: 350 MG/DL (ref 70–105)
GLUCOSE SERPL-MCNC: 210 MG/DL (ref 65–99)
HCT VFR BLD AUTO: 29.9 % (ref 37.5–51)
HGB BLD-MCNC: 9.7 G/DL (ref 13–17.7)
LYMPHOCYTES # BLD AUTO: 2.1 10*3/MM3 (ref 0.7–3.1)
LYMPHOCYTES NFR BLD AUTO: 36.3 % (ref 19.6–45.3)
MCH RBC QN AUTO: 24.5 PG (ref 26.6–33)
MCHC RBC AUTO-ENTMCNC: 32.5 G/DL (ref 31.5–35.7)
MCV RBC AUTO: 75.2 FL (ref 79–97)
MONOCYTES # BLD AUTO: 0.4 10*3/MM3 (ref 0.1–0.9)
MONOCYTES NFR BLD AUTO: 6.8 % (ref 5–12)
NEUTROPHILS NFR BLD AUTO: 3 10*3/MM3 (ref 1.7–7)
NEUTROPHILS NFR BLD AUTO: 51.5 % (ref 42.7–76)
NRBC BLD AUTO-RTO: 0 /100 WBC (ref 0–0.2)
PLATELET # BLD AUTO: 373 10*3/MM3 (ref 140–450)
PMV BLD AUTO: 6.6 FL (ref 6–12)
POTASSIUM SERPL-SCNC: 4.3 MMOL/L (ref 3.5–5.2)
PROT SERPL-MCNC: 7.4 G/DL (ref 6–8.5)
RBC # BLD AUTO: 3.98 10*6/MM3 (ref 4.14–5.8)
SODIUM SERPL-SCNC: 135 MMOL/L (ref 136–145)
VANCOMYCIN TROUGH SERPL-MCNC: 16.4 MCG/ML (ref 5–20)
WBC # BLD AUTO: 5.9 10*3/MM3 (ref 3.4–10.8)

## 2021-06-02 PROCEDURE — 82962 GLUCOSE BLOOD TEST: CPT

## 2021-06-02 PROCEDURE — 63710000001 INSULIN LISPRO (HUMAN) PER 5 UNITS: Performed by: INTERNAL MEDICINE

## 2021-06-02 PROCEDURE — 80053 COMPREHEN METABOLIC PANEL: CPT | Performed by: INTERNAL MEDICINE

## 2021-06-02 PROCEDURE — 99232 SBSQ HOSP IP/OBS MODERATE 35: CPT | Performed by: INTERNAL MEDICINE

## 2021-06-02 PROCEDURE — 80202 ASSAY OF VANCOMYCIN: CPT | Performed by: INTERNAL MEDICINE

## 2021-06-02 PROCEDURE — 25010000002 VANCOMYCIN 10 G RECONSTITUTED SOLUTION: Performed by: INTERNAL MEDICINE

## 2021-06-02 PROCEDURE — 25010000002 VANCOMYCIN PER 500 MG: Performed by: INTERNAL MEDICINE

## 2021-06-02 PROCEDURE — 97530 THERAPEUTIC ACTIVITIES: CPT

## 2021-06-02 PROCEDURE — 85025 COMPLETE CBC W/AUTO DIFF WBC: CPT | Performed by: INTERNAL MEDICINE

## 2021-06-02 PROCEDURE — 63710000001 INSULIN GLARGINE PER 5 UNITS: Performed by: INTERNAL MEDICINE

## 2021-06-02 PROCEDURE — 25010000002 ENOXAPARIN PER 10 MG: Performed by: HOSPITALIST

## 2021-06-02 RX ORDER — OXYCODONE HCL 10 MG/1
20 TABLET, FILM COATED, EXTENDED RELEASE ORAL ONCE
Status: CANCELLED | OUTPATIENT
Start: 2021-06-02 | End: 2021-06-02

## 2021-06-02 RX ORDER — PREGABALIN 75 MG/1
75 CAPSULE ORAL ONCE
Status: CANCELLED | OUTPATIENT
Start: 2021-06-02 | End: 2021-06-02

## 2021-06-02 RX ORDER — ACETAMINOPHEN 500 MG
1000 TABLET ORAL ONCE
Status: CANCELLED | OUTPATIENT
Start: 2021-06-02 | End: 2021-06-02

## 2021-06-02 RX ADMIN — ATORVASTATIN CALCIUM 20 MG: 20 TABLET, FILM COATED ORAL at 20:38

## 2021-06-02 RX ADMIN — FAMOTIDINE 20 MG: 20 TABLET ORAL at 17:25

## 2021-06-02 RX ADMIN — Medication: at 10:50

## 2021-06-02 RX ADMIN — INSULIN LISPRO 6 UNITS: 100 INJECTION, SOLUTION INTRAVENOUS; SUBCUTANEOUS at 20:36

## 2021-06-02 RX ADMIN — TRAZODONE HYDROCHLORIDE 25 MG: 50 TABLET ORAL at 20:37

## 2021-06-02 RX ADMIN — INSULIN LISPRO 4 UNITS: 100 INJECTION, SOLUTION INTRAVENOUS; SUBCUTANEOUS at 17:25

## 2021-06-02 RX ADMIN — INSULIN GLARGINE 15 UNITS: 100 INJECTION, SOLUTION SUBCUTANEOUS at 20:36

## 2021-06-02 RX ADMIN — MEMANTINE 5 MG: 5 TABLET ORAL at 10:49

## 2021-06-02 RX ADMIN — Medication 100 MG: at 10:49

## 2021-06-02 RX ADMIN — TAMSULOSIN HYDROCHLORIDE 0.4 MG: 0.4 CAPSULE ORAL at 20:38

## 2021-06-02 RX ADMIN — FAMOTIDINE 20 MG: 20 TABLET ORAL at 05:42

## 2021-06-02 RX ADMIN — HYDROCODONE BITARTRATE AND ACETAMINOPHEN 1 TABLET: 7.5; 325 TABLET ORAL at 05:42

## 2021-06-02 RX ADMIN — METOPROLOL SUCCINATE 25 MG: 25 TABLET, EXTENDED RELEASE ORAL at 10:49

## 2021-06-02 RX ADMIN — Medication: at 20:37

## 2021-06-02 RX ADMIN — ISOSORBIDE MONONITRATE 30 MG: 30 TABLET, EXTENDED RELEASE ORAL at 10:49

## 2021-06-02 RX ADMIN — Medication 10 ML: at 20:37

## 2021-06-02 RX ADMIN — Medication 10 ML: at 10:50

## 2021-06-02 RX ADMIN — THERA TABS 1 TABLET: TAB at 10:49

## 2021-06-02 RX ADMIN — LEVOTHYROXINE SODIUM 100 MCG: 0.1 TABLET ORAL at 05:42

## 2021-06-02 RX ADMIN — LAMOTRIGINE 12.5 MG: 25 TABLET ORAL at 10:49

## 2021-06-02 RX ADMIN — RIFAMPIN 300 MG: 300 CAPSULE ORAL at 10:49

## 2021-06-02 RX ADMIN — HYDROCODONE BITARTRATE AND ACETAMINOPHEN 1 TABLET: 7.5; 325 TABLET ORAL at 17:25

## 2021-06-02 RX ADMIN — VANCOMYCIN HYDROCHLORIDE 1750 MG: 10 INJECTION, POWDER, LYOPHILIZED, FOR SOLUTION INTRAVENOUS at 20:36

## 2021-06-02 RX ADMIN — ENOXAPARIN SODIUM 40 MG: 40 INJECTION SUBCUTANEOUS at 17:26

## 2021-06-02 RX ADMIN — RIFAMPIN 300 MG: 300 CAPSULE ORAL at 20:37

## 2021-06-02 RX ADMIN — HYDROCODONE BITARTRATE AND ACETAMINOPHEN 1 TABLET: 7.5; 325 TABLET ORAL at 10:53

## 2021-06-02 RX ADMIN — INSULIN LISPRO 2 UNITS: 100 INJECTION, SOLUTION INTRAVENOUS; SUBCUTANEOUS at 10:52

## 2021-06-02 RX ADMIN — OLANZAPINE 5 MG: 5 TABLET ORAL at 20:38

## 2021-06-02 RX ADMIN — FOLIC ACID 1 MG: 1 TABLET ORAL at 10:49

## 2021-06-02 RX ADMIN — LAMOTRIGINE 12.5 MG: 25 TABLET ORAL at 20:38

## 2021-06-02 RX ADMIN — SENNOSIDES 1 TABLET: 8.6 TABLET, FILM COATED ORAL at 10:49

## 2021-06-02 NOTE — NURSING NOTE
S/W bed board at [facility], no bed available until tomorrow. They will call when ready. Was just follow up to inform patient.

## 2021-06-02 NOTE — PROGRESS NOTES
[facility] Medicine Services Daily Progress Note      Hospitalist Team  LOS 9 days      Patient Care Team:  Gisela Sotelo APRN as PCP - Yanci Buckley MD as Consulting Physician (Cardiology)  Hector Stovall MD as Consulting Physician (Nephrology)    Patient Location: [facility]/1      Subjective   Subjective     Chief Complaint / Subjective  Chief Complaint   Patient presents with   • Knee Pain         Brief Synopsis of Hospital Course/HPI    75-year-old man with multiple comorbidities including insulin-dependent diabetes mellitus type 2, hypothyroidism, hypertension and BPH.  Presented to [facility] ED on 5/21/2021 with complaints worsening left knee pain.  He also related having significant swelling of the left lower leg with increased pain and drainage from his previous incision site.  Had subjective fever and chills.  After evaluating in the ED, patient was admitted for further care.  5/24/2021  Patient seen and examined  On room air  Tolerating IV antibiotics  Has been afebrile for the last 48 hours  Still complaining of left lower extremity pain    5/25/2021  Seen and examined  Complaining of left knee pain  Patient continues to have episodes of hypoglycemia-oral intake is quite poor-dietitian following    5/26/2021  Patient seen and examined  No new complaints    5/27/2021  No new complaints    5/28/2021  Patient seen and examined  No new complaints  Tolerating antibiotics    5/29/2021  Continued complaining of left knee pain  Had a bone scan today    5/30/2021  Patient seen and examined  Complaint of left knee pain  Results of three-phase bone scan reviewed      Date::      5/31/2021: Poor historian having some mild knee pain no new issues  6/1/2021: Reports an increase in pain in his left knee as compared to yesterday  6/2/21:mild confusion noted this am per nursing but seems improved at time of exam. Some knee pain persists.     Review of Systems  "  Constitutional: Negative for fever.   Musculoskeletal: Positive for joint pain.         Objective   Objective      Vital Signs  Temp:  [97.6 °F (36.4 °C)-98 °F (36.7 °C)] 98 °F (36.7 °C)  Heart Rate:  [75-96] 84  Resp:  [14-17] 17  BP: ()/(59-86) 141/84  Oxygen Therapy  SpO2: 95 %  Pulse Oximetry Type: Intermittent  Device (Oxygen Therapy): room air  Flowsheet Rows      First Filed Value   Admission Height  177.8 cm (70\") Documented at 05/21/2021 1255   Admission Weight  90.7 kg (200 lb) Documented at 05/21/2021 1255        Intake & Output (last 3 days)       05/30 0701 - 05/31 0700 05/31 0701 - 06/01 0700 06/01 0701 - 06/02 0700 06/02 0701 - 06/03 0700    P.O. 440 240 480     IV Piggyback   500     Total Intake(mL/kg) 440 (5.2) 240 (2.8) 980 (11.6)     Urine (mL/kg/hr)  600 (0.3) 25 (0)     Stool        Total Output  600 25     Net +440 -360 +955             Urine Unmeasured Occurrence 6 x 1 x 1 x     Stool Unmeasured Occurrence 1 x           Lines, Drains & Airways    Active LDAs     Name:   Placement date:   Placement time:   Site:   Days:    Peripheral IV 05/21/21 2224 Posterior;Right Forearm   05/21/21 2224    Forearm   less than 1                  Physical Exam:    Physical Exam  Vitals and nursing note reviewed.   Eyes:      Pupils: Pupils are equal, round, and reactive to light.   Cardiovascular:      Rate and Rhythm: Normal rate.   Pulmonary:      Effort: Pulmonary effort is normal. No respiratory distress.   Abdominal:      General: There is no distension.      Palpations: Abdomen is soft.   Musculoskeletal:      Comments: Dressing to the left knee clean dry and intact, TTP left knee   Skin:     General: Skin is warm.   Neurological:      Mental Status: He is alert.      Comments: Oriented x2              Wounds (last 24 hours)      LDA Wound     Row Name 06/02/21 0730 06/01/21 1948          Wound 05/21/21 2015 Left lower leg    Wound - Properties Group Placement Date: 05/21/21  -AB Placement " Time: 2015 -AB Present on Hospital Admission: Y  -AB Side: Left  -AB Orientation: lower  -AB Location: leg  -AB Stage, Pressure Injury : other (see comments)  -AB, cellulitis     Dressing Appearance  dry;intact  -AR  dry;intact  -AC     Closure  GENE  -AR  --     Base  dressing in place, unable to visualize  -AR  dressing in place, unable to visualize  -AC     Periwound  pink  -AR  --     Periwound Temperature  warm  -AR  --     Periwound Skin Turgor  firm  -AR  --     Edges  open  -AR  --     Dressing Care  --  silicone;border dressing  -AC     Retired Wound - Properties Group Date first assessed: 05/21/21 -AB Time first assessed: 2015 -AB Present on Hospital Admission: Y  -AB Side: Left  -AB Location: leg  -AB       Wound 05/21/21 2016 coccyx    Wound - Properties Group Placement Date: 05/21/21  -AB Placement Time: 2016 -AB Present on Hospital Admission: Y  -AB Location: coccyx  -AB    Dressing Appearance  no drainage  -AR  no drainage;open to air  -AC     Closure  GENE  -AR  --     Base  red;non-blanchable  -AR  red;non-blanchable  -AC     Periwound  intact;dry  -AR  --     Periwound Temperature  cool  -AR  --     Periwound Skin Turgor  firm  -AR  --     Retired Wound - Properties Group Date first assessed: 05/21/21 -AB Time first assessed: 2016 -AB Present on Hospital Admission: Y  -AB Location: coccyx  -AB       Wound 05/21/21 2016 Left lower leg Skin Tear    Wound - Properties Group Placement Date: 05/21/21  -AB Placement Time: 2016 -AB Side: Left  -AB Orientation: lower  -AB Location: leg  -AB Primary Wound Type: Skin tear  -AB    Dressing Appearance  dry;intact  -AR  dry;intact  -AC     Closure  GENE  -AR  --     Base  dressing in place, unable to visualize  -AR  dressing in place, unable to visualize  -AC     Dressing Care  --  silicone;border dressing  -AC     Retired Wound - Properties Group Date first assessed: 05/21/21 -AB Time first assessed: 2016 -AB Side: Left  -AB Location: leg  -AB Primary  Wound Type: Skin tear  -AB       Wound 05/21/21 2017 Left heel Pressure Injury    Wound - Properties Group Placement Date: 05/21/21  -AB Placement Time: 2017 -AB Side: Left  -AB Location: heel  -AB Primary Wound Type: Pressure inj  -AB Stage, Pressure Injury : other (see comments)  -AB    Dressing Appearance  dry;intact  -AR  dry;intact;no drainage  -AC     Closure  GENE  -AR  --     Base  --  red;non-blanchable  -AC     Dressing Care  --  silicone;border dressing  -AC     Retired Wound - Properties Group Date first assessed: 05/21/21 -AB Time first assessed: 2017 -AB Side: Left  -AB Location: heel  -AB Primary Wound Type: Pressure inj  -AB      User Key  (r) = Recorded By, (t) = Taken By, (c) = Cosigned By    Initials Name Provider Type    Jose Michel RN Registered Nurse    Yomaira Martinez LPN Licensed Nurse    Thalia Schmidt LPN Licensed Nurse          Procedures:              Results Review:     I reviewed the patient's new clinical results.      Lab Results (last 24 hours)     Procedure Component Value Units Date/Time    POC Glucose Once [[id number]]  (Abnormal) Collected: 06/02/21 0731    Specimen: Blood Updated: 06/02/21 0732     Glucose 180 mg/dL      Comment: Serial Number: [id number]:  [id number]       Comprehensive Metabolic Panel [[id number]]  (Abnormal) Collected: 06/02/21 0650    Specimen: Blood Updated: 06/02/21 0720     Glucose 210 mg/dL      BUN 21 mg/dL      Creatinine 1.25 mg/dL      Sodium 135 mmol/L      Potassium 4.3 mmol/L      Chloride 102 mmol/L      CO2 25.0 mmol/L      Calcium 8.9 mg/dL      Total Protein 7.4 g/dL      Albumin 2.80 g/dL      ALT (SGPT) 17 U/L      AST (SGOT) 15 U/L      Alkaline Phosphatase 127 U/L      Total Bilirubin 0.3 mg/dL      eGFR Non African Amer 57 mL/min/1.73      Globulin 4.6 gm/dL      A/G Ratio 0.6 g/dL      BUN/Creatinine Ratio 16.8     Anion Gap 8.0 mmol/L     Narrative:      GFR Normal >60  Chronic Kidney Disease <60  Kidney Failure  <15      CBC & Differential [[id number]]  (Abnormal) Collected: 06/02/21 0650    Specimen: Blood Updated: 06/02/21 0655    Narrative:      The following orders were created for panel order CBC & Differential.  Procedure                               Abnormality         Status                     ---------                               -----------         ------                     CBC Auto Differential[[id number]]        Abnormal            Final result                 Please view results for these tests on the individual orders.    CBC Auto Differential [[id number]]  (Abnormal) Collected: 06/02/21 0650    Specimen: Blood Updated: 06/02/21 0655     WBC 5.90 10*3/mm3      RBC 3.98 10*6/mm3      Hemoglobin 9.7 g/dL      Hematocrit 29.9 %      MCV 75.2 fL      MCH 24.5 pg      MCHC 32.5 g/dL      RDW 19.3 %      RDW-SD 50.8 fl      MPV 6.6 fL      Platelets 373 10*3/mm3      Neutrophil % 51.5 %      Lymphocyte % 36.3 %      Monocyte % 6.8 %      Eosinophil % 4.7 %      Basophil % 0.7 %      Neutrophils, Absolute 3.00 10*3/mm3      Lymphocytes, Absolute 2.10 10*3/mm3      Monocytes, Absolute 0.40 10*3/mm3      Eosinophils, Absolute 0.30 10*3/mm3      Basophils, Absolute 0.00 10*3/mm3      nRBC 0.0 /100 WBC     POC Glucose Once [[id number]]  (Abnormal) Collected: 06/01/21 2118    Specimen: Blood Updated: 06/01/21 2119     Glucose 177 mg/dL      Comment: Serial Number: [id number]:  [id number]       POC Glucose Once [[id number]]  (Abnormal) Collected: 06/01/21 1626    Specimen: Blood Updated: 06/01/21 1629     Glucose 199 mg/dL      Comment: Serial Number: [id number]:  [id number]           No results found for: HGBA1C            Lab Results   Component Value Date    LIPASE 16 11/14/2020     Lab Results   Component Value Date    CHOL 137 11/17/2020    CHLPL 118 10/11/2020    TRIG 128 11/17/2020    HDL 37 (L) 11/17/2020    LDL 77 11/17/2020       Lab Results   Lab Value Date/Time    FINALDX  04/30/2021 1142      Leukocytosis  Microcytic anemia  No blasts identified         Microbiology Results (last 10 days)     Procedure Component Value - Date/Time    Wound Culture - Wound, Knee, Left [[id number]] Collected: 05/25/21 1124    Lab Status: Final result Specimen: Wound from Knee, Left Updated: 05/28/21 0811     Wound Culture No growth at 3 days     Gram Stain Many (4+) WBCs per low power field      No organisms seen    COVID PRE-OP / PRE-PROCEDURE SCREENING ORDER (NO ISOLATION) - Swab, Nasopharynx [[id number]]  (Normal) Collected: 05/24/21 0449    Lab Status: Final result Specimen: Swab from Nasopharynx Updated: 05/24/21 1436    Narrative:      The following orders were created for panel order COVID PRE-OP / PRE-PROCEDURE SCREENING ORDER (NO ISOLATION) - Swab, Nasopharynx.  Procedure                               Abnormality         Status                     ---------                               -----------         ------                     COVID-19,APTIMA PANTHER,...[[id number]]  Normal              Final result                 Please view results for these tests on the individual orders.    COVID-19,APTIMA PANTHER,[facility] IN-HOUSE, NP/OP SWAB IN UTM/VTM/SALINE TRANSPORT MEDIA,24 HR TAT - Swab, Nasopharynx [[id number]]  (Normal) Collected: 05/24/21 0449    Lab Status: Final result Specimen: Swab from Nasopharynx Updated: 05/24/21 1436     COVID19 Not Detected    Narrative:      Fact sheet for providers: https://www.fda.gov/media/093830/download     Fact sheet for patients: https://www.fda.gov/media/743456/download    Test performed by RT PCR.    Wound Culture - Wound, Leg, Left [[id number]]  (Abnormal)  (Susceptibility) Collected: 05/23/21 1651    Lab Status: Final result Specimen: Wound from Leg, Left Updated: 05/26/21 0733     Wound Culture Light growth (2+) Staphylococcus aureus, MRSA     Comment: Methicillin resistant Staphylococcus aureus, Patient may be an isolation risk.         Light growth (2+) Corynebacterium species      Comment: No definitive guidelines. All are susceptible to vancomycin. Resistance to penicillins & cephalosporins does occur.        Gram Stain Moderate (3+) WBCs per low power field      Rare (1+) Gram positive bacilli      Rare (1+) Gram positive cocci    Susceptibility      Staphylococcus aureus, MRSA      NAIDA      Clindamycin Resistant      Erythromycin Resistant      Inducible Clindamycin Resistance Negative      Oxacillin Resistant      Penicillin G Resistant      Rifampin Susceptible      Tetracycline Resistant      Trimethoprim + Sulfamethoxazole Susceptible      Vancomycin Susceptible               Linear View                         ECG/EMG Results (most recent)     None          Results for orders placed during the hospital encounter of 05/21/21    Duplex Venous Lower Extremity - Bilateral CAR    Interpretation Summary  · Normal bilateral lower extremity venous duplex scan.      Results for orders placed during the hospital encounter of 04/25/21    Adult Transthoracic Echo Limited W/ Cont if Necessary Per Protocol    Interpretation Summary  · Left ventricular ejection fraction appears to be 56 - 60%. Left ventricular systolic function is normal.    Technically difficult study with very limited views shows only apical four-chamber images of mildly tachycardic/hyperdynamic ventricle with LVEF normal range 55 to 60% and no segmental wall motion abnormalities.  Poorly visualized mitral and tricuspid valves appear grossly structurally normal with satisfactory leaflet excursion.  The aortic and pulmonic valves are not sufficiently imaged.  No pericardial effusion is seen.  Very limited screening color-flow Doppler shows no significant MR or TR jets.      NM Bone Scan 3 Phase    Result Date: 5/29/2021  Positive three-phase bone scan, consistent with the history of prostatic joint infection.  Electronically Signed By-Rita Thomas MD On:5/29/2021 12:40 PM This report was finalized on [id number] by  Rita  MD Martha.          Xrays, labs reviewed personally by physician.    Medication Review:   I have reviewed the patient's current medication list      Scheduled Meds  atorvastatin, 20 mg, Oral, Nightly  enoxaparin, 40 mg, Subcutaneous, Q24H  famotidine, 20 mg, Oral, BID AC  folic acid, 1 mg, Oral, Daily  insulin glargine, 15 Units, Subcutaneous, Nightly  insulin lispro, 0-7 Units, Subcutaneous, 4x Daily With Meals & Nightly  isosorbide mononitrate, 30 mg, Oral, Daily  lamoTRIgine, 12.5 mg, Oral, BID  levothyroxine, 100 mcg, Oral, Daily  memantine, 5 mg, Oral, Daily  metoprolol succinate XL, 25 mg, Oral, Daily  multivitamin, 1 tablet, Oral, Daily  OLANZapine, 5 mg, Oral, Nightly  rifAMPin, 300 mg, Oral, Q12H  sodium chloride, 10 mL, Intravenous, Q12H  tamsulosin, 0.4 mg, Oral, Daily  thiamine, 100 mg, Oral, Daily  traZODone, 25 mg, Oral, Nightly  vancomycin, 1,750 mg, Intravenous, Q24H  Zinc Oxide, , Apply externally, BID        Meds Infusions  Pharmacy to dose vancomycin,         Meds PRN  •  acetaminophen  •  dextrose  •  dextrose  •  glucagon (human recombinant)  •  HYDROcodone-acetaminophen  •  insulin lispro **AND** insulin lispro  •  Pharmacy to dose vancomycin  •  senna  •  sodium chloride  •  Zinc Oxide        Assessment/Plan   Assessment/Plan     Active Hospital Problems    Diagnosis  POA   • Cellulitis of left lower extremity [L03.116]  Yes   • Coronary heart disease [I25.10]  Yes   • Essential hypertension [I10]  Yes   • DM (diabetes mellitus), type 2 (CMS/HCC) [E11.9]  Yes   • BPH (benign prostatic hyperplasia) [N40.0]  Yes   • Hypothyroidism [E03.9]  Yes      Resolved Hospital Problems   No resolved problems to display.       MEDICAL DECISION MAKING COMPLEXITY BY PROBLEM:     Left lower extremity cellulitis/Possible left total knee infection; both related to type 2 diabetes  -Bone scan on 5/29/2021 consistent with history of prosthetic joint infection  -Patient has history of left knee surgeries-left  knee replacement, supracondylar femoral fracture status post open reduction and internal fixation 1/9/2021 complicated by another fracture and underwent knee revision and distal femoral replacement 08/2018, Left leg debridements 09/20/2020  -Wound culture growing MRSA  -Blood culture-no growth   -ID following , on IV vancomycin, rifampin added 5/31/2021  -Left knee aspiration with fluid sent for culture 5/25/2021  -ID recommending IV antibiotics x6 weeks.  -Per Ortho transferring to [facility] for procedure     Hypothyroidism  -on Synthroid    Insulin-dependent diabetes mellitus  A1c 7.6  Patient had episodes of hypoglycemia   -continue Lantus 15 units  -Monitor blood sugar and adjust insulin as needed    BPH  -on Flomax    Hyperlipidemia  -on statin    CAD with previous stenting  -No active angina  On beta-blocker, Imdur, statin    Hypertension  - continue Imdur, metoprolol.       Mechanical Order History:     None      Pharmalogical Order History:      Ordered     Dose Route Frequency Stop    05/21/21 1842  enoxaparin (LOVENOX) syringe 40 mg      40 mg SC Every 24 Hours Scheduled --                  Code Status -   Code Status and Medical Interventions:   Ordered at: 05/21/21 1842     Level Of Support Discussed With:    Patient     Code Status:    CPR     Medical Interventions (Level of Support Prior to Arrest):    Full       This patient has been examined wearing appropriate Personal Protective Equipment and discussed with hospital infection control department. 06/02/21        Discharge Planning    Will be transferring to [facility] likely today once bed available, see d/c summary written 6/1/21 for d/c details    Electronically signed by Timo Blake DO, 06/02/21, 09:50 EDT.  [facility] Hospitalist Team

## 2021-06-02 NOTE — PLAN OF CARE
Problem: Fall Injury Risk  Goal: Absence of Fall and Fall-Related Injury  Outcome: Ongoing, Progressing  Intervention: Identify and Manage Contributors to Fall Injury Risk  Description: Reassess fall risk frequently and with change in status or transfer to another level of care.  Communicate fall injury risk to all healthcare team members (e.g., rounds, change of shift/provider, patient transport).  Anticipate needs; perform regular intentional rounding to assess need for position change, pain assessment, personal needs (e.g., toileting) and placement of necessary items.  Provide reorientation, appropriate sensory stimulation and routines with changes in mental status to decrease risk of fall.  Promote use of personal vision and auditory aids (e.g., glasses, hearing aids).  Assess assistance level required for safe and effective care; provide support as needed (e.g., toileting, bathing, mobilization).  Define behavior and activity limits to patient and family.  If fall occurs, assess for and treat injury; determine cause; revise fall injury prevention plan.  Regularly review medication contribution to fall risk; adjust medication administration times to minimize risk of falling.  Consider risk related to polypharmacy and age.  Balance adequate pain management with potential for oversedation.  Recent Flowsheet Documentation  Taken 6/2/2021 0730 by Thalia Calvin LPN  Medication Review/Management: medications reviewed  Intervention: Promote Injury-Free Environment  Description: Provide a safe, barrier-free environment that encourages independent activity.  Keep care area uncluttered and well-lighted.  Determine need for increased observation or auditory alerts (e.g., bed or chair alarm).  Assess equipment and environmental modification needs (e.g., low bed, signage, nonskid footwear, grab bars).  Avoid use of restraints.  Recent Flowsheet Documentation  Taken 6/2/2021 1400 by Thalia Calvin LPN  Safety  Promotion/Fall Prevention:   safety round/check completed   nonskid shoes/slippers when out of bed  Taken 6/2/2021 1320 by Thalia Calvin LPN  Safety Promotion/Fall Prevention:   safety round/check completed   nonskid shoes/slippers when out of bed   fall prevention program maintained  Taken 6/2/2021 1212 by Thalia Calvin LPN  Safety Promotion/Fall Prevention:   safety round/check completed   nonskid shoes/slippers when out of bed  Taken 6/2/2021 1129 by Thalia Calvin LPN  Safety Promotion/Fall Prevention:   safety round/check completed   nonskid shoes/slippers when out of bed   fall prevention program maintained  Taken 6/2/2021 1123 by Thalia Calvin LPN  Safety Promotion/Fall Prevention:   safety round/check completed   nonskid shoes/slippers when out of bed   fall prevention program maintained  Taken 6/2/2021 1053 by Thalia Calvin LPN  Safety Promotion/Fall Prevention:   safety round/check completed   nonskid shoes/slippers when out of bed   fall prevention program maintained  Taken 6/2/2021 0900 by Thalia Calvin LPN  Safety Promotion/Fall Prevention:   safety round/check completed   nonskid shoes/slippers when out of bed   fall prevention program maintained  Taken 6/2/2021 0830 by Thalia Calvin LPN  Safety Promotion/Fall Prevention:   safety round/check completed   fall prevention program maintained   nonskid shoes/slippers when out of bed  Taken 6/2/2021 0730 by Thalia Calvin LPN  Safety Promotion/Fall Prevention: safety round/check completed  Goal: Absence of Fall and Fall-Related Injury  Outcome: Ongoing, Progressing  Intervention: Identify and Manage Contributors to Fall Injury Risk  Description: Reassess fall risk frequently and with change in status or transfer to another level of care.  Communicate fall injury risk to all healthcare team members (e.g., rounds, change of shift/provider, patient transport).  Anticipate needs; perform regular intentional rounding to assess need for  position change, pain assessment, personal needs (e.g., toileting) and placement of necessary items.  Provide reorientation, appropriate sensory stimulation and routines with changes in mental status to decrease risk of fall.  Promote use of personal vision and auditory aids (e.g., glasses, hearing aids).  Assess assistance level required for safe and effective care; provide support as needed (e.g., toileting, bathing, mobilization).  Define behavior and activity limits to patient and family.  If fall occurs, assess for and treat injury; determine cause; revise fall injury prevention plan.  Regularly review medication contribution to fall risk; adjust medication administration times to minimize risk of falling.  Consider risk related to polypharmacy and age.  Balance adequate pain management with potential for oversedation.  Recent Flowsheet Documentation  Taken 6/2/2021 0730 by Thalia Calvin LPN  Medication Review/Management: medications reviewed  Intervention: Promote Injury-Free Environment  Description: Provide a safe, barrier-free environment that encourages independent activity.  Keep care area uncluttered and well-lighted.  Determine need for increased observation or auditory alerts (e.g., bed or chair alarm).  Assess equipment and environmental modification needs (e.g., low bed, signage, nonskid footwear, grab bars).  Avoid use of restraints.  Recent Flowsheet Documentation  Taken 6/2/2021 1400 by Thalia Calvin LPN  Safety Promotion/Fall Prevention:   safety round/check completed   nonskid shoes/slippers when out of bed  Taken 6/2/2021 1320 by Thalia Calvin LPN  Safety Promotion/Fall Prevention:   safety round/check completed   nonskid shoes/slippers when out of bed   fall prevention program maintained  Taken 6/2/2021 1212 by Thalia Calvin LPN  Safety Promotion/Fall Prevention:   safety round/check completed   nonskid shoes/slippers when out of bed  Taken 6/2/2021 1129 by Thalia Calvin  LPN  Safety Promotion/Fall Prevention:   safety round/check completed   nonskid shoes/slippers when out of bed   fall prevention program maintained  Taken 6/2/2021 1123 by Thalia Calvin LPN  Safety Promotion/Fall Prevention:   safety round/check completed   nonskid shoes/slippers when out of bed   fall prevention program maintained  Taken 6/2/2021 1053 by Thalia Calvin LPN  Safety Promotion/Fall Prevention:   safety round/check completed   nonskid shoes/slippers when out of bed   fall prevention program maintained  Taken 6/2/2021 0900 by Thalia Calvin LPN  Safety Promotion/Fall Prevention:   safety round/check completed   nonskid shoes/slippers when out of bed   fall prevention program maintained  Taken 6/2/2021 0830 by Thalia Calvin LPN  Safety Promotion/Fall Prevention:   safety round/check completed   fall prevention program maintained   nonskid shoes/slippers when out of bed  Taken 6/2/2021 0730 by Thalia Calvin LPN  Safety Promotion/Fall Prevention: safety round/check completed   Goal Outcome Evaluation:   Patient observed with eyes open in sitting position in chair. Rise and fall of chest observed. Dressings clean dry and intact. Patient turned q2 hours for skin protection. Hourly rounding completed this shift, no complaints or needs voiced.

## 2021-06-02 NOTE — CASE MANAGEMENT/SOCIAL WORK
Continued Stay Note   [facility]     Patient Name: Chris Ferguson  MRN: [id number]  Today's Date: 6/2/2021    Admit Date: 5/21/2021    Discharge Plan     Row Name 06/02/21 1434       Plan    Plan Comments  DC barriers: patient is transferring to [facility] for ortho sx. MSW updated facility on transfer. DC orders noted.    Final Discharge Disposition Code  02 - short term hospital for IP care    Final Note  transfer to [facility]        Expected Discharge Date and Time     Expected Discharge Date Expected Discharge Time    Jun 1, 2021         Phone communication or documentation only - no physical contact with patient or family.      Cheryl Tapia RN

## 2021-06-02 NOTE — PLAN OF CARE
Pt would benefit from Inpatient Rehabilitation placement at discharge from facility and requires no DME at discharge.   Pt desires Inpatient Rehabilitation placement at discharge. Pt cooperative; agreeable to therapeutic recommendations and plan of care.

## 2021-06-02 NOTE — THERAPY TREATMENT NOTE
Subjective: Pt agreeable to therapeutic plan of care.    Objective:     Bed mobility - Pt. Up in chair.  Transfers - CGA and with rolling walker, multiple vcs/mcs for proper ergonomics c sit/stand and rwx. use.  Pt. Stood x 2, would stand momentarily then insist to sit down. Pt. presents fearful of falling .   Ambulation - 0 feet N/A or Not attempted. Pt. Adamantly refusing.    Pain: 10 VAS LLE  Education: Provided education on importance of mobility and skilled verbal / tactile cueing throughout intervention.     Assessment: Chris Ferguson presents with functional mobility impairments which indicate the need for skilled intervention. Limited by fear of falling and reported pain. Needs reinforced safety c transfers and rwx. Use, hih falls risk, poor safety awareness. Requires great encouragement to progress. Tolerating session today without incident. Will continue to follow and progress as tolerated.     Plan/Recommendations:   Pt would benefit from Inpatient Rehabilitation placement at discharge from facility and requires no DME at discharge.   Pt desires Inpatient Rehabilitation placement at discharge. Pt cooperative; agreeable to therapeutic recommendations and plan of care.     Basic Mobility 6-click:  Rollin = Total, A lot = 2, A little = 3; 4 = None  Supine>Sit:   1 = Total, A lot = 2, A little = 3; 4 = None   Sit>Stand with arms:  1 = Total, A lot = 2, A little = 3; 4 = None  Bed>Chair:   1 = Total, A lot = 2, A little = 3; 4 = None  Ambulate in room:  1 = Total, A lot = 2, A little = 3; 4 = None  3-5 Steps with railin = Total, A lot = 2, A little = 3; 4 = None  Score: 12    Modified Murali: 4 = Moderately severe disability (Unable to attend to own bodily needs without assistance, and unable to walk unassisted)     Post-Tx Position: Up in Chair, Alarms activated and Call light and personal items within reach  PPE: gloves, surgical mask, eyewear protection

## 2021-06-02 NOTE — PLAN OF CARE
Goal Outcome Evaluation:  Plan of Care Reviewed With: patient  Progress: no change  Outcome Summary: minimal pain complaints, prn norco. transfer to [facility] when bed available today for sx 6/3 or 6/4. yelled out for staff and continuously asking same questions over and over, did not use call light. turn/agiliti. room air. will continue to monitor.

## 2021-06-02 NOTE — PROGRESS NOTES
FromInfectious Diseases Progress Note      LOS: 9 days   Patient Care Team:  Gisela Sotelo APRN as PCP - General  Yanci Howard MD as Consulting Physician (Cardiology)  Hector Stovall MD as Consulting Physician (Nephrology)    Chief Complaint: Left leg wound and swelling    Subjective     The patient had no fever during the last 24 hours.  The patient remained hemodynamically stable.  The patient remained confused.       Review of Systems:   Review of Systems   Unable to perform ROS: Mental status change   Musculoskeletal:        Continued left knee and left leg pain   Skin: Positive for wound.   Psychiatric/Behavioral: Positive for confusion.        Objective     Vital Signs  Temp:  [97.6 °F (36.4 °C)-98 °F (36.7 °C)] 97.8 °F (36.6 °C)  Heart Rate:  [75-95] 95  Resp:  [15-17] 16  BP: (116-141)/(72-86) 116/72    Physical Exam:  Physical Exam  Vitals and nursing note reviewed.   Constitutional:       Appearance: He is well-developed.   HENT:      Head: Normocephalic and atraumatic.   Eyes:      Pupils: Pupils are equal, round, and reactive to light.   Cardiovascular:      Rate and Rhythm: Normal rate and regular rhythm.      Heart sounds: Normal heart sounds.   Pulmonary:      Effort: Pulmonary effort is normal. No respiratory distress.      Breath sounds: Normal breath sounds. No wheezing or rales.   Abdominal:      General: Bowel sounds are normal. There is no distension.      Palpations: Abdomen is soft. There is no mass.      Tenderness: There is no abdominal tenderness. There is no guarding or rebound.   Musculoskeletal:         General: No deformity. Normal range of motion.      Cervical back: Normal range of motion and neck supple.      Left lower leg: Edema present.      Comments: Superficial wound is present on the left leg calf    Effusion of the left knee with warmness   Skin:     General: Skin is warm.      Findings: No erythema or rash.   Neurological:      Mental Status: He is alert and  oriented to person, place, and time.      Cranial Nerves: No cranial nerve deficit.          Results Review:    I have reviewed all clinical data, test, lab, and imaging results.     Radiology  No Radiology Exams Resulted Within Past 24 Hours    Cardiology    Laboratory    Results from last 7 days   Lab Units 06/02/21  0650 06/01/21  0344 05/31/21  0738 05/30/21  0232 05/28/21  0950 05/27/21  0702   WBC 10*3/mm3 5.90 4.50 5.60 5.60 7.00 5.80   HEMOGLOBIN g/dL 9.7* 8.8* 8.8* 8.5* 9.5* 8.3*   HEMATOCRIT % 29.9* 26.8* 26.7* 25.8* 28.7* 25.0*   PLATELETS 10*3/mm3 373 325 332 305 333 254     Results from last 7 days   Lab Units 06/02/21  0650 06/01/21  0344 05/31/21  0738 05/30/21  0232 05/28/21  0950 05/27/21  0702   SODIUM mmol/L 135* 135* 136 137 137 134*   POTASSIUM mmol/L 4.3 4.2 4.1 4.4 4.4 4.7   CHLORIDE mmol/L 102 103 103 104 103 101   CO2 mmol/L 25.0 24.0 27.0 26.0 25.0 24.0   BUN mg/dL 21 22 21 20 19 20   CREATININE mg/dL 1.25 1.09 1.10 1.04 1.03 0.95   GLUCOSE mg/dL 210* 200* 123* 166* 166* 199*   ALBUMIN g/dL 2.80* 2.60* 2.50*  --   --  2.20*   BILIRUBIN mg/dL 0.3 0.4 0.3  --   --  0.2   ALK PHOS U/L 127* 117 116  --   --  107   AST (SGOT) U/L 15 24 18  --   --  21   ALT (SGPT) U/L 17 16 18  --   --  15   CALCIUM mg/dL 8.9 8.0* 8.6 8.5* 8.3* 7.8*                 Microbiology   Microbiology Results (last 10 days)     Procedure Component Value - Date/Time    Wound Culture - Wound, Knee, Left [[id number]] Collected: 05/25/21 1124    Lab Status: Final result Specimen: Wound from Knee, Left Updated: 05/28/21 0811     Wound Culture No growth at 3 days     Gram Stain Many (4+) WBCs per low power field      No organisms seen    COVID PRE-OP / PRE-PROCEDURE SCREENING ORDER (NO ISOLATION) - Swab, Nasopharynx [[id number]]  (Normal) Collected: 05/24/21 0449    Lab Status: Final result Specimen: Swab from Nasopharynx Updated: 05/24/21 1436    Narrative:      The following orders were created for panel order COVID PRE-OP /  PRE-PROCEDURE SCREENING ORDER (NO ISOLATION) - Swab, Nasopharynx.  Procedure                               Abnormality         Status                     ---------                               -----------         ------                     COVID-19,APTIMA PANTHER,...[[id number]]  Normal              Final result                 Please view results for these tests on the individual orders.    COVID-19,APTIMA PANTHER,[facility], NP/OP SWAB IN UTM/VTM/[facility] TRANSPORT MEDIA,24 HR TAT - Swab, Nasopharynx [[id number]]  (Normal) Collected: 05/24/21 0449    Lab Status: Final result Specimen: Swab from Nasopharynx Updated: 05/24/21 1436     COVID19 Not Detected    Narrative:      Fact sheet for providers: https://www.fda.gov/media/377561/download     Fact sheet for patients: https://www.fda.gov/media/239413/download    Test performed by RT PCR.    Wound Culture - Wound, Leg, Left [[id number]]  (Abnormal)  (Susceptibility) Collected: 05/23/21 1651    Lab Status: Final result Specimen: Wound from Leg, Left Updated: 05/26/21 0733     Wound Culture Light growth (2+) Staphylococcus aureus, MRSA     Comment: Methicillin resistant Staphylococcus aureus, Patient may be an isolation risk.         Light growth (2+) Corynebacterium species     Comment: No definitive guidelines. All are susceptible to vancomycin. Resistance to penicillins & cephalosporins does occur.        Gram Stain Moderate (3+) WBCs per low power field      Rare (1+) Gram positive bacilli      Rare (1+) Gram positive cocci    Susceptibility      Staphylococcus aureus, MRSA      NAIDA      Clindamycin Resistant      Erythromycin Resistant      Inducible Clindamycin Resistance Negative      Oxacillin Resistant      Penicillin G Resistant      Rifampin Susceptible      Tetracycline Resistant      Trimethoprim + Sulfamethoxazole Susceptible      Vancomycin Susceptible               Linear View                         Medication Review:       Schedule Meds  atorvastatin,  20 mg, Oral, Nightly  enoxaparin, 40 mg, Subcutaneous, Q24H  famotidine, 20 mg, Oral, BID AC  folic acid, 1 mg, Oral, Daily  insulin glargine, 15 Units, Subcutaneous, Nightly  insulin lispro, 0-7 Units, Subcutaneous, 4x Daily With Meals & Nightly  isosorbide mononitrate, 30 mg, Oral, Daily  lamoTRIgine, 12.5 mg, Oral, BID  levothyroxine, 100 mcg, Oral, Daily  memantine, 5 mg, Oral, Daily  metoprolol succinate XL, 25 mg, Oral, Daily  multivitamin, 1 tablet, Oral, Daily  OLANZapine, 5 mg, Oral, Nightly  rifAMPin, 300 mg, Oral, Q12H  sodium chloride, 10 mL, Intravenous, Q12H  tamsulosin, 0.4 mg, Oral, Daily  thiamine, 100 mg, Oral, Daily  traZODone, 25 mg, Oral, Nightly  vancomycin, 1,750 mg, Intravenous, Q24H  Zinc Oxide, , Apply externally, BID        Infusion Meds  Pharmacy to dose vancomycin,         PRN Meds  •  acetaminophen  •  dextrose  •  dextrose  •  glucagon (human recombinant)  •  HYDROcodone-acetaminophen  •  insulin lispro **AND** insulin lispro  •  Pharmacy to dose vancomycin  •  senna  •  sodium chloride  •  Zinc Oxide        Assessment/Plan       Antimicrobial Therapy   1.  IV vancomycin      day  2.        day  3.      Day  4.      Day  5.      Day      Assessment     Possible infected left total knee.  Current x-ray showed loosening of the hardware.  The patient was admitted to the hospital recently with left leg cellulitis and x-ray of the left knee showed stable hardware.  The patient is currently have superficial wounds in the left leg.  Wound cultures are growing methicillin resistant Staphylococcus aureus and corynebacterium   -Had a aspiration of the left knee on 5/25/2021-cultures was negative but patient was already on antimicrobial therapy  3 phases bone scan from May 29, 2021 was positive for prosthetic joint infection    Patient has an extensive history of the left leg with a remote left knee replacement.  Patient had a supracondylar femur fracture and underwent an open reduction internal  fixation on 1/9/2018.  He then had another fracture in this area and Dr. Giang performed knee revision with a distal femoral replacement in August 2018.  Patient then had a left leg debridement in September 2020.     Low-grade fever probably secondary to above  -Resolved     Type 2 diabetes     History of right total hip replacement     History of Parkinson's disease with dementia        Plan     Continue IV vancomycin for now, pharmacy is following and dosing  Continue rifampin 300 mg p.o. twice daily  Continue supportive care  A.m. labs  The patient apparently will be transferred to [facility] since he required equipment not available here for cement removal.  The patient will need to have infectious disease consultation upon arrival to manage his antimicrobial therapy      Enrique Kolb MD  06/02/21  15:50 EDT      Note is dictated utilizing voice recognition software/Dragon

## 2021-06-02 NOTE — PLAN OF CARE
Problem: Fall Injury Risk  Goal: Absence of Fall and Fall-Related Injury  6/2/2021 1717 by Thalia Calvin LPN  Outcome: Ongoing, Progressing  6/2/2021 1716 by Thalia Calvin LPN  Outcome: Ongoing, Progressing  Intervention: Identify and Manage Contributors to Fall Injury Risk  Description: Reassess fall risk frequently and with change in status or transfer to another level of care.  Communicate fall injury risk to all healthcare team members (e.g., rounds, change of shift/provider, patient transport).  Anticipate needs; perform regular intentional rounding to assess need for position change, pain assessment, personal needs (e.g., toileting) and placement of necessary items.  Provide reorientation, appropriate sensory stimulation and routines with changes in mental status to decrease risk of fall.  Promote use of personal vision and auditory aids (e.g., glasses, hearing aids).  Assess assistance level required for safe and effective care; provide support as needed (e.g., toileting, bathing, mobilization).  Define behavior and activity limits to patient and family.  If fall occurs, assess for and treat injury; determine cause; revise fall injury prevention plan.  Regularly review medication contribution to fall risk; adjust medication administration times to minimize risk of falling.  Consider risk related to polypharmacy and age.  Balance adequate pain management with potential for oversedation.  Recent Flowsheet Documentation  Taken 6/2/2021 0730 by Thalia Calvin LPN  Medication Review/Management: medications reviewed  Intervention: Promote Injury-Free Environment  Description: Provide a safe, barrier-free environment that encourages independent activity.  Keep care area uncluttered and well-lighted.  Determine need for increased observation or auditory alerts (e.g., bed or chair alarm).  Assess equipment and environmental modification needs (e.g., low bed, signage, nonskid footwear, grab bars).  Avoid use  of restraints.  Recent Flowsheet Documentation  Taken 6/2/2021 1400 by Thalia Calvin LPN  Safety Promotion/Fall Prevention:   safety round/check completed   nonskid shoes/slippers when out of bed  Taken 6/2/2021 1320 by Thalia Calvin LPN  Safety Promotion/Fall Prevention:   safety round/check completed   nonskid shoes/slippers when out of bed   fall prevention program maintained  Taken 6/2/2021 1212 by Thalia Calvin LPN  Safety Promotion/Fall Prevention:   safety round/check completed   nonskid shoes/slippers when out of bed  Taken 6/2/2021 1129 by Thalia Calvin LPN  Safety Promotion/Fall Prevention:   safety round/check completed   nonskid shoes/slippers when out of bed   fall prevention program maintained  Taken 6/2/2021 1123 by Thalia Calvin LPN  Safety Promotion/Fall Prevention:   safety round/check completed   nonskid shoes/slippers when out of bed   fall prevention program maintained  Taken 6/2/2021 1053 by Thalia Calvin LPN  Safety Promotion/Fall Prevention:   safety round/check completed   nonskid shoes/slippers when out of bed   fall prevention program maintained  Taken 6/2/2021 0900 by Thalia Calvin LPN  Safety Promotion/Fall Prevention:   safety round/check completed   nonskid shoes/slippers when out of bed   fall prevention program maintained  Taken 6/2/2021 0830 by Thalia Calvin LPN  Safety Promotion/Fall Prevention:   safety round/check completed   fall prevention program maintained   nonskid shoes/slippers when out of bed  Taken 6/2/2021 0730 by Thalia Calvin LPN  Safety Promotion/Fall Prevention: safety round/check completed  Goal: Absence of Fall and Fall-Related Injury  6/2/2021 1717 by Thalia Calvin LPN  Outcome: Ongoing, Progressing  6/2/2021 1716 by Thalia Calvin LPN  Outcome: Ongoing, Progressing  Intervention: Identify and Manage Contributors to Fall Injury Risk  Description: Reassess fall risk frequently and with change in status or transfer to another level  of care.  Communicate fall injury risk to all healthcare team members (e.g., rounds, change of shift/provider, patient transport).  Anticipate needs; perform regular intentional rounding to assess need for position change, pain assessment, personal needs (e.g., toileting) and placement of necessary items.  Provide reorientation, appropriate sensory stimulation and routines with changes in mental status to decrease risk of fall.  Promote use of personal vision and auditory aids (e.g., glasses, hearing aids).  Assess assistance level required for safe and effective care; provide support as needed (e.g., toileting, bathing, mobilization).  Define behavior and activity limits to patient and family.  If fall occurs, assess for and treat injury; determine cause; revise fall injury prevention plan.  Regularly review medication contribution to fall risk; adjust medication administration times to minimize risk of falling.  Consider risk related to polypharmacy and age.  Balance adequate pain management with potential for oversedation.  Recent Flowsheet Documentation  Taken 6/2/2021 0730 by Thalia Calvin LPN  Medication Review/Management: medications reviewed  Intervention: Promote Injury-Free Environment  Description: Provide a safe, barrier-free environment that encourages independent activity.  Keep care area uncluttered and well-lighted.  Determine need for increased observation or auditory alerts (e.g., bed or chair alarm).  Assess equipment and environmental modification needs (e.g., low bed, signage, nonskid footwear, grab bars).  Avoid use of restraints.  Recent Flowsheet Documentation  Taken 6/2/2021 1400 by Thalia Calvin LPN  Safety Promotion/Fall Prevention:   safety round/check completed   nonskid shoes/slippers when out of bed  Taken 6/2/2021 1320 by Thalia Calvin LPN  Safety Promotion/Fall Prevention:   safety round/check completed   nonskid shoes/slippers when out of bed   fall prevention program  maintained  Taken 6/2/2021 1212 by Thalia Calvin LPN  Safety Promotion/Fall Prevention:   safety round/check completed   nonskid shoes/slippers when out of bed  Taken 6/2/2021 1129 by Thalia Calvin LPN  Safety Promotion/Fall Prevention:   safety round/check completed   nonskid shoes/slippers when out of bed   fall prevention program maintained  Taken 6/2/2021 1123 by Thalia Calvin LPN  Safety Promotion/Fall Prevention:   safety round/check completed   nonskid shoes/slippers when out of bed   fall prevention program maintained  Taken 6/2/2021 1053 by Thalia Calvin LPN  Safety Promotion/Fall Prevention:   safety round/check completed   nonskid shoes/slippers when out of bed   fall prevention program maintained  Taken 6/2/2021 0900 by Thalia Calvin LPN  Safety Promotion/Fall Prevention:   safety round/check completed   nonskid shoes/slippers when out of bed   fall prevention program maintained  Taken 6/2/2021 0830 by Thalia Calvin LPN  Safety Promotion/Fall Prevention:   safety round/check completed   fall prevention program maintained   nonskid shoes/slippers when out of bed  Taken 6/2/2021 0730 by hTalia Calvin LPN  Safety Promotion/Fall Prevention: safety round/check completed   Goal Outcome Evaluation:      [facility]. They stated they would call when a bed was ready.

## 2021-06-03 ENCOUNTER — APPOINTMENT (OUTPATIENT)
Dept: GENERAL RADIOLOGY | Facility: HOSPITAL | Age: 76
End: 2021-06-03

## 2021-06-03 ENCOUNTER — HOSPITAL ENCOUNTER (OUTPATIENT)
Facility: HOSPITAL | Age: 76
Setting detail: SURGERY ADMIT
End: 2021-06-03
Attending: ORTHOPAEDIC SURGERY | Admitting: ORTHOPAEDIC SURGERY

## 2021-06-03 ENCOUNTER — HOSPITAL ENCOUNTER (INPATIENT)
Facility: HOSPITAL | Age: 76
LOS: 7 days | Discharge: NURSING, REHAB OR LONG-TERM CARE | End: 2021-06-10
Attending: INTERNAL MEDICINE | Admitting: HOSPITALIST

## 2021-06-03 VITALS
HEIGHT: 70 IN | TEMPERATURE: 98.2 F | DIASTOLIC BLOOD PRESSURE: 72 MMHG | SYSTOLIC BLOOD PRESSURE: 107 MMHG | WEIGHT: 187 LBS | HEART RATE: 106 BPM | RESPIRATION RATE: 18 BRPM | OXYGEN SATURATION: 90 % | BODY MASS INDEX: 26.77 KG/M2

## 2021-06-03 DIAGNOSIS — T24.332A BURN OF THIRD DEGREE OF LEFT LOWER LEG, INITIAL ENCOUNTER: ICD-10-CM

## 2021-06-03 DIAGNOSIS — T84.54XD INFECTION ASSOCIATED WITH INTERNAL LEFT KNEE PROSTHESIS, SUBSEQUENT ENCOUNTER: Primary | ICD-10-CM

## 2021-06-03 PROBLEM — T84.54XA INFECTION OF PROSTHETIC LEFT KNEE JOINT (HCC): Status: ACTIVE | Noted: 2021-06-03

## 2021-06-03 PROBLEM — Z89.529 ACQUIRED ABSENCE OF KNEE JOINT FOLLOWING REMOVAL OF JOINT PROSTHESIS WITH PRESENCE OF ANTIBIOTIC-IMPREGNATED CEMENT SPACER: Status: ACTIVE | Noted: 2021-06-03

## 2021-06-03 LAB
ALBUMIN SERPL-MCNC: 2.9 G/DL (ref 3.5–5.2)
ALBUMIN/GLOB SERPL: 0.7 G/DL
ALP SERPL-CCNC: 116 U/L (ref 39–117)
ALT SERPL W P-5'-P-CCNC: 16 U/L (ref 1–41)
ANION GAP SERPL CALCULATED.3IONS-SCNC: 7.9 MMOL/L (ref 5–15)
AST SERPL-CCNC: 15 U/L (ref 1–40)
BASOPHILS # BLD AUTO: 0.03 10*3/MM3 (ref 0–0.2)
BASOPHILS NFR BLD AUTO: 0.4 % (ref 0–1.5)
BILIRUB SERPL-MCNC: 0.4 MG/DL (ref 0–1.2)
BUN SERPL-MCNC: 26 MG/DL (ref 8–23)
BUN/CREAT SERPL: 21.8 (ref 7–25)
CALCIUM SPEC-SCNC: 8.3 MG/DL (ref 8.6–10.5)
CHLORIDE SERPL-SCNC: 104 MMOL/L (ref 98–107)
CO2 SERPL-SCNC: 24.1 MMOL/L (ref 22–29)
CREAT SERPL-MCNC: 1.19 MG/DL (ref 0.76–1.27)
D-LACTATE SERPL-SCNC: 0.9 MMOL/L (ref 0.5–2)
DEPRECATED RDW RBC AUTO: 49 FL (ref 37–54)
EOSINOPHIL # BLD AUTO: 0.18 10*3/MM3 (ref 0–0.4)
EOSINOPHIL NFR BLD AUTO: 2.6 % (ref 0.3–6.2)
ERYTHROCYTE [DISTWIDTH] IN BLOOD BY AUTOMATED COUNT: 17.4 % (ref 12.3–15.4)
GFR SERPL CREATININE-BSD FRML MDRD: 60 ML/MIN/1.73
GLOBULIN UR ELPH-MCNC: 4 GM/DL
GLUCOSE BLDC GLUCOMTR-MCNC: 134 MG/DL (ref 70–130)
GLUCOSE BLDC GLUCOMTR-MCNC: 139 MG/DL (ref 70–130)
GLUCOSE BLDC GLUCOMTR-MCNC: 165 MG/DL (ref 70–130)
GLUCOSE BLDC GLUCOMTR-MCNC: 187 MG/DL (ref 70–130)
GLUCOSE BLDC GLUCOMTR-MCNC: 201 MG/DL (ref 70–130)
GLUCOSE SERPL-MCNC: 154 MG/DL (ref 65–99)
HCT VFR BLD AUTO: 27.8 % (ref 37.5–51)
HGB BLD-MCNC: 8.6 G/DL (ref 13–17.7)
IMM GRANULOCYTES # BLD AUTO: 0.03 10*3/MM3 (ref 0–0.05)
IMM GRANULOCYTES NFR BLD AUTO: 0.4 % (ref 0–0.5)
LYMPHOCYTES # BLD AUTO: 2.11 10*3/MM3 (ref 0.7–3.1)
LYMPHOCYTES NFR BLD AUTO: 30.9 % (ref 19.6–45.3)
MCH RBC QN AUTO: 24 PG (ref 26.6–33)
MCHC RBC AUTO-ENTMCNC: 30.9 G/DL (ref 31.5–35.7)
MCV RBC AUTO: 77.4 FL (ref 79–97)
MONOCYTES # BLD AUTO: 0.46 10*3/MM3 (ref 0.1–0.9)
MONOCYTES NFR BLD AUTO: 6.7 % (ref 5–12)
NEUTROPHILS NFR BLD AUTO: 4.02 10*3/MM3 (ref 1.7–7)
NEUTROPHILS NFR BLD AUTO: 59 % (ref 42.7–76)
NRBC BLD AUTO-RTO: 0 /100 WBC (ref 0–0.2)
PLATELET # BLD AUTO: 381 10*3/MM3 (ref 140–450)
PMV BLD AUTO: 8.9 FL (ref 6–12)
POTASSIUM SERPL-SCNC: 3.7 MMOL/L (ref 3.5–5.2)
PROCALCITONIN SERPL-MCNC: 0.04 NG/ML (ref 0–0.25)
PROT SERPL-MCNC: 6.9 G/DL (ref 6–8.5)
RBC # BLD AUTO: 3.59 10*6/MM3 (ref 4.14–5.8)
SODIUM SERPL-SCNC: 136 MMOL/L (ref 136–145)
VANCOMYCIN TROUGH SERPL-MCNC: 28.6 MCG/ML (ref 5–20)
WBC # BLD AUTO: 6.83 10*3/MM3 (ref 3.4–10.8)

## 2021-06-03 PROCEDURE — 25010000002 ENOXAPARIN PER 10 MG: Performed by: INTERNAL MEDICINE

## 2021-06-03 PROCEDURE — 80053 COMPREHEN METABOLIC PANEL: CPT | Performed by: INTERNAL MEDICINE

## 2021-06-03 PROCEDURE — 83605 ASSAY OF LACTIC ACID: CPT | Performed by: NURSE PRACTITIONER

## 2021-06-03 PROCEDURE — 82962 GLUCOSE BLOOD TEST: CPT

## 2021-06-03 PROCEDURE — 63710000001 INSULIN GLARGINE PER 5 UNITS: Performed by: INTERNAL MEDICINE

## 2021-06-03 PROCEDURE — 25010000002 VANCOMYCIN 10 G RECONSTITUTED SOLUTION: Performed by: INTERNAL MEDICINE

## 2021-06-03 PROCEDURE — 84145 PROCALCITONIN (PCT): CPT | Performed by: NURSE PRACTITIONER

## 2021-06-03 PROCEDURE — 73552 X-RAY EXAM OF FEMUR 2/>: CPT

## 2021-06-03 PROCEDURE — 73590 X-RAY EXAM OF LOWER LEG: CPT

## 2021-06-03 PROCEDURE — 80202 ASSAY OF VANCOMYCIN: CPT | Performed by: INTERNAL MEDICINE

## 2021-06-03 PROCEDURE — 63710000001 INSULIN LISPRO (HUMAN) PER 5 UNITS: Performed by: INTERNAL MEDICINE

## 2021-06-03 PROCEDURE — 85025 COMPLETE CBC W/AUTO DIFF WBC: CPT | Performed by: INTERNAL MEDICINE

## 2021-06-03 RX ORDER — LEVOTHYROXINE SODIUM 0.1 MG/1
100 TABLET ORAL
Status: DISCONTINUED | OUTPATIENT
Start: 2021-06-03 | End: 2021-06-10 | Stop reason: HOSPADM

## 2021-06-03 RX ORDER — INSULIN LISPRO 100 [IU]/ML
0-7 INJECTION, SOLUTION INTRAVENOUS; SUBCUTANEOUS AS NEEDED
Status: DISCONTINUED | OUTPATIENT
Start: 2021-06-03 | End: 2021-06-05

## 2021-06-03 RX ORDER — METOPROLOL SUCCINATE 25 MG/1
25 TABLET, EXTENDED RELEASE ORAL DAILY
Status: DISCONTINUED | OUTPATIENT
Start: 2021-06-03 | End: 2021-06-10 | Stop reason: HOSPADM

## 2021-06-03 RX ORDER — ISOSORBIDE MONONITRATE 30 MG/1
30 TABLET, EXTENDED RELEASE ORAL DAILY
Status: DISCONTINUED | OUTPATIENT
Start: 2021-06-03 | End: 2021-06-10 | Stop reason: HOSPADM

## 2021-06-03 RX ORDER — NICOTINE POLACRILEX 4 MG
15 LOZENGE BUCCAL
Status: DISCONTINUED | OUTPATIENT
Start: 2021-06-03 | End: 2021-06-10 | Stop reason: HOSPADM

## 2021-06-03 RX ORDER — INSULIN LISPRO 100 [IU]/ML
0-7 INJECTION, SOLUTION INTRAVENOUS; SUBCUTANEOUS
Status: DISCONTINUED | OUTPATIENT
Start: 2021-06-03 | End: 2021-06-05

## 2021-06-03 RX ORDER — ATORVASTATIN CALCIUM 20 MG/1
20 TABLET, FILM COATED ORAL NIGHTLY
Status: DISCONTINUED | OUTPATIENT
Start: 2021-06-03 | End: 2021-06-10 | Stop reason: HOSPADM

## 2021-06-03 RX ORDER — MEMANTINE HYDROCHLORIDE 5 MG/1
5 TABLET ORAL DAILY
Status: DISCONTINUED | OUTPATIENT
Start: 2021-06-03 | End: 2021-06-10 | Stop reason: HOSPADM

## 2021-06-03 RX ORDER — OLANZAPINE 5 MG/1
5 TABLET ORAL NIGHTLY
Status: DISCONTINUED | OUTPATIENT
Start: 2021-06-03 | End: 2021-06-10 | Stop reason: HOSPADM

## 2021-06-03 RX ORDER — FOLIC ACID 1 MG/1
1 TABLET ORAL DAILY
Status: DISCONTINUED | OUTPATIENT
Start: 2021-06-03 | End: 2021-06-10 | Stop reason: HOSPADM

## 2021-06-03 RX ORDER — SODIUM CHLORIDE 0.9 % (FLUSH) 0.9 %
10 SYRINGE (ML) INJECTION EVERY 12 HOURS SCHEDULED
Status: DISCONTINUED | OUTPATIENT
Start: 2021-06-03 | End: 2021-06-10 | Stop reason: HOSPADM

## 2021-06-03 RX ORDER — SODIUM CHLORIDE 0.9 % (FLUSH) 0.9 %
10 SYRINGE (ML) INJECTION AS NEEDED
Status: DISCONTINUED | OUTPATIENT
Start: 2021-06-03 | End: 2021-06-10 | Stop reason: HOSPADM

## 2021-06-03 RX ORDER — INSULIN GLARGINE 100 [IU]/ML
15 INJECTION, SOLUTION SUBCUTANEOUS NIGHTLY
Status: DISCONTINUED | OUTPATIENT
Start: 2021-06-03 | End: 2021-06-10 | Stop reason: HOSPADM

## 2021-06-03 RX ORDER — LAMOTRIGINE 25 MG/1
12.5 TABLET ORAL 2 TIMES DAILY
Status: DISCONTINUED | OUTPATIENT
Start: 2021-06-03 | End: 2021-06-10 | Stop reason: HOSPADM

## 2021-06-03 RX ORDER — CASTOR OIL AND BALSAM, PERU 788; 87 MG/G; MG/G
OINTMENT TOPICAL EVERY 12 HOURS SCHEDULED
Status: DISCONTINUED | OUTPATIENT
Start: 2021-06-03 | End: 2021-06-10 | Stop reason: HOSPADM

## 2021-06-03 RX ORDER — SENNA PLUS 8.6 MG/1
1 TABLET ORAL DAILY PRN
Status: DISCONTINUED | OUTPATIENT
Start: 2021-06-03 | End: 2021-06-10 | Stop reason: HOSPADM

## 2021-06-03 RX ORDER — TRAZODONE HYDROCHLORIDE 50 MG/1
25 TABLET ORAL NIGHTLY
Status: DISCONTINUED | OUTPATIENT
Start: 2021-06-03 | End: 2021-06-10 | Stop reason: HOSPADM

## 2021-06-03 RX ORDER — DEXTROSE MONOHYDRATE 25 G/50ML
25 INJECTION, SOLUTION INTRAVENOUS
Status: DISCONTINUED | OUTPATIENT
Start: 2021-06-03 | End: 2021-06-10 | Stop reason: HOSPADM

## 2021-06-03 RX ORDER — HYDROCODONE BITARTRATE AND ACETAMINOPHEN 7.5; 325 MG/1; MG/1
1 TABLET ORAL EVERY 4 HOURS PRN
Status: DISCONTINUED | OUTPATIENT
Start: 2021-06-03 | End: 2021-06-10 | Stop reason: HOSPADM

## 2021-06-03 RX ORDER — DIPHENOXYLATE HYDROCHLORIDE AND ATROPINE SULFATE 2.5; .025 MG/1; MG/1
1 TABLET ORAL DAILY
Status: DISCONTINUED | OUTPATIENT
Start: 2021-06-03 | End: 2021-06-10 | Stop reason: HOSPADM

## 2021-06-03 RX ORDER — MORPHINE SULFATE 2 MG/ML
2 INJECTION, SOLUTION INTRAMUSCULAR; INTRAVENOUS EVERY 4 HOURS PRN
Status: DISCONTINUED | OUTPATIENT
Start: 2021-06-03 | End: 2021-06-05

## 2021-06-03 RX ORDER — RIFAMPIN 300 MG/1
300 CAPSULE ORAL EVERY 12 HOURS SCHEDULED
Status: DISCONTINUED | OUTPATIENT
Start: 2021-06-03 | End: 2021-06-05

## 2021-06-03 RX ORDER — ACETAMINOPHEN 325 MG/1
650 TABLET ORAL EVERY 8 HOURS PRN
Status: DISCONTINUED | OUTPATIENT
Start: 2021-06-03 | End: 2021-06-05

## 2021-06-03 RX ORDER — MORPHINE SULFATE 2 MG/ML
2 INJECTION, SOLUTION INTRAMUSCULAR; INTRAVENOUS
Status: DISCONTINUED | OUTPATIENT
Start: 2021-06-03 | End: 2021-06-03

## 2021-06-03 RX ORDER — TAMSULOSIN HYDROCHLORIDE 0.4 MG/1
0.4 CAPSULE ORAL DAILY
Status: DISCONTINUED | OUTPATIENT
Start: 2021-06-03 | End: 2021-06-10 | Stop reason: HOSPADM

## 2021-06-03 RX ORDER — FAMOTIDINE 20 MG/1
20 TABLET, FILM COATED ORAL
Status: DISCONTINUED | OUTPATIENT
Start: 2021-06-03 | End: 2021-06-10 | Stop reason: HOSPADM

## 2021-06-03 RX ADMIN — FOLIC ACID 1 MG: 1 TABLET ORAL at 08:05

## 2021-06-03 RX ADMIN — SODIUM CHLORIDE, PRESERVATIVE FREE 10 ML: 5 INJECTION INTRAVENOUS at 09:15

## 2021-06-03 RX ADMIN — HYDROCODONE BITARTRATE AND ACETAMINOPHEN 1 TABLET: 7.5; 325 TABLET ORAL at 00:14

## 2021-06-03 RX ADMIN — VANCOMYCIN HYDROCHLORIDE 1750 MG: 10 INJECTION, POWDER, LYOPHILIZED, FOR SOLUTION INTRAVENOUS at 21:03

## 2021-06-03 RX ADMIN — HYDROCODONE BITARTRATE AND ACETAMINOPHEN 1 TABLET: 7.5; 325 TABLET ORAL at 16:19

## 2021-06-03 RX ADMIN — Medication 100 MG: at 08:18

## 2021-06-03 RX ADMIN — MUPIROCIN 1 APPLICATION: 20 OINTMENT TOPICAL at 20:56

## 2021-06-03 RX ADMIN — ENOXAPARIN SODIUM 40 MG: 40 INJECTION SUBCUTANEOUS at 15:48

## 2021-06-03 RX ADMIN — INSULIN LISPRO 2 UNITS: 100 INJECTION, SOLUTION INTRAVENOUS; SUBCUTANEOUS at 08:05

## 2021-06-03 RX ADMIN — HYDROCODONE BITARTRATE AND ACETAMINOPHEN 1 TABLET: 7.5; 325 TABLET ORAL at 10:02

## 2021-06-03 RX ADMIN — CASTOR OIL AND BALSAM, PERU 5 G: 788; 87 OINTMENT TOPICAL at 21:04

## 2021-06-03 RX ADMIN — Medication: at 08:04

## 2021-06-03 RX ADMIN — FAMOTIDINE 20 MG: 20 TABLET, FILM COATED ORAL at 08:05

## 2021-06-03 RX ADMIN — FAMOTIDINE 20 MG: 20 TABLET, FILM COATED ORAL at 15:48

## 2021-06-03 RX ADMIN — LAMOTRIGINE 12.5 MG: 25 TABLET ORAL at 08:05

## 2021-06-03 RX ADMIN — INSULIN LISPRO 2 UNITS: 100 INJECTION, SOLUTION INTRAVENOUS; SUBCUTANEOUS at 16:19

## 2021-06-03 RX ADMIN — RIFAMPIN 300 MG: 300 CAPSULE ORAL at 20:55

## 2021-06-03 RX ADMIN — INSULIN GLARGINE 15 UNITS: 100 INJECTION, SOLUTION SUBCUTANEOUS at 20:57

## 2021-06-03 RX ADMIN — HYDROCODONE BITARTRATE AND ACETAMINOPHEN 1 TABLET: 7.5; 325 TABLET ORAL at 21:02

## 2021-06-03 RX ADMIN — MEMANTINE HYDROCHLORIDE 5 MG: 5 TABLET, FILM COATED ORAL at 08:05

## 2021-06-03 RX ADMIN — TRAZODONE HYDROCHLORIDE 25 MG: 50 TABLET ORAL at 20:55

## 2021-06-03 RX ADMIN — OLANZAPINE 5 MG: 5 TABLET ORAL at 20:55

## 2021-06-03 RX ADMIN — Medication: at 21:04

## 2021-06-03 RX ADMIN — ISOSORBIDE MONONITRATE 30 MG: 30 TABLET ORAL at 08:05

## 2021-06-03 RX ADMIN — MUPIROCIN 1 APPLICATION: 20 OINTMENT TOPICAL at 08:05

## 2021-06-03 RX ADMIN — RIFAMPIN 300 MG: 300 CAPSULE ORAL at 08:05

## 2021-06-03 RX ADMIN — LAMOTRIGINE 12.5 MG: 25 TABLET ORAL at 20:55

## 2021-06-03 RX ADMIN — ATORVASTATIN CALCIUM 20 MG: 20 TABLET, FILM COATED ORAL at 20:55

## 2021-06-03 RX ADMIN — Medication 1 TABLET: at 08:05

## 2021-06-03 RX ADMIN — TAMSULOSIN HYDROCHLORIDE 0.4 MG: 0.4 CAPSULE ORAL at 20:55

## 2021-06-03 NOTE — H&P
Patient Name:  Chris Ferguson  YOB: 1945  MRN:  [id number]  Admit Date:  6/3/2021  Patient Care Team:  Gisela Sotelo APRN as PCP - Yanci Buckley MD as Consulting Physician (Cardiology)  Hector Stovall MD as Consulting Physician (Nephrology)      Subjective   History Present Illness     Chief Complaint: Left Knee Pain    Mr. Ferguson is a 75 y.o. non-smoker with a history of hypertension, type 2 diabetes mellitus, hypothyroidism, chronic kidney disease stage 3, BPH, hyperlipidemia, and dementia  that presents to [facility] complaining of left knee pain.  He initially presented to [facility] on 5/21/2021 with complaints of left knee pain for three days priori to arrival.  He was treated for cellulitis of the left lower extremity and left total knee prosthesis infection.  He was followed by infectious disease who managed his antibiotics.  He was evaluated by orthopedic surgery who has requested transfer to [facility] as there was no cement removal kit available in the [facility] operating room.  The patient reports pain in the left knee that is described as intermittent, moderate, and aching in nature.  He states movement exacerbates the pain and he denies alleviating factors.  He deniechest pain, shortness of breath, and paresthesias.  He reports fevers while at [facility] that have since resolved.         History of Present Illness  Review of Systems   Constitutional: Positive for fever. Negative for chills.   HENT: Positive for congestion. Negative for sore throat.    Eyes: Negative for photophobia and visual disturbance.   Respiratory: Negative for cough and shortness of breath.    Cardiovascular: Negative for chest pain and palpitations.   Gastrointestinal: Negative for abdominal pain, nausea and vomiting.   Endocrine: Negative for polydipsia, polyphagia and polyuria.   Genitourinary: Negative for decreased urine  volume, dysuria, flank pain, frequency, hematuria and urgency.   Musculoskeletal: Positive for arthralgias (left knee), gait problem and joint swelling (left knee). Negative for back pain.   Skin: Negative for rash and wound.   Neurological: Negative for dizziness, speech difficulty, light-headedness, numbness and headaches.        Personal History     Past Medical History:   Diagnosis Date   • Anemia    • Angina pectoris (CMS/formerly [facility])    • Anxiety    • Arthritis     OSTEO   • BPH (benign prostatic hyperplasia)    • Dementia (CMS/formerly [facility])    • Dementia (CMS/formerly [facility])    • Depression    • Diabetes mellitus (CMS/formerly [facility])     type 2   • Disease of thyroid gland    • GERD (gastroesophageal reflux disease)    • Hypertension    • Parkinson's disease (CMS/formerly [facility])    • Short-term memory loss      Past Surgical History:   Procedure Laterality Date   • APPENDECTOMY     • CARDIAC CATHETERIZATION N/A 11/18/2020    Procedure: Left Heart Cath and coronary angiogram;  Surgeon: Yanci Howard MD;  Location: [facility] CATH INVASIVE LOCATION;  Service: Cardiovascular;  Laterality: N/A;   • CARDIAC CATHETERIZATION N/A 11/18/2020    Procedure: Coronary angiography;  Surgeon: Yanci Howard MD;  Location: [facility] CATH INVASIVE LOCATION;  Service: Cardiovascular;  Laterality: N/A;   • CARDIAC CATHETERIZATION N/A 11/18/2020    Procedure: Left ventriculography;  Surgeon: Yanci Howard MD;  Location: [facility] CATH INVASIVE LOCATION;  Service: Cardiovascular;  Laterality: N/A;   • CHOLECYSTECTOMY     • COLONOSCOPY     • EYE SURGERY      kallie cataracts w iol   • FEMUR FRACTURE SURGERY Left     1/2018   • LEG DEBRIDEMENT Left 9/15/2020    Procedure: LOWER EXTREMITY DEBRIDEMENT;  Surgeon: Joslyn Mitsry MD;  Location: [facility];  Service: General;  Laterality: Left;   • MULTIPLE TOOTH EXTRACTIONS      ALL TEETH REMOVED   • STOMACH SURGERY      gastric ulcer   • TOTAL HIP ARTHROPLASTY Right 4/23/2019    Procedure: TOTAL HIP ARTHROPLASTY POSTERIOR;  Surgeon:  Valente Giang MD;  Location: [facility] OR;  Service: Orthopedics   • TOTAL KNEE ARTHROPLASTY Left 8/31/2018    Procedure: REMOVAL OF LEFT DISTAL FEMUR PLATE AND SCREWS WITH COMPLEX TOTAL KNEE REPLACEMENT DISTAL FEMUR HINGED;  Surgeon: Valente Giang MD;  Location: [facility] OR;  Service: Orthopedics     Family History   Problem Relation Age of Onset   • Heart disease Mother    • Stroke Father    • Heart disease Father    • Seizures Son    • Seizures Son    • Malig Hyperthermia Neg Hx      Social History     Tobacco Use   • Smoking status: Never Smoker   • Smokeless tobacco: Never Used   Substance Use Topics   • Alcohol use: Yes     Comment: pt reports mod amt    • Drug use: No     Medications Prior to Admission   Medication Sig Dispense Refill Last Dose   • atorvastatin (LIPITOR) 20 MG tablet Take 20 mg by mouth Every Night.   6/2/2021 at 2038   • famotidine (PEPCID) 20 MG tablet Take 1 tablet by mouth 2 (Two) Times a Day Before Meals.   6/2/2021 at 0540   • folic acid (FOLVITE) 1 MG tablet Take 1 mg by mouth Daily.   6/2/2021 at 1049   • HYDROcodone-acetaminophen (NORCO) 7.5-325 MG per tablet Take 1 tablet by mouth Every 4 (Four) Hours As Needed for Mild Pain  or Moderate Pain . 12 tablet 0 6/3/2021 at 0014   • insulin glargine (Semglee) 100 UNIT/ML injection Inject 20 Units under the skin into the appropriate area as directed 2 (Two) Times a Day.   6/2/2021 at 2036   • insulin lispro (humaLOG) 100 UNIT/ML injection Inject 5 Units under the skin into the appropriate area as directed 3 (Three) Times a Day Before Meals. Hold for bs <70 and call MD for bs >400    6/2/2021 at 2036   • isosorbide mononitrate (IMDUR) 30 MG 24 hr tablet Take 30 mg by mouth Daily.   6/2/2021 at 1049   • lamoTRIgine (LaMICtal) 25 MG tablet Take 12.5 mg by mouth 2 (Two) Times a Day.   6/2/2021 at 2038   • levothyroxine (SYNTHROID, LEVOTHROID) 100 MCG tablet Take 100 mcg by mouth Daily.   6/2/2021 at 0542   •  memantine (NAMENDA) 5 MG tablet Take 5 mg by mouth Daily.   6/2/2021 at 1049   • metoprolol succinate XL (TOPROL-XL) 25 MG 24 hr tablet Take 25 mg by mouth Daily.   6/2/2021 at 1049   • multivitamin (Therems) tablet tablet Take 1 tablet by mouth Daily.   6/2/2021 at 1049   • OLANZapine (zyPREXA) 5 MG tablet Take 5 mg by mouth Every Night.   6/2/2021 at 2038   • senna (senna) 8.6 MG tablet Take 1 tablet by mouth Daily As Needed for Constipation.   6/2/2021 at 1049   • tamsulosin (FLOMAX) 0.4 MG capsule 24 hr capsule Take 1 capsule by mouth Daily.   6/2/2021 at 2038   • thiamine (VITAMIN B1) 100 MG tablet Take 1 tablet by mouth Daily.   6/2/2021 at 1049   • traZODone (DESYREL) 50 MG tablet Take 50 mg by mouth 2 (two) times a day.   6/2/2021 at 2037   • acetaminophen (TYLENOL) 325 MG tablet Take 650 mg by mouth Every 8 (Eight) Hours As Needed for Mild Pain .      • dextrose (GLUTOSE) 40 % gel Take 15 g by mouth Every 1 (One) Hour As Needed for Low Blood Sugar. And pt unable to swallow      • ferrous sulfate 325 (65 FE) MG tablet Take 325 mg by mouth 2 (two) times a day.      • glucagon (Glucagon Emergency) 1 MG injection Inject 1 mg into the appropriate muscle as directed by prescriber Every 12 (Twelve) Hours As Needed for Low Blood Sugar.      • LACTOBACILLUS RHAMNOSUS, GG, PO Take 1 capsule by mouth Daily.      • vitamin C (ASCORBIC ACID) 500 MG tablet Take 500 mg by mouth 2 (two) times a day.        Allergies:    Allergies   Allergen Reactions   • Codeine GI Intolerance       Objective    Objective     Vital Signs  Temp:  [97.7 °F (36.5 °C)-98.2 °F (36.8 °C)] 97.7 °F (36.5 °C)  Heart Rate:  [] 92  Resp:  [16-18] 18  BP: (107-141)/(60-84) 112/60  SpO2:  [90 %-95 %] 93 %  on   ;   Device (Oxygen Therapy): room air  Body mass index is 27.98 kg/m².    Physical Exam  Vitals and nursing note reviewed.   HENT:      Head: Normocephalic and atraumatic.      Nose: Nose normal.      Mouth/Throat:      Mouth: Mucous  membranes are dry.      Pharynx: Oropharynx is clear.   Eyes:      Extraocular Movements: Extraocular movements intact.      Conjunctiva/sclera: Conjunctivae normal.   Cardiovascular:      Rate and Rhythm: Normal rate and regular rhythm.      Pulses: Normal pulses.      Heart sounds: Normal heart sounds.   Pulmonary:      Effort: Pulmonary effort is normal.      Breath sounds: Normal breath sounds.   Abdominal:      General: Bowel sounds are normal. There is no distension.      Palpations: Abdomen is soft.      Tenderness: There is no abdominal tenderness.   Musculoskeletal:         General: Swelling and tenderness present.      Cervical back: Normal range of motion and neck supple.      Comments: Left knee warmth, tenderness, and edema. Decreased ROM of left knee   Skin:     General: Skin is warm and dry.      Comments: Left knee warm to touch. Multiple scabs to knee, no drainage noted   Neurological:      General: No focal deficit present.      Mental Status: He is alert.      Comments: Oriented to person and place only   Psychiatric:         Mood and Affect: Mood normal.         Behavior: Behavior normal.         Results Review:  I reviewed the patient's new clinical results.  I reviewed the patient's new imaging results and agree with the interpretation.  I reviewed the patient's other test results and agree with the interpretation  I personally viewed and interpreted the patient's EKG/Telemetry data  Discussed with ED provider.    Lab Results (last 24 hours)     Procedure Component Value Units Date/Time    CBC & Differential [[id number]]  (Abnormal) Collected: 06/02/21 0650    Specimen: Blood Updated: 06/02/21 0655    Narrative:      The following orders were created for panel order CBC & Differential.  Procedure                               Abnormality         Status                     ---------                               -----------         ------                     CBC Auto Differential[[id number]]         Abnormal            Final result                 Please view results for these tests on the individual orders.    Comprehensive Metabolic Panel [[id number]]  (Abnormal) Collected: 06/02/21 0650    Specimen: Blood Updated: 06/02/21 0720     Glucose 210 mg/dL      BUN 21 mg/dL      Creatinine 1.25 mg/dL      Sodium 135 mmol/L      Potassium 4.3 mmol/L      Chloride 102 mmol/L      CO2 25.0 mmol/L      Calcium 8.9 mg/dL      Total Protein 7.4 g/dL      Albumin 2.80 g/dL      ALT (SGPT) 17 U/L      AST (SGOT) 15 U/L      Alkaline Phosphatase 127 U/L      Total Bilirubin 0.3 mg/dL      eGFR Non African Amer 57 mL/min/1.73      Globulin 4.6 gm/dL      A/G Ratio 0.6 g/dL      BUN/Creatinine Ratio 16.8     Anion Gap 8.0 mmol/L     Narrative:      GFR Normal >60  Chronic Kidney Disease <60  Kidney Failure <15      CBC Auto Differential [[id number]]  (Abnormal) Collected: 06/02/21 0650    Specimen: Blood Updated: 06/02/21 0655     WBC 5.90 10*3/mm3      RBC 3.98 10*6/mm3      Hemoglobin 9.7 g/dL      Hematocrit 29.9 %      MCV 75.2 fL      MCH 24.5 pg      MCHC 32.5 g/dL      RDW 19.3 %      RDW-SD 50.8 fl      MPV 6.6 fL      Platelets 373 10*3/mm3      Neutrophil % 51.5 %      Lymphocyte % 36.3 %      Monocyte % 6.8 %      Eosinophil % 4.7 %      Basophil % 0.7 %      Neutrophils, Absolute 3.00 10*3/mm3      Lymphocytes, Absolute 2.10 10*3/mm3      Monocytes, Absolute 0.40 10*3/mm3      Eosinophils, Absolute 0.30 10*3/mm3      Basophils, Absolute 0.00 10*3/mm3      nRBC 0.0 /100 WBC     POC Glucose Once [[id number]]  (Abnormal) Collected: 06/02/21 0731    Specimen: Blood Updated: 06/02/21 0732     Glucose 180 mg/dL      Comment: Serial Number: [id number]:  [id number]       POC Glucose Once [[id number]]  (Abnormal) Collected: 06/02/21 1218    Specimen: Blood Updated: 06/02/21 1219     Glucose 135 mg/dL      Comment: Serial Number: [id number]:  [id number]       POC Glucose Once [[id number]]  (Abnormal) Collected:  06/02/21 1626    Specimen: Blood Updated: 06/02/21 1627     Glucose 293 mg/dL      Comment: Serial Number: [id number]:  [id number]       Vancomycin, Trough [[id number]]  (Normal) Collected: 06/02/21 2019    Specimen: Blood Updated: 06/02/21 2133     Vancomycin Trough 16.40 mcg/mL     Narrative:      Therapeutic Ranges for Vancomycin    Vancomycin Random   5.0-40.0 mcg/mL  Vancomycin Trough   5.0-20.0 mcg/mL  Vancomycin Peak     20.0-40.0 mcg/mL    POC Glucose Once [[id number]]  (Abnormal) Collected: 06/02/21 2035    Specimen: Blood Updated: 06/02/21 2036     Glucose 350 mg/dL      Comment: Serial Number: [id number]:  [id number]       POC Glucose Once [[id number]]  (Abnormal) Collected: 06/03/21 0141    Specimen: Blood Updated: 06/03/21 0152     Glucose 201 mg/dL           Imaging Results (Last 24 Hours)     ** No results found for the last 24 hours. **          Results for orders placed during the hospital encounter of 04/25/21    Adult Transthoracic Echo Limited W/ Cont if Necessary Per Protocol    Interpretation Summary  · Left ventricular ejection fraction appears to be 56 - 60%. Left ventricular systolic function is normal.    Technically difficult study with very limited views shows only apical four-chamber images of mildly tachycardic/hyperdynamic ventricle with LVEF normal range 55 to 60% and no segmental wall motion abnormalities.  Poorly visualized mitral and tricuspid valves appear grossly structurally normal with satisfactory leaflet excursion.  The aortic and pulmonic valves are not sufficiently imaged.  No pericardial effusion is seen.  Very limited screening color-flow Doppler shows no significant MR or TR jets.      No orders to display        Assessment/Plan     Active Hospital Problems    Diagnosis  POA   • **Infection of prosthetic left knee joint (CMS/[facility]) [T84.54XA]  Not Applicable   • Type 2 diabetes mellitus with hyperglycemia, with long-term current use of insulin (CMS/[facility]) [E11.65,  Z79.4]  Not Applicable   • Dementia in Alzheimer's disease with early onset (CMS/[facility]) [G30.0, F02.80]  Yes   • Cellulitis of left lower extremity [L03.116]  Yes   • Essential hypertension [I10]  Yes   • BPH (benign prostatic hyperplasia) [N40.0]  Yes   • Hypothyroidism [E03.9]  Yes   • CKD (chronic kidney disease) stage 3, GFR 30-59 ml/min (CMS/[facility]) [N18.30]  Unknown   • Hyperlipidemia [E78.5]  Yes       Infection of Prosthetic Left Knee Joint/Cellulitis of Left Lower Extremity  -He has history of multiple left knee surgeries  -Orthopedic surgery consult  -Infectious disease consult for antibiotic management  -He was receiving Vancomycin and Rifampin at the outlying facility, will continue for now  -Wound culture growing MRSA  -He has been afebrile, WBC ok. Check procal and lactate  -PRN analgesia  -PT/OT consults    Type 2 Diabetes Mellitus with Hyperglycemia  -Hold oral diabetic medications  -Initiate correctional factor insulin  -Continue basal insulin at home dose  -Monitor blood sugars ACHS  -Hgb A1C 7.6 on 5/24/21  -NCS diet    Hypertension  -Blood pressures stable. Continue home regimen  -Monitor    Hypothyroidism/Hyperlipidemia/BPH/Alzheimer's Dementia  -Continue home medications    Chronic Kidney Disease Stage 3  -Baseline creat around 1.10  -Avoid nephrotoxins  -Repeat lab work in AM    Anemia  -Hgb 9.7, improved from baseline  -No signs of active bleeding  -Repeat lab work in AM    -I discussed the patients findings and my recommendations with patient.    VTE Prophylaxis - Lovenox 40 mg SC daily.  Code Status - Full code.       MARIZOL Washington  [facility]  06/03/21  03:07 EDT

## 2021-06-03 NOTE — PLAN OF CARE
Goal Outcome Evaluation:  Plan of Care Reviewed With: patient  Progress: no change   Pt was a direct admit from [facility]. Pt arrived at 0100. Pt is currently on contact precautions for MRSA to the left knee. Pt was originally admitted with LLE cellulitis. Pt transferred for surgical intervention that could not be done at [facility]. Original surgery was done by Dr Giang. Consult for Dr Giang called in. Infectious disease consult also called in. Pt has a history of Parkingsons and Dementia. Pt has only been disoriented to time so far. Pt can be forgetful and repetitive with questions. As per notes surgery will be today or tomorrow. Kept pt NPO just in case. Attempted to educate on comorbidities, but pt was falling asleep . Pt is resting at this time , will continue to monitor,

## 2021-06-03 NOTE — PROGRESS NOTES
"[facility]  Clinical Pharmacy Department     Vancomycin Pharmacokinetic Note    Chris Ferguson is a 75 y.o. male who presents from [facility] on day 14 of pharmacy to dose vancomycin for bone and/or joint infection.     Target level: AUC24 400-600  Consulting Provider:  Dr. Blake   Current Vancomycin Dose: 1750 mg Q24h  Planned Duration of Therapy: ~30 days remaining, subject to change     Allergies  Allergies as of 06/01/2021 - Reviewed 05/29/2021   Allergen Reaction Noted    Codeine GI Intolerance 08/23/2018     Microbiology:  Microbiology Results (last 10 days)       Procedure Component Value - Date/Time    Wound Culture - Wound, Knee, Left [[id number]] Collected: 05/25/21 1124    Lab Status: Final result Specimen: Wound from Knee, Left Updated: 05/28/21 0811     Wound Culture No growth at 3 days     Gram Stain Many (4+) WBCs per low power field      No organisms seen          Vitals/Labs/I&O  177.8 cm (70\")  88.5 kg (195 lb)  Body mass index is 27.98 kg/m².   [unfilled]    Results from last 7 days   Lab Units 06/03/21  0504 06/02/21  0650 06/01/21  0344   WBC 10*3/mm3 6.83 5.90 4.50     Results from last 7 days   Lab Units 06/03/21  0504   LACTATE mmol/L 0.9      Results from last 7 days   Lab Units 06/03/21  0504   PROCALCITONIN ng/mL 0.04     Estimated Creatinine Clearance: 61.9 mL/min (by C-G formula based on SCr of 1.19 mg/dL).  Results from last 7 days   Lab Units 06/03/21  0504 06/02/21  0650 06/01/21  0344   BUN mg/dL 26* 21 22   CREATININE mg/dL 1.19 1.25 1.09     Intake & Output (last 3 days)       None            Vancomycin Dosing and Level History:        Assessment/Plan:    Patient presents from [facility] on an established regimen of vancomycin 1750 mg Q24. A trough was collected once the patient arrived at our facility but was poorly timed and has returned supratherapeutic at 28.6 mg/L. This level is inaccurate and holds no clinical value because the level was collected extremely " "early, 8.5 hours after the dose for a Q24 hour frequency, which actually should have been collected 23.5 hours after the dose.   Based on the above I will continue the current regimen unchanged. A level was taken at [place] the day before transfer and was therapeutic so I will hold on getting a repeat at this time. Likely to obtain one on Saturday unless there is an acute clinical change.     Bayesian analysis of the most recent level(s) using [employer] provides the following patient-specific pharmacokinetic parameters:   CL: 2.94 L/h   Vd: 69.7 L   T1/2: 18.9 h  If the predicted trough on this regimen is not within what was previously considered a \"target trough range\", the AUC24 (a stronger predictor of vancomycin efficacy) is predicted to achieve the accepted target of 400-600 mg*h/L. To best balance efficacy and toxicity, we will be targeting AUC24 in this patient rather than steady-state trough levels.       Thank you for involving pharmacy in this patient's care. Please contact pharmacy with any questions or concerns.                           Meghana Sosa [facility]  Clinical Pharmacist  06/03/21 08:32 EDT    "

## 2021-06-03 NOTE — SIGNIFICANT NOTE
06/03/21 1509   OTHER   Discipline occupational therapist   Rehab Time/Intention   Session Not Performed other (see comments)   Recommendation   OT - Next Appointment 06/05/21  (Pt plans surgery tomorrow. OT will f/u post op as needed.)

## 2021-06-03 NOTE — NURSING NOTE
CWON consult received. Right heel intact, red and blanchable. Left heel, skin intact with 2 small areas of non-blanchable redness, consistent with a stage 1 pressure injury. Will recommend Venelex ointment and floating heels with boots or pillows.     Gluteal skin also assessed. There is no evidence of pressure injury or breakdown r/t moisture. Skin is dry, intact and pink.  Desitin ointment has been ordered and appropriate to continue use for moisture barrier protection.    Please call with any questions.

## 2021-06-03 NOTE — PLAN OF CARE
Goal Outcome Evaluation:  Plan of Care Reviewed With: patient  Progress: improving  Outcome Summary: vss. dsg cdi. ambulates with x2 assist and walker. voiding per brief/urinal. q2turn. agiliti bed ordered and placed today. as/hs with ssi. 2L via NC today. pain tolerated with po prn meds. plan to surgery tomorrow and will be npo after mn. educated on glucose monitoring.
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
## 2021-06-04 ENCOUNTER — ANESTHESIA (OUTPATIENT)
Dept: PERIOP | Facility: HOSPITAL | Age: 76
End: 2021-06-04

## 2021-06-04 ENCOUNTER — ANESTHESIA EVENT (OUTPATIENT)
Dept: PERIOP | Facility: HOSPITAL | Age: 76
End: 2021-06-04

## 2021-06-04 ENCOUNTER — APPOINTMENT (OUTPATIENT)
Dept: GENERAL RADIOLOGY | Facility: HOSPITAL | Age: 76
End: 2021-06-04

## 2021-06-04 LAB
CREAT SERPL-MCNC: 1.12 MG/DL (ref 0.76–1.27)
GFR SERPL CREATININE-BSD FRML MDRD: 64 ML/MIN/1.73
GLUCOSE BLDC GLUCOMTR-MCNC: 175 MG/DL (ref 70–130)
GLUCOSE BLDC GLUCOMTR-MCNC: 238 MG/DL (ref 70–130)
GLUCOSE BLDC GLUCOMTR-MCNC: 95 MG/DL (ref 70–130)
SARS-COV-2 RNA PNL SPEC NAA+PROBE: NOT DETECTED

## 2021-06-04 PROCEDURE — 87070 CULTURE OTHR SPECIMN AEROBIC: CPT | Performed by: ORTHOPAEDIC SURGERY

## 2021-06-04 PROCEDURE — 82565 ASSAY OF CREATININE: CPT | Performed by: INTERNAL MEDICINE

## 2021-06-04 PROCEDURE — C1776 JOINT DEVICE (IMPLANTABLE): HCPCS | Performed by: ORTHOPAEDIC SURGERY

## 2021-06-04 PROCEDURE — 88331 PATH CONSLTJ SURG 1 BLK 1SPC: CPT | Performed by: ORTHOPAEDIC SURGERY

## 2021-06-04 PROCEDURE — 25010000002 VANCOMYCIN 1 G RECONSTITUTED SOLUTION: Performed by: ORTHOPAEDIC SURGERY

## 2021-06-04 PROCEDURE — 87635 SARS-COV-2 COVID-19 AMP PRB: CPT | Performed by: HOSPITALIST

## 2021-06-04 PROCEDURE — 25010000002 PROPOFOL 10 MG/ML EMULSION: Performed by: NURSE ANESTHETIST, CERTIFIED REGISTERED

## 2021-06-04 PROCEDURE — 87205 SMEAR GRAM STAIN: CPT | Performed by: ORTHOPAEDIC SURGERY

## 2021-06-04 PROCEDURE — 82962 GLUCOSE BLOOD TEST: CPT

## 2021-06-04 PROCEDURE — 25010000002 FENTANYL CITRATE (PF) 50 MCG/ML SOLUTION: Performed by: NURSE ANESTHETIST, CERTIFIED REGISTERED

## 2021-06-04 PROCEDURE — 63710000001 INSULIN LISPRO (HUMAN) PER 5 UNITS: Performed by: INTERNAL MEDICINE

## 2021-06-04 PROCEDURE — 25010000002 VANCOMYCIN 1 G RECONSTITUTED SOLUTION 1 EACH VIAL: Performed by: ORTHOPAEDIC SURGERY

## 2021-06-04 PROCEDURE — 25010000002 HYDROMORPHONE PER 4 MG: Performed by: NURSE ANESTHETIST, CERTIFIED REGISTERED

## 2021-06-04 PROCEDURE — C1769 GUIDE WIRE: HCPCS | Performed by: ORTHOPAEDIC SURGERY

## 2021-06-04 PROCEDURE — C1889 IMPLANT/INSERT DEVICE, NOC: HCPCS | Performed by: ORTHOPAEDIC SURGERY

## 2021-06-04 PROCEDURE — 87176 TISSUE HOMOGENIZATION CULTR: CPT | Performed by: ORTHOPAEDIC SURGERY

## 2021-06-04 PROCEDURE — 25010000003 CEFAZOLIN IN DEXTROSE 2-4 GM/100ML-% SOLUTION: Performed by: ORTHOPAEDIC SURGERY

## 2021-06-04 PROCEDURE — C1713 ANCHOR/SCREW BN/BN,TIS/BN: HCPCS | Performed by: ORTHOPAEDIC SURGERY

## 2021-06-04 PROCEDURE — L1830 KO IMMOB CANVAS LONG PRE OTS: HCPCS | Performed by: ORTHOPAEDIC SURGERY

## 2021-06-04 PROCEDURE — 63710000001 INSULIN LISPRO (HUMAN) PER 5 UNITS: Performed by: ORTHOPAEDIC SURGERY

## 2021-06-04 PROCEDURE — 63710000001 INSULIN GLARGINE PER 5 UNITS: Performed by: ORTHOPAEDIC SURGERY

## 2021-06-04 PROCEDURE — 88305 TISSUE EXAM BY PATHOLOGIST: CPT | Performed by: ORTHOPAEDIC SURGERY

## 2021-06-04 PROCEDURE — 25010000002 VANCOMYCIN: Performed by: ORTHOPAEDIC SURGERY

## 2021-06-04 PROCEDURE — 0SPD0JZ REMOVAL OF SYNTHETIC SUBSTITUTE FROM LEFT KNEE JOINT, OPEN APPROACH: ICD-10-PCS | Performed by: ORTHOPAEDIC SURGERY

## 2021-06-04 PROCEDURE — 0SRD0J9 REPLACEMENT OF LEFT KNEE JOINT WITH SYNTHETIC SUBSTITUTE, CEMENTED, OPEN APPROACH: ICD-10-PCS | Performed by: ORTHOPAEDIC SURGERY

## 2021-06-04 PROCEDURE — 73560 X-RAY EXAM OF KNEE 1 OR 2: CPT

## 2021-06-04 PROCEDURE — 88332 PATH CONSLTJ SURG EA ADD BLK: CPT | Performed by: ORTHOPAEDIC SURGERY

## 2021-06-04 DEVICE — IMPLANTABLE DEVICE: Type: IMPLANTABLE DEVICE | Site: KNEE | Status: FUNCTIONAL

## 2021-06-04 DEVICE — BUSHING FEM DURATION MRH STD: Type: IMPLANTABLE DEVICE | Site: KNEE | Status: FUNCTIONAL

## 2021-06-04 DEVICE — SLV TIB DURATION MRH ROTAT: Type: IMPLANTABLE DEVICE | Site: KNEE | Status: FUNCTIONAL

## 2021-06-04 DEVICE — DEV CONTRL TISS STRATAFIX PDS PLS OS6 REV SZ1 18IN 45CM: Type: IMPLANTABLE DEVICE | Site: KNEE | Status: FUNCTIONAL

## 2021-06-04 DEVICE — CABL W/SLV DALLMILES BEAD 2MM: Type: IMPLANTABLE DEVICE | Site: KNEE | Status: FUNCTIONAL

## 2021-06-04 DEVICE — CMT BONE SIMPLEX/P TMYCIN FDOS 10PK: Type: IMPLANTABLE DEVICE | Site: KNEE | Status: FUNCTIONAL

## 2021-06-04 DEVICE — AXLE KN DURATION MRH: Type: IMPLANTABLE DEVICE | Site: KNEE | Status: FUNCTIONAL

## 2021-06-04 DEVICE — COMP TIB ROTAT MRH: Type: IMPLANTABLE DEVICE | Site: KNEE | Status: FUNCTIONAL

## 2021-06-04 DEVICE — BUMPER TIB DURATION MRH NUTRL: Type: IMPLANTABLE DEVICE | Site: KNEE | Status: FUNCTIONAL

## 2021-06-04 RX ORDER — EPHEDRINE SULFATE 50 MG/ML
5 INJECTION, SOLUTION INTRAVENOUS ONCE AS NEEDED
Status: DISCONTINUED | OUTPATIENT
Start: 2021-06-04 | End: 2021-06-04 | Stop reason: HOSPADM

## 2021-06-04 RX ORDER — SODIUM CHLORIDE 0.9 % (FLUSH) 0.9 %
3 SYRINGE (ML) INJECTION EVERY 12 HOURS SCHEDULED
Status: DISCONTINUED | OUTPATIENT
Start: 2021-06-04 | End: 2021-06-04 | Stop reason: HOSPADM

## 2021-06-04 RX ORDER — NALOXONE HCL 0.4 MG/ML
0.1 VIAL (ML) INJECTION
Status: DISCONTINUED | OUTPATIENT
Start: 2021-06-04 | End: 2021-06-05

## 2021-06-04 RX ORDER — PROPOFOL 10 MG/ML
VIAL (ML) INTRAVENOUS CONTINUOUS PRN
Status: DISCONTINUED | OUTPATIENT
Start: 2021-06-04 | End: 2021-06-04 | Stop reason: SURG

## 2021-06-04 RX ORDER — ACETAMINOPHEN 325 MG/1
325 TABLET ORAL EVERY 4 HOURS PRN
Status: DISCONTINUED | OUTPATIENT
Start: 2021-06-04 | End: 2021-06-10 | Stop reason: HOSPADM

## 2021-06-04 RX ORDER — LIDOCAINE HYDROCHLORIDE 20 MG/ML
INJECTION, SOLUTION INFILTRATION; PERINEURAL AS NEEDED
Status: DISCONTINUED | OUTPATIENT
Start: 2021-06-04 | End: 2021-06-04 | Stop reason: SURG

## 2021-06-04 RX ORDER — ACETAMINOPHEN 500 MG
1000 TABLET ORAL ONCE
Status: COMPLETED | OUTPATIENT
Start: 2021-06-04 | End: 2021-06-04

## 2021-06-04 RX ORDER — FLUMAZENIL 0.1 MG/ML
0.2 INJECTION INTRAVENOUS AS NEEDED
Status: DISCONTINUED | OUTPATIENT
Start: 2021-06-04 | End: 2021-06-04 | Stop reason: HOSPADM

## 2021-06-04 RX ORDER — LIDOCAINE HYDROCHLORIDE 10 MG/ML
0.5 INJECTION, SOLUTION EPIDURAL; INFILTRATION; INTRACAUDAL; PERINEURAL ONCE AS NEEDED
Status: DISCONTINUED | OUTPATIENT
Start: 2021-06-04 | End: 2021-06-04 | Stop reason: HOSPADM

## 2021-06-04 RX ORDER — LABETALOL HYDROCHLORIDE 5 MG/ML
5 INJECTION, SOLUTION INTRAVENOUS
Status: DISCONTINUED | OUTPATIENT
Start: 2021-06-04 | End: 2021-06-04 | Stop reason: HOSPADM

## 2021-06-04 RX ORDER — ONDANSETRON 2 MG/ML
4 INJECTION INTRAMUSCULAR; INTRAVENOUS EVERY 6 HOURS PRN
Status: DISCONTINUED | OUTPATIENT
Start: 2021-06-04 | End: 2021-06-10 | Stop reason: HOSPADM

## 2021-06-04 RX ORDER — FENTANYL CITRATE 50 UG/ML
INJECTION, SOLUTION INTRAMUSCULAR; INTRAVENOUS AS NEEDED
Status: DISCONTINUED | OUTPATIENT
Start: 2021-06-04 | End: 2021-06-04 | Stop reason: SURG

## 2021-06-04 RX ORDER — ONDANSETRON 2 MG/ML
4 INJECTION INTRAMUSCULAR; INTRAVENOUS ONCE AS NEEDED
Status: DISCONTINUED | OUTPATIENT
Start: 2021-06-04 | End: 2021-06-04 | Stop reason: HOSPADM

## 2021-06-04 RX ORDER — NALOXONE HCL 0.4 MG/ML
0.2 VIAL (ML) INJECTION AS NEEDED
Status: DISCONTINUED | OUTPATIENT
Start: 2021-06-04 | End: 2021-06-04 | Stop reason: HOSPADM

## 2021-06-04 RX ORDER — VANCOMYCIN HYDROCHLORIDE 1 G/20ML
INJECTION, POWDER, LYOPHILIZED, FOR SOLUTION INTRAVENOUS AS NEEDED
Status: DISCONTINUED | OUTPATIENT
Start: 2021-06-04 | End: 2021-06-04 | Stop reason: HOSPADM

## 2021-06-04 RX ORDER — CEFAZOLIN SODIUM 2 G/100ML
2 INJECTION, SOLUTION INTRAVENOUS EVERY 8 HOURS
Status: COMPLETED | OUTPATIENT
Start: 2021-06-04 | End: 2021-06-05

## 2021-06-04 RX ORDER — OXYCODONE AND ACETAMINOPHEN 10; 325 MG/1; MG/1
1 TABLET ORAL EVERY 4 HOURS PRN
Status: DISCONTINUED | OUTPATIENT
Start: 2021-06-04 | End: 2021-06-04 | Stop reason: HOSPADM

## 2021-06-04 RX ORDER — FAMOTIDINE 10 MG/ML
20 INJECTION, SOLUTION INTRAVENOUS ONCE
Status: COMPLETED | OUTPATIENT
Start: 2021-06-04 | End: 2021-06-04

## 2021-06-04 RX ORDER — UREA 10 %
1 LOTION (ML) TOPICAL NIGHTLY PRN
Status: DISCONTINUED | OUTPATIENT
Start: 2021-06-04 | End: 2021-06-10 | Stop reason: HOSPADM

## 2021-06-04 RX ORDER — TRANEXAMIC ACID 100 MG/ML
INJECTION, SOLUTION INTRAVENOUS AS NEEDED
Status: DISCONTINUED | OUTPATIENT
Start: 2021-06-04 | End: 2021-06-04 | Stop reason: SURG

## 2021-06-04 RX ORDER — PREGABALIN 75 MG/1
75 CAPSULE ORAL ONCE
Status: COMPLETED | OUTPATIENT
Start: 2021-06-04 | End: 2021-06-04

## 2021-06-04 RX ORDER — ONDANSETRON 4 MG/1
4 TABLET, FILM COATED ORAL EVERY 6 HOURS PRN
Status: DISCONTINUED | OUTPATIENT
Start: 2021-06-04 | End: 2021-06-10 | Stop reason: HOSPADM

## 2021-06-04 RX ORDER — HYDROMORPHONE HYDROCHLORIDE 1 MG/ML
0.5 INJECTION, SOLUTION INTRAMUSCULAR; INTRAVENOUS; SUBCUTANEOUS
Status: DISCONTINUED | OUTPATIENT
Start: 2021-06-04 | End: 2021-06-05

## 2021-06-04 RX ORDER — SODIUM CHLORIDE 9 MG/ML
100 INJECTION, SOLUTION INTRAVENOUS CONTINUOUS
Status: ACTIVE | OUTPATIENT
Start: 2021-06-04 | End: 2021-06-05

## 2021-06-04 RX ORDER — HYDROCODONE BITARTRATE AND ACETAMINOPHEN 7.5; 325 MG/1; MG/1
1 TABLET ORAL EVERY 4 HOURS PRN
Status: DISCONTINUED | OUTPATIENT
Start: 2021-06-04 | End: 2021-06-04 | Stop reason: SDUPTHER

## 2021-06-04 RX ORDER — HYDROMORPHONE HYDROCHLORIDE 1 MG/ML
0.5 INJECTION, SOLUTION INTRAMUSCULAR; INTRAVENOUS; SUBCUTANEOUS
Status: DISCONTINUED | OUTPATIENT
Start: 2021-06-04 | End: 2021-06-04 | Stop reason: HOSPADM

## 2021-06-04 RX ORDER — CEFAZOLIN SODIUM 2 G/100ML
2 INJECTION, SOLUTION INTRAVENOUS ONCE
Status: COMPLETED | OUTPATIENT
Start: 2021-06-04 | End: 2021-06-04

## 2021-06-04 RX ORDER — FENTANYL CITRATE 50 UG/ML
50 INJECTION, SOLUTION INTRAMUSCULAR; INTRAVENOUS
Status: DISCONTINUED | OUTPATIENT
Start: 2021-06-04 | End: 2021-06-04 | Stop reason: HOSPADM

## 2021-06-04 RX ORDER — DOCUSATE SODIUM 100 MG/1
100 CAPSULE, LIQUID FILLED ORAL 2 TIMES DAILY
Status: DISCONTINUED | OUTPATIENT
Start: 2021-06-04 | End: 2021-06-10 | Stop reason: HOSPADM

## 2021-06-04 RX ORDER — HYDROCODONE BITARTRATE AND ACETAMINOPHEN 7.5; 325 MG/1; MG/1
1 TABLET ORAL ONCE AS NEEDED
Status: DISCONTINUED | OUTPATIENT
Start: 2021-06-04 | End: 2021-06-04 | Stop reason: HOSPADM

## 2021-06-04 RX ORDER — HYDRALAZINE HYDROCHLORIDE 20 MG/ML
5 INJECTION INTRAMUSCULAR; INTRAVENOUS
Status: DISCONTINUED | OUTPATIENT
Start: 2021-06-04 | End: 2021-06-04 | Stop reason: HOSPADM

## 2021-06-04 RX ORDER — MAGNESIUM HYDROXIDE 1200 MG/15ML
LIQUID ORAL AS NEEDED
Status: DISCONTINUED | OUTPATIENT
Start: 2021-06-04 | End: 2021-06-04 | Stop reason: HOSPADM

## 2021-06-04 RX ORDER — BUPIVACAINE HYDROCHLORIDE 7.5 MG/ML
INJECTION, SOLUTION EPIDURAL; RETROBULBAR AS NEEDED
Status: DISCONTINUED | OUTPATIENT
Start: 2021-06-04 | End: 2021-06-04 | Stop reason: SURG

## 2021-06-04 RX ORDER — SODIUM CHLORIDE 0.9 % (FLUSH) 0.9 %
3-10 SYRINGE (ML) INJECTION AS NEEDED
Status: DISCONTINUED | OUTPATIENT
Start: 2021-06-04 | End: 2021-06-04 | Stop reason: HOSPADM

## 2021-06-04 RX ORDER — SODIUM CHLORIDE, SODIUM LACTATE, POTASSIUM CHLORIDE, CALCIUM CHLORIDE 600; 310; 30; 20 MG/100ML; MG/100ML; MG/100ML; MG/100ML
9 INJECTION, SOLUTION INTRAVENOUS CONTINUOUS
Status: DISCONTINUED | OUTPATIENT
Start: 2021-06-04 | End: 2021-06-05

## 2021-06-04 RX ORDER — PROPOFOL 10 MG/ML
VIAL (ML) INTRAVENOUS AS NEEDED
Status: DISCONTINUED | OUTPATIENT
Start: 2021-06-04 | End: 2021-06-04 | Stop reason: SURG

## 2021-06-04 RX ORDER — HYDROCODONE BITARTRATE AND ACETAMINOPHEN 7.5; 325 MG/1; MG/1
2 TABLET ORAL EVERY 4 HOURS PRN
Status: DISCONTINUED | OUTPATIENT
Start: 2021-06-04 | End: 2021-06-10 | Stop reason: HOSPADM

## 2021-06-04 RX ADMIN — CASTOR OIL AND BALSAM, PERU: 788; 87 OINTMENT TOPICAL at 08:39

## 2021-06-04 RX ADMIN — PROPOFOL 25 MCG/KG/MIN: 10 INJECTION, EMULSION INTRAVENOUS at 13:45

## 2021-06-04 RX ADMIN — LIDOCAINE HYDROCHLORIDE 60 MG: 20 INJECTION, SOLUTION INFILTRATION; PERINEURAL at 13:44

## 2021-06-04 RX ADMIN — LIDOCAINE HYDROCHLORIDE 40 MG: 20 INJECTION, SOLUTION INFILTRATION; PERINEURAL at 13:53

## 2021-06-04 RX ADMIN — INSULIN LISPRO 3 UNITS: 100 INJECTION, SOLUTION INTRAVENOUS; SUBCUTANEOUS at 20:35

## 2021-06-04 RX ADMIN — INSULIN LISPRO 2 UNITS: 100 INJECTION, SOLUTION INTRAVENOUS; SUBCUTANEOUS at 07:49

## 2021-06-04 RX ADMIN — INSULIN GLARGINE 15 UNITS: 100 INJECTION, SOLUTION SUBCUTANEOUS at 20:43

## 2021-06-04 RX ADMIN — TRANEXAMIC ACID 1000 MG: 1 INJECTION, SOLUTION INTRAVENOUS at 15:50

## 2021-06-04 RX ADMIN — CEFAZOLIN SODIUM 2 G: 2 INJECTION, SOLUTION INTRAVENOUS at 20:35

## 2021-06-04 RX ADMIN — PROPOFOL 40 MG: 10 INJECTION, EMULSION INTRAVENOUS at 13:53

## 2021-06-04 RX ADMIN — HYDROMORPHONE HYDROCHLORIDE 0.5 MG: 1 INJECTION, SOLUTION INTRAMUSCULAR; INTRAVENOUS; SUBCUTANEOUS at 17:43

## 2021-06-04 RX ADMIN — ACETAMINOPHEN 1000 MG: 500 TABLET, FILM COATED ORAL at 13:01

## 2021-06-04 RX ADMIN — OLANZAPINE 5 MG: 5 TABLET ORAL at 20:36

## 2021-06-04 RX ADMIN — VANCOMYCIN HYDROCHLORIDE 1750 MG: 1 INJECTION, POWDER, LYOPHILIZED, FOR SOLUTION INTRAVENOUS at 13:49

## 2021-06-04 RX ADMIN — CEFAZOLIN SODIUM 2 G: 2 INJECTION, SOLUTION INTRAVENOUS at 13:05

## 2021-06-04 RX ADMIN — SODIUM CHLORIDE, POTASSIUM CHLORIDE, SODIUM LACTATE AND CALCIUM CHLORIDE 9 ML/HR: 600; 310; 30; 20 INJECTION, SOLUTION INTRAVENOUS at 13:06

## 2021-06-04 RX ADMIN — FENTANYL CITRATE 50 MCG: 50 INJECTION INTRAMUSCULAR; INTRAVENOUS at 14:37

## 2021-06-04 RX ADMIN — MUPIROCIN 1 APPLICATION: 20 OINTMENT TOPICAL at 13:06

## 2021-06-04 RX ADMIN — TAMSULOSIN HYDROCHLORIDE 0.4 MG: 0.4 CAPSULE ORAL at 20:37

## 2021-06-04 RX ADMIN — FAMOTIDINE 20 MG: 10 INJECTION INTRAVENOUS at 13:12

## 2021-06-04 RX ADMIN — RIFAMPIN 300 MG: 300 CAPSULE ORAL at 20:37

## 2021-06-04 RX ADMIN — FAMOTIDINE 20 MG: 20 TABLET, FILM COATED ORAL at 18:18

## 2021-06-04 RX ADMIN — BUPIVACAINE HYDROCHLORIDE 2 ML: 7.5 INJECTION, SOLUTION EPIDURAL; RETROBULBAR at 13:40

## 2021-06-04 RX ADMIN — SODIUM CHLORIDE 100 ML/HR: 9 INJECTION, SOLUTION INTRAVENOUS at 18:44

## 2021-06-04 RX ADMIN — FENTANYL CITRATE 50 MCG: 50 INJECTION, SOLUTION INTRAMUSCULAR; INTRAVENOUS at 16:51

## 2021-06-04 RX ADMIN — FENTANYL CITRATE 50 MCG: 50 INJECTION INTRAMUSCULAR; INTRAVENOUS at 13:31

## 2021-06-04 RX ADMIN — Medication: at 08:41

## 2021-06-04 RX ADMIN — ATORVASTATIN CALCIUM 20 MG: 20 TABLET, FILM COATED ORAL at 20:36

## 2021-06-04 RX ADMIN — HYDROCODONE BITARTRATE AND ACETAMINOPHEN 2 TABLET: 7.5; 325 TABLET ORAL at 18:28

## 2021-06-04 RX ADMIN — PROPOFOL 40 MG: 10 INJECTION, EMULSION INTRAVENOUS at 13:44

## 2021-06-04 RX ADMIN — SODIUM CHLORIDE, PRESERVATIVE FREE 10 ML: 5 INJECTION INTRAVENOUS at 08:41

## 2021-06-04 RX ADMIN — PREGABALIN 75 MG: 75 CAPSULE ORAL at 13:01

## 2021-06-04 RX ADMIN — DOCUSATE SODIUM 100 MG: 100 CAPSULE, LIQUID FILLED ORAL at 20:36

## 2021-06-04 RX ADMIN — LAMOTRIGINE 12.5 MG: 25 TABLET ORAL at 20:36

## 2021-06-04 RX ADMIN — PROPOFOL 40 MG: 10 INJECTION, EMULSION INTRAVENOUS at 14:37

## 2021-06-04 RX ADMIN — TRAZODONE HYDROCHLORIDE 25 MG: 50 TABLET ORAL at 20:37

## 2021-06-04 RX ADMIN — SODIUM CHLORIDE, POTASSIUM CHLORIDE, SODIUM LACTATE AND CALCIUM CHLORIDE: 600; 310; 30; 20 INJECTION, SOLUTION INTRAVENOUS at 16:28

## 2021-06-04 RX ADMIN — MUPIROCIN 1 APPLICATION: 20 OINTMENT TOPICAL at 20:37

## 2021-06-04 RX ADMIN — MUPIROCIN 1 APPLICATION: 20 OINTMENT TOPICAL at 07:50

## 2021-06-04 RX ADMIN — METOPROLOL SUCCINATE 25 MG: 25 TABLET, EXTENDED RELEASE ORAL at 07:49

## 2021-06-04 RX ADMIN — Medication: at 20:36

## 2021-06-04 RX ADMIN — CASTOR OIL AND BALSAM, PERU 5 G: 788; 87 OINTMENT TOPICAL at 20:36

## 2021-06-04 NOTE — PLAN OF CARE
Goal Outcome Evaluation:  Plan of Care Reviewed With: patient  Progress: improving  Outcome Summary: pod 0 left knee replacement with hv and YOSEPH intact. KI in place. dtv 0100 brief in place. achs with ssi. pain tolerated with po prn meds. not ambulated yet but is WBAT. plan to dc to snf when appropriate. educated on glucose monitoring.

## 2021-06-04 NOTE — OP NOTE
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  Size B cone tibia  [facility] tibial rotating component  [facility] knee tibial insert 13 mm thickness  [facility] knee bumper insert, 0°  Modular rotating hinge knee axle    Bone cement   was utilized   Fully cemented the  Femur  implants.    Implant Name Type Inv. Item Serial No.  Lot No. LRB No. Used Action   SUT CONTRL TISS STRATAFIX PDS PLS OS6 REV SZ1 18IN 45CM - [id number] Implant SUT CONTRL TISS STRATAFIX PDS PLS OS6 REV SZ1 18IN 45CM  [employer]  DIV OF J AND J RAMDPR Left 1 Implanted   SUT CONTRL TISS STRATAFIX PDS PLS OS6 REV SZ1 18IN 45CM - [id number] Implant SUT CONTRL TISS STRATAFIX PDS PLS OS6 REV SZ1 18IN 45CM  [employer]  DIV OF J AND J RAMDPR Left 1 Implanted   CMT BONE SIMPLEX/P TMYCIN FDOS 10PK - [id number] Implant CMT BONE SIMPLEX/P TMYCIN FDOS 10PK  SUZIE TAYLER [id number] Left 1 Implanted   CMT BONE SIMPLEX/P TMYCIN FDOS 10PK - [id number] Implant CMT BONE SIMPLEX/P TMYCIN FDOS 10PK  SUZIE TAYLER [id number] Left 1 Implanted   BUMPER TIB DURATION MR NUTRL - [id number] Implant BUMPER TIB DURATION MR NUTRL  SUZIE TAYLER [id number] Left 1 Implanted   BUSHING FEM DURATION MR STD - [id number] Implant BUSHING FEM DURATION MR STD  SUZIE TAYLER [id number] Left 2  Implanted   AXLE KN DURATION MRH - [id number] Implant AXLE KN DURATION MRH  NOLAN TAYLER [id number] Left 1 Implanted   [place] long curved cemeted stem    [employer] [id number] Left 1 Implanted   [place] extension piece    [employer] [id number] Left 1 Implanted   AUG CONE TIB/KN TRIATHLON REV SYMM SZB - [id number] Implant AUG CONE TIB/KN TRIATHLON REV SYMM SZB  NOLAN TAYLER [id number] Left 1 Implanted   COMP FEM DIST GMRS 65MM LT STD - [id number] Implant COMP FEM DIST GMRS 65MM LT STD  NOLAN TAYLER [id number] Left 1 Implanted   SLV TIB DURATION MRH ROTAT - [id number] Implant SLV TIB DURATION MRH ROTAT  NOLAN TAYLER [id number] Left 1 Implanted   COMP TIB ROTAT MR - [id number] Implant COMP TIB ROTAT MRH  NOLAN TAYLER [id number] Left 1 Implanted   CABL W/SLV DALLMILES BEAD 2MM - [id number] Implant CABL W/SLV DALLMILES BEAD 2MM  NOLAN Shozu [id number] Left 1 Implanted   INSRT TIB MRH M2 L2 13MM M/L - [id number] Implant INSRT TIB MRH M2 L2 13MM M/L  NOLAN TAYLER [id number] Left 1 Implanted       SURGEON: Valente Giang MD     ASSISTANT: German Euceda DO Fellow    ROSMERY Mccabe    The services of a skilled  first assist were necessary for performing the procedure safely and expeditiously.  The first assist was present for the entire duration of the case and helped with positioning, retraction and closure of the incision.      ANESTHESIA: General anaesthesia with a regional block femoral-sciatic and intraoperative periarticular  Exparel injection.    ESTIMATED BLOOD LOSS: 200ml    SPECIMENS: 1.Tissue from  knee for culture sensitivity aerobic and anaerobic.  2. Tissue from  knee for frozen section for acute inflammation.      COMPLICATIONS: Nil.     DRAINS: A 10 [place] Hemovac drain.     INDICATIONS: The patient is a 75 y.o. male  who presented to [facility] emergency room with increasing pain and inability to ambulate.  He was evaluated and x-rays were suspicious for possible loosening of the  implants.    He had a pressure ulceration on the lateral aspect of his leg and the superficial wound culture was positive for MRSA.  He underwent aspiration of the knee and the cell count was elevated 20,000 WBC with 90% or greater neutrophils.    He had a three-phase bone scan and suspected loosening of the femoral implant.    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  Subsequently he was doing well and also underwent a total hip replacement by me about 2 years back.    Apparently the patient has been admitted about 1 month back to [facility] and was treated for some superficial infection.  I was able to discuss the management with the infectious disease service on board.    The patient was indicated for an explant removal of the implants and possible antibiotic spacer/possible single-stage revision.  He is a minimally ambulator and has multiple comorbidities and history of dementia.  In view of this it was planned to proceed with possible spacer/possible revision.    The patient has been on IV antibiotics for 1 week.  Secondary to lack of equipment I have had the patient transferred to [facility].  He was negative for cultures from his knee fluid.    Treatment options and alternatives were discussed in detail with the patient's power of  who is indicated for a revision total knee arthroplasty .      Likely risks and benefits of the procedure, including but not limited to infection, DVT, pulmonary embolism, future loosening of the implants, possibility of injury to tendons, ligaments, nerves or vessels and periprosthetic fractures, loss of extensor mechanism, stiffness, future above-the-knee amputation have been discussed in detail. Despite the risks involved, the patient elected to proceed and informed consent was obtained and the patient was scheduled for surgery. The patient was seen in the  preoperative holding area and the operative site was marked.       DESCRIPTION OF PROCEDURE:   The patient was transferred to [facility] operating room.     Preoperative antibiotics in the form of  Vancomycin and  Kefzol  intravenously was infused prior to the incision and prior to the tourniquet placement according to the SCIP protocol.     A surgical time out was done with the team and the correct patient, surgical side and site were identified.     After achieving adequate general anesthesia, a well-padded tourniquet was placed over the proximal aspect of the operative thigh. The operative leg was prepped and draped in the usual sterile fashion. Tourniquet was elevated to a pressure of 250 mm Hg.     Trannexamic acid was given intravenously prior to incision.      A skin incision was made vertically oriented centering over the patella anteriorly incorporating previous surgical scar. A long incision was made in the suprapatellar area to be able to access the distal femur. Skin and subcutaneous tissue were incised, full thickness flaps were raised and a medial parapatellar approach was developed. There was a small effusion. The patella was subluxed and the knee was inspected.      There was some synovitis.  This was excised and sent for cultures and frozen section.  The frozen section has returned with suspicion for positive acute inflammation and high WBC count.    The femoral implant was found to be loose.  The tibial implant was found to be well fixed to the underlying bone and cement.  Patella implant was found to be stable and well fixed to the bone.    Attention was  directed to the distal femur.  I was able to remove the femur implant after disengaging the hinge axle.  I removed the polyethylene liner and the bushings.    Femur implant came out along with the cement.  There was a intramedullary plug.  I was able to remove this with the help of Wilson cement removal kit.    I made a  freshening cut on the distal femur removed about 1 cm of native femur along with overgrowth around the collar.  Care was taken to remain close to the bony surface to avoid any injury to the vessels.  Throughout the case.  Care was taken to avoid any stretch to the vessels/neurovascular structures.    Intramedullary reamer was utilized for the medullary canal An adequate size was found to be with a 16 mm diameter.  I used a face reamer. I placed the distal femur Trial maintaining correct rotational alignment based upon epicondylar axis.      The wound was thoroughly irrigated with saline 6 L of saline was utilized.  Followed by this irrisept was utilized to irrigate the wound and intramedullary canals.  Followed by this I irrigated the wound again with saline with vancomycin.    I elected to use a tibial cone that was reversed and placed into the femur intramedullary to help with biologic fixation.  I prepped for the cone utilizing a reamer.  I also placed a prophylactic circumferential cable on the distal femur [employer] cable 2.0 was used.  This was tensioned crimped and cut before seating the size a tibial cone.    Trial liner was seated and knee reduced.  Reduction was found to be satisfactory with range of motion from 0° to 120° with the patella tracking well.     Having been satisfied with the trials, the bony surfaces irrigated with saline.  I elected to proceed with fully cemented stems.    The   femur was Assembled on the back table and Cemented appropriately.   Femur implant was Cemented with a separate batch of Simplex cement ×2, vancomycin powder was added to this again maintaining the correct external rotation.  Excess cement was removed.  Cement was allowed to set.    followed by seating of the 13 mm thick trial liner.   The trial liner was replaced with a 13 mm Insert, followed by seating of the bushes rotating component and hinge and axle. 0° bumper insert was seated.    Again, range of motion was  checked and the range was satisfactory from 0° to 120° with excellent stability throughout the range of motion. Good medial and lateral tissue tension, and soft tissue balancing. The patella was tracking well. Having been satisfied with this, the joint was thoroughly irrigated with saline. Soft tissue hemostasis was secured. A 10-Thai Hemovac drain was placed.      The sponge and needle count was found to be correct.  Arthrotomy was closed with Ethibond sutures and Stratifix  followed by closure of the incision in layers with Vicryl sutures and staples. Sterile dressings were placed and the patient was transferred to the recovery room in stable condition. The patient was given a knee immobilizer. The patient tolerated the procedure well and is being admitted for postoperative antibiotics according to the SCIP protocol for 2 more doses.     DVT Prophylaxis -  Mechanical - TEDS and venous foot plexipulses  daily will be started daily on postoperative day #1.     The patient will be mobilized in am with physical therapy. WBAT and no restrictions for ROM.     Infectious disease consult is being placed for routine follow up and postop antibiotic recommendations.     I discussed the satisfactory performance of the procedure with the patient's family and discussed with them The postoperative management.      Valente Giang M.D.    6/4/2021    CC: Gisela Sotelo APRN; MD Milad Lozoya Jonathan, MD

## 2021-06-04 NOTE — CONSULTS
CONSULT NOTE    Infectious Diseases - Bettina Loyd MD  [facility]       Patient Identification:  Name: Chris Ferguson  Age: 75 y.o.  Sex: male  :  1945  MRN: [id number]             Date of Consultation: 6/3/2021      Primary Care Physician: Gisela Sotelo APRN                               Requesting Physician: Dr. Villarreal  Reason for Consultation: Right knee prosthetic joint infection.    Impression: Patient is a 75-year-old male with history of diabetes dementia hypertension chronic kidney disease who was admitted at [facility] on 2021 with complaints of left knee pain for 3 days in the setting of wound and cellulitis of the left leg.  Patient wound culture was positive for MRSA and corynebacterium species and was followed by infectious disease service with recommendations of prolonged antibiotic therapy and orthopedic evaluation of the right knee.  Patient was seen by orthopedic surgery service and surgical intervention status explant of hardware with antibiotic spacer placement was recommended.  Patient overall has improved except for persistent pain and discomfort in the left leg in.  Patient was transferred to our facility for orthopedic intervention.  Infectious disease service is consulted.  At the time of admission at the outside facility had fever chills this is all improved now.  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  3-diabetes mellitus  4-hypothyroidism  5-dyslipidemia  6-dementia  7-Parkinson's disease and immobility      Recommendations/Discussions:  At this juncture I agree with the care plan consisting of IV vancomycin all orthopedic surgery service evaluating the patient for definitive  intervention such as I&D explant of hardware and antibiotic spacer placement and appropriate it as 2 stage treatment.  Whether or not patient would need stage II implant or if needed when he gets this stage II implant would be decided by orthopedic surgery service.  Patient would need 6 weeks of IV vancomycin after final surgical intervention with close follow-up for antibiotic toxicity and effectiveness of treatment.  This is partially discussed with orthopedic surgery service.  Thank you Dr. Villarreal for letting me be the part of your patient care please see above impression and recommendations      History of Present Illness:   Patient is a 75-year-old male with history of diabetes dementia hypertension chronic kidney disease who was admitted at [facility] on 5/21/2021 with complaints of left knee pain for 3 days in the setting of wound and cellulitis of the left leg.  Patient wound culture was positive for MRSA and corynebacterium species and was followed by infectious disease service with recommendations of prolonged antibiotic therapy and orthopedic evaluation of the right knee.  Patient was seen by orthopedic surgery service and surgical intervention status explant of hardware with antibiotic spacer placement was recommended.  Patient overall has improved except for persistent pain and discomfort in the left leg in.  Patient was transferred to our facility for orthopedic intervention.  Infectious disease service is consulted.  At the time of admission at the outside facility had fever chills this is all improved now.       Past Medical History:  Past Medical History:   Diagnosis Date   • Anemia    • Angina pectoris (CMS/HCC)    • Anxiety    • Arthritis     OSTEO   • BPH (benign prostatic hyperplasia)    • Dementia (CMS/HCC)    • Dementia (CMS/HCC)    • Depression    • Diabetes mellitus (CMS/HCC)     type 2   • Disease of thyroid gland    • GERD (gastroesophageal reflux disease)    • Hypertension    •  Parkinson's disease (CMS/[facility])    • Short-term memory loss      Past Surgical History:  Past Surgical History:   Procedure Laterality Date   • APPENDECTOMY     • CARDIAC CATHETERIZATION N/A 11/18/2020    Procedure: Left Heart Cath and coronary angiogram;  Surgeon: Yanci Howard MD;  Location: [facility] CATH INVASIVE LOCATION;  Service: Cardiovascular;  Laterality: N/A;   • CARDIAC CATHETERIZATION N/A 11/18/2020    Procedure: Coronary angiography;  Surgeon: Yanci Howard MD;  Location: [facility] CATH INVASIVE LOCATION;  Service: Cardiovascular;  Laterality: N/A;   • CARDIAC CATHETERIZATION N/A 11/18/2020    Procedure: Left ventriculography;  Surgeon: Yanci Howard MD;  Location: [facility] CATH INVASIVE LOCATION;  Service: Cardiovascular;  Laterality: N/A;   • CHOLECYSTECTOMY     • COLONOSCOPY     • EYE SURGERY      kallie cataracts w iol   • FEMUR FRACTURE SURGERY Left     1/2018   • LEG DEBRIDEMENT Left 9/15/2020    Procedure: LOWER EXTREMITY DEBRIDEMENT;  Surgeon: Joslyn Mistry MD;  Location: [facility];  Service: General;  Laterality: Left;   • MULTIPLE TOOTH EXTRACTIONS      ALL TEETH REMOVED   • STOMACH SURGERY      gastric ulcer   • TOTAL HIP ARTHROPLASTY Right 4/23/2019    Procedure: TOTAL HIP ARTHROPLASTY POSTERIOR;  Surgeon: Valente Giang MD;  Location: [facility];  Service: Orthopedics   • TOTAL KNEE ARTHROPLASTY Left 8/31/2018    Procedure: REMOVAL OF LEFT DISTAL FEMUR PLATE AND SCREWS WITH COMPLEX TOTAL KNEE REPLACEMENT DISTAL FEMUR HINGED;  Surgeon: Valente Giang MD;  Location: [facility];  Service: Orthopedics      Home Meds:  Medications Prior to Admission   Medication Sig Dispense Refill Last Dose   • atorvastatin (LIPITOR) 20 MG tablet Take 20 mg by mouth Every Night.   6/2/2021 at 2038   • famotidine (PEPCID) 20 MG tablet Take 1 tablet by mouth 2 (Two) Times a Day Before Meals.   6/2/2021 at 0540   • folic acid (FOLVITE) 1 MG tablet Take 1 mg by mouth Daily.    6/2/2021 at 1049   • HYDROcodone-acetaminophen (NORCO) 7.5-325 MG per tablet Take 1 tablet by mouth Every 4 (Four) Hours As Needed for Mild Pain  or Moderate Pain . 12 tablet 0 6/3/2021 at 0014   • insulin glargine (Semglee) 100 UNIT/ML injection Inject 20 Units under the skin into the appropriate area as directed 2 (Two) Times a Day.   6/2/2021 at 2036   • insulin lispro (humaLOG) 100 UNIT/ML injection Inject 5 Units under the skin into the appropriate area as directed 3 (Three) Times a Day Before Meals. Hold for bs <70 and call MD for bs >400    6/2/2021 at 2036   • isosorbide mononitrate (IMDUR) 30 MG 24 hr tablet Take 30 mg by mouth Daily.   6/2/2021 at 1049   • lamoTRIgine (LaMICtal) 25 MG tablet Take 12.5 mg by mouth 2 (Two) Times a Day.   6/2/2021 at 2038   • levothyroxine (SYNTHROID, LEVOTHROID) 100 MCG tablet Take 100 mcg by mouth Daily.   6/2/2021 at 0542   • memantine (NAMENDA) 5 MG tablet Take 5 mg by mouth Daily.   6/2/2021 at 1049   • metoprolol succinate XL (TOPROL-XL) 25 MG 24 hr tablet Take 25 mg by mouth Daily.   6/2/2021 at 1049   • multivitamin (Therems) tablet tablet Take 1 tablet by mouth Daily.   6/2/2021 at 1049   • OLANZapine (zyPREXA) 5 MG tablet Take 5 mg by mouth Every Night.   6/2/2021 at 2038   • senna (senna) 8.6 MG tablet Take 1 tablet by mouth Daily As Needed for Constipation.   6/2/2021 at 1049   • tamsulosin (FLOMAX) 0.4 MG capsule 24 hr capsule Take 1 capsule by mouth Daily.   6/2/2021 at 2038   • thiamine (VITAMIN B1) 100 MG tablet Take 1 tablet by mouth Daily.   6/2/2021 at 1049   • traZODone (DESYREL) 50 MG tablet Take 50 mg by mouth 2 (two) times a day.   6/2/2021 at 2037   • acetaminophen (TYLENOL) 325 MG tablet Take 650 mg by mouth Every 8 (Eight) Hours As Needed for Mild Pain .      • dextrose (GLUTOSE) 40 % gel Take 15 g by mouth Every 1 (One) Hour As Needed for Low Blood Sugar. And pt unable to swallow      • ferrous sulfate 325 (65 FE) MG tablet Take 325 mg by mouth 2  (two) times a day.      • glucagon (Glucagon Emergency) 1 MG injection Inject 1 mg into the appropriate muscle as directed by prescriber Every 12 (Twelve) Hours As Needed for Low Blood Sugar.      • LACTOBACILLUS RHAMNOSUS, GG, PO Take 1 capsule by mouth Daily.      • vitamin C (ASCORBIC ACID) 500 MG tablet Take 500 mg by mouth 2 (two) times a day.        Current Meds:     Current Facility-Administered Medications:   •  acetaminophen (TYLENOL) tablet 650 mg, 650 mg, Oral, Q8H PRN, Timo Blake, DO  •  atorvastatin (LIPITOR) tablet 20 mg, 20 mg, Oral, Nightly, Timo Blake, DO, 20 mg at 06/03/21 2055  •  castor oil-balsam peru (VENELEX) ointment, , Topical, Q12H, Reed Aguirre MD, 5 g at 06/03/21 2104  •  dextrose (D50W) 25 g/ 50mL Intravenous Solution 25 g, 25 g, Intravenous, Q15 Min PRN, Timo Blake, DO  •  dextrose (GLUTOSE) oral gel 15 g, 15 g, Oral, Q15 Min PRN, Timo Blake, DO  •  enoxaparin (LOVENOX) syringe 40 mg, 40 mg, Subcutaneous, Q24H, Timo Blake, DO, 40 mg at 06/03/21 1548  •  famotidine (PEPCID) tablet 20 mg, 20 mg, Oral, BID AC, Timo Blake, DO, 20 mg at 06/03/21 1548  •  folic acid (FOLVITE) tablet 1 mg, 1 mg, Oral, Daily, Timo Blake, DO, 1 mg at 06/03/21 0805  •  glucagon (human recombinant) (GLUCAGEN DIAGNOSTIC) injection 1 mg, 1 mg, Subcutaneous, Q15 Min PRN, Timo Blake, DO  •  HYDROcodone-acetaminophen (NORCO) 7.5-325 MG per tablet 1 tablet, 1 tablet, Oral, Q4H PRN, Timo Blake, DO, 1 tablet at 06/03/21 2102  •  insulin glargine (LANTUS, SEMGLEE) injection 15 Units, 15 Units, Subcutaneous, Nightly, Timo Blake, DO, 15 Units at 06/03/21 2057  •  insulin lispro (ADMELOG) injection 0-7 Units, 0-7 Units, Subcutaneous, 4x Daily With Meals & Nightly, 2 Units at 06/03/21 1619 **AND** insulin lispro (ADMELOG) injection 0-7 Units, 0-7 Units, Subcutaneous, PRN, Timo Blake,   •  isosorbide mononitrate (IMDUR) 24 hr tablet 30 mg, 30 mg, Oral, Daily, Timo Blake, , 30 mg at  06/03/21 0805  •  lamoTRIgine (LaMICtal) tablet 12.5 mg, 12.5 mg, Oral, BID, Timo Blake, , 12.5 mg at 06/03/21 2055  •  levothyroxine (SYNTHROID, LEVOTHROID) tablet 100 mcg, 100 mcg, Oral, Q AM, Timo Blake, DO  •  memantine (NAMENDA) tablet 5 mg, 5 mg, Oral, Daily, Timo Blaek, DO, 5 mg at 06/03/21 0805  •  metoprolol succinate XL (TOPROL-XL) 24 hr tablet 25 mg, 25 mg, Oral, Daily, Timo Blake, , Stopped at 06/03/21 0914  •  morphine injection 2 mg, 2 mg, Intravenous, Q4H PRN, Leela Craft, APRN  •  multivitamin (THERAGRAN) tablet 1 tablet, 1 tablet, Oral, Daily, Timo Blake, , 1 tablet at 06/03/21 0805  •  mupirocin (BACTROBAN) 2 % nasal ointment, , Each Nare, BID, Valente Giang MD, 1 application at 06/03/21 2056  •  OLANZapine (zyPREXA) tablet 5 mg, 5 mg, Oral, Nightly, Timo Blake DO, 5 mg at 06/03/21 2055  •  Pharmacy to dose vancomycin, , Does not apply, Continuous PRN, Timo Blake,   •  rifAMPin (RIFADIN) capsule 300 mg, 300 mg, Oral, Q12H, Timo Blake, DO, 300 mg at 06/03/21 2055  •  senna (SENOKOT) tablet 1 tablet, 1 tablet, Oral, Daily PRN, Timo Blake,   •  sodium chloride 0.9 % flush 10 mL, 10 mL, Intravenous, Q12H, Timo Blake DO, 10 mL at 06/03/21 0915  •  sodium chloride 0.9 % flush 10 mL, 10 mL, Intravenous, PRN, Timo Blake, DO  •  tamsulosin (FLOMAX) 24 hr capsule 0.4 mg, 0.4 mg, Oral, Daily, Timo Blake, , 0.4 mg at 06/03/21 2055  •  thiamine (VITAMIN B-1) tablet 100 mg, 100 mg, Oral, Daily, Timo Blake DO, 100 mg at 06/03/21 0818  •  traZODone (DESYREL) tablet 25 mg, 25 mg, Oral, Nightly, Timo Blake, , 25 mg at 06/03/21 2055  •  vancomycin 1750 mg/500 mL 0.9% NS IVPB ([facility]), 1,750 mg, Intravenous, Q24H, Timo Blake DO, 1,750 mg at 06/03/21 2103  •  zinc oxide (DESITIN) 40 % paste, , Topical, PRN, Timo Blake DO  •  zinc oxide (DESITIN) 40 % paste, , Topical, BID, Timo Blake DO, Given at 06/03/21 2104  Allergies:  Allergies  "  Allergen Reactions   • Codeine GI Intolerance     Social History:   Social History     Tobacco Use   • Smoking status: Never Smoker   • Smokeless tobacco: Never Used   Substance Use Topics   • Alcohol use: Yes     Comment: pt reports mod amt       Family History:  Family History   Problem Relation Age of Onset   • Heart disease Mother    • Stroke Father    • Heart disease Father    • Seizures Son    • Seizures Son    • Malig Hyperthermia Neg Hx           Review of Systems  See history of present illness and past medical history.  Overall feels better  Review of system at the time of admission was recorded as follows:  Constitutional: Positive for fever. Negative for chills.   HENT: Positive for congestion. Negative for sore throat.    Eyes: Negative for photophobia and visual disturbance.   Respiratory: Negative for cough and shortness of breath.    Cardiovascular: Negative for chest pain and palpitations.   Gastrointestinal: Negative for abdominal pain, nausea and vomiting.   Endocrine: Negative for polydipsia, polyphagia and polyuria.   Genitourinary: Negative for decreased urine volume, dysuria, flank pain, frequency, hematuria and urgency.   Musculoskeletal: Positive for arthralgias (left knee), gait problem and joint swelling (left knee). Negative for back pain.   Skin: Negative for rash and wound.   Neurological: Negative for dizziness, speech difficulty, light-headedness, numbness and headaches.     Vitals:   /62 (BP Location: Left arm, Patient Position: Lying)   Pulse 86   Temp 98.1 °F (36.7 °C) (Oral)   Resp 18   Ht 177.8 cm (70\")   Wt 88.5 kg (195 lb)   SpO2 97%   BMI 27.98 kg/m²   I/O:     Intake/Output Summary (Last 24 hours) at 6/3/2021 2110  Last data filed at 6/3/2021 1700  Gross per 24 hour   Intake 480 ml   Output 450 ml   Net 30 ml     Exam:  Patient is examined using the personal protective equipment as per guidelines from infection control for this particular patient as enacted.  " Hand washing was performed before and after patient interaction.  General Appearance:    Alert, cooperative, no distress, appears stated age   Head:    Normocephalic, without obvious abnormality, atraumatic   Eyes:    PERRL, conjunctivae/corneas clear, EOM's intact, both eyes   Ears:    Normal external ear canals, both ears   Nose:   Nares normal, septum midline, mucosa normal, no drainage    or sinus tenderness   Throat:   Lips, tongue, gums normal; oral mucosa pink and moist   Neck:   Supple, symmetrical, trachea midline, no adenopathy;     thyroid:  no enlargement/tenderness/nodules; no carotid    bruit or JVD   Back:     Symmetric, no curvature, ROM normal, no CVA tenderness   Lungs:     Clear to auscultation bilaterally, respirations unlabored   Chest Wall:    No tenderness or deformity    Heart:    Regular rate and rhythm, S1 and S2 normal, no murmur, rub   or gallop   Abdomen:     Soft, non-tender, bowel sounds active all four quadrants,     no masses, no hepatomegaly, no splenomegaly   Extremities: : Swollen tender slightly warm left knee, with decreased range of motion   Pulses:   Pulses palpable in all extremities; symmetric all extremities   Skin:                      Neurologic:  Spasticity and weakness of the lower extremities.       Data Review:    I reviewed the patient's new clinical results.  Results from last 7 days   Lab Units 06/03/21  0504 06/02/21  0650 06/01/21  0344 05/31/21  0738 05/30/21  0232 05/28/21  0950   WBC 10*3/mm3 6.83 5.90 4.50 5.60 5.60 7.00   HEMOGLOBIN g/dL 8.6* 9.7* 8.8* 8.8* 8.5* 9.5*   PLATELETS 10*3/mm3 381 373 325 332 305 333     Results from last 7 days   Lab Units 06/03/21  0504 06/02/21  0650 06/01/21  0344 05/31/21  0738 05/30/21  0232 05/28/21  0950   SODIUM mmol/L 136 135* 135* 136 137 137   POTASSIUM mmol/L 3.7 4.3 4.2 4.1 4.4 4.4   CHLORIDE mmol/L 104 102 103 103 104 103   CO2 mmol/L 24.1 25.0 24.0 27.0 26.0 25.0   BUN mg/dL 26* 21 22 21 20 19   CREATININE mg/dL  1.19 1.25 1.09 1.10 1.04 1.03   CALCIUM mg/dL 8.3* 8.9 8.0* 8.6 8.5* 8.3*   GLUCOSE mg/dL 154* 210* 200* 123* 166* 166*     Culture   Date Value Ref Range Status   01/10/2018 NO GROWTH 2 DAYS  Final   ]  No results found for: ACANTHNAEG, AFBCX, BPERTUSSISCX, BLOODCX  No results found for: BCIDPCR, CXREFLEX, CSFCX, CULTURETIS  No results found for: CULTURES, HSVCX, URCX  No results found for: EYECULTURE, GCCX, HSVCULTURE, LABHSV  No results found for: LEGIONELLA, MRSACX, MUMPSCX, MYCOPLASCX  No results found for: NOCARDIACX, STOOLCX  No results found for: THROATCX, UNSTIMCULT, URINECX, CULTURE, VZVCULTUR  No results found for: VIRALCULTU, WOUNDCX  Microbiology Results (last 10 days)     Procedure Component Value - Date/Time    Wound Culture - Wound, Knee, Left [[id number]] Collected: 05/25/21 1124    Lab Status: Final result Specimen: Wound from Knee, Left Updated: 05/28/21 0811     Wound Culture No growth at 3 days     Gram Stain Many (4+) WBCs per low power field      No organisms seen          Assessment:  Active Hospital Problems    Diagnosis  POA   • **Infection of prosthetic left knee joint (CMS/[facility]) [T84.54XA]  Not Applicable   • 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 with presence of antibiotic-impregnated cement spacer [Z89.529]  Yes   • Type 2 diabetes mellitus with hyperglycemia, with long-term current use of insulin (CMS/[facility]) [E11.65, Z79.4]  Not Applicable   • Dementia in Alzheimer's disease with early onset (CMS/[facility]) [G30.0, F02.80]  Yes   • Cellulitis of left lower extremity [L03.116]  Yes   • Essential hypertension [I10]  Yes   • BPH (benign prostatic hyperplasia) [N40.0]  Yes   • Hypothyroidism [E03.9]  Yes   • CKD (chronic kidney disease) stage 3, GFR 30-59 ml/min (CMS/[facility]) [N18.30]  Unknown   • Hyperlipidemia [E78.5]  Yes      Resolved Hospital Problems   No resolved problems to display.         Plan:  See above  Bettina Mendenhall MD   6/3/2021  21:10 EDT    Much of this  encounter note is an electronic transcription/translation of spoken language to printed text. The electronic translation of spoken language may permit erroneous, or at times, nonsensical words or phrases to be inadvertently transcribed; Although I have reviewed the note for such errors, some may still exist

## 2021-06-04 NOTE — ANESTHESIA PROCEDURE NOTES
Intrathecal Block      Patient reassessed immediately prior to procedure    Patient location during procedure: OR  Indication:at surgeon's request  Performed By  Anesthesiologist: Eric Meza MD  Preanesthetic Checklist  Completed: patient identified, site marked, surgical consent, pre-op evaluation, timeout performed, IV checked, risks and benefits discussed and monitors and equipment checked  Additional Notes  Pt c/o Right paresthesia so removed and repositioned more midline and injection without incident  Intrathecal Block Prep:  Pt Position:left lateral decubitus  Sterile Tech:sterile barrier, gloves and cap  Prep:chloraprep  Monitoring:blood pressure monitoring, continuous pulse oximetry and EKG  Intrathecal Block Procedure:  Approach:midline  Location:L2-L3  Needle Type:Bassam  Needle Gauge:25 G  Placement of Needle Event: cerebrospinal fluid  Fluid Appearance:clear  Post Assessment:  Patient Tolerance:patient tolerated the procedure well with no apparent complications

## 2021-06-04 NOTE — BRIEF OP NOTE
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, subsequent encounter [T84.54XD]       Post-Op Diagnosis Codes:     * 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, subsequent encounter [T84.54XD]    Procedure/CPT® Codes:        Procedure(s):  Single stage revision left knee replacement    Surgeon(s):  Valente Giang MD Williams, Mark D, DO    Anesthesia: Spinal    Staff:   Circulator: Saira Mcdonald RN  Scrub Person: Kodak Dodd  Vendor Representative: Lanre Quinn  Assistant: Constance Lam RNFA  Assistant: Constance Lam RNFA      Estimated Blood Loss: 200ml    Urine Voided: * No values recorded between 6/4/2021  1:27 PM and 6/4/2021  4:01 PM *    Specimens:                Specimens     ID Source Type Tests Collected By Collected At Frozen?    1 Knee, Left Wound · WOUND CULTURE (Canceled)   Valente Giang MD 6/4/21 1418     A Knee, Left Tissue · TISSUE PATHOLOGY EXAM   Valente Giang MD 6/4/21 1419 Yes    Description: left knee tissue synovium, frozen, OR 10 [phone]                Drains:   Closed/Suction Drain Left Knee Accordion 10 Fr. (Active)       Findings: see dict     Complications: none    Assistant: Constance Lam RNFA  was responsible for performing the following activities: Retraction, Suction, Irrigation, Suturing and Closing and their skilled assistance was necessary for the success of this case.    Valente Giang MD     Date: 6/4/2021  Time: 16:01 EDT

## 2021-06-04 NOTE — ANESTHESIA POSTPROCEDURE EVALUATION
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        Post Anesthesia Care and Evaluation    Patient location during evaluation: bedside  Patient participation: complete - patient participated  Level of consciousness: awake and alert  Pain management: adequate  Airway patency: patent  Anesthetic complications: No anesthetic complications    Cardiovascular status: acceptable  Respiratory status: acceptable  Hydration status: acceptable    Comments: /68   Pulse 61   Temp 36.8 °C (98.2 °F) (Oral)   Resp 16   Ht 177.8 cm (70\")   Wt 88.5 kg (195 lb)   SpO2 100%   BMI 27.98 kg/m²     Patient is stable postoperatively and has adequately recovered from anesthesia as described above unless otherwise noted      "

## 2021-06-04 NOTE — PERIOPERATIVE NURSING NOTE
Spoke with two pharmacists regarding Mr Ferguson's vanc and Dr Giang request for pre-op dose.Pharmacist will re-time vanc dose so that Mr Ferguson can have a dose now and I have asked them to send the Vancomycin to the OR since patient is ready to be taken back.

## 2021-06-04 NOTE — SIGNIFICANT NOTE
06/04/21 0804   OTHER   Discipline physical therapist   Rehab Time/Intention   Session Not Performed other (see comments)  (Pt is scheduled for knee surgery today to remove infected prosthetic and spacer placement. PT will follow up tomorrow post-op. Please provide instructions on weight bearing status and activity/ROM restrictions.)   Recommendation   PT - Next Appointment 06/05/21

## 2021-06-04 NOTE — PROGRESS NOTES
Not seen today as patient in OR.  Will follow up in am.   Electronically signed by Nghia Stinson MD, 06/04/21, 4:03 PM EDT.

## 2021-06-04 NOTE — PLAN OF CARE
Goal Outcome Evaluation:  Plan of Care Reviewed With: patient  Progress: no change  Outcome Summary: patient resting comfortably between care, voiding per urinal, pain controlled at this time, surgery planned for 06/04/21 at 1615, npo since MN, educated on b/p and glucose monitoring and pain management

## 2021-06-04 NOTE — PROGRESS NOTES
"[facility]  Clinical Pharmacy Department     Vancomycin Pharmacokinetic Note    Chris Ferguson is a 75 y.o. male who presents from [facility] [place] on day 15 of pharmacy to dose vancomycin for bone and/or joint infection.     Target level: AUC24 400-600  Consulting Provider:  Dr. Blake   Current Vancomycin Dose: 1750 mg Q24h  Planned Duration of Therapy: ~30 days remaining, subject to change     Allergies  Allergies as of 06/01/2021 - Reviewed 05/29/2021   Allergen Reaction Noted   • Codeine GI Intolerance 08/23/2018     Microbiology:  Microbiology Results (last 10 days)     Procedure Component Value - Date/Time    COVID PRE-OP / PRE-PROCEDURE SCREENING ORDER (NO ISOLATION) - Swab, Nasopharynx [[id number]]  (Normal) Collected: 06/04/21 0640    Lab Status: Final result Specimen: Swab from Nasopharynx Updated: 06/04/21 0957    Narrative:      The following orders were created for panel order COVID PRE-OP / PRE-PROCEDURE SCREENING ORDER (NO ISOLATION) - Swab, Nasopharynx.  Procedure                               Abnormality         Status                     ---------                               -----------         ------                     COVID-19,[facility]...[[id number]]  Normal              Final result                 Please view results for these tests on the individual orders.    COVID-19,[facility] CEPHEID/ILYA NP SWAB IN TRANSPORT MEDIA 8-12 HR TAT - Swab, Nasopharynx [[id number]]  (Normal) Collected: 06/04/21 0640    Lab Status: Final result Specimen: Swab from Nasopharynx Updated: 06/04/21 0957     COVID19 Not Detected    Narrative:      Fact sheet for providers: https://www.fda.gov/media/928363/download    Fact sheet for patients: https://www.fda.gov/media/952146/download    Test performed by PCR.        Vitals/Labs/I&O  177.8 cm (70\")  88.5 kg (195 lb)  Body mass index is 27.98 kg/m².   @DOC@    Results from last 7 days   Lab Units 06/03/21  0504 06/02/21  0650 06/01/21  0344   WBC " "10*3/mm3 6.83 5.90 4.50     Results from last 7 days   Lab Units 06/03/21  0504   LACTATE mmol/L 0.9      Results from last 7 days   Lab Units 06/03/21  0504   PROCALCITONIN ng/mL 0.04     Estimated Creatinine Clearance: 65.8 mL/min (by C-G formula based on SCr of 1.12 mg/dL).  Results from last 7 days   Lab Units 06/04/21  0616 06/03/21  0504 06/02/21  0650 06/01/21  0344   BUN mg/dL  --  26* 21 22   CREATININE mg/dL 1.12 1.19 1.25 1.09     Intake & Output (last 3 days)       06/01 0701 - 06/02 0700 06/02 0701 - 06/03 0700 06/03 0701 - 06/04 0700 06/04 0701 - 06/05 0700    P.O.   840 0    IV Piggyback   500     Total Intake(mL/kg)   1340 (15.1) 0 (0)    Urine (mL/kg/hr)   850 (0.4) 250 (0.4)    Stool   0     Total Output   850 250    Net   +490 -250            Urine Unmeasured Occurrence   4 x 2 x    Stool Unmeasured Occurrence   1 x           Vancomycin Dosing and Level History:        Assessment/Plan:    • Overall stable renal function and kinetic calculations. No change to the regimen is indicated at this time and vancomycin 1750 mg Q24 will be continued. NOTE: patient to OR and surgeon would like vancomycin pre-op so I have moved his once daily dose up to be given on OR and will adjust remaining scheduled doses and trough accordingly. No post-op vancomycin dose is indicated.    Bayesian analysis of the most recent level(s) using [employer] provides the following patient-specific pharmacokinetic parameters:   CL: 3.08 L/h   Vd: 69.7 L   T1/2: 18.1 h  If the predicted trough on this regimen is not within what was previously considered a \"target trough range\", the AUC24 (a stronger predictor of vancomycin efficacy) is predicted to achieve the accepted target of 400-600 mg*h/L. To best balance efficacy and toxicity, we will be targeting AUC24 in this patient rather than steady-state trough levels.       Thank you for involving pharmacy in this patient's care. Please contact pharmacy with any questions or concerns.   "                         Meghana Sosa [facility]  Clinical Pharmacist  06/04/21 13:23 EDT

## 2021-06-04 NOTE — ANESTHESIA PREPROCEDURE EVALUATION
Anesthesia Evaluation     Patient summary reviewed and Nursing notes reviewed   no history of anesthetic complications:  NPO Solid Status: > 6 hours             Airway   Mallampati: III  TM distance: >3 FB  Neck ROM: full  No difficulty expected  Dental    (+) poor dentition and edentulous    Pulmonary - normal exam   (-) not a smoker  Cardiovascular - normal exam    ECG reviewed    (+) hypertension, CAD,   (-) angina    ROS comment: BORDERLINE ECG -  Sinus tachycardia  Ventricular premature complex  Low voltage, extremity and precordial leads  Consider anterior infarct  When compared with ECG of 17-Nov-2020 15:21:55,  Significant change in rhythm  Electronically Signed By: Eric Mullins (Vinod) 27-Apr-2021     Neuro/Psych  (+) dementia,     GI/Hepatic/Renal/Endo    (+)  GERD,  renal disease CRI, diabetes mellitus,     Musculoskeletal         ROS comment: Infected TKA  Abdominal    Substance History      OB/GYN          Other   arthritis, blood dyscrasia (Hb 8.6) anemia,                     Anesthesia Plan    ASA 3     spinal       Anesthetic plan, all risks, benefits, and alternatives have been provided, discussed and informed consent has been obtained with: patient.

## 2021-06-04 NOTE — PROGRESS NOTES
"  Infectious Diseases Progress Note    Bettina Mendenhall MD     [facility]  Los: 1 day  Patient Identification:  Name: Chris Ferguson  Age: 75 y.o.  Sex: male  :  1945  MRN: [id number]         Primary Care Physician: Gisela Sotelo APRN            Subjective: No new complaints.  Interval History: See consultation note.    Objective:    Scheduled Meds:atorvastatin, 20 mg, Oral, Nightly  castor oil-balsam peru, , Topical, Q12H  enoxaparin, 40 mg, Subcutaneous, Q24H  famotidine, 20 mg, Oral, BID AC  folic acid, 1 mg, Oral, Daily  insulin glargine, 15 Units, Subcutaneous, Nightly  insulin lispro, 0-7 Units, Subcutaneous, 4x Daily With Meals & Nightly  isosorbide mononitrate, 30 mg, Oral, Daily  lamoTRIgine, 12.5 mg, Oral, BID  levothyroxine, 100 mcg, Oral, Q AM  memantine, 5 mg, Oral, Daily  metoprolol succinate XL, 25 mg, Oral, Daily  multivitamin, 1 tablet, Oral, Daily  mupirocin, , Each Nare, BID  OLANZapine, 5 mg, Oral, Nightly  rifAMPin, 300 mg, Oral, Q12H  sodium chloride, 10 mL, Intravenous, Q12H  tamsulosin, 0.4 mg, Oral, Daily  thiamine, 100 mg, Oral, Daily  traZODone, 25 mg, Oral, Nightly  vancomycin, 1,750 mg, Intravenous, Q24H  zinc oxide, , Topical, BID      Continuous Infusions:Pharmacy to dose vancomycin,         Vital signs in last 24 hours:  Temp:  [97.4 °F (36.3 °C)-98.2 °F (36.8 °C)] 98.1 °F (36.7 °C)  Heart Rate:  [80-89] 86  Resp:  [16-18] 18  BP: (106-120)/(62-71) 112/68    Intake/Output:    Intake/Output Summary (Last 24 hours) at 2021 1022  Last data filed at 2021 0721  Gross per 24 hour   Intake 1340 ml   Output 850 ml   Net 490 ml       Exam:  /68 (BP Location: Right arm, Patient Position: Lying)   Pulse 86   Temp 98.1 °F (36.7 °C) (Skin)   Resp 18   Ht 177.8 cm (70\")   Wt 88.5 kg (195 lb)   SpO2 95%   BMI 27.98 kg/m²   Patient is examined using the personal protective equipment as per guidelines from infection control for this particular patient as " enacted.  Hand washing was performed before and after patient interaction.  General Appearance:    Alert, cooperative, no distress, AAOx3                          Head:    Normocephalic, without obvious abnormality, atraumatic                           Eyes:    PERRL, conjunctivae/corneas clear, EOM's intact, both eyes                         Throat:   Lips, tongue, gums normal; oral mucosa pink and moist                           Neck:   Supple, symmetrical, trachea midline, no JVD                         Lungs:    Clear to auscultation bilaterally, respirations unlabored                 Chest Wall:    No tenderness or deformity                          Heart:    Regular rate and rhythm, S1 and S2 normal, no murmur, no rub                                         or gallop                  Abdomen:     Soft, non-tender, bowel sounds active, no masses, no                                                        organomegaly                  Extremities:                                           Pulses:   Pulses palpable in all extremities                            Skin:   Skin is warm and dry,  no rashes or palpable lesions                  Neurologic: Awake and interactive       Data Review:    I reviewed the patient's new clinical results.  Results from last 7 days   Lab Units 06/03/21  0504 06/02/21  0650 06/01/21  0344 05/31/21  0738 05/30/21  0232   WBC 10*3/mm3 6.83 5.90 4.50 5.60 5.60   HEMOGLOBIN g/dL 8.6* 9.7* 8.8* 8.8* 8.5*   PLATELETS 10*3/mm3 381 373 325 332 305     Results from last 7 days   Lab Units 06/04/21  0616 06/03/21  0504 06/02/21  0650 06/01/21  0344 05/31/21  0738 05/30/21  0232   SODIUM mmol/L  --  136 135* 135* 136 137   POTASSIUM mmol/L  --  3.7 4.3 4.2 4.1 4.4   CHLORIDE mmol/L  --  104 102 103 103 104   CO2 mmol/L  --  24.1 25.0 24.0 27.0 26.0   BUN mg/dL  --  26* 21 22 21 20   CREATININE mg/dL 1.12 1.19 1.25 1.09 1.10 1.04   CALCIUM mg/dL  --  8.3* 8.9 8.0* 8.6 8.5*   GLUCOSE mg/dL  --   154* 210* 200* 123* 166*     Microbiology Results (last 10 days)     Procedure Component Value - Date/Time    COVID PRE-OP / PRE-PROCEDURE SCREENING ORDER (NO ISOLATION) - Swab, Nasopharynx [[id number]]  (Normal) Collected: 06/04/21 0640    Lab Status: Final result Specimen: Swab from Nasopharynx Updated: 06/04/21 0957    Narrative:      The following orders were created for panel order COVID PRE-OP / PRE-PROCEDURE SCREENING ORDER (NO ISOLATION) - Swab, Nasopharynx.  Procedure                               Abnormality         Status                     ---------                               -----------         ------                     COVID-19,[facility]...[[id number]]  Normal              Final result                 Please view results for these tests on the individual orders.    COVID-19,[facility] CEPHEID/ILYA NP SWAB IN TRANSPORT MEDIA 8-12 HR TAT - Swab, Nasopharynx [[id number]]  (Normal) Collected: 06/04/21 0640    Lab Status: Final result Specimen: Swab from Nasopharynx Updated: 06/04/21 0957     COVID19 Not Detected    Narrative:      Fact sheet for providers: https://www.fda.gov/media/285866/download    Fact sheet for patients: https://www.fda.gov/media/744072/download    Test performed by PCR.    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)    Essential hypertension    BPH (benign prostatic hyperplasia)    Stage 3a chronic kidney disease (CMS/HCC)    Hyperlipidemia    Cellulitis of left lower extremity    Type 2 diabetes mellitus with hyperglycemia, with long-term current use of insulin (CMS/[facility])    Dementia in Alzheimer's disease with early onset (CMS/[facility])    Acquired absence of knee joint following  removal of joint prosthesis with presence of antibiotic-impregnated cement spacer  1-probable left knee prosthetic joint infection secondary to  2-cellulitis of the left leg with wound infection with culture positive for MRSA resulting in contiguous focus of spread to the prosthetic joint as well as possible hematogenous seeding.  3-diabetes mellitus  4-hypothyroidism  5-dyslipidemia  6-dementia  7-Parkinson's disease and immobility        Recommendations/Discussions:  Continue present regimen while awaiting surgical intervention.  See recommendations and discussion on 6/3/2021.  Bettina Mendenhall MD  6/4/2021  10:22 EDT    Much of this encounter note is an electronic transcription/translation of spoken language to printed text. The electronic translation of spoken language may permit erroneous, or at times, nonsensical words or phrases to be inadvertently transcribed; Although I have reviewed the note for such errors, some may still exist

## 2021-06-05 LAB
ANION GAP SERPL CALCULATED.3IONS-SCNC: 9.2 MMOL/L (ref 5–15)
BASOPHILS # BLD AUTO: 0.02 10*3/MM3 (ref 0–0.2)
BASOPHILS NFR BLD AUTO: 0.3 % (ref 0–1.5)
BUN SERPL-MCNC: 18 MG/DL (ref 8–23)
BUN/CREAT SERPL: 17.5 (ref 7–25)
CALCIUM SPEC-SCNC: 8 MG/DL (ref 8.6–10.5)
CHLORIDE SERPL-SCNC: 104 MMOL/L (ref 98–107)
CO2 SERPL-SCNC: 21.8 MMOL/L (ref 22–29)
CREAT SERPL-MCNC: 1.03 MG/DL (ref 0.76–1.27)
DEPRECATED RDW RBC AUTO: 50.2 FL (ref 37–54)
EOSINOPHIL # BLD AUTO: 0.01 10*3/MM3 (ref 0–0.4)
EOSINOPHIL NFR BLD AUTO: 0.1 % (ref 0.3–6.2)
ERYTHROCYTE [DISTWIDTH] IN BLOOD BY AUTOMATED COUNT: 17.1 % (ref 12.3–15.4)
GFR SERPL CREATININE-BSD FRML MDRD: 71 ML/MIN/1.73
GLUCOSE BLDC GLUCOMTR-MCNC: 187 MG/DL (ref 70–130)
GLUCOSE BLDC GLUCOMTR-MCNC: 207 MG/DL (ref 70–130)
GLUCOSE BLDC GLUCOMTR-MCNC: 232 MG/DL (ref 70–130)
GLUCOSE SERPL-MCNC: 167 MG/DL (ref 65–99)
HCT VFR BLD AUTO: 26.8 % (ref 37.5–51)
HGB BLD-MCNC: 8.2 G/DL (ref 13–17.7)
IMM GRANULOCYTES # BLD AUTO: 0.03 10*3/MM3 (ref 0–0.05)
IMM GRANULOCYTES NFR BLD AUTO: 0.4 % (ref 0–0.5)
LYMPHOCYTES # BLD AUTO: 1.14 10*3/MM3 (ref 0.7–3.1)
LYMPHOCYTES NFR BLD AUTO: 15.9 % (ref 19.6–45.3)
MCH RBC QN AUTO: 24.6 PG (ref 26.6–33)
MCHC RBC AUTO-ENTMCNC: 30.6 G/DL (ref 31.5–35.7)
MCV RBC AUTO: 80.5 FL (ref 79–97)
MONOCYTES # BLD AUTO: 0.53 10*3/MM3 (ref 0.1–0.9)
MONOCYTES NFR BLD AUTO: 7.4 % (ref 5–12)
NEUTROPHILS NFR BLD AUTO: 5.45 10*3/MM3 (ref 1.7–7)
NEUTROPHILS NFR BLD AUTO: 75.9 % (ref 42.7–76)
NRBC BLD AUTO-RTO: 0.1 /100 WBC (ref 0–0.2)
PLATELET # BLD AUTO: 328 10*3/MM3 (ref 140–450)
PMV BLD AUTO: 9.1 FL (ref 6–12)
POTASSIUM SERPL-SCNC: 4.2 MMOL/L (ref 3.5–5.2)
RBC # BLD AUTO: 3.33 10*6/MM3 (ref 4.14–5.8)
SODIUM SERPL-SCNC: 135 MMOL/L (ref 136–145)
WBC # BLD AUTO: 7.18 10*3/MM3 (ref 3.4–10.8)

## 2021-06-05 PROCEDURE — 80048 BASIC METABOLIC PNL TOTAL CA: CPT | Performed by: ORTHOPAEDIC SURGERY

## 2021-06-05 PROCEDURE — 63710000001 INSULIN LISPRO (HUMAN) PER 5 UNITS: Performed by: ORTHOPAEDIC SURGERY

## 2021-06-05 PROCEDURE — 25010000002 ENOXAPARIN PER 10 MG: Performed by: ORTHOPAEDIC SURGERY

## 2021-06-05 PROCEDURE — 82962 GLUCOSE BLOOD TEST: CPT

## 2021-06-05 PROCEDURE — 63710000001 INSULIN LISPRO (HUMAN) PER 5 UNITS: Performed by: HOSPITALIST

## 2021-06-05 PROCEDURE — 63710000001 INSULIN GLARGINE PER 5 UNITS: Performed by: ORTHOPAEDIC SURGERY

## 2021-06-05 PROCEDURE — 97530 THERAPEUTIC ACTIVITIES: CPT

## 2021-06-05 PROCEDURE — 94799 UNLISTED PULMONARY SVC/PX: CPT

## 2021-06-05 PROCEDURE — 97166 OT EVAL MOD COMPLEX 45 MIN: CPT

## 2021-06-05 PROCEDURE — 85025 COMPLETE CBC W/AUTO DIFF WBC: CPT | Performed by: ORTHOPAEDIC SURGERY

## 2021-06-05 PROCEDURE — 25010000002 VANCOMYCIN 10 G RECONSTITUTED SOLUTION: Performed by: ORTHOPAEDIC SURGERY

## 2021-06-05 PROCEDURE — 25010000003 CEFAZOLIN IN DEXTROSE 2-4 GM/100ML-% SOLUTION: Performed by: ORTHOPAEDIC SURGERY

## 2021-06-05 RX ORDER — INSULIN LISPRO 100 [IU]/ML
0-9 INJECTION, SOLUTION INTRAVENOUS; SUBCUTANEOUS
Status: DISCONTINUED | OUTPATIENT
Start: 2021-06-05 | End: 2021-06-10 | Stop reason: HOSPADM

## 2021-06-05 RX ADMIN — Medication 1 TABLET: at 08:05

## 2021-06-05 RX ADMIN — CASTOR OIL AND BALSAM, PERU 5 G: 788; 87 OINTMENT TOPICAL at 08:06

## 2021-06-05 RX ADMIN — FAMOTIDINE 20 MG: 20 TABLET, FILM COATED ORAL at 17:55

## 2021-06-05 RX ADMIN — Medication: at 20:13

## 2021-06-05 RX ADMIN — VANCOMYCIN HYDROCHLORIDE 1750 MG: 10 INJECTION, POWDER, LYOPHILIZED, FOR SOLUTION INTRAVENOUS at 14:03

## 2021-06-05 RX ADMIN — TAMSULOSIN HYDROCHLORIDE 0.4 MG: 0.4 CAPSULE ORAL at 20:06

## 2021-06-05 RX ADMIN — CEFAZOLIN SODIUM 2 G: 2 INJECTION, SOLUTION INTRAVENOUS at 04:38

## 2021-06-05 RX ADMIN — ISOSORBIDE MONONITRATE 30 MG: 30 TABLET ORAL at 08:05

## 2021-06-05 RX ADMIN — TRAZODONE HYDROCHLORIDE 25 MG: 50 TABLET ORAL at 20:07

## 2021-06-05 RX ADMIN — HYDROCODONE BITARTRATE AND ACETAMINOPHEN 2 TABLET: 7.5; 325 TABLET ORAL at 19:01

## 2021-06-05 RX ADMIN — ENOXAPARIN SODIUM 40 MG: 40 INJECTION SUBCUTANEOUS at 08:04

## 2021-06-05 RX ADMIN — LEVOTHYROXINE SODIUM 100 MCG: 0.1 TABLET ORAL at 04:39

## 2021-06-05 RX ADMIN — RIFAMPIN 300 MG: 300 CAPSULE ORAL at 08:05

## 2021-06-05 RX ADMIN — SODIUM CHLORIDE, PRESERVATIVE FREE 10 ML: 5 INJECTION INTRAVENOUS at 20:06

## 2021-06-05 RX ADMIN — FOLIC ACID 1 MG: 1 TABLET ORAL at 08:06

## 2021-06-05 RX ADMIN — FAMOTIDINE 20 MG: 20 TABLET, FILM COATED ORAL at 04:39

## 2021-06-05 RX ADMIN — LAMOTRIGINE 12.5 MG: 25 TABLET ORAL at 20:06

## 2021-06-05 RX ADMIN — MUPIROCIN 1 APPLICATION: 20 OINTMENT TOPICAL at 20:07

## 2021-06-05 RX ADMIN — HYDROCODONE BITARTRATE AND ACETAMINOPHEN 2 TABLET: 7.5; 325 TABLET ORAL at 04:39

## 2021-06-05 RX ADMIN — INSULIN GLARGINE 15 UNITS: 100 INJECTION, SOLUTION SUBCUTANEOUS at 23:11

## 2021-06-05 RX ADMIN — INSULIN LISPRO 3 UNITS: 100 INJECTION, SOLUTION INTRAVENOUS; SUBCUTANEOUS at 17:54

## 2021-06-05 RX ADMIN — METOPROLOL SUCCINATE 25 MG: 25 TABLET, EXTENDED RELEASE ORAL at 08:05

## 2021-06-05 RX ADMIN — Medication: at 08:06

## 2021-06-05 RX ADMIN — OLANZAPINE 5 MG: 5 TABLET ORAL at 20:06

## 2021-06-05 RX ADMIN — CASTOR OIL AND BALSAM, PERU 5 G: 788; 87 OINTMENT TOPICAL at 20:06

## 2021-06-05 RX ADMIN — MUPIROCIN 1 APPLICATION: 20 OINTMENT TOPICAL at 08:04

## 2021-06-05 RX ADMIN — HYDROCODONE BITARTRATE AND ACETAMINOPHEN 2 TABLET: 7.5; 325 TABLET ORAL at 14:28

## 2021-06-05 RX ADMIN — ATORVASTATIN CALCIUM 20 MG: 20 TABLET, FILM COATED ORAL at 20:06

## 2021-06-05 RX ADMIN — DOCUSATE SODIUM 100 MG: 100 CAPSULE, LIQUID FILLED ORAL at 08:05

## 2021-06-05 RX ADMIN — RIFAMPIN 300 MG: 300 CAPSULE ORAL at 20:06

## 2021-06-05 RX ADMIN — LAMOTRIGINE 12.5 MG: 25 TABLET ORAL at 08:05

## 2021-06-05 RX ADMIN — INSULIN LISPRO 2 UNITS: 100 INJECTION, SOLUTION INTRAVENOUS; SUBCUTANEOUS at 08:06

## 2021-06-05 RX ADMIN — HYDROCODONE BITARTRATE AND ACETAMINOPHEN 2 TABLET: 7.5; 325 TABLET ORAL at 23:20

## 2021-06-05 RX ADMIN — Medication 100 MG: at 08:05

## 2021-06-05 RX ADMIN — MEMANTINE HYDROCHLORIDE 5 MG: 5 TABLET, FILM COATED ORAL at 08:06

## 2021-06-05 NOTE — SIGNIFICANT NOTE
Attempted eval, pt with nursing getting cleaned up.  Will check back in AM.   06/05/21 1654   OTHER   Discipline physical therapist   Rehab Time/Intention   Session Not Performed patient unavailable for evaluation

## 2021-06-05 NOTE — PROGRESS NOTES
Infectious Diseases Progress Note    Bettina Mendenhall MD     [facility]  Los: 2 days  Patient Identification:  Name: Chris Ferguson  Age: 75 y.o.  Sex: male  :  1945  MRN: [id number]         Primary Care Physician: Gisela Sotelo APRN            Subjective: Feeling better denies any new complaints.  Interval History: See consultation note.    Objective:    Scheduled Meds:atorvastatin, 20 mg, Oral, Nightly  castor oil-balsam peru, , Topical, Q12H  docusate sodium, 100 mg, Oral, BID  enoxaparin, 40 mg, Subcutaneous, Daily  famotidine, 20 mg, Oral, BID AC  folic acid, 1 mg, Oral, Daily  insulin glargine, 15 Units, Subcutaneous, Nightly  insulin lispro, 0-7 Units, Subcutaneous, 4x Daily With Meals & Nightly  isosorbide mononitrate, 30 mg, Oral, Daily  lamoTRIgine, 12.5 mg, Oral, BID  levothyroxine, 100 mcg, Oral, Q AM  memantine, 5 mg, Oral, Daily  metoprolol succinate XL, 25 mg, Oral, Daily  multivitamin, 1 tablet, Oral, Daily  mupirocin, , Each Nare, BID  OLANZapine, 5 mg, Oral, Nightly  rifAMPin, 300 mg, Oral, Q12H  sodium chloride, 10 mL, Intravenous, Q12H  tamsulosin, 0.4 mg, Oral, Daily  thiamine, 100 mg, Oral, Daily  traZODone, 25 mg, Oral, Nightly  vancomycin, 1,750 mg, Intravenous, Q24H  zinc oxide, , Topical, BID      Continuous Infusions:lactated ringers, 9 mL/hr, Last Rate: 9 mL/hr (21 1306)  Pharmacy to dose vancomycin,   sodium chloride, 100 mL/hr, Last Rate: 100 mL/hr (21 1844)        Vital signs in last 24 hours:  Temp:  [97.1 °F (36.2 °C)-98.7 °F (37.1 °C)] 97.7 °F (36.5 °C)  Heart Rate:  [] 104  Resp:  [16-20] 18  BP: ()/(54-81) 134/69    Intake/Output:    Intake/Output Summary (Last 24 hours) at 2021 0718  Last data filed at 2021 0600  Gross per 24 hour   Intake  ml   Output 410 ml   Net 1610 ml       Exam:  /69 (BP Location: Right arm, Patient Position: Lying)   Pulse 104   Temp 97.7 °F (36.5 °C) (Oral)   Resp 18   Ht 177.8 cm  "(70\")   Wt 88.5 kg (195 lb)   SpO2 95%   BMI 27.98 kg/m²   Patient is examined using the personal protective equipment as per guidelines from infection control for this particular patient as enacted.  Hand washing was performed before and after patient interaction.  General Appearance:    Alert, cooperative, no distress, AAOx3                          Head:    Normocephalic, without obvious abnormality, atraumatic                           Eyes:    PERRL, conjunctivae/corneas clear, EOM's intact, both eyes                         Throat:   Lips, tongue, gums normal; oral mucosa pink and moist                           Neck:   Supple, symmetrical, trachea midline, no JVD                         Lungs:    Clear to auscultation bilaterally, respirations unlabored                 Chest Wall:    No tenderness or deformity                          Heart:    Regular rate and rhythm, S1 and S2 normal, no murmur, no rub                                         or gallop                  Abdomen:     Soft, non-tender, bowel sounds active, no masses, no                                                        organomegaly                  Extremities: Left knee dressed.                        Pulses:   Pulses palpable in all extremities                            Skin:   Skin is warm and dry,  no rashes or palpable lesions                  Neurologic: Awake and interactive       Data Review:    I reviewed the patient's new clinical results.  Results from last 7 days   Lab Units 06/05/21  0531 06/03/21  0504 06/02/21  0650 06/01/21  0344 05/31/21  0738 05/30/21  0232   WBC 10*3/mm3 7.18 6.83 5.90 4.50 5.60 5.60   HEMOGLOBIN g/dL 8.2* 8.6* 9.7* 8.8* 8.8* 8.5*   PLATELETS 10*3/mm3 328 381 373 325 332 305     Results from last 7 days   Lab Units 06/05/21  0531 06/04/21  0616 06/03/21  0504 06/02/21  0650 06/01/21  0344 05/31/21  0738 05/30/21  0232   SODIUM mmol/L 135*  --  136 135* 135* 136 137   POTASSIUM mmol/L 4.2  --  3.7 4.3 " 4.2 4.1 4.4   CHLORIDE mmol/L 104  --  104 102 103 103 104   CO2 mmol/L 21.8*  --  24.1 25.0 24.0 27.0 26.0   BUN mg/dL 18  --  26* 21 22 21 20   CREATININE mg/dL 1.03 1.12 1.19 1.25 1.09 1.10 1.04   CALCIUM mg/dL 8.0*  --  8.3* 8.9 8.0* 8.6 8.5*   GLUCOSE mg/dL 167*  --  154* 210* 200* 123* 166*     Microbiology Results (last 10 days)     Procedure Component Value - Date/Time    Tissue / Bone Culture - Tissue, Knee, Left [[id number]] Collected: 06/04/21 1418    Lab Status: Preliminary result Specimen: Tissue from Knee, Left Updated: 06/04/21 1805     Gram Stain Rare (1+) WBCs seen      No organisms seen    COVID PRE-OP / PRE-PROCEDURE SCREENING ORDER (NO ISOLATION) - Swab, Nasopharynx [[id number]]  (Normal) Collected: 06/04/21 0640    Lab Status: Final result Specimen: Swab from Nasopharynx Updated: 06/04/21 0957    Narrative:      The following orders were created for panel order COVID PRE-OP / PRE-PROCEDURE SCREENING ORDER (NO ISOLATION) - Swab, Nasopharynx.  Procedure                               Abnormality         Status                     ---------                               -----------         ------                     COVID-19,[facility]...[[id number]]  Normal              Final result                 Please view results for these tests on the individual orders.    COVID-19,[facility] CEPHEID/ILYA NP SWAB IN TRANSPORT MEDIA 8-12 HR TAT - Swab, Nasopharynx [[id number]]  (Normal) Collected: 06/04/21 0640    Lab Status: Final result Specimen: Swab from Nasopharynx Updated: 06/04/21 0957     COVID19 Not Detected    Narrative:      Fact sheet for providers: https://www.fda.gov/media/614505/download    Fact sheet for patients: https://www.fda.gov/media/714508/download    Test performed by PCR.            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)    Essential hypertension    BPH (benign prostatic hyperplasia)    Stage 3a chronic kidney disease (CMS/HCC)    Hyperlipidemia     Cellulitis of left lower extremity    Type 2 diabetes mellitus with hyperglycemia, with long-term current use of insulin (CMS/HCC)    Dementia in Alzheimer's disease with early onset (CMS/HCC)    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 with presence of antibiotic-impregnated cement spacer  1-probable left knee prosthetic joint infection secondary to  2-cellulitis of the left leg with wound infection with culture positive for MRSA resulting in contiguous focus of spread to the prosthetic joint as well as possible hematogenous seeding.  3-diabetes mellitus  4-hypothyroidism  5-dyslipidemia  6-dementia  7-Parkinson's disease and immobility        Recommendations/Discussions:  · Simplify antibiotic regimen to vancomycin only and DC rifampin since prosthetic devices removed.  · See recommendations and discussion on 6/3/2021.  · Patient would need 6 weeks of antibiotic treatment from 6/4/2021.  Bettina Mendenhall MD  6/5/2021  07:18 EDT    Much of this encounter note is an electronic transcription/translation of spoken language to printed text. The electronic translation of spoken language may permit erroneous, or at times, nonsensical words or phrases to be inadvertently transcribed; Although I have reviewed the note for such errors, some may still exist

## 2021-06-05 NOTE — PLAN OF CARE
Goal Outcome Evaluation:  Plan of Care Reviewed With: patient  Progress: improving  Outcome Summary: patient resting comfortably between care, voiding incontinently, pain controlled at this time, KI in place, educated on b/p and glucose monitoring
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
## 2021-06-05 NOTE — PLAN OF CARE
Goal Outcome Evaluation:  Plan of Care Reviewed With: patient, sibling     Outcome Summary: Pt is a 74 y/o M admit with infection at L knee prosthesis (s/p single stage revision L knee replacement 6/5/21). PMH includes dementia, HTN, DM 2, hypothyroidism, CKD stage 3, BPH and hyperlipidemia. Pt is a facility resident. Pt’s reported level of indep level is questionable as he and family report opposite of one another. Pt with LLE in KI with hemo vac x 1 and currently on 1L 02 NC. Pt agreeable to bed mobility only this date due to LLE pain and limited receptiveness regarding importance of mobility provided thru verbal education. Spoke with pt’s brother regarding education on progression of deterioration possible if patient continues to refuse to eat or participate in therapy services. Brother was receptive and reports he will encourage him to be more cooperative/willing to participate. Pt appears significantly below baseline, limited by impaired insight, impaired balance, impaired endurance, generalized weakness, pain, baseline cognitive impairment and acuity of illness. OT recommends return to facility with continued PT/OT services.    Patient was not wearing a face mask during this therapy encounter. Therapist used appropriate personal protective equipment including mask, gloves, and eye protection.  Mask used was standard procedure mask. Appropriate PPE was worn during the entire therapy session. Hand hygiene was completed before and after therapy session. Patient is not in enhanced droplet precautions.
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
## 2021-06-05 NOTE — PROGRESS NOTES
Name: Chris Ferguson ADMIT: 6/3/2021   : 1945  PCP: Gisela Sotelo APRN    MRN: [id number] LOS: 2 days   AGE/SEX: 75 y.o. male  ROOM: [facility]     Subjective   Subjective   Seems to be doing okay complaining only of expected pain in left leg.He denies any chest pain, SOA, nausea, vomiting or diarrhea.     Review of Systems     Objective   Objective   Vital Signs  Temp:  [97.1 °F (36.2 °C)-98.2 °F (36.8 °C)] 97.6 °F (36.4 °C)  Heart Rate:  [] 101  Resp:  [16-20] 16  BP: ()/(54-81) 139/71  SpO2:  [94 %-100 %] 96 %  on  Flow (L/min):  [2-4] 2;   Device (Oxygen Therapy): nasal cannula  Body mass index is 27.98 kg/m².  Physical Exam  Vitals and nursing note reviewed.   Constitutional:       General: He is not in acute distress.     Appearance: He is well-developed. He is ill-appearing (Chronically).   Neck:      Vascular: No JVD.      Trachea: No tracheal deviation.   Cardiovascular:      Rate and Rhythm: Normal rate and regular rhythm.      Heart sounds: No murmur heard.     Pulmonary:      Effort: Pulmonary effort is normal. No respiratory distress.      Breath sounds: Normal breath sounds.   Abdominal:      General: Bowel sounds are normal. There is no distension.      Palpations: Abdomen is soft.      Tenderness: There is no abdominal tenderness.   Musculoskeletal:      Comments: L knee bandaged   Skin:     General: Skin is warm and dry.      Coloration: Skin is pale.   Neurological:      Mental Status: He is alert and oriented to person, place, and time.   Psychiatric:         Behavior: Behavior normal. Behavior is cooperative.         Results Review:       I reviewed the patient's new clinical results.  Results from last 7 days   Lab Units 21  0531 21  0504 21  0650 21  0344   WBC 10*3/mm3 7.18 6.83 5.90 4.50   HEMOGLOBIN g/dL 8.2* 8.6* 9.7* 8.8*   PLATELETS 10*3/mm3 328 381 373 325     Results from last 7 days   Lab Units 21  0531 21  0616 21  0504  06/02/21  0650 06/01/21  0344 05/31/21  0738   SODIUM mmol/L 135*  --  136 135* 135* 136   POTASSIUM mmol/L 4.2  --  3.7 4.3 4.2 4.1   CHLORIDE mmol/L 104  --  104 102 103 103   CO2 mmol/L 21.8*  --  24.1 25.0 24.0 27.0   BUN mg/dL 18  --  26* 21 22 21   CREATININE mg/dL 1.03 1.12 1.19 1.25 1.09 1.10   GLUCOSE mg/dL 167*  --  154* 210* 200* 123*   ALBUMIN g/dL  --   --  2.90* 2.80* 2.60* 2.50*   BILIRUBIN mg/dL  --   --  0.4 0.3 0.4 0.3   ALK PHOS U/L  --   --  116 127* 117 116   AST (SGOT) U/L  --   --  15 15 24 18   ALT (SGPT) U/L  --   --  16 17 16 18   Estimated Creatinine Clearance: 71.6 mL/min (by C-G formula based on SCr of 1.03 mg/dL).  Results from last 7 days   Lab Units 06/05/21  0531 06/03/21  0504 06/02/21  0650 06/01/21  0344 05/31/21  0738   CALCIUM mg/dL 8.0* 8.3* 8.9 8.0* 8.6   ALBUMIN g/dL  --  2.90* 2.80* 2.60* 2.50*     Results from last 7 days   Lab Units 06/04/21  0640   COVID19  Not Detected     Results from last 7 days   Lab Units 06/03/21  0504   PROCALCITONIN ng/mL 0.04   LACTATE mmol/L 0.9       Glucose   Date/Time Value Ref Range Status   06/05/2021 0601 187 (H) 70 - 130 mg/dL Final   06/04/2021 2025 238 (H) 70 - 130 mg/dL Final   06/04/2021 1040 95 70 - 130 mg/dL Final   06/04/2021 0610 175 (H) 70 - 130 mg/dL Final   06/03/2021 2059 139 (H) 70 - 130 mg/dL Final   06/03/2021 1547 187 (H) 70 - 130 mg/dL Final   06/03/2021 1041 134 (H) 70 - 130 mg/dL Final       Scheduled Medications  atorvastatin, 20 mg, Oral, Nightly  castor oil-balsam peru, , Topical, Q12H  docusate sodium, 100 mg, Oral, BID  enoxaparin, 40 mg, Subcutaneous, Daily  famotidine, 20 mg, Oral, BID AC  folic acid, 1 mg, Oral, Daily  insulin glargine, 15 Units, Subcutaneous, Nightly  insulin lispro, 0-7 Units, Subcutaneous, 4x Daily With Meals & Nightly  isosorbide mononitrate, 30 mg, Oral, Daily  lamoTRIgine, 12.5 mg, Oral, BID  levothyroxine, 100 mcg, Oral, Q AM  memantine, 5 mg, Oral, Daily  metoprolol succinate XL, 25 mg,  Oral, Daily  multivitamin, 1 tablet, Oral, Daily  mupirocin, , Each Nare, BID  OLANZapine, 5 mg, Oral, Nightly  rifAMPin, 300 mg, Oral, Q12H  sodium chloride, 10 mL, Intravenous, Q12H  tamsulosin, 0.4 mg, Oral, Daily  thiamine, 100 mg, Oral, Daily  traZODone, 25 mg, Oral, Nightly  vancomycin, 1,750 mg, Intravenous, Q24H  zinc oxide, , Topical, BID    Infusions  lactated ringers, 9 mL/hr, Last Rate: 9 mL/hr (06/04/21 1306)  Pharmacy to dose vancomycin,   sodium chloride, 100 mL/hr, Last Rate: 100 mL/hr (06/04/21 1844)    Diet  Diet Regular; Consistent Carbohydrate         Assessment/Plan     Active Hospital Problems    Diagnosis  POA   • **Infection of prosthetic left knee joint (CMS/[facility]) [T84.54XA]  Not Applicable   • 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       75 y.o. male with Infection of prosthetic left knee joint (CMS/[facility]).    · Postoperative care per orthopedic surgery and infectious disease.  · ID managing antibiotics.  Cultures from OR negative thus far.  · Continue to monitor hemoglobin postoperatively  · Renal function stable  · Blood sugars adequately controlled for now, continue current regimen  · Stop IV fluids  · Lovenox 40 mg SC daily for DVT prophylaxis.  · DNR.  Discussed this yesterday with nursing and with patient today.  He does not want heroic measures.  · Discussed with  patient.  · Anticipate discharge home with HH vs SNU facility next week.      Nghia Stinson MD  [facility]  06/05/21  13:22 EDT

## 2021-06-05 NOTE — PLAN OF CARE
Goal Outcome Evaluation:   POD 1 L knee revision. Refused to work with OT or staff. Will not let us turn or move him. Refused to eat. YOSEPH, and HV with KI in place. ACHS. Norco for pain. Plans for SNU on Monday

## 2021-06-06 LAB
ABO GROUP BLD: NORMAL
ANION GAP SERPL CALCULATED.3IONS-SCNC: 8.4 MMOL/L (ref 5–15)
BASOPHILS # BLD AUTO: 0.03 10*3/MM3 (ref 0–0.2)
BASOPHILS NFR BLD AUTO: 0.5 % (ref 0–1.5)
BLD GP AB SCN SERPL QL: NEGATIVE
BUN SERPL-MCNC: 16 MG/DL (ref 8–23)
BUN/CREAT SERPL: 16.2 (ref 7–25)
CALCIUM SPEC-SCNC: 8.1 MG/DL (ref 8.6–10.5)
CHLORIDE SERPL-SCNC: 103 MMOL/L (ref 98–107)
CO2 SERPL-SCNC: 22.6 MMOL/L (ref 22–29)
CREAT SERPL-MCNC: 0.99 MG/DL (ref 0.76–1.27)
DEPRECATED RDW RBC AUTO: 47.6 FL (ref 37–54)
EOSINOPHIL # BLD AUTO: 0.23 10*3/MM3 (ref 0–0.4)
EOSINOPHIL NFR BLD AUTO: 4 % (ref 0.3–6.2)
ERYTHROCYTE [DISTWIDTH] IN BLOOD BY AUTOMATED COUNT: 17 % (ref 12.3–15.4)
GFR SERPL CREATININE-BSD FRML MDRD: 74 ML/MIN/1.73
GLUCOSE BLDC GLUCOMTR-MCNC: 152 MG/DL (ref 70–130)
GLUCOSE BLDC GLUCOMTR-MCNC: 209 MG/DL (ref 70–130)
GLUCOSE BLDC GLUCOMTR-MCNC: 310 MG/DL (ref 70–130)
GLUCOSE SERPL-MCNC: 136 MG/DL (ref 65–99)
HCT VFR BLD AUTO: 21.4 % (ref 37.5–51)
HGB BLD-MCNC: 6.7 G/DL (ref 13–17.7)
IMM GRANULOCYTES # BLD AUTO: 0.02 10*3/MM3 (ref 0–0.05)
IMM GRANULOCYTES NFR BLD AUTO: 0.4 % (ref 0–0.5)
LYMPHOCYTES # BLD AUTO: 1.72 10*3/MM3 (ref 0.7–3.1)
LYMPHOCYTES NFR BLD AUTO: 30.3 % (ref 19.6–45.3)
MCH RBC QN AUTO: 24.2 PG (ref 26.6–33)
MCHC RBC AUTO-ENTMCNC: 31.3 G/DL (ref 31.5–35.7)
MCV RBC AUTO: 77.3 FL (ref 79–97)
MONOCYTES # BLD AUTO: 0.46 10*3/MM3 (ref 0.1–0.9)
MONOCYTES NFR BLD AUTO: 8.1 % (ref 5–12)
NEUTROPHILS NFR BLD AUTO: 3.22 10*3/MM3 (ref 1.7–7)
NEUTROPHILS NFR BLD AUTO: 56.7 % (ref 42.7–76)
NRBC BLD AUTO-RTO: 0.2 /100 WBC (ref 0–0.2)
PLATELET # BLD AUTO: 314 10*3/MM3 (ref 140–450)
PMV BLD AUTO: 9 FL (ref 6–12)
POTASSIUM SERPL-SCNC: 3.8 MMOL/L (ref 3.5–5.2)
RBC # BLD AUTO: 2.77 10*6/MM3 (ref 4.14–5.8)
RH BLD: POSITIVE
SODIUM SERPL-SCNC: 134 MMOL/L (ref 136–145)
T&S EXPIRATION DATE: NORMAL
VANCOMYCIN TROUGH SERPL-MCNC: 16.8 MCG/ML (ref 5–20)
WBC # BLD AUTO: 5.68 10*3/MM3 (ref 3.4–10.8)

## 2021-06-06 PROCEDURE — 25010000002 ENOXAPARIN PER 10 MG: Performed by: ORTHOPAEDIC SURGERY

## 2021-06-06 PROCEDURE — 63710000001 INSULIN LISPRO (HUMAN) PER 5 UNITS: Performed by: HOSPITALIST

## 2021-06-06 PROCEDURE — 85025 COMPLETE CBC W/AUTO DIFF WBC: CPT | Performed by: ORTHOPAEDIC SURGERY

## 2021-06-06 PROCEDURE — 80202 ASSAY OF VANCOMYCIN: CPT | Performed by: ORTHOPAEDIC SURGERY

## 2021-06-06 PROCEDURE — 63710000001 INSULIN GLARGINE PER 5 UNITS: Performed by: ORTHOPAEDIC SURGERY

## 2021-06-06 PROCEDURE — 86850 RBC ANTIBODY SCREEN: CPT | Performed by: NURSE PRACTITIONER

## 2021-06-06 PROCEDURE — 82962 GLUCOSE BLOOD TEST: CPT

## 2021-06-06 PROCEDURE — 36430 TRANSFUSION BLD/BLD COMPNT: CPT

## 2021-06-06 PROCEDURE — 97163 PT EVAL HIGH COMPLEX 45 MIN: CPT | Performed by: PHYSICAL THERAPIST

## 2021-06-06 PROCEDURE — 86901 BLOOD TYPING SEROLOGIC RH(D): CPT | Performed by: NURSE PRACTITIONER

## 2021-06-06 PROCEDURE — 86923 COMPATIBILITY TEST ELECTRIC: CPT

## 2021-06-06 PROCEDURE — 80048 BASIC METABOLIC PNL TOTAL CA: CPT | Performed by: ORTHOPAEDIC SURGERY

## 2021-06-06 PROCEDURE — 86900 BLOOD TYPING SEROLOGIC ABO: CPT

## 2021-06-06 PROCEDURE — P9016 RBC LEUKOCYTES REDUCED: HCPCS

## 2021-06-06 PROCEDURE — 94799 UNLISTED PULMONARY SVC/PX: CPT

## 2021-06-06 PROCEDURE — 97530 THERAPEUTIC ACTIVITIES: CPT | Performed by: PHYSICAL THERAPIST

## 2021-06-06 PROCEDURE — 25010000002 VANCOMYCIN 10 G RECONSTITUTED SOLUTION: Performed by: ORTHOPAEDIC SURGERY

## 2021-06-06 PROCEDURE — 86900 BLOOD TYPING SEROLOGIC ABO: CPT | Performed by: NURSE PRACTITIONER

## 2021-06-06 RX ORDER — INSULIN LISPRO 100 [IU]/ML
5 INJECTION, SOLUTION INTRAVENOUS; SUBCUTANEOUS
Status: DISCONTINUED | OUTPATIENT
Start: 2021-06-06 | End: 2021-06-10 | Stop reason: HOSPADM

## 2021-06-06 RX ADMIN — MEMANTINE HYDROCHLORIDE 5 MG: 5 TABLET, FILM COATED ORAL at 09:42

## 2021-06-06 RX ADMIN — Medication: at 09:44

## 2021-06-06 RX ADMIN — INSULIN LISPRO 5 UNITS: 100 INJECTION, SOLUTION INTRAVENOUS; SUBCUTANEOUS at 11:49

## 2021-06-06 RX ADMIN — TAMSULOSIN HYDROCHLORIDE 0.4 MG: 0.4 CAPSULE ORAL at 20:09

## 2021-06-06 RX ADMIN — ATORVASTATIN CALCIUM 20 MG: 20 TABLET, FILM COATED ORAL at 20:09

## 2021-06-06 RX ADMIN — TRAZODONE HYDROCHLORIDE 25 MG: 50 TABLET ORAL at 20:09

## 2021-06-06 RX ADMIN — Medication 1 MG: at 22:23

## 2021-06-06 RX ADMIN — MUPIROCIN 1 APPLICATION: 20 OINTMENT TOPICAL at 20:09

## 2021-06-06 RX ADMIN — OLANZAPINE 5 MG: 5 TABLET ORAL at 20:09

## 2021-06-06 RX ADMIN — HYDROCODONE BITARTRATE AND ACETAMINOPHEN 2 TABLET: 7.5; 325 TABLET ORAL at 20:22

## 2021-06-06 RX ADMIN — DOCUSATE SODIUM 100 MG: 100 CAPSULE, LIQUID FILLED ORAL at 09:39

## 2021-06-06 RX ADMIN — Medication: at 20:14

## 2021-06-06 RX ADMIN — FAMOTIDINE 20 MG: 20 TABLET, FILM COATED ORAL at 17:12

## 2021-06-06 RX ADMIN — HYDROCODONE BITARTRATE AND ACETAMINOPHEN 1 TABLET: 7.5; 325 TABLET ORAL at 16:20

## 2021-06-06 RX ADMIN — VANCOMYCIN HYDROCHLORIDE 1750 MG: 10 INJECTION, POWDER, LYOPHILIZED, FOR SOLUTION INTRAVENOUS at 17:12

## 2021-06-06 RX ADMIN — INSULIN LISPRO 7 UNITS: 100 INJECTION, SOLUTION INTRAVENOUS; SUBCUTANEOUS at 11:49

## 2021-06-06 RX ADMIN — INSULIN GLARGINE 15 UNITS: 100 INJECTION, SOLUTION SUBCUTANEOUS at 21:30

## 2021-06-06 RX ADMIN — INSULIN LISPRO 5 UNITS: 100 INJECTION, SOLUTION INTRAVENOUS; SUBCUTANEOUS at 17:13

## 2021-06-06 RX ADMIN — Medication 1 TABLET: at 09:43

## 2021-06-06 RX ADMIN — SODIUM CHLORIDE, PRESERVATIVE FREE 10 ML: 5 INJECTION INTRAVENOUS at 20:09

## 2021-06-06 RX ADMIN — METOPROLOL SUCCINATE 25 MG: 25 TABLET, EXTENDED RELEASE ORAL at 09:43

## 2021-06-06 RX ADMIN — Medication 100 MG: at 09:44

## 2021-06-06 RX ADMIN — DOCUSATE SODIUM 100 MG: 100 CAPSULE, LIQUID FILLED ORAL at 20:09

## 2021-06-06 RX ADMIN — FOLIC ACID 1 MG: 1 TABLET ORAL at 09:40

## 2021-06-06 RX ADMIN — ISOSORBIDE MONONITRATE 30 MG: 30 TABLET ORAL at 09:41

## 2021-06-06 RX ADMIN — LAMOTRIGINE 12.5 MG: 25 TABLET ORAL at 09:42

## 2021-06-06 RX ADMIN — MUPIROCIN: 20 OINTMENT TOPICAL at 09:44

## 2021-06-06 RX ADMIN — SODIUM CHLORIDE, PRESERVATIVE FREE 10 ML: 5 INJECTION INTRAVENOUS at 09:44

## 2021-06-06 RX ADMIN — CASTOR OIL AND BALSAM, PERU 5 G: 788; 87 OINTMENT TOPICAL at 20:09

## 2021-06-06 RX ADMIN — LAMOTRIGINE 12.5 MG: 25 TABLET ORAL at 20:09

## 2021-06-06 RX ADMIN — FAMOTIDINE 20 MG: 20 TABLET, FILM COATED ORAL at 06:31

## 2021-06-06 RX ADMIN — ENOXAPARIN SODIUM 40 MG: 40 INJECTION SUBCUTANEOUS at 09:40

## 2021-06-06 RX ADMIN — INSULIN LISPRO 2 UNITS: 100 INJECTION, SOLUTION INTRAVENOUS; SUBCUTANEOUS at 17:12

## 2021-06-06 RX ADMIN — LEVOTHYROXINE SODIUM 100 MCG: 0.1 TABLET ORAL at 06:31

## 2021-06-06 RX ADMIN — CASTOR OIL AND BALSAM, PERU: 788; 87 OINTMENT TOPICAL at 09:39

## 2021-06-06 NOTE — PLAN OF CARE
Goal Outcome Evaluation:        Outcome Summary: A&OX4 C PERIODS OF CONFUSION, RECEIVED 2 UNITS PRBC THIS SHIFT, VANC Q24H, TROUGH AT 1430 TODAY, SL/RA, VSS, NVI, DSG CDI, HV DRAIN SS DRAINAGE, WORKED C PT- ONLY ABLE TO STAND

## 2021-06-06 NOTE — PROGRESS NOTES
Name: Chris Ferguson ADMIT: 6/3/2021   : 1945  PCP: Gisela Sotelo APRN    MRN: [id number] LOS: 3 days   AGE/SEX: 75 y.o. male  ROOM: [facility]     Subjective   Subjective   He denies any chest pain, SOA, nausea, vomiting or diarrhea.   Leg pain controlled with medication.    Review of Systems     Objective   Objective   Vital Signs  Temp:  [98.5 °F (36.9 °C)-100.9 °F (38.3 °C)] 99 °F (37.2 °C)  Heart Rate:  [] 97  Resp:  [14-26] 14  BP: ()/(52-72) 126/72  SpO2:  [89 %-92 %] 90 %  on   ;   Device (Oxygen Therapy): room air  Body mass index is 27.98 kg/m².  Physical Exam  Vitals and nursing note reviewed.   Constitutional:       General: He is not in acute distress.     Appearance: He is well-developed. He is ill-appearing (Chronically).   Neck:      Vascular: No JVD.      Trachea: No tracheal deviation.   Cardiovascular:      Rate and Rhythm: Normal rate and regular rhythm.      Heart sounds: No murmur heard.     Pulmonary:      Effort: Pulmonary effort is normal. No respiratory distress.      Breath sounds: Normal breath sounds.   Abdominal:      General: Bowel sounds are normal. There is no distension.      Palpations: Abdomen is soft.      Tenderness: There is no abdominal tenderness.   Musculoskeletal:      Comments: L knee bandaged   Skin:     General: Skin is warm and dry.      Coloration: Skin is pale.   Neurological:      Mental Status: He is alert and oriented to person, place, and time.   Psychiatric:         Behavior: Behavior normal. Behavior is cooperative.         Results Review:       I reviewed the patient's new clinical results.  Results from last 7 days   Lab Units 21  0457 21  0531 21  0504 21  0650   WBC 10*3/mm3 5.68 7.18 6.83 5.90   HEMOGLOBIN g/dL 6.7* 8.2* 8.6* 9.7*   PLATELETS 10*3/mm3 314 328 381 373     Results from last 7 days   Lab Units 21  0457 21  0531 21  0616 21  0504 21  0650 21  0344 21  0738    SODIUM mmol/L 134* 135*  --  136 135* 135* 136   POTASSIUM mmol/L 3.8 4.2  --  3.7 4.3 4.2 4.1   CHLORIDE mmol/L 103 104  --  104 102 103 103   CO2 mmol/L 22.6 21.8*  --  24.1 25.0 24.0 27.0   BUN mg/dL 16 18  --  26* 21 22 21   CREATININE mg/dL 0.99 1.03 1.12 1.19 1.25 1.09 1.10   GLUCOSE mg/dL 136* 167*  --  154* 210* 200* 123*   ALBUMIN g/dL  --   --   --  2.90* 2.80* 2.60* 2.50*   BILIRUBIN mg/dL  --   --   --  0.4 0.3 0.4 0.3   ALK PHOS U/L  --   --   --  116 127* 117 116   AST (SGOT) U/L  --   --   --  15 15 24 18   ALT (SGPT) U/L  --   --   --  16 17 16 18   Estimated Creatinine Clearance: 74.4 mL/min (by C-G formula based on SCr of 0.99 mg/dL).  Results from last 7 days   Lab Units 06/06/21  0457 06/05/21  0531 06/03/21  0504 06/02/21  0650 06/01/21  0344 05/31/21  0738   CALCIUM mg/dL 8.1* 8.0* 8.3* 8.9 8.0* 8.6   ALBUMIN g/dL  --   --  2.90* 2.80* 2.60* 2.50*     Results from last 7 days   Lab Units 06/04/21  0640   COVID19  Not Detected     Results from last 7 days   Lab Units 06/03/21  0504   PROCALCITONIN ng/mL 0.04   LACTATE mmol/L 0.9       Glucose   Date/Time Value Ref Range Status   06/05/2021 2158 232 (H) 70 - 130 mg/dL Final   06/05/2021 1742 207 (H) 70 - 130 mg/dL Final   06/05/2021 0601 187 (H) 70 - 130 mg/dL Final   06/04/2021 2025 238 (H) 70 - 130 mg/dL Final   06/04/2021 1040 95 70 - 130 mg/dL Final   06/04/2021 0610 175 (H) 70 - 130 mg/dL Final   06/03/2021 2059 139 (H) 70 - 130 mg/dL Final       Scheduled Medications  atorvastatin, 20 mg, Oral, Nightly  castor oil-balsam peru, , Topical, Q12H  docusate sodium, 100 mg, Oral, BID  enoxaparin, 40 mg, Subcutaneous, Daily  famotidine, 20 mg, Oral, BID AC  folic acid, 1 mg, Oral, Daily  insulin glargine, 15 Units, Subcutaneous, Nightly  insulin lispro, 0-9 Units, Subcutaneous, TID AC  isosorbide mononitrate, 30 mg, Oral, Daily  lamoTRIgine, 12.5 mg, Oral, BID  levothyroxine, 100 mcg, Oral, Q AM  memantine, 5 mg, Oral, Daily  metoprolol  succinate XL, 25 mg, Oral, Daily  multivitamin, 1 tablet, Oral, Daily  mupirocin, , Each Nare, BID  OLANZapine, 5 mg, Oral, Nightly  sodium chloride, 10 mL, Intravenous, Q12H  tamsulosin, 0.4 mg, Oral, Daily  thiamine, 100 mg, Oral, Daily  traZODone, 25 mg, Oral, Nightly  vancomycin, 1,750 mg, Intravenous, Q24H  zinc oxide, , Topical, BID    Infusions  Pharmacy to dose vancomycin,     Diet  Diet Regular; Consistent Carbohydrate         Assessment/Plan     Active Hospital Problems    Diagnosis  POA   • **Infection of prosthetic left knee joint (CMS/H[facility]) [T84.54XA]  Not Applicable   • 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 with presence of antibiotic-impregnated cement spacer [Z89.529]  Yes   • Type 2 diabetes mellitus with hyperglycemia, with long-term current use of insulin (CMS/HCC) [E11.65, Z79.4]  Not Applicable   • Dementia in Alzheimer's disease with early onset (CMS/[facility]) [G30.0, F02.80]  Yes   • Cellulitis of left lower extremity [L03.116]  Yes   • Essential hypertension [I10]  Yes   • BPH (benign prostatic hyperplasia) [N40.0]  Yes   • Stage 3a chronic kidney disease (CMS/HCC) [N18.31]  Yes   • Hyperlipidemia [E78.5]  Yes      Resolved Hospital Problems   No resolved problems to display.       75 y.o. male with Infection of prosthetic left knee joint (CMS/HCC).    Cultures obtained in OR still negative.  ID managing antibiotics and will follow up.  He has had drop in hemoglobin postoperatively and 2 units of blood have been ordered.  Will continue to monitor.  Remains on Lovenox for DVT prophylaxis.  Blood sugars elevated, will add scheduled AC insulin.      · Lovenox 40 mg SC daily for DVT prophylaxis.  · DNR.    · Discussed with patient.  · Anticipate discharge home with HH vs SNU facility next week.      Nghia Stinson MD  [facility]  06/06/21  11:00 EDT

## 2021-06-06 NOTE — SIGNIFICANT NOTE
Attempted eval again this afternoon, he is receiving second unit of blood. Will attempt in AM.   06/06/21 7491   OTHER   Discipline physical therapist   Rehab Time/Intention   Session Not Performed patient unavailable for evaluation   Recommendation   PT - Next Appointment 06/07/21
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
## 2021-06-06 NOTE — PLAN OF CARE
"Goal Outcome Evaluation:  Plan of Care Reviewed With: patient      Mr. Ferguson is POD L TKA revision. He is WBAT, KI on until able to perform SLR independently. He is ok for ROM as tolerated.  He was receiving blood during evaluation.  He requires encouragement to participate with therapy, repeating \"I can't, I can't\".  He demonstrate poor L DF AROM.  He declined to take KI off for ROM. He requires min to mod assist for bed mobility and for sit to/from stand transfer. He declined attempts to ambulate and second attempts to stand again.  PTA he may have been facility resident and baseline mobility is in question.   Mr. Ferguson is a good candidate for skilled acute physical therapy.  Anticipated DC location to SNF.      Patient was not wearing a face mask during this therapy encounter. Therapist used appropriate personal protective equipment including eye protection, mask, and gloves.  Mask used was standard procedure mask. Appropriate PPE was worn during the entire therapy session. Hand hygiene was completed before and after therapy session. Patient is not in enhanced droplet precautions.     "

## 2021-06-06 NOTE — PLAN OF CARE
Goal Outcome Evaluation:  Plan of Care Reviewed With: patient  Progress: no change  Outcome Summary: The pt remains stable. A/O. No s/s distress. On contact isolation d/t MRSA per wound culture. S/p left knee knee revision on 06/04/21, w/ imchael and hv, LLE w/ KI. Pain controlled w/ PRN [place]. Pt n Vancomycin q24hrs and for Vanc trough today at 2:30pm. Plan to d/c to SNF. Will continue to monitor.     Latest Hgb 6.7, referred to oncall for [facility] MARIZOL Hernandez w/ given orders for blood type and screen, obtain consent for BT, transfuse 1 unit RBC. Pt informed.

## 2021-06-07 LAB
ANION GAP SERPL CALCULATED.3IONS-SCNC: 9.3 MMOL/L (ref 5–15)
BH BB BLOOD EXPIRATION DATE: NORMAL
BH BB BLOOD EXPIRATION DATE: NORMAL
BH BB BLOOD TYPE BARCODE: 6200
BH BB BLOOD TYPE BARCODE: 6200
BH BB DISPENSE STATUS: NORMAL
BH BB DISPENSE STATUS: NORMAL
BH BB PRODUCT CODE: NORMAL
BH BB PRODUCT CODE: NORMAL
BH BB UNIT NUMBER: NORMAL
BH BB UNIT NUMBER: NORMAL
BUN SERPL-MCNC: 15 MG/DL (ref 8–23)
BUN/CREAT SERPL: 15.5 (ref 7–25)
CALCIUM SPEC-SCNC: 8.1 MG/DL (ref 8.6–10.5)
CHLORIDE SERPL-SCNC: 102 MMOL/L (ref 98–107)
CO2 SERPL-SCNC: 22.7 MMOL/L (ref 22–29)
CREAT SERPL-MCNC: 0.97 MG/DL (ref 0.76–1.27)
CROSSMATCH INTERPRETATION: NORMAL
CROSSMATCH INTERPRETATION: NORMAL
CYTO UR: NORMAL
DEPRECATED RDW RBC AUTO: 52 FL (ref 37–54)
ERYTHROCYTE [DISTWIDTH] IN BLOOD BY AUTOMATED COUNT: 17.9 % (ref 12.3–15.4)
GFR SERPL CREATININE-BSD FRML MDRD: 76 ML/MIN/1.73
GLUCOSE BLDC GLUCOMTR-MCNC: 150 MG/DL (ref 70–130)
GLUCOSE BLDC GLUCOMTR-MCNC: 167 MG/DL (ref 70–130)
GLUCOSE BLDC GLUCOMTR-MCNC: 197 MG/DL (ref 70–130)
GLUCOSE BLDC GLUCOMTR-MCNC: 205 MG/DL (ref 70–130)
GLUCOSE SERPL-MCNC: 233 MG/DL (ref 65–99)
HCT VFR BLD AUTO: 27.5 % (ref 37.5–51)
HGB BLD-MCNC: 8.8 G/DL (ref 13–17.7)
LAB AP CASE REPORT: NORMAL
Lab: NORMAL
MCH RBC QN AUTO: 26.2 PG (ref 26.6–33)
MCHC RBC AUTO-ENTMCNC: 32 G/DL (ref 31.5–35.7)
MCV RBC AUTO: 81.8 FL (ref 79–97)
PATH REPORT.FINAL DX SPEC: NORMAL
PATH REPORT.GROSS SPEC: NORMAL
PLATELET # BLD AUTO: 327 10*3/MM3 (ref 140–450)
PMV BLD AUTO: 8.9 FL (ref 6–12)
POTASSIUM SERPL-SCNC: 3.9 MMOL/L (ref 3.5–5.2)
RBC # BLD AUTO: 3.36 10*6/MM3 (ref 4.14–5.8)
SODIUM SERPL-SCNC: 134 MMOL/L (ref 136–145)
UNIT  ABO: NORMAL
UNIT  ABO: NORMAL
UNIT  RH: NORMAL
UNIT  RH: NORMAL
WBC # BLD AUTO: 6.03 10*3/MM3 (ref 3.4–10.8)

## 2021-06-07 PROCEDURE — 25010000002 VANCOMYCIN 10 G RECONSTITUTED SOLUTION: Performed by: ORTHOPAEDIC SURGERY

## 2021-06-07 PROCEDURE — 85027 COMPLETE CBC AUTOMATED: CPT | Performed by: HOSPITALIST

## 2021-06-07 PROCEDURE — 63710000001 INSULIN LISPRO (HUMAN) PER 5 UNITS: Performed by: HOSPITALIST

## 2021-06-07 PROCEDURE — 25010000002 ENOXAPARIN PER 10 MG: Performed by: ORTHOPAEDIC SURGERY

## 2021-06-07 PROCEDURE — 94799 UNLISTED PULMONARY SVC/PX: CPT

## 2021-06-07 PROCEDURE — 82962 GLUCOSE BLOOD TEST: CPT

## 2021-06-07 PROCEDURE — 80048 BASIC METABOLIC PNL TOTAL CA: CPT | Performed by: HOSPITALIST

## 2021-06-07 PROCEDURE — 63710000001 INSULIN GLARGINE PER 5 UNITS: Performed by: ORTHOPAEDIC SURGERY

## 2021-06-07 RX ADMIN — SODIUM CHLORIDE, PRESERVATIVE FREE 10 ML: 5 INJECTION INTRAVENOUS at 21:12

## 2021-06-07 RX ADMIN — VANCOMYCIN HYDROCHLORIDE 1750 MG: 10 INJECTION, POWDER, LYOPHILIZED, FOR SOLUTION INTRAVENOUS at 16:48

## 2021-06-07 RX ADMIN — TRAZODONE HYDROCHLORIDE 25 MG: 50 TABLET ORAL at 21:10

## 2021-06-07 RX ADMIN — INSULIN LISPRO 2 UNITS: 100 INJECTION, SOLUTION INTRAVENOUS; SUBCUTANEOUS at 16:48

## 2021-06-07 RX ADMIN — INSULIN GLARGINE 15 UNITS: 100 INJECTION, SOLUTION SUBCUTANEOUS at 21:32

## 2021-06-07 RX ADMIN — DOCUSATE SODIUM 100 MG: 100 CAPSULE, LIQUID FILLED ORAL at 07:56

## 2021-06-07 RX ADMIN — LAMOTRIGINE 12.5 MG: 25 TABLET ORAL at 21:11

## 2021-06-07 RX ADMIN — Medication 1 TABLET: at 07:56

## 2021-06-07 RX ADMIN — HYDROCODONE BITARTRATE AND ACETAMINOPHEN 1 TABLET: 7.5; 325 TABLET ORAL at 16:48

## 2021-06-07 RX ADMIN — TAMSULOSIN HYDROCHLORIDE 0.4 MG: 0.4 CAPSULE ORAL at 21:11

## 2021-06-07 RX ADMIN — OLANZAPINE 5 MG: 5 TABLET ORAL at 22:36

## 2021-06-07 RX ADMIN — INSULIN LISPRO 5 UNITS: 100 INJECTION, SOLUTION INTRAVENOUS; SUBCUTANEOUS at 08:07

## 2021-06-07 RX ADMIN — MUPIROCIN 1 APPLICATION: 20 OINTMENT TOPICAL at 21:10

## 2021-06-07 RX ADMIN — INSULIN LISPRO 4 UNITS: 100 INJECTION, SOLUTION INTRAVENOUS; SUBCUTANEOUS at 07:57

## 2021-06-07 RX ADMIN — FAMOTIDINE 20 MG: 20 TABLET, FILM COATED ORAL at 16:48

## 2021-06-07 RX ADMIN — CASTOR OIL AND BALSAM, PERU 5 G: 788; 87 OINTMENT TOPICAL at 21:13

## 2021-06-07 RX ADMIN — FOLIC ACID 1 MG: 1 TABLET ORAL at 07:56

## 2021-06-07 RX ADMIN — Medication: at 22:36

## 2021-06-07 RX ADMIN — INSULIN LISPRO 5 UNITS: 100 INJECTION, SOLUTION INTRAVENOUS; SUBCUTANEOUS at 16:48

## 2021-06-07 RX ADMIN — Medication: at 08:09

## 2021-06-07 RX ADMIN — INSULIN LISPRO 2 UNITS: 100 INJECTION, SOLUTION INTRAVENOUS; SUBCUTANEOUS at 11:25

## 2021-06-07 RX ADMIN — INSULIN LISPRO 5 UNITS: 100 INJECTION, SOLUTION INTRAVENOUS; SUBCUTANEOUS at 11:25

## 2021-06-07 RX ADMIN — ATORVASTATIN CALCIUM 20 MG: 20 TABLET, FILM COATED ORAL at 21:12

## 2021-06-07 RX ADMIN — METOPROLOL SUCCINATE 25 MG: 25 TABLET, EXTENDED RELEASE ORAL at 07:56

## 2021-06-07 RX ADMIN — HYDROCODONE BITARTRATE AND ACETAMINOPHEN 1 TABLET: 7.5; 325 TABLET ORAL at 10:37

## 2021-06-07 RX ADMIN — LEVOTHYROXINE SODIUM 100 MCG: 0.1 TABLET ORAL at 06:12

## 2021-06-07 RX ADMIN — ENOXAPARIN SODIUM 40 MG: 40 INJECTION SUBCUTANEOUS at 07:56

## 2021-06-07 RX ADMIN — DOCUSATE SODIUM 100 MG: 100 CAPSULE, LIQUID FILLED ORAL at 21:11

## 2021-06-07 RX ADMIN — FAMOTIDINE 20 MG: 20 TABLET, FILM COATED ORAL at 06:12

## 2021-06-07 RX ADMIN — Medication 100 MG: at 07:56

## 2021-06-07 RX ADMIN — CASTOR OIL AND BALSAM, PERU 5 G: 788; 87 OINTMENT TOPICAL at 07:56

## 2021-06-07 RX ADMIN — LAMOTRIGINE 12.5 MG: 25 TABLET ORAL at 07:56

## 2021-06-07 RX ADMIN — SODIUM CHLORIDE, PRESERVATIVE FREE 10 ML: 5 INJECTION INTRAVENOUS at 07:56

## 2021-06-07 RX ADMIN — HYDROCODONE BITARTRATE AND ACETAMINOPHEN 1 TABLET: 7.5; 325 TABLET ORAL at 21:32

## 2021-06-07 RX ADMIN — MEMANTINE HYDROCHLORIDE 5 MG: 5 TABLET, FILM COATED ORAL at 07:56

## 2021-06-07 NOTE — PLAN OF CARE
Goal Outcome Evaluation:  Plan of Care Reviewed With: patient  Progress: improving  Outcome Summary: The pt remains stable. Pt verbalized he feels better today. Awake, disoriented to time. No s/s distress. S/p left knee revision w/ michael and HV done on 06/04/21. Knee immobilizer on LLE. Pain controlled w/ PRN [place]. On contact isolation r/t MRSA LLE wound. Continues on IV Vancomycin q24hrs. WBAT. Last BM: 06/05/21. Will continue to monitor.

## 2021-06-07 NOTE — DISCHARGE PLACEMENT REQUEST
"Chris Grullon (75 y.o. Male)     Date of Birth Social Security Number Address Home Phone MRN    1945  [id number] [place] [facility] [phone] [id number]    Synagogue Marital Status          Anabaptist        Admission Date Admission Type Admitting Provider Attending Provider Department, Room/Bed    6/3/21 Urgent Reed Aguirre MD Ray, Jonathan, MD [address], P895/1    Discharge Date Discharge Disposition Discharge Destination                       Attending Provider: Nghia Stinson MD    Allergies: Codeine    Isolation: Contact   Infection: MRSA (05/25/21)   Code Status: No CPR    Ht: 177.8 cm (70\")   Wt: 88.5 kg (195 lb)    Admission Cmt: None   Principal Problem: Infection of prosthetic left knee joint (CMS/HCC) [T84.54XA]                 Active Insurance as of 6/3/2021     Primary Coverage     Payor Plan Insurance Group Employer/Plan Group    HUMANA MEDICARE REPLACEMENT HUMANA MEDICARE REPLACEMENT [id number]     Payor Plan Address Payor Plan Phone Number Payor Plan Fax Number Effective Dates    [address] [address] [phone]  1/1/2018 - None Entered    [facility] [address]       Subscriber Name Subscriber Birth Date Member ID       CHRIS GRULLON W 1945 [id number]           Secondary Coverage     Payor Plan Insurance Group Employer/Plan Group    [place]      Payor Plan Address Payor Plan Phone Number Payor Plan Fax Number Effective Dates    [address]   12/12/1999 - None Entered    [place] [address]       Subscriber Name Subscriber Birth Date Member ID       CHRIS GRULLON W 1945 [id number]                 Emergency Contacts      (Rel.) Home Phone Work Phone Mobile Phone    Norbert Lynch (Power of ) [phone] -- --    Constance Moreau (Friend) -- -- [phone]    Marjorie Valdez (Sister) -- -- [phone]              "

## 2021-06-07 NOTE — DISCHARGE INSTRUCTIONS
[facility] Discharge Instructions     I. ACTIVITIES:  1. Exercises:  ? Complete exercise program as taught by the hospital physical therapist 2 times per day  ? Exercise program will be advanced by your home health physical therapist  ? During the day be up ambulating every 2 hours (while awake) for short distances  ? Complete the ankle pump exercises at least 10 times per hour (while awake)  ? Elevate legs most of the day the first week post operatively and thereafter elevate legs when in bed and for at least 30 minutes during the day.   ? Caution must be taken to avoid pillow placement under the bend of the knee as this can led to flexion contractures of the knee. Pillow placement under the heel is encouraged.  ? Use cold packs 20-30 minutes approximately 5 times per day. This should be done before and after completing your exercises and at any time you are experiencing pain/ stiffness in your operative extremity.      2. Activities of Daily Living:  ? No tub baths, hot tubs, or swimming pools for 4 weeks  ? May shower and let water run over the incision on post-operative day #5 if no drainage. Do not scrub or rub the incision. Simply let the water run over the incision and pat dry.    II. Restrictions  ? Do not cross legs or kneel  ? Your surgeon will discuss with you when you will be able to drive again. Usual guidelines are you are to be off pain medications prior to driving.  ? Weight bearing is as tolerated  ? First week stay inside on even terrain. May go up and down stairs one stair at a time utilizing the hand rail.  ? After one week, you may venture outside.    III. Precautions:  ? Everyone that comes near you should wash their hands  ? No elective dental, genital-urinary, or colon procedures or surgical procedures for 12 weeks after surgery unless absolutely necessary.  ?  If dental work or surgical procedure is deemed absolutely necessary, you will need to contact your surgeon as  you will need to take antibiotics 1 hour prior to any dental work (including teeth cleanings).  ? Please discuss with your surgeon prophylactic antibiotics as the length of time this intervention will be necessary for you varies with each patient’s health history and situation.  ? Avoid sick people. If you must be around someone who is ill, they should wear a mask.  ? Avoid visits to the Emergency Room or Urgent Care. If you feel you need to go to the emergency room, please notify your surgeon.    ? Stockings are to be worn for one week after surgery and are to be placed on in the morning and removed at night. Observe your skin when stocking is removed for any problems. Monitor the stockings to ensure that any swelling is not causing the stockings to become too tight. In this case, remove stockings immediately.    IV. INCISION CARE:  ? Wash your hands prior to dressing changes  ? Change the dressing as needed to keep incision clean and dry. Utilize dry gauze and paper tape. Avoid touching the side of the gauze that goes against the incision with your hands.  ? No creams or ointments to the incision  ? May remove dressing once the incision is free of drainage  ? Do not touch or pick at the incision  ? Check incision every day and notify surgeon immediately if any of the following signs or symptoms are noted:  o Increase in redness  o Increase in swelling around the incision and of the entire extremity  o Increase in pain  o Drainage oozing from the incision  o Pulling apart of the edges of the incision  o Increase in overall body temperature (greater than 100.5 degrees)  ? Your  Staples will be removed between 10-14 days postoperation.  This may be done by either the home health nurse, rehabilitation nurse or during your return visit to Dr. Giang's office.  You will then be instructed on how to care for the incision.  (Please call the office if your staples have not been removed within 14 days after  surgery).    V. Medications:   1. Anticoagulants: You will be discharged on an anticoagulant. This is a prophylactic medication that helps prevent blood clots during your post-operative period.  You will be on Lovenox 40mg SQ daily x14 days. If you were on Aspirin  prior to surgery hold it and restart once your Aspirin 81 mg is completed.     ? While taking the anticoagulant, you should avoid taking any additional aspirin, ibuprofen (Advil or Motrin), Aleve (Naprosyn) or other non-steroidal anti-inflammatory medications.   ? Notify surgeon immediately if any franklin bleeding is noted in the urine, stool, emesis, or from the nose or the incision. Blood in the stool will often appear as black rather than red. Blood in urine may appear as pink. Blood in emesis may appear as brown/black like coffee grounds.  ? You will need to apply pressure for longer periods of time to any cuts or abrasions to stop bleeding  ? Avoid alcohol while taking anticoagulants    2. Stool Softeners: You will be at greater risk of constipation after surgery due to being less mobile and the pain medications.   ? Take stool softeners as instructed by your surgeon while on pain medications. Over the counter Colace 100 mg 1-2 capsules twice daily.   ? If stools become too loose or too frequent, please decreases the dosage or stop the stool softener.  ? If constipation occurs despite use of stool softeners, you are to continue the stool softeners and add a laxative (Milk of Magnesia 1 ounce daily as needed).  ? Dulcolax oral tabs or suppository, or a fleets enema can also be utilized for constipation and can be obtained over the counter.   ? If above interventions are unsuccessful in inducing bowel movements, please contact your surgeon's office / family physician's office.  ? Drink plenty of fluids, and eat fruits and vegetables during your recovery time    3. Pain Medications utilized after surgery are narcotics and the law requires that the  following information be given to all patients that are prescribed narcotics:  ? CLASSIFICATION: Pain medications are called Opioids and are narcotics  ? LEGALITIES: It is illegal to share narcotics with others and to drive within 24 hours of taking narcotics  ? POTENTIAL SIDE EFFECTS: Potential side effects of opioids include: nausea, vomiting, itching, dizziness, drowsiness, dry mouth, constipation, and difficulty urinating.  ? POTENTIAL ADVERSE EFFECTS:   o Opioid tolerance can develop with use of pain medications and this simply means that it requires more and more of the medication to control pain; however, this is seen more in patients that use opioids for longer periods of time.  o Opioid dependence can develop with use of Opioids and this simply means that to stop the medication can cause withdrawal symptoms; however, this is seen with patients that use Opioids for longer periods of time.  o Opioid addiction can develop with use of Opioids and the incidence of this is very unlikely in patients who take the medications as ordered and stop the medications as instructed.  o Opioid overdose can be dangerous, but is unlikely when the medication is taken as ordered and stopped when ordered. It is important not to mix opioids with alcohol or with and type of sedative such as Benadryl as this can lead to over sedation and respiratory difficulty.  ? DOSAGE:   o Pain medications will need to be taken consistently for the first week to decrease pain and promote adequate pain relief and participation in physical therapy.  o After the initial surgical pain begins to resolve, you may begin to decrease the pain medication. By the end of 6 weeks, you should be off of pain medications.  o Refills will not be given by the office during evening hours, on weekends, or after 6 weeks post-op.  o To seek refills on pain medications during the initial 6 week post-operative period, you must call the office 48 hours in advance to  request the refill. The office will then notify you when to  the prescription. DO NOT wait until you are out of the medication to request a refill.    V. FOLLOW-UP VISITS:  ? You will need to follow up in the office with your surgeon on June 17, 2021. Please call this number [phone] to schedule this appointment.  ? If you have any concerns or suspected complications prior to your follow up visit, please call your surgeons office. Do not wait until your appointment time if you suspect complications. These will need to be addressed in the office promptly.

## 2021-06-07 NOTE — PROGRESS NOTES
Name: Chris Ferguson ADMIT: 6/3/2021   : 1945  PCP: Gisela Sotelo APRN    MRN: [id number] LOS: 4 days   AGE/SEX: 75 y.o. male  ROOM: [facility]     Subjective   Subjective   He denies any chest pain, SOA, nausea, vomiting or diarrhea.   Leg pain controlled with medication.    Review of Systems     Objective   Objective   Vital Signs  Temp:  [97.9 °F (36.6 °C)-100.4 °F (38 °C)] 98 °F (36.7 °C)  Heart Rate:  [] 94  Resp:  [14-16] 16  BP: (101-126)/(58-73) 104/58  SpO2:  [90 %-93 %] 90 %  on   ;   Device (Oxygen Therapy): room air  Body mass index is 27.98 kg/m².  Physical Exam  Vitals and nursing note reviewed.   Constitutional:       General: He is not in acute distress.     Appearance: He is well-developed. He is ill-appearing (Chronically).   Neck:      Vascular: No JVD.      Trachea: No tracheal deviation.   Cardiovascular:      Rate and Rhythm: Normal rate and regular rhythm.      Heart sounds: No murmur heard.     Pulmonary:      Effort: Pulmonary effort is normal. No respiratory distress.      Breath sounds: Normal breath sounds.   Abdominal:      General: Bowel sounds are normal. There is no distension.      Palpations: Abdomen is soft.      Tenderness: There is no abdominal tenderness.   Musculoskeletal:      Comments: L knee bandaged   Skin:     General: Skin is warm and dry.      Coloration: Skin is pale.   Neurological:      Mental Status: He is alert and oriented to person, place, and time.   Psychiatric:         Behavior: Behavior normal. Behavior is cooperative.         Results Review:       I reviewed the patient's new clinical results.  Results from last 7 days   Lab Units 21  0605 21  0457 21  0531 21  0504   WBC 10*3/mm3 6.03 5.68 7.18 6.83   HEMOGLOBIN g/dL 8.8* 6.7* 8.2* 8.6*   PLATELETS 10*3/mm3 327 314 328 381     Results from last 7 days   Lab Units 21  0457 21  0531 21  0616 21  0504 21  0650 21  0344   SODIUM mmol/L  134* 135*  --  136 135* 135*   POTASSIUM mmol/L 3.8 4.2  --  3.7 4.3 4.2   CHLORIDE mmol/L 103 104  --  104 102 103   CO2 mmol/L 22.6 21.8*  --  24.1 25.0 24.0   BUN mg/dL 16 18  --  26* 21 22   CREATININE mg/dL 0.99 1.03 1.12 1.19 1.25 1.09   GLUCOSE mg/dL 136* 167*  --  154* 210* 200*   ALBUMIN g/dL  --   --   --  2.90* 2.80* 2.60*   BILIRUBIN mg/dL  --   --   --  0.4 0.3 0.4   ALK PHOS U/L  --   --   --  116 127* 117   AST (SGOT) U/L  --   --   --  15 15 24   ALT (SGPT) U/L  --   --   --  16 17 16   Estimated Creatinine Clearance: 74.4 mL/min (by C-G formula based on SCr of 0.99 mg/dL).  Results from last 7 days   Lab Units 06/06/21  0457 06/05/21  0531 06/03/21  0504 06/02/21  0650 06/01/21  0344   CALCIUM mg/dL 8.1* 8.0* 8.3* 8.9 8.0*   ALBUMIN g/dL  --   --  2.90* 2.80* 2.60*     Results from last 7 days   Lab Units 06/04/21  0640   COVID19  Not Detected     Results from last 7 days   Lab Units 06/03/21  0504   PROCALCITONIN ng/mL 0.04   LACTATE mmol/L 0.9       Glucose   Date/Time Value Ref Range Status   06/07/2021 0617 205 (H) 70 - 130 mg/dL Final   06/06/2021 2106 209 (H) 70 - 130 mg/dL Final   06/06/2021 1558 152 (H) 70 - 130 mg/dL Final   06/06/2021 1121 310 (H) 70 - 130 mg/dL Final   06/05/2021 2158 232 (H) 70 - 130 mg/dL Final   06/05/2021 1742 207 (H) 70 - 130 mg/dL Final   06/05/2021 0601 187 (H) 70 - 130 mg/dL Final       Scheduled Medications  atorvastatin, 20 mg, Oral, Nightly  castor oil-balsam peru, , Topical, Q12H  docusate sodium, 100 mg, Oral, BID  enoxaparin, 40 mg, Subcutaneous, Daily  famotidine, 20 mg, Oral, BID AC  folic acid, 1 mg, Oral, Daily  insulin glargine, 15 Units, Subcutaneous, Nightly  insulin lispro, 0-9 Units, Subcutaneous, TID AC  insulin lispro, 5 Units, Subcutaneous, TID With Meals  isosorbide mononitrate, 30 mg, Oral, Daily  lamoTRIgine, 12.5 mg, Oral, BID  levothyroxine, 100 mcg, Oral, Q AM  memantine, 5 mg, Oral, Daily  metoprolol succinate XL, 25 mg, Oral,  Daily  multivitamin, 1 tablet, Oral, Daily  mupirocin, , Each Nare, BID  OLANZapine, 5 mg, Oral, Nightly  sodium chloride, 10 mL, Intravenous, Q12H  tamsulosin, 0.4 mg, Oral, Daily  thiamine, 100 mg, Oral, Daily  traZODone, 25 mg, Oral, Nightly  vancomycin, 1,750 mg, Intravenous, Q24H  zinc oxide, , Topical, BID    Infusions  Pharmacy to dose vancomycin,     Diet  Diet Regular; Consistent Carbohydrate         Assessment/Plan     Active Hospital Problems    Diagnosis  POA   • **Infection of prosthetic left knee joint (CMS/[facility]) [T84.54XA]  Not Applicable   • 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 with presence of antibiotic-impregnated cement spacer [Z89.529]  Yes   • Type 2 diabetes mellitus with hyperglycemia, with long-term current use of insulin (CMS/[facility]) [E11.65, Z79.4]  Not Applicable   • Dementia in Alzheimer's disease with early onset (CMS/[facility]) [G30.0, F02.80]  Yes   • Cellulitis of left lower extremity [L03.116]  Yes   • Essential hypertension [I10]  Yes   • BPH (benign prostatic hyperplasia) [N40.0]  Yes   • Stage 3a chronic kidney disease (CMS/[facility]) [N18.31]  Yes   • Hyperlipidemia [E78.5]  Yes      Resolved Hospital Problems   No resolved problems to display.       75 y.o. male with Infection of prosthetic left knee joint (CMS/HCC).    Hgb improved.  rbc x2  BS up AC lispro added yest, monitor today  Plan DC when okay with ID/ortho.        · Lovenox 40 mg SC daily for DVT prophylaxis.  · DNR.    · Discussed with patient and nursing staff.  · Anticipate discharge to SNU facility when cleared by consultants. ?tomorrow      Nghia Stinson MD  [facility]  06/07/21  08:31 EDT

## 2021-06-07 NOTE — PROGRESS NOTES
"  Infectious Diseases Progress Note    Bettina Mendenhall MD     [facility]  [place]: 4 days  Patient Identification:  Name: Chris Ferguson  Age: 75 y.o.  Sex: male  :  1945  MRN: [id number]         Primary Care Physician: Gisela Sotelo APRN            Subjective: Doing well denies any specific complaints.  Interval History: See consultation note.    Objective:    Scheduled Meds:atorvastatin, 20 mg, Oral, Nightly  castor oil-balsam peru, , Topical, Q12H  docusate sodium, 100 mg, Oral, BID  enoxaparin, 40 mg, Subcutaneous, Daily  famotidine, 20 mg, Oral, BID AC  folic acid, 1 mg, Oral, Daily  insulin glargine, 15 Units, Subcutaneous, Nightly  insulin lispro, 0-9 Units, Subcutaneous, TID AC  insulin lispro, 5 Units, Subcutaneous, TID With Meals  isosorbide mononitrate, 30 mg, Oral, Daily  lamoTRIgine, 12.5 mg, Oral, BID  levothyroxine, 100 mcg, Oral, Q AM  memantine, 5 mg, Oral, Daily  metoprolol succinate XL, 25 mg, Oral, Daily  multivitamin, 1 tablet, Oral, Daily  mupirocin, , Each Nare, BID  OLANZapine, 5 mg, Oral, Nightly  sodium chloride, 10 mL, Intravenous, Q12H  tamsulosin, 0.4 mg, Oral, Daily  thiamine, 100 mg, Oral, Daily  traZODone, 25 mg, Oral, Nightly  vancomycin, 1,750 mg, Intravenous, Q24H  zinc oxide, , Topical, BID      Continuous Infusions:Pharmacy to dose vancomycin,         Vital signs in last 24 hours:  Temp:  [97.9 °F (36.6 °C)-100.4 °F (38 °C)] 98 °F (36.7 °C)  Heart Rate:  [] 94  Resp:  [14-18] 16  BP: (101-126)/(58-73) 104/58    Intake/Output:    Intake/Output Summary (Last 24 hours) at 2021 0744  Last data filed at 2021 0700  Gross per 24 hour   Intake 1140 ml   Output 250 ml   Net 890 ml       Exam:  /58 (BP Location: Left arm, Patient Position: Lying)   Pulse 94   Temp 98 °F (36.7 °C) (Oral)   Resp 16   Ht 177.8 cm (70\")   Wt 88.5 kg (195 lb)   SpO2 90%   BMI 27.98 kg/m²   Patient is examined using the personal protective equipment as per " guidelines from infection control for this particular patient as enacted.  Hand washing was performed before and after patient interaction.  General Appearance:    Alert, cooperative, no distress, AAOx3                          Head:    Normocephalic, without obvious abnormality, atraumatic                           Eyes:    PERRL, conjunctivae/corneas clear, EOM's intact, both eyes                         Throat:   Lips, tongue, gums normal; oral mucosa pink and moist                           Neck:   Supple, symmetrical, trachea midline, no JVD                         Lungs:    Clear to auscultation bilaterally, respirations unlabored                 Chest Wall:    No tenderness or deformity                          Heart:    Regular rate and rhythm, S1 and S2 normal                  Abdomen:     Soft, non-tender, bowel sounds active                 Extremities: Left knee dressed.  In the immobilizer.                        Pulses:   Pulses palpable in all extremities                            Skin:   Skin is warm and dry,  no rashes or palpable lesions                  Neurologic: Awake and interactive       Data Review:    I reviewed the patient's new clinical results.  Results from last 7 days   Lab Units 06/07/21  0605 06/06/21  0457 06/05/21  0531 06/03/21  0504 06/02/21  0650 06/01/21  0344   WBC 10*3/mm3 6.03 5.68 7.18 6.83 5.90 4.50   HEMOGLOBIN g/dL 8.8* 6.7* 8.2* 8.6* 9.7* 8.8*   PLATELETS 10*3/mm3 327 314 328 381 373 325     Results from last 7 days   Lab Units 06/06/21  0457 06/05/21  0531 06/04/21  0616 06/03/21  0504 06/02/21  0650 06/01/21  0344   SODIUM mmol/L 134* 135*  --  136 135* 135*   POTASSIUM mmol/L 3.8 4.2  --  3.7 4.3 4.2   CHLORIDE mmol/L 103 104  --  104 102 103   CO2 mmol/L 22.6 21.8*  --  24.1 25.0 24.0   BUN mg/dL 16 18  --  26* 21 22   CREATININE mg/dL 0.99 1.03 1.12 1.19 1.25 1.09   CALCIUM mg/dL 8.1* 8.0*  --  8.3* 8.9 8.0*   GLUCOSE mg/dL 136* 167*  --  154* 210* 200*      Microbiology Results (last 10 days)     Procedure Component Value - Date/Time    Tissue / Bone Culture - Tissue, Knee, Left [[id number]] Collected: 06/04/21 1418    Lab Status: Preliminary result Specimen: Tissue from Knee, Left Updated: 06/07/21 0646     Tissue Culture No growth at 3 days     Gram Stain Rare (1+) WBCs seen      No organisms seen    COVID PRE-OP / PRE-PROCEDURE SCREENING ORDER (NO ISOLATION) - Swab, Nasopharynx [[id number]]  (Normal) Collected: 06/04/21 0640    Lab Status: Final result Specimen: Swab from Nasopharynx Updated: 06/04/21 0957    Narrative:      The following orders were created for panel order COVID PRE-OP / PRE-PROCEDURE SCREENING ORDER (NO ISOLATION) - Swab, Nasopharynx.  Procedure                               Abnormality         Status                     ---------                               -----------         ------                     COVID-19,[facility]...[[id number]]  Normal              Final result                 Please view results for these tests on the individual orders.    COVID-19,[facility] CEPHEID/ILYA NP SWAB IN TRANSPORT MEDIA 8-12 HR TAT - Swab, Nasopharynx [[id number]]  (Normal) Collected: 06/04/21 0640    Lab Status: Final result Specimen: Swab from Nasopharynx Updated: 06/04/21 0957     COVID19 Not Detected    Narrative:      Fact sheet for providers: https://www.fda.gov/media/696113/download    Fact sheet for patients: https://www.fda.gov/media/180365/download    Test performed by PCR.            Assessment:    Infection of prosthetic left knee joint (CMS/[facility])    Essential hypertension    BPH (benign prostatic hyperplasia)    Stage 3a chronic kidney disease (CMS/[facility])    Hyperlipidemia    Cellulitis of left lower extremity    Type 2 diabetes mellitus with hyperglycemia, with long-term current use of insulin (CMS/[facility])    Dementia in Alzheimer's disease with early onset (CMS/[facility])    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  with presence of antibiotic-impregnated cement spacer  1-probable left knee prosthetic joint infection secondary to  2-cellulitis of the left leg with wound infection with culture positive for MRSA resulting in contiguous focus of spread to the prosthetic joint as well as possible hematogenous seeding.  3-diabetes mellitus  4-hypothyroidism  5-dyslipidemia  6-dementia  7-Parkinson's disease and immobility        Recommendations/Discussions:  · Simplify antibiotic regimen to vancomycin only and DC rifampin since prosthetic devices removed.  · See recommendations and discussion on 6/3/2021.  · Patient would need 6 weeks of antibiotic treatment from 6/4/2021.  · CCP consult request: Please arrange 42 days of IV vancomycin to be dosed by pharmacy to achieve a trough level of 15-20 or area under the curve of 400-600.  Check weekly CBC CMP CRP and call abnormal results to Dr. Mendenhall at [phone].    · Diagnosis: MRSA left knee prosthetic joint infection status post explant of hardware and antibiotic spacer placement.  Bettina Mendenhall MD  6/7/2021  07:44 EDT    Much of this encounter note is an electronic transcription/translation of spoken language to printed text. The electronic translation of spoken language may permit erroneous, or at times, nonsensical words or phrases to be inadvertently transcribed; Although I have reviewed the note for such errors, some may still exist

## 2021-06-07 NOTE — PROGRESS NOTES
"[facility]  Clinical Pharmacy Department     Vancomycin Pharmacokinetic Note    Chris Ferguson is a 75 y.o. male who presents from [facility] on day 17 of pharmacy to dose vancomycin for bone and/or joint infection.     Target level: AUC24 400-600  Consulting Provider:  Dr. Blake   Current Vancomycin Dose: 1750 mg Q24h  Planned Duration of Therapy: Per Dr. Mendenhall \"6 weeks of antibiotic treatment from 6/4/2021\"  Stop date = 7/16/21    Allergies  Allergies as of 06/01/2021 - Reviewed 05/29/2021   Allergen Reaction Noted   • Codeine GI Intolerance 08/23/2018     Microbiology:  Microbiology Results (last 10 days)     Procedure Component Value - Date/Time    Tissue / Bone Culture - Tissue, Knee, Left [[id number]] Collected: 06/04/21 1418    Lab Status: Preliminary result Specimen: Tissue from Knee, Left Updated: 06/06/21 1334     Tissue Culture No growth at 2 days     Gram Stain Rare (1+) WBCs seen      No organisms seen    COVID PRE-OP / PRE-PROCEDURE SCREENING ORDER (NO ISOLATION) - Swab, Nasopharynx [[id number]]  (Normal) Collected: 06/04/21 0640    Lab Status: Final result Specimen: Swab from Nasopharynx Updated: 06/04/21 0957    Narrative:      The following orders were created for panel order COVID PRE-OP / PRE-PROCEDURE SCREENING ORDER (NO ISOLATION) - Swab, Nasopharynx.  Procedure                               Abnormality         Status                     ---------                               -----------         ------                     COVID-19,[facility]...[[id number]]  Normal              Final result                 Please view results for these tests on the individual orders.    COVID-19,[facility] CEPHEID/ILYA NP SWAB IN TRANSPORT MEDIA 8-12 HR TAT - Swab, Nasopharynx [[id number]]  (Normal) Collected: 06/04/21 0640    Lab Status: Final result Specimen: Swab from Nasopharynx Updated: 06/04/21 0957     COVID19 Not Detected    Narrative:      Fact sheet for providers: " "https://www.fda.gov/media/750045/download    Fact sheet for patients: https://www.fda.gov/media/047675/download    Test performed by PCR.        Vitals/Labs/I&O  177.8 cm (70\")  88.5 kg (195 lb)  Body mass index is 27.98 kg/m².   @DOC@    Results from last 7 days   Lab Units 06/06/21  0457 06/05/21  0531 06/03/21  0504   WBC 10*3/mm3 5.68 7.18 6.83     Results from last 7 days   Lab Units 06/03/21  0504   LACTATE mmol/L 0.9      Results from last 7 days   Lab Units 06/03/21  0504   PROCALCITONIN ng/mL 0.04     Estimated Creatinine Clearance: 74.4 mL/min (by C-G formula based on SCr of 0.99 mg/dL).  Results from last 7 days   Lab Units 06/06/21  0457 06/05/21  0531 06/04/21  0616 06/03/21  0504   BUN mg/dL 16 18  --  26*   CREATININE mg/dL 0.99 1.03 1.12 1.19     Intake & Output (last 3 days)       06/04 0701 - 06/05 0700 06/05 0701 - 06/06 0700 06/06 0701 - 06/07 0700    P.O. 720  720    I.V. (mL/kg) 1100 (12.4)      Blood   300    IV Piggyback 200      Total Intake(mL/kg) 2020 (22.8)  1020 (11.5)    Urine (mL/kg/hr) 250 (0.1)      Drains 150 0 50    Stool  0     Wound 10      Total Output 410 0 50    Net +1610 0 +970           Urine Unmeasured Occurrence 4 x 2 x     Stool Unmeasured Occurrence  1 x           Assessment/Plan:    • Overall stable renal function and kinetic calculations. No change to the regimen is indicated at this time and vancomycin 1750 mg Q24 will be continued. NOTE from 6/4: patient to OR and surgeon would like vancomycin pre-op so once daily doses moved up to be given on OR and will adjust remaining scheduled doses and trough accordingly. No post-op vancomycin dose is indicated.  • Follow up trough scheduled from 6/9 prior to 1500 dose    Bayesian analysis of the most recent level(s) using [employer] provides the following patient-specific pharmacokinetic parameters:   CL: 3.38 L/h   Vd: 72.6 L   T1/2: 17.2 h  If the predicted trough on this regimen is not within what was previously " "considered a \"target trough range\", the AUC24 (a stronger predictor of vancomycin efficacy) is predicted to achieve the accepted target of 400-600 mg*h/L. To best balance efficacy and toxicity, we will be targeting AUC24 in this patient rather than steady-state trough levels.       Thank you for involving pharmacy in this patient's care. Please contact pharmacy with any questions or concerns.                           [facility]  Clinical Pharmacist  06/06/21 20:57 EDT    "

## 2021-06-07 NOTE — NURSING NOTE
1240- Call placed to Norbert Lynch (BOBBI) to obtain consent for PICC placement. No answer, voicemail left, will try again.    1530- 2nd attempt, no answer.    1740- Call received from Norbert Lynch, able to obtain consent for PICC line over the phone, two RN witnesses. Call placed to IV team to inform them.

## 2021-06-07 NOTE — CASE MANAGEMENT/SOCIAL WORK
Discharge Planning Assessment  [facility]     Patient Name: Chris Ferguson  MRN: [id number]  Today's Date: 6/7/2021    Admit Date: 6/3/2021    Discharge Needs Assessment     Row Name 06/07/21 1640       Living Environment    Lives With  alone    Current Living Arrangements  home/apartment/condo    Primary Care Provided by  self;friend    Provides Primary Care For  no one, unable/limited ability to care for self    Family Caregiver if Needed  friend(s);sibling(s)    Quality of Family Relationships  helpful    Able to Return to Prior Arrangements  no       Transition Planning    Patient/Family Anticipates Transition to  home with family;home with help/services;inpatient rehabilitation facility    Patient/Family Anticipated Services at Transition      Transportation Anticipated  health plan transportation       Discharge Needs Assessment    Readmission Within the Last 30 Days  no previous admission in last 30 days    Equipment Currently Used at Home  wheelchair;shower chair;commode;grab bar;glucometer    Discharge Facility/Level of Care Needs  nursing facility, skilled    Provided Post Acute Provider List?  Yes    Post Acute Provider List  Nursing Home SNF.        Discharge Plan     Row Name 06/07/21 1645       Plan    Plan  Return to [facility] -- Referral Pending.    Patient/Family in Agreement with Plan  yes    Plan Comments  Spoke with the patient, verified current information and explained the role of the CCP. Patient said he lives alone in a single-level apartment. He states he's overall IADL. He has a wheelchair that he uses daily, a shower chair, grab bars, commode chair and glucometer. He arrived to the hospital from [facility], and cannot remember the name of the  he has worked with in the past. Physical Therapy eval/rec noted. Patient said he plans to d/c back to SNF and would like to return to [facility]. Referral sent in [facility]. Left a  message for Yomaira Guillen/Ninfa. Patient will likely need transportation arranged at d/c. No other needs identified. Await SNF's determination.        Continued Care and Services - Admitted Since 6/3/2021     Destination     Service Provider Request Status Selected Services Address Phone Fax Patient Preferred    [facility]  Pending - Request Sent N/A [address] [place] [address] [phone] --    [facility]  Pending - Request Sent N/A [address] [place] [id number] [phone] --    [facility]  Pending - Request Sent N/A [address] [facility] [place] [address] [phone] --            Selected Continued Care - Prior Encounters Includes selections from prior encounters from 3/5/2021 to 6/7/2021    Discharged on 6/3/2021 Admission date: 5/21/2021 - Discharge disposition: Short Term Hospital ([facility])    Destination     Service Provider Selected Services Address Phone Fax Patient Preferred    [facility]  Skilled Nursing [address] [facility] [place] [address] [phone] --                Discharged on 4/30/2021 Admission date: 4/25/2021 - Discharge disposition: Intermediate Care     Destination     Service Provider Selected Services Address Phone Fax Patient Preferred    [facility]  Skilled [facility] [address] [place] [address] [phone] --                      Demographic Summary     Row Name 06/07/21 1639       General Information    Admission Type  inpatient    Reason for Consult  discharge planning    Preferred Language  English     Used During This Interaction  no       Contact Information    Permission Granted to Share Info With  ;family/designee        Functional Status     Row Name 06/07/21 1639       Functional Status    Usual Activity Tolerance  fair    Current Activity Tolerance  poor        Functional Status, IADL    Medications  independent;assistive equipment    Meal Preparation  assistive equipment;assistive equipment and person    Housekeeping  assistive equipment;assistive equipment and person    Laundry  assistive equipment;assistive equipment and person    Shopping  assistive equipment;assistive equipment and person       Mental Status Summary    Recent Changes in Mental Status/Cognitive Functioning  no changes        Psychosocial     Row Name 06/07/21 1640       Intellectual Performance WDL    Level of Consciousness  Alert       Coping/Stress    Patient Personal Strengths  able to adapt    Sources of Support  friend(s);sibling(s)    Reaction to Health Status  accepting    Understanding of Condition and Treatment  adequate understanding of medical condition       Developmental Stage (Eriksson's)    Developmental Stage  Stage 8 (67 years-death/Late Adulthood) Integrity vs. Despair        Abuse/Neglect    No documentation.       Legal    No documentation.       Substance Abuse    No documentation.       Patient Forms    No documentation.           Yadira Pedroza RN

## 2021-06-07 NOTE — CASE MANAGEMENT/SOCIAL WORK
Continued Stay Note  [facility]     Patient Name: Chris Ferguson  MRN: [id number]  Today's Date: 6/7/2021    Admit Date: 6/3/2021    Discharge Plan     Row Name 06/07/21 1319       Plan    Plan Comments  Per nursing staff, patient is confused at this time. Therefore CCP called and left a message for the patient's POA/Norbert Lynch. Await his call back to discuss discharge planning needs.        Discharge Codes    No documentation.             Yadira Pedroza RN

## 2021-06-07 NOTE — PLAN OF CARE
Goal Outcome Evaluation:  Plan of Care Reviewed With: patient        Progress: no change  Outcome Summary: Pleasantly confused. VSS. Dressing CDI, KI in place. Pain controlled. Voiding per brief. BM last night. Refused PT today. Vancomycin q 24 hours, tolerating well. Need PICC place, attempting to get consent from POA. Isolation for MRSA. Plans to D/C back to SNF pending precert. Will cont to monitor.

## 2021-06-07 NOTE — SIGNIFICANT NOTE
"   06/07/21 0944   OTHER   Discipline physical therapy assistant   Rehab Time/Intention   Session Not Performed patient/family declined treatment  (Pt refusal stating, \"Not this morning\". Pt offered assist to get out of bed to chair w/ pt continuing to repeat, \"not this morning\". Pt will follow up PM as time allows.)     " No
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
## 2021-06-08 LAB
BACTERIA SPEC AEROBE CULT: NORMAL
GLUCOSE BLDC GLUCOMTR-MCNC: 192 MG/DL (ref 70–130)
GLUCOSE BLDC GLUCOMTR-MCNC: 230 MG/DL (ref 70–130)
GLUCOSE BLDC GLUCOMTR-MCNC: 235 MG/DL (ref 70–130)
GLUCOSE BLDC GLUCOMTR-MCNC: 257 MG/DL (ref 70–130)
GRAM STN SPEC: NORMAL
GRAM STN SPEC: NORMAL

## 2021-06-08 PROCEDURE — 94799 UNLISTED PULMONARY SVC/PX: CPT

## 2021-06-08 PROCEDURE — 97530 THERAPEUTIC ACTIVITIES: CPT

## 2021-06-08 PROCEDURE — 25010000002 ENOXAPARIN PER 10 MG: Performed by: ORTHOPAEDIC SURGERY

## 2021-06-08 PROCEDURE — 63710000001 INSULIN GLARGINE PER 5 UNITS: Performed by: ORTHOPAEDIC SURGERY

## 2021-06-08 PROCEDURE — 63710000001 INSULIN LISPRO (HUMAN) PER 5 UNITS: Performed by: HOSPITALIST

## 2021-06-08 PROCEDURE — 25010000002 VANCOMYCIN 10 G RECONSTITUTED SOLUTION: Performed by: ORTHOPAEDIC SURGERY

## 2021-06-08 PROCEDURE — 97535 SELF CARE MNGMENT TRAINING: CPT

## 2021-06-08 PROCEDURE — 82962 GLUCOSE BLOOD TEST: CPT

## 2021-06-08 RX ADMIN — INSULIN LISPRO 5 UNITS: 100 INJECTION, SOLUTION INTRAVENOUS; SUBCUTANEOUS at 08:54

## 2021-06-08 RX ADMIN — TAMSULOSIN HYDROCHLORIDE 0.4 MG: 0.4 CAPSULE ORAL at 21:04

## 2021-06-08 RX ADMIN — ATORVASTATIN CALCIUM 20 MG: 20 TABLET, FILM COATED ORAL at 21:04

## 2021-06-08 RX ADMIN — METOPROLOL SUCCINATE 25 MG: 25 TABLET, EXTENDED RELEASE ORAL at 08:53

## 2021-06-08 RX ADMIN — LAMOTRIGINE 12.5 MG: 25 TABLET ORAL at 08:51

## 2021-06-08 RX ADMIN — SODIUM CHLORIDE, PRESERVATIVE FREE 10 ML: 5 INJECTION INTRAVENOUS at 08:53

## 2021-06-08 RX ADMIN — Medication: at 08:54

## 2021-06-08 RX ADMIN — INSULIN LISPRO 2 UNITS: 100 INJECTION, SOLUTION INTRAVENOUS; SUBCUTANEOUS at 17:12

## 2021-06-08 RX ADMIN — ENOXAPARIN SODIUM 40 MG: 40 INJECTION SUBCUTANEOUS at 08:51

## 2021-06-08 RX ADMIN — INSULIN LISPRO 6 UNITS: 100 INJECTION, SOLUTION INTRAVENOUS; SUBCUTANEOUS at 12:32

## 2021-06-08 RX ADMIN — LAMOTRIGINE 12.5 MG: 25 TABLET ORAL at 21:05

## 2021-06-08 RX ADMIN — Medication 1 TABLET: at 08:52

## 2021-06-08 RX ADMIN — DOCUSATE SODIUM 100 MG: 100 CAPSULE, LIQUID FILLED ORAL at 08:50

## 2021-06-08 RX ADMIN — Medication: at 21:30

## 2021-06-08 RX ADMIN — MUPIROCIN 1 APPLICATION: 20 OINTMENT TOPICAL at 08:53

## 2021-06-08 RX ADMIN — HYDROCODONE BITARTRATE AND ACETAMINOPHEN 2 TABLET: 7.5; 325 TABLET ORAL at 12:40

## 2021-06-08 RX ADMIN — DOCUSATE SODIUM 100 MG: 100 CAPSULE, LIQUID FILLED ORAL at 21:04

## 2021-06-08 RX ADMIN — MEMANTINE HYDROCHLORIDE 5 MG: 5 TABLET, FILM COATED ORAL at 08:52

## 2021-06-08 RX ADMIN — OLANZAPINE 5 MG: 5 TABLET ORAL at 21:04

## 2021-06-08 RX ADMIN — Medication 100 MG: at 08:52

## 2021-06-08 RX ADMIN — HYDROCODONE BITARTRATE AND ACETAMINOPHEN 2 TABLET: 7.5; 325 TABLET ORAL at 17:11

## 2021-06-08 RX ADMIN — HYDROCODONE BITARTRATE AND ACETAMINOPHEN 2 TABLET: 7.5; 325 TABLET ORAL at 21:04

## 2021-06-08 RX ADMIN — INSULIN LISPRO 5 UNITS: 100 INJECTION, SOLUTION INTRAVENOUS; SUBCUTANEOUS at 17:12

## 2021-06-08 RX ADMIN — FAMOTIDINE 20 MG: 20 TABLET, FILM COATED ORAL at 07:05

## 2021-06-08 RX ADMIN — ISOSORBIDE MONONITRATE 30 MG: 30 TABLET ORAL at 08:51

## 2021-06-08 RX ADMIN — CASTOR OIL AND BALSAM, PERU 5 G: 788; 87 OINTMENT TOPICAL at 08:53

## 2021-06-08 RX ADMIN — CASTOR OIL AND BALSAM, PERU 5 G: 788; 87 OINTMENT TOPICAL at 21:30

## 2021-06-08 RX ADMIN — MUPIROCIN 1 APPLICATION: 20 OINTMENT TOPICAL at 21:05

## 2021-06-08 RX ADMIN — INSULIN LISPRO 5 UNITS: 100 INJECTION, SOLUTION INTRAVENOUS; SUBCUTANEOUS at 12:32

## 2021-06-08 RX ADMIN — VANCOMYCIN HYDROCHLORIDE 1750 MG: 10 INJECTION, POWDER, LYOPHILIZED, FOR SOLUTION INTRAVENOUS at 17:12

## 2021-06-08 RX ADMIN — FOLIC ACID 1 MG: 1 TABLET ORAL at 08:51

## 2021-06-08 RX ADMIN — TRAZODONE HYDROCHLORIDE 25 MG: 50 TABLET ORAL at 21:05

## 2021-06-08 RX ADMIN — INSULIN LISPRO 4 UNITS: 100 INJECTION, SOLUTION INTRAVENOUS; SUBCUTANEOUS at 08:54

## 2021-06-08 RX ADMIN — SODIUM CHLORIDE, PRESERVATIVE FREE 10 ML: 5 INJECTION INTRAVENOUS at 21:04

## 2021-06-08 RX ADMIN — FAMOTIDINE 20 MG: 20 TABLET, FILM COATED ORAL at 17:11

## 2021-06-08 RX ADMIN — INSULIN GLARGINE 15 UNITS: 100 INJECTION, SOLUTION SUBCUTANEOUS at 21:02

## 2021-06-08 RX ADMIN — LEVOTHYROXINE SODIUM 100 MCG: 0.1 TABLET ORAL at 07:05

## 2021-06-08 NOTE — PLAN OF CARE
Goal Outcome Evaluation:  Plan of Care Reviewed With: patient        Progress: no change  Outcome Summary: vitals stable.  pt expressed pain.  meds given per orders.  hemovac in place.  turn q 2 hours.  álvaro care as needed.  knee immobilizer in place.  will continue to monitor.
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
## 2021-06-08 NOTE — PLAN OF CARE
Goal Outcome Evaluation:  Plan of Care Reviewed With: patient        Progress: improving  Outcome Summary: Pt agreeable to PT w/ encouragement and agreed to transfer to chair only w/ encouragement of Frieda guerra, and DONOVAN. Pt req mod A x 2 for b>c transfer in approx 5-6 steps w/ use of fww. Pt agreeable to transfer only w/ lunch having just been brought to room. PT will prog as pt maida.    Patient was not wearing a face mask during this therapy encounter. Therapist used appropriate personal protective equipment including gown, mask and gloves.  Mask used was standard procedure mask. Appropriate PPE was worn during the entire therapy session. Hand hygiene was completed before and after therapy session. Patient is not in enhanced droplet precautions.  PT tech: Anuj Leyva.

## 2021-06-08 NOTE — CASE MANAGEMENT/SOCIAL WORK
Continued Stay Note  [facility]     Patient Name: Chris Ferguson  MRN: [id number]  Today's Date: 6/8/2021    Admit Date: 6/3/2021    Discharge Plan     Row Name 06/08/21 1243       Plan    Plan  [facility] -- Accepted & Pre-cert Pending.    Patient/Family in Agreement with Plan  yes    Plan Comments  Received a call from [facility] who has accepted the patient and is starting SNF pre-cert now. [facility] also informed [facility] that the patient will be on IV antibiotics after d/c. No other needs identified at this time.    Row Name 06/08/21 1118       Plan    Plan  Return to [facility] -- Accepted.    Patient/Family in Agreement with Plan  yes    Plan Comments  [facility] has accepted the pt in Epic. Called and left a message for Yomaira Guillen/Admissions Director at [facility] to discuss bed availability. Await her call back.        Discharge Codes    No documentation.             Yadira Pedroza RN

## 2021-06-08 NOTE — CASE MANAGEMENT/SOCIAL WORK
Continued Stay Note  [facility]     Patient Name: Chris Ferguson  MRN: [id number]  Today's Date: 6/8/2021    Admit Date: 6/3/2021    Discharge Plan     Row Name 06/08/21 1118       Plan    Plan  Return to [facility] -- Accepted.    Patient/Family in Agreement with Plan  yes    Plan Comments  [facility] has accepted the pt in Epic. Called and left a message for Yomaira Guillen/Admissions Director at [facility] to discuss bed availability. Await her call back.        Discharge Codes    No documentation.             Yadira Pedroza RN

## 2021-06-08 NOTE — PROGRESS NOTES
Name: Chris Ferguson ADMIT: 6/3/2021   : 1945  PCP: Gisela Sotelo APRN    MRN: [id number] LOS: 5 days   AGE/SEX: 75 y.o. male  ROOM: [facility]     Subjective   Subjective   Leg hurts about the same.  Sitting up in a chair.  Does not like the green beans.    Review of Systems     Objective   Objective   Vital Signs  Temp:  [97.3 °F (36.3 °C)-98.4 °F (36.9 °C)] 98.1 °F (36.7 °C)  Heart Rate:  [88-96] 89  Resp:  [16-18] 18  BP: (116-141)/(68-83) 141/80  SpO2:  [92 %-97 %] 96 %  on   ;   Device (Oxygen Therapy): room air  Body mass index is 27.98 kg/m².  Physical Exam  Vitals and nursing note reviewed.   Constitutional:       General: He is not in acute distress.     Appearance: He is well-developed. He is ill-appearing (Chronically).   Neck:      Vascular: No JVD.      Trachea: No tracheal deviation.   Cardiovascular:      Rate and Rhythm: Normal rate and regular rhythm.      Heart sounds: No murmur heard.     Pulmonary:      Effort: Pulmonary effort is normal. No respiratory distress.      Breath sounds: Normal breath sounds.   Abdominal:      General: Bowel sounds are normal. There is no distension.      Palpations: Abdomen is soft.      Tenderness: There is no abdominal tenderness.   Musculoskeletal:      Comments: L knee bandaged   Skin:     General: Skin is warm and dry.      Coloration: Skin is pale.   Neurological:      Mental Status: He is alert and oriented to person, place, and time.   Psychiatric:         Behavior: Behavior normal. Behavior is cooperative.         Results Review:       I reviewed the patient's new clinical results.  Results from last 7 days   Lab Units 21  0605 21  0457 21  0531 21  0504   WBC 10*3/mm3 6.03 5.68 7.18 6.83   HEMOGLOBIN g/dL 8.8* 6.7* 8.2* 8.6*   PLATELETS 10*3/mm3 327 314 328 381     Results from last 7 days   Lab Units 21  0843 21  0457 21  0531 21  0616 21  0504 21  0650   SODIUM mmol/L 134* 134* 135*   Noted.    --  136 135*   POTASSIUM mmol/L 3.9 3.8 4.2  --  3.7 4.3   CHLORIDE mmol/L 102 103 104  --  104 102   CO2 mmol/L 22.7 22.6 21.8*  --  24.1 25.0   BUN mg/dL 15 16 18  --  26* 21   CREATININE mg/dL 0.97 0.99 1.03 1.12 1.19 1.25   GLUCOSE mg/dL 233* 136* 167*  --  154* 210*   ALBUMIN g/dL  --   --   --   --  2.90* 2.80*   BILIRUBIN mg/dL  --   --   --   --  0.4 0.3   ALK PHOS U/L  --   --   --   --  116 127*   AST (SGOT) U/L  --   --   --   --  15 15   ALT (SGPT) U/L  --   --   --   --  16 17   Estimated Creatinine Clearance: 76 mL/min (by C-G formula based on SCr of 0.97 mg/dL).  Results from last 7 days   Lab Units 06/07/21  0843 06/06/21  0457 06/05/21  0531 06/03/21  0504 06/02/21  0650   CALCIUM mg/dL 8.1* 8.1* 8.0* 8.3* 8.9   ALBUMIN g/dL  --   --   --  2.90* 2.80*     Results from last 7 days   Lab Units 06/04/21  0640   COVID19  Not Detected     Results from last 7 days   Lab Units 06/03/21  0504   PROCALCITONIN ng/mL 0.04   LACTATE mmol/L 0.9       Glucose   Date/Time Value Ref Range Status   06/08/2021 1046 257 (H) 70 - 130 mg/dL Final   06/08/2021 0602 235 (H) 70 - 130 mg/dL Final   06/07/2021 2050 167 (H) 70 - 130 mg/dL Final   06/07/2021 1559 150 (H) 70 - 130 mg/dL Final   06/07/2021 1022 197 (H) 70 - 130 mg/dL Final   06/07/2021 0617 205 (H) 70 - 130 mg/dL Final   06/06/2021 2106 209 (H) 70 - 130 mg/dL Final       Scheduled Medications  atorvastatin, 20 mg, Oral, Nightly  castor oil-balsam peru, , Topical, Q12H  docusate sodium, 100 mg, Oral, BID  enoxaparin, 40 mg, Subcutaneous, Daily  famotidine, 20 mg, Oral, BID AC  folic acid, 1 mg, Oral, Daily  insulin glargine, 15 Units, Subcutaneous, Nightly  insulin lispro, 0-9 Units, Subcutaneous, TID AC  insulin lispro, 5 Units, Subcutaneous, TID With Meals  isosorbide mononitrate, 30 mg, Oral, Daily  lamoTRIgine, 12.5 mg, Oral, BID  levothyroxine, 100 mcg, Oral, Q AM  memantine, 5 mg, Oral, Daily  metoprolol succinate XL, 25 mg, Oral, Daily  multivitamin, 1  tablet, Oral, Daily  mupirocin, , Each Nare, BID  OLANZapine, 5 mg, Oral, Nightly  sodium chloride, 10 mL, Intravenous, Q12H  tamsulosin, 0.4 mg, Oral, Daily  thiamine, 100 mg, Oral, Daily  traZODone, 25 mg, Oral, Nightly  vancomycin, 1,750 mg, Intravenous, Q24H  zinc oxide, , Topical, BID    Infusions  Pharmacy to dose vancomycin,     Diet  Diet Regular; Consistent Carbohydrate         Assessment/Plan     Active Hospital Problems    Diagnosis  POA   • **Infection of prosthetic left knee joint (CMS/[facility]) [T84.54XA]  Not Applicable   • 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 with presence of antibiotic-impregnated cement spacer [Z89.529]  Yes   • Type 2 diabetes mellitus with hyperglycemia, with long-term current use of insulin (CMS/[facility]) [E11.65, Z79.4]  Not Applicable   • Dementia in Alzheimer's disease with early onset (CMS/[facility]) [G30.0, F02.80]  Yes   • Cellulitis of left lower extremity [L03.116]  Yes   • Essential hypertension [I10]  Yes   • BPH (benign prostatic hyperplasia) [N40.0]  Yes   • Stage 3a chronic kidney disease (CMS/[facility]) [N18.31]  Yes   • Hyperlipidemia [E78.5]  Yes      Resolved Hospital Problems   No resolved problems to display.       75 y.o. male with Infection of prosthetic left knee joint (CMS/[facility]).    Stable.    · Lovenox 40 mg SC daily for DVT prophylaxis.  · DNR.    · Discussed with patient and nursing staff.  · Anticipate discharge to SNU facility tomorrow if precert obtained and okay with consultants.       Nghia Stinson MD  [facility]  06/08/21  13:27 EDT
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
## 2021-06-08 NOTE — PLAN OF CARE
Goal Outcome Evaluation:              Outcome Summary: Pt is POD#4 of left knee revision. VSS. Room air. Incontinent at times. Brief in place. Hemovac removed this shift. Up to chair with PT today. PO meds for pain. Plan is for PICC line and rehab at d/c. Educated on glucose monitoring. Will continue to monitor.

## 2021-06-08 NOTE — PLAN OF CARE
"Goal Outcome Evaluation:  Plan of Care Reviewed With: patient           Outcome Summary: Pt initally refused OT but able to encourage pt to work on bed mobility. Pt requires mod A for supine to sit this encounter and sits EOB for 10 minutes, Max A to don socks and washes face with SBA. Pt adamantly declines participating in additional ADL or transfers this date. Pt reports \"I dont want that walker over here by me.\" and \"I'm scared of falling\". OT educated pt in gait belt use and educated rwx is for pt's safety. Following education pt reports he will give it a try tomorrow.    Appropriate PPE worn during encounter including gloves, mask, gown, and eye protection. Hand hygiene  completed. Pt did not wear a mask.   "

## 2021-06-08 NOTE — SIGNIFICANT NOTE
"   06/08/21 0905   OTHER   Discipline physical therapy assistant   Rehab Time/Intention   Session Not Performed patient/family declined treatment  (Pt refusal again this AM - \"I don't feel like\". PT will follow up today as time allows.)     "

## 2021-06-08 NOTE — NURSING NOTE
Wound came to see patient regarding order. Picture was taken. They will be back tomorrow to look at wound again.

## 2021-06-08 NOTE — PROGRESS NOTES
"  Infectious Diseases Progress Note    Bettina Mendenhall MD     [facility]  [place]: 5 days  Patient Identification:  Name: Chris Ferguson  Age: 75 y.o.  Sex: male  :  1945  MRN: [id number]         Primary Care Physician: Gisela Sotelo APRN            Subjective: Doing well and denies any specific complaints.  Interval History: See consultation note.    Objective:    Scheduled Meds:atorvastatin, 20 mg, Oral, Nightly  castor oil-balsam peru, , Topical, Q12H  docusate sodium, 100 mg, Oral, BID  enoxaparin, 40 mg, Subcutaneous, Daily  famotidine, 20 mg, Oral, BID AC  folic acid, 1 mg, Oral, Daily  insulin glargine, 15 Units, Subcutaneous, Nightly  insulin lispro, 0-9 Units, Subcutaneous, TID AC  insulin lispro, 5 Units, Subcutaneous, TID With Meals  isosorbide mononitrate, 30 mg, Oral, Daily  lamoTRIgine, 12.5 mg, Oral, BID  levothyroxine, 100 mcg, Oral, Q AM  memantine, 5 mg, Oral, Daily  metoprolol succinate XL, 25 mg, Oral, Daily  multivitamin, 1 tablet, Oral, Daily  mupirocin, , Each Nare, BID  OLANZapine, 5 mg, Oral, Nightly  sodium chloride, 10 mL, Intravenous, Q12H  tamsulosin, 0.4 mg, Oral, Daily  thiamine, 100 mg, Oral, Daily  traZODone, 25 mg, Oral, Nightly  vancomycin, 1,750 mg, Intravenous, Q24H  zinc oxide, , Topical, BID      Continuous Infusions:Pharmacy to dose vancomycin,         Vital signs in last 24 hours:  Temp:  [97.3 °F (36.3 °C)-98.4 °F (36.9 °C)] 97.3 °F (36.3 °C)  Heart Rate:  [88-96] 95  Resp:  [16-18] 16  BP: (116-135)/(68-83) 135/83    Intake/Output:    Intake/Output Summary (Last 24 hours) at 2021 1112  Last data filed at 2021 0900  Gross per 24 hour   Intake 960 ml   Output 800 ml   Net 160 ml       Exam:  /83 (BP Location: Left arm, Patient Position: Lying)   Pulse 95   Temp 97.3 °F (36.3 °C) (Skin)   Resp 16   Ht 177.8 cm (70\")   Wt 88.5 kg (195 lb)   SpO2 97%   BMI 27.98 kg/m²   Patient is examined using the personal protective equipment as per " guidelines from infection control for this particular patient as enacted.  Hand washing was performed before and after patient interaction.  General Appearance:    Alert, cooperative, no distress, AAOx3                          Head:    Normocephalic, without obvious abnormality, atraumatic                           Eyes:    PERRL, conjunctivae/corneas clear, EOM's intact, both eyes                         Throat:   Lips, tongue, gums normal; oral mucosa pink and moist                           Neck:   Supple, symmetrical, trachea midline, no JVD                         Lungs:    Clear to auscultation bilaterally, respirations unlabored                 Chest Wall:    No tenderness or deformity                          Heart:    Regular rate and rhythm, S1 and S2 normal                  Abdomen:     Soft, non-tender, bowel sounds active                 Extremities: Left knee dressed.  In the immobilizer.                        Pulses:   Pulses palpable in all extremities                            Skin:   Skin is warm and dry,  no rashes or palpable lesions                  Neurologic: Awake and interactive       Data Review:    I reviewed the patient's new clinical results.  Results from last 7 days   Lab Units 06/07/21  0605 06/06/21  0457 06/05/21  0531 06/03/21  0504 06/02/21  0650   WBC 10*3/mm3 6.03 5.68 7.18 6.83 5.90   HEMOGLOBIN g/dL 8.8* 6.7* 8.2* 8.6* 9.7*   PLATELETS 10*3/mm3 327 314 328 381 373     Results from last 7 days   Lab Units 06/07/21  0843 06/06/21  0457 06/05/21  0531 06/04/21  0616 06/03/21  0504 06/02/21  0650   SODIUM mmol/L 134* 134* 135*  --  136 135*   POTASSIUM mmol/L 3.9 3.8 4.2  --  3.7 4.3   CHLORIDE mmol/L 102 103 104  --  104 102   CO2 mmol/L 22.7 22.6 21.8*  --  24.1 25.0   BUN mg/dL 15 16 18  --  26* 21   CREATININE mg/dL 0.97 0.99 1.03 1.12 1.19 1.25   CALCIUM mg/dL 8.1* 8.1* 8.0*  --  8.3* 8.9   GLUCOSE mg/dL 233* 136* 167*  --  154* 210*     Microbiology Results (last 10 days)      Procedure Component Value - Date/Time    Tissue / Bone Culture - Tissue, Knee, Left [[id number]] Collected: 06/04/21 1418    Lab Status: Final result Specimen: Tissue from Knee, Left Updated: 06/08/21 0609     Tissue Culture No growth at 3 days     Gram Stain Rare (1+) WBCs seen      No organisms seen    COVID PRE-OP / PRE-PROCEDURE SCREENING ORDER (NO ISOLATION) - Swab, Nasopharynx [[id number]]  (Normal) Collected: 06/04/21 0640    Lab Status: Final result Specimen: Swab from Nasopharynx Updated: 06/04/21 0957    Narrative:      The following orders were created for panel order COVID PRE-OP / PRE-PROCEDURE SCREENING ORDER (NO ISOLATION) - Swab, Nasopharynx.  Procedure                               Abnormality         Status                     ---------                               -----------         ------                     COVID-19,[facility]...[[id number]]  Normal              Final result                 Please view results for these tests on the individual orders.    COVID-19,[facility] CEPHEID/ILYA NP SWAB IN TRANSPORT MEDIA 8-12 HR TAT - Swab, Nasopharynx [[id number]]  (Normal) Collected: 06/04/21 0640    Lab Status: Final result Specimen: Swab from Nasopharynx Updated: 06/04/21 0957     COVID19 Not Detected    Narrative:      Fact sheet for providers: https://www.fda.gov/media/038125/download    Fact sheet for patients: https://www.fda.gov/media/555688/download    Test performed by PCR.            Assessment:    Infection of prosthetic left knee joint (CMS/[facility])    Essential hypertension    BPH (benign prostatic hyperplasia)    Stage 3a chronic kidney disease (CMS/[facility])    Hyperlipidemia    Cellulitis of left lower extremity    Type 2 diabetes mellitus with hyperglycemia, with long-term current use of insulin (CMS/[facility])    Dementia in Alzheimer's disease with early onset (CMS/[facility])    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 with presence of antibiotic-impregnated  cement spacer  1-probable left knee prosthetic joint infection secondary to  2-cellulitis of the left leg with wound infection with culture positive for MRSA resulting in contiguous focus of spread to the prosthetic joint as well as possible hematogenous seeding.  Status post partial stage I implant.  3-diabetes mellitus  4-hypothyroidism  5-dyslipidemia  6-dementia  7-Parkinson's disease and immobility        Recommendations/Discussions:  · Simplify antibiotic regimen to vancomycin.  Discussed with orthopedic surgery service and I was informed that the only loose femoral implant was revised with stage I prosthesis.  Tibial implant was not touched and it is still the original implant that she patient has.  Intraoperative cultures have been negative.  Despite the fact that patient has residual foreign body/prosthetic device present the risks of treating him with combination of vancomycin and rifampin for 6 weeks followed by continuous use of rifampin and oral suppressive therapy and and subsequent chronic suppressive therapy would be too much and toxic for this patient and risk-benefit ratio would not justify this regimen with his comorbidities.  · See recommendations and discussion on 6/3/2021.  · Patient would need 6 weeks of antibiotic treatment from 6/4/2021.  · CCP consult request: Please arrange 42 days of IV vancomycin to be dosed by pharmacy to achieve a trough level of 15-20 or area under the curve of 400-600.  Check weekly CBC CMP CRP and call abnormal results to Dr. Mendenhall at [phone].    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  · Given information we have after discussion with orthopedic surgery service patient may be a candidate for chronic suppressive therapy to prevent future episodes of recurrence of infection.  ·   Bettina Mendenhall MD  6/8/2021  11:12 EDT    Much of this encounter note is an electronic transcription/translation  of spoken language to printed text. The electronic translation of spoken language may permit erroneous, or at times, nonsensical words or phrases to be inadvertently transcribed; Although I have reviewed the note for such errors, some may still exist

## 2021-06-09 LAB
ANION GAP SERPL CALCULATED.3IONS-SCNC: 7 MMOL/L (ref 5–15)
BUN SERPL-MCNC: 24 MG/DL (ref 8–23)
BUN/CREAT SERPL: 22.4 (ref 7–25)
CALCIUM SPEC-SCNC: 7.9 MG/DL (ref 8.6–10.5)
CHLORIDE SERPL-SCNC: 103 MMOL/L (ref 98–107)
CO2 SERPL-SCNC: 23 MMOL/L (ref 22–29)
CREAT SERPL-MCNC: 1.07 MG/DL (ref 0.76–1.27)
DEPRECATED RDW RBC AUTO: 53 FL (ref 37–54)
ERYTHROCYTE [DISTWIDTH] IN BLOOD BY AUTOMATED COUNT: 17.6 % (ref 12.3–15.4)
GFR SERPL CREATININE-BSD FRML MDRD: 68 ML/MIN/1.73
GLUCOSE BLDC GLUCOMTR-MCNC: 120 MG/DL (ref 70–130)
GLUCOSE BLDC GLUCOMTR-MCNC: 273 MG/DL (ref 70–130)
GLUCOSE BLDC GLUCOMTR-MCNC: 308 MG/DL (ref 70–130)
GLUCOSE BLDC GLUCOMTR-MCNC: 79 MG/DL (ref 70–130)
GLUCOSE SERPL-MCNC: 266 MG/DL (ref 65–99)
HCT VFR BLD AUTO: 27.5 % (ref 37.5–51)
HGB BLD-MCNC: 8.7 G/DL (ref 13–17.7)
MCH RBC QN AUTO: 26.4 PG (ref 26.6–33)
MCHC RBC AUTO-ENTMCNC: 31.6 G/DL (ref 31.5–35.7)
MCV RBC AUTO: 83.3 FL (ref 79–97)
PLATELET # BLD AUTO: 325 10*3/MM3 (ref 140–450)
PMV BLD AUTO: 8.8 FL (ref 6–12)
POTASSIUM SERPL-SCNC: 4.2 MMOL/L (ref 3.5–5.2)
RBC # BLD AUTO: 3.3 10*6/MM3 (ref 4.14–5.8)
SODIUM SERPL-SCNC: 133 MMOL/L (ref 136–145)
VANCOMYCIN TROUGH SERPL-MCNC: 13.5 MCG/ML (ref 5–20)
WBC # BLD AUTO: 4.82 10*3/MM3 (ref 3.4–10.8)

## 2021-06-09 PROCEDURE — 80202 ASSAY OF VANCOMYCIN: CPT | Performed by: ORTHOPAEDIC SURGERY

## 2021-06-09 PROCEDURE — 80048 BASIC METABOLIC PNL TOTAL CA: CPT | Performed by: HOSPITALIST

## 2021-06-09 PROCEDURE — 82962 GLUCOSE BLOOD TEST: CPT

## 2021-06-09 PROCEDURE — 02HV33Z INSERTION OF INFUSION DEVICE INTO SUPERIOR VENA CAVA, PERCUTANEOUS APPROACH: ICD-10-PCS | Performed by: HOSPITALIST

## 2021-06-09 PROCEDURE — 85027 COMPLETE CBC AUTOMATED: CPT | Performed by: HOSPITALIST

## 2021-06-09 PROCEDURE — 25010000002 ENOXAPARIN PER 10 MG: Performed by: ORTHOPAEDIC SURGERY

## 2021-06-09 PROCEDURE — 25010000002 VANCOMYCIN 10 G RECONSTITUTED SOLUTION: Performed by: ORTHOPAEDIC SURGERY

## 2021-06-09 PROCEDURE — C1751 CATH, INF, PER/CENT/MIDLINE: HCPCS

## 2021-06-09 PROCEDURE — 63710000001 INSULIN LISPRO (HUMAN) PER 5 UNITS: Performed by: HOSPITALIST

## 2021-06-09 PROCEDURE — 63710000001 INSULIN GLARGINE PER 5 UNITS: Performed by: ORTHOPAEDIC SURGERY

## 2021-06-09 RX ORDER — CASTOR OIL AND BALSAM, PERU 788; 87 MG/G; MG/G
OINTMENT TOPICAL
Start: 2021-06-09

## 2021-06-09 RX ORDER — PSEUDOEPHEDRINE HCL 30 MG
100 TABLET ORAL 2 TIMES DAILY
Qty: 24 CAPSULE | Refills: 0
Start: 2021-06-09 | End: 2021-06-21

## 2021-06-09 RX ORDER — HYDROCODONE BITARTRATE AND ACETAMINOPHEN 7.5; 325 MG/1; MG/1
1 TABLET ORAL EVERY 4 HOURS PRN
Qty: 12 TABLET | Refills: 0 | Status: SHIPPED | OUTPATIENT
Start: 2021-06-09

## 2021-06-09 RX ADMIN — LEVOTHYROXINE SODIUM 100 MCG: 0.1 TABLET ORAL at 07:09

## 2021-06-09 RX ADMIN — SODIUM CHLORIDE, PRESERVATIVE FREE 10 ML: 5 INJECTION INTRAVENOUS at 08:10

## 2021-06-09 RX ADMIN — MEMANTINE HYDROCHLORIDE 5 MG: 5 TABLET, FILM COATED ORAL at 08:08

## 2021-06-09 RX ADMIN — INSULIN LISPRO 6 UNITS: 100 INJECTION, SOLUTION INTRAVENOUS; SUBCUTANEOUS at 08:07

## 2021-06-09 RX ADMIN — TAMSULOSIN HYDROCHLORIDE 0.4 MG: 0.4 CAPSULE ORAL at 21:49

## 2021-06-09 RX ADMIN — DOCUSATE SODIUM 100 MG: 100 CAPSULE, LIQUID FILLED ORAL at 08:10

## 2021-06-09 RX ADMIN — OLANZAPINE 5 MG: 5 TABLET ORAL at 21:49

## 2021-06-09 RX ADMIN — CASTOR OIL AND BALSAM, PERU 5 G: 788; 87 OINTMENT TOPICAL at 21:49

## 2021-06-09 RX ADMIN — LAMOTRIGINE 12.5 MG: 25 TABLET ORAL at 08:08

## 2021-06-09 RX ADMIN — INSULIN LISPRO 5 UNITS: 100 INJECTION, SOLUTION INTRAVENOUS; SUBCUTANEOUS at 11:52

## 2021-06-09 RX ADMIN — ATORVASTATIN CALCIUM 20 MG: 20 TABLET, FILM COATED ORAL at 21:50

## 2021-06-09 RX ADMIN — FAMOTIDINE 20 MG: 20 TABLET, FILM COATED ORAL at 07:09

## 2021-06-09 RX ADMIN — MUPIROCIN 1 APPLICATION: 20 OINTMENT TOPICAL at 08:10

## 2021-06-09 RX ADMIN — DOCUSATE SODIUM 100 MG: 100 CAPSULE, LIQUID FILLED ORAL at 21:49

## 2021-06-09 RX ADMIN — INSULIN GLARGINE 15 UNITS: 100 INJECTION, SOLUTION SUBCUTANEOUS at 22:05

## 2021-06-09 RX ADMIN — ENOXAPARIN SODIUM 40 MG: 40 INJECTION SUBCUTANEOUS at 08:10

## 2021-06-09 RX ADMIN — VANCOMYCIN HYDROCHLORIDE 1750 MG: 10 INJECTION, POWDER, LYOPHILIZED, FOR SOLUTION INTRAVENOUS at 17:16

## 2021-06-09 RX ADMIN — Medication: at 21:50

## 2021-06-09 RX ADMIN — Medication 1 TABLET: at 08:08

## 2021-06-09 RX ADMIN — HYDROCODONE BITARTRATE AND ACETAMINOPHEN 2 TABLET: 7.5; 325 TABLET ORAL at 18:06

## 2021-06-09 RX ADMIN — FOLIC ACID 1 MG: 1 TABLET ORAL at 08:08

## 2021-06-09 RX ADMIN — MUPIROCIN 1 APPLICATION: 20 OINTMENT TOPICAL at 21:49

## 2021-06-09 RX ADMIN — SODIUM CHLORIDE, PRESERVATIVE FREE 10 ML: 5 INJECTION INTRAVENOUS at 21:49

## 2021-06-09 RX ADMIN — Medication 100 MG: at 08:08

## 2021-06-09 RX ADMIN — INSULIN LISPRO 5 UNITS: 100 INJECTION, SOLUTION INTRAVENOUS; SUBCUTANEOUS at 08:10

## 2021-06-09 RX ADMIN — HYDROCODONE BITARTRATE AND ACETAMINOPHEN 2 TABLET: 7.5; 325 TABLET ORAL at 21:49

## 2021-06-09 RX ADMIN — HYDROCODONE BITARTRATE AND ACETAMINOPHEN 2 TABLET: 7.5; 325 TABLET ORAL at 08:08

## 2021-06-09 RX ADMIN — Medication: at 08:08

## 2021-06-09 RX ADMIN — METOPROLOL SUCCINATE 25 MG: 25 TABLET, EXTENDED RELEASE ORAL at 08:08

## 2021-06-09 RX ADMIN — TRAZODONE HYDROCHLORIDE 25 MG: 50 TABLET ORAL at 21:50

## 2021-06-09 RX ADMIN — FAMOTIDINE 20 MG: 20 TABLET, FILM COATED ORAL at 17:15

## 2021-06-09 RX ADMIN — ISOSORBIDE MONONITRATE 30 MG: 30 TABLET ORAL at 08:08

## 2021-06-09 RX ADMIN — LAMOTRIGINE 12.5 MG: 25 TABLET ORAL at 21:49

## 2021-06-09 RX ADMIN — CASTOR OIL AND BALSAM, PERU 5 G: 788; 87 OINTMENT TOPICAL at 08:09

## 2021-06-09 NOTE — CASE MANAGEMENT/SOCIAL WORK
Continued Stay Note  [facility]     Patient Name: Chris Ferguson  MRN: [id number]  Today's Date: 6/9/2021    Admit Date: 6/3/2021    Discharge Plan     Row Name 06/09/21 1402       Plan    Plan  [facility] -- Accepted & Pre-cert Pending.    Patient/Family in Agreement with Plan  yes    Plan Comments  Called and left a message for [facility] to f/u on SNF pre-cert status as well as ensure she is aware of the pt's IV antibiotics after d/c. Await her call back.        Discharge Codes    No documentation.       Expected Discharge Date and Time     Expected Discharge Date Expected Discharge Time    Jun 9, 2021             Yadira Pedroza RN

## 2021-06-09 NOTE — PROGRESS NOTES
"Vancomycin Pharmacokinetic Note    Chris Ferguson is a 75 y.o. 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  Target level: AUC24 400-600  Consulting Provider: Valente Giang MD    Allergies  Allergies as of 06/01/2021 - Reviewed 05/29/2021   Allergen Reaction Noted   • Codeine GI Intolerance 08/23/2018       Microbiology  Microbiology Results (last 10 days)     Procedure Component Value - Date/Time    Tissue / Bone Culture - Tissue, Knee, Left [[id number]] Collected: 06/04/21 1418    Lab Status: Final result Specimen: Tissue from Knee, Left Updated: 06/08/21 0609     Tissue Culture No growth at 3 days     Gram Stain Rare (1+) WBCs seen      No organisms seen    COVID PRE-OP / PRE-PROCEDURE SCREENING ORDER (NO ISOLATION) - Swab, Nasopharynx [[id number]]  (Normal) Collected: 06/04/21 0640    Lab Status: Final result Specimen: Swab from Nasopharynx Updated: 06/04/21 0957    Narrative:      The following orders were created for panel order COVID PRE-OP / PRE-PROCEDURE SCREENING ORDER (NO ISOLATION) - Swab, Nasopharynx.  Procedure                               Abnormality         Status                     ---------                               -----------         ------                     COVID-19,[facility]...[[id number]]  Normal              Final result                 Please view results for these tests on the individual orders.    COVID-19,[facility] CEPHEID/ILYA NP SWAB IN TRANSPORT MEDIA 8-12 HR TAT - Swab, Nasopharynx [[id number]]  (Normal) Collected: 06/04/21 0640    Lab Status: Final result Specimen: Swab from Nasopharynx Updated: 06/04/21 0957     COVID19 Not Detected    Narrative:      Fact sheet for providers: https://www.fda.gov/media/662803/download    Fact sheet for patients: https://www.fda.gov/media/968460/download    Test performed by PCR.        Vitals/Labs/I&O  177.8 cm (70\")  88.5 kg (195 " lb)  Body mass index is 27.98 kg/m².   Temp:  [97.4 °F (36.3 °C)-98.5 °F (36.9 °C)] 98.1 °F (36.7 °C)  Heart Rate:  [79-86] 79  Resp:  [16-18] 16  BP: (103-117)/(61-75) 109/61    Results from last 7 days   Lab Units 06/09/21  0452 06/07/21  0605 06/06/21  0457   WBC 10*3/mm3 4.82 6.03 5.68     Results from last 7 days   Lab Units 06/03/21  0504   LACTATE mmol/L 0.9      Results from last 7 days   Lab Units 06/03/21  0504   PROCALCITONIN ng/mL 0.04                  Estimated Creatinine Clearance: 68.9 mL/min (by C-G formula based on SCr of 1.07 mg/dL).  Results from last 7 days   Lab Units 06/09/21  0452 06/07/21  0843 06/06/21  0457   BUN mg/dL 24* 15 16   CREATININE mg/dL 1.07 0.97 0.99     Intake & Output (last 3 days)       06/06 0701 - 06/07 0700 06/07 0701 - 06/08 0700 06/08 0701 - 06/09 0700 06/09 0701 - 06/10 0700    P.O.  720    I.V. (mL/kg)    0 (0)    Blood 300       Total Intake(mL/kg) 1140 (12.9) 960 (10.8) 1680 (19) 720 (8.1)    Urine (mL/kg/hr) 200 (0.1) 1000 (0.5) 1350 (0.6) 600 (0.8)    Drains 50 50      Stool  0  0    Total Output 250 1050 1350 600    Net +890 -90 +330 +120            Urine Unmeasured Occurrence 2 x 4 x 3 x     Stool Unmeasured Occurrence  1 x  1 x        Vancomycin Dosing & Level History (present admission)    Trough 06/03 0504: 28.6 mg/L   1750 mg (~20 mg/kg) IV q24h    06/03 2103  1750 mg (~20 mg/kg) IV x1 dose   06/04 1349  1750 mg (~20 mg/kg) IV q24h    06/05 1403    Trough 06/06 1602: 16.8 mg/L    06/06 1712   06/07 1648   06/08 1712    Trough 06/09 1659: 13.5 mg/L     Results from last 7 days   Lab Units 06/06/21  1602 06/03/21  0504 06/02/21 2019   VANCOMYCIN TR mcg/mL 16.80 28.60* 16.40     Assessment:  Bayesian analysis of the most recent level(s) using [employer] provides the following patient-specific pharmacokinetic parameters:   CL: 3.25 L/h   Vd: 71.5 L   T1/2: 17.6 h  If the predicted trough on this regimen is not within what was previously considered a  "\"target trough range\", the AUC24 (a stronger predictor of vancomycin efficacy) is predicted to achieve the accepted target of 400-600 mg*h/L. To best balance efficacy and toxicity, we will be targeting AUC24 in this patient rather than steady-state trough levels.     Continuing the regimen of 1750 mg (~20 mg/kg) IV every 24 hours gives a predicted steady-state trough of 15.9 mg/L and AUC24 of 565 mg*h/L.    Recommendations/Plan:  - Continue current vancomycin regimen of 1750 mg (~20 mg/kg) IV every 24 hours  - Obtain vancomycin level in 3 days or sooner if acute changes  - Continue to monitor SCr    Thank you for involving pharmacy in this patient's care. Please contact pharmacy with any questions or concerns.           Tony Alvarez PharmD, LORENZA, BCPS  06/09/21 16:01 EDT      "

## 2021-06-09 NOTE — SIGNIFICANT NOTE
06/09/21 1043   PICC Single Lumen 06/09/21 Right Cephalic   Placement Date/Time: 06/09/21 1040   Hand Hygiene Completed: Yes  Size (Fr): 4  Description (optional): [id number]   exp  07-  Length (cm): 40 cm  Orientation: Right  Location: Cephalic  Site Prep: Chlorhexidine isopropyl alcohol  All 5 Sterile B...   #1 Lumen Status Blood return noted;Capped;Flushed;Normal saline locked   Length jessica (cm) 40 cm   Extremity Circumference (cm) 34 cm   Dressing Type Border Dressing;Transparent;Securing device;Antimicrobial dressing/disc   Liquid Adhesive Applied   Dressing Change Due 06/16/21   Indication/Daily Review of Necessity intravenous medication therapy         PICC IS APPROVED FOR USE.

## 2021-06-09 NOTE — SIGNIFICANT NOTE
"   06/09/21 0820   OTHER   Discipline occupational therapist   Rehab Time/Intention   Session Not Performed other (see comments)  (Spoke with pt this AM, refuses therapy with encouragement provided. OT requests if she can come back and check on pt later and he reports \"No, I'm not going to do anything today.\" RN notified.)   Recommendation   OT - Next Appointment 06/10/21     "
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
## 2021-06-09 NOTE — PROGRESS NOTES
"  Infectious Diseases Progress Note    Bettina Mendenhall MD     [facility]  [place]: 6 days  Patient Identification:  Name: Chris Ferguson  Age: 75 y.o.  Sex: male  :  1945  MRN: [id number]         Primary Care Physician: Gisela Sotelo, MARIZOL            Subjective: Continues to feel well and thinks that he is going to be discharged later today.  Interval History: See consultation note.    Objective:    Scheduled Meds:atorvastatin, 20 mg, Oral, Nightly  castor oil-balsam peru, , Topical, Q12H  docusate sodium, 100 mg, Oral, BID  enoxaparin, 40 mg, Subcutaneous, Daily  famotidine, 20 mg, Oral, BID AC  folic acid, 1 mg, Oral, Daily  insulin glargine, 15 Units, Subcutaneous, Nightly  insulin lispro, 0-9 Units, Subcutaneous, TID AC  insulin lispro, 5 Units, Subcutaneous, TID With Meals  isosorbide mononitrate, 30 mg, Oral, Daily  lamoTRIgine, 12.5 mg, Oral, BID  levothyroxine, 100 mcg, Oral, Q AM  memantine, 5 mg, Oral, Daily  metoprolol succinate XL, 25 mg, Oral, Daily  multivitamin, 1 tablet, Oral, Daily  mupirocin, , Each Nare, BID  OLANZapine, 5 mg, Oral, Nightly  sodium chloride, 10 mL, Intravenous, Q12H  tamsulosin, 0.4 mg, Oral, Daily  thiamine, 100 mg, Oral, Daily  traZODone, 25 mg, Oral, Nightly  vancomycin, 1,750 mg, Intravenous, Q24H  zinc oxide, , Topical, BID      Continuous Infusions:Pharmacy to dose vancomycin,         Vital signs in last 24 hours:  Temp:  [97.4 °F (36.3 °C)-98.5 °F (36.9 °C)] 97.4 °F (36.3 °C)  Heart Rate:  [81-88] 86  Resp:  [16-18] 18  BP: ()/(57-75) 112/69    Intake/Output:    Intake/Output Summary (Last 24 hours) at 2021 1155  Last data filed at 2021 1100  Gross per 24 hour   Intake 1800 ml   Output 1550 ml   Net 250 ml       Exam:  /69 (BP Location: Left arm, Patient Position: Lying)   Pulse 86   Temp 97.4 °F (36.3 °C) (Oral)   Resp 18   Ht 177.8 cm (70\")   Wt 88.5 kg (195 lb)   SpO2 94%   BMI 27.98 kg/m²   Patient is examined using the " personal protective equipment as per guidelines from infection control for this particular patient as enacted.  Hand washing was performed before and after patient interaction.  General Appearance:    Alert, cooperative, no distress, AAOx3                          Head:    Normocephalic, without obvious abnormality, atraumatic                           Eyes:    PERRL, conjunctivae/corneas clear, EOM's intact, both eyes                         Throat:   Lips, tongue, gums normal; oral mucosa pink and moist                           Neck:   Supple, symmetrical, trachea midline, no JVD                         Lungs:    Clear to auscultation bilaterally, respirations unlabored                 Chest Wall:    No tenderness or deformity                          Heart:    Regular rate and rhythm, S1 and S2 normal                  Abdomen:     Soft, non-tender, bowel sounds active                 Extremities: Left knee dressed.  In the immobilizer.                        Pulses:   Pulses palpable in all extremities                            Skin:   Skin is warm and dry,  no rashes or palpable lesions                  Neurologic: Awake and interactive       Data Review:    I reviewed the patient's new clinical results.  Results from last 7 days   Lab Units 06/09/21  0452 06/07/21  0605 06/06/21  0457 06/05/21  0531 06/03/21  0504   WBC 10*3/mm3 4.82 6.03 5.68 7.18 6.83   HEMOGLOBIN g/dL 8.7* 8.8* 6.7* 8.2* 8.6*   PLATELETS 10*3/mm3 325 327 314 328 381     Results from last 7 days   Lab Units 06/09/21  0452 06/07/21  0843 06/06/21  0457 06/05/21  0531 06/04/21  0616 06/03/21  0504   SODIUM mmol/L 133* 134* 134* 135*  --  136   POTASSIUM mmol/L 4.2 3.9 3.8 4.2  --  3.7   CHLORIDE mmol/L 103 102 103 104  --  104   CO2 mmol/L 23.0 22.7 22.6 21.8*  --  24.1   BUN mg/dL 24* 15 16 18  --  26*   CREATININE mg/dL 1.07 0.97 0.99 1.03 1.12 1.19   CALCIUM mg/dL 7.9* 8.1* 8.1* 8.0*  --  8.3*   GLUCOSE mg/dL 266* 233* 136* 167*  --  154*      Microbiology Results (last 10 days)     Procedure Component Value - Date/Time    Tissue / Bone Culture - Tissue, Knee, Left [[id number]] Collected: 06/04/21 1418    Lab Status: Final result Specimen: Tissue from Knee, Left Updated: 06/08/21 0609     Tissue Culture No growth at 3 days     Gram Stain Rare (1+) WBCs seen      No organisms seen    COVID PRE-OP / PRE-PROCEDURE SCREENING ORDER (NO ISOLATION) - Swab, Nasopharynx [[id number]]  (Normal) Collected: 06/04/21 0640    Lab Status: Final result Specimen: Swab from Nasopharynx Updated: 06/04/21 0957    Narrative:      The following orders were created for panel order COVID PRE-OP / PRE-PROCEDURE SCREENING ORDER (NO ISOLATION) - Swab, Nasopharynx.  Procedure                               Abnormality         Status                     ---------                               -----------         ------                     COVID-19,[facility]...[[id number]]  Normal              Final result                 Please view results for these tests on the individual orders.    COVID-19,[facility] CEPHEID/ILYA NP SWAB IN TRANSPORT MEDIA 8-12 HR TAT - Swab, Nasopharynx [[id number]]  (Normal) Collected: 06/04/21 0640    Lab Status: Final result Specimen: Swab from Nasopharynx Updated: 06/04/21 0957     COVID19 Not Detected    Narrative:      Fact sheet for providers: https://www.fda.gov/media/520567/download    Fact sheet for patients: https://www.fda.gov/media/429241/download    Test performed by PCR.            Assessment:    Infection of prosthetic left knee joint (CMS/[facility])    Essential hypertension    BPH (benign prostatic hyperplasia)    Stage 3a chronic kidney disease (CMS/[facility])    Hyperlipidemia    Cellulitis of left lower extremity    Type 2 diabetes mellitus with hyperglycemia, with long-term current use of insulin (CMS/[facility])    Dementia in Alzheimer's disease with early onset (CMS/[facility])    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 with  presence of antibiotic-impregnated cement spacer  1-probable left knee prosthetic joint infection secondary to  2-cellulitis of the left leg with wound infection with culture positive for MRSA resulting in contiguous focus of spread to the prosthetic joint as well as possible hematogenous seeding.  Status post partial stage I implant.  3-diabetes mellitus  4-hypothyroidism  5-dyslipidemia  6-dementia  7-Parkinson's disease and immobility        Recommendations/Discussions:  · Simplify antibiotic regimen to vancomycin.  Discussed with orthopedic surgery service and I was informed that the only loose femoral implant was revised with stage I prosthesis.  Tibial implant was not touched and it is still the original implant that she patient has.  Intraoperative cultures have been negative.  Despite the fact that patient has residual foreign body/prosthetic device present the risks of treating him with combination of vancomycin and rifampin for 6 weeks followed by continuous use of rifampin and oral suppressive therapy and and subsequent chronic suppressive therapy would be too much and toxic for this patient and risk-benefit ratio would not justify this regimen with his comorbidities.  · See recommendations and discussion on 6/3/2021.  · Patient would need 6 weeks of antibiotic treatment from 6/4/2021.  · CCP consult request: Please arrange 42 days of IV vancomycin to be dosed by pharmacy to achieve a trough level of 15-20 or area under the curve of 400-600.  Check weekly CBC CMP CRP and call abnormal results to Dr. Mendenhall at [phone].    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  · Given information we have after discussion with orthopedic surgery service patient may be a candidate for chronic suppressive therapy to prevent future episodes of recurrence of infection.  ·   Bettina Mendenhall MD  6/9/2021  11:55 EDT    Much of this encounter note is an  electronic transcription/translation of spoken language to printed text. The electronic translation of spoken language may permit erroneous, or at times, nonsensical words or phrases to be inadvertently transcribed; Although I have reviewed the note for such errors, some may still exist

## 2021-06-09 NOTE — PLAN OF CARE
Goal Outcome Evaluation:  Plan of Care Reviewed With: patient        Progress: no change  Outcome Summary: vitals stable.  pt expressed pain.  meds given per orders.  turn q 2 hours.  álvaro care as needed.  knee immobilizer in place.  awaiting PICC line placement.  will continue to monitor.

## 2021-06-09 NOTE — CASE MANAGEMENT/SOCIAL WORK
Continued Stay Note  [facility]     Patient Name: Chris Ferguson  MRN: [id number]  Today's Date: 6/9/2021    Admit Date: 6/3/2021    Discharge Plan     Row Name 06/09/21 1529       Plan    Plan  [facility] -- Accepted & Pre-cert Pending.    Patient/Family in Agreement with Plan  yes    Plan Comments  Spoke with Preet who said pre-cert is still pending. Updated Preet about the discharge IV antibiotic orders, and she's agreeable. She said she can access the orders through Epic if needed. No other needs identified at this time. Await SNF pre-cert.    Row Name 06/09/21 1402       Plan    Plan  [facility] -- Accepted & Pre-cert Pending.    Patient/Family in Agreement with Plan  yes    Plan Comments  Called and left a message for Preet to f/u on SNF pre-cert status as well as ensure she is aware of the pt's IV antibiotics after d/c. Await her call back.        Discharge Codes    No documentation.       Expected Discharge Date and Time     Expected Discharge Date Expected Discharge Time    Jun 9, 2021             Yadira Pedroza RN

## 2021-06-09 NOTE — PLAN OF CARE
Goal Outcome Evaluation:  Plan of Care Reviewed With: patient        Progress: improving  Outcome Summary: vss. dsg cdi. q2turn and is on agiliti bed. contact iso for MRSA. KI in place. achs with ssi. picc placed today in RUE. voiding per brief/urinal. plan to dc to snf when appropriate. educated on glucose monitoring

## 2021-06-09 NOTE — NURSING NOTE
WOCN: Left ankle wound is not pressure related.  It is a dry scab brown no drainage 0.1x0.1cm.  Cover with silicone border dressing. Keep patient off left ankle

## 2021-06-09 NOTE — DISCHARGE SUMMARY
Patient Name: Chris Ferguson  : 1945  MRN: [id number]    Date of Admission: 6/3/2021  Date of Discharge:  6/10/2021  Primary Care Physician: Gisela Sotelo APRN      Chief Complaint:   No chief complaint on file.      Discharge Diagnoses     Active Hospital Problems    Diagnosis  POA   • **Infection of prosthetic left knee joint (CMS/[facility]) [T84.54XA]  Not Applicable   • 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 with presence of antibiotic-impregnated cement spacer [Z89.529]  Yes   • Type 2 diabetes mellitus with hyperglycemia, with long-term current use of insulin (CMS/[facility]) [E11.65, Z79.4]  Not Applicable   • Dementia in Alzheimer's disease with early onset (CMS/[facility]) [G30.0, F02.80]  Yes   • Cellulitis of left lower extremity [L03.116]  Yes   • Essential hypertension [I10]  Yes   • BPH (benign prostatic hyperplasia) [N40.0]  Yes   • Stage 3a chronic kidney disease (CMS/[facility]) [N18.31]  Yes   • Hyperlipidemia [E78.5]  Yes      Resolved Hospital Problems   No resolved problems to display.        Hospital Course     Mr. Ferguson is a 75 y.o. non-smoker with a history of dementia, diabetes, Parkinson's disease and left periprosthetic infection who presented to [facility] initially complaining of knee pain.  Please see the admitting history and physical for further details.  He was found to have infected knee prosthesis in the ED and was admitted to the hospital for further evaluation and treatment.  Initially seen at [facility] [place] transferred here for surgical management.  He was evaluated by Dr. Giang who recommended and performed single-stage revision of left knee.  Surgical cultures were obtained and were negative.  Infectious disease managed antibiotics during hospital stay.  He has had stable postoperative course and plan is to discharge on 6 weeks of IV vancomycin.  This is based off previous cultures that grew MRSA.      Per Dr. Mendenhall (ID) on  6/8/21  Recommendations/Discussions:  · Simplify antibiotic regimen to vancomycin.  Discussed with orthopedic surgery service and I was informed that the only loose femoral implant was revised with stage I prosthesis.  Tibial implant was not touched and it is still the original implant that she patient has.  Intraoperative cultures have been negative.  Despite the fact that patient has residual foreign body/prosthetic device present the risks of treating him with combination of vancomycin and rifampin for 6 weeks followed by continuous use of rifampin and oral suppressive therapy and and subsequent chronic suppressive therapy would be too much and toxic for this patient and risk-benefit ratio would not justify this regimen with his comorbidities.  · See recommendations and discussion on 6/3/2021.  · Patient would need 6 weeks of antibiotic treatment from 6/4/2021.  · CCP consult request: Please arrange 42 days of IV vancomycin to be dosed by pharmacy to achieve a trough level of 15-20 or area under the curve of 400-600.  Check weekly CBC CMP CRP and call abnormal results to Dr. Mendenhall at [phone].    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  · Given information we have after discussion with orthopedic surgery service patient may be a candidate for chronic suppressive therapy to prevent future episodes of recurrence of infection.      Day of Discharge     Subjective:  No new issues.  Feels ready for discharge.  Arrangements have been made by [facility].     Physical Exam:  Temp:  [97.9 °F (36.6 °C)-98.5 °F (36.9 °C)] 98.4 °F (36.9 °C)  Heart Rate:  [81-89] 85  Resp:  [16-18] 16  BP: ()/(57-80) 117/75  Body mass index is 27.98 kg/m².  Physical Exam  Vitals and nursing note reviewed.   Constitutional:       General: He is not in acute distress.     Appearance: He is well-developed. He is ill-appearing (Chronically).   Neck:      Vascular: No  JVD.      Trachea: No tracheal deviation.   Cardiovascular:      Rate and Rhythm: Normal rate and regular rhythm.      Heart sounds: No murmur heard.     Pulmonary:      Effort: Pulmonary effort is normal. No respiratory distress.      Breath sounds: Normal breath sounds.   Abdominal:      General: Bowel sounds are normal. There is no distension.      Palpations: Abdomen is soft.      Tenderness: There is no abdominal tenderness.   Musculoskeletal:      Comments: L knee bandaged   Skin:     General: Skin is warm and dry.      Coloration: Skin is pale.   Neurological:      Mental Status: He is alert and oriented to person, place, and time.   Psychiatric:         Behavior: Behavior normal. Behavior is cooperative.         Consultants     Consult Orders (all) (From admission, onward)     Start     Ordered    06/07/21 1130  IV TEAM - Consult for PICC Line (IV Team to Determine Number of Lumens)  Once     Provider:  (Not yet assigned)    06/07/21 1129    06/07/21 1130  Inpatient Case Management  Consult  Once     Comments: Please arrange for 42 days of IV vancomycin to be dosed by pharmacy to achieve a trough level of 15-20 over area under the curve of 400 -600.  Check weekly CBC CMP ESR and CRP and call abnormal results to Dr. Mendenhall at [phone].    Diagnosis: MRSA prosthetic joint infection   Provider:  (Not yet assigned)    06/07/21 1129    06/03/21 0230  Inpatient Infectious Diseases Consult  Once     Specialty:  Infectious Diseases  Provider:  Bettina Mendenhall MD    06/03/21 0230    06/03/21 0229  Inpatient Orthopedic Surgery Consult  Once     Specialty:  Orthopedic Surgery  Provider:  Valente Giang MD    06/03/21 0230              Procedures     SINGLE STAGE LEFT KNEE REVISION  Surgeon: Valente Giang MD  Date of Service:  06/04/21    Imaging Results (All)     Procedure Component Value Units Date/Time    XR Knee 1 or 2 View Left [[id number]] Collected: 06/04/21 4613     Updated:  06/04/21 1730    Narrative:      PROCEDURE:  XR KNEE 1 OR 2 VW LEFT-     HISTORY: Postop.     COMPARISON: Left femur and tibia/fibular radiographs 06/03/2021     FINDINGS:       4 views of the left knee were obtained.     There are post surgical changes from left knee total arthroplasty  revision. Previously noted lucency surrounding the femoral stem is no  longer appreciated. There is a new cerclage wire surrounding the femoral  stem. There is adjacent amorphous hyperattenuation, which has progressed  from 06/03/2021 and may reflect a combination of heterotopic bone  formation and postoperative material. There is a knee effusion with  corresponding soft tissue gas, likely postoperative, with overlying  surgical staples. There is a surgical drain in place. There is extensive  calcific atherosclerosis.       XR Femur 2 View Left [[id number]] Collected: 06/03/21 1208     Updated: 06/03/21 1216    Narrative:      XR FEMUR 2 VW LEFT-, XR TIBIA FIBULA 2 VW LEFT-     INDICATIONS: Possible infection     TECHNIQUE: 7 views of the left femur, 4 views of the left lower leg     COMPARISON: 08/31/2018     FINDINGS:     Critical test result.     Conspicuous osseous lucency around the femoral component of the knee  arthroplasty hardware is a change from the prior exam, compatible with  infection/loosening. Soft tissue gas at the popliteal fossa further  suggests infectious process with gas producing organism. No acute  fracture or dislocation is identified. Chronic deformity of the pubic  rami is noted. Arterial calcifications are conspicuous.       XR Tibia Fibula 2 View Left [[id number]] Collected: 06/03/21 1208     Updated: 06/03/21 1216    Narrative:      XR FEMUR 2 VW LEFT-, XR TIBIA FIBULA 2 VW LEFT-     INDICATIONS: Possible infection     TECHNIQUE: 7 views of the left femur, 4 views of the left lower leg     COMPARISON: 08/31/2018     FINDINGS:     Critical test result.     Conspicuous osseous lucency around the femoral  component of the knee  arthroplasty hardware is a change from the prior exam, compatible with  infection/loosening. Soft tissue gas at the popliteal fossa further  suggests infectious process with gas producing organism. No acute  fracture or dislocation is identified. Chronic deformity of the pubic  rami is noted. Arterial calcifications are conspicuous.        Pertinent Labs     Results from last 7 days   Lab Units 06/09/21  0452 06/07/21  0605 06/06/21  0457 06/05/21  0531   WBC 10*3/mm3 4.82 6.03 5.68 7.18   HEMOGLOBIN g/dL 8.7* 8.8* 6.7* 8.2*   PLATELETS 10*3/mm3 325 327 314 328     Results from last 7 days   Lab Units 06/09/21  0452 06/07/21  0843 06/06/21 0457 06/05/21  0531   SODIUM mmol/L 133* 134* 134* 135*   POTASSIUM mmol/L 4.2 3.9 3.8 4.2   CHLORIDE mmol/L 103 102 103 104   CO2 mmol/L 23.0 22.7 22.6 21.8*   BUN mg/dL 24* 15 16 18   CREATININE mg/dL 1.07 0.97 0.99 1.03   GLUCOSE mg/dL 266* 233* 136* 167*   Estimated Creatinine Clearance: 68.9 mL/min (by C-G formula based on SCr of 1.07 mg/dL).  Results from last 7 days   Lab Units 06/03/21  0504   ALBUMIN g/dL 2.90*   BILIRUBIN mg/dL 0.4   ALK PHOS U/L 116   AST (SGOT) U/L 15   ALT (SGPT) U/L 16     Results from last 7 days   Lab Units 06/09/21  0452 06/07/21  0843 06/06/21 0457 06/05/21  0531 06/03/21  0504   CALCIUM mg/dL 7.9* 8.1* 8.1* 8.0* 8.3*   ALBUMIN g/dL  --   --   --   --  2.90*               Results from last 7 days   Lab Units 06/04/21  0640   COVID19  Not Detected       Test Results Pending at Discharge       Discharge Details        Discharge Medications      New Medications      Instructions Start Date   castor oil-balsam peru ointment   Apply both heels twice daily      docusate sodium 100 MG capsule   100 mg, Oral, 2 Times Daily      enoxaparin 40 MG/0.4ML solution syringe  Commonly known as: Lovenox   40 mg, Subcutaneous, Every 24 Hours Scheduled      mupirocin 2 % nasal ointment  Commonly known as: BACTROBAN   Each Nare, 2 Times  Daily      vancomycin   1,750 mg, Intravenous, Every 24 Hours      zinc oxide 40 % paste paste  Commonly known as: DESITIN   Topical, 2 Times Daily         Continue These Medications      Instructions Start Date   acetaminophen 325 MG tablet  Commonly known as: TYLENOL   650 mg, Oral, Every 8 Hours PRN      atorvastatin 20 MG tablet  Commonly known as: LIPITOR   20 mg, Oral, Nightly      dextrose 40 % gel  Commonly known as: GLUTOSE   15 g, Oral, Every 1 Hour PRN, And pt unable to swallow       famotidine 20 MG tablet  Commonly known as: PEPCID   20 mg, Oral, 2 Times Daily Before Meals      folic acid 1 MG tablet  Commonly known as: FOLVITE   1 mg, Oral, Daily      HYDROcodone-acetaminophen 7.5-325 MG per tablet  Commonly known as: NORCO   1 tablet, Oral, Every 4 Hours PRN      insulin lispro 100 UNIT/ML injection  Commonly known as: humaLOG   5 Units, Subcutaneous, 3 Times Daily Before Meals, Hold for bs <70 and call MD for bs >400      isosorbide mononitrate 30 MG 24 hr tablet  Commonly known as: IMDUR   30 mg, Oral, Daily      lamoTRIgine 25 MG tablet  Commonly known as: LaMICtal   12.5 mg, Oral, 2 Times Daily      levothyroxine 100 MCG tablet  Commonly known as: SYNTHROID, LEVOTHROID   100 mcg, Oral, Daily      memantine 5 MG tablet  Commonly known as: NAMENDA   5 mg, Oral, Daily      metoprolol succinate XL 25 MG 24 hr tablet  Commonly known as: TOPROL-XL   25 mg, Oral, Daily      OLANZapine 5 MG tablet  Commonly known as: zyPREXA   5 mg, Oral, Nightly      Semglee 100 UNIT/ML injection  Generic drug: insulin glargine   20 Units, Subcutaneous, 2 Times Daily      senna 8.6 MG tablet  Commonly known as: SENOKOT   1 tablet, Oral, Daily PRN      tamsulosin 0.4 MG capsule 24 hr capsule  Commonly known as: FLOMAX   1 capsule, Oral, Daily      Therems tablet tablet   1 tablet, Oral, Daily      thiamine 100 MG tablet tablet  Commonly known as: VITAMIN B-1   100 mg, Oral, Daily      traZODone 50 MG tablet  Commonly  known as: DESYREL   50 mg, Oral, 2 times daily         Stop These Medications    ferrous sulfate 325 (65 FE) MG tablet     Glucagon Emergency 1 MG kit     LACTOBACILLUS RHAMNOSUS (GG) PO     vitamin C 500 MG tablet  Commonly known as: ASCORBIC ACID            Allergies   Allergen Reactions   • Codeine GI Intolerance         Discharge Disposition:  Skilled Nursing Facility (DC - External)    Discharge Diet:  Diet Order   Procedures   • Diet Regular; Consistent Carbohydrate       Discharge Activity:   Activity Instructions     Activity as Tolerated            CODE STATUS:    Code Status and Medical Interventions:   Ordered at: 06/05/21 1808     Limited Support to NOT Include:    Cardioversion/Defibrillation    Intubation    Antiarrhythmic Drugs     Level Of Support Discussed With:    Patient     Code Status:    No CPR     Medical Interventions (Level of Support Prior to Arrest):    Limited       No future appointments.  Follow-up Information     Valente Giang MD. Schedule an appointment as soon as possible for a visit on 6/21/2021.    Specialty: Orthopedic Surgery  Contact information:  [address]  [address]  [place] [address]  [phone]             Gisela Sotelo, MARIZOL Follow up in 2 week(s).    Specialties: Nurse Practitioner, Family Medicine  Contact information:  [address]  [address]  [place] [address] [phone]                   Time Spent on Discharge:  Greater than 30 minutes      Nghia Stinson MD  [facility]  06/09/21  07:15 EDT    ADDENDUM  Patient's discharge was held yesterday pending insurance precertification.  Arrangements have been made today for transfer.  See today's progress note.  No new medical issues overnight.    Electronically signed by Nghia Stinson MD, 06/10/21, 1:47 PM EDT.

## 2021-06-10 VITALS
WEIGHT: 195 LBS | BODY MASS INDEX: 27.92 KG/M2 | HEART RATE: 83 BPM | RESPIRATION RATE: 18 BRPM | OXYGEN SATURATION: 100 % | SYSTOLIC BLOOD PRESSURE: 111 MMHG | DIASTOLIC BLOOD PRESSURE: 64 MMHG | HEIGHT: 70 IN | TEMPERATURE: 97.3 F

## 2021-06-10 LAB
ANION GAP SERPL CALCULATED.3IONS-SCNC: 6.9 MMOL/L (ref 5–15)
BUN SERPL-MCNC: 19 MG/DL (ref 8–23)
BUN/CREAT SERPL: 26 (ref 7–25)
CALCIUM SPEC-SCNC: 8.1 MG/DL (ref 8.6–10.5)
CHLORIDE SERPL-SCNC: 101 MMOL/L (ref 98–107)
CO2 SERPL-SCNC: 26.1 MMOL/L (ref 22–29)
CREAT SERPL-MCNC: 0.73 MG/DL (ref 0.76–1.27)
GFR SERPL CREATININE-BSD FRML MDRD: 105 ML/MIN/1.73
GLUCOSE BLDC GLUCOMTR-MCNC: 120 MG/DL (ref 70–130)
GLUCOSE BLDC GLUCOMTR-MCNC: 131 MG/DL (ref 70–130)
GLUCOSE BLDC GLUCOMTR-MCNC: 221 MG/DL (ref 70–130)
GLUCOSE SERPL-MCNC: 186 MG/DL (ref 65–99)
POTASSIUM SERPL-SCNC: 4.3 MMOL/L (ref 3.5–5.2)
SODIUM SERPL-SCNC: 134 MMOL/L (ref 136–145)

## 2021-06-10 PROCEDURE — 25010000002 VANCOMYCIN 10 G RECONSTITUTED SOLUTION: Performed by: ORTHOPAEDIC SURGERY

## 2021-06-10 PROCEDURE — 97530 THERAPEUTIC ACTIVITIES: CPT

## 2021-06-10 PROCEDURE — 63710000001 INSULIN LISPRO (HUMAN) PER 5 UNITS: Performed by: HOSPITALIST

## 2021-06-10 PROCEDURE — 80048 BASIC METABOLIC PNL TOTAL CA: CPT | Performed by: ORTHOPAEDIC SURGERY

## 2021-06-10 PROCEDURE — 25010000002 ENOXAPARIN PER 10 MG: Performed by: ORTHOPAEDIC SURGERY

## 2021-06-10 PROCEDURE — 82962 GLUCOSE BLOOD TEST: CPT

## 2021-06-10 RX ADMIN — CASTOR OIL AND BALSAM, PERU 5 G: 788; 87 OINTMENT TOPICAL at 08:40

## 2021-06-10 RX ADMIN — Medication: at 08:40

## 2021-06-10 RX ADMIN — METOPROLOL SUCCINATE 25 MG: 25 TABLET, EXTENDED RELEASE ORAL at 08:39

## 2021-06-10 RX ADMIN — HYDROCODONE BITARTRATE AND ACETAMINOPHEN 2 TABLET: 7.5; 325 TABLET ORAL at 16:35

## 2021-06-10 RX ADMIN — INSULIN LISPRO 5 UNITS: 100 INJECTION, SOLUTION INTRAVENOUS; SUBCUTANEOUS at 16:28

## 2021-06-10 RX ADMIN — HYDROCODONE BITARTRATE AND ACETAMINOPHEN 2 TABLET: 7.5; 325 TABLET ORAL at 11:51

## 2021-06-10 RX ADMIN — HYDROCODONE BITARTRATE AND ACETAMINOPHEN 2 TABLET: 7.5; 325 TABLET ORAL at 05:05

## 2021-06-10 RX ADMIN — LAMOTRIGINE 12.5 MG: 25 TABLET ORAL at 08:39

## 2021-06-10 RX ADMIN — INSULIN LISPRO 5 UNITS: 100 INJECTION, SOLUTION INTRAVENOUS; SUBCUTANEOUS at 11:51

## 2021-06-10 RX ADMIN — FAMOTIDINE 20 MG: 20 TABLET, FILM COATED ORAL at 08:39

## 2021-06-10 RX ADMIN — INSULIN LISPRO 4 UNITS: 100 INJECTION, SOLUTION INTRAVENOUS; SUBCUTANEOUS at 08:40

## 2021-06-10 RX ADMIN — ISOSORBIDE MONONITRATE 30 MG: 30 TABLET ORAL at 08:40

## 2021-06-10 RX ADMIN — INSULIN LISPRO 5 UNITS: 100 INJECTION, SOLUTION INTRAVENOUS; SUBCUTANEOUS at 08:40

## 2021-06-10 RX ADMIN — FAMOTIDINE 20 MG: 20 TABLET, FILM COATED ORAL at 16:28

## 2021-06-10 RX ADMIN — Medication 100 MG: at 08:40

## 2021-06-10 RX ADMIN — MEMANTINE HYDROCHLORIDE 5 MG: 5 TABLET, FILM COATED ORAL at 08:40

## 2021-06-10 RX ADMIN — Medication 1 TABLET: at 08:40

## 2021-06-10 RX ADMIN — VANCOMYCIN HYDROCHLORIDE 1750 MG: 10 INJECTION, POWDER, LYOPHILIZED, FOR SOLUTION INTRAVENOUS at 16:28

## 2021-06-10 RX ADMIN — FOLIC ACID 1 MG: 1 TABLET ORAL at 08:40

## 2021-06-10 RX ADMIN — LEVOTHYROXINE SODIUM 100 MCG: 0.1 TABLET ORAL at 05:05

## 2021-06-10 RX ADMIN — ENOXAPARIN SODIUM 40 MG: 40 INJECTION SUBCUTANEOUS at 08:39

## 2021-06-10 NOTE — PLAN OF CARE
Goal Outcome Evaluation:  Plan of Care Reviewed With: patient        Progress: no change  Outcome Summary: Pt agreeable to PT and amb short distance to chair w/ mod/min A x 2 and use of fww. Pt unsafe and not following cues as he turned to sit w/ pt reaching and holding onto R armrest w/ attempts to replace R hand back on fww. Pt limited in distance by fatigue/pain. Pt agreeable to TE today and performed B LE exercises w/ assist for R LE in KI. Pt will poss dc to SNF tomorrow. PT will prog as pt maida.    Patient was intermittently wearing a face mask during this therapy encounter. Therapist used appropriate personal protective equipment including eye protection, mask, and gloves.  Mask used was standard procedure mask. Appropriate PPE was worn during the entire therapy session. Hand hygiene was completed before and after therapy session. Patient is not in enhanced droplet precautions. PT tech: Anuj Leyva.

## 2021-06-10 NOTE — CASE MANAGEMENT/SOCIAL WORK
"Physicians Statement of Medical Necessity for  Ambulance Transportation    GENERAL INFORMATION     Name: Chris Ferguson  YOB: 1945  Medicare #: [id number] (See Facesheet)  Transport Date: 6/10/2021 (Valid for round trips this date, or for scheduled repetitive trips for 60 days from the date signed below.)  Origin: [facility]  Destination: [facility].  Is the Patient's stay covered under Medicare Part A (PPS/DRG?)Yes  Closest appropriate facility? Yes  If this a hosp-hosp transfer? No  Is this a hospice patient? No    MEDICAL NECESSITY QUESTIONAIRE    Ambulance Transportation is medically necessary only if other means of transportation are contraindicated or would be potentially harmful to the patient.  To meet this requirement, the patient must be either \"bed confined\" or suffer from a condition such that transport by means other than an ambulance is contraindicated by the patient's condition.  The following questions must be answered by the healthcare professional signing below for this form to be valid:     1) Describe the MEDICAL CONDITION (physical and/or mental) of this patient AT THE TIME OF AMBULANCE TRANSPORT that requires the patient to be transported in an ambulance, and why transport by other means is contraindicated by the patient's condition:   Past Medical History:   Diagnosis Date   • Anemia    • Angina pectoris (CMS/HCC)    • Anxiety    • Arthritis     OSTEO   • BPH (benign prostatic hyperplasia)    • Dementia (CMS/HCC)    • Dementia (CMS/HCC)    • Depression    • Diabetes mellitus (CMS/HCC)     type 2   • Disease of thyroid gland    • GERD (gastroesophageal reflux disease)    • Hypertension    • Parkinson's disease (CMS/HCC)    • Short-term memory loss       Past Surgical History:   Procedure Laterality Date   • APPENDECTOMY     • CARDIAC CATHETERIZATION N/A 11/18/2020    Procedure: Left Heart Cath and coronary angiogram;  Surgeon: Yanci Howard, " "MD;  Location: [facility] CATH INVASIVE LOCATION;  Service: Cardiovascular;  Laterality: N/A;   • CARDIAC CATHETERIZATION N/A 11/18/2020    Procedure: Coronary angiography;  Surgeon: Yanci Howard MD;  Location: [facility] CATH INVASIVE LOCATION;  Service: Cardiovascular;  Laterality: N/A;   • CARDIAC CATHETERIZATION N/A 11/18/2020    Procedure: Left ventriculography;  Surgeon: Yanci Howard MD;  Location: [facility] CATH INVASIVE LOCATION;  Service: Cardiovascular;  Laterality: N/A;   • CHOLECYSTECTOMY     • COLONOSCOPY     • EYE SURGERY      kallie cataracts w iol   • FEMUR FRACTURE SURGERY Left     1/2018   • LEG DEBRIDEMENT Left 9/15/2020    Procedure: LOWER EXTREMITY DEBRIDEMENT;  Surgeon: Joslyn Mistry MD;  Location: [facility] OR;  Service: General;  Laterality: Left;   • MULTIPLE TOOTH EXTRACTIONS      ALL TEETH REMOVED   • STOMACH SURGERY      gastric ulcer   • TOTAL HIP ARTHROPLASTY Right 4/23/2019    Procedure: TOTAL HIP ARTHROPLASTY POSTERIOR;  Surgeon: Valente Giang MD;  Location: [facility] OR;  Service: Orthopedics   • TOTAL KNEE ARTHROPLASTY Left 8/31/2018    Procedure: REMOVAL OF LEFT DISTAL FEMUR PLATE AND SCREWS WITH COMPLEX TOTAL KNEE REPLACEMENT DISTAL FEMUR HINGED;  Surgeon: Valente Giang MD;  Location: [facility] OR;  Service: Orthopedics      2) Is this patient \"bed confined\" as defined below?Yes   To be \"bed confined\" the patient must satisfy all three of the following criteria:  (1) unable to get up from bed without assistance; AND (2) unable to ambulate;  AND (3) unable to sit in a chair or wheelchair.  3) Can this patient safely be transported by car or wheelchair van (I.e., may safely sit during transport, without an attendant or monitoring?)No   4. In addition to completing questions 1-3 above, please check any of the following conditions that apply*:          *Note: supporting documentation for any boxes checked must be maintained in the patient's medical records " Patient is confused, Moderate/severe pain on movement, Special handling/isolation/infection control precautions required and Unable to tolerate seated position for time needed to transport      SIGNATURE OF PHYSICIAN OR OTHER AUTHORIZED HEALTHCARE PROFESSIONAL    I certify that the above information is true and correct based on my evaluation of this patient, and represent that the patient requires transport by ambulance and that other forms of transport are contraindicated.  I understand that this information will be used by the Centers for Medicare and Medicaid Services (CMS) to support the determiniation of medical necessity for ambulance services, and I represent that I have personal knowledge of the patient's condition at the time of transport.       If this box is checked, I also certify that the patient is physically or mentally incapable of signing the ambulance service's claim form and that the institution with which I am affiliated has furnished care, services or assistance to the patient.  My signature below is made on behalf of the patient pursuant to 42 .36(b)(4). In accordance with 42 .37, the specific reason(s) that the patient is physically or mentally incapable of signing the claim for is as follows:     Signature of Physician or Healthcare Professional  Date/Time:        (For Scheduled repetitive transport, this form is not valid for transports performed more than 60 days after this date).                                                                                                                                            --------------------------------------------------------------------------------------------  Printed Name and Credentials of Physician or Authorized Healthcare Professional     *Form must be signed by patient's attending physician for scheduled, repetitive transports,.  For non-repetitive ambulance transports, if unable to obtain the signature of the attending  physician, any of the following may sign (please select below):     Physician  Clinical Nurse Specialist  Registered Nurse     Physician Assistant  Discharge Planner  Licensed Practical Nurse     Nurse Practitioner

## 2021-06-10 NOTE — PROGRESS NOTES
"  Infectious Diseases Progress Note    Bettina Mendenhall MD     [facility]  [place]: 7 days  Patient Identification:  Name: Chris Ferguson  Age: 75 y.o.  Sex: male  :  1945  MRN: [id number]         Primary Care Physician: Gisela Sotelo, MARIZOL            Subjective: Feels well no new complaints, he is waiting for arrangements so that he could be discharged.  Interval History: See consultation note.    Objective:    Scheduled Meds:atorvastatin, 20 mg, Oral, Nightly  castor oil-balsam peru, , Topical, Q12H  docusate sodium, 100 mg, Oral, BID  enoxaparin, 40 mg, Subcutaneous, Daily  famotidine, 20 mg, Oral, BID AC  folic acid, 1 mg, Oral, Daily  insulin glargine, 15 Units, Subcutaneous, Nightly  insulin lispro, 0-9 Units, Subcutaneous, TID AC  insulin lispro, 5 Units, Subcutaneous, TID With Meals  isosorbide mononitrate, 30 mg, Oral, Daily  lamoTRIgine, 12.5 mg, Oral, BID  levothyroxine, 100 mcg, Oral, Q AM  memantine, 5 mg, Oral, Daily  metoprolol succinate XL, 25 mg, Oral, Daily  multivitamin, 1 tablet, Oral, Daily  OLANZapine, 5 mg, Oral, Nightly  sodium chloride, 10 mL, Intravenous, Q12H  tamsulosin, 0.4 mg, Oral, Daily  thiamine, 100 mg, Oral, Daily  traZODone, 25 mg, Oral, Nightly  vancomycin, 1,750 mg, Intravenous, Q24H  zinc oxide, , Topical, BID      Continuous Infusions:Pharmacy to dose vancomycin,         Vital signs in last 24 hours:  Temp:  [97.1 °F (36.2 °C)-98.4 °F (36.9 °C)] 97.3 °F (36.3 °C)  Heart Rate:  [79-93] 83  Resp:  [16-18] 18  BP: (109-131)/(61-83) 111/64    Intake/Output:    Intake/Output Summary (Last 24 hours) at 6/10/2021 1408  Last data filed at 6/10/2021 0505  Gross per 24 hour   Intake 840 ml   Output 750 ml   Net 90 ml       Exam:  /64 (BP Location: Right arm, Patient Position: Lying)   Pulse 83   Temp 97.3 °F (36.3 °C) (Oral)   Resp 18   Ht 177.8 cm (70\")   Wt 88.5 kg (195 lb)   SpO2 100%   BMI 27.98 kg/m²   Patient is examined using the personal " protective equipment as per guidelines from infection control for this particular patient as enacted.  Hand washing was performed before and after patient interaction.  General Appearance:    Alert, cooperative, no distress, AAOx3                          Head:    Normocephalic, without obvious abnormality, atraumatic                           Eyes:    PERRL, conjunctivae/corneas clear, EOM's intact, both eyes                         Throat:   Lips, tongue, gums normal; oral mucosa pink and moist                           Neck:   Supple, symmetrical, trachea midline, no JVD                         Lungs:    Clear to auscultation bilaterally, respirations unlabored                 Chest Wall:    No tenderness or deformity                          Heart:    Regular rate and rhythm, S1 and S2 normal                  Abdomen:     Soft, non-tender, bowel sounds active                 Extremities: Left knee dressed.  In the immobilizer.                        Pulses:   Pulses palpable in all extremities                            Skin:   Skin is warm and dry,  no rashes or palpable lesions                  Neurologic: Awake and interactive       Data Review:    I reviewed the patient's new clinical results.  Results from last 7 days   Lab Units 06/09/21  0452 06/07/21  0605 06/06/21  0457 06/05/21  0531   WBC 10*3/mm3 4.82 6.03 5.68 7.18   HEMOGLOBIN g/dL 8.7* 8.8* 6.7* 8.2*   PLATELETS 10*3/mm3 325 327 314 328     Results from last 7 days   Lab Units 06/10/21  0512 06/09/21  0452 06/07/21  0843 06/06/21  0457 06/05/21  0531 06/04/21  0616   SODIUM mmol/L 134* 133* 134* 134* 135*  --    POTASSIUM mmol/L 4.3 4.2 3.9 3.8 4.2  --    CHLORIDE mmol/L 101 103 102 103 104  --    CO2 mmol/L 26.1 23.0 22.7 22.6 21.8*  --    BUN mg/dL 19 24* 15 16 18  --    CREATININE mg/dL 0.73* 1.07 0.97 0.99 1.03 1.12   CALCIUM mg/dL 8.1* 7.9* 8.1* 8.1* 8.0*  --    GLUCOSE mg/dL 186* 266* 233* 136* 167*  --      Microbiology Results (last 10  days)     Procedure Component Value - Date/Time    Tissue / Bone Culture - Tissue, Knee, Left [[id number]] Collected: 06/04/21 1418    Lab Status: Final result Specimen: Tissue from Knee, Left Updated: 06/08/21 0609     Tissue Culture No growth at 3 days     Gram Stain Rare (1+) WBCs seen      No organisms seen    COVID PRE-OP / PRE-PROCEDURE SCREENING ORDER (NO ISOLATION) - Swab, Nasopharynx [[id number]]  (Normal) Collected: 06/04/21 0640    Lab Status: Final result Specimen: Swab from Nasopharynx Updated: 06/04/21 0957    Narrative:      The following orders were created for panel order COVID PRE-OP / PRE-PROCEDURE SCREENING ORDER (NO ISOLATION) - Swab, Nasopharynx.  Procedure                               Abnormality         Status                     ---------                               -----------         ------                     COVID-19,[facility]...[[id number]]  Normal              Final result                 Please view results for these tests on the individual orders.    COVID-19,[facility] CEPHEID/ILYA NP SWAB IN TRANSPORT MEDIA 8-12 HR TAT - Swab, Nasopharynx [[id number]]  (Normal) Collected: 06/04/21 0640    Lab Status: Final result Specimen: Swab from Nasopharynx Updated: 06/04/21 0957     COVID19 Not Detected    Narrative:      Fact sheet for providers: https://www.fda.gov/media/130661/download    Fact sheet for patients: https://www.fda.gov/media/593146/download    Test performed by PCR.            Assessment:    Infection of prosthetic left knee joint (CMS/[facility])    Essential hypertension    BPH (benign prostatic hyperplasia)    Stage 3a chronic kidney disease (CMS/[facility])    Hyperlipidemia    Cellulitis of left lower extremity    Type 2 diabetes mellitus with hyperglycemia, with long-term current use of insulin (CMS/[facility])    Dementia in Alzheimer's disease with early onset (CMS/[facility])    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 with presence of antibiotic-impregnated  cement spacer  1-probable left knee prosthetic joint infection secondary to  2-cellulitis of the left leg with wound infection with culture positive for MRSA resulting in contiguous focus of spread to the prosthetic joint as well as possible hematogenous seeding.  Status post partial stage I implant.  3-diabetes mellitus  4-hypothyroidism  5-dyslipidemia  6-dementia  7-Parkinson's disease and immobility        Recommendations/Discussions:  · Simplify antibiotic regimen to vancomycin.  Discussed with orthopedic surgery service and I was informed that the only loose femoral implant was revised with stage I prosthesis.  Tibial implant was not touched and it is still the original implant that she patient has.  Intraoperative cultures have been negative.  Despite the fact that patient has residual foreign body/prosthetic device present the risks of treating him with combination of vancomycin and rifampin for 6 weeks followed by continuous use of rifampin and oral suppressive therapy and and subsequent chronic suppressive therapy would be too much and toxic for this patient and risk-benefit ratio would not justify this regimen with his comorbidities.  · See recommendations and discussion on 6/3/2021.  · Patient would need 6 weeks of antibiotic treatment from 6/4/2021.  · CCP consult request: Please arrange 42 days of IV vancomycin to be dosed by pharmacy to achieve a trough level of 15-20 or area under the curve of 400-600.  Check weekly CBC CMP CRP and call abnormal results to Dr. Mendenhall at [phone].    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  · Given information we have after discussion with orthopedic surgery service patient may be a candidate for chronic suppressive therapy to prevent future episodes of recurrence of infection.  ·   Bettina Mendenhall MD  6/10/2021  14:08 EDT    Much of this encounter note is an electronic transcription/translation  of spoken language to printed text. The electronic translation of spoken language may permit erroneous, or at times, nonsensical words or phrases to be inadvertently transcribed; Although I have reviewed the note for such errors, some may still exist
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
## 2021-06-10 NOTE — PLAN OF CARE
See below.    Problem: Adult Inpatient Plan of Care  Goal: Plan of Care Review  Outcome: Ongoing, Progressing  Flowsheets (Taken 6/10/2021 1712)  Progress: improving  Plan of Care Reviewed With: patient  Outcome Summary: 75/M POD#6 left knee revision.  ALOx self, situation only, RA, lungs clear but diminished, BS normoactive x4, voiding independently with brief in place for occasional incontience.  2+ pedal pulses noted bilaterally, no c/o numbness/tingling in operative extremity, dressing CDI.  Pain well-controlled with PO pain meds only.  PIV removed, PICC line in place for IV ABX at SNF.  [facility] at 1730 today.

## 2021-06-10 NOTE — PROGRESS NOTES
Name: Chris Ferguson ADMIT: 6/3/2021   : 1945  PCP: Gisela Sotelo APRN    MRN: [id number] LOS: 7 days   AGE/SEX: 75 y.o. male  ROOM: [facility]     Subjective   Subjective   No new issues.    Review of Systems     Objective   Objective   Vital Signs  Temp:  [97.1 °F (36.2 °C)-98.4 °F (36.9 °C)] 98.4 °F (36.9 °C)  Heart Rate:  [79-93] 82  Resp:  [16-18] 18  BP: (109-131)/(61-83) 131/83  SpO2:  [93 %-95 %] 94 %  on   ;   Device (Oxygen Therapy): room air  Body mass index is 27.98 kg/m².  Physical Exam  Vitals and nursing note reviewed.   Constitutional:       General: He is not in acute distress.     Appearance: He is well-developed. He is ill-appearing (Chronically).   Neck:      Vascular: No JVD.      Trachea: No tracheal deviation.   Cardiovascular:      Rate and Rhythm: Normal rate and regular rhythm.      Heart sounds: No murmur heard.     Pulmonary:      Effort: Pulmonary effort is normal. No respiratory distress.      Breath sounds: Normal breath sounds.   Abdominal:      General: Bowel sounds are normal. There is no distension.      Palpations: Abdomen is soft.      Tenderness: There is no abdominal tenderness.   Musculoskeletal:      Comments: L knee bandaged   Skin:     General: Skin is warm and dry.      Coloration: Skin is pale.   Neurological:      Mental Status: He is alert and oriented to person, place, and time.   Psychiatric:         Behavior: Behavior normal. Behavior is cooperative.         Results Review:       I reviewed the patient's new clinical results.  Results from last 7 days   Lab Units 21  0452 21  0605 21  0457 21  0531   WBC 10*3/mm3 4.82 6.03 5.68 7.18   HEMOGLOBIN g/dL 8.7* 8.8* 6.7* 8.2*   PLATELETS 10*3/mm3 325 327 314 328     Results from last 7 days   Lab Units 06/10/21  0512 21  0452 21  0843 21  0457   SODIUM mmol/L 134* 133* 134* 134*   POTASSIUM mmol/L 4.3 4.2 3.9 3.8   CHLORIDE mmol/L 101 103 102 103   CO2 mmol/L 26.1 23.0  22.7 22.6   BUN mg/dL 19 24* 15 16   CREATININE mg/dL 0.73* 1.07 0.97 0.99   GLUCOSE mg/dL 186* 266* 233* 136*   Estimated Creatinine Clearance: 92.1 mL/min (A) (by C-G formula based on SCr of 0.73 mg/dL (L)).  Results from last 7 days   Lab Units 06/10/21  0512 06/09/21  0452 06/07/21  0843 06/06/21  0457   CALCIUM mg/dL 8.1* 7.9* 8.1* 8.1*     Results from last 7 days   Lab Units 06/04/21  0640   COVID19  Not Detected           Glucose   Date/Time Value Ref Range Status   06/10/2021 0626 221 (H) 70 - 130 mg/dL Final   06/09/2021 2202 308 (H) 70 - 130 mg/dL Final   06/09/2021 1534 79 70 - 130 mg/dL Final   06/09/2021 1120 120 70 - 130 mg/dL Final   06/09/2021 0559 273 (H) 70 - 130 mg/dL Final   06/08/2021 2127 230 (H) 70 - 130 mg/dL Final   06/08/2021 1603 192 (H) 70 - 130 mg/dL Final       Scheduled Medications  atorvastatin, 20 mg, Oral, Nightly  castor oil-balsam peru, , Topical, Q12H  docusate sodium, 100 mg, Oral, BID  enoxaparin, 40 mg, Subcutaneous, Daily  famotidine, 20 mg, Oral, BID AC  folic acid, 1 mg, Oral, Daily  insulin glargine, 15 Units, Subcutaneous, Nightly  insulin lispro, 0-9 Units, Subcutaneous, TID AC  insulin lispro, 5 Units, Subcutaneous, TID With Meals  isosorbide mononitrate, 30 mg, Oral, Daily  lamoTRIgine, 12.5 mg, Oral, BID  levothyroxine, 100 mcg, Oral, Q AM  memantine, 5 mg, Oral, Daily  metoprolol succinate XL, 25 mg, Oral, Daily  multivitamin, 1 tablet, Oral, Daily  OLANZapine, 5 mg, Oral, Nightly  sodium chloride, 10 mL, Intravenous, Q12H  tamsulosin, 0.4 mg, Oral, Daily  thiamine, 100 mg, Oral, Daily  traZODone, 25 mg, Oral, Nightly  vancomycin, 1,750 mg, Intravenous, Q24H  zinc oxide, , Topical, BID    Infusions  Pharmacy to dose vancomycin,     Diet  Diet Regular; Consistent Carbohydrate         Assessment/Plan     Active Hospital Problems    Diagnosis  POA   • **Infection of prosthetic left knee joint (CMS/[facility]) [T84.54XA]  Not Applicable   • Acquired absence of knee joint  following removal of joint prosthesis with presence of antibiotic-impregnated cement spacer [Z89.529]  Yes   • Type 2 diabetes mellitus with hyperglycemia, with long-term current use of insulin (CMS/[facility]) [E11.65, Z79.4]  Not Applicable   • Dementia in Alzheimer's disease with early onset (CMS/[facility]) [G30.0, F02.80]  Yes   • Cellulitis of left lower extremity [L03.116]  Yes   • Essential hypertension [I10]  Yes   • BPH (benign prostatic hyperplasia) [N40.0]  Yes   • Stage 3a chronic kidney disease (CMS/[facility]) [N18.31]  Yes   • Hyperlipidemia [E78.5]  Yes      Resolved Hospital Problems   No resolved problems to display.       75 y.o. male with Infection of prosthetic left knee joint (CMS/[facility]).    Stable. Still waiting on precert.    · Lovenox 40 mg SC daily for DVT prophylaxis.  · DNR.    · Discussed with patient and CCP.  · Anticipate discharge to SNU facility tomorrow if precert obtained and okay with consultants.       Nghia Stinson MD  [facility]  06/10/21  09:44 EDT

## 2021-06-10 NOTE — PLAN OF CARE
Goal Outcome Evaluation:  Plan of Care Reviewed With: patient        Progress: improving  Outcome Summary: POD 5 Left knee revision. VSS, pain controlled with prn norco. YOSEPH dsg to left knee incision - c,d,i, green light flashing; knee immbolizer in place. plans to d/c back to SNF 6/10. PICC line inserted to [facility], 6/9. discussed blood glucose monitoring r/t hx DM, will continue to monitor.

## 2021-06-10 NOTE — CASE MANAGEMENT/SOCIAL WORK
Continued Stay Note  [facility]     Patient Name: Chris Ferguson  MRN: [id number]  Today's Date: 6/10/2021    Admit Date: 6/3/2021    Discharge Plan     Row Name 06/10/21 1213       Plan    Plan  [facility] SNF -- Accepted & Pre-cert Obtained.    Patient/Family in Agreement with Plan  yes    Plan Comments  Recieved a call from Preet who has obtained SNF pre-cert and has a bed for the pt today at [place]. Updated the patient's nurse/RUBY Jesus and the patient who are both agreeable. Scheduled a [facility] for today at 1700 (spoke with Monalisa). Updated the patient, nurse/RUBY Jesus, patient's friend/Norbert Lynch and RUBY Murdock/[facility] Liaison who will discuss with Dr. Sitnson. [facility] gave the patient and his friend/Norbert MasonLynch the disclaimer that [facility] cannot guarantee insurance will cover the cost of the ambulance transport. Both the patient and friend/Norbert are agreeable, acknowledges understanding and want to continue with the ambulance transport. Discharge IV antibiotic orders were faxed with the d/c summary and sent in the d/c packet with the patient. No other needs identified. Packet is on the chart.    Final Discharge Disposition Code  03 - skilled nursing facility (SNF)    Final Note  Patient's being discharged to [facility] SNF. [facility] to transport.    Row Name 06/10/21 1032       Plan    Plan  [facility] SNF -- Accepted & Pre-cert Pending.    Patient/Family in Agreement with Plan  yes    Plan Comments  Spoke with Preet who said pre-cert is still pending at this time. Patient will need transport arranged at d/c. Await SNF pre-cert.        Discharge Codes    No documentation.       Expected Discharge Date and Time     Expected Discharge Date Expected Discharge Time    Jun 9, 2021             Yadira Pedroza RN

## 2021-06-29 LAB
GLUCOSE BLDC GLUCOMTR-MCNC: 146 MG/DL (ref 70–130)
GLUCOSE BLDC GLUCOMTR-MCNC: 150 MG/DL (ref 70–130)

## 2021-07-21 ENCOUNTER — TELEPHONE (OUTPATIENT)
Dept: SURGERY | Facility: CLINIC | Age: 76
End: 2021-07-21

## 2021-07-21 NOTE — TELEPHONE ENCOUNTER
Mr Ferguson is in a nursing home in [place], [place]. I told her to pick the nearest [facility] and pick a surgeon on staff. I told her I would have thought that by now he would have all new doctors there because he can NOT be transported back to [place] for check ups. I'm not sure what she wanted to hear but that is a situation that Dr. Mistry can not manage 4 hours away. She said she would ask the doctor on staff to recommend a surgeon near by.

## 2021-08-10 ENCOUNTER — TELEPHONE (OUTPATIENT)
Dept: SURGERY | Facility: CLINIC | Age: 76
End: 2021-08-10

## 2021-08-10 NOTE — TELEPHONE ENCOUNTER
Dr. Rosa reviewed chart prior to patient arrival. Dr. Rosa states this patient needs an orthopaedic surgeon not a general surgeon. I have called [facility] and notified them and provided our [facility] ortho groups number for them to contact. This is for staples in a knee and dr. Rosa feels he is best  Treated by an orthopaedic surgeon. Apt with us cancelled for today.

## 2022-02-16 ENCOUNTER — PATIENT OUTREACH (OUTPATIENT)
Dept: CASE MANAGEMENT | Facility: OTHER | Age: 77
End: 2022-02-16

## 2022-02-16 NOTE — OUTREACH NOTE
Ambulatory Case Management Note    Care Coordination    Pt identified for proactive outreach. RN-ACM outreach call made to [facility], staff reports pt is there for LTC. HRCM program closed. No further outreach scheduled.     Judie Cole RN  Ambulatory Case Management    2/16/2022, 15:08 EST

## 2022-03-14 ENCOUNTER — HOSPITAL ENCOUNTER (OUTPATIENT)
Facility: HOSPITAL | Age: 77
Setting detail: OBSERVATION
Discharge: NURSING, REHAB OR LONG-TERM CARE | End: 2022-03-16
Attending: EMERGENCY MEDICINE | Admitting: INTERNAL MEDICINE

## 2022-03-14 ENCOUNTER — APPOINTMENT (OUTPATIENT)
Dept: GENERAL RADIOLOGY | Facility: HOSPITAL | Age: 77
End: 2022-03-14

## 2022-03-14 DIAGNOSIS — I95.9 HYPOTENSION, UNSPECIFIED HYPOTENSION TYPE: ICD-10-CM

## 2022-03-14 DIAGNOSIS — N17.9 ACUTE RENAL FAILURE, UNSPECIFIED ACUTE RENAL FAILURE TYPE: ICD-10-CM

## 2022-03-14 DIAGNOSIS — R53.1 WEAKNESS: Primary | ICD-10-CM

## 2022-03-14 PROBLEM — N18.30 STAGE 3 CHRONIC KIDNEY DISEASE: Status: ACTIVE | Noted: 2022-03-14

## 2022-03-14 LAB
ALBUMIN SERPL-MCNC: 0.5 G/DL (ref 3.5–5.2)
ALBUMIN SERPL-MCNC: 3.1 G/DL (ref 3.5–5.2)
ALBUMIN/GLOB SERPL: 0.6 G/DL
ALBUMIN/GLOB SERPL: 1.1 G/DL
ALP SERPL-CCNC: 21 U/L (ref 39–117)
ALP SERPL-CCNC: 97 U/L (ref 39–117)
ALT SERPL W P-5'-P-CCNC: 46 U/L (ref 1–41)
ALT SERPL W P-5'-P-CCNC: 9 U/L (ref 1–41)
AMMONIA BLD-SCNC: 14 UMOL/L (ref 16–60)
AMMONIA BLD-SCNC: 18 UMOL/L (ref 16–60)
ANION GAP SERPL CALCULATED.3IONS-SCNC: 22 MMOL/L (ref 5–15)
ANION GAP SERPL CALCULATED.3IONS-SCNC: 8 MMOL/L (ref 5–15)
AST SERPL-CCNC: 27 U/L (ref 1–40)
AST SERPL-CCNC: 6 U/L (ref 1–40)
BASOPHILS # BLD AUTO: 0 10*3/MM3 (ref 0–0.2)
BASOPHILS # BLD AUTO: 0.1 10*3/MM3 (ref 0–0.2)
BASOPHILS NFR BLD AUTO: 0.4 % (ref 0–1.5)
BASOPHILS NFR BLD AUTO: 0.5 % (ref 0–1.5)
BILIRUB SERPL-MCNC: 0.3 MG/DL (ref 0–1.2)
BILIRUB SERPL-MCNC: <0.2 MG/DL (ref 0–1.2)
BILIRUB UR QL STRIP: NEGATIVE
BUN SERPL-MCNC: 29 MG/DL (ref 8–23)
BUN SERPL-MCNC: 9 MG/DL (ref 8–23)
BUN/CREAT SERPL: 15.3 (ref 7–25)
BUN/CREAT SERPL: 32.1 (ref 7–25)
CALCIUM SPEC-SCNC: 6.1 MG/DL (ref 8.6–10.5)
CALCIUM SPEC-SCNC: 8.2 MG/DL (ref 8.6–10.5)
CHLORIDE SERPL-SCNC: 103 MMOL/L (ref 98–107)
CHLORIDE SERPL-SCNC: 105 MMOL/L (ref 98–107)
CHOLEST SERPL-MCNC: 134 MG/DL (ref 0–200)
CK SERPL-CCNC: 16 U/L (ref 20–200)
CLARITY UR: CLEAR
CO2 SERPL-SCNC: 24 MMOL/L (ref 22–29)
CO2 SERPL-SCNC: 8 MMOL/L (ref 22–29)
COLOR UR: YELLOW
CREAT SERPL-MCNC: 0.28 MG/DL (ref 0.76–1.27)
CREAT SERPL-MCNC: 1.9 MG/DL (ref 0.76–1.27)
D-LACTATE SERPL-SCNC: 1.3 MMOL/L (ref 0.5–2)
D-LACTATE SERPL-SCNC: 2.4 MMOL/L (ref 0.5–2)
DEPRECATED RDW RBC AUTO: 42.4 FL (ref 37–54)
DEPRECATED RDW RBC AUTO: 42.9 FL (ref 37–54)
EGFRCR SERPLBLD CKD-EPI 2021: 127.5 ML/MIN/1.73
EGFRCR SERPLBLD CKD-EPI 2021: 36.6 ML/MIN/1.73
EOSINOPHIL # BLD AUTO: 0.1 10*3/MM3 (ref 0–0.4)
EOSINOPHIL # BLD AUTO: 0.2 10*3/MM3 (ref 0–0.4)
EOSINOPHIL NFR BLD AUTO: 0.9 % (ref 0.3–6.2)
EOSINOPHIL NFR BLD AUTO: 2 % (ref 0.3–6.2)
ERYTHROCYTE [DISTWIDTH] IN BLOOD BY AUTOMATED COUNT: 16.8 % (ref 12.3–15.4)
ERYTHROCYTE [DISTWIDTH] IN BLOOD BY AUTOMATED COUNT: 16.9 % (ref 12.3–15.4)
FLUAV SUBTYP SPEC NAA+PROBE: NOT DETECTED
FLUBV RNA ISLT QL NAA+PROBE: NOT DETECTED
GLOBULIN UR ELPH-MCNC: 0.8 GM/DL
GLOBULIN UR ELPH-MCNC: 2.8 GM/DL
GLUCOSE BLDC GLUCOMTR-MCNC: 136 MG/DL (ref 70–105)
GLUCOSE BLDC GLUCOMTR-MCNC: 137 MG/DL (ref 70–105)
GLUCOSE BLDC GLUCOMTR-MCNC: 206 MG/DL (ref 70–105)
GLUCOSE SERPL-MCNC: 304 MG/DL (ref 65–99)
GLUCOSE SERPL-MCNC: 71 MG/DL (ref 65–99)
GLUCOSE UR STRIP-MCNC: ABNORMAL MG/DL
HCT VFR BLD AUTO: 34.1 % (ref 37.5–51)
HCT VFR BLD AUTO: 37.1 % (ref 37.5–51)
HDLC SERPL-MCNC: 33 MG/DL (ref 40–60)
HGB BLD-MCNC: 10.9 G/DL (ref 13–17.7)
HGB BLD-MCNC: 12.4 G/DL (ref 13–17.7)
HGB UR QL STRIP.AUTO: NEGATIVE
KETONES UR QL STRIP: ABNORMAL
LDLC SERPL CALC-MCNC: 67 MG/DL (ref 0–100)
LDLC/HDLC SERPL: 1.83 {RATIO}
LEUKOCYTE ESTERASE UR QL STRIP.AUTO: NEGATIVE
LIPASE SERPL-CCNC: 13 U/L (ref 13–60)
LIPASE SERPL-CCNC: 4 U/L (ref 13–60)
LYMPHOCYTES # BLD AUTO: 1.4 10*3/MM3 (ref 0.7–3.1)
LYMPHOCYTES # BLD AUTO: 3 10*3/MM3 (ref 0.7–3.1)
LYMPHOCYTES NFR BLD AUTO: 12.4 % (ref 19.6–45.3)
LYMPHOCYTES NFR BLD AUTO: 29 % (ref 19.6–45.3)
MAGNESIUM SERPL-MCNC: 1.4 MG/DL (ref 1.6–2.4)
MCH RBC QN AUTO: 23 PG (ref 26.6–33)
MCH RBC QN AUTO: 24.1 PG (ref 26.6–33)
MCHC RBC AUTO-ENTMCNC: 31.9 G/DL (ref 31.5–35.7)
MCHC RBC AUTO-ENTMCNC: 33.5 G/DL (ref 31.5–35.7)
MCV RBC AUTO: 71.9 FL (ref 79–97)
MCV RBC AUTO: 72.3 FL (ref 79–97)
MONOCYTES # BLD AUTO: 0.7 10*3/MM3 (ref 0.1–0.9)
MONOCYTES # BLD AUTO: 0.7 10*3/MM3 (ref 0.1–0.9)
MONOCYTES NFR BLD AUTO: 5.9 % (ref 5–12)
MONOCYTES NFR BLD AUTO: 6.8 % (ref 5–12)
NEUTROPHILS NFR BLD AUTO: 6.5 10*3/MM3 (ref 1.7–7)
NEUTROPHILS NFR BLD AUTO: 61.7 % (ref 42.7–76)
NEUTROPHILS NFR BLD AUTO: 80.4 % (ref 42.7–76)
NEUTROPHILS NFR BLD AUTO: 9.3 10*3/MM3 (ref 1.7–7)
NITRITE UR QL STRIP: NEGATIVE
NRBC BLD AUTO-RTO: 0 /100 WBC (ref 0–0.2)
NRBC BLD AUTO-RTO: 0.1 /100 WBC (ref 0–0.2)
PH UR STRIP.AUTO: <=5 [PH] (ref 5–8)
PHOSPHATE SERPL-MCNC: 3.4 MG/DL (ref 2.5–4.5)
PLATELET # BLD AUTO: 209 10*3/MM3 (ref 140–450)
PLATELET # BLD AUTO: 301 10*3/MM3 (ref 140–450)
PMV BLD AUTO: 7.9 FL (ref 6–12)
PMV BLD AUTO: 8.5 FL (ref 6–12)
POTASSIUM SERPL-SCNC: 4.2 MMOL/L (ref 3.5–5.2)
POTASSIUM SERPL-SCNC: 4.2 MMOL/L (ref 3.5–5.2)
PROCALCITONIN SERPL-MCNC: 0.05 NG/ML (ref 0–0.25)
PROCALCITONIN SERPL-MCNC: 0.06 NG/ML (ref 0–0.25)
PROT SERPL-MCNC: 1.3 G/DL (ref 6–8.5)
PROT SERPL-MCNC: 5.9 G/DL (ref 6–8.5)
PROT UR QL STRIP: NEGATIVE
RBC # BLD AUTO: 4.71 10*6/MM3 (ref 4.14–5.8)
RBC # BLD AUTO: 5.16 10*6/MM3 (ref 4.14–5.8)
S PYO AG THROAT QL: NEGATIVE
SARS-COV-2 RNA PNL SPEC NAA+PROBE: NOT DETECTED
SODIUM SERPL-SCNC: 133 MMOL/L (ref 136–145)
SODIUM SERPL-SCNC: 137 MMOL/L (ref 136–145)
SP GR UR STRIP: 1.02 (ref 1–1.03)
TRIGL SERPL-MCNC: 203 MG/DL (ref 0–150)
TROPONIN T SERPL-MCNC: <0.01 NG/ML (ref 0–0.03)
TSH SERPL DL<=0.05 MIU/L-ACNC: 0.42 UIU/ML (ref 0.27–4.2)
TSH SERPL DL<=0.05 MIU/L-ACNC: 0.42 UIU/ML (ref 0.27–4.2)
UROBILINOGEN UR QL STRIP: ABNORMAL
VLDLC SERPL-MCNC: 34 MG/DL (ref 5–40)
WBC NRBC COR # BLD: 10.5 10*3/MM3 (ref 3.4–10.8)
WBC NRBC COR # BLD: 11.5 10*3/MM3 (ref 3.4–10.8)
WHOLE BLOOD HOLD SPECIMEN: NORMAL

## 2022-03-14 PROCEDURE — 96361 HYDRATE IV INFUSION ADD-ON: CPT

## 2022-03-14 PROCEDURE — 82140 ASSAY OF AMMONIA: CPT | Performed by: EMERGENCY MEDICINE

## 2022-03-14 PROCEDURE — 87635 SARS-COV-2 COVID-19 AMP PRB: CPT | Performed by: EMERGENCY MEDICINE

## 2022-03-14 PROCEDURE — 71045 X-RAY EXAM CHEST 1 VIEW: CPT

## 2022-03-14 PROCEDURE — 25010000002 VANCOMYCIN 10 G RECONSTITUTED SOLUTION: Performed by: EMERGENCY MEDICINE

## 2022-03-14 PROCEDURE — 84145 PROCALCITONIN (PCT): CPT | Performed by: EMERGENCY MEDICINE

## 2022-03-14 PROCEDURE — G0378 HOSPITAL OBSERVATION PER HR: HCPCS

## 2022-03-14 PROCEDURE — 82550 ASSAY OF CK (CPK): CPT | Performed by: EMERGENCY MEDICINE

## 2022-03-14 PROCEDURE — 80053 COMPREHEN METABOLIC PANEL: CPT | Performed by: EMERGENCY MEDICINE

## 2022-03-14 PROCEDURE — 99220 PR INITIAL OBSERVATION CARE/DAY 70 MINUTES: CPT | Performed by: INTERNAL MEDICINE

## 2022-03-14 PROCEDURE — 87502 INFLUENZA DNA AMP PROBE: CPT | Performed by: EMERGENCY MEDICINE

## 2022-03-14 PROCEDURE — 81003 URINALYSIS AUTO W/O SCOPE: CPT | Performed by: EMERGENCY MEDICINE

## 2022-03-14 PROCEDURE — 83605 ASSAY OF LACTIC ACID: CPT | Performed by: PHYSICIAN ASSISTANT

## 2022-03-14 PROCEDURE — 25010000002 CEFEPIME PER 500 MG: Performed by: EMERGENCY MEDICINE

## 2022-03-14 PROCEDURE — 82962 GLUCOSE BLOOD TEST: CPT

## 2022-03-14 PROCEDURE — 99285 EMERGENCY DEPT VISIT HI MDM: CPT

## 2022-03-14 PROCEDURE — 87040 BLOOD CULTURE FOR BACTERIA: CPT | Performed by: EMERGENCY MEDICINE

## 2022-03-14 PROCEDURE — 80053 COMPREHEN METABOLIC PANEL: CPT | Performed by: PHYSICIAN ASSISTANT

## 2022-03-14 PROCEDURE — 83036 HEMOGLOBIN GLYCOSYLATED A1C: CPT | Performed by: PHYSICIAN ASSISTANT

## 2022-03-14 PROCEDURE — 82140 ASSAY OF AMMONIA: CPT | Performed by: PHYSICIAN ASSISTANT

## 2022-03-14 PROCEDURE — 96366 THER/PROPH/DIAG IV INF ADDON: CPT

## 2022-03-14 PROCEDURE — 84145 PROCALCITONIN (PCT): CPT | Performed by: PHYSICIAN ASSISTANT

## 2022-03-14 PROCEDURE — 83690 ASSAY OF LIPASE: CPT | Performed by: EMERGENCY MEDICINE

## 2022-03-14 PROCEDURE — 85025 COMPLETE CBC W/AUTO DIFF WBC: CPT | Performed by: EMERGENCY MEDICINE

## 2022-03-14 PROCEDURE — 83690 ASSAY OF LIPASE: CPT | Performed by: PHYSICIAN ASSISTANT

## 2022-03-14 PROCEDURE — 84484 ASSAY OF TROPONIN QUANT: CPT | Performed by: EMERGENCY MEDICINE

## 2022-03-14 PROCEDURE — 96375 TX/PRO/DX INJ NEW DRUG ADDON: CPT

## 2022-03-14 PROCEDURE — 63710000001 INSULIN GLARGINE PER 5 UNITS: Performed by: PHYSICIAN ASSISTANT

## 2022-03-14 PROCEDURE — 83605 ASSAY OF LACTIC ACID: CPT

## 2022-03-14 PROCEDURE — 96365 THER/PROPH/DIAG IV INF INIT: CPT

## 2022-03-14 PROCEDURE — 85025 COMPLETE CBC W/AUTO DIFF WBC: CPT | Performed by: PHYSICIAN ASSISTANT

## 2022-03-14 PROCEDURE — 87651 STREP A DNA AMP PROBE: CPT | Performed by: EMERGENCY MEDICINE

## 2022-03-14 PROCEDURE — 93005 ELECTROCARDIOGRAM TRACING: CPT | Performed by: EMERGENCY MEDICINE

## 2022-03-14 PROCEDURE — 84100 ASSAY OF PHOSPHORUS: CPT | Performed by: PHYSICIAN ASSISTANT

## 2022-03-14 PROCEDURE — 83735 ASSAY OF MAGNESIUM: CPT | Performed by: PHYSICIAN ASSISTANT

## 2022-03-14 PROCEDURE — 84443 ASSAY THYROID STIM HORMONE: CPT | Performed by: EMERGENCY MEDICINE

## 2022-03-14 PROCEDURE — P9612 CATHETERIZE FOR URINE SPEC: HCPCS

## 2022-03-14 PROCEDURE — 80061 LIPID PANEL: CPT | Performed by: PHYSICIAN ASSISTANT

## 2022-03-14 PROCEDURE — C9803 HOPD COVID-19 SPEC COLLECT: HCPCS

## 2022-03-14 PROCEDURE — 84443 ASSAY THYROID STIM HORMONE: CPT | Performed by: PHYSICIAN ASSISTANT

## 2022-03-14 RX ORDER — SENNA PLUS 8.6 MG/1
1 TABLET ORAL DAILY PRN
Status: DISCONTINUED | OUTPATIENT
Start: 2022-03-14 | End: 2022-03-16 | Stop reason: HOSPADM

## 2022-03-14 RX ORDER — ISOSORBIDE MONONITRATE 30 MG/1
30 TABLET, EXTENDED RELEASE ORAL DAILY
Status: DISCONTINUED | OUTPATIENT
Start: 2022-03-14 | End: 2022-03-16 | Stop reason: HOSPADM

## 2022-03-14 RX ORDER — FOLIC ACID 1 MG/1
1 TABLET ORAL DAILY
Status: DISCONTINUED | OUTPATIENT
Start: 2022-03-14 | End: 2022-03-16 | Stop reason: HOSPADM

## 2022-03-14 RX ORDER — LAMOTRIGINE 25 MG/1
12.5 TABLET ORAL 2 TIMES DAILY
Status: DISCONTINUED | OUTPATIENT
Start: 2022-03-14 | End: 2022-03-16 | Stop reason: HOSPADM

## 2022-03-14 RX ORDER — LEVOTHYROXINE SODIUM 0.1 MG/1
100 TABLET ORAL
Status: DISCONTINUED | OUTPATIENT
Start: 2022-03-15 | End: 2022-03-16 | Stop reason: HOSPADM

## 2022-03-14 RX ORDER — TRAZODONE HYDROCHLORIDE 50 MG/1
25 TABLET ORAL EVERY 12 HOURS SCHEDULED
Status: DISCONTINUED | OUTPATIENT
Start: 2022-03-14 | End: 2022-03-16 | Stop reason: HOSPADM

## 2022-03-14 RX ORDER — ACETAMINOPHEN 650 MG/1
650 SUPPOSITORY RECTAL EVERY 4 HOURS PRN
Status: DISCONTINUED | OUTPATIENT
Start: 2022-03-14 | End: 2022-03-16 | Stop reason: HOSPADM

## 2022-03-14 RX ORDER — CHOLECALCIFEROL (VITAMIN D3) 125 MCG
5 CAPSULE ORAL NIGHTLY PRN
Status: DISCONTINUED | OUTPATIENT
Start: 2022-03-14 | End: 2022-03-16 | Stop reason: HOSPADM

## 2022-03-14 RX ORDER — INSULIN LISPRO 100 [IU]/ML
5 INJECTION, SOLUTION INTRAVENOUS; SUBCUTANEOUS
Status: DISCONTINUED | OUTPATIENT
Start: 2022-03-14 | End: 2022-03-16 | Stop reason: HOSPADM

## 2022-03-14 RX ORDER — OLANZAPINE 5 MG/1
5 TABLET ORAL NIGHTLY
Status: DISCONTINUED | OUTPATIENT
Start: 2022-03-14 | End: 2022-03-16 | Stop reason: HOSPADM

## 2022-03-14 RX ORDER — ACETAMINOPHEN 325 MG/1
650 TABLET ORAL EVERY 4 HOURS PRN
Status: DISCONTINUED | OUTPATIENT
Start: 2022-03-14 | End: 2022-03-16 | Stop reason: HOSPADM

## 2022-03-14 RX ORDER — ONDANSETRON 2 MG/ML
4 INJECTION INTRAMUSCULAR; INTRAVENOUS EVERY 6 HOURS PRN
Status: DISCONTINUED | OUTPATIENT
Start: 2022-03-14 | End: 2022-03-16 | Stop reason: HOSPADM

## 2022-03-14 RX ORDER — VANCOMYCIN 1.75 GRAM/500 ML IN 0.9 % SODIUM CHLORIDE INTRAVENOUS
20 ONCE
Status: COMPLETED | OUTPATIENT
Start: 2022-03-14 | End: 2022-03-14

## 2022-03-14 RX ORDER — INSULIN GLARGINE 100 [IU]/ML
20 INJECTION, SOLUTION SUBCUTANEOUS 2 TIMES DAILY
Status: DISCONTINUED | OUTPATIENT
Start: 2022-03-14 | End: 2022-03-16 | Stop reason: HOSPADM

## 2022-03-14 RX ORDER — ALUMINA, MAGNESIA, AND SIMETHICONE 2400; 2400; 240 MG/30ML; MG/30ML; MG/30ML
15 SUSPENSION ORAL EVERY 6 HOURS PRN
Status: DISCONTINUED | OUTPATIENT
Start: 2022-03-14 | End: 2022-03-16 | Stop reason: HOSPADM

## 2022-03-14 RX ORDER — ONDANSETRON 4 MG/1
4 TABLET, FILM COATED ORAL EVERY 6 HOURS PRN
Status: DISCONTINUED | OUTPATIENT
Start: 2022-03-14 | End: 2022-03-16 | Stop reason: HOSPADM

## 2022-03-14 RX ORDER — ATORVASTATIN CALCIUM 20 MG/1
20 TABLET, FILM COATED ORAL NIGHTLY
Status: DISCONTINUED | OUTPATIENT
Start: 2022-03-14 | End: 2022-03-16 | Stop reason: HOSPADM

## 2022-03-14 RX ORDER — TAMSULOSIN HYDROCHLORIDE 0.4 MG/1
0.4 CAPSULE ORAL DAILY
Status: DISCONTINUED | OUTPATIENT
Start: 2022-03-14 | End: 2022-03-16 | Stop reason: HOSPADM

## 2022-03-14 RX ORDER — NITROGLYCERIN 0.4 MG/1
0.4 TABLET SUBLINGUAL
Status: DISCONTINUED | OUTPATIENT
Start: 2022-03-14 | End: 2022-03-16 | Stop reason: HOSPADM

## 2022-03-14 RX ORDER — SODIUM CHLORIDE, SODIUM LACTATE, POTASSIUM CHLORIDE, CALCIUM CHLORIDE 600; 310; 30; 20 MG/100ML; MG/100ML; MG/100ML; MG/100ML
100 INJECTION, SOLUTION INTRAVENOUS ONCE
Status: COMPLETED | OUTPATIENT
Start: 2022-03-14 | End: 2022-03-15

## 2022-03-14 RX ORDER — ACETAMINOPHEN 160 MG/5ML
650 SOLUTION ORAL EVERY 4 HOURS PRN
Status: DISCONTINUED | OUTPATIENT
Start: 2022-03-14 | End: 2022-03-16 | Stop reason: HOSPADM

## 2022-03-14 RX ORDER — SODIUM CHLORIDE 0.9 % (FLUSH) 0.9 %
3 SYRINGE (ML) INJECTION EVERY 12 HOURS SCHEDULED
Status: DISCONTINUED | OUTPATIENT
Start: 2022-03-14 | End: 2022-03-16 | Stop reason: HOSPADM

## 2022-03-14 RX ORDER — SODIUM CHLORIDE 0.9 % (FLUSH) 0.9 %
10 SYRINGE (ML) INJECTION AS NEEDED
Status: DISCONTINUED | OUTPATIENT
Start: 2022-03-14 | End: 2022-03-16 | Stop reason: HOSPADM

## 2022-03-14 RX ORDER — SODIUM CHLORIDE 0.9 % (FLUSH) 0.9 %
3-10 SYRINGE (ML) INJECTION AS NEEDED
Status: DISCONTINUED | OUTPATIENT
Start: 2022-03-14 | End: 2022-03-16 | Stop reason: HOSPADM

## 2022-03-14 RX ORDER — MEMANTINE HYDROCHLORIDE 5 MG/1
5 TABLET ORAL DAILY
Status: DISCONTINUED | OUTPATIENT
Start: 2022-03-14 | End: 2022-03-16 | Stop reason: HOSPADM

## 2022-03-14 RX ORDER — CALCIUM CARBONATE 200(500)MG
1 TABLET,CHEWABLE ORAL 2 TIMES DAILY PRN
Status: DISCONTINUED | OUTPATIENT
Start: 2022-03-14 | End: 2022-03-16 | Stop reason: HOSPADM

## 2022-03-14 RX ORDER — FAMOTIDINE 20 MG/1
20 TABLET, FILM COATED ORAL
Status: DISCONTINUED | OUTPATIENT
Start: 2022-03-14 | End: 2022-03-16 | Stop reason: HOSPADM

## 2022-03-14 RX ADMIN — SODIUM CHLORIDE, POTASSIUM CHLORIDE, SODIUM LACTATE AND CALCIUM CHLORIDE 100 ML/HR: 600; 310; 30; 20 INJECTION, SOLUTION INTRAVENOUS at 21:51

## 2022-03-14 RX ADMIN — TRAZODONE HYDROCHLORIDE 25 MG: 50 TABLET ORAL at 20:35

## 2022-03-14 RX ADMIN — ATORVASTATIN CALCIUM 20 MG: 20 TABLET, FILM COATED ORAL at 20:38

## 2022-03-14 RX ADMIN — SODIUM CHLORIDE, POTASSIUM CHLORIDE, SODIUM LACTATE AND CALCIUM CHLORIDE 2721 ML: 600; 310; 30; 20 INJECTION, SOLUTION INTRAVENOUS at 13:17

## 2022-03-14 RX ADMIN — ISOSORBIDE MONONITRATE 30 MG: 30 TABLET, EXTENDED RELEASE ORAL at 20:36

## 2022-03-14 RX ADMIN — LAMOTRIGINE 12.5 MG: 25 TABLET ORAL at 20:38

## 2022-03-14 RX ADMIN — Medication 3 ML: at 20:35

## 2022-03-14 RX ADMIN — TAMSULOSIN HYDROCHLORIDE 0.4 MG: 0.4 CAPSULE ORAL at 20:37

## 2022-03-14 RX ADMIN — Medication 100 MG: at 20:37

## 2022-03-14 RX ADMIN — FOLIC ACID 1 MG: 1 TABLET ORAL at 20:36

## 2022-03-14 RX ADMIN — MEMANTINE HYDROCHLORIDE 5 MG: 5 TABLET ORAL at 20:36

## 2022-03-14 RX ADMIN — INSULIN GLARGINE 20 UNITS: 100 INJECTION, SOLUTION SUBCUTANEOUS at 20:39

## 2022-03-14 RX ADMIN — OLANZAPINE 5 MG: 5 TABLET, FILM COATED ORAL at 20:38

## 2022-03-14 RX ADMIN — FAMOTIDINE 20 MG: 20 TABLET ORAL at 20:37

## 2022-03-14 RX ADMIN — WATER 2 G: 1000 INJECTION, SOLUTION INTRAVENOUS at 13:57

## 2022-03-14 RX ADMIN — VANCOMYCIN HYDROCHLORIDE 1750 MG: 10 INJECTION, POWDER, LYOPHILIZED, FOR SOLUTION INTRAVENOUS at 14:25

## 2022-03-14 NOTE — H&P
[facility]      Patient Name: Chris Ferguson  : 1945  MRN: [id number]  Primary Care Physician:  Provider, No Known  Date of admission: 3/14/2022      Subjective      Chief Complaint: AMS    History of Present Illness: Chris Ferguson is a 76 y.o. male who presented to [facility] on 3/14/2022 from SNF with reports of altered mental status for 1 day without any other associated symptoms. Patient was oriented on arrival to the ED without any focal deficits appreciated and patient was also fully oriented and alert on my exam as well. Patient was found to be hypotensive in the ED but has improved with IVFs. Patient also had an associated Lactic level elevated, but no source of infection appreciated. Patient states he does have a sore throat that started on the evening of 3/13/22 but it has not worsened and he denies any fever, chills, chest pain, SOA, cough, abdominal pain, N/V/D/C, peripheral edema.    Review of medical record does show that the patient was hospitalized in 2021 for an infected prosthetic left knee joint with MRSA seen on Wound culture.      Review of Systems   All other systems reviewed and are negative.      Personal History     Past Medical History:   Diagnosis Date   • Anemia    • Angina pectoris (HCC)    • Anxiety    • Arthritis     OSTEO   • BPH (benign prostatic hyperplasia)    • Dementia (HCC)    • Dementia (HCC)    • Depression    • Diabetes mellitus (HCC)     type 2   • Disease of thyroid gland    • GERD (gastroesophageal reflux disease)    • Hypertension    • Parkinson's disease (HCC)    • Short-term memory loss        Past Surgical History:   Procedure Laterality Date   • APPENDECTOMY     • CARDIAC CATHETERIZATION N/A 2020    Procedure: Left Heart Cath and coronary angiogram;  Surgeon: Yanci Howard MD;  Location: [facility] INVASIVE LOCATION;  Service: Cardiovascular;  Laterality: N/A;   • CARDIAC CATHETERIZATION N/A  11/18/2020    Procedure: Coronary angiography;  Surgeon: Yanci Howard MD;  Location: [facility] CATH INVASIVE LOCATION;  Service: Cardiovascular;  Laterality: N/A;   • CARDIAC CATHETERIZATION N/A 11/18/2020    Procedure: Left ventriculography;  Surgeon: Yanci Howard MD;  Location: [facility] CATH INVASIVE LOCATION;  Service: Cardiovascular;  Laterality: N/A;   • CHOLECYSTECTOMY     • COLONOSCOPY     • EYE SURGERY      kallie cataracts w iol   • FEMUR FRACTURE SURGERY Left     1/2018   • LEG DEBRIDEMENT Left 9/15/2020    Procedure: LOWER EXTREMITY DEBRIDEMENT;  Surgeon: Joslyn Mistry MD;  Location: [facility] OR;  Service: General;  Laterality: Left;   • MULTIPLE TOOTH EXTRACTIONS      ALL TEETH REMOVED   • STOMACH SURGERY      gastric ulcer   • TOTAL HIP ARTHROPLASTY Right 4/23/2019    Procedure: TOTAL HIP ARTHROPLASTY POSTERIOR;  Surgeon: Valente Giang MD;  Location: [facility] OR;  Service: Orthopedics   • TOTAL KNEE ARTHROPLASTY Left 8/31/2018    Procedure: REMOVAL OF LEFT DISTAL FEMUR PLATE AND SCREWS WITH COMPLEX TOTAL KNEE REPLACEMENT DISTAL FEMUR HINGED;  Surgeon: Valente Giang MD;  Location: [facility] OR;  Service: Orthopedics   • TOTAL KNEE ARTHROPLASTY REVISION Left 6/4/2021    Procedure: SINGLE STAGE LEFT KNEE REVISION;  Surgeon: Valente Giang MD;  Location: [facility] OR;  Service: Orthopedics;  Laterality: Left;       Family History: family history includes Heart disease in his father and mother; Seizures in his son and son; Stroke in his father. Otherwise pertinent FHx was reviewed and not pertinent to current issue.    Social History:  reports that he has never smoked. He has never used smokeless tobacco. He reports current alcohol use. He reports that he does not use drugs.    Home Medications:  Prior to Admission Medications     Prescriptions Last Dose Informant Patient Reported? Taking?    acetaminophen (TYLENOL) 325 MG tablet Unknown  Yes No    Take 650 mg by  mouth Every 8 (Eight) Hours As Needed for Mild Pain .    atorvastatin (LIPITOR) 20 MG tablet Unknown Pharmacy Yes No    Take 20 mg by mouth Every Night.    castor oil-balsam peru (VENELEX) ointment Unknown  No No    Apply both heels twice daily    dextrose (GLUTOSE) 40 % gel Unknown  Yes No    Take 15 g by mouth Every 1 (One) Hour As Needed for Low Blood Sugar. And pt unable to swallow    famotidine (PEPCID) 20 MG tablet Unknown  No No    Take 1 tablet by mouth 2 (Two) Times a Day Before Meals.    folic acid (FOLVITE) 1 MG tablet Unknown  Yes No    Take 1 mg by mouth Daily.    HYDROcodone-acetaminophen (NORCO) 7.5-325 MG per tablet Unknown  No No    Take 1 tablet by mouth Every 4 (Four) Hours As Needed for Mild Pain  or Moderate Pain .    insulin glargine (Semglee) 100 UNIT/ML injection Unknown  Yes No    Inject 20 Units under the skin into the appropriate area as directed 2 (Two) Times a Day.    insulin lispro (humaLOG) 100 UNIT/ML injection Unknown  Yes No    Inject 5 Units under the skin into the appropriate area as directed 3 (Three) Times a Day Before Meals. Hold for bs <70 and call MD for bs >400    isosorbide mononitrate (IMDUR) 30 MG 24 hr tablet Unknown  Yes No    Take 30 mg by mouth Daily.    lamoTRIgine (LaMICtal) 25 MG tablet Unknown Pharmacy Yes No    Take 12.5 mg by mouth 2 (Two) Times a Day.    levothyroxine (SYNTHROID, LEVOTHROID) 100 MCG tablet Unknown Pharmacy Yes No    Take 100 mcg by mouth Daily.    memantine (NAMENDA) 5 MG tablet Unknown Pharmacy Yes No    Take 5 mg by mouth Daily.    metoprolol succinate XL (TOPROL-XL) 25 MG 24 hr tablet Unknown Pharmacy Yes No    Take 25 mg by mouth Daily.    multivitamin (THERAGRAN) tablet tablet Unknown  Yes No    Take 1 tablet by mouth Daily.    OLANZapine (zyPREXA) 5 MG tablet Unknown  Yes No    Take 5 mg by mouth Every Night.    senna (SENOKOT) 8.6 MG tablet Unknown  Yes No    Take 1 tablet by mouth Daily As Needed for Constipation.    tamsulosin (FLOMAX)  0.4 MG capsule 24 hr capsule Unknown Pharmacy Yes No    Take 1 capsule by mouth Daily.    thiamine (VITAMIN B1) 100 MG tablet Unknown  No No    Take 1 tablet by mouth Daily.    traZODone (DESYREL) 50 MG tablet Unknown  Yes No    Take 50 mg by mouth 2 (two) times a day.    zinc oxide (DESITIN) 40 % paste paste Unknown  No No    Apply  topically to the appropriate area as directed 2 (Two) Times a Day.            Allergies:  Allergies   Allergen Reactions   • Codeine GI Intolerance       Objective      Vitals:   Temp:  [98.1 °F (36.7 °C)-98.2 °F (36.8 °C)] 98.2 °F (36.8 °C)  Heart Rate:  [59-82] 79  Resp:  [16-17] 16  BP: ()/(33-70) 101/63    Physical Exam  Vitals reviewed.   Constitutional:       General: He is not in acute distress.     Appearance: He is not ill-appearing, toxic-appearing or diaphoretic.   HENT:      Head: Normocephalic and atraumatic.      Right Ear: External ear normal.      Left Ear: External ear normal.      Nose: Nose normal.      Mouth/Throat:      Mouth: Mucous membranes are dry.      Pharynx: Oropharynx is clear.   Eyes:      General: No scleral icterus.        Right eye: No discharge.         Left eye: No discharge.      Extraocular Movements: Extraocular movements intact.      Conjunctiva/sclera: Conjunctivae normal.   Cardiovascular:      Rate and Rhythm: Normal rate and regular rhythm.      Pulses: Normal pulses.      Heart sounds: Normal heart sounds. No murmur heard.    No gallop.   Pulmonary:      Effort: Pulmonary effort is normal. No respiratory distress.      Breath sounds: Normal breath sounds. No wheezing.      Comments: On RA  Abdominal:      General: Bowel sounds are normal. There is no distension.      Palpations: Abdomen is soft.      Tenderness: There is no abdominal tenderness. There is no guarding.   Musculoskeletal:         General: Normal range of motion.      Cervical back: Normal range of motion and neck supple.      Right lower leg: No edema.      Left lower leg:  No edema.   Skin:     General: Skin is warm and dry.   Neurological:      General: No focal deficit present.      Mental Status: He is alert and oriented to person, place, and time.   Psychiatric:         Mood and Affect: Mood normal.         Behavior: Behavior normal.         Thought Content: Thought content normal.         Judgment: Judgment normal.         Result Review    Result Review:  I have personally reviewed the results from the time of this admission to 3/14/2022 18:05 EDT and agree with these findings:  [x]  Laboratory  [x]  Microbiology  [x]  Radiology  [x]  EKG/Telemetry   []  Cardiology/Vascular   []  Pathology  [x]  Old records  []  Other:  Most notable findings include:       Assessment/Plan        Active Hospital Problems:  Active Hospital Problems    Diagnosis    • **Hypotension    • Dementia ([facility])    • Essential hypertension    • DM (diabetes mellitus), type 2 (HCC)    • BPH (benign prostatic hyperplasia)    • Hypothyroidism    • Stage 3a chronic kidney disease (HCC)    • Hyperlipidemia      Plan:   Acute Hypotension with chronic essential HTN:  - Improving with IVFs, no acute issues appreciated as far as infectious etiology goes  - WBC and PCT normal   - LA 2.4; will repeat  - IV ABX   - Blood cultures pending  - CXR personally reviewed and no acute issues appreciated  - UA appreciated and no acute infection appreciated   - Rapid strep negative   - Covid/flu negative  - Holding home antihypertensive regimen  - Holding pain medication due to hypotension    DM2:  - A1c ordered   - Continue home insulin use and SSI ordered    Hypothyroidism:  - continue home regimen  - TSH ordered    HLD:  - Continue home regimen  - Lipid panel ordered    CKD3:  - Baseline Scr 1.10  - Avoid nephrotoxic medications and renal dose all others  - follow renal function and electrolytes with daily labs    BPH:  - monitor for urinary retention  - continue flomax    Alzheimer's Dementia:  - Unknown baseline but patient was  oriented x4 on our exam.  - Continue Namenda and zyprexa      DVT prophylaxis:  SCDs  CODE STATUS:       Admission Status:  I believe this patient meets Observation status.    I discussed the patient's findings and my recommendations with patient.    This patient has been examined wearing appropriate Personal Protective Equipment and discussed with ED provider. 03/14/22      Signature: Electronically signed by Jocelyn Lang PA-C, 03/14/22, 6:03 PM EDT.    I have reviewed this documentation and agree.    I also personally evaluated this patient    76-year-old man who is a resident of a local skilled nursing facility.  Was sent to the emergency room on 3/14/2022 due to altered mental status.  On arrival to the ED, patient was alert and oriented x3 but was noted to be hypotensive.  Work-up including blood work, chest x-ray and respiratory viral panel were insignificant.  Blood culture was collected and patient given IV fluid bolus and started on IV fluid.  Lactate was also noted to be elevated.  No source of infection was identified.    On general examination, patient was in no acute distress  Heart sounds 1 and 2 heard regular rate and rhythm  Lungs clear to auscultation bilaterally  Abdomen benign    Plan  No source of infection identified at this time  Hypotension probably due to pain medication  Lactate elevation probably due to tissue hypoperfusion  Blood culture pending  We will hold off on antimicrobial at this time  Supportive care    Further recommendation following clinical course    Electronically signed by Keith Hathaway MD, 03/15/22, 9:16 AM EDT.

## 2022-03-14 NOTE — PLAN OF CARE
Goal Outcome Evaluation:  Plan of Care Reviewed With: patient           Outcome Evaluation: Pt is alert and oriented times 4 during admission . Pt not complaining of shortness of air or breath and above 92% on roomm air. Pt Blood pressure at 101/63 map of 73. Will cont to monitor.

## 2022-03-14 NOTE — ED PROVIDER NOTES
Subjective   Chief complaint patient has no complaints other than sore throat nursing home reports change in mental status.    History of present illness 76-year-old male with multiple health problems who the nursing home states that he has had some altered mental status and not his normal since he woke up this morning.  Patient was brought in by EMS with only complaints of a sore throat.  He states this is mild ongoing for a couple of days and nothing really makes it better or worse he has had no fevers or vomiting he denies chest pain neck arm or jaw pain or shortness of breath.  He denies headache or injury no speech difficulty or paralysis.  He denies any confusion.  This is reported by nursing home to be there since he got up this morning mild confusion not his normal self nothing seems to make it better or worse he lives at the nursing home and has had no other associated symptoms per nursing home.  Pain is located in throat.  No other complaints or associated symptoms at this time          Review of Systems   Constitutional: Negative for chills and fever.   HENT: Positive for sore throat. Negative for congestion.    Eyes: Negative for photophobia and visual disturbance.   Respiratory: Negative for chest tightness and shortness of breath.    Cardiovascular: Negative for chest pain and palpitations.   Gastrointestinal: Negative for abdominal pain and vomiting.   Endocrine: Negative for cold intolerance and heat intolerance.   Genitourinary: Negative for difficulty urinating and dysuria.   Musculoskeletal: Positive for arthralgias. Negative for back pain and neck pain.   Skin: Negative for rash.   Neurological: Negative for dizziness, speech difficulty, light-headedness and headaches.   Psychiatric/Behavioral: Negative for agitation and behavioral problems.       Past Medical History:   Diagnosis Date   • Anemia    • Angina pectoris (HCC)    • Anxiety    • Arthritis     OSTEO   • BPH (benign prostatic  hyperplasia)    • Dementia (HCC)    • Dementia (HCC)    • Depression    • Diabetes mellitus (HCC)     type 2   • Disease of thyroid gland    • GERD (gastroesophageal reflux disease)    • Hypertension    • Parkinson's disease (HCC)    • Short-term memory loss        Allergies   Allergen Reactions   • Codeine GI Intolerance       Past Surgical History:   Procedure Laterality Date   • APPENDECTOMY     • CARDIAC CATHETERIZATION N/A 11/18/2020    Procedure: Left Heart Cath and coronary angiogram;  Surgeon: Yanci Howard MD;  Location: [facility] CATH INVASIVE LOCATION;  Service: Cardiovascular;  Laterality: N/A;   • CARDIAC CATHETERIZATION N/A 11/18/2020    Procedure: Coronary angiography;  Surgeon: Yanci Howard MD;  Location: [facility] CATH INVASIVE LOCATION;  Service: Cardiovascular;  Laterality: N/A;   • CARDIAC CATHETERIZATION N/A 11/18/2020    Procedure: Left ventriculography;  Surgeon: Yanci Howard MD;  Location: [facility] CATH INVASIVE LOCATION;  Service: Cardiovascular;  Laterality: N/A;   • CHOLECYSTECTOMY     • COLONOSCOPY     • EYE SURGERY      kallie cataracts w iol   • FEMUR FRACTURE SURGERY Left     1/2018   • LEG DEBRIDEMENT Left 9/15/2020    Procedure: LOWER EXTREMITY DEBRIDEMENT;  Surgeon: Joslyn Mistry MD;  Location: [facility] OR;  Service: General;  Laterality: Left;   • MULTIPLE TOOTH EXTRACTIONS      ALL TEETH REMOVED   • STOMACH SURGERY      gastric ulcer   • TOTAL HIP ARTHROPLASTY Right 4/23/2019    Procedure: TOTAL HIP ARTHROPLASTY POSTERIOR;  Surgeon: Valente Giang MD;  Location: [facility];  Service: Orthopedics   • TOTAL KNEE ARTHROPLASTY Left 8/31/2018    Procedure: REMOVAL OF LEFT DISTAL FEMUR PLATE AND SCREWS WITH COMPLEX TOTAL KNEE REPLACEMENT DISTAL FEMUR HINGED;  Surgeon: Valente Giang MD;  Location: [facility];  Service: Orthopedics   • TOTAL KNEE ARTHROPLASTY REVISION Left 6/4/2021    Procedure: SINGLE STAGE LEFT KNEE REVISION;  Surgeon: Rahat  Valente HOFFMANN MD;  Location: [facility] MAIN OR;  Service: Orthopedics;  Laterality: Left;       Family History   Problem Relation Age of Onset   • Heart disease Mother    • Stroke Father    • Heart disease Father    • Seizures Son    • Seizures Son    • Malig Hyperthermia Neg Hx        Social History     Socioeconomic History   • Marital status:    Tobacco Use   • Smoking status: Never Smoker   • Smokeless tobacco: Never Used   Substance and Sexual Activity   • Alcohol use: Yes     Comment: pt reports mod amt    • Drug use: No   • Sexual activity: Defer     Prior to Admission medications    Medication Sig Start Date End Date Taking? Authorizing Provider   acetaminophen (TYLENOL) 325 MG tablet Take 650 mg by mouth Every 8 (Eight) Hours As Needed for Mild Pain .    Bess Mcclain MD   atorvastatin (LIPITOR) 20 MG tablet Take 20 mg by mouth Every Night.       castor oil-balsam peru (VENELEX) ointment Apply both heels twice daily 6/9/21   Nghia Stinson MD   dextrose (GLUTOSE) 40 % gel Take 15 g by mouth Every 1 (One) Hour As Needed for Low Blood Sugar. And pt unable to swallow    Bess Mcclain MD   famotidine (PEPCID) 20 MG tablet Take 1 tablet by mouth 2 (Two) Times a Day Before Meals. 9/25/20   Seth Ernst DO   folic acid (FOLVITE) 1 MG tablet Take 1 mg by mouth Daily.    Bess Mcclain MD   HYDROcodone-acetaminophen (NORCO) 7.5-325 MG per tablet Take 1 tablet by mouth Every 4 (Four) Hours As Needed for Mild Pain  or Moderate Pain . 6/9/21   Nghia Stinson MD   insulin glargine (Semglee) 100 UNIT/ML injection Inject 20 Units under the skin into the appropriate area as directed 2 (Two) Times a Day.    Bess Mcclain MD   insulin lispro (humaLOG) 100 UNIT/ML injection Inject 5 Units under the skin into the appropriate area as directed 3 (Three) Times a Day Before Meals. Hold for bs <70 and call MD for bs >400     Bess Mcclain MD   isosorbide mononitrate (IMDUR) 30 MG 24 hr  tablet Take 30 mg by mouth Daily.    Bess Mcclain MD   lamoTRIgine (LaMICtal) 25 MG tablet Take 12.5 mg by mouth 2 (Two) Times a Day.    Bess Mcclain MD   levothyroxine (SYNTHROID, LEVOTHROID) 100 MCG tablet Take 100 mcg by mouth Daily.    Bess Mcclain MD   memantine (NAMENDA) 5 MG tablet Take 5 mg by mouth Daily.    eBss Mcclain MD   metoprolol succinate XL (TOPROL-XL) 25 MG 24 hr tablet Take 25 mg by mouth Daily.    Bess Mcclain MD   multivitamin (Therems) tablet tablet Take 1 tablet by mouth Daily.    Bess Mcclain MD   OLANZapine (zyPREXA) 5 MG tablet Take 5 mg by mouth Every Night.    Bess Mcclain MD   senna (senna) 8.6 MG tablet Take 1 tablet by mouth Daily As Needed for Constipation.    Bess Mcclain MD   tamsulosin (FLOMAX) 0.4 MG capsule 24 hr capsule Take 1 capsule by mouth Daily.    Bess Mcclain MD   thiamine (VITAMIN B1) 100 MG tablet Take 1 tablet by mouth Daily. 9/26/20   Seth Ernst DO   traZODone (DESYREL) 50 MG tablet Take 50 mg by mouth 2 (two) times a day.    Bess Mcclain MD   zinc oxide (DESITIN) 40 % paste paste Apply  topically to the appropriate area as directed 2 (Two) Times a Day. 6/9/21   Nghia Stinson MD           Objective   Physical Exam  In general this is a 76-year-old male who is awake alert nontoxic-appearing resting comfortably very alert looks well.  HEENT extraocular muscles are intact pupils equal react there is no photophobia disks are sharp mouth is clear there is no exudate abscess stridor drooling no bulging posterior pharyngeal wall no trismus tongue floor the mouth normal.  Speech is normal.  Neck is supple no adenopathy no JVD no bruits no tenderness throughout the anterior neck no redness or signs of infection.  Lungs clear no retractions.  Heart regular without murmur rub.  Abdomen soft without tenderness good bowel sounds no peritoneal findings or pulsatile masses.  Extremities  pulses are equal throughout upper and lower extremities no edema cords or Homans' sign or evidence of DVT.  Skin warm and dry without rashes or cellulitic changes.  Including the genitalia area.  Neurologic he is awake alert orientated x3 no facial asymmetry normal speech tongue soft palate normal no drift the arms or legs normal finger-to-nose toes downgoing there is no focal deficits.  Musculoskeletal no red hot swollen joints.  Procedures           ED Course      Results for orders placed or performed during the hospital encounter of 03/14/22   COVID-19,CEPHEID/ILYA,COR/VENECIA/PAD/BRIAN IN-HOUSE(OR EMERGENT/ADD-ON),NP SWAB IN TRANSPORT MEDIA 3-4 HR TAT, RT-PCR - Swab, Nasopharynx    Specimen: Nasopharynx; Swab   Result Value Ref Range    COVID19 Not Detected Not Detected - Ref. Range   Influenza Antigen, Rapid - Swab, Nasopharynx    Specimen: Nasopharynx; Swab   Result Value Ref Range    Influenza A PCR Not Detected Not Detected    Influenza B PCR Not Detected Not Detected   Rapid Strep A Screen - Swab, Throat    Specimen: Throat; Swab   Result Value Ref Range    Strep A Ag Negative Negative   Comprehensive Metabolic Panel    Specimen: Arm, Right; Blood   Result Value Ref Range    Glucose 71 65 - 99 mg/dL    BUN 9 8 - 23 mg/dL    Creatinine 0.28 (L) 0.76 - 1.27 mg/dL    Sodium 133 (L) 136 - 145 mmol/L    Potassium 4.2 3.5 - 5.2 mmol/L    Chloride 103 98 - 107 mmol/L    CO2 8.0 (L) 22.0 - 29.0 mmol/L    Calcium 6.1 (L) 8.6 - 10.5 mg/dL    Total Protein 1.3 (L) 6.0 - 8.5 g/dL    Albumin 0.50 (L) 3.50 - 5.20 g/dL    ALT (SGPT) 9 1 - 41 U/L    AST (SGOT) 6 1 - 40 U/L    Alkaline Phosphatase 21 (L) 39 - 117 U/L    Total Bilirubin <0.2 0.0 - 1.2 mg/dL    Globulin 0.8 gm/dL    A/G Ratio 0.6 g/dL    BUN/Creatinine Ratio 32.1 (H) 7.0 - 25.0    Anion Gap 22.0 (H) 5.0 - 15.0 mmol/L    eGFR 127.5 >60.0 mL/min/1.73   Lipase    Specimen: Arm, Right; Blood   Result Value Ref Range    Lipase 4 (L) 13 - 60 U/L   Urinalysis With  Microscopic If Indicated (No Culture) - Urine, Catheter    Specimen: Urine, Catheter   Result Value Ref Range    Color, UA Yellow Yellow, Straw    Appearance, UA Clear Clear    pH, UA <=5.0 5.0 - 8.0    Specific Gravity, UA 1.022 1.005 - 1.030    Glucose, UA >=1000 mg/dL (3+) (A) Negative    Ketones, UA Trace (A) Negative    Bilirubin, UA Negative Negative    Blood, UA Negative Negative    Protein, UA Negative Negative    Leuk Esterase, UA Negative Negative    Nitrite, UA Negative Negative    Urobilinogen, UA 1.0 E.U./dL 0.2 - 1.0 E.U./dL   Troponin    Specimen: Arm, Right; Blood   Result Value Ref Range    Troponin T <0.010 0.000 - 0.030 ng/mL   CBC Auto Differential    Specimen: Arm, Right; Blood   Result Value Ref Range    WBC 10.50 3.40 - 10.80 10*3/mm3    RBC 5.16 4.14 - 5.80 10*6/mm3    Hemoglobin 12.4 (L) 13.0 - 17.7 g/dL    Hematocrit 37.1 (L) 37.5 - 51.0 %    MCV 71.9 (L) 79.0 - 97.0 fL    MCH 24.1 (L) 26.6 - 33.0 pg    MCHC 33.5 31.5 - 35.7 g/dL    RDW 16.8 (H) 12.3 - 15.4 %    RDW-SD 42.4 37.0 - 54.0 fl    MPV 8.5 6.0 - 12.0 fL    Platelets 301 140 - 450 10*3/mm3    Neutrophil % 61.7 42.7 - 76.0 %    Lymphocyte % 29.0 19.6 - 45.3 %    Monocyte % 6.8 5.0 - 12.0 %    Eosinophil % 2.0 0.3 - 6.2 %    Basophil % 0.5 0.0 - 1.5 %    Neutrophils, Absolute 6.50 1.70 - 7.00 10*3/mm3    Lymphocytes, Absolute 3.00 0.70 - 3.10 10*3/mm3    Monocytes, Absolute 0.70 0.10 - 0.90 10*3/mm3    Eosinophils, Absolute 0.20 0.00 - 0.40 10*3/mm3    Basophils, Absolute 0.10 0.00 - 0.20 10*3/mm3    nRBC 0.0 0.0 - 0.2 /100 WBC   Ammonia    Specimen: Arm, Right; Blood   Result Value Ref Range    Ammonia 18 16 - 60 umol/L   CK    Specimen: Arm, Right; Blood   Result Value Ref Range    Creatine Kinase 16 (L) 20 - 200 U/L   TSH    Specimen: Arm, Right; Blood   Result Value Ref Range    TSH 0.420 0.270 - 4.200 uIU/mL   Procalcitonin    Specimen: Arm, Right; Blood   Result Value Ref Range    Procalcitonin 0.05 0.00 - 0.25 ng/mL   TSH     Specimen: Blood   Result Value Ref Range    TSH 0.424 0.270 - 4.200 uIU/mL   Procalcitonin    Specimen: Blood   Result Value Ref Range    Procalcitonin 0.06 0.00 - 0.25 ng/mL   Ammonia    Specimen: Blood   Result Value Ref Range    Ammonia 14 (L) 16 - 60 umol/L   Lipase    Specimen: Blood   Result Value Ref Range    Lipase 13 13 - 60 U/L   CBC Auto Differential    Specimen: Blood   Result Value Ref Range    WBC 11.50 (H) 3.40 - 10.80 10*3/mm3    RBC 4.71 4.14 - 5.80 10*6/mm3    Hemoglobin 10.9 (L) 13.0 - 17.7 g/dL    Hematocrit 34.1 (L) 37.5 - 51.0 %    MCV 72.3 (L) 79.0 - 97.0 fL    MCH 23.0 (L) 26.6 - 33.0 pg    MCHC 31.9 31.5 - 35.7 g/dL    RDW 16.9 (H) 12.3 - 15.4 %    RDW-SD 42.9 37.0 - 54.0 fl    MPV 7.9 6.0 - 12.0 fL    Platelets 209 140 - 450 10*3/mm3    Neutrophil % 80.4 (H) 42.7 - 76.0 %    Lymphocyte % 12.4 (L) 19.6 - 45.3 %    Monocyte % 5.9 5.0 - 12.0 %    Eosinophil % 0.9 0.3 - 6.2 %    Basophil % 0.4 0.0 - 1.5 %    Neutrophils, Absolute 9.30 (H) 1.70 - 7.00 10*3/mm3    Lymphocytes, Absolute 1.40 0.70 - 3.10 10*3/mm3    Monocytes, Absolute 0.70 0.10 - 0.90 10*3/mm3    Eosinophils, Absolute 0.10 0.00 - 0.40 10*3/mm3    Basophils, Absolute 0.00 0.00 - 0.20 10*3/mm3    nRBC 0.1 0.0 - 0.2 /100 WBC   Comprehensive Metabolic Panel    Specimen: Blood   Result Value Ref Range    Glucose 304 (H) 65 - 99 mg/dL    BUN 29 (H) 8 - 23 mg/dL    Creatinine 1.90 (H) 0.76 - 1.27 mg/dL    Sodium 137 136 - 145 mmol/L    Potassium 4.2 3.5 - 5.2 mmol/L    Chloride 105 98 - 107 mmol/L    CO2 24.0 22.0 - 29.0 mmol/L    Calcium 8.2 (L) 8.6 - 10.5 mg/dL    Total Protein 5.9 (L) 6.0 - 8.5 g/dL    Albumin 3.10 (L) 3.50 - 5.20 g/dL    ALT (SGPT) 46 (H) 1 - 41 U/L    AST (SGOT) 27 1 - 40 U/L    Alkaline Phosphatase 97 39 - 117 U/L    Total Bilirubin 0.3 0.0 - 1.2 mg/dL    Globulin 2.8 gm/dL    A/G Ratio 1.1 g/dL    BUN/Creatinine Ratio 15.3 7.0 - 25.0    Anion Gap 8.0 5.0 - 15.0 mmol/L    eGFR 36.6 (L) >60.0 mL/min/1.73   Lactic  Acid, Plasma    Specimen: Blood   Result Value Ref Range    Lactate 1.3 0.5 - 2.0 mmol/L   Hemoglobin A1c    Specimen: Blood   Result Value Ref Range    Hemoglobin A1C 10.0 (H) 3.5 - 5.6 %   Lipid Panel    Specimen: Blood   Result Value Ref Range    Total Cholesterol 134 0 - 200 mg/dL    Triglycerides 203 (H) 0 - 150 mg/dL    HDL Cholesterol 33 (L) 40 - 60 mg/dL    LDL Cholesterol  67 0 - 100 mg/dL    VLDL Cholesterol 34 5 - 40 mg/dL    LDL/HDL Ratio 1.83    Magnesium    Specimen: Blood   Result Value Ref Range    Magnesium 1.4 (L) 1.6 - 2.4 mg/dL   Phosphorus    Specimen: Blood   Result Value Ref Range    Phosphorus 3.4 2.5 - 4.5 mg/dL   POC Glucose Once    Specimen: Blood   Result Value Ref Range    Glucose 206 (H) 70 - 105 mg/dL   POC Lactate    Specimen: Blood   Result Value Ref Range    Lactate 2.4 (C) 0.5 - 2.0 mmol/L   POC Glucose Once    Specimen: Blood   Result Value Ref Range    Glucose 136 (H) 70 - 105 mg/dL   POC Glucose Once    Specimen: Blood   Result Value Ref Range    Glucose 137 (H) 70 - 105 mg/dL   POC Glucose Once    Specimen: Blood   Result Value Ref Range    Glucose 109 (H) 70 - 105 mg/dL   POC Glucose Once    Specimen: Blood   Result Value Ref Range    Glucose 92 70 - 105 mg/dL   ECG 12 Lead   Result Value Ref Range    QT Interval 447 ms   Light Blue Top   Result Value Ref Range    Extra Tube hold for add-on      XR Chest 1 View    Result Date: 3/14/2022   1. No acute chest findings. No significant change from 4/25/2021.  Electronically Signed By-Yoselin Woodward MD On:3/14/2022 1:48 PM This report was finalized on [id number] by  Yoselin Woodward MD.    Medications   sodium chloride 0.9 % flush 10 mL (has no administration in time range)   atorvastatin (LIPITOR) tablet 20 mg (20 mg Oral Given 3/14/22 2038)   famotidine (PEPCID) tablet 20 mg (20 mg Oral Given 3/15/22 0822)   folic acid (FOLVITE) tablet 1 mg (1 mg Oral Given 3/15/22 0822)   insulin glargine (LANTUS, SEMGLEE) injection 20 Units  (20 Units Subcutaneous Given 3/15/22 0836)   insulin lispro (ADMELOG) injection 5 Units (5 Units Subcutaneous Not Given 3/15/22 1040)   isosorbide mononitrate (IMDUR) 24 hr tablet 30 mg (30 mg Oral Given 3/15/22 0822)   lamoTRIgine (LaMICtal) tablet 12.5 mg (12.5 mg Oral Given 3/15/22 0822)   levothyroxine (SYNTHROID, LEVOTHROID) tablet 100 mcg (100 mcg Oral Given 3/15/22 0519)   memantine (NAMENDA) tablet 5 mg (5 mg Oral Given 3/15/22 0822)   OLANZapine (zyPREXA) tablet 5 mg (5 mg Oral Given 3/14/22 2038)   senna (SENOKOT) tablet 1 tablet (has no administration in time range)   tamsulosin (FLOMAX) 24 hr capsule 0.4 mg (0.4 mg Oral Given 3/15/22 0822)   thiamine (VITAMIN B-1) tablet 100 mg (100 mg Oral Given 3/15/22 0822)   traZODone (DESYREL) tablet 25 mg (25 mg Oral Given 3/15/22 0822)   sodium chloride 0.9 % flush 3 mL (3 mL Intravenous Given 3/15/22 0822)   sodium chloride 0.9 % flush 3-10 mL (has no administration in time range)   calcium carbonate (TUMS) chewable tablet 500 mg (200 mg elemental) (has no administration in time range)   aluminum-magnesium hydroxide-simethicone (MAALOX MAX) 400-400-40 MG/5ML suspension 15 mL (has no administration in time range)   ondansetron (ZOFRAN) tablet 4 mg (has no administration in time range)     Or   ondansetron (ZOFRAN) injection 4 mg (has no administration in time range)   nitroglycerin (NITROSTAT) SL tablet 0.4 mg (has no administration in time range)   acetaminophen (TYLENOL) tablet 650 mg (has no administration in time range)     Or   acetaminophen (TYLENOL) 160 MG/5ML solution 650 mg (has no administration in time range)     Or   acetaminophen (TYLENOL) suppository 650 mg (has no administration in time range)   melatonin tablet 5 mg (has no administration in time range)   sodium chloride 0.9 % infusion (has no administration in time range)   lactated ringers bolus 2,721 mL (0 mL/kg × 90.7 kg Intravenous Stopped 3/14/22 1644)   ceFEPime (MAXIPIME) in SWFI 2g/12.5ml  IV PUSH syringe (2 g Intravenous Given 3/14/22 1627)   vancomycin IVPB 1750 mg in 0.9% Sodium Chloride (premix) 500 mL (0 mg/kg × 90.7 kg Intravenous Stopped 3/14/22 1644)   lactated ringers infusion (0 mL/hr Intravenous Stopped 3/15/22 0821)          EKG my interpretation normal sinus rhythm rate of 73 normal axis no hypertrophy QTC of 465 some low voltage borderline EKG change from previous other than slower rate      SEPTIC SHOCK FOCUSED EXAM ATTESTATION    I attest that I have reassessed tissue perfusion after the fluid bolus given.    Slava Ellis MD  03/15/22  11:33 EDT                                   MDM  Number of Diagnoses or Management Options  Hypotension, unspecified hypotension type: new and requires workup  Weakness: new and requires workup  Diagnosis management comments: Medical decision making.  Patient IV established placed on a cardiac monitor blood pressure was noted to be about 56/34 when I was in the room.  And this was rechecked and continued to be low in the 60s systolic.  Patient IV established she was placed on sepsis protocol started IV Ringer's 30 mill per kilo bolus.  And had the above evaluation labs and cultures lactate obtained and started on antibiotics of cefepime and vancomycin for possible sepsis.  Labs all reviewed by me COVID-19 negative flu negative.  Chemistries remarkable for a CO2 of 8 and a calcium of 6.1 but albumin was only 0.50 lipase normal urine normal troponin negative CBC normal white count 10.5 hemoglobin 12.4 ammonia normal TSH normal CK normal the patient had procalcitonin normal lactate was 2.4 cultures are pending chest x-ray reviewed by me without acute findings.  The patient EKG reviewed by me showed a sinus rhythm without acute changes.  The patient's pressure did improve he came up into the 120s systolic after fluids.  Patient repeat exam is resting comfortably he is awake alert orientated x3 he has no facial asymmetries no focal deficits lungs are clear  heart is regular without murmur abdomen soft without tenderness or pulsatile masses good cap refill good skin turgor he has no focal findings he looks well.  Etiology of his hypotension is not quite clear.  I do not see evidence that suggest acute aneurysm or dissection or myocardial infarction no evidence of a stroke.  No evidence of any intra-abdominal process.  No evidence of pneumonia.  There are multiple etiologies possible this is not a complete list.  Patient's blood pressure does present a life-threatening problem has improved with fluids.  He was acidotic with a CO2 of 8.  There is no family present he was made aware of the findings.  And the patient will be placed in the hospital hospitalist nurse practitioner notified.  Patient stable improved ER course.  Critical care 30 minutes occluding IV fluid boluses antibiotics potential sepsis.  Hypotension.  All labs EKG chest x-ray reviewed by me       Amount and/or Complexity of Data Reviewed  Clinical lab tests: reviewed  Tests in the radiology section of CPT®: reviewed  Discuss the patient with other providers: yes    Risk of Complications, Morbidity, and/or Mortality  Presenting problems: high  Diagnostic procedures: high  Management options: high    Patient Progress  Patient progress: stable      Final diagnoses:   Weakness   Hypotension, unspecified hypotension type       ED Disposition  ED Disposition     ED Disposition   Decision to Admit    Condition   --    Comment   Level of Care: Telemetry [5]   Admitting Physician: EBEN HOBSON [[id number]]   Attending Physician: EBEN HOBSON [[id number]]   Bed Request Comments: pcu               No follow-up provider specified.       Medication List      No changes were made to your prescriptions during this visit.          Slava Ellis MD  03/15/22 2064     Yes

## 2022-03-15 ENCOUNTER — APPOINTMENT (OUTPATIENT)
Dept: ULTRASOUND IMAGING | Facility: HOSPITAL | Age: 77
End: 2022-03-15

## 2022-03-15 LAB
ALBUMIN SERPL-MCNC: 3.2 G/DL (ref 3.5–5.2)
ALBUMIN/GLOB SERPL: 1.1 G/DL
ALP SERPL-CCNC: 101 U/L (ref 39–117)
ALT SERPL W P-5'-P-CCNC: 39 U/L (ref 1–41)
ANION GAP SERPL CALCULATED.3IONS-SCNC: 7 MMOL/L (ref 5–15)
AST SERPL-CCNC: 25 U/L (ref 1–40)
BASOPHILS # BLD AUTO: 0 10*3/MM3 (ref 0–0.2)
BASOPHILS NFR BLD AUTO: 0.6 % (ref 0–1.5)
BILIRUB SERPL-MCNC: 0.4 MG/DL (ref 0–1.2)
BUN SERPL-MCNC: 20 MG/DL (ref 8–23)
BUN/CREAT SERPL: 14.7 (ref 7–25)
CALCIUM SPEC-SCNC: 8.6 MG/DL (ref 8.6–10.5)
CHLORIDE SERPL-SCNC: 106 MMOL/L (ref 98–107)
CO2 SERPL-SCNC: 28 MMOL/L (ref 22–29)
CREAT SERPL-MCNC: 1.36 MG/DL (ref 0.76–1.27)
DEPRECATED RDW RBC AUTO: 42.4 FL (ref 37–54)
EGFRCR SERPLBLD CKD-EPI 2021: 54.6 ML/MIN/1.73
EOSINOPHIL # BLD AUTO: 0.2 10*3/MM3 (ref 0–0.4)
EOSINOPHIL NFR BLD AUTO: 3.7 % (ref 0.3–6.2)
ERYTHROCYTE [DISTWIDTH] IN BLOOD BY AUTOMATED COUNT: 16.6 % (ref 12.3–15.4)
GLOBULIN UR ELPH-MCNC: 2.8 GM/DL
GLUCOSE BLDC GLUCOMTR-MCNC: 109 MG/DL (ref 70–105)
GLUCOSE BLDC GLUCOMTR-MCNC: 187 MG/DL (ref 70–105)
GLUCOSE BLDC GLUCOMTR-MCNC: 237 MG/DL (ref 70–105)
GLUCOSE BLDC GLUCOMTR-MCNC: 247 MG/DL (ref 70–105)
GLUCOSE BLDC GLUCOMTR-MCNC: 274 MG/DL (ref 70–105)
GLUCOSE BLDC GLUCOMTR-MCNC: 92 MG/DL (ref 70–105)
GLUCOSE SERPL-MCNC: 92 MG/DL (ref 65–99)
HBA1C MFR BLD: 10 % (ref 3.5–5.6)
HCT VFR BLD AUTO: 33.7 % (ref 37.5–51)
HGB BLD-MCNC: 11 G/DL (ref 13–17.7)
LYMPHOCYTES # BLD AUTO: 1.8 10*3/MM3 (ref 0.7–3.1)
LYMPHOCYTES NFR BLD AUTO: 27.5 % (ref 19.6–45.3)
MAGNESIUM SERPL-MCNC: 1.5 MG/DL (ref 1.6–2.4)
MCH RBC QN AUTO: 23.5 PG (ref 26.6–33)
MCHC RBC AUTO-ENTMCNC: 32.6 G/DL (ref 31.5–35.7)
MCV RBC AUTO: 72.2 FL (ref 79–97)
MONOCYTES # BLD AUTO: 0.4 10*3/MM3 (ref 0.1–0.9)
MONOCYTES NFR BLD AUTO: 6.7 % (ref 5–12)
NEUTROPHILS NFR BLD AUTO: 4 10*3/MM3 (ref 1.7–7)
NEUTROPHILS NFR BLD AUTO: 61.5 % (ref 42.7–76)
NRBC BLD AUTO-RTO: 0 /100 WBC (ref 0–0.2)
PLATELET # BLD AUTO: 203 10*3/MM3 (ref 140–450)
PMV BLD AUTO: 8 FL (ref 6–12)
POTASSIUM SERPL-SCNC: 3.8 MMOL/L (ref 3.5–5.2)
PROT SERPL-MCNC: 6 G/DL (ref 6–8.5)
RBC # BLD AUTO: 4.66 10*6/MM3 (ref 4.14–5.8)
SODIUM SERPL-SCNC: 141 MMOL/L (ref 136–145)
WBC NRBC COR # BLD: 6.4 10*3/MM3 (ref 3.4–10.8)

## 2022-03-15 PROCEDURE — 80053 COMPREHEN METABOLIC PANEL: CPT | Performed by: INTERNAL MEDICINE

## 2022-03-15 PROCEDURE — 63710000001 INSULIN GLARGINE PER 5 UNITS: Performed by: PHYSICIAN ASSISTANT

## 2022-03-15 PROCEDURE — 82962 GLUCOSE BLOOD TEST: CPT

## 2022-03-15 PROCEDURE — 96365 THER/PROPH/DIAG IV INF INIT: CPT

## 2022-03-15 PROCEDURE — G0378 HOSPITAL OBSERVATION PER HR: HCPCS

## 2022-03-15 PROCEDURE — 76775 US EXAM ABDO BACK WALL LIM: CPT

## 2022-03-15 PROCEDURE — 63710000001 INSULIN LISPRO (HUMAN) PER 5 UNITS: Performed by: PHYSICIAN ASSISTANT

## 2022-03-15 PROCEDURE — 96376 TX/PRO/DX INJ SAME DRUG ADON: CPT

## 2022-03-15 PROCEDURE — 97162 PT EVAL MOD COMPLEX 30 MIN: CPT

## 2022-03-15 PROCEDURE — 96366 THER/PROPH/DIAG IV INF ADDON: CPT

## 2022-03-15 PROCEDURE — 85025 COMPLETE CBC W/AUTO DIFF WBC: CPT | Performed by: INTERNAL MEDICINE

## 2022-03-15 PROCEDURE — 25010000002 ENOXAPARIN PER 10 MG: Performed by: INTERNAL MEDICINE

## 2022-03-15 PROCEDURE — 25010000002 MAGNESIUM SULFATE 2 GM/50ML SOLUTION: Performed by: INTERNAL MEDICINE

## 2022-03-15 PROCEDURE — 96367 TX/PROPH/DG ADDL SEQ IV INF: CPT

## 2022-03-15 PROCEDURE — 25010000002 CEFEPIME PER 500 MG: Performed by: PHYSICIAN ASSISTANT

## 2022-03-15 PROCEDURE — 83735 ASSAY OF MAGNESIUM: CPT | Performed by: INTERNAL MEDICINE

## 2022-03-15 PROCEDURE — 99226 PR SBSQ OBSERVATION CARE/DAY 35 MINUTES: CPT | Performed by: INTERNAL MEDICINE

## 2022-03-15 PROCEDURE — 96372 THER/PROPH/DIAG INJ SC/IM: CPT

## 2022-03-15 PROCEDURE — 96361 HYDRATE IV INFUSION ADD-ON: CPT

## 2022-03-15 RX ORDER — SODIUM CHLORIDE 9 MG/ML
75 INJECTION, SOLUTION INTRAVENOUS CONTINUOUS
Status: DISCONTINUED | OUTPATIENT
Start: 2022-03-15 | End: 2022-03-16 | Stop reason: HOSPADM

## 2022-03-15 RX ORDER — MAGNESIUM SULFATE HEPTAHYDRATE 40 MG/ML
2 INJECTION, SOLUTION INTRAVENOUS AS NEEDED
Status: DISCONTINUED | OUTPATIENT
Start: 2022-03-15 | End: 2022-03-16 | Stop reason: HOSPADM

## 2022-03-15 RX ORDER — POTASSIUM CHLORIDE 20 MEQ/1
40 TABLET, EXTENDED RELEASE ORAL AS NEEDED
Status: DISCONTINUED | OUTPATIENT
Start: 2022-03-15 | End: 2022-03-16 | Stop reason: HOSPADM

## 2022-03-15 RX ORDER — POTASSIUM CHLORIDE 1.5 G/1.77G
40 POWDER, FOR SOLUTION ORAL AS NEEDED
Status: DISCONTINUED | OUTPATIENT
Start: 2022-03-15 | End: 2022-03-16 | Stop reason: HOSPADM

## 2022-03-15 RX ORDER — MAGNESIUM SULFATE HEPTAHYDRATE 40 MG/ML
4 INJECTION, SOLUTION INTRAVENOUS AS NEEDED
Status: DISCONTINUED | OUTPATIENT
Start: 2022-03-15 | End: 2022-03-16 | Stop reason: HOSPADM

## 2022-03-15 RX ORDER — POTASSIUM CHLORIDE 7.45 MG/ML
10 INJECTION INTRAVENOUS
Status: DISCONTINUED | OUTPATIENT
Start: 2022-03-15 | End: 2022-03-16 | Stop reason: HOSPADM

## 2022-03-15 RX ORDER — MIDODRINE HYDROCHLORIDE 5 MG/1
2.5 TABLET ORAL
Status: DISCONTINUED | OUTPATIENT
Start: 2022-03-15 | End: 2022-03-16 | Stop reason: HOSPADM

## 2022-03-15 RX ADMIN — Medication 3 ML: at 08:22

## 2022-03-15 RX ADMIN — ENOXAPARIN SODIUM 40 MG: 40 INJECTION SUBCUTANEOUS at 17:50

## 2022-03-15 RX ADMIN — ATORVASTATIN CALCIUM 20 MG: 20 TABLET, FILM COATED ORAL at 20:29

## 2022-03-15 RX ADMIN — INSULIN GLARGINE 20 UNITS: 100 INJECTION, SOLUTION SUBCUTANEOUS at 20:31

## 2022-03-15 RX ADMIN — TAMSULOSIN HYDROCHLORIDE 0.4 MG: 0.4 CAPSULE ORAL at 08:22

## 2022-03-15 RX ADMIN — INSULIN LISPRO 5 UNITS: 100 INJECTION, SOLUTION INTRAVENOUS; SUBCUTANEOUS at 19:16

## 2022-03-15 RX ADMIN — Medication 100 MG: at 08:22

## 2022-03-15 RX ADMIN — LEVOTHYROXINE SODIUM 100 MCG: 0.1 TABLET ORAL at 05:19

## 2022-03-15 RX ADMIN — MIDODRINE HYDROCHLORIDE 2.5 MG: 5 TABLET ORAL at 17:50

## 2022-03-15 RX ADMIN — WATER 2 G: 1000 INJECTION, SOLUTION INTRAVENOUS at 01:55

## 2022-03-15 RX ADMIN — TRAZODONE HYDROCHLORIDE 25 MG: 50 TABLET ORAL at 08:22

## 2022-03-15 RX ADMIN — FAMOTIDINE 20 MG: 20 TABLET ORAL at 08:22

## 2022-03-15 RX ADMIN — MAGNESIUM SULFATE HEPTAHYDRATE 2 G: 2 INJECTION, SOLUTION INTRAVENOUS at 18:14

## 2022-03-15 RX ADMIN — INSULIN LISPRO 5 UNITS: 100 INJECTION, SOLUTION INTRAVENOUS; SUBCUTANEOUS at 13:39

## 2022-03-15 RX ADMIN — SODIUM CHLORIDE 75 ML/HR: 9 INJECTION, SOLUTION INTRAVENOUS at 15:00

## 2022-03-15 RX ADMIN — MEMANTINE HYDROCHLORIDE 5 MG: 5 TABLET ORAL at 08:22

## 2022-03-15 RX ADMIN — LAMOTRIGINE 12.5 MG: 25 TABLET ORAL at 20:24

## 2022-03-15 RX ADMIN — TRAZODONE HYDROCHLORIDE 25 MG: 50 TABLET ORAL at 20:29

## 2022-03-15 RX ADMIN — ISOSORBIDE MONONITRATE 30 MG: 30 TABLET, EXTENDED RELEASE ORAL at 08:22

## 2022-03-15 RX ADMIN — Medication 3 ML: at 20:24

## 2022-03-15 RX ADMIN — OLANZAPINE 5 MG: 5 TABLET, FILM COATED ORAL at 20:29

## 2022-03-15 RX ADMIN — FOLIC ACID 1 MG: 1 TABLET ORAL at 08:22

## 2022-03-15 RX ADMIN — FAMOTIDINE 20 MG: 20 TABLET ORAL at 17:50

## 2022-03-15 RX ADMIN — LAMOTRIGINE 12.5 MG: 25 TABLET ORAL at 08:22

## 2022-03-15 RX ADMIN — INSULIN GLARGINE 20 UNITS: 100 INJECTION, SOLUTION SUBCUTANEOUS at 08:36
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
## 2022-03-15 NOTE — PROGRESS NOTES
[facility] Medicine Services Daily Progress Note    Patient Name: Chris Ferguson  : 1945  MRN: [id number]  Primary Care Physician:  Provider, No Known  Date of admission: 3/14/2022      Subjective      Chief Complaint: Altered mental status, hypotension      Patient Reports    3/15/2022  Patient seen and examined  No new complaints  Alert oriented x3    Review of Systems   Constitutional: Negative for chills and fever.   HENT: Negative for congestion.    Eyes: Negative for blurred vision.   Cardiovascular: Negative for chest pain.   Respiratory: Negative for shortness of breath.    Endocrine: Negative for cold intolerance and heat intolerance.   Musculoskeletal: Positive for arthritis.   Gastrointestinal: Negative for abdominal pain, nausea and vomiting.   Neurological: Negative for dizziness.   Psychiatric/Behavioral: Negative for altered mental status.          Objective      Vitals:   Temp:  [97.9 °F (36.6 °C)-98.7 °F (37.1 °C)] 98.3 °F (36.8 °C)  Heart Rate:  [] 95  Resp:  [16-20] 20  BP: ()/(45-93) 93/45    Physical Exam  Vitals reviewed.   Constitutional:       General: He is not in acute distress.  HENT:      Head: Normocephalic.      Nose: Nose normal.      Mouth/Throat:      Mouth: Mucous membranes are moist.   Eyes:      Extraocular Movements: Extraocular movements intact.      Conjunctiva/sclera: Conjunctivae normal.      Pupils: Pupils are equal, round, and reactive to light.   Cardiovascular:      Rate and Rhythm: Normal rate and regular rhythm.   Pulmonary:      Effort: No respiratory distress.   Abdominal:      General: Bowel sounds are normal.      Palpations: Abdomen is soft.      Tenderness: There is no abdominal tenderness.   Musculoskeletal:      Cervical back: Neck supple.      Comments: Degenerative changes   Skin:     General: Skin is warm and dry.   Neurological:      Mental Status: He is alert and oriented to person, place, and time.    Psychiatric:         Mood and Affect: Mood normal.             Result Review    Result Review:  I have personally reviewed the results from the time of this admission to 3/15/2022 16:03 EDT and agree with these findings:  [x]  Laboratory  [x]  Microbiology  [x]  Radiology  [x]  EKG/Telemetry   []  Cardiology/Vascular   []  Pathology  []  Old records  []  Other:  Most notable findings include:     Wounds (last 24 hours)     LDA Wound     Row Name 03/15/22 0000 03/14/22 2000 03/14/22 1736       [REMOVED] Wound 05/21/21 2016 Left lower leg Skin Tear    Wound - Properties Group Placement Date: 05/21/21  -AB Placement Time: 2016  -AB Side: Left  -AB Orientation: lower  -AB Location: leg  -AB Primary Wound Type: Skin tear  -AB Removal Date: 03/14/22  -HI Removal Time: 1900 -HI Wound Outcome: Healed  -HI, not present at beginning of shift     Dressing Appearance --  -HI --  -HI open to air  -AD    Retired Wound - Properties Group Placement Date: 05/21/21  -AB Placement Time: 2016  -AB Side: Left  -AB Orientation: lower  -AB Location: leg  -AB Primary Wound Type: Skin tear  -AB Removal Date: 03/14/22  -HI Removal Time: 1900 -HI Wound Outcome: Healed  -HI, not present at beginning of shift     Retired Wound - Properties Group Date first assessed: 05/21/21  -AB Time first assessed: 2016  -AB Side: Left  -AB Location: leg  -AB Primary Wound Type: Skin tear  -AB Resolution Date: 03/14/22  -HI Resolution Time: 1900 -HI Wound Outcome: Healed  -HI, not present at beginning of shift        [REMOVED] Wound 05/21/21 2015 Left lower leg    Wound - Properties Group Placement Date: 05/21/21  -AB Placement Time: 2015  -AB Present on Hospital Admission: Y  -AB Side: Left  -AB Orientation: lower  -AB Location: leg  -AB Additional Comments: from 2015  -NW Removal Date: 03/15/22  -KD Removal Time: 0815 -KD Wound Outcome: Healed  -KD Stage, Pressure Injury : other (see comments)  -AB, cellulitis     Retired Wound - Properties Group  Placement Date: 05/21/21 -AB Placement Time: 2015 -AB Present on Hospital Admission: Y  -AB Side: Left  -AB Orientation: lower  -AB Location: leg  -AB Stage, Pressure Injury : other (see comments)  -AB, cellulitis  Additional Comments: from 2015 -NW Removal Date: 03/15/22  -KD Removal Time: 0815 -KD Wound Outcome: Healed  -KD    Retired Wound - Properties Group Date first assessed: 05/21/21 -AB Time first assessed: 2015 -AB Present on Hospital Admission: Y  -AB Side: Left  -AB Location: leg  -AB Additional Comments: from 2015 -NW Resolution Date: 03/15/22  -KD Resolution Time: 0815 -KD Wound Outcome: Healed  -KD       [REMOVED] Wound 05/21/21 2017 Left heel Pressure Injury    Wound - Properties Group Placement Date: 05/21/21 -AB Placement Time: 2017 -AB Side: Left  -AB Location: heel  -AB Primary Wound Type: Pressure inj  -AB Additional Comments: stage 1, lpc, cwon  -LC Removal Date: 03/15/22  -KD Removal Time: 0815 -KD Wound Outcome: Healed  -KD Stage, Pressure Injury : other (see comments)  -AB    Retired Wound - Properties Group Placement Date: 05/21/21 -AB Placement Time: 2017 -AB Side: Left  -AB Location: heel  -AB Primary Wound Type: Pressure inj  -AB Stage, Pressure Injury : other (see comments)  -AB Additional Comments: stage 1, lpc, cwon  -LC Removal Date: 03/15/22  -KD Removal Time: 0815 -KD Wound Outcome: Healed  -KD    Retired Wound - Properties Group Date first assessed: 05/21/21 -AB Time first assessed: 2017  -AB Side: Left  -AB Location: heel  -AB Primary Wound Type: Pressure inj  -AB Additional Comments: stage 1, lpc, cwon  -LC Resolution Date: 03/15/22  -KD Resolution Time: 0815 -KD Wound Outcome: Healed  -KD       [REMOVED] Wound 06/04/21 1412 Left anterior knee Incision    Wound - Properties Group Placement Date: 06/04/21  -AR Placement Time: 1412  -AR Present on Hospital Admission: N  -AR Side: Left  -AR Orientation: anterior  -AR Location: knee  -AR Primary Wound Type: Incision   -AR Removal Date: 03/15/22  -KD Removal Time: 0815 -KD Wound Outcome: Healed  -KD    Retired Wound - Properties Group Placement Date: 06/04/21  -AR Placement Time: 1412  -AR Present on Hospital Admission: N  -AR Side: Left  -AR Orientation: anterior  -AR Location: knee  -AR Primary Wound Type: Incision  -AR Removal Date: 03/15/22  -KD Removal Time: 0815 -KD Wound Outcome: Healed  -KD    Retired Wound - Properties Group Date first assessed: 06/04/21  -AR Time first assessed: 1412  -AR Present on Hospital Admission: N  -AR Side: Left  -AR Location: knee  -AR Primary Wound Type: Incision  -AR Resolution Date: 03/15/22  -KD Resolution Time: 0815 -KD Wound Outcome: Healed  -KD       [REMOVED] Wound 06/07/21 0218 Bilateral other (see comments) MASD (Moisture associated skin damage)    Wound - Properties Group Placement Date: 06/07/21  -JR Placement Time: 0218  -JR Side: Bilateral  -JR Location: other (see comments)  -JR, álvaro area  Primary Wound Type: MASD  -JR Removal Date: 03/15/22  -KD Removal Time: 0815 -KD    Retired Wound - Properties Group Placement Date: 06/07/21  -JR Placement Time: 0218  -JR Side: Bilateral  -JR Location: other (see comments)  -JR, [place] area  Primary Wound Type: MASD  -JR Removal Date: 03/15/22  -KD Removal Time: 0815 -KD    Retired Wound - Properties Group Date first assessed: 06/07/21  -JR Time first assessed: 0218  -JR Side: Bilateral  -JR Location: other (see comments)  -JR, [place] area  Primary Wound Type: MASD  -JR Resolution Date: 03/15/22  -KD Resolution Time: 0815 -KD          User Key  (r) = Recorded By, (t) = Taken By, (c) = Cosigned By    Initials Name Provider Type    Diane Morales RN Registered Nurse    Doroteo Augustine, RUBY Registered Nurse    Saira Dolan RN Registered Nurse    Sita Burgess Technician    Sita Burgess RN Registered Nurse    Sakshi Morton RN Registered Nurse    Jose Mcihel RN Registered Nurse    Candace Marin RN Registered  Nurse    Raquel Brink, RN Registered Nurse                  Assessment/Plan      Brief Patient Summary:  76-year-old man who is a resident of a local skilled nursing facility.  Was sent to the emergency room on 3/14/2022 due to altered mental status.  On arrival to the ED, patient was alert and oriented x3 but was noted to be hypotensive.  Work-up including blood work, chest x-ray and respiratory viral panel were insignificant.  Blood culture was collected and patient given IV fluid bolus and started on IV fluid.  Lactate was also noted to be elevated.  No source of infection was identified.      atorvastatin, 20 mg, Oral, Nightly  enoxaparin, 40 mg, Subcutaneous, Q24H  famotidine, 20 mg, Oral, BID AC  folic acid, 1 mg, Oral, Daily  insulin glargine, 20 Units, Subcutaneous, BID  insulin lispro, 5 Units, Subcutaneous, TID With Meals  isosorbide mononitrate, 30 mg, Oral, Daily  lamoTRIgine, 12.5 mg, Oral, BID  levothyroxine, 100 mcg, Oral, Q AM  memantine, 5 mg, Oral, Daily  midodrine, 2.5 mg, Oral, TID AC  OLANZapine, 5 mg, Oral, Nightly  sodium chloride, 3 mL, Intravenous, Q12H  tamsulosin, 0.4 mg, Oral, Daily  thiamine, 100 mg, Oral, Daily  traZODone, 25 mg, Oral, Q12H       sodium chloride, 75 mL/hr, Last Rate: 75 mL/hr (03/15/22 1500)         Active Hospital Problems:  Active Hospital Problems    Diagnosis    • **Hypotension    • Dementia (HCC)    • Essential hypertension    • DM (diabetes mellitus), type 2 (HCC)    • BPH (benign prostatic hyperplasia)    • Hypothyroidism    • Stage 3a chronic kidney disease (HCC)    • Hyperlipidemia      Plan:     Hypotension  ?  Etiology  No source of infection identified at this time  Procalcitonin and WBC-within normal limits  Hypotension probably due to pain medication  Lactate elevation probably due to tissue hypoperfusion  Blood culture pending  Will hold off on antimicrobial at this time  Supportive care  Started on low-dose midodrine    Diabetes mellitus type 2  with hyperglycemia  A1c 10  ?  Medication compliance  On Lantus, Premeal insulin and sliding scale insulin  Monitor blood sugar and adjust insulin as needed    Hypothyroidism-on Synthroid    Hyperlipidemia-on statin    Acute kidney injury on CKD stage III  Serum creatinine above baseline  Baseline Scr 1.10  Check renal ultrasound  Serum creatinine trending down with IV fluid  Avoid nephrotoxic medications and renal dose all others  follow renal function and electrolytes with daily labs     BPH:-On Flomax       Alzheimer's Dementia:  Unknown baseline but patient was oriented   On Namenda and zyprexa             DVT prophylaxis:  Medical and mechanical DVT prophylaxis orders are present.    CODE STATUS:    Code Status (Patient has no pulse and is not breathing): No CPR (Do Not Attempt to Resuscitate)  Medical Interventions (Patient has pulse or is breathing): Full Support      Disposition: Pending clinical progress.    This patient has been examined with appropriate PPE03/15/22      Electronically signed by Keith Hathaway MD, 03/15/22, 16:03 EDT.  [facility] Hospitalist Team

## 2022-03-15 NOTE — CASE MANAGEMENT/SOCIAL WORK
Discharge Planning Assessment   [facility]     Patient Name: Chris Ferguson  MRN: [id number]  Today's Date: 3/15/2022    Admit Date: 3/14/2022     Discharge Needs Assessment     Row Name 03/15/22 1414       Living Environment    People in Home facility resident    Name(s) of People in Home pt is LTC at [place]    Current Living Arrangements extended care facility    Quality of Family Relationships supportive    Able to Return to Prior Arrangements yes       Resource/Environmental Concerns    Resource/Environmental Concerns none       Transition Planning    Patient/Family Anticipates Transition to long-term care facility    Transportation Anticipated health plan transportation       Discharge Needs Assessment    Readmission Within the Last 30 Days no previous admission in last 30 days    Equipment Currently Used at Home wheelchair;glucometer;commode;shower chair               Discharge Plan     Row Name 03/15/22 1415       Plan    Plan D/C Plan ; Return to [facility] , okay to return per pt and facility , no precert required    Plan Comments confirmed PCP and pharmacy with pt and he is agreeable to return              Continued Care and Services - Admitted Since 3/14/2022    Coordination has not been started for this encounter.          Demographic Summary     Row Name 03/15/22 1413       General Information    Admission Type observation    Arrived From emergency department    Required Notices Provided Observation Status Notice    Preferred Language English               Functional Status     Row Name 03/15/22 1413       Functional Status    Usual Activity Tolerance moderate    Current Activity Tolerance moderate       Mental Status    General Appearance WDL WDL       Mental Status Summary    Recent Changes in Mental Status/Cognitive Functioning no changes              Met with patient in room wearing PPE: mask, face shield/goggles,.      Maintained distance greater than six feet and spent less than 15 minutes  in the room.            Substance Abuse    No documentation.                Patient Forms    No documentation.                   Denise Lin RN

## 2022-03-15 NOTE — PLAN OF CARE
Goal Outcome Evaluation:  Plan of Care Reviewed With: patient Pt presents as a 75 y/o M with hypotension.  BP was stable thru PT treatment today. Pt required CGA of 1 for bed mobility and pivot transfer to recliner with min assist of 1. Pt wants to return to [facility] upon D/C from this facility and PT agrees with this plan.

## 2022-03-15 NOTE — PLAN OF CARE
Goal Outcome Evaluation:  Plan of Care Reviewed With: patient        Progress: no change  Outcome Evaluation: Patient received pm medications, patient is on room air and resting.

## 2022-03-15 NOTE — PLAN OF CARE
Goal Outcome Evaluation:  Plan of Care Reviewed With: patient            The patient has had no confusion and his blood pressure has not been below 100 systolic throughout the entire shift. The patient has had no complaints and per Rivas YEPEZ he should discharge back to [facility] tomorrow.

## 2022-03-16 VITALS
RESPIRATION RATE: 17 BRPM | OXYGEN SATURATION: 96 % | TEMPERATURE: 97.6 F | HEART RATE: 88 BPM | BODY MASS INDEX: 28.58 KG/M2 | HEIGHT: 71 IN | SYSTOLIC BLOOD PRESSURE: 117 MMHG | WEIGHT: 204.15 LBS | DIASTOLIC BLOOD PRESSURE: 73 MMHG

## 2022-03-16 LAB
ANION GAP SERPL CALCULATED.3IONS-SCNC: 7 MMOL/L (ref 5–15)
BASOPHILS # BLD AUTO: 0 10*3/MM3 (ref 0–0.2)
BASOPHILS NFR BLD AUTO: 0.5 % (ref 0–1.5)
BUN SERPL-MCNC: 19 MG/DL (ref 8–23)
BUN/CREAT SERPL: 14.5 (ref 7–25)
CALCIUM SPEC-SCNC: 8.5 MG/DL (ref 8.6–10.5)
CHLORIDE SERPL-SCNC: 104 MMOL/L (ref 98–107)
CO2 SERPL-SCNC: 28 MMOL/L (ref 22–29)
CREAT SERPL-MCNC: 1.31 MG/DL (ref 0.76–1.27)
DEPRECATED RDW RBC AUTO: 42.4 FL (ref 37–54)
EGFRCR SERPLBLD CKD-EPI 2021: 57.1 ML/MIN/1.73
EOSINOPHIL # BLD AUTO: 0.3 10*3/MM3 (ref 0–0.4)
EOSINOPHIL NFR BLD AUTO: 4.4 % (ref 0.3–6.2)
ERYTHROCYTE [DISTWIDTH] IN BLOOD BY AUTOMATED COUNT: 17.1 % (ref 12.3–15.4)
GLUCOSE BLDC GLUCOMTR-MCNC: 134 MG/DL (ref 70–105)
GLUCOSE SERPL-MCNC: 112 MG/DL (ref 65–99)
HCT VFR BLD AUTO: 34.7 % (ref 37.5–51)
HGB BLD-MCNC: 11.3 G/DL (ref 13–17.7)
LYMPHOCYTES # BLD AUTO: 1.7 10*3/MM3 (ref 0.7–3.1)
LYMPHOCYTES NFR BLD AUTO: 29.3 % (ref 19.6–45.3)
MAGNESIUM SERPL-MCNC: 2.3 MG/DL (ref 1.6–2.4)
MCH RBC QN AUTO: 23.4 PG (ref 26.6–33)
MCHC RBC AUTO-ENTMCNC: 32.6 G/DL (ref 31.5–35.7)
MCV RBC AUTO: 72 FL (ref 79–97)
MONOCYTES # BLD AUTO: 0.4 10*3/MM3 (ref 0.1–0.9)
MONOCYTES NFR BLD AUTO: 6.8 % (ref 5–12)
NEUTROPHILS NFR BLD AUTO: 3.4 10*3/MM3 (ref 1.7–7)
NEUTROPHILS NFR BLD AUTO: 59 % (ref 42.7–76)
NRBC BLD AUTO-RTO: 0.1 /100 WBC (ref 0–0.2)
PLATELET # BLD AUTO: 194 10*3/MM3 (ref 140–450)
PMV BLD AUTO: 8 FL (ref 6–12)
POTASSIUM SERPL-SCNC: 4 MMOL/L (ref 3.5–5.2)
RBC # BLD AUTO: 4.82 10*6/MM3 (ref 4.14–5.8)
SODIUM SERPL-SCNC: 139 MMOL/L (ref 136–145)
WBC NRBC COR # BLD: 5.8 10*3/MM3 (ref 3.4–10.8)

## 2022-03-16 PROCEDURE — 63710000001 INSULIN LISPRO (HUMAN) PER 5 UNITS: Performed by: PHYSICIAN ASSISTANT

## 2022-03-16 PROCEDURE — 96366 THER/PROPH/DIAG IV INF ADDON: CPT

## 2022-03-16 PROCEDURE — 25010000002 MAGNESIUM SULFATE 2 GM/50ML SOLUTION: Performed by: INTERNAL MEDICINE

## 2022-03-16 PROCEDURE — 99217 PR OBSERVATION CARE DISCHARGE MANAGEMENT: CPT | Performed by: INTERNAL MEDICINE

## 2022-03-16 PROCEDURE — 85025 COMPLETE CBC W/AUTO DIFF WBC: CPT | Performed by: INTERNAL MEDICINE

## 2022-03-16 PROCEDURE — 83735 ASSAY OF MAGNESIUM: CPT | Performed by: INTERNAL MEDICINE

## 2022-03-16 PROCEDURE — 80048 BASIC METABOLIC PNL TOTAL CA: CPT | Performed by: INTERNAL MEDICINE

## 2022-03-16 PROCEDURE — 63710000001 INSULIN GLARGINE PER 5 UNITS: Performed by: PHYSICIAN ASSISTANT

## 2022-03-16 PROCEDURE — 82962 GLUCOSE BLOOD TEST: CPT

## 2022-03-16 PROCEDURE — G0378 HOSPITAL OBSERVATION PER HR: HCPCS

## 2022-03-16 RX ORDER — MIDODRINE HYDROCHLORIDE 2.5 MG/1
2.5 TABLET ORAL
Qty: 90 TABLET | Refills: 0 | Status: SHIPPED | OUTPATIENT
Start: 2022-03-16 | End: 2022-04-15

## 2022-03-16 RX ADMIN — Medication 100 MG: at 08:27

## 2022-03-16 RX ADMIN — TAMSULOSIN HYDROCHLORIDE 0.4 MG: 0.4 CAPSULE ORAL at 08:27

## 2022-03-16 RX ADMIN — MIDODRINE HYDROCHLORIDE 2.5 MG: 5 TABLET ORAL at 08:27

## 2022-03-16 RX ADMIN — Medication 3 ML: at 08:33

## 2022-03-16 RX ADMIN — FAMOTIDINE 20 MG: 20 TABLET ORAL at 08:27

## 2022-03-16 RX ADMIN — MAGNESIUM SULFATE HEPTAHYDRATE 2 G: 2 INJECTION, SOLUTION INTRAVENOUS at 02:13

## 2022-03-16 RX ADMIN — FOLIC ACID 1 MG: 1 TABLET ORAL at 08:27

## 2022-03-16 RX ADMIN — LEVOTHYROXINE SODIUM 100 MCG: 0.1 TABLET ORAL at 05:43

## 2022-03-16 RX ADMIN — MIDODRINE HYDROCHLORIDE 2.5 MG: 5 TABLET ORAL at 14:20

## 2022-03-16 RX ADMIN — SODIUM CHLORIDE 75 ML/HR: 9 INJECTION, SOLUTION INTRAVENOUS at 00:12

## 2022-03-16 RX ADMIN — INSULIN GLARGINE 20 UNITS: 100 INJECTION, SOLUTION SUBCUTANEOUS at 08:30

## 2022-03-16 RX ADMIN — ISOSORBIDE MONONITRATE 30 MG: 30 TABLET, EXTENDED RELEASE ORAL at 08:27

## 2022-03-16 RX ADMIN — INSULIN LISPRO 5 UNITS: 100 INJECTION, SOLUTION INTRAVENOUS; SUBCUTANEOUS at 08:27

## 2022-03-16 RX ADMIN — TRAZODONE HYDROCHLORIDE 25 MG: 50 TABLET ORAL at 08:27

## 2022-03-16 RX ADMIN — LAMOTRIGINE 12.5 MG: 25 TABLET ORAL at 08:27

## 2022-03-16 RX ADMIN — MAGNESIUM SULFATE HEPTAHYDRATE 2 G: 2 INJECTION, SOLUTION INTRAVENOUS at 00:12

## 2022-03-16 RX ADMIN — MEMANTINE HYDROCHLORIDE 5 MG: 5 TABLET ORAL at 08:27

## 2022-03-16 NOTE — CASE MANAGEMENT/SOCIAL WORK
Continued Stay Note  RAUL Spears     Patient Name: Chris Ferguson  MRN: [id number]  Today's Date: 3/16/2022    Admit Date: 3/14/2022     Discharge Plan     Row Name 03/16/22 1328       Plan    Plan D/C Plan ; Return to [facility] , okay to return per pt and facility , no precert required    Plan Comments MD placed discharge orders for patient . CM messaged facility liaisonYomaira that the patient has discharge orders in for today.              Expected Discharge Date and Time     Expected Discharge Date Expected Discharge Time    Mar 16, 2022

## 2022-03-16 NOTE — PLAN OF CARE
Goal Outcome Evaluation:  Plan of Care Reviewed With: patient        Progress: improving  Outcome Evaluation: since pt transfered to floor his B/P has been WDL, he has rested most of the night, plan is to d/c back to [facility]

## 2022-03-16 NOTE — DISCHARGE SUMMARY
[facility] Medicine Services  DISCHARGE SUMMARY    Patient Name: Chris Ferguson  : 1945  MRN: [id number]    Date of Admission: 3/14/2022  Discharge Diagnosis:     Hypotension-improved  ?  Etiology  No source of infection identified   Procalcitonin and WBC-within normal limits  Hypotension probably due to pain medication  Lactate elevation probably due to tissue hypoperfusion  Blood culture-no growth   monitored off on antimicrobial at this time  Started on low-dose midodrine     Diabetes mellitus type 2 with hyperglycemia  A1c 10  ?  Medication compliance  Continue on home insulin regimen and follow-up with PCP for medication adjustments     Hypothyroidism-on Synthroid     Hyperlipidemia-on statin     Acute kidney injury-due to hemodynamic instability  ? CKD   Baseline Scr 1.10  renal ultrasound-normal study  Serum creatinine trending down with IV fluid  BMP next week as outpatient     BPH:-On Flomax        Alzheimer's Dementia:  Unknown baseline but patient was oriented   On Namenda and zyprexa    Date of Discharge: 3/16/2022  Primary Care Physician: Provider, No Known      Presenting Problem:   Weakness [R53.1]  Hypotension, unspecified hypotension type [I95.9]  Hypotension [I95.9]    Active and Resolved Hospital Problems:  Active Hospital Problems    Diagnosis POA   • **Hypotension [I95.9] Unknown   • Dementia (HCC) [F03.90] Yes   • Essential hypertension [I10] Yes   • DM (diabetes mellitus), type 2 (HCC) [E11.9] Yes   • BPH (benign prostatic hyperplasia) [N40.0] Yes   • Hypothyroidism [E03.9] Yes   • Stage 3a chronic kidney disease (HCC) [N18.31] Yes   • Hyperlipidemia [E78.5] Yes      Resolved Hospital Problems   No resolved problems to display.         Hospital Course     Hospital Course:  76-year-old man who is a resident of a local skilled nursing facility.  Was sent to the emergency room on 3/14/2022 due to altered mental status.  On arrival to the ED, patient was  alert and oriented x3 but was noted to be hypotensive.  Work-up including blood work, chest x-ray and respiratory viral panel were insignificant.  Blood culture was collected and patient given IV fluid bolus and started on IV fluid.  Lactate was also noted to be elevated.  No source of infection was identified.    Conditions addressed while inpatient are as stated below    Hypotension-improved  ?  Etiology  No source of infection identified   Procalcitonin and WBC-within normal limits  Hypotension probably due to pain medication  Lactate elevation probably due to tissue hypoperfusion  Blood culture-no growth   monitored off on antimicrobial at this time  Started on low-dose midodrine     Diabetes mellitus type 2 with hyperglycemia  A1c 10  ?  Medication compliance  Continue on home insulin regimen and follow-up with PCP for medication adjustments     Hypothyroidism-on Synthroid     Hyperlipidemia-on statin     Acute kidney injury-due to hemodynamic instability  ? CKD   Baseline Scr 1.10  renal ultrasound-normal study  Serum creatinine trending down with IV fluid  BMP next week as outpatient     BPH:-On Flomax        Alzheimer's Dementia:  Unknown baseline but patient was oriented   On Namenda and zyprexa     Condition at discharge stable    DISCHARGE Follow Up Recommendations for labs and diagnostics:       Reasons For Change In Medications and Indications for New Medications:      Day of Discharge     Vital Signs:  Temp:  [97.6 °F (36.4 °C)-99.7 °F (37.6 °C)] 97.6 °F (36.4 °C)  Heart Rate:  [66-95] 68  Resp:  [15-20] 16  BP: ()/(45-82) 132/82    Physical Exam:  Physical Exam  Vitals reviewed.   Constitutional:       General: He is not in acute distress.  HENT:      Head: Normocephalic.      Nose: Nose normal.      Mouth/Throat:      Mouth: Mucous membranes are moist.   Eyes:      Extraocular Movements: Extraocular movements intact.      Conjunctiva/sclera: Conjunctivae normal.      Pupils: Pupils are equal,  round, and reactive to light.   Cardiovascular:      Rate and Rhythm: Normal rate and regular rhythm.   Pulmonary:      Effort: No respiratory distress.   Abdominal:      General: Bowel sounds are normal.      Palpations: Abdomen is soft.      Tenderness: There is no abdominal tenderness.   Musculoskeletal:      Cervical back: Neck supple.      Comments: Degenerative change   Skin:     General: Skin is warm and dry.   Neurological:      Mental Status: He is alert. Mental status is at baseline.   Psychiatric:         Mood and Affect: Mood normal.          Pertinent  and/or Most Recent Results     LAB RESULTS:      Lab 03/16/22  0957 03/15/22  1310 03/14/22  2233 03/14/22  1321 03/14/22  1316 03/14/22  1313   WBC 5.80 6.40 11.50*  --   --  10.50   HEMOGLOBIN 11.3* 11.0* 10.9*  --   --  12.4*   HEMATOCRIT 34.7* 33.7* 34.1*  --   --  37.1*   PLATELETS 194 203 209  --   --  301   NEUTROS ABS 3.40 4.00 9.30*  --   --  6.50   LYMPHS ABS 1.70 1.80 1.40  --   --  3.00   MONOS ABS 0.40 0.40 0.70  --   --  0.70   EOS ABS 0.30 0.20 0.10  --   --  0.20   MCV 72.0* 72.2* 72.3*  --   --  71.9*   PROCALCITONIN  --   --  0.06 0.05  --   --    LACTATE  --   --  1.3  --  2.4*  --          Lab 03/16/22  0957 03/15/22  1310 03/14/22  2233 03/14/22  1321   SODIUM 139 141 137 133*   POTASSIUM 4.0 3.8 4.2 4.2   CHLORIDE 104 106 105 103   CO2 28.0 28.0 24.0 8.0*   ANION GAP 7.0 7.0 8.0 22.0*   BUN 19 20 29* 9   CREATININE 1.31* 1.36* 1.90* 0.28*   EGFR 57.1* 54.6* 36.6* 127.5   GLUCOSE 112* 92 304* 71   CALCIUM 8.5* 8.6 8.2* 6.1*   MAGNESIUM 2.3 1.5* 1.4*  --    PHOSPHORUS  --   --  3.4  --    HEMOGLOBIN A1C  --   --  10.0*  --    TSH  --   --  0.424 0.420         Lab 03/15/22  1310 03/14/22  2233 03/14/22  1321   TOTAL PROTEIN 6.0 5.9* 1.3*   ALBUMIN 3.20* 3.10* 0.50*   GLOBULIN 2.8 2.8 0.8   ALT (SGPT) 39 46* 9   AST (SGOT) 25 27 6   BILIRUBIN 0.4 0.3 <0.2   ALK PHOS 101 97 21*   LIPASE  --  13 4*         Lab 03/14/22  1321   TROPONIN T  <0.010         Lab 03/14/22 2233   CHOLESTEROL 134   LDL CHOL 67   HDL CHOL 33*   TRIGLYCERIDES 203*             Brief Urine Lab Results  (Last result in the past 365 days)      Color   Clarity   Blood   Leuk Est   Nitrite   Protein   CREAT   Urine HCG        03/14/22 1333 Yellow   Clear   Negative   Negative   Negative   Negative               Microbiology Results (last 10 days)     Procedure Component Value - Date/Time    Rapid Strep A Screen - Swab, Throat [[id number]]  (Normal) Collected: 03/14/22 1425    Lab Status: Final result Specimen: Swab from Throat Updated: 03/14/22 1454     Strep A Ag Negative    Blood Culture - Blood, Arm, Right [[id number]]  (Normal) Collected: 03/14/22 1324    Lab Status: Preliminary result Specimen: Blood from Arm, Right Updated: 03/15/22 1333     Blood Culture No growth at 24 hours    COVID-19,CEPHEID/ILYA,COR/VENECIA/PAD/BRIAN IN-HOUSE(OR EMERGENT/ADD-ON),NP SWAB IN TRANSPORT MEDIA 3-4 HR TAT, RT-PCR - Swab, Nasopharynx [[id number]]  (Normal) Collected: 03/14/22 1319    Lab Status: Final result Specimen: Swab from Nasopharynx Updated: 03/14/22 1357     COVID19 Not Detected    Narrative:      Fact sheet for providers: https://www.fda.gov/media/804418/download     Fact sheet for patients: https://www.fda.gov/media/991518/download  Fact sheet for providers: https://www.fda.gov/media/574966/download    Fact sheet for patients: https://www.fda.gov/media/374962/download    Test performed by PCR.    Influenza Antigen, Rapid - Swab, Nasopharynx [[id number]]  (Normal) Collected: 03/14/22 1319    Lab Status: Final result Specimen: Swab from Nasopharynx Updated: 03/14/22 1358     Influenza A PCR Not Detected     Influenza B PCR Not Detected    Blood Culture - Blood, Arm, Right [[id number]]  (Normal) Collected: 03/14/22 1312    Lab Status: Preliminary result Specimen: Blood from Arm, Right Updated: 03/15/22 1333     Blood Culture No growth at 24 hours          XR Chest 1 View    Result Date:  3/14/2022  Impression:  1. No acute chest findings. No significant change from 4/25/2021.  Electronically Signed By-Yoselin Woodward MD On:3/14/2022 1:48 PM This report was finalized on [id number] by  Yoselin Woodward MD.    US Renal Bilateral    Result Date: 3/15/2022  Impression: Normal study.  Electronically Signed By-Aiden Tobias MD On:3/15/2022 5:10 PM This report was finalized on [id number] by  Aiden Tobias MD.      Results for orders placed during the hospital encounter of 05/21/21    Duplex Venous Lower Extremity - Bilateral CAR    Interpretation Summary  · Normal bilateral lower extremity venous duplex scan.      Results for orders placed during the hospital encounter of 05/21/21    Duplex Venous Lower Extremity - Bilateral CAR    Interpretation Summary  · Normal bilateral lower extremity venous duplex scan.      Results for orders placed during the hospital encounter of 04/25/21    Adult Transthoracic Echo Limited W/ Cont if Necessary Per Protocol    Interpretation Summary  · Left ventricular ejection fraction appears to be 56 - 60%. Left ventricular systolic function is normal.    Technically difficult study with very limited views shows only apical four-chamber images of mildly tachycardic/hyperdynamic ventricle with LVEF normal range 55 to 60% and no segmental wall motion abnormalities.  Poorly visualized mitral and tricuspid valves appear grossly structurally normal with satisfactory leaflet excursion.  The aortic and pulmonic valves are not sufficiently imaged.  No pericardial effusion is seen.  Very limited screening color-flow Doppler shows no significant MR or TR jets.      Labs Pending at Discharge:  Pending Labs     Order Current Status    Blood Culture - Blood, Arm, Right Preliminary result    Blood Culture - Blood, Arm, Right Preliminary result          Procedures Performed           Consults:   Consults     Date and Time Order Name Status Description    3/14/2022  2:45 PM Hospitalist  (on-call MD unless specified)              Discharge Details        Discharge Medications      New Medications      Instructions Start Date   midodrine 2.5 MG tablet  Commonly known as: PROAMATINE   2.5 mg, Oral, 3 Times Daily Before Meals         Continue These Medications      Instructions Start Date   acetaminophen 325 MG tablet  Commonly known as: TYLENOL   650 mg, Oral, Every 8 Hours PRN      atorvastatin 20 MG tablet  Commonly known as: LIPITOR   20 mg, Oral, Nightly      castor oil-balsam peru ointment   Apply both heels twice daily      dextrose 40 % gel  Commonly known as: GLUTOSE   15 g, Oral, Every 1 Hour PRN, And pt unable to swallow       famotidine 20 MG tablet  Commonly known as: PEPCID   20 mg, Oral, 2 Times Daily Before Meals      folic acid 1 MG tablet  Commonly known as: FOLVITE   1 mg, Oral, Daily      HYDROcodone-acetaminophen 7.5-325 MG per tablet  Commonly known as: NORCO   1 tablet, Oral, Every 4 Hours PRN      insulin lispro 100 UNIT/ML injection  Commonly known as: humaLOG   5 Units, Subcutaneous, 3 Times Daily Before Meals, Hold for bs <70 and call MD for bs >400       isosorbide mononitrate 30 MG 24 hr tablet  Commonly known as: IMDUR   30 mg, Oral, Daily      lamoTRIgine 25 MG tablet  Commonly known as: LaMICtal   12.5 mg, Oral, 2 Times Daily      levothyroxine 100 MCG tablet  Commonly known as: SYNTHROID, LEVOTHROID   100 mcg, Oral, Daily      memantine 5 MG tablet  Commonly known as: NAMENDA   5 mg, Oral, Daily      metoprolol succinate XL 25 MG 24 hr tablet  Commonly known as: TOPROL-XL   25 mg, Oral, Daily      multivitamin tablet tablet   1 tablet, Oral, Daily      OLANZapine 5 MG tablet  Commonly known as: zyPREXA   5 mg, Oral, Nightly      Semglee 100 UNIT/ML injection  Generic drug: insulin glargine   20 Units, Subcutaneous, 2 Times Daily      senna 8.6 MG tablet  Commonly known as: SENOKOT   1 tablet, Oral, Daily PRN      tamsulosin 0.4 MG capsule 24 hr capsule  Commonly known  as: FLOMAX   1 capsule, Oral, Daily      thiamine 100 MG tablet tablet  Commonly known as: VITAMIN B-1   100 mg, Oral, Daily      traZODone 50 MG tablet  Commonly known as: DESYREL   50 mg, Oral, 2 times daily      zinc oxide 40 % paste paste  Commonly known as: DESITIN   Topical, 2 Times Daily             Allergies   Allergen Reactions   • Codeine GI Intolerance         Discharge Disposition:   Skilled Nursing Facility (DC - External)    Diet:  Hospital:  Diet Order   Procedures   • Diet Diabetic/Consistent Carbs; Diabetic - Consistent Carb         Discharge Activity:   Activity Instructions     Gradually Increase Activity Until at Pre-Hospitalization Level              CODE STATUS:  Code Status and Medical Interventions:   Ordered at: 03/14/22 1818     Code Status (Patient has no pulse and is not breathing):    No CPR (Do Not Attempt to Resuscitate)     Medical Interventions (Patient has pulse or is breathing):    Full Support         No future appointments.    Additional Instructions for the Follow-ups that You Need to Schedule     Basic Metabolic Panel    Mar 21, 2022 (Approximate)      Release to patient: Immediate               Time spent on Discharge including face to face service:  32 minutes    This patient has been examined with appropriate PPE . 03/16/22      Signature: Electronically signed by Keith Hathaway MD, 03/16/22, 12:36 PM EDT.

## 2022-03-19 LAB
BACTERIA SPEC AEROBE CULT: NORMAL
BACTERIA SPEC AEROBE CULT: NORMAL
QT INTERVAL: 447 MS

## 2023-03-22 ENCOUNTER — EXTERNAL PBMM DATA (OUTPATIENT)
Dept: PHARMACY | Facility: OTHER | Age: 78
End: 2023-03-22
Payer: MEDICARE

## 2023-08-07 RX ORDER — LEVOTHYROXINE SODIUM 0.07 MG/1
75 TABLET ORAL DAILY
COMMUNITY

## 2023-08-07 RX ORDER — BUDESONIDE AND FORMOTEROL FUMARATE DIHYDRATE 160; 4.5 UG/1; UG/1
2 AEROSOL RESPIRATORY (INHALATION)
COMMUNITY

## 2023-08-07 RX ORDER — UREA 10 %
3 LOTION (ML) TOPICAL NIGHTLY
COMMUNITY

## 2023-08-10 ENCOUNTER — HOSPITAL ENCOUNTER (OUTPATIENT)
Facility: HOSPITAL | Age: 78
Setting detail: HOSPITAL OUTPATIENT SURGERY
Discharge: NURSING, REHAB OR LONG-TERM CARE | End: 2023-08-10
Attending: INTERNAL MEDICINE | Admitting: INTERNAL MEDICINE
Payer: MEDICARE

## 2023-08-10 ENCOUNTER — ANESTHESIA EVENT (OUTPATIENT)
Dept: GASTROENTEROLOGY | Facility: HOSPITAL | Age: 78
End: 2023-08-10
Payer: MEDICARE

## 2023-08-10 ENCOUNTER — ANESTHESIA (OUTPATIENT)
Dept: GASTROENTEROLOGY | Facility: HOSPITAL | Age: 78
End: 2023-08-10
Payer: MEDICARE

## 2023-08-10 VITALS
HEIGHT: 70 IN | OXYGEN SATURATION: 94 % | RESPIRATION RATE: 16 BRPM | SYSTOLIC BLOOD PRESSURE: 114 MMHG | DIASTOLIC BLOOD PRESSURE: 65 MMHG | BODY MASS INDEX: 25.48 KG/M2 | HEART RATE: 69 BPM | WEIGHT: 178 LBS | TEMPERATURE: 98.7 F

## 2023-08-10 LAB — GLUCOSE BLDC GLUCOMTR-MCNC: 90 MG/DL (ref 70–105)

## 2023-08-10 PROCEDURE — 25010000002 PROPOFOL 1000 MG/100ML EMULSION: Performed by: NURSE ANESTHETIST, CERTIFIED REGISTERED

## 2023-08-10 PROCEDURE — 82948 REAGENT STRIP/BLOOD GLUCOSE: CPT

## 2023-08-10 PROCEDURE — 25010000002 PROPOFOL 10 MG/ML EMULSION: Performed by: NURSE ANESTHETIST, CERTIFIED REGISTERED

## 2023-08-10 RX ORDER — SODIUM CHLORIDE, SODIUM LACTATE, POTASSIUM CHLORIDE, CALCIUM CHLORIDE 600; 310; 30; 20 MG/100ML; MG/100ML; MG/100ML; MG/100ML
INJECTION, SOLUTION INTRAVENOUS CONTINUOUS PRN
Status: DISCONTINUED | OUTPATIENT
Start: 2023-08-10 | End: 2023-08-10

## 2023-08-10 RX ORDER — LIDOCAINE HYDROCHLORIDE 20 MG/ML
INJECTION, SOLUTION EPIDURAL; INFILTRATION; INTRACAUDAL; PERINEURAL AS NEEDED
Status: DISCONTINUED | OUTPATIENT
Start: 2023-08-10 | End: 2023-08-10 | Stop reason: SURG

## 2023-08-10 RX ORDER — SODIUM CHLORIDE 9 MG/ML
9 INJECTION, SOLUTION INTRAVENOUS ONCE
Status: COMPLETED | OUTPATIENT
Start: 2023-08-10 | End: 2023-08-10

## 2023-08-10 RX ORDER — SODIUM CHLORIDE 9 MG/ML
INJECTION, SOLUTION INTRAVENOUS CONTINUOUS PRN
Status: DISCONTINUED | OUTPATIENT
Start: 2023-08-10 | End: 2023-08-10 | Stop reason: SURG

## 2023-08-10 RX ORDER — PROPOFOL 10 MG/ML
INJECTION, EMULSION INTRAVENOUS AS NEEDED
Status: DISCONTINUED | OUTPATIENT
Start: 2023-08-10 | End: 2023-08-10 | Stop reason: SURG

## 2023-08-10 RX ADMIN — PROPOFOL INJECTABLE EMULSION 50 MG: 10 INJECTION, EMULSION INTRAVENOUS at 10:45

## 2023-08-10 RX ADMIN — LIDOCAINE HYDROCHLORIDE 60 MG: 20 INJECTION, SOLUTION EPIDURAL; INFILTRATION; INTRACAUDAL; PERINEURAL at 10:45

## 2023-08-10 RX ADMIN — SODIUM CHLORIDE 9 ML/HR: 9 INJECTION, SOLUTION INTRAVENOUS at 10:22

## 2023-08-10 RX ADMIN — PROPOFOL 105 MCG/KG/MIN: 10 INJECTION, EMULSION INTRAVENOUS at 10:45

## 2023-08-10 RX ADMIN — SODIUM CHLORIDE: 9 INJECTION, SOLUTION INTRAVENOUS at 10:36

## 2023-08-10 NOTE — H&P
Patient Care Team:  Provider, No Known as PCP - Yanci Buckley MD as Consulting Physician (Cardiology)  Hector Stovall MD as Consulting Physician (Nephrology)      Subjective .     History of present illness:    Chris Ferguson is a 77 y.o. male who presents today for Procedure(s):  COLONOSCOPY for the indications listed below.     The updated Patient Profile was reviewed prior to the procedure, in conjunction with the Physical Exam, including medical conditions, surgical procedures, medications, allergies, family history and social history.     Pre-operatively, I reviewed the indication(s) for the procedure, the risks of the procedure [including but not limited to: unexpected bleeding possibly requiring hospitalization and/or unplanned repeat procedures, perforation possibly requiring surgical treatment, missed lesions and complications of sedation/MAC (also explained by anesthesia staff)].     I have evaluated the patient for risks associated with the planned anesthesia and the procedure to be performed and find the patient an acceptable candidate for IV sedation.    Multiple opportunities were provided for any questions or concerns, and all questions were answered satisfactorily before any anesthesia was administered. We will proceed with the planned procedure.      ASSESSMENT/PLAN:  77 years old male with a history of rectal bleeding as well as polyps      Past Medical History:  Past Medical History:   Diagnosis Date    Anemia     Angina pectoris     Anxiety     Arthritis     OSTEO    BPH (benign prostatic hyperplasia)     Dementia     Dementia     Depression     Diabetes mellitus     type 2    Disease of thyroid gland     GERD (gastroesophageal reflux disease)     Hypertension     Parkinson's disease     Short-term memory loss        Past Surgical History:  Past Surgical History:   Procedure Laterality Date    APPENDECTOMY      CARDIAC CATHETERIZATION N/A 11/18/2020    Procedure: Left Heart Cath  and coronary angiogram;  Surgeon: Yanci Howard MD;  Location: [facility] CATH INVASIVE LOCATION;  Service: Cardiovascular;  Laterality: N/A;    CARDIAC CATHETERIZATION N/A 11/18/2020    Procedure: Coronary angiography;  Surgeon: Yanci Howard MD;  Location: [facility] CATH INVASIVE LOCATION;  Service: Cardiovascular;  Laterality: N/A;    CARDIAC CATHETERIZATION N/A 11/18/2020    Procedure: Left ventriculography;  Surgeon: Yanci Howard MD;  Location: [facility] CATH INVASIVE LOCATION;  Service: Cardiovascular;  Laterality: N/A;    CHOLECYSTECTOMY      COLONOSCOPY      EYE SURGERY      kallie cataracts w iol    FEMUR FRACTURE SURGERY Left     1/2018    LEG DEBRIDEMENT Left 9/15/2020    Procedure: LOWER EXTREMITY DEBRIDEMENT;  Surgeon: Joslyn Mistry MD;  Location: [facility];  Service: General;  Laterality: Left;    MULTIPLE TOOTH EXTRACTIONS      ALL TEETH REMOVED    STOMACH SURGERY      gastric ulcer    TOTAL HIP ARTHROPLASTY Right 4/23/2019    Procedure: TOTAL HIP ARTHROPLASTY POSTERIOR;  Surgeon: Valente Giang MD;  Location: [facility];  Service: Orthopedics    TOTAL KNEE ARTHROPLASTY Left 8/31/2018    Procedure: REMOVAL OF LEFT DISTAL FEMUR PLATE AND SCREWS WITH COMPLEX TOTAL KNEE REPLACEMENT DISTAL FEMUR HINGED;  Surgeon: Valente Giang MD;  Location: [facility];  Service: Orthopedics    TOTAL KNEE ARTHROPLASTY REVISION Left 6/4/2021    Procedure: SINGLE STAGE LEFT KNEE REVISION;  Surgeon: Valente Giang MD;  Location: [facility];  Service: Orthopedics;  Laterality: Left;       Social History:  Social History     Tobacco Use    Smoking status: Never    Smokeless tobacco: Never   Substance Use Topics    Alcohol use: Yes     Comment: pt reports mod amt     Drug use: No       Family History:  Family History   Problem Relation Age of Onset    Heart disease Mother     Stroke Father     Heart disease Father     Seizures Son     Seizures Son     Malig Hyperthermia Neg Hx         Medications:  No medications prior to admission.       Scheduled Meds:  Continuous Infusions:No current facility-administered medications for this encounter.    PRN Meds:.    ALLERGIES:  Codeine        Objective     Vital Signs:        Physical Exam:      General Appearance:    Awake and alert, in no acute distress   Lungs:     Clear to auscultation bilaterally, respirations regular, even and unlabored    Heart:    Regular rhythm and normal rate, normal S1 and S2, no            murmur, no gallop, no rub   Abdomen:     Normal bowel sounds, soft, non-tender, no rebound or guarding, non-distended, no hepatosplenomegaly        Results Review:   I reviewed the patient's labs and imaging.  Lab Results (last 24 hours)       ** No results found for the last 24 hours. **            Imaging Results (Last 24 Hours)       ** No results found for the last 24 hours. **               I discussed the patients findings and my recommendations with the patient.  Claudio Pimentel MD  08/10/23  08:34 EDT

## 2023-08-10 NOTE — ANESTHESIA POSTPROCEDURE EVALUATION
Patient: Chris Ferguson    Procedure Summary       Date: 08/10/23 Room / Location: [facility] ENDOSCOPY 4 / [facility] ENDOSCOPY    Anesthesia Start: 1036 Anesthesia Stop: 1127    Procedure: COLONOSCOPY (Rectum) Diagnosis:       Rectal bleeding      Personal history of colonic polyps      (Rectal bleeding [K62.5])      (Personal history of colonic polyps [Z86.010])    Surgeons: Claudio Pimentel MD Provider: Kelby Greenwood MD    Anesthesia Type: general ASA Status: 3            Anesthesia Type: general    Vitals  Vitals Value Taken Time   /65 08/10/23 1151   Temp     Pulse 74 08/10/23 1153   Resp 16 08/10/23 1151   SpO2 95 % 08/10/23 1153   Vitals shown include unvalidated device data.        Post Anesthesia Care and Evaluation    Patient location during evaluation: PACU  Patient participation: complete - patient participated  Level of consciousness: awake  Pain scale: See nurse's notes for pain score.  Pain management: adequate    Airway patency: patent  Anesthetic complications: No anesthetic complications  PONV Status: none  Cardiovascular status: acceptable  Respiratory status: acceptable and spontaneous ventilation  Hydration status: acceptable    Comments: Patient seen and examined postoperatively; vital signs stable; SpO2 greater than or equal to 90%; cardiopulmonary status stable; nausea/vomiting adequately controlled; pain adequately controlled; no apparent anesthesia complications; patient discharged from anesthesia care when discharge criteria were met

## 2023-08-10 NOTE — DISCHARGE INSTRUCTIONS
A responsible adult should stay with you and you should rest quietly for the rest of the day.    Do not drink alcohol, drive, operate any heavy machinery or power tools or make any legal/important decisions for the next 24 hours.     Progress your diet as tolerated.  If you begin to experience severe pain, increased shortness of breath, racing heartbeat or a fever above 101 F, seek immediate medical attention.     Follow up with MD as instructed. Call office for results in 3 to 5 days if needed.    [phone]    Impression:  Moderate-sized hemorrhoid likely source for rectal bleeding.  Extensive diverticulosis.  Prep was relatively marginal however no obvious large mass of polyp or lesion was noticed.     Recommendations:  Follow up with GI clinic as needed  Follow up with PCP as scheduled  Follow up with biopsy report  No recall due to advanced age.  Benefiber 1 scoop 2x/day   Advised MiraLAX twice a day with Benefiber twice daily

## 2023-08-10 NOTE — ANESTHESIA PREPROCEDURE EVALUATION
Anesthesia Evaluation     Patient summary reviewed and Nursing notes reviewed   NPO Solid Status: > 8 hours  NPO Liquid Status: > 8 hours           Airway   Mallampati: II  TM distance: >3 FB  Neck ROM: full  No difficulty expected  Dental - normal exam     Pulmonary    Cardiovascular     (+) hypertension, CADangina, hyperlipidemia      Neuro/Psych  (+) Parkinson's disease, psychiatric history, dementia  GI/Hepatic/Renal/Endo    (+) renal disease, diabetes mellitus, thyroid problem     Musculoskeletal     Abdominal    Substance History      OB/GYN          Other   arthritis,     ROS/Med Hx Other: ú Left ventricular ejection fraction appears to be 56 - 60%. Left ventricular systolic function is normal.     Technically difficult study with very limited views shows only apical four-chamber images of mildly tachycardic/hyperdynamic ventricle with LVEF normal range 55 to 60% and no segmental wall motion abnormalities.  Poorly visualized mitral and tricuspid valves appear grossly structurally normal with satisfactory leaflet excursion.  The aortic and pulmonic valves are not sufficiently imaged.  No pericardial effusion is seen.  Very limited screening color-flow Doppler shows no significant MR or TR jets.                  Anesthesia Plan    ASA 3     general     intravenous induction     Anesthetic plan, risks, benefits, and alternatives have been provided, discussed and informed consent has been obtained with: patient.    Plan discussed with CRNA.    CODE STATUS:

## 2023-08-10 NOTE — OP NOTE
COLONOSCOPY Procedure Report    Patient Name:  Chris Ferguson  YOB: 1945    Date of Surgery:  8/10/2023     Pre-Op Diagnosis:  Rectal bleeding [K62.5]  Personal history of colonic polyps [Z86.010]         Procedure/CPTr Codes:      Procedure(s):  COLONOSCOPY    Staff:  Surgeon(s):  Claudio Pimentel MD      Anesthesia: Monitored Anesthesia Care    Description of Procedure:  A description of the procedure as well as risks, benefits and alternative methods were explained to the patient who voiced understanding and signed the corresponding consent form. A physical exam was performed and vital signs were monitored throughout the procedure.    A rectal exam was performed which was normal. An Olympus colonoscope was placed into the rectum and proceeded under direct visualization through the colon until the cecum and appendiceal orifice were identified. Careful visualization occurred upon slow withdraw of the scope. The scope was then retroflexed and the distal rectum was visualized. The quality of the prep was good. The procedure was not difficult and there were no immediate complications.    Findings:   Prep was marginal.  No obvious large mass or polyp was noticed.  Extensive diverticulosis was noticed with redundant colon.  Moderate-sized internal/external hemorrhoid was seen.    Impression:  Moderate-sized hemorrhoid likely source for rectal bleeding.  Extensive diverticulosis.  Prep was relatively marginal however no obvious large mass of polyp or lesion was noticed.    Recommendations:  Follow up with GI clinic as needed  Follow up with PCP as scheduled  Follow up with biopsy report  No recall due to advanced age.  Benefiber 1 scoop 2x/day   Advised MiraLAX twice a day with Benefiber twice daily      Claudio Pimentel MD     Date: 8/10/2023    Time: 11:21 EDT

## 2023-08-27 NOTE — THERAPY TREATMENT NOTE
Acute Care - Physical Therapy Treatment Note  [facility]     Patient Name: Chris Ferguson  : 1945  MRN: [id number]  Today's Date: 2018  Onset of Illness/Injury or Date of Surgery: 18  Date of Referral to PT: 18  Referring Physician: Dr. Giang    Admit Date: 2018    Visit Dx:    ICD-10-CM ICD-9-CM   1. Impaired functional mobility, balance, gait, and endurance Z74.09 V49.89   2. 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, left   • Essential hypertension   • DM (diabetes mellitus), type 2 (CMS/HCC)   • BPH (benign prostatic hyperplasia)   • Hypothyroidism   • Postoperative anemia due to acute blood loss   • Tachycardia       Therapy Treatment          Rehabilitation Treatment Summary     Row Name 18 1400 18 0830          Treatment Time/Intention    Discipline physical therapy assistant  -SM physical therapy assistant  -     Document Type therapy note (daily note)  - therapy note (daily note)  -     Subjective Information complains of;weakness;fatigue;pain  -SM complains of;weakness;pain  -SM     Mode of Treatment physical therapy  - group therapy;physical therapy  -     Patient Effort fair  -SM adequate  -SM     Comment  -- pt very lethargic  -SM     Existing Precautions/Restrictions fall;brace worn when out of bed   KI until able to SLR ind  -SM fall;brace worn when out of bed   KI until SLR ind  -SM     Recorded by [SM] Leila Hunter, [facility] 18 1452 [SM] Leila Hunter, [facility] 18 1106     Row Name 18 1400 18 0830          Vital Signs    Pre SpO2 (%) 100  -SM  --     O2 Delivery Pre Treatment supplemental O2  -SM supplemental O2  -SM     Intra SpO2 (%) 85  -SM  --     O2 Delivery Intra Treatment supplemental O2  -SM supplemental O2  -SM     Post SpO2 (%) 97  -SM  --     O2  Delivery Post Treatment supplemental O2  -SM supplemental O2  -SM     Pre Patient Position Sitting  -SM  --     Intra Patient Position Standing  -SM  --     Post Patient Position Supine  -SM  --     Recorded by [SM] Dale Leila [place], [facility] 09/01/18 1452 [SM] Peter Hunterah [place], [facility] 09/01/18 1106     Row Name 09/01/18 1400 09/01/18 0830          Cognitive Assessment/Intervention- PT/OT    Orientation Status (Cognition) oriented x 3  -SM oriented x 3  -SM     Follows Commands (Cognition) follows two step commands;repetition of directions required  -SM follows two step commands;repetition of directions required;verbal cues/prompting required  -SM     Safety Deficit (Cognitive) moderate deficit  -SM mild deficit  -SM     Personal Safety Interventions fall prevention program maintained;gait belt;nonskid shoes/slippers when out of bed  -SM fall prevention program maintained;gait belt;nonskid shoes/slippers when out of bed  -SM     Recorded by [SM] Leila Hunter, [facility] 09/01/18 1452 [SM] Leila Hunter, [facility] 09/01/18 1106     Row Name 09/01/18 1400 09/01/18 0830          Safety Issues, Functional Mobility    Safety Issues Affecting Function (Mobility) insight into deficits/self awareness;positioning of assistive device;safety precaution awareness;sequencing abilities  -SM  --     Impairments Affecting Function (Mobility) balance;endurance/activity tolerance;pain;strength  -SM endurance/activity tolerance;pain;strength  -SM     Recorded by [SM] Leila Hunter, [facility] 09/01/18 1452 [SM] Leila Hunter, [facility] 09/01/18 1106     Row Name 09/01/18 1400 09/01/18 0830          Bed Mobility Assessment/Treatment    Bed Mobility Assessment/Treatment sit-supine  -SM  --     Supine-Sit [place] (Bed Mobility) not tested  -SM  --     Sit-Supine [place] (Bed Mobility) moderate assist (50% patient effort);verbal cues  -SM  --     Bed Mobility, Safety Issues decreased use of legs for bridging/pushing  -SM  --      Comment (Bed Mobility)  -- up in chair  -     Recorded by [] Leila Hunter, [facility] 09/01/18 1452 [SM] Leila Hunter, [facility] 09/01/18 1106     Row Name 09/01/18 1400 09/01/18 0830          Transfer Assessment/Treatment    Transfer Assessment/Treatment sit-stand transfer;stand-sit transfer  -  --     Comment (Transfers) cues for keeping walker close and turn to square up at bed before sitting  -SM pt too lethargic, unsafe at this time  -SM     Recorded by [] Leila Hunter, [facility] 09/01/18 1452 [SM] Leila Hunter, [facility] 09/01/18 1106     Row Name 09/01/18 1400             Sit-Stand Transfer    Sit-Stand [place] (Transfers) minimum assist (75% patient effort);moderate assist (50% patient effort);verbal cues  -      Assistive Device (Sit-Stand Transfers) walker, front-wheeled  -SM      Recorded by [] Leila Hunter, [facility] 09/01/18 1452      Row Name 09/01/18 1400             Stand-Sit Transfer    Stand-Sit [place] (Transfers) moderate assist (50% patient effort);verbal cues  -      Assistive Device (Stand-Sit Transfers) walker, front-wheeled  -      Recorded by [] Leila Hunter, [facility] 09/01/18 1452      Row Name 09/01/18 1400             Gait/Stairs Assessment/Training    Gait/Stairs Assessment/Training gait/ambulation independence;gait/ambulation assistive device;distance ambulated;gait pattern;gait deviations  -      [place] Level (Gait) moderate assist (50% patient effort);verbal cues  -      Assistive Device (Gait) walker, front-wheeled  -      Distance in Feet (Gait) 3  -SM      Pattern (Gait) step-to  -SM      Deviations/Abnormal Patterns (Gait) antalgic;scar decreased;stride length decreased  -      Bilateral Gait Deviations forward flexed posture;heel strike decreased;weight shift ability decreased  -      Comment (Gait/Stairs) cues for sequencing w/ walker; popping sounds noted in B hip w/ weight shifting  -SM      Recorded by [SM] Leila Hunter  [place], [facility] 09/01/18 1452      Row Name 09/01/18 0830             General ROM    LT Lower Ext Lt Knee Extension/Flexion  -SM      Recorded by [] Leila Hunter, [facility] 09/01/18 1106      Row Name 09/01/18 0830             Left Lower Ext    Lt Knee Extension/Flexion AROM 25-65   w/ ace wrap  -SM      Recorded by [SM] Leila Hunter, [facility] 09/01/18 1106      Row Name 09/01/18 1400 09/01/18 0830          Therapeutic Exercise    Comment (Therapeutic Exercise) L TKR protocol x 15 reps  -SM L TKR protocol x 20 reps, no SLR or SAQ  -SM     Recorded by [] Leila Hunter, [facility] 09/01/18 1452 [SM] Leila Hunter, [facility] 09/01/18 1106     Row Name 09/01/18 1400 09/01/18 0830          Positioning and Restraints    Pre-Treatment Position sitting in chair/recliner  - sitting in chair/recliner  -SM     Post Treatment Position bed  - chair  -SM     In Bed supine;call light within reach;encouraged to call for assist;exit alarm on;with family/caregiver  -SM  --     In Chair  -- reclined;call light within reach;encouraged to call for assist  -SM     Recorded by [] Leila Hunter, [facility] 09/01/18 1452 [SM] Leila Hunter, [facility] 09/01/18 1106     Row Name 09/01/18 1400 09/01/18 0830          Pain Assessment    Additional Documentation Pain Scale: Numbers Pre/Post-Treatment (Group)  - Pain Scale: Numbers Pre/Post-Treatment (Group)  -SM     Recorded by [SM] Leila Hunter, [facility] 09/01/18 1452 [SM] Leila Hunter, [facility] 09/01/18 1106     Row Name 09/01/18 1400 09/01/18 0830          Pain Scale: Numbers Pre/Post-Treatment    Pain Scale: Numbers, Pretreatment 8/10  -SM 8/10  -SM     Pain Scale: Numbers, Post-Treatment 9/10  -SM 9/10  -SM     Pain Location - Side Left  -SM Left  -SM     Pain Location knee  -SM knee  -SM     Pain Intervention(s) Repositioned;Ambulation/increased activity;Rest  -SM Repositioned;Ambulation/increased activity;Rest  -SM     Recorded by [SM] Leila Hunter, PTA 09/01/18 0706  [SM] Leila Hunter, PTA 09/01/18 1106     Row Name                Wound 08/31/18 1241 Left knee incision    Wound - Properties Group Date first assessed: 08/31/18 [EG] Time first assessed: 1241 [EG] Side: Left [EG] Location: knee [EG] Type: incision [EG] Recorded by:  [EG] Mery Jc RN 08/31/18 1241      User Key  (r) = Recorded By, (t) = Taken By, (c) = Cosigned By    Initials Name Effective Dates Discipline     Leila Hunter, PTA 03/07/18 -  PT    EG Mery Jc RN 07/10/17 -  Nurse          Wound 08/31/18 1241 Left knee incision (Active)   Dressing Appearance dry;intact;no drainage 9/1/2018  7:56 AM   Closure GENE 9/1/2018  7:56 AM   Base dressing in place, unable to visualize 8/31/2018  7:52 PM   Drainage Amount none 9/1/2018  7:56 AM   Dressing Care, Wound multi-layer wrap;elastic bandage 9/1/2018  7:56 AM             Physical Therapy Education     Title: PT OT SLP Therapies (Done)     Topic: Physical Therapy (Done)     Point: Mobility training (Done)    Learning Progress Summary     Learner Status Readiness Method Response Comment Documented by    Patient Done Acceptance ARISTIDES MCCALL D VU,NR   09/01/18 1453     Active Acceptance E,D NR   09/01/18 1106     Done Acceptance E VU  MA 08/31/18 1618          Point: Home exercise program (Done)    Learning Progress Summary     Learner Status Readiness Method Response Comment Documented by    Patient Done Acceptance ARISTIDES MCCALL D VU,NR   09/01/18 1453     Active Acceptance E,D NR   09/01/18 1106     Done Acceptance E VU  MA 08/31/18 1618          Point: Body mechanics (Done)    Learning Progress Summary     Learner Status Readiness Method Response Comment Documented by    Patient Done Acceptance ARISTIDES MCCALL D VU,NR   09/01/18 1453     Active Acceptance E,D NR   09/01/18 1106     Done Acceptance E VU  MA 08/31/18 1618          Point: Precautions (Done)    Learning Progress Summary     Learner Status Readiness Method Response Comment Documented by     Patient Done Acceptance E,TB,D VU,NR   09/01/18 1453     Active Acceptance E,D NR   09/01/18 1106     Done Acceptance E ALIA  MA 08/31/18 1618                      User Key     Initials Effective Dates Name Provider Type Discipline     03/07/18 -  Leila Hunter, PTA Physical Therapy Assistant PT    MA 04/03/18 -  Bridget Dennis, PT Physical Therapist PT                    PT Recommendation and Plan  Anticipated Discharge Disposition (PT): home with home health, skilled nursing facility  Outcome Summary/Treatment Plan (PT)  Anticipated Discharge Disposition (PT): home with home health, skilled nursing facility  Plan of Care Reviewed With: patient  Progress: improving  Outcome Summary: Pt required mod A for standing and ambulating 3ft from chair to bed this date. VCs given for sequencing w/ Rw. Family present towards 2nd half of session to see pt transfer, discussed rehab option. Pt and family requests rehab facility close to home ([facility]).          Outcome Measures     Row Name 09/01/18 1400 09/01/18 1111 09/01/18 1100       How much help from another person do you currently need...    Turning from your back to your side while in flat bed without using bedrails? 3  -SM  -- 3  -SM    Moving from lying on back to sitting on the side of a flat bed without bedrails? 3  -SM  -- 3  -SM    Moving to and from a bed to a chair (including a wheelchair)? 2  -SM  -- 2  -SM    Standing up from a chair using your arms (e.g., wheelchair, bedside chair)? 2  -SM  -- 2  -SM    Climbing 3-5 steps with a railing? 2  -SM  -- 2  -SM    To walk in hospital room? 2  -SM  -- 2  -SM    AM-PAC 6 Clicks Score 14  -SM  -- 14  -SM       How much help from another is currently needed...    Putting on and taking off regular lower body clothing?  -- 1  -LE  --    Bathing (including washing, rinsing, and drying)  -- 1  -LE  --    Toileting (which includes using toilet bed pan or urinal)  -- 2  -LE  --    Putting on and taking off  regular upper body clothing  -- 1  -LE  --    Taking care of personal grooming (such as brushing teeth)  -- 1  -LE  --    Eating meals  -- 3  -LE  --    Score  -- 9  -LE  --       Functional Assessment    Outcome Measure Options AM-[facility] 6 Clicks Basic Mobility (PT)  - AM-PAC 6 Clicks Daily Activity (OT)  -LE AM-[facility] 6 Clicks Basic Mobility (PT)  -    Row Name 08/31/18 1600             How much help from another person do you currently need...    Turning from your back to your side while in flat bed without using bedrails? 3  -MA      Moving from lying on back to sitting on the side of a flat bed without bedrails? 3  -MA      Moving to and from a bed to a chair (including a wheelchair)? 2  -MA      Standing up from a chair using your arms (e.g., wheelchair, bedside chair)? 2  -MA      Climbing 3-5 steps with a railing? 2  -MA      To walk in hospital room? 2  -MA      AM-PAC 6 Clicks Score 14  -MA         Functional Assessment    Outcome Measure Options AM-[facility] 6 Clicks Basic Mobility (PT)  -MA        User Key  (r) = Recorded By, (t) = Taken By, (c) = Cosigned By    Initials Name Provider Type    Rita Arceo, OTR Occupational Therapist    Leila Cooney PTA Physical Therapy Assistant    Bridget Kirkland, PT Physical Therapist           Time Calculation:         PT Charges     Row Name 09/01/18 1456 09/01/18 1110          Time Calculation    Start Time 1400  - 0830  -     Stop Time 1430  - 0930  -     Time Calculation (min) 30 min  - 60 min  -     PT Received On 09/01/18  - 09/01/18  -     PT - Next Appointment 09/02/18  - 09/01/18  -       User Key  (r) = Recorded By, (t) = Taken By, (c) = Cosigned By    Initials Name Provider Type    Leila Cooney PTA Physical Therapy Assistant        Therapy Suggested Charges     Code   Minutes Charges    None           Therapy Charges for Today     Code Description Service Date Service Provider Modifiers Qty    32140405373 HC PT  show THER PROC EA 15 MIN 9/1/2018 Leila Hunter, PTA GP 1    98663977934 HC PT THER PROC GROUP 9/1/2018 Leila Hunter, PTA GP 1    75419093991 HC PT THER PROC EA 15 MIN 9/1/2018 Leila Hunter, PTA GP 2          PT G-Codes  Outcome Measure Options: AM-PAC 6 Clicks Basic Mobility (PT)    Leila Hunter, DONOVAN  9/1/2018

## 2024-03-05 ENCOUNTER — APPOINTMENT (OUTPATIENT)
Dept: CARDIOLOGY | Facility: HOSPITAL | Age: 79
End: 2024-03-05
Payer: MEDICARE

## 2024-03-05 ENCOUNTER — APPOINTMENT (OUTPATIENT)
Dept: GENERAL RADIOLOGY | Facility: HOSPITAL | Age: 79
End: 2024-03-05
Payer: MEDICARE

## 2024-03-05 ENCOUNTER — HOSPITAL ENCOUNTER (OUTPATIENT)
Facility: HOSPITAL | Age: 79
Setting detail: OBSERVATION
Discharge: NURSING, REHAB OR LONG-TERM CARE | End: 2024-03-06
Attending: EMERGENCY MEDICINE | Admitting: EMERGENCY MEDICINE
Payer: MEDICARE

## 2024-03-05 DIAGNOSIS — R07.9 CHEST PAIN, UNSPECIFIED TYPE: Primary | ICD-10-CM

## 2024-03-05 LAB
ANION GAP SERPL CALCULATED.3IONS-SCNC: 10 MMOL/L (ref 5–15)
AORTIC DIMENSIONLESS INDEX: 0.52 (DI)
APTT PPP: 28.5 SECONDS (ref 61–76.5)
BASOPHILS # BLD AUTO: 0 10*3/MM3 (ref 0–0.2)
BASOPHILS NFR BLD AUTO: 0.2 % (ref 0–1.5)
BH CV ECHO MEAS - AI P1/2T: 576.1 MSEC
BH CV ECHO MEAS - AO MAX PG: 6.8 MMHG
BH CV ECHO MEAS - AO MEAN PG: 4 MMHG
BH CV ECHO MEAS - AO V2 MAX: 130 CM/SEC
BH CV ECHO MEAS - AO V2 VTI: 28.4 CM
BH CV ECHO MEAS - AVA(I,D): 1.85 CM2
BH CV ECHO MEAS - EDV(CUBED): 110.6 ML
BH CV ECHO MEAS - EDV(MOD-SP4): 59.8 ML
BH CV ECHO MEAS - EF(MOD-BP): 51 %
BH CV ECHO MEAS - EF(MOD-SP4): 50.8 %
BH CV ECHO MEAS - ESV(CUBED): 42.9 ML
BH CV ECHO MEAS - ESV(MOD-SP4): 29.4 ML
BH CV ECHO MEAS - FS: 27.1 %
BH CV ECHO MEAS - IVS/LVPW: 1.11 CM
BH CV ECHO MEAS - IVSD: 1 CM
BH CV ECHO MEAS - LA DIMENSION: 3.4 CM
BH CV ECHO MEAS - LAT PEAK E' VEL: 6.5 CM/SEC
BH CV ECHO MEAS - LV DIASTOLIC VOL/BSA (35-75): 29.3 CM2
BH CV ECHO MEAS - LV MASS(C)D: 158.8 GRAMS
BH CV ECHO MEAS - LV MAX PG: 1.83 MMHG
BH CV ECHO MEAS - LV MEAN PG: 1 MMHG
BH CV ECHO MEAS - LV SYSTOLIC VOL/BSA (12-30): 14.4 CM2
BH CV ECHO MEAS - LV V1 MAX: 67.6 CM/SEC
BH CV ECHO MEAS - LV V1 VTI: 15.2 CM
BH CV ECHO MEAS - LVIDD: 4.8 CM
BH CV ECHO MEAS - LVIDS: 3.5 CM
BH CV ECHO MEAS - LVOT AREA: 3.5 CM2
BH CV ECHO MEAS - LVOT DIAM: 2.1 CM
BH CV ECHO MEAS - LVPWD: 0.9 CM
BH CV ECHO MEAS - MED PEAK E' VEL: 5.3 CM/SEC
BH CV ECHO MEAS - MV A MAX VEL: 102 CM/SEC
BH CV ECHO MEAS - MV DEC SLOPE: 375 CM/SEC2
BH CV ECHO MEAS - MV DEC TIME: 0.24 SEC
BH CV ECHO MEAS - MV E MAX VEL: 72.3 CM/SEC
BH CV ECHO MEAS - MV E/A: 0.71
BH CV ECHO MEAS - MV MAX PG: 5 MMHG
BH CV ECHO MEAS - MV MEAN PG: 3 MMHG
BH CV ECHO MEAS - MV P1/2T: 75.8 MSEC
BH CV ECHO MEAS - MV V2 VTI: 30.6 CM
BH CV ECHO MEAS - MVA(P1/2T): 2.9 CM2
BH CV ECHO MEAS - MVA(VTI): 1.72 CM2
BH CV ECHO MEAS - PA ACC TIME: 0.03 SEC
BH CV ECHO MEAS - PA V2 MAX: 81.3 CM/SEC
BH CV ECHO MEAS - PULM A REVS DUR: 0.11 SEC
BH CV ECHO MEAS - PULM A REVS VEL: 28.5 CM/SEC
BH CV ECHO MEAS - PULM DIAS VEL: 38.6 CM/SEC
BH CV ECHO MEAS - PULM S/D: 0.66
BH CV ECHO MEAS - PULM SYS VEL: 25.5 CM/SEC
BH CV ECHO MEAS - RAP SYSTOLE: 3 MMHG
BH CV ECHO MEAS - RV MAX PG: 2.11 MMHG
BH CV ECHO MEAS - RV V1 MAX: 72.7 CM/SEC
BH CV ECHO MEAS - RV V1 VTI: 15 CM
BH CV ECHO MEAS - RVDD: 2.7 CM
BH CV ECHO MEAS - RVSP: 20.1 MMHG
BH CV ECHO MEAS - SI(MOD-SP4): 14.9 ML/M2
BH CV ECHO MEAS - SV(LVOT): 52.6 ML
BH CV ECHO MEAS - SV(MOD-SP4): 30.4 ML
BH CV ECHO MEAS - TAPSE (>1.6): 1.47 CM
BH CV ECHO MEAS - TR MAX PG: 17.1 MMHG
BH CV ECHO MEAS - TR MAX VEL: 207 CM/SEC
BH CV ECHO MEASUREMENTS AVERAGE E/E' RATIO: 12.25
BH CV XLRA - TDI S': 11.5 CM/SEC
BUN SERPL-MCNC: 19 MG/DL (ref 8–23)
BUN/CREAT SERPL: 17.1 (ref 7–25)
CALCIUM SPEC-SCNC: 9.5 MG/DL (ref 8.6–10.5)
CHLORIDE SERPL-SCNC: 102 MMOL/L (ref 98–107)
CO2 SERPL-SCNC: 28 MMOL/L (ref 22–29)
CREAT SERPL-MCNC: 1.11 MG/DL (ref 0.76–1.27)
DEPRECATED RDW RBC AUTO: 45.5 FL (ref 37–54)
EGFRCR SERPLBLD CKD-EPI 2021: 68.8 ML/MIN/1.73
EOSINOPHIL # BLD AUTO: 0.2 10*3/MM3 (ref 0–0.4)
EOSINOPHIL NFR BLD AUTO: 2.5 % (ref 0.3–6.2)
ERYTHROCYTE [DISTWIDTH] IN BLOOD BY AUTOMATED COUNT: 17.9 % (ref 12.3–15.4)
GEN 5 2HR TROPONIN T REFLEX: 26 NG/L
GLUCOSE BLDC GLUCOMTR-MCNC: 267 MG/DL (ref 70–105)
GLUCOSE BLDC GLUCOMTR-MCNC: 334 MG/DL (ref 70–105)
GLUCOSE BLDC GLUCOMTR-MCNC: 39 MG/DL (ref 70–105)
GLUCOSE BLDC GLUCOMTR-MCNC: 45 MG/DL (ref 70–105)
GLUCOSE BLDC GLUCOMTR-MCNC: 58 MG/DL (ref 70–105)
GLUCOSE BLDC GLUCOMTR-MCNC: 65 MG/DL (ref 70–105)
GLUCOSE BLDC GLUCOMTR-MCNC: 67 MG/DL (ref 70–105)
GLUCOSE BLDC GLUCOMTR-MCNC: 83 MG/DL (ref 70–105)
GLUCOSE SERPL-MCNC: 81 MG/DL (ref 65–99)
HBA1C MFR BLD: 8 % (ref 4.8–5.6)
HCT VFR BLD AUTO: 35.2 % (ref 37.5–51)
HGB BLD-MCNC: 11.1 G/DL (ref 13–17.7)
HOLD SPECIMEN: NORMAL
INR PPP: 1 (ref 0.93–1.1)
LEFT ATRIUM VOLUME INDEX: 14.3 ML/M2
LYMPHOCYTES # BLD AUTO: 1.8 10*3/MM3 (ref 0.7–3.1)
LYMPHOCYTES NFR BLD AUTO: 27.9 % (ref 19.6–45.3)
MCH RBC QN AUTO: 22.5 PG (ref 26.6–33)
MCHC RBC AUTO-ENTMCNC: 31.4 G/DL (ref 31.5–35.7)
MCV RBC AUTO: 71.7 FL (ref 79–97)
MONOCYTES # BLD AUTO: 0.3 10*3/MM3 (ref 0.1–0.9)
MONOCYTES NFR BLD AUTO: 5 % (ref 5–12)
NEUTROPHILS NFR BLD AUTO: 4.1 10*3/MM3 (ref 1.7–7)
NEUTROPHILS NFR BLD AUTO: 64.4 % (ref 42.7–76)
NRBC BLD AUTO-RTO: 0 /100 WBC (ref 0–0.2)
NT-PROBNP SERPL-MCNC: 109.8 PG/ML (ref 0–1800)
PLAT MORPH BLD: NORMAL
PLATELET # BLD AUTO: 370 10*3/MM3 (ref 140–450)
PMV BLD AUTO: 6.8 FL (ref 6–12)
POTASSIUM SERPL-SCNC: 4.8 MMOL/L (ref 3.5–5.2)
PROTHROMBIN TIME: 10.9 SECONDS (ref 9.6–11.7)
RBC # BLD AUTO: 4.91 10*6/MM3 (ref 4.14–5.8)
RBC MORPH BLD: NORMAL
SINUS: 3.5 CM
SODIUM SERPL-SCNC: 140 MMOL/L (ref 136–145)
STJ: 3.2 CM
TROPONIN T DELTA: -1 NG/L
TROPONIN T SERPL HS-MCNC: 27 NG/L
WBC MORPH BLD: NORMAL
WBC NRBC COR # BLD AUTO: 6.4 10*3/MM3 (ref 3.4–10.8)

## 2024-03-05 PROCEDURE — 80048 BASIC METABOLIC PNL TOTAL CA: CPT | Performed by: NURSE PRACTITIONER

## 2024-03-05 PROCEDURE — 83880 ASSAY OF NATRIURETIC PEPTIDE: CPT | Performed by: NURSE PRACTITIONER

## 2024-03-05 PROCEDURE — 96374 THER/PROPH/DIAG INJ IV PUSH: CPT

## 2024-03-05 PROCEDURE — 84484 ASSAY OF TROPONIN QUANT: CPT | Performed by: NURSE PRACTITIONER

## 2024-03-05 PROCEDURE — 99285 EMERGENCY DEPT VISIT HI MDM: CPT

## 2024-03-05 PROCEDURE — 99204 OFFICE O/P NEW MOD 45 MIN: CPT | Performed by: INTERNAL MEDICINE

## 2024-03-05 PROCEDURE — 97161 PT EVAL LOW COMPLEX 20 MIN: CPT

## 2024-03-05 PROCEDURE — 83036 HEMOGLOBIN GLYCOSYLATED A1C: CPT | Performed by: NURSE PRACTITIONER

## 2024-03-05 PROCEDURE — G0378 HOSPITAL OBSERVATION PER HR: HCPCS

## 2024-03-05 PROCEDURE — 85730 THROMBOPLASTIN TIME PARTIAL: CPT | Performed by: NURSE PRACTITIONER

## 2024-03-05 PROCEDURE — 63710000001 INSULIN GLARGINE PER 5 UNITS: Performed by: NURSE PRACTITIONER

## 2024-03-05 PROCEDURE — 85007 BL SMEAR W/DIFF WBC COUNT: CPT | Performed by: NURSE PRACTITIONER

## 2024-03-05 PROCEDURE — 93005 ELECTROCARDIOGRAM TRACING: CPT

## 2024-03-05 PROCEDURE — 71045 X-RAY EXAM CHEST 1 VIEW: CPT

## 2024-03-05 PROCEDURE — 93306 TTE W/DOPPLER COMPLETE: CPT | Performed by: INTERNAL MEDICINE

## 2024-03-05 PROCEDURE — 87102 FUNGUS ISOLATION CULTURE: CPT | Performed by: NURSE PRACTITIONER

## 2024-03-05 PROCEDURE — 63710000001 INSULIN LISPRO (HUMAN) PER 5 UNITS: Performed by: NURSE PRACTITIONER

## 2024-03-05 PROCEDURE — 82948 REAGENT STRIP/BLOOD GLUCOSE: CPT

## 2024-03-05 PROCEDURE — 85025 COMPLETE CBC W/AUTO DIFF WBC: CPT | Performed by: NURSE PRACTITIONER

## 2024-03-05 PROCEDURE — 93306 TTE W/DOPPLER COMPLETE: CPT

## 2024-03-05 PROCEDURE — 36415 COLL VENOUS BLD VENIPUNCTURE: CPT

## 2024-03-05 PROCEDURE — 85610 PROTHROMBIN TIME: CPT | Performed by: NURSE PRACTITIONER

## 2024-03-05 RX ORDER — IBUPROFEN 400 MG/1
400 TABLET ORAL 2 TIMES DAILY
Status: DISCONTINUED | OUTPATIENT
Start: 2024-03-05 | End: 2024-03-06 | Stop reason: HOSPADM

## 2024-03-05 RX ORDER — DEXTROSE MONOHYDRATE 25 G/50ML
25 INJECTION, SOLUTION INTRAVENOUS
Status: DISCONTINUED | OUTPATIENT
Start: 2024-03-05 | End: 2024-03-06 | Stop reason: HOSPADM

## 2024-03-05 RX ORDER — SENNOSIDES A AND B 8.6 MG/1
1 TABLET, FILM COATED ORAL DAILY PRN
Status: DISCONTINUED | OUTPATIENT
Start: 2024-03-05 | End: 2024-03-06 | Stop reason: HOSPADM

## 2024-03-05 RX ORDER — ISOSORBIDE MONONITRATE 30 MG/1
30 TABLET, EXTENDED RELEASE ORAL DAILY
Status: DISCONTINUED | OUTPATIENT
Start: 2024-03-05 | End: 2024-03-06 | Stop reason: HOSPADM

## 2024-03-05 RX ORDER — ACETAMINOPHEN 325 MG/1
650 TABLET ORAL EVERY 4 HOURS PRN
Status: DISCONTINUED | OUTPATIENT
Start: 2024-03-05 | End: 2024-03-06 | Stop reason: HOSPADM

## 2024-03-05 RX ORDER — BUDESONIDE AND FORMOTEROL FUMARATE DIHYDRATE 160; 4.5 UG/1; UG/1
2 AEROSOL RESPIRATORY (INHALATION)
Status: DISCONTINUED | OUTPATIENT
Start: 2024-03-05 | End: 2024-03-06 | Stop reason: HOSPADM

## 2024-03-05 RX ORDER — DEXTROSE MONOHYDRATE 25 G/50ML
INJECTION, SOLUTION INTRAVENOUS
Status: COMPLETED
Start: 2024-03-05 | End: 2024-03-05

## 2024-03-05 RX ORDER — NITROGLYCERIN 0.4 MG/1
0.4 TABLET SUBLINGUAL
Status: COMPLETED | OUTPATIENT
Start: 2024-03-05 | End: 2024-03-05

## 2024-03-05 RX ORDER — MORPHINE SULFATE 2 MG/ML
2 INJECTION, SOLUTION INTRAMUSCULAR; INTRAVENOUS EVERY 4 HOURS PRN
Status: DISCONTINUED | OUTPATIENT
Start: 2024-03-05 | End: 2024-03-06 | Stop reason: HOSPADM

## 2024-03-05 RX ORDER — POLYETHYLENE GLYCOL 3350 17 G/17G
17 POWDER, FOR SOLUTION ORAL DAILY PRN
Status: DISCONTINUED | OUTPATIENT
Start: 2024-03-05 | End: 2024-03-06 | Stop reason: HOSPADM

## 2024-03-05 RX ORDER — INSULIN LISPRO 100 [IU]/ML
2-9 INJECTION, SOLUTION INTRAVENOUS; SUBCUTANEOUS
Status: DISCONTINUED | OUTPATIENT
Start: 2024-03-05 | End: 2024-03-06 | Stop reason: HOSPADM

## 2024-03-05 RX ORDER — SODIUM CHLORIDE 0.9 % (FLUSH) 0.9 %
10 SYRINGE (ML) INJECTION AS NEEDED
Status: DISCONTINUED | OUTPATIENT
Start: 2024-03-05 | End: 2024-03-06 | Stop reason: HOSPADM

## 2024-03-05 RX ORDER — ATORVASTATIN CALCIUM 20 MG/1
20 TABLET, FILM COATED ORAL DAILY
Status: DISCONTINUED | OUTPATIENT
Start: 2024-03-05 | End: 2024-03-06 | Stop reason: HOSPADM

## 2024-03-05 RX ORDER — METOPROLOL SUCCINATE 25 MG/1
25 TABLET, EXTENDED RELEASE ORAL DAILY
Status: DISCONTINUED | OUTPATIENT
Start: 2024-03-05 | End: 2024-03-06 | Stop reason: HOSPADM

## 2024-03-05 RX ORDER — IBUPROFEN 600 MG/1
1 TABLET ORAL
Status: DISCONTINUED | OUTPATIENT
Start: 2024-03-05 | End: 2024-03-06 | Stop reason: HOSPADM

## 2024-03-05 RX ORDER — ALUMINA, MAGNESIA, AND SIMETHICONE 2400; 2400; 240 MG/30ML; MG/30ML; MG/30ML
15 SUSPENSION ORAL EVERY 6 HOURS PRN
Status: DISCONTINUED | OUTPATIENT
Start: 2024-03-05 | End: 2024-03-06 | Stop reason: HOSPADM

## 2024-03-05 RX ORDER — MULTIPLE VITAMINS W/ MINERALS TAB 9MG-400MCG
1 TAB ORAL DAILY
COMMUNITY

## 2024-03-05 RX ORDER — ATORVASTATIN CALCIUM 20 MG/1
20 TABLET, FILM COATED ORAL DAILY
COMMUNITY

## 2024-03-05 RX ORDER — GUAIFENESIN 200 MG/10ML
200 LIQUID ORAL EVERY 6 HOURS PRN
COMMUNITY

## 2024-03-05 RX ORDER — BISACODYL 10 MG
10 SUPPOSITORY, RECTAL RECTAL DAILY PRN
Status: DISCONTINUED | OUTPATIENT
Start: 2024-03-05 | End: 2024-03-06 | Stop reason: HOSPADM

## 2024-03-05 RX ORDER — BENZONATATE 100 MG/1
100 CAPSULE ORAL EVERY 6 HOURS PRN
COMMUNITY

## 2024-03-05 RX ORDER — ONDANSETRON 4 MG/1
4 TABLET, ORALLY DISINTEGRATING ORAL EVERY 6 HOURS PRN
Status: DISCONTINUED | OUTPATIENT
Start: 2024-03-05 | End: 2024-03-06 | Stop reason: HOSPADM

## 2024-03-05 RX ORDER — AMOXICILLIN 250 MG
2 CAPSULE ORAL 2 TIMES DAILY PRN
Status: DISCONTINUED | OUTPATIENT
Start: 2024-03-05 | End: 2024-03-06 | Stop reason: HOSPADM

## 2024-03-05 RX ORDER — ONDANSETRON 2 MG/ML
4 INJECTION INTRAMUSCULAR; INTRAVENOUS EVERY 6 HOURS PRN
Status: DISCONTINUED | OUTPATIENT
Start: 2024-03-05 | End: 2024-03-06 | Stop reason: HOSPADM

## 2024-03-05 RX ORDER — BISACODYL 5 MG/1
5 TABLET, DELAYED RELEASE ORAL DAILY PRN
Status: DISCONTINUED | OUTPATIENT
Start: 2024-03-05 | End: 2024-03-06 | Stop reason: HOSPADM

## 2024-03-05 RX ORDER — SERTRALINE HYDROCHLORIDE 25 MG/1
25 TABLET, FILM COATED ORAL DAILY
Status: DISCONTINUED | OUTPATIENT
Start: 2024-03-05 | End: 2024-03-06 | Stop reason: HOSPADM

## 2024-03-05 RX ORDER — NICOTINE POLACRILEX 4 MG
15 LOZENGE BUCCAL
Status: DISCONTINUED | OUTPATIENT
Start: 2024-03-05 | End: 2024-03-06 | Stop reason: HOSPADM

## 2024-03-05 RX ORDER — SODIUM CHLORIDE 9 MG/ML
40 INJECTION, SOLUTION INTRAVENOUS AS NEEDED
Status: DISCONTINUED | OUTPATIENT
Start: 2024-03-05 | End: 2024-03-06 | Stop reason: HOSPADM

## 2024-03-05 RX ORDER — FAMOTIDINE 20 MG/1
20 TABLET, FILM COATED ORAL
Status: DISCONTINUED | OUTPATIENT
Start: 2024-03-05 | End: 2024-03-06 | Stop reason: HOSPADM

## 2024-03-05 RX ORDER — SODIUM CHLORIDE 0.9 % (FLUSH) 0.9 %
10 SYRINGE (ML) INJECTION EVERY 12 HOURS SCHEDULED
Status: DISCONTINUED | OUTPATIENT
Start: 2024-03-05 | End: 2024-03-06 | Stop reason: HOSPADM

## 2024-03-05 RX ORDER — INSULIN GLARGINE 100 [IU]/ML
20 INJECTION, SOLUTION SUBCUTANEOUS DAILY
COMMUNITY

## 2024-03-05 RX ORDER — IBUPROFEN 400 MG/1
400 TABLET ORAL 2 TIMES DAILY
COMMUNITY

## 2024-03-05 RX ORDER — LEVOTHYROXINE SODIUM 0.07 MG/1
75 TABLET ORAL
Status: DISCONTINUED | OUTPATIENT
Start: 2024-03-05 | End: 2024-03-06 | Stop reason: HOSPADM

## 2024-03-05 RX ORDER — NITROGLYCERIN 0.4 MG/1
0.4 TABLET SUBLINGUAL
Status: DISCONTINUED | OUTPATIENT
Start: 2024-03-05 | End: 2024-03-06 | Stop reason: HOSPADM

## 2024-03-05 RX ORDER — LAMOTRIGINE 25 MG/1
50 TABLET ORAL 2 TIMES DAILY
Status: DISCONTINUED | OUTPATIENT
Start: 2024-03-05 | End: 2024-03-06 | Stop reason: HOSPADM

## 2024-03-05 RX ORDER — SERTRALINE HYDROCHLORIDE 25 MG/1
25 TABLET, FILM COATED ORAL DAILY
COMMUNITY

## 2024-03-05 RX ORDER — GUAIFENESIN 200 MG/10ML
200 LIQUID ORAL EVERY 6 HOURS PRN
Status: DISCONTINUED | OUTPATIENT
Start: 2024-03-05 | End: 2024-03-06 | Stop reason: HOSPADM

## 2024-03-05 RX ORDER — CHOLECALCIFEROL (VITAMIN D3) 125 MCG
5 CAPSULE ORAL NIGHTLY PRN
Status: DISCONTINUED | OUTPATIENT
Start: 2024-03-05 | End: 2024-03-06 | Stop reason: HOSPADM

## 2024-03-05 RX ORDER — TAMSULOSIN HYDROCHLORIDE 0.4 MG/1
0.4 CAPSULE ORAL DAILY
Status: DISCONTINUED | OUTPATIENT
Start: 2024-03-05 | End: 2024-03-06 | Stop reason: HOSPADM

## 2024-03-05 RX ORDER — ACETAMINOPHEN 325 MG/1
650 TABLET ORAL EVERY 8 HOURS PRN
Status: DISCONTINUED | OUTPATIENT
Start: 2024-03-05 | End: 2024-03-05 | Stop reason: SDUPTHER

## 2024-03-05 RX ORDER — ASPIRIN 81 MG/1
324 TABLET, CHEWABLE ORAL ONCE
Status: COMPLETED | OUTPATIENT
Start: 2024-03-05 | End: 2024-03-05

## 2024-03-05 RX ORDER — INSULIN GLARGINE 100 [IU]/ML
10 INJECTION, SOLUTION SUBCUTANEOUS NIGHTLY
Status: DISCONTINUED | OUTPATIENT
Start: 2024-03-05 | End: 2024-03-06 | Stop reason: HOSPADM

## 2024-03-05 RX ADMIN — TRAZODONE HYDROCHLORIDE 150 MG: 100 TABLET ORAL at 21:18

## 2024-03-05 RX ADMIN — INSULIN LISPRO 6 UNITS: 100 INJECTION, SOLUTION INTRAVENOUS; SUBCUTANEOUS at 17:46

## 2024-03-05 RX ADMIN — NITROGLYCERIN 0.4 MG: 0.4 TABLET SUBLINGUAL at 07:46

## 2024-03-05 RX ADMIN — INSULIN GLARGINE 10 UNITS: 100 INJECTION, SOLUTION SUBCUTANEOUS at 21:18

## 2024-03-05 RX ADMIN — INSULIN LISPRO 7 UNITS: 100 INJECTION, SOLUTION INTRAVENOUS; SUBCUTANEOUS at 21:18

## 2024-03-05 RX ADMIN — ASPIRIN 81 MG CHEWABLE TABLET 324 MG: 81 TABLET CHEWABLE at 07:45

## 2024-03-05 RX ADMIN — NITROGLYCERIN 0.4 MG: 0.4 TABLET SUBLINGUAL at 09:40

## 2024-03-05 RX ADMIN — FAMOTIDINE 20 MG: 20 TABLET, FILM COATED ORAL at 18:03

## 2024-03-05 RX ADMIN — DEXTROSE MONOHYDRATE 25 G: 25 INJECTION, SOLUTION INTRAVENOUS at 11:09

## 2024-03-05 RX ADMIN — IBUPROFEN 400 MG: 400 TABLET, FILM COATED ORAL at 11:52

## 2024-03-05 RX ADMIN — ATORVASTATIN CALCIUM 20 MG: 20 TABLET, FILM COATED ORAL at 11:52

## 2024-03-05 RX ADMIN — SERTRALINE 25 MG: 25 TABLET, FILM COATED ORAL at 11:52

## 2024-03-05 RX ADMIN — METOPROLOL SUCCINATE 25 MG: 25 TABLET, FILM COATED, EXTENDED RELEASE ORAL at 11:52

## 2024-03-05 RX ADMIN — LAMOTRIGINE 50 MG: 25 TABLET ORAL at 21:18

## 2024-03-05 RX ADMIN — NITROGLYCERIN 0.4 MG: 0.4 TABLET SUBLINGUAL at 08:24

## 2024-03-05 RX ADMIN — LEVOTHYROXINE SODIUM 75 MCG: 0.07 TABLET ORAL at 11:52

## 2024-03-05 RX ADMIN — DEXTROSE 15 G: 15 GEL ORAL at 12:25

## 2024-03-05 RX ADMIN — LAMOTRIGINE 50 MG: 25 TABLET ORAL at 11:52

## 2024-03-05 RX ADMIN — Medication 10 ML: at 21:19

## 2024-03-05 RX ADMIN — IBUPROFEN 400 MG: 400 TABLET, FILM COATED ORAL at 21:18

## 2024-03-05 RX ADMIN — Medication 10 ML: at 11:56

## 2024-03-05 RX ADMIN — ISOSORBIDE MONONITRATE 30 MG: 30 TABLET, EXTENDED RELEASE ORAL at 11:52

## 2024-03-05 RX ADMIN — TAMSULOSIN HYDROCHLORIDE 0.4 MG: 0.4 CAPSULE ORAL at 11:52

## 2024-03-05 NOTE — PLAN OF CARE
Goal Outcome Evaluation:  Plan of Care Reviewed With: patient           Outcome Evaluation: 78 y.o. male from ECF with Chest discomfort-possible angina pectoris per cardiology.  EKG showed no acute changes.  Minimal elevation of troponin. Noted to have  Cardiac catheterization 11/18/2020 which revealed normal coronary arteries.     Relevant comorbidities including Hypertension,    Dyslipidemia, Diabetes-on insulin,  Hypothyroidism. Pt presents for PT eval with no c/o chest pain. He requires mod/maxA for SqPS transfers from bed to/from . Pt reports this is his baseline. Pt is pleasant and cooperative for PT eval but reports he does not want any additional therapy services while here. He is content to return back to ECF at current level. Will discontinue order and sign off. No f/u therapy recommended.      Anticipated Discharge Disposition (PT): extended care facility

## 2024-03-05 NOTE — CASE MANAGEMENT/SOCIAL WORK
Discharge Planning Assessment   [facility]     Patient Name: Chris Ferguson  MRN: [id number]  Today's Date: 3/5/2024    Admit Date: 3/5/2024    Plan: Return to [facility]. No precert or PASRR needed. Will need ambulance transport for bedbound.   Discharge Needs Assessment       Row Name 03/05/24 1430       Living Environment    People in Home other (see comments)    Unique Family Situation LTC    Current Living Arrangements extended care facility    Duration at Residence 3 years at [facility]    Potentially Unsafe Housing Conditions none    In the past 12 months has the electric, gas, oil, or water company threatened to shut off services in your home? No    Provides Primary Care For no one    Family Caregiver if Needed other (see comments)    Quality of Family Relationships helpful;involved;supportive    Able to Return to Prior Arrangements yes       Resource/Environmental Concerns    Resource/Environmental Concerns none    Transportation Concerns none       Transportation Needs    In the past 12 months, has lack of transportation kept you from medical appointments or from getting medications? no    In the past 12 months, has lack of transportation kept you from meetings, work, or from getting things needed for daily living? No       Food Insecurity    Within the past 12 months, you worried that your food would run out before you got the money to buy more. Never true    Within the past 12 months, the food you bought just didn't last and you didn't have money to get more. Never true       Transition Planning    Patient/Family Anticipates Transition to long-term care facility    Patient/Family Anticipated Services at Transition none    Transportation Anticipated health plan transportation;family or friend will provide       Discharge Needs Assessment    Readmission Within the Last 30 Days no previous admission in last 30 days    Concerns to be Addressed discharge planning    Anticipated Changes  Related to Illness none    Equipment Needed After Discharge none    Outpatient/Agency/Support Group Needs skilled nursing facility                   Discharge Plan       Row Name 03/05/24 1431       Plan    Plan Return to [facility]. No precert or PASRR needed. Will need ambulance transport for bedbound.    Patient/Family in Agreement with Plan yes    Plan Comments CM met with Mr. Ferguson and verified he lives at [facility] and has been there 3 years. He plans to return and will need ambulance due to being bed bound and non ambulatory.  ODIN contacted Kylie with [facility] who verified  he can return there and does not need precert or PASRR                  Continued Care and Services - Admitted Since 3/5/2024       Destination       Service Provider Request Status Selected Services Address Phone Fax Patient Preferred    [address] [facility] [facility] Considering N/A [address] [place] [address] [phone] --                     Demographic Summary       Row Name 03/05/24 1429       General Information    Admission Type observation    Arrived From emergency department    Required Notices Provided Observation Status Notice    Referral Source admission list    Reason for Consult discharge planning    Preferred Language English       Contact Information    Permission Granted to Share Info With     Contact Information Obtained for facility                    Functional Status       Row Name 03/05/24 1429       Functional Status    Usual Activity Tolerance fair    Current Activity Tolerance poor    Functional Status Comments dependent       Functional Status, IADL    Medications completely dependent    Meal Preparation completely dependent    Housekeeping completely dependent    Laundry completely dependent    Shopping completely dependent       Mental Status    General Appearance WDL WDL       Mental Status  Summary    Recent Changes in Mental Status/Cognitive Functioning no changes                     Patient Forms       Row Name 03/05/24 1411       Patient Forms    Important Message from Medicare (IMM) Delivered  MOON by REG 3/05    Patient Observation Letter Delivered                      Terra PRATHER,RN Case Manager  [facility]  Phone: desk- [phone] cell- [phone]
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
## 2024-03-05 NOTE — PLAN OF CARE
Goal Outcome Evaluation:   Ax4 no complaints pain or N.V. Per pt from [facility] wheelchair bound. Consult to Cardio Pt  remain npo until cardio see him
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
## 2024-03-05 NOTE — LETTER
EMS Transport Request  For use at [facility], [place], [place], [place], and [place] only   Patient Name: Chris Ferguson : 1945   Weight:86.2 kg (190 lb) Pick-up Location: [facility] BLS/ALS: BLS/ALS: ALS   Insurance: [employer] MEDICARE REPLACEMENT Auth End Date: .   Pre-Cert #: D/C Summary complete:    [address]   Contact Precautions: None   Equipment (O2, Fluids, etc.): None   Arrive By Date/Time: 3/06/24 Stretcher/WC: Stretcher   CM Requesting: Terra Berg Ext: [phone]   Notes/Medical Necessity: Bedbound, non-ambulatory. Lives at [facility]     ______________________________________________________________________    *Only 2 patient bags OR 1 carry-on size bag are permitted.  Wheelchairs and walkers CANNOT transported with the patient. Acknowledge: Yes

## 2024-03-05 NOTE — ED PROVIDER NOTES
Subjective   History of Present Illness  Chief complaint: Chest pain      Context: Patient is a 78-year-old male past medical history significant for hypertension diabetes CKD hyperlipidemia who presents with complaints of chest pain that woke him up out of his sleep around 330 this morning 4 hours prior to arrival.  Denies any history of ANDREY.  He describes it as a heavy nonradiating pressure.  He denies any nausea vomiting diarrhea fever or diaphoresis.  Has had an intermittent dry nonproductive cough.  Denies any edema.        PCP:    marni      Review of Systems   Constitutional:  Negative for fever.       Past Medical History:   Diagnosis Date    Anemia     Angina pectoris     Anxiety     Arthritis     OSTEO    Bipolar 1 disorder     BPH (benign prostatic hyperplasia)     Cellulitis of left lower extremity 05/03/2021    CKD (chronic kidney disease)     Constipation     Dementia     Dementia     Depression     Diabetes mellitus     type 2    Disease of thyroid gland     GERD (gastroesophageal reflux disease)     Hemorrhage of anus     Hypertension     Parkinson's disease     Short-term memory loss        Allergies   Allergen Reactions    Codeine GI Intolerance       Past Surgical History:   Procedure Laterality Date    APPENDECTOMY      CARDIAC CATHETERIZATION N/A 11/18/2020    Procedure: Left Heart Cath and coronary angiogram;  Surgeon: Yanci Howard MD;  Location: [facility] CATH INVASIVE LOCATION;  Service: Cardiovascular;  Laterality: N/A;    CARDIAC CATHETERIZATION N/A 11/18/2020    Procedure: Coronary angiography;  Surgeon: Yanci Howard MD;  Location: [facility] CATH INVASIVE LOCATION;  Service: Cardiovascular;  Laterality: N/A;    CARDIAC CATHETERIZATION N/A 11/18/2020    Procedure: Left ventriculography;  Surgeon: Yanci Howard MD;  Location: [facility] CATH INVASIVE LOCATION;  Service: Cardiovascular;  Laterality: N/A;    CHOLECYSTECTOMY      COLONOSCOPY      COLONOSCOPY N/A 8/10/2023    Procedure:  COLONOSCOPY;  Surgeon: Claudio Pimentel MD;  Location: [facility] ENDOSCOPY;  Service: Gastroenterology;  Laterality: N/A;  Internal hemorrhoids, extensive diverticulosis    EYE SURGERY      kallie cataracts w iol    FEMUR FRACTURE SURGERY Left     1/2018    LEG DEBRIDEMENT Left 9/15/2020    Procedure: LOWER EXTREMITY DEBRIDEMENT;  Surgeon: Joslyn Mistry MD;  Location: [facility] OR;  Service: General;  Laterality: Left;    MULTIPLE TOOTH EXTRACTIONS      ALL TEETH REMOVED    STOMACH SURGERY      gastric ulcer    TOTAL HIP ARTHROPLASTY Right 4/23/2019    Procedure: TOTAL HIP ARTHROPLASTY POSTERIOR;  Surgeon: Valente Giang MD;  Location: [facility] OR;  Service: Orthopedics    TOTAL KNEE ARTHROPLASTY Left 8/31/2018    Procedure: REMOVAL OF LEFT DISTAL FEMUR PLATE AND SCREWS WITH COMPLEX TOTAL KNEE REPLACEMENT DISTAL FEMUR HINGED;  Surgeon: Valente Giang MD;  Location: [facility] OR;  Service: Orthopedics    TOTAL KNEE ARTHROPLASTY REVISION Left 6/4/2021    Procedure: SINGLE STAGE LEFT KNEE REVISION;  Surgeon: Valente Giang MD;  Location: [facility] OR;  Service: Orthopedics;  Laterality: Left;       Family History   Problem Relation Age of Onset    Heart disease Mother     Stroke Father     Heart disease Father     Seizures Son     Seizures Son     Malig Hyperthermia Neg Hx        Social History     Socioeconomic History    Marital status:    Tobacco Use    Smoking status: Never    Smokeless tobacco: Never   Vaping Use    Vaping status: Never Used   Substance and Sexual Activity    Alcohol use: Not Currently    Drug use: No    Sexual activity: Defer           Objective   Physical Exam    Due to significant overcrowding in the emergency department patient was evaluated by myself in a hallway bed.  Explained to the patient our limitations due to overcrowding and they were in agreement to continue exam and treatment at this time.       Vital signs and triage nurse note  reviewed.  Constitutional: Awake, alert; well-developed and well-nourished.    HEENT: Normocephalic   Neck: Supple, full range of motion without pain;   Cardiovascular: Regular rate and rhythm, normal S1-S2.  No murmurs or rubs  Pulmonary: Respiratory effort regular nonlabored, breath sounds clear to auscultation all fields.  Abdomen: Soft, nontender nondistended with normoactive bowel sounds; no rebound or guarding.  Musculoskeletal: Independent range of motion of all extremities with no palpable tenderness or edema.  Neuro: Alert oriented x3, speech is clear and appropriate, GCS 15  Skin:  Fleshtone warm, dry, intact;        Procedures           ED Course  ED Course as of 03/11/24 0830   Tue Mar 05, 2024   0928 Spoke with Dr. Howard  []   0939 Spoke with Fior [MAXIMILIANO]      ED Course User Index  [JW] Glenny Euceda APRN                                 Labs Reviewed   APTT - Abnormal; Notable for the following components:       Result Value    PTT 28.5 (*)     All other components within normal limits   TROPONIN - Abnormal; Notable for the following components:    HS Troponin T 27 (*)     All other components within normal limits    Narrative:     High Sensitive Troponin T Reference Range:  <14.0 ng/L- Negative Female for AMI  <22.0 ng/L- Negative Male for AMI  >=14 - Abnormal Female indicating possible myocardial injury.  >=22 - Abnormal Male indicating possible myocardial injury.   Clinicians would have to utilize clinical acumen, EKG, Troponin, and serial changes to determine if it is an Acute Myocardial Infarction or myocardial injury due to an underlying chronic condition.        CBC WITH AUTO DIFFERENTIAL - Abnormal; Notable for the following components:    Hemoglobin 11.1 (*)     Hematocrit 35.2 (*)     MCV 71.7 (*)     MCH 22.5 (*)     MCHC 31.4 (*)     RDW 17.9 (*)     All other components within normal limits   HIGH SENSITIVITIY TROPONIN T 2HR - Abnormal; Notable for the following components:    HS  Troponin T 26 (*)     All other components within normal limits    Narrative:     High Sensitive Troponin T Reference Range:  <14.0 ng/L- Negative Female for AMI  <22.0 ng/L- Negative Male for AMI  >=14 - Abnormal Female indicating possible myocardial injury.  >=22 - Abnormal Male indicating possible myocardial injury.   Clinicians would have to utilize clinical acumen, EKG, Troponin, and serial changes to determine if it is an Acute Myocardial Infarction or myocardial injury due to an underlying chronic condition.        HEMOGLOBIN A1C - Abnormal; Notable for the following components:    Hemoglobin A1C 8.00 (*)     All other components within normal limits   CBC WITH AUTO DIFFERENTIAL - Abnormal; Notable for the following components:    Hemoglobin 10.3 (*)     Hematocrit 33.1 (*)     MCV 70.9 (*)     MCH 22.1 (*)     MCHC 31.1 (*)     RDW 17.9 (*)     All other components within normal limits   POCT GLUCOSE FINGERSTICK - Abnormal; Notable for the following components:    Glucose 151 (*)     All other components within normal limits   POCT GLUCOSE FINGERSTICK - Abnormal; Notable for the following components:    Glucose 39 (*)     All other components within normal limits   POCT GLUCOSE FINGERSTICK - Abnormal; Notable for the following components:    Glucose 45 (*)     All other components within normal limits   POCT GLUCOSE FINGERSTICK - Abnormal; Notable for the following components:    Glucose 58 (*)     All other components within normal limits   POCT GLUCOSE FINGERSTICK - Abnormal; Notable for the following components:    Glucose 67 (*)     All other components within normal limits   POCT GLUCOSE FINGERSTICK - Abnormal; Notable for the following components:    Glucose 65 (*)     All other components within normal limits   POCT GLUCOSE FINGERSTICK - Abnormal; Notable for the following components:    Glucose 267 (*)     All other components within normal limits   POCT GLUCOSE FINGERSTICK - Abnormal; Notable for  the following components:    Glucose 334 (*)     All other components within normal limits   CANDIDA AURIS SCREEN - Normal   BASIC METABOLIC PANEL - Normal    Narrative:     GFR Normal >60  Chronic Kidney Disease <60  Kidney Failure <15    The GFR formula is only valid for adults with stable renal function between ages 18 and 70.   PROTIME-INR - Normal   BNP (IN-HOUSE) - Normal    Narrative:     This assay is used as an aid in the diagnosis of individuals suspected of having heart failure. It can be used as an aid in the diagnosis of acute decompensated heart failure (ADHF) in patients presenting with signs and symptoms of ADHF to the emergency department (ED). In addition, NT-proBNP of <300 pg/mL indicates ADHF is not likely.    Age Range Result Interpretation  NT-proBNP Concentration (pg/mL:      <50             Positive            >450                   Gray                 300-450                    Negative             <300    50-75           Positive            >900                  Gray                300-900                  Negative            <300      >75             Positive            >1800                  Gray                300-1800                  Negative            <300   SCAN SLIDE - Normal    Narrative:     Slide Reviewed    BASIC METABOLIC PANEL - Normal    Narrative:     GFR Normal >60  Chronic Kidney Disease <60  Kidney Failure <15    The GFR formula is only valid for adults with stable renal function between ages 18 and 70.   POCT GLUCOSE FINGERSTICK - Normal   POCT GLUCOSE FINGERSTICK   POCT GLUCOSE FINGERSTICK   POCT GLUCOSE FINGERSTICK   CBC AND DIFFERENTIAL    Narrative:     The following orders were created for panel order CBC & Differential.  Procedure                               Abnormality         Status                     ---------                               -----------         ------                     CBC Auto Differential[[id number]]        Abnormal            Final result                Scan Slide[[id number]]                   Normal              Final result                 Please view results for these tests on the individual orders.   EXTRA TUBES    Narrative:     The following orders were created for panel order Extra Tubes.  Procedure                               Abnormality         Status                     ---------                               -----------         ------                     Gold Top - SST[[id number]]                                   Final result                 Please view results for these tests on the individual orders.   GOLD TOP - SST   CBC AND DIFFERENTIAL    Narrative:     The following orders were created for panel order CBC & Differential.  Procedure                               Abnormality         Status                     ---------                               -----------         ------                     CBC Auto Differential[[id number]]        Abnormal            Final result               Scan Slide[[id number]]                                                                    Please view results for these tests on the individual orders.     Medications   aspirin chewable tablet 324 mg (324 mg Oral Given 3/5/24 0745)   nitroglycerin (NITROSTAT) SL tablet 0.4 mg (0.4 mg Sublingual Given 3/5/24 0940)   technetium tetrofosmin (Tc-MYOVIEW) injection 1 dose (1 dose Intravenous Given 3/6/24 0708)   regadenoson (LEXISCAN) injection 0.4 mg (0.4 mg Intravenous Given 3/6/24 0915)   technetium tetrofosmin (Tc-MYOVIEW) injection 1 dose (1 dose Intravenous Given 3/6/24 0915)     No radiology results for the last day  Prior to Admission medications    Medication Sig Start Date End Date Taking? Authorizing Provider   acetaminophen (TYLENOL) 325 MG tablet Take 2 tablets by mouth Every 8 (Eight) Hours As Needed for Mild Pain.    Provider, MD Bess   atorvastatin (LIPITOR) 20 MG tablet Take 20 mg by mouth Every Night.  Patient not taking: Reported on  8/8/2023       budesonide-formoterol (SYMBICORT) 160-4.5 MCG/ACT inhaler Inhale 2 puffs 2 (Two) Times a Day.    Bess Mcclain MD   castor oil-balsam peru (VENELEX) ointment Apply both heels twice daily 6/9/21   Nghia Stinson MD   dextrose (GLUTOSE) 40 % gel Take 15 g by mouth Every 1 (One) Hour As Needed for Low Blood Sugar. And pt unable to swallow    Bess Mcclain MD   famotidine (PEPCID) 20 MG tablet Take 1 tablet by mouth 2 (Two) Times a Day Before Meals. 9/25/20   Seth Ernst DO   folic acid (FOLVITE) 1 MG tablet Take 1 mg by mouth Daily.  Patient not taking: Reported on 8/8/2023    Bess Mcclain MD   HYDROcodone-acetaminophen (NORCO) 7.5-325 MG per tablet Take 1 tablet by mouth Every 4 (Four) Hours As Needed for Mild Pain  or Moderate Pain .  Patient not taking: Reported on 8/8/2023 6/9/21   Nghia Stinson MD   insulin glargine (Semglee) 100 UNIT/ML injection Inject 20 Units under the skin into the appropriate area as directed 2 (Two) Times a Day.    Bess Mcclain MD   insulin lispro (humaLOG) 100 UNIT/ML injection Inject 10 Units under the skin into the appropriate area as directed 2 (Two) Times a Day. Hold for bs <70 and call MD for bs >400    Bess Mcclain MD   isosorbide mononitrate (IMDUR) 30 MG 24 hr tablet Take 1 tablet by mouth Daily.    Bess Mcclain MD   lamoTRIgine (LaMICtal) 25 MG tablet Take 2 tablets by mouth 2 (Two) Times a Day.    Bess Mcclain MD   levothyroxine (SYNTHROID, LEVOTHROID) 75 MCG tablet Take 1 tablet by mouth Daily.    Bess Mcclain MD   melatonin 1 MG tablet Take 3 tablets by mouth Every Night.    Bess Mcclain MD   memantine (NAMENDA) 5 MG tablet Take 5 mg by mouth Daily.  Patient not taking: Reported on 8/8/2023    Bess Mcclain MD   metFORMIN (GLUCOPHAGE) 500 MG tablet Take 1 tablet by mouth 2 (Two) Times a Day With Meals.    Bess Mcclain MD   metoprolol succinate XL (TOPROL-XL) 25 MG  "24 hr tablet Take 1 tablet by mouth Daily.    ProviderBess MD   midodrine (PROAMATINE) 2.5 MG tablet Take 1 tablet by mouth 3 (Three) Times a Day Before Meals for 30 days.  Patient not taking: Reported on 8/8/2023 3/16/22 4/15/22  Keith Hathaway MD   multivitamin (THERAGRAN) tablet tablet Take 1 tablet by mouth Daily.    Bess Mcclain MD   OLANZapine (zyPREXA) 5 MG tablet Take 5 mg by mouth Every Night.  Patient not taking: Reported on 8/8/2023    Bess Mcclain MD   senna (SENOKOT) 8.6 MG tablet Take 1 tablet by mouth Daily As Needed for Constipation.    Bess Mcclain MD   tamsulosin (FLOMAX) 0.4 MG capsule 24 hr capsule Take 1 capsule by mouth Daily.    Bess Mcclain MD   thiamine (VITAMIN B1) 100 MG tablet Take 1 tablet by mouth Daily.  Patient not taking: Reported on 8/8/2023 9/26/20   Seth Ernst DO   traZODone (DESYREL) 50 MG tablet Take 1 tablet by mouth 2 (two) times a day.    ProviderBess MD   zinc oxide (DESITIN) 40 % paste paste Apply  topically to the appropriate area as directed 2 (Two) Times a Day. 6/9/21   Nghia Stinson MD                       Medical Decision Making      /70 (BP Location: Right arm, Patient Position: Lying)   Pulse 84   Temp 97.6 °F (36.4 °C)   Resp 17   Ht 177.8 cm (70\")   Wt 86.2 kg (190 lb)   SpO2 96%   BMI 27.26 kg/m²      Chart review: 11/2020: Heart cath shows normal coronaries with an EF of 60%  4/2021 echo: Left ventricular ejection fraction appears to be 56 - 60%. Left ventricular systolic function is normal.    Radiology interpretation:  X-rays reviewed independently and interpreted by me: Negative for pulmonary edema  Further interpretation by radiologist as above  Lab interpretation:  Labs all viewed by me and significant for, initial troponin 27 with repeat pending, , hemoglobin 11.1, GFR 68    EKG viewed and interpreted by me and corroborated by Dr. Medrano   , sinus rhythm rate of 65  comparison: " 3/14/2022 sinus rhythm rate of 72 PVC            Appropriate PPE worn during exam.  Patient was seen in a murphy bed due to overcrowding and census.  He was given aspirin and sublingual nitro.  Labs x-ray and EKG were obtained to evaluate for STEMI non-STEMI angina pleural effusions.  He was given aspirin and sublingual nitro.  On exam patient resting comfortably in no acute distress.  He states his pain improved after nitro.  He was placed in observation for further evaluation and repeat troponin is pending.  I discussed with Dr. Howard      i discussed findings with patient who voices understanding of placement in observation.  Accepted by Fior.    Discussed with Dr. Medrano    Amount and/or Complexity of Data Reviewed  Labs: ordered.  Radiology: ordered.    Risk  OTC drugs.  Prescription drug management.  Decision regarding hospitalization.        Final diagnoses:   Chest pain, unspecified type       ED Disposition  ED Disposition       ED Disposition   Decision to Admit    Condition   --    Comment   --               [address] [facility]  [address] [facility] [address] [phone]        Yanci Howard MD  [address] [facility] [place] [address]  [phone]    Follow up in 2 week(s)      Provider, No Known  [facility] [place] [facility]      as previously scheduled         Medication List      No changes were made to your prescriptions during this visit.            Glenny Euceda, MARIZOL  03/05/24 0933       Glenny Euceda, MARIZOL  03/11/24 6748

## 2024-03-05 NOTE — DISCHARGE PLACEMENT REQUEST
"Chris Grullon W \"JUSTEN\" (78 y.o. Male)       Date of Birth   1945    Social Security Number       Address   [address] [place] [address]    Home Phone   [phone]    MRN   [id number]       [facility]    Marital Status                               Admission Date   3/5/24    Admission Type   Emergency    Admitting Provider   Skyler Medrano MD    Attending Provider   Skyler Medrano MD    Department, Room/Bed   [facility] OBSERVATION, 235/1       Discharge Date       Discharge Disposition       Discharge Destination                                 Attending Provider: Skyler Medrano MD    Allergies: Codeine    Isolation: None   Infection: MRSA No Isolation this Admit (03/15/22), Adrianna Auris (rule out) (03/05/24)   Code Status: CPR    Ht: 177.8 cm (70\")   Wt: 86.2 kg (190 lb)    Admission Cmt: None   Principal Problem: None                  Active Insurance as of 3/5/2024       Primary Coverage       Payor Plan Insurance Group Employer/Plan Group    HUMANA MEDICARE REPLACEMENT HUMANA MEDICARE REPLACEMENT [id number]       Payor Plan Address Payor Plan Phone Number Payor Plan Fax Number Effective Dates    [address] [address] [phone]  1/1/2018 - None Entered    [facility] [address]         Subscriber Name Subscriber Birth Date Member ID       CHRIS GRULLON W 1945 [id number]               Secondary Coverage       Payor Plan Insurance Group Employer/Plan Group    [place]        Payor Plan Address Payor Plan Phone Number Payor Plan Fax Number Effective Dates    [address]   12/12/1999 - None Entered    [place] [address]         Subscriber Name Subscriber Birth Date Member ID       CHRIS GRULLON W 1945 [id number]                     Emergency Contacts        (Rel.) Home Phone Work Phone Mobile Phone    Norbert Lynch (Power of ) [phone] -- --    Constance Moreau (Friend) -- -- [phone]    Marjorie Valdez " (Sister) -- -- [phone]              Insurance Information                  HUMANA MEDICARE REPLACEMENT/HUMANA MEDICARE REPLACEMENT Phone: [phone]    Subscriber: Chris Ferguson Subscriber#: [id number]    Group#: [id number] Precert#: --        [place] Phone: --    Subscriber: Chris Ferguson Subscriber#: [id number]    Group#: -- Precert#: --

## 2024-03-05 NOTE — H&P
[facility] H&P    Patient Name: Chris Ferguson  : 1945  MRN: [id number]  Primary Care Physician: Provider, No Known  Date of admission: 3/5/2024     Patient Care Team:  Provider, No Known as PCP - Yanci Buckley MD as Consulting Physician (Cardiology)  Hector Stovall MD as Consulting Physician (Nephrology)          Subjective   History Present Illness     Chief Complaint:   Chief Complaint   Patient presents with    Chest Pain     Chest Pain     History of Present Illness  ED 2024   Context: Patient is a 78-year-old male past medical history significant for hypertension diabetes CKD hyperlipidemia who presents with complaints of chest pain that woke him up out of his sleep around 330 this morning 4 hours prior to arrival.  Denies any history of ANDREY.  He describes it as a heavy nonradiating pressure.  He denies any nausea vomiting diarrhea fever or diaphoresis.  Has had an intermittent dry nonproductive cough.  Denies any edema.     Observation 2024  Patient agrees with HPI noted above. Woke up with chest pressure and shortness of breath. Currently asymptomatic. Sitting at the edge of bed, no distress noted.      Review of Systems   Constitutional: Negative for diaphoresis and malaise/fatigue.   Cardiovascular:  Positive for chest pain. Negative for near-syncope and palpitations.   Respiratory:  Positive for shortness of breath.    Gastrointestinal:  Negative for nausea and vomiting.   All other systems reviewed and are negative.          Personal History     Past Medical History:   Past Medical History:   Diagnosis Date    Anemia     Angina pectoris     Anxiety     Arthritis     OSTEO    BPH (benign prostatic hyperplasia)     Dementia     Dementia     Depression     Diabetes mellitus     type 2    Disease of thyroid gland     GERD (gastroesophageal reflux disease)     Hypertension     Parkinson's disease     Short-term memory loss        Surgical History:      Past  Surgical History:   Procedure Laterality Date    APPENDECTOMY      CARDIAC CATHETERIZATION N/A 11/18/2020    Procedure: Left Heart Cath and coronary angiogram;  Surgeon: Yanci Howard MD;  Location: [facility] CATH INVASIVE LOCATION;  Service: Cardiovascular;  Laterality: N/A;    CARDIAC CATHETERIZATION N/A 11/18/2020    Procedure: Coronary angiography;  Surgeon: Yanci Howard MD;  Location: [facility] CATH INVASIVE LOCATION;  Service: Cardiovascular;  Laterality: N/A;    CARDIAC CATHETERIZATION N/A 11/18/2020    Procedure: Left ventriculography;  Surgeon: Yanci Howard MD;  Location: [facility] CATH INVASIVE LOCATION;  Service: Cardiovascular;  Laterality: N/A;    CHOLECYSTECTOMY      COLONOSCOPY      COLONOSCOPY N/A 8/10/2023    Procedure: COLONOSCOPY;  Surgeon: Claudio Pimentel MD;  Location: [facility] ENDOSCOPY;  Service: Gastroenterology;  Laterality: N/A;  Internal hemorrhoids, extensive diverticulosis    EYE SURGERY      kallie cataracts w iol    FEMUR FRACTURE SURGERY Left     1/2018    LEG DEBRIDEMENT Left 9/15/2020    Procedure: LOWER EXTREMITY DEBRIDEMENT;  Surgeon: Joslyn Mistry MD;  Location: [facility] OR;  Service: General;  Laterality: Left;    MULTIPLE TOOTH EXTRACTIONS      ALL TEETH REMOVED    STOMACH SURGERY      gastric ulcer    TOTAL HIP ARTHROPLASTY Right 4/23/2019    Procedure: TOTAL HIP ARTHROPLASTY POSTERIOR;  Surgeon: Valente Giang MD;  Location: [facility] OR;  Service: Orthopedics    TOTAL KNEE ARTHROPLASTY Left 8/31/2018    Procedure: REMOVAL OF LEFT DISTAL FEMUR PLATE AND SCREWS WITH COMPLEX TOTAL KNEE REPLACEMENT DISTAL FEMUR HINGED;  Surgeon: Valente Giang MD;  Location: [facility] OR;  Service: Orthopedics    TOTAL KNEE ARTHROPLASTY REVISION Left 6/4/2021    Procedure: SINGLE STAGE LEFT KNEE REVISION;  Surgeon: Valente Giang MD;  Location: [facility] OR;  Service: Orthopedics;  Laterality: Left;           Family History: family history includes Heart  disease in his father and mother; Seizures in his son and son; Stroke in his father. Otherwise pertinent FHx was reviewed and unremarkable.     Social History:  reports that he has never smoked. He has never used smokeless tobacco. He reports that he does not currently use alcohol. He reports that he does not use drugs.      Medications:  Prior to Admission medications    Medication Sig Start Date End Date Taking? Authorizing Provider   acetaminophen (TYLENOL) 325 MG tablet Take 2 tablets by mouth Every 8 (Eight) Hours As Needed for Mild Pain.   Yes Bess Mcclain MD   atorvastatin (LIPITOR) 20 MG tablet Take 1 tablet by mouth Daily.   Yes Bess Mcclain MD   benzonatate (TESSALON) 100 MG capsule Take 1 capsule by mouth Every 6 (Six) Hours As Needed for Cough.   Yes Bess Mcclain MD   budesonide-formoterol (SYMBICORT) 160-4.5 MCG/ACT inhaler Inhale 2 puffs 2 (Two) Times a Day.   Yes Bess Mcclain MD   dextrose (GLUTOSE) 40 % gel Take 15 g by mouth Every 1 (One) Hour As Needed for Low Blood Sugar. And pt unable to swallow   Yes Bess Mcclain MD   Diclofenac Sodium (VOLTAREN) 1 % gel gel Apply 4 g topically to the appropriate area as directed 2 (Two) Times a Day.   Yes Bess Mcclain MD   famotidine (PEPCID) 20 MG tablet Take 1 tablet by mouth 2 (Two) Times a Day Before Meals. 9/25/20  Yes Seth Ernst DO   guaifenesin (ROBITUSSIN) 100 MG/5ML liquid Take 10 mL by mouth Every 6 (Six) Hours As Needed for Cough.   Yes Bess Mcclain MD   ibuprofen (ADVIL,MOTRIN) 400 MG tablet Take 1 tablet by mouth 2 (Two) Times a Day.   Yes Bess Mcclain MD   insulin aspart (novoLOG FLEXPEN) 100 UNIT/ML solution pen-injector sc pen Inject 10 Units under the skin into the appropriate area as directed 2 (Two) Times a Day.   Yes Bess Mcclain MD   insulin glargine (LANTUS, SEMGLEE) 100 UNIT/ML injection Inject 20 Units under the skin into the appropriate area as  directed Daily.   Yes Bess Mcclain MD   isosorbide mononitrate (IMDUR) 30 MG 24 hr tablet Take 1 tablet by mouth Daily.   Yes Bess Mcclain MD   lamoTRIgine (LaMICtal) 25 MG tablet Take 2 tablets by mouth 2 (Two) Times a Day.   Yes Bess Mcclain MD   levothyroxine (SYNTHROID, LEVOTHROID) 75 MCG tablet Take 1 tablet by mouth Daily.   Yes Bess Mcclain MD   Melatonin 10 MG tablet Take 1 tablet by mouth Every Night.   Yes Bess Mcclain MD   metFORMIN (GLUCOPHAGE) 500 MG tablet Take 1 tablet by mouth 2 (Two) Times a Day With Meals.   Yes Bess Mcclain MD   metoprolol succinate XL (TOPROL-XL) 25 MG 24 hr tablet Take 1 tablet by mouth Daily.   Yes Bess Mcclain MD   multivitamin with minerals tablet tablet Take 1 tablet by mouth Daily.   Yes Bess Mcclain MD   senna (SENOKOT) 8.6 MG tablet Take 1 tablet by mouth Daily As Needed for Constipation.   Yes Bess Mcclain MD   sertraline (ZOLOFT) 25 MG tablet Take 1 tablet by mouth Daily.   Yes Bess Mcclain MD   tamsulosin (FLOMAX) 0.4 MG capsule 24 hr capsule Take 1 capsule by mouth Daily.   Yes Bess Mcclain MD   traZODone (DESYREL) 150 MG tablet Take 1 tablet by mouth Every Night.   Yes Bess Mcclain MD   insulin glargine (Semglee) 100 UNIT/ML injection Inject 20 Units under the skin into the appropriate area as directed 2 (Two) Times a Day.  3/5/24 Yes Bess Mcclain MD   atorvastatin (LIPITOR) 20 MG tablet Take 20 mg by mouth Every Night.  Patient not taking: Reported on 8/8/2023  3/5/24     castor oil-balsam peru (VENELEX) ointment Apply both heels twice daily 6/9/21 3/5/24  Nghia Stinson MD   folic acid (FOLVITE) 1 MG tablet Take 1 mg by mouth Daily.  Patient not taking: Reported on 8/8/2023  3/5/24  Bess Mcclain MD   HYDROcodone-acetaminophen (NORCO) 7.5-325 MG per tablet Take 1 tablet by mouth Every 4 (Four) Hours As Needed for Mild Pain  or Moderate Pain  .  Patient not taking: Reported on 8/8/2023 6/9/21 3/5/24  Nghia Stinson MD   insulin lispro (humaLOG) 100 UNIT/ML injection Inject 10 Units under the skin into the appropriate area as directed 2 (Two) Times a Day. Hold for bs <70 and call MD for bs >400  3/5/24  Bess Mcclain MD   memantine (NAMENDA) 5 MG tablet Take 5 mg by mouth Daily.  Patient not taking: Reported on 8/8/2023  3/5/24  Bess Mcclain MD   midodrine (PROAMATINE) 2.5 MG tablet Take 1 tablet by mouth 3 (Three) Times a Day Before Meals for 30 days.  Patient not taking: Reported on 8/8/2023 3/16/22 3/5/24  Keith Hathaway MD   multivitamin (THERAGRAN) tablet tablet Take 1 tablet by mouth Daily.  3/5/24  Bess Mcclain MD   OLANZapine (zyPREXA) 5 MG tablet Take 5 mg by mouth Every Night.  Patient not taking: Reported on 8/8/2023  3/5/24  Bess Mcclain MD   thiamine (VITAMIN B1) 100 MG tablet Take 1 tablet by mouth Daily.  Patient not taking: Reported on 8/8/2023 9/26/20 3/5/24  Seth Ernst DO   zinc oxide (DESITIN) 40 % paste paste Apply  topically to the appropriate area as directed 2 (Two) Times a Day. 6/9/21 3/5/24  Nghia Stinson MD       Allergies:    Allergies   Allergen Reactions    Codeine GI Intolerance       Objective   Objective     Vital Signs  Temp:  [97.6 °F (36.4 °C)-98.4 °F (36.9 °C)] 98.4 °F (36.9 °C)  Heart Rate:  [63-84] 67  Resp:  [16-18] 18  BP: (108-142)/(67-85) 130/76  SpO2:  [94 %-99 %] 96 %  on   ;   Device (Oxygen Therapy): room air  Body mass index is 27.26 kg/m².    Physical Exam  Vitals and nursing note reviewed.   Constitutional:       Appearance: Normal appearance.   HENT:      Head: Normocephalic and atraumatic.      Right Ear: External ear normal.      Left Ear: External ear normal.      Nose: Nose normal.      Mouth/Throat:      Mouth: Mucous membranes are moist.   Eyes:      Extraocular Movements: Extraocular movements intact.   Cardiovascular:      Rate and Rhythm: Normal rate and  regular rhythm.      Pulses: Normal pulses.      Heart sounds: Normal heart sounds.   Pulmonary:      Effort: Pulmonary effort is normal.   Abdominal:      General: Abdomen is flat. Bowel sounds are normal.      Palpations: Abdomen is soft.   Musculoskeletal:         General: Normal range of motion.      Cervical back: Normal range of motion.   Skin:     General: Skin is warm.   Neurological:      Mental Status: He is alert and oriented to person, place, and time.   Psychiatric:         Behavior: Behavior normal.         Thought Content: Thought content normal.         Judgment: Judgment normal.           Results Review:  I have personally reviewed most recent cardiac tracings, lab results, and radiology images and interpretations and agree with findings, most notably: Troponin, CMP,-CBC, EKG, chest x-ray and 2D echo.    Results from last 7 days   Lab Units 03/05/24  0738   WBC 10*3/mm3 6.40   HEMOGLOBIN g/dL 11.1*   HEMATOCRIT % 35.2*   PLATELETS 10*3/mm3 370   INR  1.00     Results from last 7 days   Lab Units 03/05/24  0939 03/05/24  0739   SODIUM mmol/L  --  140   POTASSIUM mmol/L  --  4.8   CHLORIDE mmol/L  --  102   CO2 mmol/L  --  28.0   BUN mg/dL  --  19   CREATININE mg/dL  --  1.11   GLUCOSE mg/dL  --  81   CALCIUM mg/dL  --  9.5   HSTROP T ng/L 26* 27*   PROBNP pg/mL  --  109.8     Estimated Creatinine Clearance: 69 mL/min (by C-G formula based on SCr of 1.11 mg/dL).  Brief Urine Lab Results       None            Microbiology Results (last 10 days)       ** No results found for the last 240 hours. **            ECG/EMG Results (most recent)       Procedure Component Value Units Date/Time    ECG 12 Lead Chest Pain [[id number]] Collected: 03/05/24 0600     Updated: 03/05/24 0601     QT Interval 414 ms      QTC Interval 432 ms     Narrative:      HEART RATE= 65  bpm  RR Interval= 920  ms  NJ Interval= 185  ms  P Horizontal Axis= 36  deg  P Front Axis= 23  deg  QRSD Interval= 82  ms  QT Interval= 414  ms  QTcB=  432  ms  QRS Axis= -22  deg  T Wave Axis= 51  deg  - BORDERLINE ECG -  Sinus rhythm  Low voltage, extremity and precordial leads  Consider anterior infarct  When compared with ECG of 14-Mar-2022 13:05:12,  No significant change  Electronically Signed By:   Date and Time of Study: 2024-03-05 06:00:50    Adult Transthoracic Echo Complete W/ Cont if Necessary Per Protocol [[id number]] Resulted: 03/05/24 1139     Updated: 03/05/24 1139            Results for orders placed during the hospital encounter of 05/21/21    Duplex Venous Lower Extremity - Bilateral CAR    Interpretation Summary  · Normal bilateral lower extremity venous duplex scan.      Results for orders placed during the hospital encounter of 04/25/21    Adult Transthoracic Echo Limited W/ Cont if Necessary Per Protocol    Interpretation Summary  · Left ventricular ejection fraction appears to be 56 - 60%. Left ventricular systolic function is normal.    Technically difficult study with very limited views shows only apical four-chamber images of mildly tachycardic/hyperdynamic ventricle with LVEF normal range 55 to 60% and no segmental wall motion abnormalities.  Poorly visualized mitral and tricuspid valves appear grossly structurally normal with satisfactory leaflet excursion.  The aortic and pulmonic valves are not sufficiently imaged.  No pericardial effusion is seen.  Very limited screening color-flow Doppler shows no significant MR or TR jets.      XR Chest 1 View    Result Date: 3/5/2024  Impression: Chronic findings. No acute process. Electronically Signed: Nneka De Jesus MD  3/5/2024 7:48 AM EST  Workstation ID: [id number]       Estimated Creatinine Clearance: 69 mL/min (by C-G formula based on SCr of 1.11 mg/dL).    Assessment & Plan   Assessment/Plan       Active Hospital Problems    Diagnosis  POA    Chest pain [R07.9]  Yes      Resolved Hospital Problems   No resolved problems to display.       Chest pain with history of Hypertension  BP Readings  from Last 1 Encounters:   03/05/24 130/76     Lab Results   Component Value Date    TROPONINT 26 (H) 03/05/2024    TROPONINT 27 (H) 03/05/2024    TROPONINT <0.010 03/14/2022   -proBNP 109.8  -CMP unremarkable  -CBC showed mild anemia with hemoglobin 11.1 which appears to be at his baseline  -Chest x-ray showed no acute process  -Heart cath from 2020 showed normal coronaries  -EKG: Showed sinus rhythm with heart rate of 65, ME interval 185 and   -In the ED pt given aspirin  -Patient follows up with Dr. Howard  -Cardiology was consulted in the ED and recommended 2D echo  -Telemetry  -2D echo pending results  -Continue Imdur and metoprolol      Diabetes mellitus  -Moderately controlled   Lab Results   Component Value Date    GLUCOSE 81 03/05/2024    GLUCOSE 112 (H) 03/16/2022    GLUCOSE 92 03/15/2022    GLUCOSE 304 (H) 03/14/2022   -A1c pending  -Hold metformin  -Patient was hypoglycemic upon arrival in the observation unit.  Glucose 39.  He was given D 50 25 g.  Recheck glucose 45, 58, 67.  Will keep monitoring  -Hold mealtime insulin  -Lantus ordered at a decreased dose  -Diabetic diet  -Monitor before meals and at bedtime    Hyperlipidemia  -Continue Lipitor    Hypothyroidism  -Continue Synthroid    Mood disorder  -Continue Zoloft, Lamictal    GERD  -Continue PPI    Overweight  -BMI 27.26  -Lifestyle modifications    VTE Prophylaxis -   Mechanical Order History:        Ordered        03/05/24 1123  Place Sequential Compression Device  Once            03/05/24 1123  Maintain Sequential Compression Device  Continuous                          Pharmalogical Order History:       None            CODE STATUS:    Code Status and Medical Interventions:   Ordered at: 03/05/24 0941     Level Of Support Discussed With:    Patient     Code Status (Patient has no pulse and is not breathing):    CPR (Attempt to Resuscitate)     Medical Interventions (Patient has pulse or is breathing):    Full Support       This patient has  been examined wearing personal protective equipment.     I discussed the patient's findings and my recommendations with patient and nursing staff.      Signature:Electronically signed by MARIZOL Walker, 03/05/24, 12:02 PM EST.

## 2024-03-06 ENCOUNTER — APPOINTMENT (OUTPATIENT)
Dept: NUCLEAR MEDICINE | Facility: HOSPITAL | Age: 79
End: 2024-03-06
Payer: MEDICARE

## 2024-03-06 VITALS
HEART RATE: 78 BPM | TEMPERATURE: 98.1 F | HEIGHT: 70 IN | WEIGHT: 190 LBS | OXYGEN SATURATION: 93 % | DIASTOLIC BLOOD PRESSURE: 65 MMHG | RESPIRATION RATE: 18 BRPM | SYSTOLIC BLOOD PRESSURE: 119 MMHG | BODY MASS INDEX: 27.2 KG/M2

## 2024-03-06 LAB
ANION GAP SERPL CALCULATED.3IONS-SCNC: 10 MMOL/L (ref 5–15)
BASOPHILS # BLD AUTO: 0 10*3/MM3 (ref 0–0.2)
BASOPHILS NFR BLD AUTO: 0.3 % (ref 0–1.5)
BH CV NUCLEAR PRIOR STUDY: 3
BH CV REST NUCLEAR ISOTOPE DOSE: 11 MCI
BH CV STRESS BP STAGE 2: NORMAL
BH CV STRESS BP STAGE 3: NORMAL
BH CV STRESS COMMENTS STAGE 1: NORMAL
BH CV STRESS COMMENTS STAGE 2: NORMAL
BH CV STRESS DOSE REGADENOSON STAGE 1: 0.4
BH CV STRESS DURATION MIN STAGE 1: 0
BH CV STRESS DURATION MIN STAGE 2: 4
BH CV STRESS DURATION SEC STAGE 1: 10
BH CV STRESS DURATION SEC STAGE 2: 0
BH CV STRESS HR STAGE 1: 100
BH CV STRESS HR STAGE 2: 102
BH CV STRESS HR STAGE 3: 97
BH CV STRESS HR STAGE 4: 97
BH CV STRESS NUCLEAR ISOTOPE DOSE: 31.5 MCI
BH CV STRESS PROTOCOL 1: NORMAL
BH CV STRESS RECOVERY BP: NORMAL MMHG
BH CV STRESS RECOVERY HR: 88 BPM
BH CV STRESS STAGE 1: 1
BH CV STRESS STAGE 2: 2
BH CV STRESS STAGE 3: 3
BH CV STRESS STAGE 4: 4
BUN SERPL-MCNC: 23 MG/DL (ref 8–23)
BUN/CREAT SERPL: 21.7 (ref 7–25)
CALCIUM SPEC-SCNC: 8.7 MG/DL (ref 8.6–10.5)
CHLORIDE SERPL-SCNC: 103 MMOL/L (ref 98–107)
CO2 SERPL-SCNC: 25 MMOL/L (ref 22–29)
CREAT SERPL-MCNC: 1.06 MG/DL (ref 0.76–1.27)
DEPRECATED RDW RBC AUTO: 47.7 FL (ref 37–54)
EGFRCR SERPLBLD CKD-EPI 2021: 72.7 ML/MIN/1.73
EOSINOPHIL # BLD AUTO: 0.2 10*3/MM3 (ref 0–0.4)
EOSINOPHIL NFR BLD AUTO: 4.3 % (ref 0.3–6.2)
ERYTHROCYTE [DISTWIDTH] IN BLOOD BY AUTOMATED COUNT: 17.9 % (ref 12.3–15.4)
GLUCOSE BLDC GLUCOMTR-MCNC: 151 MG/DL (ref 70–105)
GLUCOSE SERPL-MCNC: 98 MG/DL (ref 65–99)
HCT VFR BLD AUTO: 33.1 % (ref 37.5–51)
HGB BLD-MCNC: 10.3 G/DL (ref 13–17.7)
LYMPHOCYTES # BLD AUTO: 1.7 10*3/MM3 (ref 0.7–3.1)
LYMPHOCYTES NFR BLD AUTO: 31.8 % (ref 19.6–45.3)
MAXIMAL PREDICTED HEART RATE: 144 BPM
MCH RBC QN AUTO: 22.1 PG (ref 26.6–33)
MCHC RBC AUTO-ENTMCNC: 31.1 G/DL (ref 31.5–35.7)
MCV RBC AUTO: 70.9 FL (ref 79–97)
MONOCYTES # BLD AUTO: 0.3 10*3/MM3 (ref 0.1–0.9)
MONOCYTES NFR BLD AUTO: 6.4 % (ref 5–12)
NEUTROPHILS NFR BLD AUTO: 3 10*3/MM3 (ref 1.7–7)
NEUTROPHILS NFR BLD AUTO: 57.2 % (ref 42.7–76)
NRBC BLD AUTO-RTO: 0.1 /100 WBC (ref 0–0.2)
PERCENT MAX PREDICTED HR: 70.83 %
PLATELET # BLD AUTO: 330 10*3/MM3 (ref 140–450)
PMV BLD AUTO: 7.2 FL (ref 6–12)
POTASSIUM SERPL-SCNC: 3.9 MMOL/L (ref 3.5–5.2)
QT INTERVAL: 414 MS
QTC INTERVAL: 432 MS
RBC # BLD AUTO: 4.67 10*6/MM3 (ref 4.14–5.8)
SODIUM SERPL-SCNC: 138 MMOL/L (ref 136–145)
STRESS BASELINE BP: NORMAL MMHG
STRESS BASELINE HR: 71 BPM
STRESS PERCENT HR: 83 %
STRESS POST PEAK BP: NORMAL MMHG
STRESS POST PEAK HR: 102 BPM
STRESS TARGET HR: 122 BPM
WBC NRBC COR # BLD AUTO: 5.3 10*3/MM3 (ref 3.4–10.8)

## 2024-03-06 PROCEDURE — 82948 REAGENT STRIP/BLOOD GLUCOSE: CPT | Performed by: NURSE PRACTITIONER

## 2024-03-06 PROCEDURE — A9502 TC99M TETROFOSMIN: HCPCS | Performed by: EMERGENCY MEDICINE

## 2024-03-06 PROCEDURE — 80048 BASIC METABOLIC PNL TOTAL CA: CPT | Performed by: NURSE PRACTITIONER

## 2024-03-06 PROCEDURE — G0378 HOSPITAL OBSERVATION PER HR: HCPCS

## 2024-03-06 PROCEDURE — 63710000001 INSULIN LISPRO (HUMAN) PER 5 UNITS: Performed by: NURSE PRACTITIONER

## 2024-03-06 PROCEDURE — 78452 HT MUSCLE IMAGE SPECT MULT: CPT | Performed by: INTERNAL MEDICINE

## 2024-03-06 PROCEDURE — 93017 CV STRESS TEST TRACING ONLY: CPT

## 2024-03-06 PROCEDURE — 93018 CV STRESS TEST I&R ONLY: CPT | Performed by: INTERNAL MEDICINE

## 2024-03-06 PROCEDURE — 78452 HT MUSCLE IMAGE SPECT MULT: CPT

## 2024-03-06 PROCEDURE — 0 TECHNETIUM TETROFOSMIN KIT: Performed by: EMERGENCY MEDICINE

## 2024-03-06 PROCEDURE — 99214 OFFICE O/P EST MOD 30 MIN: CPT | Performed by: INTERNAL MEDICINE

## 2024-03-06 PROCEDURE — 25010000002 REGADENOSON 0.4 MG/5ML SOLUTION: Performed by: EMERGENCY MEDICINE

## 2024-03-06 PROCEDURE — 85025 COMPLETE CBC W/AUTO DIFF WBC: CPT | Performed by: NURSE PRACTITIONER

## 2024-03-06 RX ORDER — REGADENOSON 0.08 MG/ML
0.4 INJECTION, SOLUTION INTRAVENOUS
Status: COMPLETED | OUTPATIENT
Start: 2024-03-06 | End: 2024-03-06

## 2024-03-06 RX ADMIN — INSULIN LISPRO 2 UNITS: 100 INJECTION, SOLUTION INTRAVENOUS; SUBCUTANEOUS at 12:08

## 2024-03-06 RX ADMIN — FAMOTIDINE 20 MG: 20 TABLET, FILM COATED ORAL at 10:14

## 2024-03-06 RX ADMIN — ATORVASTATIN CALCIUM 20 MG: 20 TABLET, FILM COATED ORAL at 10:14

## 2024-03-06 RX ADMIN — TAMSULOSIN HYDROCHLORIDE 0.4 MG: 0.4 CAPSULE ORAL at 10:14

## 2024-03-06 RX ADMIN — LEVOTHYROXINE SODIUM 75 MCG: 0.07 TABLET ORAL at 05:43

## 2024-03-06 RX ADMIN — METOPROLOL SUCCINATE 25 MG: 25 TABLET, FILM COATED, EXTENDED RELEASE ORAL at 10:14

## 2024-03-06 RX ADMIN — TETROFOSMIN 1 DOSE: 1.38 INJECTION, POWDER, LYOPHILIZED, FOR SOLUTION INTRAVENOUS at 07:08

## 2024-03-06 RX ADMIN — TETROFOSMIN 1 DOSE: 1.38 INJECTION, POWDER, LYOPHILIZED, FOR SOLUTION INTRAVENOUS at 09:15

## 2024-03-06 RX ADMIN — Medication 10 ML: at 10:14

## 2024-03-06 RX ADMIN — IBUPROFEN 400 MG: 400 TABLET, FILM COATED ORAL at 10:14

## 2024-03-06 RX ADMIN — ISOSORBIDE MONONITRATE 30 MG: 30 TABLET, EXTENDED RELEASE ORAL at 10:14

## 2024-03-06 RX ADMIN — LAMOTRIGINE 50 MG: 25 TABLET ORAL at 10:14

## 2024-03-06 RX ADMIN — REGADENOSON 0.4 MG: 0.08 INJECTION, SOLUTION INTRAVENOUS at 09:15

## 2024-03-06 RX ADMIN — SERTRALINE 25 MG: 25 TABLET, FILM COATED ORAL at 10:19

## 2024-03-06 NOTE — CASE MANAGEMENT/SOCIAL WORK
"Physicians Statement of Medical Necessity for  Ambulance Transportation    GENERAL INFORMATION     Name: Chris Ferguson  YOB: 1945  Medicare #: [id number]   Transport Date: 3/06/24 (Valid for round trips this date, or for scheduled repetitive trips for 60 days from the date signed below.)  Origin: [facility]  Destination: [facility] [address]. [place], [place]  Is the Patient's stay covered under Medicare Part A (PPS/DRG?)Yes  Closest appropriate facility? Yes  If this a hosp-hosp transfer? No  Is this a hospice patient? No    MEDICAL NECESSITY QUESTIONAIRE    Ambulance Transportation is medically necessary only if other means of transportation are contraindicated or would be potentially harmful to the patient.  To meet this requirement, the patient must be either \"bed confined\" or suffer from a condition such that transport by means other than an ambulance is contraindicated by the patient's condition.  The following questions must be answered by the healthcare professional signing below for this form to be valid:     1) Describe the MEDICAL CONDITION (physical and/or mental) of this patient AT THE TIME OF AMBULANCE TRANSPORT that requires the patient to be transported in an ambulance, and why transport by other means is contraindicated by the patient's condition: Bedbound with history of Bipolar, Dementia, Anxiety. Parkinsons  Past Medical History:   Diagnosis Date    Anemia     Angina pectoris     Anxiety     Arthritis     OSTEO    Bipolar 1 disorder     BPH (benign prostatic hyperplasia)     Cellulitis of left lower extremity 05/03/2021    CKD (chronic kidney disease)     Constipation     Dementia     Dementia     Depression     Diabetes mellitus     type 2    Disease of thyroid gland     GERD (gastroesophageal reflux disease)     Hemorrhage of anus     Hypertension     Parkinson's disease     Short-term memory loss       Past Surgical History:   Procedure Laterality Date    APPENDECTOMY " "     CARDIAC CATHETERIZATION N/A 11/18/2020    Procedure: Left Heart Cath and coronary angiogram;  Surgeon: Yanci Howard MD;  Location: [facility] CATH INVASIVE LOCATION;  Service: Cardiovascular;  Laterality: N/A;    CARDIAC CATHETERIZATION N/A 11/18/2020    Procedure: Coronary angiography;  Surgeon: Yanci Howard MD;  Location: [facility] CATH INVASIVE LOCATION;  Service: Cardiovascular;  Laterality: N/A;    CARDIAC CATHETERIZATION N/A 11/18/2020    Procedure: Left ventriculography;  Surgeon: Yanci Howard MD;  Location: [facility] CATH INVASIVE LOCATION;  Service: Cardiovascular;  Laterality: N/A;    CHOLECYSTECTOMY      COLONOSCOPY      COLONOSCOPY N/A 8/10/2023    Procedure: COLONOSCOPY;  Surgeon: Claudio Pimentel MD;  Location: [facility] ENDOSCOPY;  Service: Gastroenterology;  Laterality: N/A;  Internal hemorrhoids, extensive diverticulosis    EYE SURGERY      kallie cataracts w iol    FEMUR FRACTURE SURGERY Left     1/2018    LEG DEBRIDEMENT Left 9/15/2020    Procedure: LOWER EXTREMITY DEBRIDEMENT;  Surgeon: Joslyn Mistry MD;  Location: [facility] OR;  Service: General;  Laterality: Left;    MULTIPLE TOOTH EXTRACTIONS      ALL TEETH REMOVED    STOMACH SURGERY      gastric ulcer    TOTAL HIP ARTHROPLASTY Right 4/23/2019    Procedure: TOTAL HIP ARTHROPLASTY POSTERIOR;  Surgeon: Valente Giang MD;  Location: [facility] OR;  Service: Orthopedics    TOTAL KNEE ARTHROPLASTY Left 8/31/2018    Procedure: REMOVAL OF LEFT DISTAL FEMUR PLATE AND SCREWS WITH COMPLEX TOTAL KNEE REPLACEMENT DISTAL FEMUR HINGED;  Surgeon: Valente Giang MD;  Location: [facility] OR;  Service: Orthopedics    TOTAL KNEE ARTHROPLASTY REVISION Left 6/4/2021    Procedure: SINGLE STAGE LEFT KNEE REVISION;  Surgeon: Valente Giang MD;  Location: [facility] OR;  Service: Orthopedics;  Laterality: Left;      2) Is this patient \"bed confined\" as defined below?Yes   To be \"bed confined\" the patient must satisfy all three of " the following criteria:  (1) unable to get up from bed without assistance; AND (2) unable to ambulate;  AND (3) unable to sit in a chair or wheelchair.  3) Can this patient safely be transported by car or wheelchair van (I.e., may safely sit during transport, without an attendant or monitoring?)No   4. In addition to completing questions 1-3 above, please check any of the following conditions that apply*:          *Note: supporting documentation for any boxes checked must be maintained in the patient's medical records Unable to tolerate seated position for time needed to transport and Morbid obesity requires additional personnel/equipment to safely handle patient      SIGNATURE OF PHYSICIAN OR OTHER AUTHORIZED HEALTHCARE PROFESSIONAL    I certify that the above information is true and correct based on my evaluation of this patient, and represent that the patient requires transport by ambulance and that other forms of transport are contraindicated.  I understand that this information will be used by the Centers for Medicare and Medicaid Services (CMS) to support the determiniation of medical necessity for ambulance services, and I represent that I have personal knowledge of the patient's condition at the time of transport.    .   If this box is checked, I also certify that the patient is physically or mentally incapable of signing the ambulance service's claim form and that the institution with which I am affiliated has furnished care, services or assistance to the patient.  My signature below is made on behalf of the patient pursuant to 42 .36(b)(4). In accordance with 42 .37, the specific reason(s) that the patient is physically or mentally incapable of signing the claim for is as follows: .    Signature of Physician or Healthcare Professional   Terra Berg RN Case Manager Date/Time:   3/06/2024     (For Scheduled repetitive transport, this form is not valid for transports performed more than  60 days after this date).                                                                                                                                            --------------------------------------------------------------------------------------------  Printed Name and Credentials of Physician or Authorized Healthcare Professional     *Form must be signed by patient's attending physician for scheduled, repetitive transports,.  For non-repetitive ambulance transports, if unable to obtain the signature of the attending physician, any of the following may sign (please select below):     Physician  Clinical Nurse Specialist x Registered Nurse     Physician Assistant x Discharge Planner  Licensed Practical Nurse     Nurse Practitioner x

## 2024-03-06 NOTE — DISCHARGE SUMMARY
[facility]    Provider, No Known    CHIEF COMPLAINT:     Chest pain    HISTORY OF PRESENT ILLNESS:    HPI    ED 03/05/2024   Context: Patient is a 78-year-old male past medical history significant for hypertension diabetes CKD hyperlipidemia who presents with complaints of chest pain that woke him up out of his sleep around 330 this morning 4 hours prior to arrival.  Denies any history of ANDREY.  He describes it as a heavy nonradiating pressure.  He denies any nausea vomiting diarrhea fever or diaphoresis.  Has had an intermittent dry nonproductive cough.  Denies any edema.     Observation 03/05/2024  Patient agrees with HPI noted above. Woke up with chest pressure and shortness of breath. Currently asymptomatic. Sitting at the edge of bed, no distress noted.       Past Medical History:   Diagnosis Date    Anemia     Angina pectoris     Anxiety     Arthritis     OSTEO    Bipolar 1 disorder     BPH (benign prostatic hyperplasia)     Cellulitis of left lower extremity 05/03/2021    CKD (chronic kidney disease)     Constipation     Dementia     Dementia     Depression     Diabetes mellitus     type 2    Disease of thyroid gland     GERD (gastroesophageal reflux disease)     Hemorrhage of anus     Hypertension     Parkinson's disease     Short-term memory loss      Past Surgical History:   Procedure Laterality Date    APPENDECTOMY      CARDIAC CATHETERIZATION N/A 11/18/2020    Procedure: Left Heart Cath and coronary angiogram;  Surgeon: Yanci Howard MD;  Location: [facility] CATH INVASIVE LOCATION;  Service: Cardiovascular;  Laterality: N/A;    CARDIAC CATHETERIZATION N/A 11/18/2020    Procedure: Coronary angiography;  Surgeon: Yanci Howard MD;  Location: [facility] CATH INVASIVE LOCATION;  Service: Cardiovascular;  Laterality: N/A;    CARDIAC CATHETERIZATION N/A 11/18/2020    Procedure: Left ventriculography;  Surgeon: Yanci Howard MD;  Location: [facility] CATH INVASIVE LOCATION;  Service:  Cardiovascular;  Laterality: N/A;    CHOLECYSTECTOMY      COLONOSCOPY      COLONOSCOPY N/A 8/10/2023    Procedure: COLONOSCOPY;  Surgeon: Claudio Pimentel MD;  Location: [facility] ENDOSCOPY;  Service: Gastroenterology;  Laterality: N/A;  Internal hemorrhoids, extensive diverticulosis    EYE SURGERY      kallie cataracts w iol    FEMUR FRACTURE SURGERY Left     1/2018    LEG DEBRIDEMENT Left 9/15/2020    Procedure: LOWER EXTREMITY DEBRIDEMENT;  Surgeon: Joslyn Mistry MD;  Location: [facility] OR;  Service: General;  Laterality: Left;    MULTIPLE TOOTH EXTRACTIONS      ALL TEETH REMOVED    STOMACH SURGERY      gastric ulcer    TOTAL HIP ARTHROPLASTY Right 4/23/2019    Procedure: TOTAL HIP ARTHROPLASTY POSTERIOR;  Surgeon: Valente Giang MD;  Location: [facility] MAIN OR;  Service: Orthopedics    TOTAL KNEE ARTHROPLASTY Left 8/31/2018    Procedure: REMOVAL OF LEFT DISTAL FEMUR PLATE AND SCREWS WITH COMPLEX TOTAL KNEE REPLACEMENT DISTAL FEMUR HINGED;  Surgeon: Valente Giang MD;  Location: [facility] MAIN OR;  Service: Orthopedics    TOTAL KNEE ARTHROPLASTY REVISION Left 6/4/2021    Procedure: SINGLE STAGE LEFT KNEE REVISION;  Surgeon: Valente Giang MD;  Location: [facility] MAIN OR;  Service: Orthopedics;  Laterality: Left;     Family History   Problem Relation Age of Onset    Heart disease Mother     Stroke Father     Heart disease Father     Seizures Son     Seizures Son     Malig Hyperthermia Neg Hx      Social History     Tobacco Use    Smoking status: Never    Smokeless tobacco: Never   Vaping Use    Vaping status: Never Used   Substance Use Topics    Alcohol use: Not Currently    Drug use: No     Medications Prior to Admission   Medication Sig Dispense Refill Last Dose    acetaminophen (TYLENOL) 325 MG tablet Take 2 tablets by mouth Every 8 (Eight) Hours As Needed for Mild Pain.       atorvastatin (LIPITOR) 20 MG tablet Take 1 tablet by mouth Daily.       benzonatate (TESSALON) 100 MG capsule Take 1  capsule by mouth Every 6 (Six) Hours As Needed for Cough.       budesonide-formoterol (SYMBICORT) 160-4.5 MCG/ACT inhaler Inhale 2 puffs 2 (Two) Times a Day.       dextrose (GLUTOSE) 40 % gel Take 15 g by mouth Every 1 (One) Hour As Needed for Low Blood Sugar. And pt unable to swallow       Diclofenac Sodium (VOLTAREN) 1 % gel gel Apply 4 g topically to the appropriate area as directed 2 (Two) Times a Day.       famotidine (PEPCID) 20 MG tablet Take 1 tablet by mouth 2 (Two) Times a Day Before Meals.       guaifenesin (ROBITUSSIN) 100 MG/5ML liquid Take 10 mL by mouth Every 6 (Six) Hours As Needed for Cough.       ibuprofen (ADVIL,MOTRIN) 400 MG tablet Take 1 tablet by mouth 2 (Two) Times a Day.       insulin aspart (novoLOG FLEXPEN) 100 UNIT/ML solution pen-injector sc pen Inject 10 Units under the skin into the appropriate area as directed 2 (Two) Times a Day.       insulin glargine (LANTUS, SEMGLEE) 100 UNIT/ML injection Inject 20 Units under the skin into the appropriate area as directed Daily.       isosorbide mononitrate (IMDUR) 30 MG 24 hr tablet Take 1 tablet by mouth Daily.       lamoTRIgine (LaMICtal) 25 MG tablet Take 2 tablets by mouth 2 (Two) Times a Day.       levothyroxine (SYNTHROID, LEVOTHROID) 75 MCG tablet Take 1 tablet by mouth Daily.       Melatonin 10 MG tablet Take 1 tablet by mouth Every Night.       metFORMIN (GLUCOPHAGE) 500 MG tablet Take 1 tablet by mouth 2 (Two) Times a Day With Meals.       metoprolol succinate XL (TOPROL-XL) 25 MG 24 hr tablet Take 1 tablet by mouth Daily.       multivitamin with minerals tablet tablet Take 1 tablet by mouth Daily.       senna (SENOKOT) 8.6 MG tablet Take 1 tablet by mouth Daily As Needed for Constipation.       sertraline (ZOLOFT) 25 MG tablet Take 1 tablet by mouth Daily.       tamsulosin (FLOMAX) 0.4 MG capsule 24 hr capsule Take 1 capsule by mouth Daily.       traZODone (DESYREL) 150 MG tablet Take 1 tablet by mouth Every Night.         Allergies:  Codeine    Immunization History   Administered Date(s) Administered    Fluzone High Dose =>65 Years ([employer] ONLY) 10/01/2019           REVIEW OF SYSTEMS:    ROS  Constitutional: Negative for diaphoresis and malaise/fatigue.   Cardiovascular:  Positive for chest pain. Negative for near-syncope and palpitations.   Respiratory:  Positive for shortness of breath.    Gastrointestinal:  Negative for nausea and vomiting.   All other systems reviewed and are negative.       Vital Signs  Temp:  [98.1 °F (36.7 °C)-98.6 °F (37 °C)] 98.1 °F (36.7 °C)  Heart Rate:  [68-78] 78  Resp:  [16-19] 18  BP: (106-119)/(58-71) 119/65          Physical Exam:  Physical Exam  Vitals and nursing note reviewed.   Constitutional:       Appearance: Normal appearance.   HENT:      Head: Normocephalic and atraumatic.      Right Ear: External ear normal.      Left Ear: External ear normal.      Nose: Nose normal.      Mouth/Throat:      Mouth: Mucous membranes are moist.   Eyes:      Extraocular Movements: Extraocular movements intact.   Cardiovascular:      Rate and Rhythm: Normal rate and regular rhythm.      Pulses: Normal pulses.      Heart sounds: Normal heart sounds.   Pulmonary:      Effort: Pulmonary effort is normal.   Abdominal:      General: Abdomen is flat. Bowel sounds are normal.      Palpations: Abdomen is soft.   Musculoskeletal:         General: Normal range of motion.      Cervical back: Normal range of motion.   Skin:     General: Skin is warm.   Neurological:      Mental Status: He is alert and oriented to person, place, and time.   Psychiatric:         Behavior: Behavior normal.         Thought Content: Thought content normal.         Judgment: Judgment normal.     Emotional Behavior:    wnl   Debilities:   None    Results Review:    I reviewed the patient's new clinical results.  Lab Results (most recent)       Procedure Component Value Units Date/Time    CANDIDA AURIS SCREEN - Swab, Axilla Right, Axilla Left  and Groin [[id number]]  (Normal) Collected: 03/05/24 1053    Specimen: Swab from Axilla Right, Axilla Left and Groin Updated: 03/06/24 1245     Adrianna Auris Screen Culture No Candida auris isolated at 24 hours    POC Glucose 4x Daily Before Meals & at Bedtime [[id number]]  (Abnormal) Collected: 03/06/24 1142    Specimen: Blood Updated: 03/06/24 1143     Glucose 151 mg/dL      Comment: Serial Number: [id number]:  [id number]       Basic Metabolic Panel [[id number]]  (Normal) Collected: 03/06/24 0553    Specimen: Blood from Arm, Right Updated: 03/06/24 0759     Glucose 98 mg/dL      BUN 23 mg/dL      Creatinine 1.06 mg/dL      Sodium 138 mmol/L      Potassium 3.9 mmol/L      Chloride 103 mmol/L      CO2 25.0 mmol/L      Calcium 8.7 mg/dL      BUN/Creatinine Ratio 21.7     Anion Gap 10.0 mmol/L      eGFR 72.7 mL/min/1.73     Narrative:      GFR Normal >60  Chronic Kidney Disease <60  Kidney Failure <15    The GFR formula is only valid for adults with stable renal function between ages 18 and 70.    CBC & Differential [[id number]]  (Abnormal) Collected: 03/06/24 0553    Specimen: Blood from Arm, Right Updated: 03/06/24 0748    Narrative:      The following orders were created for panel order CBC & Differential.  Procedure                               Abnormality         Status                     ---------                               -----------         ------                     CBC Auto Differential[[id number]]        Abnormal            Final result               Scan Slide[[id number]]                                                                    Please view results for these tests on the individual orders.    CBC Auto Differential [[id number]]  (Abnormal) Collected: 03/06/24 0553    Specimen: Blood from Arm, Right Updated: 03/06/24 0748     WBC 5.30 10*3/mm3      RBC 4.67 10*6/mm3      Hemoglobin 10.3 g/dL      Hematocrit 33.1 %      MCV 70.9 fL      MCH 22.1 pg      MCHC 31.1 g/dL      RDW 17.9 %      RDW-SD  47.7 fl      MPV 7.2 fL      Platelets 330 10*3/mm3      Neutrophil % 57.2 %      Lymphocyte % 31.8 %      Monocyte % 6.4 %      Eosinophil % 4.3 %      Basophil % 0.3 %      Neutrophils, Absolute 3.00 10*3/mm3      Lymphocytes, Absolute 1.70 10*3/mm3      Monocytes, Absolute 0.30 10*3/mm3      Eosinophils, Absolute 0.20 10*3/mm3      Basophils, Absolute 0.00 10*3/mm3      nRBC 0.1 /100 WBC     POC Glucose Once [[id number]]  (Abnormal) Collected: 03/05/24 2056    Specimen: Blood Updated: 03/05/24 2058     Glucose 334 mg/dL      Comment: Serial Number: [id number]:  [id number]       Hemoglobin A1c [[id number]]  (Abnormal) Collected: 03/05/24 0738    Specimen: Blood Updated: 03/05/24 1208     Hemoglobin A1C 8.00 %     Extra Tubes [[id number]] Collected: 03/05/24 0939    Specimen: Blood, Venous Line Updated: 03/05/24 1045    Narrative:      The following orders were created for panel order Extra Tubes.  Procedure                               Abnormality         Status                     ---------                               -----------         ------                     Gold Top - SST[[id number]]                                   Final result                 Please view results for these tests on the individual orders.    Gold Top - SST [[id number]] Collected: 03/05/24 0939    Specimen: Blood Updated: 03/05/24 1045     Extra Tube Hold for add-ons.     Comment: Auto resulted.       High Sensitivity Troponin T 2Hr [[id number]]  (Abnormal) Collected: 03/05/24 0939    Specimen: Blood Updated: 03/05/24 1002     HS Troponin T 26 ng/L      Troponin T Delta -1 ng/L     Narrative:      High Sensitive Troponin T Reference Range:  <14.0 ng/L- Negative Female for AMI  <22.0 ng/L- Negative Male for AMI  >=14 - Abnormal Female indicating possible myocardial injury.  >=22 - Abnormal Male indicating possible myocardial injury.   Clinicians would have to utilize clinical acumen, EKG, Troponin, and serial changes to determine if it  is an Acute Myocardial Infarction or myocardial injury due to an underlying chronic condition.         Protime-INR [[id number]]  (Normal) Collected: 03/05/24 0738    Specimen: Blood Updated: 03/05/24 0813     Protime 10.9 Seconds      INR 1.00    aPTT [[id number]]  (Abnormal) Collected: 03/05/24 0738    Specimen: Blood Updated: 03/05/24 0813     PTT 28.5 seconds     Basic Metabolic Panel [[id number]]  (Normal) Collected: 03/05/24 0739    Specimen: Blood Updated: 03/05/24 0808     Glucose 81 mg/dL      BUN 19 mg/dL      Creatinine 1.11 mg/dL      Sodium 140 mmol/L      Potassium 4.8 mmol/L      Chloride 102 mmol/L      CO2 28.0 mmol/L      Calcium 9.5 mg/dL      BUN/Creatinine Ratio 17.1     Anion Gap 10.0 mmol/L      eGFR 68.8 mL/min/1.73     Narrative:      GFR Normal >60  Chronic Kidney Disease <60  Kidney Failure <15    The GFR formula is only valid for adults with stable renal function between ages 18 and 70.    High Sensitivity Troponin T [[id number]]  (Abnormal) Collected: 03/05/24 0739    Specimen: Blood Updated: 03/05/24 0808     HS Troponin T 27 ng/L     Narrative:      High Sensitive Troponin T Reference Range:  <14.0 ng/L- Negative Female for AMI  <22.0 ng/L- Negative Male for AMI  >=14 - Abnormal Female indicating possible myocardial injury.  >=22 - Abnormal Male indicating possible myocardial injury.   Clinicians would have to utilize clinical acumen, EKG, Troponin, and serial changes to determine if it is an Acute Myocardial Infarction or myocardial injury due to an underlying chronic condition.         BNP [[id number]]  (Normal) Collected: 03/05/24 0739    Specimen: Blood Updated: 03/05/24 0808     proBNP 109.8 pg/mL     Narrative:      This assay is used as an aid in the diagnosis of individuals suspected of having heart failure. It can be used as an aid in the diagnosis of acute decompensated heart failure (ADHF) in patients presenting with signs and symptoms of ADHF to the emergency department (ED). In  addition, NT-proBNP of <300 pg/mL indicates ADHF is not likely.    Age Range Result Interpretation  NT-proBNP Concentration (pg/mL:      <50             Positive            >450                   Gray                 300-450                    Negative             <300    50-75           Positive            >900                  Gray                300-900                  Negative            <300      >75             Positive            >1800                  Gray                300-1800                  Negative            <300    CBC & Differential [[id number]]  (Abnormal) Collected: 03/05/24 0738    Specimen: Blood Updated: 03/05/24 0800    Narrative:      The following orders were created for panel order CBC & Differential.  Procedure                               Abnormality         Status                     ---------                               -----------         ------                     CBC Auto Differential[[id number]]        Abnormal            Final result               Scan Slide[[id number]]                   Normal              Final result                 Please view results for these tests on the individual orders.    Scan Slide [[id number]]  (Normal) Collected: 03/05/24 0738    Specimen: Blood Updated: 03/05/24 0800     RBC Morphology Normal     WBC Morphology Normal     Platelet Morphology Normal    Narrative:      Slide Reviewed     CBC Auto Differential [[id number]]  (Abnormal) Collected: 03/05/24 0738    Specimen: Blood Updated: 03/05/24 0800     WBC 6.40 10*3/mm3      RBC 4.91 10*6/mm3      Hemoglobin 11.1 g/dL      Hematocrit 35.2 %      MCV 71.7 fL      MCH 22.5 pg      MCHC 31.4 g/dL      RDW 17.9 %      RDW-SD 45.5 fl      MPV 6.8 fL      Platelets 370 10*3/mm3      Neutrophil % 64.4 %      Lymphocyte % 27.9 %      Monocyte % 5.0 %      Eosinophil % 2.5 %      Basophil % 0.2 %      Neutrophils, Absolute 4.10 10*3/mm3      Lymphocytes, Absolute 1.80 10*3/mm3      Monocytes, Absolute 0.30  10*3/mm3      Eosinophils, Absolute 0.20 10*3/mm3      Basophils, Absolute 0.00 10*3/mm3      nRBC 0.0 /100 WBC             Imaging Results (Most Recent)       Procedure Component Value Units Date/Time    XR Chest 1 View [[id number]] Collected: 03/05/24 0746     Updated: 03/05/24 0750    Narrative:      XR CHEST 1 VW    Date of Exam: 3/5/2024 7:35 AM EST    Indication: cp    Comparison: 3/14/2022    Findings:  There is unchanged marked elevation of the right diaphragm with mild atelectasis in the right lung base. The left lung is clear. There are no effusions. There is stable borderline cardiomegaly.      Impression:      Impression:  Chronic findings. No acute process.      Electronically Signed: Nneka De Jesus MD    3/5/2024 7:48 AM EST    Workstation ID: [id number]          reviewed    ECG/EMG Results (most recent)       Procedure Component Value Units Date/Time    Adult Transthoracic Echo Complete W/ Cont if Necessary Per Protocol [[id number]] Resulted: 03/05/24 1330     Updated: 03/05/24 1335     LVIDd 4.8 cm      LVIDs 3.5 cm      IVSd 1.00 cm      LVPWd 0.90 cm      FS 27.1 %      IVS/LVPW 1.11 cm      ESV(cubed) 42.9 ml      LV Sys Vol (BSA corrected) 14.4 cm2      EDV(cubed) 110.6 ml      LV Ibarra Vol (BSA corrected) 29.3 cm2      LV mass(C)d 158.8 grams      LVOT area 3.5 cm2      LVOT diam 2.10 cm      EDV(MOD-sp4) 59.8 ml      ESV(MOD-sp4) 29.4 ml      SV(MOD-sp4) 30.4 ml      SI(MOD-sp4) 14.9 ml/m2      EF(MOD-sp4) 50.8 %      MV E max douglas 72.3 cm/sec      MV A max douglas 102.0 cm/sec      MV dec time 0.24 sec      MV E/A 0.71     Pulm A Revs Dur 0.11 sec      LA ESV Index (BP) 14.3 ml/m2      Med Peak E' Douglas 5.3 cm/sec      Lat Peak E' Douglas 6.5 cm/sec      TR max douglas 207.0 cm/sec      Avg E/e' ratio 12.25     SV(LVOT) 52.6 ml      RVIDd 2.7 cm      TAPSE (>1.6) 1.47 cm      RV S' 11.5 cm/sec      LA dimension (2D)  3.4 cm      Pulm Sys Douglas 25.5 cm/sec      Pulm Ibarra Douglas 38.6 cm/sec      Pulm S/D 0.66     Pulm  A Revs Douglas 28.5 cm/sec      LV V1 max 67.6 cm/sec      LV V1 max PG 1.83 mmHg      LV V1 mean PG 1.00 mmHg      LV V1 VTI 15.2 cm      Ao pk douglas 130.0 cm/sec      Ao max PG 6.8 mmHg      Ao mean PG 4.0 mmHg      Ao V2 VTI 28.4 cm      KE(I,D) 1.85 cm2      AI P1/2t 576.1 msec      MV max PG 5.0 mmHg      MV mean PG 3.0 mmHg      MV V2 VTI 30.6 cm      MV P1/2t 75.8 msec      MVA(P1/2t) 2.9 cm2      MVA(VTI) 1.72 cm2      MV dec slope 375.0 cm/sec2      TR max PG 17.1 mmHg      RVSP(TR) 20.1 mmHg      RAP systole 3.0 mmHg      RV V1 max PG 2.11 mmHg      RV V1 max 72.7 cm/sec      RV V1 VTI 15.0 cm      PA V2 max 81.3 cm/sec      PA acc time 0.03 sec      Sinus 3.5 cm      STJ 3.2 cm      EF(MOD-bp) 51.0 %      Dimensionless Index 0.52 (DI)     Narrative:        Left ventricular ejection fraction appears to be 56 - 60%.    Estimated right ventricular systolic pressure from tricuspid   regurgitation is normal (<35 mmHg).    Indications  Chest pain    Technically difficult study.  Mitral valve is structurally normal.  Tricuspid valve is structurally normal.  Aortic valve is structurally normal.  Pulmonic valve could not be well visualized.  No evidence for mitral tricuspid or aortic regurgitation is seen by   Doppler study.  No evidence for pulmonary hypertension is present.  Left atrium is normal in size.  Right atrium is normal in size.  Left ventricle is normal in size and inferior hypokinesis with ejection   fraction of 60%.  Mild grade 1 diastolic dysfunction (MV E/A0.71.)  Right ventricle is normal in size and mildly hypocontractility with TAPSE   1.47 cm..  Atrial septum is intact.  Aorta is normal.  No pericardial effusion or intracardiac thrombus is seen.    Impression  Structurally and functionally normal cardiac valves.  Left ventricular size and inferior hypokinesis with ejection fraction of   60%.  No evidence for pulmonary hypertension is present.  Mild grade 1 diastolic dysfunction is present.  Mild  right ventricular hypocontractility.    Telemetry Scan [[id number]] Resulted: 03/05/24     Updated: 03/05/24 2039    ECG 12 Lead Chest Pain [[id number]] Collected: 03/05/24 0600     Updated: 03/06/24 0716     QT Interval 414 ms      QTC Interval 432 ms     Narrative:      HEART RATE= 65  bpm  RR Interval= 920  ms  MO Interval= 185  ms  P Horizontal Axis= 36  deg  P Front Axis= 23  deg  QRSD Interval= 82  ms  QT Interval= 414  ms  QTcB= 432  ms  QRS Axis= -22  deg  T Wave Axis= 51  deg  - BORDERLINE ECG -  Sinus rhythm  Low voltage, extremity and precordial leads  Consider anterior infarct  When compared with ECG of 14-Mar-2022 13:05:12,  No significant change  Electronically Signed By: Edinson Barry (VENECIA) 06-Mar-2024 07:15:49  Date and Time of Study: 2024-03-05 06:00:50    Telemetry Scan [[id number]] Resulted: 03/05/24     Updated: 03/06/24 0815    Telemetry Scan [[id number]] Resulted: 03/05/24     Updated: 03/06/24 0825    Telemetry Scan [[id number]] Resulted: 03/05/24     Updated: 03/06/24 1004          reviewed    Results for orders placed during the hospital encounter of 05/21/21    Duplex Venous Lower Extremity - Bilateral CAR    Interpretation Summary  · Normal bilateral lower extremity venous duplex scan.      Results for orders placed during the hospital encounter of 03/05/24    Adult Transthoracic Echo Complete W/ Cont if Necessary Per Protocol    Interpretation Summary    Left ventricular ejection fraction appears to be 56 - 60%.    Estimated right ventricular systolic pressure from tricuspid regurgitation is normal (<35 mmHg).    Indications  Chest pain    Technically difficult study.  Mitral valve is structurally normal.  Tricuspid valve is structurally normal.  Aortic valve is structurally normal.  Pulmonic valve could not be well visualized.  No evidence for mitral tricuspid or aortic regurgitation is seen by Doppler study.  No evidence for pulmonary hypertension is present.  Left atrium is normal in  size.  Right atrium is normal in size.  Left ventricle is normal in size and inferior hypokinesis with ejection fraction of 60%.  Mild grade 1 diastolic dysfunction (MV E/A0.71.)  Right ventricle is normal in size and mildly hypocontractility with TAPSE 1.47 cm..  Atrial septum is intact.  Aorta is normal.  No pericardial effusion or intracardiac thrombus is seen.    Impression  Structurally and functionally normal cardiac valves.  Left ventricular size and inferior hypokinesis with ejection fraction of 60%.  No evidence for pulmonary hypertension is present.  Mild grade 1 diastolic dysfunction is present.  Mild right ventricular hypocontractility.      Microbiology Results (last 10 days)       Procedure Component Value - Date/Time    CANDIDA AURIS SCREEN - Swab, Axilla Right, Axilla Left and Groin [[id number]]  (Normal) Collected: 03/05/24 1053    Lab Status: Preliminary result Specimen: Swab from Axilla Right, Axilla Left and Groin Updated: 03/06/24 1245     Adrianna Auris Screen Culture No Candida auris isolated at 24 hours            Assessment & Plan     Chest pain     Chest pain with history of Hypertension      BP Readings from Last 1 Encounters:   03/05/24 130/76            Lab Results   Component Value Date     TROPONINT 26 (H) 03/05/2024     TROPONINT 27 (H) 03/05/2024     TROPONINT <0.010 03/14/2022   -proBNP 109.8  -CMP unremarkable  -CBC showed mild anemia with hemoglobin 11.1 which appears to be at his baseline  -Chest x-ray showed no acute process  -Heart cath from 2020 showed normal coronaries  -EKG: Showed sinus rhythm with heart rate of 65, WA interval 185 and   -In the ED pt given aspirin  -Telemetry  -2D echo performed and shows ef 56-60%. No pulm htn present. Mild grade 1 diastolic dysfunction. Mild right ventricular hypocontractility  - stress test performed and is low risk for ischemia  -Continue Imdur and metoprolol  - cardiology consulted and recommends outpt follow up in 2 weeks      Diabetes mellitus  -Moderately controlled         Lab Results   Component Value Date     GLUCOSE 81 03/05/2024     GLUCOSE 112 (H) 03/16/2022     GLUCOSE 92 03/15/2022     GLUCOSE 304 (H) 03/14/2022   -A1c 8.0  -Hold metformin  -Patient was hypoglycemic upon arrival in the observation unit.  Glucose 39.  He was given D 50 25 g.  Recheck glucose 45, 58, 69.  Will keep monitoring  -Hold mealtime insulin  -Lantus ordered at a decreased dose  -Diabetic diet  -Monitor before meals and at bedtime     Hyperlipidemia  -Continue Lipitor     Hypothyroidism  -Continue Synthroid     Mood disorder  -Continue Zoloft, Lamictal     GERD  -Continue PPI     Overweight  -BMI 27.26  -Lifestyle modifications    I discussed the patients findings and my recommendations with patient and nursing staff.     Discharge Diagnosis:      Chest pain      Hospital Course  Patient is a 78 y.o. male presented with chest pain. Er evaluated pt and admitted to observation. Labs including cbc, cmp, bnp are unremarkable. Troponin is stable. Chest xray is normal. EKG sinus rate of 65. A1c is 8. Stress test performed and is low risk for ischemia. Echo performed and ef 60%, Mild grade 1 diastolic dysfunction. Mild right ventricular hypocontractility. Cardiology consulted and recommends follow up in 2 weeks as outpt. Pt to return to long term care. Discharge discussed with pt and he is agreeable to plan. Instructed pt to return to er if symptoms reoccur or worsen.      Past Medical History:     Past Medical History:   Diagnosis Date    Anemia     Angina pectoris     Anxiety     Arthritis     OSTEO    Bipolar 1 disorder     BPH (benign prostatic hyperplasia)     Cellulitis of left lower extremity 05/03/2021    CKD (chronic kidney disease)     Constipation     Dementia     Dementia     Depression     Diabetes mellitus     type 2    Disease of thyroid gland     GERD (gastroesophageal reflux disease)     Hemorrhage of anus     Hypertension     Parkinson's  disease     Short-term memory loss        Past Surgical History:     Past Surgical History:   Procedure Laterality Date    APPENDECTOMY      CARDIAC CATHETERIZATION N/A 11/18/2020    Procedure: Left Heart Cath and coronary angiogram;  Surgeon: Yanci Howard MD;  Location: [facility] CATH INVASIVE LOCATION;  Service: Cardiovascular;  Laterality: N/A;    CARDIAC CATHETERIZATION N/A 11/18/2020    Procedure: Coronary angiography;  Surgeon: Yanci Howard MD;  Location: [facility] CATH INVASIVE LOCATION;  Service: Cardiovascular;  Laterality: N/A;    CARDIAC CATHETERIZATION N/A 11/18/2020    Procedure: Left ventriculography;  Surgeon: Yanci Howard MD;  Location: [facility] CATH INVASIVE LOCATION;  Service: Cardiovascular;  Laterality: N/A;    CHOLECYSTECTOMY      COLONOSCOPY      COLONOSCOPY N/A 8/10/2023    Procedure: COLONOSCOPY;  Surgeon: Claudio Pimentel MD;  Location: [facility] ENDOSCOPY;  Service: Gastroenterology;  Laterality: N/A;  Internal hemorrhoids, extensive diverticulosis    EYE SURGERY      kallie cataracts w iol    FEMUR FRACTURE SURGERY Left     1/2018    LEG DEBRIDEMENT Left 9/15/2020    Procedure: LOWER EXTREMITY DEBRIDEMENT;  Surgeon: Joslyn Mistry MD;  Location: [facility] OR;  Service: General;  Laterality: Left;    MULTIPLE TOOTH EXTRACTIONS      ALL TEETH REMOVED    STOMACH SURGERY      gastric ulcer    TOTAL HIP ARTHROPLASTY Right 4/23/2019    Procedure: TOTAL HIP ARTHROPLASTY POSTERIOR;  Surgeon: Valente Giang MD;  Location: [facility] OR;  Service: Orthopedics    TOTAL KNEE ARTHROPLASTY Left 8/31/2018    Procedure: REMOVAL OF LEFT DISTAL FEMUR PLATE AND SCREWS WITH COMPLEX TOTAL KNEE REPLACEMENT DISTAL FEMUR HINGED;  Surgeon: Valente Giang MD;  Location: [facility] OR;  Service: Orthopedics    TOTAL KNEE ARTHROPLASTY REVISION Left 6/4/2021    Procedure: SINGLE STAGE LEFT KNEE REVISION;  Surgeon: Valente Giang MD;  Location: [facility] OR;  Service: Orthopedics;   Laterality: Left;       Social History:   Social History     Socioeconomic History    Marital status:    Tobacco Use    Smoking status: Never    Smokeless tobacco: Never   Vaping Use    Vaping status: Never Used   Substance and Sexual Activity    Alcohol use: Not Currently    Drug use: No    Sexual activity: Defer       Procedures Performed         Consults:   Consults       Date and Time Order Name Status Description    3/5/2024  9:42 AM Inpatient Cardiology Consult      3/5/2024  9:23 AM Cardiology (on-call MD unless specified)              Condition on Discharge:     Stable    Discharge Disposition      Discharge Medications     Discharge Medications        ASK your doctor about these medications        Instructions Start Date   acetaminophen 325 MG tablet  Commonly known as: TYLENOL   650 mg, Oral, Every 8 Hours PRN      atorvastatin 20 MG tablet  Commonly known as: LIPITOR  Ask about: Which instructions should I use?   20 mg, Oral, Daily      benzonatate 100 MG capsule  Commonly known as: TESSALON   100 mg, Oral, Every 6 Hours PRN      budesonide-formoterol 160-4.5 MCG/ACT inhaler  Commonly known as: SYMBICORT   2 puffs, Inhalation, 2 Times Daily - RT      dextrose 40 % gel  Commonly known as: GLUTOSE   15 g, Oral, Every 1 Hour PRN, And pt unable to swallow       Diclofenac Sodium 1 % gel gel  Commonly known as: VOLTAREN   4 g, Topical, 2 Times Daily      famotidine 20 MG tablet  Commonly known as: PEPCID   20 mg, Oral, 2 Times Daily Before Meals      guaifenesin 100 MG/5ML liquid  Commonly known as: ROBITUSSIN   200 mg, Oral, Every 6 Hours PRN      ibuprofen 400 MG tablet  Commonly known as: ADVIL,MOTRIN   400 mg, Oral, 2 Times Daily      insulin aspart 100 UNIT/ML solution pen-injector sc pen  Commonly known as: novoLOG FLEXPEN   10 Units, Subcutaneous, 2 Times Daily      insulin glargine 100 UNIT/ML injection  Commonly known as: LANTYRELL SEMGLEE  Ask about: Which instructions should I use?   20 Units,  Subcutaneous, Daily      isosorbide mononitrate 30 MG 24 hr tablet  Commonly known as: IMDUR   30 mg, Oral, Daily      lamoTRIgine 25 MG tablet  Commonly known as: LaMICtal   50 mg, Oral, 2 Times Daily      levothyroxine 75 MCG tablet  Commonly known as: SYNTHROID, LEVOTHROID   75 mcg, Oral, Daily      Melatonin 10 MG tablet   10 mg, Oral, Nightly      metFORMIN 500 MG tablet  Commonly known as: GLUCOPHAGE   500 mg, Oral, 2 Times Daily With Meals      metoprolol succinate XL 25 MG 24 hr tablet  Commonly known as: TOPROL-XL   25 mg, Oral, Daily      multivitamin with minerals tablet tablet   1 tablet, Oral, Daily      senna 8.6 MG tablet  Commonly known as: SENOKOT   1 tablet, Oral, Daily PRN      sertraline 25 MG tablet  Commonly known as: ZOLOFT   25 mg, Oral, Daily      tamsulosin 0.4 MG capsule 24 hr capsule  Commonly known as: FLOMAX   1 capsule, Oral, Daily      traZODone 150 MG tablet  Commonly known as: DESYREL   150 mg, Oral, Nightly               Discharge Diet:     Activity at Discharge:     Follow-up Appointments  No future appointments.      Test Results Pending at Discharge  Pending Labs       Order Current Status    [facility]IS SCREEN - Swab, Axilla Right, Axilla Left and Groin Preliminary result             Risk for Readmission (LACE) Score: 7 (3/6/2024  6:00 AM)      Less Than 30 minutes spent in discharge activities for this patient    Signature:Electronically signed by Yadira Reyes PA-C, 03/06/24, 1:58 PM EST.

## 2024-03-06 NOTE — PLAN OF CARE
----- Message from Karine Odell sent at 10/17/2023 11:55 AM CDT -----  Refill Amlodipine [facility]. Pt's # [phone] GH      Goal Outcome Evaluation:      A/O x4 no C/O pain,n,v. Had stress test awaiting results

## 2024-03-06 NOTE — CASE MANAGEMENT/SOCIAL WORK
Case Management Discharge Note      Final Note: [facility]         Selected Continued Care - Discharged on 3/6/2024 Admission date: 3/5/2024 - Discharge disposition: Long Term Care (DC - External)      Destination Coordination complete.      Service Provider Selected Services Address Phone Fax Patient Preferred    [address] [facility] [address] [facility] [place] [address] [phone] --                      Transportation Services  Ambulance: [facility]    Final Discharge Disposition Code: 04 - intermediate care facility

## 2024-03-06 NOTE — SIGNIFICANT NOTE
03/06/24 1407   OTHER   Discipline occupational therapist   Rehab Time/Intention   Session Not Performed other (see comments)  (Pt has hospital dc orders and does not with for therapy while in acute care.)

## 2024-03-06 NOTE — NURSING NOTE
Pt discharge from hospital at 315 pm. Report given to Yomaira at [facility].

## 2024-03-06 NOTE — CASE MANAGEMENT/SOCIAL WORK
Continued Stay Note  [facility]     Patient Name: Chris Ferguson  MRN: [id number]  Today's Date: 3/6/2024    Admit Date: 3/5/2024    Plan: Return to [facility] LTC. No precert or PASRR needed. Will need ambulance transport for bedbound.   Discharge Plan       Row Name 03/06/24 1040       Plan    Plan Comments Barriers: Cardiology following with stress test today.   AT discharge Mr. Ferguson will return to [facility] where he resides long term                          Terra PRATHER,RN Case Manager  [facility]  Phone: Desk- [phone] cell- [phone]

## 2024-03-06 NOTE — CASE MANAGEMENT/SOCIAL WORK
Continued Stay Note  [facility]     Patient Name: Chris Ferguson  MRN: [id number]  Today's Date: 3/6/2024    Admit Date: 3/5/2024    Plan: Return to [facility]. No precert or PASRR needed. Will need ambulance transport for bedbound.   Discharge Plan       Row Name 03/06/24 1409       Plan    Plan Comments CM completed Medical Necessity Form for ambulance transport back to his home at [facility] and placed request in [facility] for [facility]. Nursing informed. CM informed [facility] of return                          Terra PRATHER,RN Case Manager  [facility]  Phone: Desk- [phone] cell- [phone]

## 2024-03-06 NOTE — PLAN OF CARE
Problem: Adult Inpatient Plan of Care  Goal: Plan of Care Review  Outcome: Ongoing, Progressing  Goal: Patient-Specific Goal (Individualized)  Outcome: Ongoing, Progressing  Goal: Absence of Hospital-Acquired Illness or Injury  Outcome: Ongoing, Progressing  Intervention: Identify and Manage Fall Risk  Recent Flowsheet Documentation  Taken 3/6/2024 0100 by Deanne Millan RN  Safety Promotion/Fall Prevention:   safety round/check completed   nonskid shoes/slippers when out of bed   lighting adjusted  Intervention: Prevent Skin Injury  Recent Flowsheet Documentation  Taken 3/6/2024 0100 by Deanne Millan RN  Body Position: supine  Goal: Optimal Comfort and Wellbeing  Outcome: Ongoing, Progressing  Goal: Readiness for Transition of Care  Outcome: Ongoing, Progressing   Goal Outcome Evaluation:

## 2024-03-10 LAB — BACTERIA ISLT: NORMAL

## 2024-04-05 ENCOUNTER — TELEPHONE (OUTPATIENT)
Dept: CARDIOLOGY | Facility: CLINIC | Age: 79
End: 2024-04-05
Payer: MEDICARE

## 2024-04-05 NOTE — TELEPHONE ENCOUNTER
Spoke to Augusto. Explained his [employer] plus requires a referral. PCP would need to get that from [place]. Per Augusto patient lives there, not sure who his PCP is, they have PCP in house who can write a script saying he needs to see Dr. Howard. I told her the referral has to come from PCP/[facility] she said they would not be able to do that. I talked to Holly and she advised we should cancel the apt for now.  If patient comes in and the doctor wants any procedures it would not be covered. Will keep patient's info and call augusto once we get an answer on [employer] plus.

## 2024-04-05 NOTE — TELEPHONE ENCOUNTER
Caller: PORTIA    Relationship: Other    Best call back number: [phone]    What is the best time to reach you: ANYTIME     What was the call regarding: PORTIA WITH [facility] CALLED IN REGARDS TO THE LETTER THEY RECEIVED FOR PT ABOUT HIS UPCOMING APPT ON 4.9.24. IT HAS TO DO WITH A REFERRAL FROM PCP SINCE PT HAS [employer] MEDICARE. PLEASE CALL BACK TO DISCUSS.    Is it okay if the provider responds through MyChart: CALL BACK

## (undated) DEVICE — NDL SPINE 22G 31/2IN BLK

## (undated) DEVICE — KT DRN EVAC WND PVC PCH WTROC RND 10F400

## (undated) DEVICE — SUT VIC 0 CT1 36IN J946H

## (undated) DEVICE — CANISTER FOR NEGATIVE PRESSURE WOUND THERAPY POWERED SUCTION PUMP: Brand: INVIA LIBERTY CANISTER 0.3L

## (undated) DEVICE — UNDERCAST PADDING: Brand: DEROYAL

## (undated) DEVICE — PREP IM ENCHANCED TOTAL HIP BONE                                    PREPARATION KIT: Brand: PREP-IM

## (undated) DEVICE — DRAPE,REIN 53X77,STERILE: Brand: MEDLINE

## (undated) DEVICE — 3M™ STERI-STRIP™ REINFORCED ADHESIVE SKIN CLOSURES, R1547, 1/2 IN X 4 IN (12 MM X 100 MM), 6 STRIPS/ENVELOPE: Brand: 3M™ STERI-STRIP™

## (undated) DEVICE — SKIN PREP TRAY W/CHG: Brand: MEDLINE INDUSTRIES, INC.

## (undated) DEVICE — DRAPE,U/ SHT,SPLIT,PLAS,STERIL: Brand: MEDLINE

## (undated) DEVICE — DRSNG WND GEL FIBR OPTICELL AG PLS W/SLV LF 4X5IN  STRL

## (undated) DEVICE — GLV SURG SIGNATURE ESSENTIAL PF LTX SZ8

## (undated) DEVICE — PROXIMATE RH ROTATING HEAD SKIN STAPLERS (35 WIDE) CONTAINS 35 STAINLESS STEEL STAPLES: Brand: PROXIMATE

## (undated) DEVICE — CULT AER/ANAEROB FASTIDIOUS BACT

## (undated) DEVICE — PINNACLE INTRODUCER SHEATH: Brand: PINNACLE

## (undated) DEVICE — INVIA FOAM DRESSING KIT WITH FITPAD - MEDIUMSALES NUMBER: 087.6222 3 PCS / 087.6226 15 PCS: Brand: INVIA FOAM DRESSING KIT

## (undated) DEVICE — ADHS LIQ MASTISOL 2/3ML

## (undated) DEVICE — PK HIP TOTL 40

## (undated) DEVICE — GLV SURG TRIUMPH CLASSIC PF LTX 8 STRL

## (undated) DEVICE — PK KN TOTL 40

## (undated) DEVICE — APPL CHLORAPREP HI/LITE 26ML ORNG

## (undated) DEVICE — COAXIAL FEMORAL CANAL TIP

## (undated) DEVICE — CONTN STRL 32OZ

## (undated) DEVICE — GLV SURG BIOGEL LTX PF 8

## (undated) DEVICE — SUT VIC 1 CT1 36IN J947H

## (undated) DEVICE — ANTIBACTERIAL UNDYED BRAIDED (POLYGLACTIN 910), SYNTHETIC ABSORBABLE SUTURE: Brand: COATED VICRYL

## (undated) DEVICE — OPTIFOAM GENTLE SA, POSTOP, 4X12: Brand: MEDLINE

## (undated) DEVICE — PK EXTREM LOWR 70

## (undated) DEVICE — SOL NACL 0.9PCT 100ML SGL

## (undated) DEVICE — SUT VIC 3/0 CTI 36IN J944H

## (undated) DEVICE — DRAPE,LAPAROTOMY,PED,STERILE: Brand: MEDLINE

## (undated) DEVICE — CATH DIAG IMPULSE FL4 5F 100CM

## (undated) DEVICE — IMMOB KN 3PNL DLX CANVS 22IN BLU

## (undated) DEVICE — APPL CHLORAPREP W/TINT 26ML ORNG

## (undated) DEVICE — HOLDER: Brand: DEROYAL

## (undated) DEVICE — GW DIAG EMERALD HEPCOAT MOVE JTIP STD .035 3MM 150CM

## (undated) DEVICE — DRSNG SURESITE WNDW 2.38X2.75

## (undated) DEVICE — SYR CONTRL LUERLOK 10CC

## (undated) DEVICE — GLV SURG PREMIERPRO ORTHO LTX PF SZ8 BRN

## (undated) DEVICE — PICO 7 10CM X 30CM: Brand: PICO™ 7

## (undated) DEVICE — GLV SURG BIOGEL LTX PF 6 1/2

## (undated) DEVICE — SOL ISO/ALC RUB 70PCT 4OZ

## (undated) DEVICE — CEMENT MIXING SYSTEM WITH FEMORAL BREAKWAY NOZZLE: Brand: REVOLUTION

## (undated) DEVICE — DUAL CUT SAGITTAL BLADE

## (undated) DEVICE — BNDG ELAS ELITE V/CLOSE 6IN 5YD LF STRL

## (undated) DEVICE — MAT FLR ABSORBENT LG 4FT 10 2.5FT

## (undated) DEVICE — DRSNG GZ PETROLTM XEROFORM CURAD 1X8IN STRL

## (undated) DEVICE — SPNG GZ WOVN 4X4IN 12PLY 10/BX STRL

## (undated) DEVICE — ZIPPERED TOGA, 2X LARGE: Brand: FLYTE, SURGICOOL

## (undated) DEVICE — VERSAJET II HYDROSURGERY SYSTEM HANDSET, 45DEG 14MM EXACT: Brand: VERSAJET

## (undated) DEVICE — GUIDE WIRE, BALL-TIPPED, STERILE

## (undated) DEVICE — SUT ETHIB 0 CT1 CR8 18IN CX21D

## (undated) DEVICE — ENCORE® LATEX ORTHO SIZE 8, STERILE LATEX POWDER-FREE SURGICAL GLOVE: Brand: ENCORE

## (undated) DEVICE — PK ENDO GI 50

## (undated) DEVICE — TOWEL,OR,DSP,ST,BLUE,STD,4/PK,20PK/CS: Brand: MEDLINE

## (undated) DEVICE — CATH DIAG IMPULSE FR4 5F 100CM

## (undated) DEVICE — CATH DIAG IMPULSE PIG 5F 100CM

## (undated) DEVICE — TRAP FLD MINIVAC MEGADYNE 100ML

## (undated) DEVICE — GLV SURG TRIUMPH CLASSIC PF LTX 6.5 STRL

## (undated) DEVICE — PENCL E/S ULTRAVAC TELESCP NOSE HOLSTR 10FT

## (undated) DEVICE — DRAPE,HIP,W/POUCHES,STERILE: Brand: MEDLINE

## (undated) DEVICE — 3M™ IOBAN™ 2 ANTIMICROBIAL INCISE DRAPE 6640EZ: Brand: IOBAN™ 2

## (undated) DEVICE — CONTAINER,SPECIMEN,OR STERILE,4OZ: Brand: MEDLINE

## (undated) DEVICE — PAD,ABDOMINAL,8"X10",ST,LF: Brand: MEDLINE

## (undated) DEVICE — SUT PDS 2 0 CT1 27IN CLR Z259H

## (undated) DEVICE — PIN HOLD TEMP NOHEAD FLUT 1/8X3.5IN

## (undated) DEVICE — THIN OFFSET (13.0 X 0.38 X 39.0MM)

## (undated) DEVICE — RECIPROCATING BLADE, DOUBLE SIDED, OFFSET  (70.0 X 0.64 X 12.6MM)

## (undated) DEVICE — ENCORE® LATEX ORTHO SIZE 6.5, STERILE LATEX POWDER-FREE SURGICAL GLOVE: Brand: ENCORE

## (undated) DEVICE — SPNG LAP 18X18IN LF STRL PK/5

## (undated) DEVICE — PK TRY HEART CATH 50

## (undated) DEVICE — 3M™ IOBAN™ 2 ANTIMICROBIAL INCISE DRAPE 6650EZ: Brand: IOBAN™ 2

## (undated) DEVICE — HEWSON SUTURE RETRIEVER: Brand: HEWSON SUTURE RETRIEVER

## (undated) DEVICE — RECIPROCATING BLADE HEAVY DUTY LONG, OFFSET  (77.6 X 0.77 X 11.2MM)

## (undated) DEVICE — 1000 S-DRAPE TOWEL DRAPE 10/BX: Brand: STERI-DRAPE™

## (undated) DEVICE — SPIRAL GIGLI SAW BLADE 20 IN